# Patient Record
Sex: MALE | Race: WHITE | NOT HISPANIC OR LATINO | Employment: PART TIME | URBAN - METROPOLITAN AREA
[De-identification: names, ages, dates, MRNs, and addresses within clinical notes are randomized per-mention and may not be internally consistent; named-entity substitution may affect disease eponyms.]

---

## 2019-06-11 ENCOUNTER — TELEPHONE (OUTPATIENT)
Dept: PAIN MEDICINE | Facility: CLINIC | Age: 84
End: 2019-06-11

## 2019-06-11 NOTE — TELEPHONE ENCOUNTER
Staff contacted and spoke with patient in regards to a staff member reaching out to him to confirm his appointment date & time with odette Jimenez for 06.13.19 at 2:30p    Patient verbalized understanding and has confirmed appointment

## 2019-06-11 NOTE — TELEPHONE ENCOUNTER
----- Message from Amber Gillis sent at 6/11/2019 10:25 AM CDT -----  Contact: AMY NERI [[id number]]  Type:  Patient Returning Call    Who Called: AMY NERI [[id number]]    Who Left Message for Patient: Maida     Does the patient know what this is regarding?:yes    Would the patient rather a call back or a response via [facility]? Call back     Best Call Back Number:[phone]    Additional Information: Patient says maida had more questions regarding appt

## 2019-06-11 NOTE — TELEPHONE ENCOUNTER
"Called and spoke with pt to confirm appointment on 6/13/19@2:30 pm.     Pt informed staff he will call back to go over further appointment information because "now is not a good time."  " - A1c 4.8  - insulin LD SSI  - will monitor for hyperglycemia once tube feeds are started.   - in the meantime POCT q6h

## 2019-06-13 ENCOUNTER — OFFICE VISIT (OUTPATIENT)
Dept: PAIN MEDICINE | Facility: CLINIC | Age: 84
End: 2019-06-13
Attending: ANESTHESIOLOGY
Payer: MEDICARE

## 2019-06-13 VITALS
SYSTOLIC BLOOD PRESSURE: 137 MMHG | RESPIRATION RATE: 18 BRPM | TEMPERATURE: 97 F | DIASTOLIC BLOOD PRESSURE: 57 MMHG | HEART RATE: 54 BPM

## 2019-06-13 DIAGNOSIS — G03.9 ARACHNOIDITIS: ICD-10-CM

## 2019-06-13 DIAGNOSIS — G89.4 CHRONIC PAIN SYNDROME: Primary | ICD-10-CM

## 2019-06-13 PROCEDURE — 99999 PR PBB SHADOW E&M-EST. PATIENT-LVL III: CPT | Mod: PBBFAC,,, | Performed by: ANESTHESIOLOGY

## 2019-06-13 PROCEDURE — 99213 OFFICE O/P EST LOW 20 MIN: CPT | Mod: PBBFAC | Performed by: ANESTHESIOLOGY

## 2019-06-13 PROCEDURE — 99204 OFFICE O/P NEW MOD 45 MIN: CPT | Mod: S$PBB,GC,, | Performed by: ANESTHESIOLOGY

## 2019-06-13 PROCEDURE — 99204 PR OFFICE/OUTPT VISIT, NEW, LEVL IV, 45-59 MIN: ICD-10-PCS | Mod: S$PBB,GC,, | Performed by: ANESTHESIOLOGY

## 2019-06-13 PROCEDURE — 99999 PR PBB SHADOW E&M-EST. PATIENT-LVL III: ICD-10-PCS | Mod: PBBFAC,,, | Performed by: ANESTHESIOLOGY

## 2019-06-13 RX ORDER — LEVOTHYROXINE SODIUM 100 UG/1
100 TABLET ORAL
COMMUNITY

## 2019-06-13 RX ORDER — SIMVASTATIN 20 MG/1
20 TABLET, FILM COATED ORAL NIGHTLY
COMMUNITY

## 2019-06-13 RX ORDER — FLURAZEPAM HYDROCHLORIDE 15 MG/1
30 CAPSULE ORAL DAILY PRN
COMMUNITY
End: 2020-08-03

## 2019-06-13 RX ORDER — ERGOCALCIFEROL (VITAMIN D2) 10 MCG
50000 TABLET ORAL
Status: ON HOLD | COMMUNITY
End: 2022-03-09 | Stop reason: HOSPADM

## 2019-06-13 RX ORDER — CELECOXIB 200 MG/1
200 CAPSULE ORAL DAILY
COMMUNITY
End: 2021-08-26 | Stop reason: SDUPTHER

## 2019-06-13 RX ORDER — FINASTERIDE 5 MG/1
5 TABLET, FILM COATED ORAL DAILY
COMMUNITY
End: 2022-03-02

## 2019-06-13 RX ORDER — ALPRAZOLAM 1 MG/1
2 TABLET ORAL
Status: ON HOLD | COMMUNITY
End: 2023-02-13 | Stop reason: HOSPADM

## 2019-06-13 RX ORDER — TRAMADOL HYDROCHLORIDE 50 MG/1
TABLET ORAL
COMMUNITY
Start: 2019-04-12 | End: 2019-09-19 | Stop reason: SDUPTHER

## 2019-06-13 NOTE — PROGRESS NOTES
Chronic Pain - New Consult    Referring Physician: Dr Luz  Chief Complaint:   Chief Complaint   Patient presents with    Leg Pain     righ    Hip Pain     right        SUBJECTIVE:    Jefferson Boogie presents to the clinic for the evaluation of right leg and hip pain. The pain started 1.5 years ago following non related injury and symptoms have been worsening.The pain is located in the hip and gluteus area and radiates to the right calf.  The pain is described as burning, sharp and intermitten and is rated as 5/10. The pain is rated with a score of  1/10 on the BEST day and a score of 9/10 on the WORST day.  Symptoms interfere with daily activity and work. The pain is exacerbated by Sitting, Laying, Walking and Night Time.  The pain is mitigated by medications and rest. He reports spending 0 hours per day reclining. The patient reports 3 hours of uninterrupted sleep per night.  Pain imporves as the day progresses.  It interferes with activities such as golfing and exercising.    He was previously under the care of Dr. Kumar, Dr. Medina, and underwent ESIs for radicular back pain, however it was noted on MRI the interval development of arachnoiditis.  He subsequently underwent evaluation at the [facility] by Dr. Luz - his note is available in care everywhere.  Neuromodulation therapy was recommended at that time and referral was placed to Dr. Jimenez.    Patient is here for evaluation regarding spinal cord stimulator trial for arachnoiditis      Patient reports night fever/night sweats, urinary incontinence and bowel incontinence.    Physical Therapy/Home Exercise: yes, exercise     Pain Disability Index Review:  Last 3 PDI Scores 6/13/2019   Pain Disability Index (PDI) 18       Pain Medications:    - Opioids: Ultram (Tramadol HCL)  - Adjuvant Medications: none  - Anti-Coagulants: none  - Others: see med list     report:  Reviewed and consistent with medication use as prescribed.    Pain Procedures:    Lumbar injections at [facility] 2018    Imaging:  MRI OF THE LUMBAR SPINE WITH AND WITHOUT I.V. CONTRAST:    CPT CODE: 08885    INDICATION: Low back pain, rapidly progressive neuro deficit    COMPARISON STUDY: Unenhanced MRI lumbar spine of 4/8/2019 and 3/26/2018    TECHNIQUE:This study was performed on the i4.ms 1.5 [place] high field MR unit. Multislice, multisequence images of the lumbar spine were obtained in multiple projections both before and following intravenous administration of the same 9 mL of gadavist contrast that was utilized to obtain the patient's MRI of the thoracic spine.. The intravenous contrast was injected in the patient's left antecubital fossa.    FINDINGS: There are 5 lumbar type vertebral bodies lumbar vertebral body height and alignment are maintained. The signal from the lumbar vertebra demonstrates an admixture of red and yellow marrow. There are some Modic type degenerative signal changes involving the anterior inferior aspect of L5. There is slight anterior disc bulging at the L5-S1 level. All of the lumbar discs are desiccated with severe narrowing of the L2-L3 disc space, moderate narrowing of the L1-2 disc space posteriorly, moderate narrowing of the L5-S1 disc space and mild narrowing of the L3-4 and L4-5 disc spaces.    L5-S1 level: There is bilateral facet joint arthropathy with a slight amount of fluid in both facet joints. There is bilateral, right greater left, foraminal narrowing but no central canal stenosis. There is a mild broad-based posterior protrusion of the L5-S1 disc that extends towards the left-sided foramen and into and possibly through the right-sided foramen. This is similar to what was seen on the prior exam. Incidental note is made of a mild amount of epidural fat that is deposited along the right anterior and left anterior aspect of thecal sac at the L5-S1 disc space level.    L4-L5 level: There is bilateral, left greater than right, facet joint arthropathy without  central canal stenosis. There is no bony foraminal narrowing. There is a slight to mild broad-based posterior protrusion of the disc that extends to the medial foramen bilaterally.    L3-L4 level: There is bilateral facet joint arthropathy with slight ligament flavum redundancy but no bony foraminal narrowing or bony central canal stenosis. There is a slight to mild broad-based posterior protrusion of the disc that extends towards the right-sided foramen and into the anteromedial left-sided foramen.    L2-L3 level: There is bilateral facet joint arthropathy without bony central canal stenosis or bony foraminal narrowing. There is a slight to mild bilobed posterior protrusion of the disc at the level of the anteromedial foramen bilaterally and this is best seen on the axial images.    L1-L2 level: There is bilateral facet arthropathy without bony foraminal narrowing or bony central canal stenosis and the posterior disc margin is unremarkable.    The tip of the conus medullaris extends down to the level of the superior endplate of L1 and the signal from the visualized conus is unremarkable. There is clumping of the cauda equina nerve rootlets throughout the distal thecal sac consistent with arachnoiditis.  Following contrast administration, there is no abnormal enhancement of any of the bony or soft tissue elements of the lumbar spine except for possibly one or 2 facet joints.    IMPRESSION:   1. No abnormal enhancement of any bony or soft tissue elements of the lumbar spine except for possibly one or 2 facet joints. Clumping of the nerve rootlets of the cauda equina consistent with arachnoiditis.  3. Desiccation of all of the lumbar disks with multilevel disc space narrowing, multilevel facet joint arthropathy, but no central canal stenosis.  4. Modic type degenerative signal changes in the anterior inferior aspect of L5.  5. There is a mild amount of epidural fat deposit along the right anterior and left anterior  aspect of the thecal sac at the L5-S1 level..  6. Other findings as discussed above.    THE RESULTS OF THE STUDY WERE DISCUSSED BY TELEPHONE WITH DR. PAMELLA EDGAR in the [facility]  Critical Result system on 4/24/2019 12:52 PM CDT, Message ID [id number].    No past medical history on file.  No past surgical history on file.  Social History     Socioeconomic History    Marital status:      Spouse name: Not on file    Number of children: Not on file    Years of education: Not on file    Highest education level: Not on file   Occupational History    Not on file   Social Needs    Financial resource strain: Not on file    Food insecurity:     Worry: Not on file     Inability: Not on file    Transportation needs:     Medical: Not on file     Non-medical: Not on file   Tobacco Use    Smoking status: Not on file   Substance and Sexual Activity    Alcohol use: Not on file    Drug use: Not on file    Sexual activity: Not on file   Lifestyle    Physical activity:     Days per week: Not on file     Minutes per session: Not on file    Stress: Not on file   Relationships    Social connections:     Talks on phone: Not on file     Gets together: Not on file     Attends Evangelical service: Not on file     Active member of club or organization: Not on file     Attends meetings of clubs or organizations: Not on file     Relationship status: Not on file   Other Topics Concern    Not on file   Social History Narrative    Not on file     No family history on file.    Review of patient's allergies indicates:  No Known Allergies    Current Outpatient Medications   Medication Sig    ALPRAZolam (XANAX) 1 MG tablet Take 1 mg by mouth.    celecoxib (CELEBREX) 200 MG capsule Take 200 mg by mouth 2 (two) times daily.    ergocalciferol, vitamin D2, 400 unit Tab Take 50,000 mg by mouth.    finasteride (PROSCAR) 5 mg tablet Take 5 mg by mouth.    flurazepam (DALMANE) 15 MG Cap Take 30 mg by mouth daily as  needed.    levothyroxine (SYNTHROID) 100 MCG tablet Take 100 mcg by mouth.    simvastatin (ZOCOR) 20 MG tablet Take 20 mg by mouth.    traMADol (ULTRAM) 50 mg tablet TAKE 1 TABLET BY MOUTH 2 TO 3 TIMES A DAY AS NEEDED FOR SEVERE PAIN     No current facility-administered medications for this visit.        REVIEW OF SYSTEMS:    GENERAL:  No weight loss, malaise or fevers.  HEENT:  Negative for frequent or significant headaches.  NECK:  Negative for lumps, goiter, pain and significant neck swelling.  RESPIRATORY:  Negative for cough, wheezing or shortness of breath.  CARDIOVASCULAR:  Negative for chest pain, leg swelling or palpitations.  + HLD  GI:  Negative for abdominal discomfort, blood in stools or black stools or change in bowel habits.  MUSCULOSKELETAL:  See HPI.  SKIN:  Negative for lesions, rash, and itching.  PSYCH:  Positive for sleep disturbance, mood disorder and recent psychosocial stressors.   +BZD for sleep  HEMATOLOGY/LYMPHOLOGY:  Negative for prolonged bleeding, bruising easily or swollen nodes.  NEURO:   No history of headaches, syncope, paralysis, seizures or tremors.  All other reviewed and negative other than HPI.    OBJECTIVE:    BP (!) 137/57   Pulse (!) 54   Temp 96.8 °F (36 °C)   Resp 18     PHYSICAL EXAMINATION:    General appearance: Well appearing, in no acute distress, alert and oriented x3.  Psych:  Mood and affect appropriate.  Skin: Skin color, texture, turgor normal, no rashes or lesions, in both upper and lower body.  Head/face:  Normocephalic, atraumatic. No palpable lymph nodes.  Neck: No pain to palpation over the cervical paraspinous muscles. Spurling Negative. No pain with neck flexion, extension, or lateral flexion.   Cor: RRR  Pulm: CTA  GI:  Soft and non-tender.  Back: Straight leg raising in the sitting and supine positions is negative to radicular pain. Poorly localized pain to palpation over the spine or costovertebral angles. Normal range of motion without pain  reproduction.Positive axial loading test bilateral.  Positive FABERE,Ganselin and Yeoman's test on the both side.negative FADIR  Extremities: Peripheral joint ROM is full and pain free without obvious instability or laxity in all four extremities. No deformities, edema, or skin discoloration. Good capillary refill.  Musculoskeletal: Shoulder, hip, sacroiliac and knee provocative maneuvers are negative. Bilateral upper and lower extremity strength is normal and symmetric.  No atrophy or tone abnormalities are noted.  Neuro: Bilateral upper and lower extremity coordination and muscle stretch reflexes are physiologic and symmetric.  Plantar response are downgoing. No loss of sensation is noted.  Gait:  Antalgic    ASSESSMENT: 84 y.o. year old male with low back pain, consistent with below:     1. Chronic pain syndrome  Ambulatory consult to Psychology   2. Arachnoiditis  Ambulatory consult to Psychology         PLAN:     - I have stressed the importance of physical activity and a home exercise plan to help with pain and improve health.  - Patient can continue with medications for now since they are providing benefits, using them appropriately, and without side effects.  - Referral for psychological evaluation for SCS  - S&C for  dorsal column stimulator  - RTC 1 week following procedure  - Counseled patient regarding the importance of activity modification, constant sleeping habits and physical therapy.    The above plan and management options were discussed at length with patient. Patient is in agreement with the above and verbalized understanding. It will be communicated with the referring physician via electronic record, fax, or mail.    Maida Rowley MD  Pain Medicine  [phone]  PGY-V     I have personally reviewed the history and exam of this patient and agree with the resident/fellow/NPs note as stated above.    Samuel Jimenez MD

## 2019-06-14 ENCOUNTER — TELEPHONE (OUTPATIENT)
Dept: PAIN MEDICINE | Facility: CLINIC | Age: 84
End: 2019-06-14

## 2019-06-14 NOTE — TELEPHONE ENCOUNTER
Contacted patient to schedule psych testing/eval for SCS.    Psych testing/eval scheduled, all dates/times given, appt reminder letters mailed.    Patient acknowledged information given and expressed understanding.

## 2019-06-17 ENCOUNTER — TELEPHONE (OUTPATIENT)
Dept: PAIN MEDICINE | Facility: CLINIC | Age: 84
End: 2019-06-17

## 2019-06-17 NOTE — TELEPHONE ENCOUNTER
Contacted patient regarding message.    Patient stated he wanted to know if he could exercise before, during and after the SCS trial.    Staff informed pt he could do physical activities as tolerated before the trial, during the trial he will be given specific instructions with limited activities, and after the trial he can go to his normal activities as tolerated. Pt was advised to follow discharge instructions after the trial.    Patient also requested a f/u appt with Dr. Jimenez to discuss the SCS further. F/u appt with Dr. Jimenez scheduled.    Patient acknowledged information given and expressed understanding.

## 2019-06-17 NOTE — TELEPHONE ENCOUNTER
----- Message from Lui Monroy sent at 6/17/2019 11:22 AM CDT -----  Contact: AMY NERI [[id number]]  Name of Who is Calling: AMY NERI [[id number]]      What is the request in detail: Would like to speak with Deepika in regards to exercise? No other information provided. Please advise      Can the clinic reply by [facility]: no      What Number to Call Back if not in [facility]: [phone]

## 2019-07-11 ENCOUNTER — TELEPHONE (OUTPATIENT)
Dept: PAIN MEDICINE | Facility: CLINIC | Age: 84
End: 2019-07-11

## 2019-07-11 NOTE — TELEPHONE ENCOUNTER
Attempted to contact patient to confirm appointment for tomorrow 7/10/19 at 9:00am for telemed, no answer, left detailed voice message and requested a return call.

## 2019-07-12 ENCOUNTER — OFFICE VISIT (OUTPATIENT)
Dept: PSYCHIATRY | Facility: CLINIC | Age: 84
End: 2019-07-12
Payer: MEDICARE

## 2019-07-12 ENCOUNTER — TELEPHONE (OUTPATIENT)
Dept: PAIN MEDICINE | Facility: CLINIC | Age: 84
End: 2019-07-12

## 2019-07-12 ENCOUNTER — DOCUMENTATION ONLY (OUTPATIENT)
Dept: PSYCHIATRY | Facility: CLINIC | Age: 84
End: 2019-07-12

## 2019-07-12 DIAGNOSIS — G47.00 INSOMNIA, UNSPECIFIED TYPE: ICD-10-CM

## 2019-07-12 DIAGNOSIS — Z01.818 PREOP EXAMINATION: Primary | ICD-10-CM

## 2019-07-12 DIAGNOSIS — Z86.59 HISTORY OF ADJUSTMENT DISORDER: ICD-10-CM

## 2019-07-12 DIAGNOSIS — G89.4 CHRONIC PAIN SYNDROME: ICD-10-CM

## 2019-07-12 DIAGNOSIS — G03.9 ARACHNOIDITIS: ICD-10-CM

## 2019-07-12 DIAGNOSIS — G89.4 CHRONIC PAIN SYNDROME: Primary | ICD-10-CM

## 2019-07-12 PROCEDURE — 90791 PR PSYCHIATRIC DIAGNOSTIC EVALUATION: ICD-10-PCS | Mod: GT,,, | Performed by: PSYCHOLOGIST

## 2019-07-12 PROCEDURE — 96139 PSYCL/NRPSYC TST TECH EA: CPT | Mod: ,,, | Performed by: PSYCHOLOGIST

## 2019-07-12 PROCEDURE — 99499 NO LOS: ICD-10-PCS | Mod: S$PBB,,, | Performed by: PSYCHOLOGIST

## 2019-07-12 PROCEDURE — 96138 PR PSYCH/NEUROPSYCH TEST ADMIN/SCORING, BY TECH, 2+ TESTS, 1ST 30 MIN: ICD-10-PCS | Mod: ,,, | Performed by: PSYCHOLOGIST

## 2019-07-12 PROCEDURE — 96139 PR PSYCH/NEUROPSYCH TEST ADMIN/SCORING, BY TECH, 2+ TESTS, EA ADDTL 30 MIN: ICD-10-PCS | Mod: ,,, | Performed by: PSYCHOLOGIST

## 2019-07-12 PROCEDURE — 90791 PSYCH DIAGNOSTIC EVALUATION: CPT | Mod: GT,,, | Performed by: PSYCHOLOGIST

## 2019-07-12 PROCEDURE — 96130 PR PSYCHOLOGIC TEST EVAL SVCS, 1ST HR: ICD-10-PCS | Mod: ,,, | Performed by: PSYCHOLOGIST

## 2019-07-12 PROCEDURE — 96130 PSYCL TST EVAL PHYS/QHP 1ST: CPT | Mod: ,,, | Performed by: PSYCHOLOGIST

## 2019-07-12 PROCEDURE — 96131 PSYCL TST EVAL PHYS/QHP EA: CPT | Mod: ,,, | Performed by: PSYCHOLOGIST

## 2019-07-12 PROCEDURE — 99499 UNLISTED E&M SERVICE: CPT | Mod: S$PBB,,, | Performed by: PSYCHOLOGIST

## 2019-07-12 PROCEDURE — 96131 PR PSYCHOLOGIC TEST EVAL SVCS, EA ADDTL HR: ICD-10-PCS | Mod: ,,, | Performed by: PSYCHOLOGIST

## 2019-07-12 PROCEDURE — 96138 PSYCL/NRPSYC TECH 1ST: CPT | Mod: ,,, | Performed by: PSYCHOLOGIST

## 2019-07-12 NOTE — PROGRESS NOTES
This evaluation revealed NO contraindications to SCS from a psychological perspective.  Test results should be considered interpretable, and indicate that he reports somatic complaints and problematic perceptions related to pain catastrophizing.  He was open to optional treatment recommendations.  Based on research and recommendations by Janina et al. (2017) and Janina and Kamron (2013) regarding presurgical psychological screening for pain control procedures, his test results and reports are within the expected range to predict adequate outcomes and patient satisfaction.  In the clinical interview, Mr. Boogie reports a history of adjustment issues in full remission and insomnia that is well-managed with psychotropic medication.  There are no recommendations for psychological intervention at this time.  JR Chandana.

## 2019-07-12 NOTE — PSYCH TESTING
[facility]  [address] [place]  [address]  [phone]    REPORT OF PSYCHOLOGICAL TESTING    NAME: Jefferson Boogie  OC #: [id number]  : 1935    REFERRED BY:  Samuel Jimenez M.D.    EVALUATED BY:  Yisel Vogt, Ph.D., Clinical Psychologist  VALORIE Chavira, Technician    DATES OF EVALUATION: 2019, 2019    EVALUATION PROCEDURES AND TIMES:  Conducted by Psychologist (2 hours):  Integration of patient data, interpretation of standardized test results and clinical data, clinical decision-making, treatment planning and report, and interactive feedback to the patient  CPT Codes:  24623 - 1 hour; 92019 - 1 hour  Conducted by Technician (1 hour, 48 minutes):  Psychological test administration and scoring by technician, two or more tests, any method:  [place] Multiphasic Personality Inventory - 2 - Restructured Form (MMPI-2-RF); Pain and Impairment Relationship Scale (PAIRS); Beckham Pain Catastrophizing Scale (PCS)  CPT Codes:  84583 - 30 minutes; 30997 - 30 minutes, 14903 - 30 minutes, 88657 - 30 minutes (3 additional units)    EVALUATION FINDINGS:  The diagnostic interview revealed that Mr. Jefferson Boogie is a 84-year-old White  male referred for Psychological Evaluation prior to surgical implantation of a spinal cord stimulator (SCS) to address chronic pain.  He has tried multiple interventions without receiving sufficient relief.  His activities are limited.  Mr. Boogie is now interested in a trial of the SCS.  He is familiar with the procedures involved, understands risks of surgery, and indicates no concerns.  His expectations are reasonable, and he will consider other options if SCS is ineffective.    Mr. Boogie reports a history of outpatient therapy for adjustment issues with depressive symptoms.  He is currently prescribed Xanax for insomnia.  Otherwise, he does not report current psychiatric problems or adjustment issues.  The medical  record also revealed the following diagnoses:  Chronic pain syndrome; Arachnoiditis.    TEST DATA:  Psychological Testing data revealed that he was fully cooperative and engaged in the assessment process.  Effort on all tests was satisfactory to produce interpretable results.    MMPI-2-RF.  The MMPI-2-RF provides an assessment of personality and psychopathology with specific evaluation of psychosocial risk factors associated with outcomes of spinal cord stimulation.  Mr. Boogie produced an interpretable MMPI-2-RF profile. Of note, validity scale results suggest Mr. Boogie tended to portray himself in an overly positive and well-adjusted presentation, which may indicate an underestimation of problems assessed by this test.  This profile should be interpreted with these issues in mind (specifically potential underreporting), in which these results do not rule out symptoms or the possibility that certain treatment approaches could prove helpful for optimizing the candidates outcomes.    Mr. Boogie reports somatic complaints including neurological symptoms, poor health, and feeling weak or tired.  Interpersonally, he describes others as well-intentioned and trustworthy.  He is unlikely to be cynical.  Although there are no indications of emotional problems, disordered thinking, or behavioral issues, these problems cannot be ruled-out due to indications of under-reporting described earlier.    Compared to other SCS candidates, Mr. Dickson scores are all within the typical range.  His profile did not indicate any presurgical risk factors or adverse postsurgical outcomes.  There were no treatment recommendations indicated.    PAIRS and PCS.  The PAIRS and PCS reveal beliefs and attitudes related to pain that may impact outcomes of spinal cord stimulation.  The PAIRS indicated non-significant complications related to perceptions of impairment with a total score of 66 (a score over 75 is clinically  significant).  The PCS indicated significant catastrophizing of pain with a total score of 32, which is in the 80th percentile (a score over 30 is in the 75th percentile and is clinically significant).    Feedback.  Mr. Boogie was provided with test results, and offered the opportunity to respond to feedback and clarify results if needed.    DIAGNOSTIC IMPRESSIONS:    ICD-10-CM ICD-9-CM   1. Preop examination Z01.818 V72.84   2. Chronic pain syndrome G89.4 338.4   3. Arachnoiditis G03.9 322.9   4. Insomnia, unspecified type G47.00 780.52   5. History of adjustment disorder Z86.69 V12.40       SUMMARY AND RECOMMENDATIONS:  Mr. Boogie has a history of severe pain and is pursuing the spinal cord stimulator (SCS) to improve pain and quality of life.  Test results should be considered interpretable, and indicate that he reports somatic complaints and problematic perceptions related to pain catastrophizing.  He was open to optional treatment recommendations.  Based on research and recommendations by Janina et al. (2017) and Janina and Kamron (2013) regarding presurgical psychological screening for pain control procedures, his test results and reports are within the expected range to predict adequate outcomes and patient satisfaction.  In the clinical interview, Mr. Boogie reports a history of adjustment issues in full remission and insomnia that is well-managed with psychotropic medication.  There are no recommendations for psychological intervention at this time.  This evaluation revealed NO contraindications to SCS from a psychological perspective.        Interpretation and report and coding were completed on 07/12/2019.
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
## 2019-07-15 ENCOUNTER — PATIENT MESSAGE (OUTPATIENT)
Dept: PSYCHIATRY | Facility: CLINIC | Age: 84
End: 2019-07-15

## 2019-07-15 DIAGNOSIS — G03.9 ARACHNOIDITIS: ICD-10-CM

## 2019-07-15 DIAGNOSIS — G89.4 CHRONIC PAIN SYNDROME: Primary | ICD-10-CM

## 2019-07-16 ENCOUNTER — TELEPHONE (OUTPATIENT)
Dept: PAIN MEDICINE | Facility: CLINIC | Age: 84
End: 2019-07-16

## 2019-07-16 NOTE — TELEPHONE ENCOUNTER
Called to confirm appointment with Dr. Jimenez on 7/18/19 @ 9:30AM in [facility] clinic.          Pt did not answer. LVM to contact the office to confirm

## 2019-07-18 ENCOUNTER — OFFICE VISIT (OUTPATIENT)
Dept: PAIN MEDICINE | Facility: CLINIC | Age: 84
End: 2019-07-18
Attending: ANESTHESIOLOGY
Payer: MEDICARE

## 2019-07-18 ENCOUNTER — PATIENT MESSAGE (OUTPATIENT)
Dept: CARDIOLOGY | Facility: OTHER | Age: 84
End: 2019-07-18

## 2019-07-18 VITALS
WEIGHT: 207 LBS | RESPIRATION RATE: 18 BRPM | DIASTOLIC BLOOD PRESSURE: 54 MMHG | HEART RATE: 55 BPM | HEIGHT: 74 IN | SYSTOLIC BLOOD PRESSURE: 133 MMHG | BODY MASS INDEX: 26.56 KG/M2 | TEMPERATURE: 98 F

## 2019-07-18 DIAGNOSIS — G03.9 ARACHNOIDITIS: ICD-10-CM

## 2019-07-18 DIAGNOSIS — M54.15 RADICULOPATHY OF THORACOLUMBAR REGION: ICD-10-CM

## 2019-07-18 DIAGNOSIS — G89.4 CHRONIC PAIN SYNDROME: Primary | ICD-10-CM

## 2019-07-18 PROCEDURE — 99214 OFFICE O/P EST MOD 30 MIN: CPT | Mod: S$PBB,GC,, | Performed by: ANESTHESIOLOGY

## 2019-07-18 PROCEDURE — 99214 OFFICE O/P EST MOD 30 MIN: CPT | Mod: PBBFAC | Performed by: ANESTHESIOLOGY

## 2019-07-18 PROCEDURE — 99999 PR PBB SHADOW E&M-EST. PATIENT-LVL IV: ICD-10-PCS | Mod: PBBFAC,,, | Performed by: ANESTHESIOLOGY

## 2019-07-18 PROCEDURE — 99214 PR OFFICE/OUTPT VISIT, EST, LEVL IV, 30-39 MIN: ICD-10-PCS | Mod: S$PBB,GC,, | Performed by: ANESTHESIOLOGY

## 2019-07-18 PROCEDURE — 99999 PR PBB SHADOW E&M-EST. PATIENT-LVL IV: CPT | Mod: PBBFAC,,, | Performed by: ANESTHESIOLOGY

## 2019-07-18 NOTE — PROGRESS NOTES
Chronic patient Established Note (Follow up visit)      SUBJECTIVE:    Jefferson Boogie presents to the clinic for a follow-up appointment for right hip and right calf. Since the last visit, Jefferson Boogie states the pain has been persistant. Current pain intensity is 7/10. He was last seen in clinic on 6/25/2019. His pain is unchanged in character and intensity since last visit. The pain is located in the hip and gluteus area and radiates to the right calf.  The pain is described as burning, sharp and intermitten and is rated as 5/10. The pain is rated with a score of  1/10 on the BEST day and a score of 9/10 on the WORST day.  Symptoms interfere with daily activity and work. The pain is exacerbated by Sitting, Laying, Walking and Night Time.  The pain is mitigated by medications and rest.  He has been seen by psychology and it was determined that he has no CI to SCS trial.   Answered patient's questions and concerns about the SCS trial and he remains interested in pursuing.     Pain Disability Index Review:  Last 3 PDI Scores 7/18/2019 6/13/2019   Pain Disability Index (PDI) 40 18       Pain Medications:    - Opioids: Ultram ER ( Tramadol HCL)     report:  Reviewed and consistent with medication use as prescribed.    Pain Procedures: none    Physical Therapy/Home Exercise: yes    Imaging:  MRI OF THE LUMBAR SPINE WITH AND WITHOUT I.V. CONTRAST:    CPT CODE: 48562    INDICATION: Low back pain, rapidly progressive neuro deficit    COMPARISON STUDY: Unenhanced MRI lumbar spine of 4/8/2019 and 3/26/2018    TECHNIQUE:This study was performed on the GenieDB 1.5 [place] high field MR unit. Multislice, multisequence images of the lumbar spine were obtained in multiple projections both before and following intravenous administration of the same 9 mL of gadavist contrast that was utilized to obtain the patient's MRI of the thoracic spine.. The intravenous contrast was injected in the patient's left antecubital  fossa.    FINDINGS: There are 5 lumbar type vertebral bodies lumbar vertebral body height and alignment are maintained. The signal from the lumbar vertebra demonstrates an admixture of red and yellow marrow. There are some Modic type degenerative signal changes involving the anterior inferior aspect of L5. There is slight anterior disc bulging at the L5-S1 level. All of the lumbar discs are desiccated with severe narrowing of the L2-L3 disc space, moderate narrowing of the L1-2 disc space posteriorly, moderate narrowing of the L5-S1 disc space and mild narrowing of the L3-4 and L4-5 disc spaces.    L5-S1 level: There is bilateral facet joint arthropathy with a slight amount of fluid in both facet joints. There is bilateral, right greater left, foraminal narrowing but no central canal stenosis. There is a mild broad-based posterior protrusion of the L5-S1 disc that extends towards the left-sided foramen and into and possibly through the right-sided foramen. This is similar to what was seen on the prior exam. Incidental note is made of a mild amount of epidural fat that is deposited along the right anterior and left anterior aspect of thecal sac at the L5-S1 disc space level.    L4-L5 level: There is bilateral, left greater than right, facet joint arthropathy without central canal stenosis. There is no bony foraminal narrowing. There is a slight to mild broad-based posterior protrusion of the disc that extends to the medial foramen bilaterally.    L3-L4 level: There is bilateral facet joint arthropathy with slight ligament flavum redundancy but no bony foraminal narrowing or bony central canal stenosis. There is a slight to mild broad-based posterior protrusion of the disc that extends towards the right-sided foramen and into the anteromedial left-sided foramen.    L2-L3 level: There is bilateral facet joint arthropathy without bony central canal stenosis or bony foraminal narrowing. There is a slight to mild bilobed  posterior protrusion of the disc at the level of the anteromedial foramen bilaterally and this is best seen on the axial images.    L1-L2 level: There is bilateral facet arthropathy without bony foraminal narrowing or bony central canal stenosis and the posterior disc margin is unremarkable.    The tip of the conus medullaris extends down to the level of the superior endplate of L1 and the signal from the visualized conus is unremarkable. There is clumping of the cauda equina nerve rootlets throughout the distal thecal sac consistent with arachnoiditis.  Following contrast administration, there is no abnormal enhancement of any of the bony or soft tissue elements of the lumbar spine except for possibly one or 2 facet joints.    IMPRESSION:   1. No abnormal enhancement of any bony or soft tissue elements of the lumbar spine except for possibly one or 2 facet joints. Clumping of the nerve rootlets of the cauda equina consistent with arachnoiditis.  3. Desiccation of all of the lumbar disks with multilevel disc space narrowing, multilevel facet joint arthropathy, but no central canal stenosis.  4. Modic type degenerative signal changes in the anterior inferior aspect of L5.  5. There is a mild amount of epidural fat deposit along the right anterior and left anterior aspect of the thecal sac at the L5-S1 level..  6. Other findings as discussed above.        Allergies: Review of patient's allergies indicates:  No Known Allergies    Current Medications:   Current Outpatient Medications   Medication Sig Dispense Refill    ALPRAZolam (XANAX) 1 MG tablet Take 1 mg by mouth.      celecoxib (CELEBREX) 200 MG capsule Take 200 mg by mouth 2 (two) times daily.      ergocalciferol, vitamin D2, 400 unit Tab Take 50,000 mg by mouth.      finasteride (PROSCAR) 5 mg tablet Take 5 mg by mouth.      flurazepam (DALMANE) 15 MG Cap Take 30 mg by mouth daily as needed.      levothyroxine (SYNTHROID) 100 MCG tablet Take 100 mcg by  mouth.      simvastatin (ZOCOR) 20 MG tablet Take 20 mg by mouth.      traMADol (ULTRAM) 50 mg tablet TAKE 1 TABLET BY MOUTH 2 TO 3 TIMES A DAY AS NEEDED FOR SEVERE PAIN       No current facility-administered medications for this visit.        REVIEW OF SYSTEMS:    GENERAL:  No weight loss, malaise or fevers.  HEENT:  Negative for frequent or significant headaches.  NECK:  Negative for lumps, goiter, pain and significant neck swelling.  RESPIRATORY:  Negative for cough, wheezing or shortness of breath.  CARDIOVASCULAR:  Negative for chest pain, leg swelling or palpitations.  + HLD  GI:  Negative for abdominal discomfort, blood in stools or black stools or change in bowel habits.  MUSCULOSKELETAL:  See HPI.  SKIN:  Negative for lesions, rash, and itching.  PSYCH:  Positive for sleep disturbance, mood disorder and recent psychosocial stressors.   +BZD for sleep  HEMATOLOGY/LYMPHOLOGY:  Negative for prolonged bleeding, bruising easily or swollen nodes.  NEURO:   No history of headaches, syncope, paralysis, seizures or tremors.  All other reviewed and negative other than HPI.    Past Medical History:  No past medical history on file.    Past Surgical History:  No past surgical history on file.    Family History:  No family history on file.    Social History:  Social History     Socioeconomic History    Marital status:      Spouse name: Not on file    Number of children: Not on file    Years of education: Not on file    Highest education level: Not on file   Occupational History    Not on file   Social Needs    Financial resource strain: Not on file    Food insecurity:     Worry: Not on file     Inability: Not on file    Transportation needs:     Medical: Not on file     Non-medical: Not on file   Tobacco Use    Smoking status: Former Smoker    Tobacco comment: stopped 40 years ago   Substance and Sexual Activity    Alcohol use: Not on file    Drug use: Not on file    Sexual activity: Not on file  "  Lifestyle    Physical activity:     Days per week: Not on file     Minutes per session: Not on file    Stress: Not on file   Relationships    Social connections:     Talks on phone: Not on file     Gets together: Not on file     Attends Orthodoxy service: Not on file     Active member of club or organization: Not on file     Attends meetings of clubs or organizations: Not on file     Relationship status: Not on file   Other Topics Concern    Not on file   Social History Narrative    Not on file       OBJECTIVE:    BP (!) 133/54   Pulse (!) 55   Temp 97.7 °F (36.5 °C)   Resp 18   Ht 6' 1.5" (1.867 m)   Wt 93.9 kg (207 lb)   BMI 26.94 kg/m²     PHYSICAL EXAMINATION:    General appearance: Well appearing, in no acute distress, alert and oriented x3.  Psych:  Mood and affect appropriate.  Skin: Skin color, texture, turgor normal, no rashes or lesions, in both upper and lower body.  Head/face:  Normocephalic, atraumatic. No palpable lymph nodes.  Neck: No pain to palpation over the cervical paraspinous muscles. Spurling Negative. No pain with neck flexion, extension, or lateral flexion.   Cor: RRR  Pulm: CTA  GI:  Soft and non-tender.  Back: Straight leg raising in the sitting and supine positions is negative to radicular pain. Poorly localized pain to palpation over the spine or costovertebral angles. Normal range of motion without pain reproduction.Positive axial loading test bilateral.  Positive FABERE,Ganselin and Yeoman's test on the both side.negative FADIR  Extremities: Peripheral joint ROM is full and pain free without obvious instability or laxity in all four extremities. No deformities, edema, or skin discoloration. Good capillary refill.  Musculoskeletal: Shoulder, hip, sacroiliac and knee provocative maneuvers are negative. Bilateral upper and lower extremity strength is normal and symmetric.  No atrophy or tone abnormalities are noted.  Neuro: Bilateral upper and lower extremity coordination and " muscle stretch reflexes are physiologic and symmetric.  Plantar response are downgoing. No loss of sensation is noted.  Gait:  Antalgic    ASSESSMENT: 84 y.o. year old male with  low back pain, consistent with below:     1. Chronic pain syndrome     2. Arachnoiditis     3. Radiculopathy of thoracolumbar region         PLAN:     - I have stressed the importance of physical activity and a home exercise plan to help with pain and improve health.  - Patient can continue with medications for now since they are providing benefits, using them appropriately, and without side effects.  - S&C for  dorsal column stimulator scheduled  - Answered patient's questions and addressed concerns today in clinic  - RTC 1 week following procedure  - Counseled patient regarding the importance of activity modification, constant sleeping habits and physical therapy.    The above plan and management options were discussed at length with patient. Patient is in agreement with the above and verbalized understanding.    Greater than 25 minutes spent in total in todays visit with the patient, with more than half that time direct face to face counseling and education with the patient today. We discussed the disease process, prognosis, treatment plan, and risks and benefits.      Hugh Desai   Anesthesiology CA-2   I have personally reviewed the history and exam of this patient and agree with the resident/fellow/NPs note as stated above.    Samuel Jimenez MD    07/18/2019

## 2019-07-18 NOTE — H&P (VIEW-ONLY)
Chronic patient Established Note (Follow up visit)      SUBJECTIVE:    Jefferson Boogie presents to the clinic for a follow-up appointment for right hip and right calf. Since the last visit, Jefferson Boogie states the pain has been persistant. Current pain intensity is 7/10. He was last seen in clinic on 6/25/2019. His pain is unchanged in character and intensity since last visit. The pain is located in the hip and gluteus area and radiates to the right calf.  The pain is described as burning, sharp and intermitten and is rated as 5/10. The pain is rated with a score of  1/10 on the BEST day and a score of 9/10 on the WORST day.  Symptoms interfere with daily activity and work. The pain is exacerbated by Sitting, Laying, Walking and Night Time.  The pain is mitigated by medications and rest.  He has been seen by psychology and it was determined that he has no CI to SCS trial.   Answered patient's questions and concerns about the SCS trial and he remains interested in pursuing.     Pain Disability Index Review:  Last 3 PDI Scores 7/18/2019 6/13/2019   Pain Disability Index (PDI) 40 18       Pain Medications:    - Opioids: Ultram ER ( Tramadol HCL)     report:  Reviewed and consistent with medication use as prescribed.    Pain Procedures: none    Physical Therapy/Home Exercise: yes    Imaging:  MRI OF THE LUMBAR SPINE WITH AND WITHOUT I.V. CONTRAST:    CPT CODE: 34116    INDICATION: Low back pain, rapidly progressive neuro deficit    COMPARISON STUDY: Unenhanced MRI lumbar spine of 4/8/2019 and 3/26/2018    TECHNIQUE:This study was performed on the [employer] 1[employer]5 [place] high field MR unit. Multislice, multisequence images of the lumbar spine were obtained in multiple projections both before and following intravenous administration of the same 9 mL of gadavist contrast that was utilized to obtain the patient's MRI of the thoracic spine.. The intravenous contrast was injected in the patient's left antecubital  fossa.    FINDINGS: There are 5 lumbar type vertebral bodies lumbar vertebral body height and alignment are maintained. The signal from the lumbar vertebra demonstrates an admixture of red and yellow marrow. There are some Modic type degenerative signal changes involving the anterior inferior aspect of L5. There is slight anterior disc bulging at the L5-S1 level. All of the lumbar discs are desiccated with severe narrowing of the L2-L3 disc space, moderate narrowing of the L1-2 disc space posteriorly, moderate narrowing of the L5-S1 disc space and mild narrowing of the L3-4 and L4-5 disc spaces.    L5-S1 level: There is bilateral facet joint arthropathy with a slight amount of fluid in both facet joints. There is bilateral, right greater left, foraminal narrowing but no central canal stenosis. There is a mild broad-based posterior protrusion of the L5-S1 disc that extends towards the left-sided foramen and into and possibly through the right-sided foramen. This is similar to what was seen on the prior exam. Incidental note is made of a mild amount of epidural fat that is deposited along the right anterior and left anterior aspect of thecal sac at the L5-S1 disc space level.    L4-L5 level: There is bilateral, left greater than right, facet joint arthropathy without central canal stenosis. There is no bony foraminal narrowing. There is a slight to mild broad-based posterior protrusion of the disc that extends to the medial foramen bilaterally.    L3-L4 level: There is bilateral facet joint arthropathy with slight ligament flavum redundancy but no bony foraminal narrowing or bony central canal stenosis. There is a slight to mild broad-based posterior protrusion of the disc that extends towards the right-sided foramen and into the anteromedial left-sided foramen.    L2-L3 level: There is bilateral facet joint arthropathy without bony central canal stenosis or bony foraminal narrowing. There is a slight to mild bilobed  posterior protrusion of the disc at the level of the anteromedial foramen bilaterally and this is best seen on the axial images.    L1-L2 level: There is bilateral facet arthropathy without bony foraminal narrowing or bony central canal stenosis and the posterior disc margin is unremarkable.    The tip of the conus medullaris extends down to the level of the superior endplate of L1 and the signal from the visualized conus is unremarkable. There is clumping of the cauda equina nerve rootlets throughout the distal thecal sac consistent with arachnoiditis.  Following contrast administration, there is no abnormal enhancement of any of the bony or soft tissue elements of the lumbar spine except for possibly one or 2 facet joints.    IMPRESSION:   1. No abnormal enhancement of any bony or soft tissue elements of the lumbar spine except for possibly one or 2 facet joints. Clumping of the nerve rootlets of the cauda equina consistent with arachnoiditis.  3. Desiccation of all of the lumbar disks with multilevel disc space narrowing, multilevel facet joint arthropathy, but no central canal stenosis.  4. Modic type degenerative signal changes in the anterior inferior aspect of L5.  5. There is a mild amount of epidural fat deposit along the right anterior and left anterior aspect of the thecal sac at the L5-S1 level..  6. Other findings as discussed above.        Allergies: Review of patient's allergies indicates:  No Known Allergies    Current Medications:   Current Outpatient Medications   Medication Sig Dispense Refill    ALPRAZolam (XANAX) 1 MG tablet Take 1 mg by mouth.      celecoxib (CELEBREX) 200 MG capsule Take 200 mg by mouth 2 (two) times daily.      ergocalciferol, vitamin D2, 400 unit Tab Take 50,000 mg by mouth.      finasteride (PROSCAR) 5 mg tablet Take 5 mg by mouth.      flurazepam (DALMANE) 15 MG Cap Take 30 mg by mouth daily as needed.      levothyroxine (SYNTHROID) 100 MCG tablet Take 100 mcg by  mouth.      simvastatin (ZOCOR) 20 MG tablet Take 20 mg by mouth.      traMADol (ULTRAM) 50 mg tablet TAKE 1 TABLET BY MOUTH 2 TO 3 TIMES A DAY AS NEEDED FOR SEVERE PAIN       No current facility-administered medications for this visit.        REVIEW OF SYSTEMS:    GENERAL:  No weight loss, malaise or fevers.  HEENT:  Negative for frequent or significant headaches.  NECK:  Negative for lumps, goiter, pain and significant neck swelling.  RESPIRATORY:  Negative for cough, wheezing or shortness of breath.  CARDIOVASCULAR:  Negative for chest pain, leg swelling or palpitations.  + HLD  GI:  Negative for abdominal discomfort, blood in stools or black stools or change in bowel habits.  MUSCULOSKELETAL:  See HPI.  SKIN:  Negative for lesions, rash, and itching.  PSYCH:  Positive for sleep disturbance, mood disorder and recent psychosocial stressors.   +BZD for sleep  HEMATOLOGY/LYMPHOLOGY:  Negative for prolonged bleeding, bruising easily or swollen nodes.  NEURO:   No history of headaches, syncope, paralysis, seizures or tremors.  All other reviewed and negative other than HPI.    Past Medical History:  No past medical history on file.    Past Surgical History:  No past surgical history on file.    Family History:  No family history on file.    Social History:  Social History     Socioeconomic History    Marital status:      Spouse name: Not on file    Number of children: Not on file    Years of education: Not on file    Highest education level: Not on file   Occupational History    Not on file   Social Needs    Financial resource strain: Not on file    Food insecurity:     Worry: Not on file     Inability: Not on file    Transportation needs:     Medical: Not on file     Non-medical: Not on file   Tobacco Use    Smoking status: Former Smoker    Tobacco comment: stopped 40 years ago   Substance and Sexual Activity    Alcohol use: Not on file    Drug use: Not on file    Sexual activity: Not on file  "  Lifestyle    Physical activity:     Days per week: Not on file     Minutes per session: Not on file    Stress: Not on file   Relationships    Social connections:     Talks on phone: Not on file     Gets together: Not on file     Attends [employer] service: Not on file     Active member of club or organization: Not on file     Attends meetings of clubs or organizations: Not on file     Relationship status: Not on file   Other Topics Concern    Not on file   Social History Narrative    Not on file       OBJECTIVE:    BP (!) 133/54   Pulse (!) 55   Temp 97.7 °F (36.5 °C)   Resp 18   Ht 6' 1.5" (1.867 m)   Wt 93.9 kg (207 lb)   BMI 26.94 kg/m²     PHYSICAL EXAMINATION:    General appearance: Well appearing, in no acute distress, alert and oriented x3.  Psych:  Mood and affect appropriate.  Skin: Skin color, texture, turgor normal, no rashes or lesions, in both upper and lower body.  Head/face:  Normocephalic, atraumatic. No palpable lymph nodes.  Neck: No pain to palpation over the cervical paraspinous muscles. Spurling Negative. No pain with neck flexion, extension, or lateral flexion.   Cor: RRR  Pulm: CTA  GI:  Soft and non-tender.  Back: Straight leg raising in the sitting and supine positions is negative to radicular pain. Poorly localized pain to palpation over the spine or costovertebral angles. Normal range of motion without pain reproduction.Positive axial loading test bilateral.  Positive FABERE,Ganselin and Yeoman's test on the both side.negative FADIR  Extremities: Peripheral joint ROM is full and pain free without obvious instability or laxity in all four extremities. No deformities, edema, or skin discoloration. Good capillary refill.  Musculoskeletal: Shoulder, hip, sacroiliac and knee provocative maneuvers are negative. Bilateral upper and lower extremity strength is normal and symmetric.  No atrophy or tone abnormalities are noted.  Neuro: Bilateral upper and lower extremity coordination and " muscle stretch reflexes are physiologic and symmetric.  Plantar response are downgoing. No loss of sensation is noted.  Gait:  Antalgic    ASSESSMENT: 84 y.o. year old male with  low back pain, consistent with below:     1. Chronic pain syndrome     2. Arachnoiditis     3. Radiculopathy of thoracolumbar region         PLAN:     - I have stressed the importance of physical activity and a home exercise plan to help with pain and improve health.  - Patient can continue with medications for now since they are providing benefits, using them appropriately, and without side effects.  - S&C for  dorsal column stimulator scheduled  - Answered patient's questions and addressed concerns today in clinic  - RTC 1 week following procedure  - Counseled patient regarding the importance of activity modification, constant sleeping habits and physical therapy.    The above plan and management options were discussed at length with patient. Patient is in agreement with the above and verbalized understanding.    Greater than 25 minutes spent in total in todays visit with the patient, with more than half that time direct face to face counseling and education with the patient today. We discussed the disease process, prognosis, treatment plan, and risks and benefits.      Hugh Desai   Anesthesiology CA-2   I have personally reviewed the history and exam of this patient and agree with the resident/fellow/NPs note as stated above.    Samuel Jimenez MD    07/18/2019

## 2019-07-22 ENCOUNTER — TELEPHONE (OUTPATIENT)
Dept: PAIN MEDICINE | Facility: CLINIC | Age: 84
End: 2019-07-22

## 2019-07-22 NOTE — TELEPHONE ENCOUNTER
----- Message from Rizwana Gutiérrez sent at 7/22/2019  9:25 AM CDT -----  Contact: pt   Name of Who is Calling: AMY NERI [[id number]]    What is the request in detail: Patient is requesting medical records and clinic notes be emailed to Brian Sorto at [email or web address]....Please contact to further discuss and advise      Can the clinic reply by [facility]: no     What Number to Call Back if not in [facility]: [phone].

## 2019-07-22 NOTE — TELEPHONE ENCOUNTER
Staff returned the patient's call regarding his request for medical records being emailed to his providers office. Staff instructed the patient to contact our medical records department at [phone] for his pain management records. Staff also informed the patient that our office can only print a copy of his last office visit note.     Patient did not answer therefore staff left a detailed voice message informing the patient of the above information and instructing the patient to give our office a call if he had any further questions.

## 2019-07-30 PROBLEM — M54.15 RADICULOPATHY OF THORACOLUMBAR REGION: Status: ACTIVE | Noted: 2019-07-30

## 2019-07-30 PROBLEM — G89.4 CHRONIC PAIN SYNDROME: Status: ACTIVE | Noted: 2019-07-30

## 2019-07-30 PROBLEM — G03.9 ARACHNOIDITIS: Status: ACTIVE | Noted: 2019-07-30

## 2019-08-05 ENCOUNTER — HOSPITAL ENCOUNTER (OUTPATIENT)
Facility: OTHER | Age: 84
Discharge: HOME | End: 2019-08-05
Attending: ANESTHESIOLOGY | Admitting: ANESTHESIOLOGY
Payer: MEDICARE

## 2019-08-05 VITALS
RESPIRATION RATE: 16 BRPM | BODY MASS INDEX: 26.51 KG/M2 | HEART RATE: 55 BPM | OXYGEN SATURATION: 95 % | HEIGHT: 73 IN | WEIGHT: 200 LBS | SYSTOLIC BLOOD PRESSURE: 171 MMHG | DIASTOLIC BLOOD PRESSURE: 72 MMHG | TEMPERATURE: 98 F

## 2019-08-05 DIAGNOSIS — M54.15 RADICULOPATHY OF THORACOLUMBAR REGION: Primary | ICD-10-CM

## 2019-08-05 DIAGNOSIS — G03.9 ARACHNOIDITIS: ICD-10-CM

## 2019-08-05 DIAGNOSIS — G89.4 CHRONIC PAIN SYNDROME: ICD-10-CM

## 2019-08-05 PROCEDURE — 99152 MOD SED SAME PHYS/QHP 5/>YRS: CPT | Performed by: ANESTHESIOLOGY

## 2019-08-05 PROCEDURE — 99152 MOD SED SAME PHYS/QHP 5/>YRS: CPT | Mod: ,,, | Performed by: ANESTHESIOLOGY

## 2019-08-05 PROCEDURE — 63600175 PHARM REV CODE 636 W HCPCS: Performed by: ANESTHESIOLOGY

## 2019-08-05 PROCEDURE — 99152 PR MOD CONSCIOUS SEDATION, SAME PHYS, 5+ YRS, FIRST 15 MIN: ICD-10-PCS | Mod: ,,, | Performed by: ANESTHESIOLOGY

## 2019-08-05 PROCEDURE — 63650 IMPLANT NEUROELECTRODES: CPT | Mod: ,,, | Performed by: ANESTHESIOLOGY

## 2019-08-05 PROCEDURE — 63650 IMPLANT NEUROELECTRODES: CPT | Performed by: ANESTHESIOLOGY

## 2019-08-05 PROCEDURE — 25000003 PHARM REV CODE 250: Performed by: ANESTHESIOLOGY

## 2019-08-05 PROCEDURE — 63650 PR PERCUT IMPLNT NEUROELECT,EPIDURAL: ICD-10-PCS | Mod: 51,,, | Performed by: ANESTHESIOLOGY

## 2019-08-05 PROCEDURE — C1778 LEAD, NEUROSTIMULATOR: HCPCS | Performed by: ANESTHESIOLOGY

## 2019-08-05 DEVICE — TRIAL LEAD KIT, 70CM WITH 5MM SPACING
Type: IMPLANTABLE DEVICE | Site: SPINE LUMBAR | Status: FUNCTIONAL
Brand: NEVRO®

## 2019-08-05 RX ORDER — MIDAZOLAM HYDROCHLORIDE 1 MG/ML
INJECTION, SOLUTION INTRAMUSCULAR; INTRAVENOUS
Status: DISCONTINUED | OUTPATIENT
Start: 2019-08-05 | End: 2019-08-05 | Stop reason: HOSPADM

## 2019-08-05 RX ORDER — CEFAZOLIN SODIUM 2 G/50ML
2 SOLUTION INTRAVENOUS ONCE
Status: DISCONTINUED | OUTPATIENT
Start: 2019-08-05 | End: 2019-08-05 | Stop reason: HOSPADM

## 2019-08-05 RX ORDER — CEFAZOLIN SODIUM 1 G/3ML
INJECTION, POWDER, FOR SOLUTION INTRAMUSCULAR; INTRAVENOUS
Status: DISCONTINUED | OUTPATIENT
Start: 2019-08-05 | End: 2019-08-05 | Stop reason: HOSPADM

## 2019-08-05 RX ORDER — BUPIVACAINE HYDROCHLORIDE 2.5 MG/ML
INJECTION, SOLUTION EPIDURAL; INFILTRATION; INTRACAUDAL
Status: DISCONTINUED | OUTPATIENT
Start: 2019-08-05 | End: 2019-08-05 | Stop reason: HOSPADM

## 2019-08-05 RX ORDER — SODIUM CHLORIDE 9 MG/ML
500 INJECTION, SOLUTION INTRAVENOUS CONTINUOUS
Status: DISCONTINUED | OUTPATIENT
Start: 2019-08-05 | End: 2019-08-05 | Stop reason: HOSPADM

## 2019-08-05 RX ORDER — FENTANYL CITRATE 50 UG/ML
INJECTION, SOLUTION INTRAMUSCULAR; INTRAVENOUS
Status: DISCONTINUED | OUTPATIENT
Start: 2019-08-05 | End: 2019-08-05 | Stop reason: HOSPADM

## 2019-08-05 RX ORDER — LIDOCAINE HYDROCHLORIDE 10 MG/ML
INJECTION INFILTRATION; PERINEURAL
Status: DISCONTINUED | OUTPATIENT
Start: 2019-08-05 | End: 2019-08-05 | Stop reason: HOSPADM

## 2019-08-05 NOTE — DISCHARGE SUMMARY
Discharge Note  Short Stay      SUMMARY     Admit Date: 8/5/2019    Attending Physician: Samuel Jimenez      Discharge Physician: Samuel Jimenez      Discharge Date: 8/5/2019 9:21 AM    Procedure(s) (LRB):  Trial, Neurostimulator, SPINAL CORD STIMULATOR TRIAL (N/A)    Final Diagnosis: Chronic pain syndrome [G89.4]  Arachnoiditis [G03.9]    Disposition: Home or self care    Patient Instructions:   Current Discharge Medication List      CONTINUE these medications which have NOT CHANGED    Details   ALPRAZolam (XANAX) 1 MG tablet Take 1 mg by mouth.      celecoxib (CELEBREX) 200 MG capsule Take 200 mg by mouth 2 (two) times daily.      ergocalciferol, vitamin D2, 400 unit Tab Take 50,000 mg by mouth.      finasteride (PROSCAR) 5 mg tablet Take 5 mg by mouth.      flurazepam (DALMANE) 15 MG Cap Take 30 mg by mouth daily as needed.      levothyroxine (SYNTHROID) 100 MCG tablet Take 100 mcg by mouth.      simvastatin (ZOCOR) 20 MG tablet Take 20 mg by mouth.      traMADol (ULTRAM) 50 mg tablet TAKE 1 TABLET BY MOUTH 2 TO 3 TIMES A DAY AS NEEDED FOR SEVERE PAIN           Wound care:  1- in case of sutures leave underlying strips in place until follow up. If they fall off it is okay.   2- No soaking bath or swimming until after follow up. Sponge bath to front is ok   3-Take your pain medication as needed.   4-Take over the counter stool softener while taking pain medication to prevent constipation.   5-If no bowel movement in 2 days take miralax twice a day.   6-Ok for light activity (light housework, walking, stair climbing) no strenuous exercise until after follow up.   7-No lifting greater than 10 pounds or 1 gallon of milk until after follow up.  8- Please call my office if experienced any weakness or loss of sensation, fever > 101.5, pain uncontrolled with oral medications, persistent nausea/vomiting/or diarrhea, redness or drainage from the incisions, or any other worrisome concerns. If physician on call was not  reached or could not communicate with our office for any reason please go to the nearest emergency department   9-Please keep abdominal binder (if ordered) on during the day time and activity, but remove before bedtime or at rest         Discharge Diagnosis: Chronic pain syndrome [G89.4]  Arachnoiditis [G03.9]  Condition on Discharge: Stable with no complications to procedure   Diet on Discharge: Same as before.  Activity: as per instruction sheet.  Discharge to: Home with a responsible adult.  Follow up: 2-4 weeks    Please call my office or pager at [phone] if experienced any weakness or loss of sensation, fever > 101.5, pain uncontrolled with oral medications, persistent nausea/vomiting/or diarrhea, redness or drainage from the incisions, or any other worrisome concerns. If physician on call was not reached or could not communicate with our office for any reason please go to the nearest emergency department     Samuel Jimenez MD

## 2019-08-05 NOTE — OP NOTE
Patient Name: Jefferson Boogie  MRN: [id number]    INFORMED CONSENT: The procedure, risks, benefits and options were discussed with patient. There are no contraindications to the procedure. The patient expressed understanding and agreed to proceed. The personnel performing the procedure was discussed. I verify that I personally obtained Jefferson's consent prior to the start of the procedure and the signed consent can be found on the patient's chart.        Procedure Date: 8/5/2019  Surgeon(s) and Role:     * Samuel Jimenez MD - Primary      Pre Procedure diagnosis: Chronic pain syndrome [G89.4]  Arachnoiditis [G03.9]  1. Radiculopathy of thoracolumbar region    2. Chronic pain syndrome    3. Arachnoiditis      Post-Procedure diagnosis: SAME    Sedation: Yes - Fentanyl 50 mcg and Midazolam 2 mg    PROCEDURE:THORACOLUMBAR SPINAL CORD STIMULATOR TRIAL (NEVRO)    PREOPERATIVE ANTIBIOTICS: 2 g ANCEF IV given 30 minutes prior to procedure start, see anesthesia record for time.  DESCRIPTION OF PROCEDURE:  The patient was brought to the fluoroscopy suite. IV access was obtained prior to the procedure. The patient was positioned prone on the fluoroscopy table. Continuous hemodynamic monitoring was initiated including blood pressure and pulse oximetry. IV sedation was administered incrementally to allow the patient to remain comfortable and conversant throughout the procedure. The lumbar spine area was prepped with chlorhexidine  three times and draped in a sterile fashion. Skin anesthesia was achieved using 5 cc of lidocaine 1%. A 14 3 1/2 inch Tuohy needle was inserted at the T12-L1 interspace using a paramedian approach. The needle was slowly advanced in a 45-degree angle down towards the ligamentum flavum.  Once the ligamentum flavum was reached, a loss of resistance syringe was attached.  The needle was slowly advanced into the dorsal epidural space.  After loss of resistance was noted, negative aspiration for blood or  CSF was confirmed . A 8 contacts percutaneous lead was inserted through the needle and slowly advanced in the epidural space under fluoroscopy. The lead was eventually placed at top of T8. A second lead was then placed in a similar fashion and placed immediately adjacent to the first lead with tip at top of T9.  The lead was sutured in place with a silk suture. A sterile dressing was applied. The patient was transferred to the postoperative area in stable condition.  The patient's postoperative neurological examination showed no evidence of lower extremity weakness with normal bowel and bladder function. no specimens collected. PATIENT was taken to the Post-block Recovery Area for further observation.       Treatment plan was discussed with the patient. Post procedure instructions were reviewed and the patient voiced understanding.    Blood Loss: Nill  Specimen: None    Samuel Jimenez MD

## 2019-08-05 NOTE — PLAN OF CARE
Jefferson Boogie has met all discharge criteria from Phase II. Vital Signs are stable, ambulating  without difficulty. Discharge instructions given, patient verbalized understanding. Discharged from facility via wheelchair in stable condition.

## 2019-08-05 NOTE — DISCHARGE INSTRUCTIONS
Thank you for allowing us to care for you today. You may receive a survey about the care we provided. Your feedback is valuable and helps us provide excellent care throughout the community.     Home Care Instructions     1. DIET:   You may resume your normal diet today.   2. BATHING:   Sponge baths ONLY. No showers or tub baths with Spinal Cord Stimulator (SCS) in.   3. DRESSING:   Do not remove bandage. Doctor will remove at follow up visit. You can reinforce edges with band aids or skin tape if they start to come up.  4. ACTIVITY LEVEL:   Nothing strenuous with SCS in place. No bending of the back, no lifting anything heavier than a gallon of milk.    5. MEDICATIONS:   You may resume your normal medications today. DO NOT take any blood thinners with SCS in place.    6. SPECIAL INSTRUCTIONS:   No heat or ice  to the injection site while SCS is in place     If you have received any sedation through your IV, you may not drive for 24 hrs.     PLEASE CALL YOUR DOCTOR IF:  1. Redness or swelling around the injection site.  2. Fever of 101 degrees or more  3. Drainage (pus) from the injection site.  4. For any continuous bleeding (some dried blood over the incision is normal.)    FOR EMERGENCIES:   If any unusual problems or difficulties occur during clinic hours, call [phone] or [phone].       Anesthesia: Monitored Anesthesia Care (MAC)    Anesthesia Safety  · Have an adult family member or friend drive you home after the procedure.  · For the first 24 hours after your surgery:  ¨ Do not drive or use heavy equipment.  ¨ Do not make important decisions or sign documents.  ¨ Avoid alcohol.  ¨ Have someone stay with you, if possible. They can watch for problems and help keep you safe.

## 2019-08-12 ENCOUNTER — OFFICE VISIT (OUTPATIENT)
Dept: PAIN MEDICINE | Facility: CLINIC | Age: 84
End: 2019-08-12
Payer: MEDICARE

## 2019-08-12 ENCOUNTER — HOSPITAL ENCOUNTER (OUTPATIENT)
Dept: RADIOLOGY | Facility: OTHER | Age: 84
Discharge: HOME | End: 2019-08-12
Attending: NURSE PRACTITIONER
Payer: MEDICARE

## 2019-08-12 VITALS
HEART RATE: 51 BPM | SYSTOLIC BLOOD PRESSURE: 141 MMHG | WEIGHT: 209.38 LBS | BODY MASS INDEX: 27.75 KG/M2 | TEMPERATURE: 98 F | RESPIRATION RATE: 18 BRPM | HEIGHT: 73 IN | DIASTOLIC BLOOD PRESSURE: 63 MMHG

## 2019-08-12 DIAGNOSIS — G03.9 ARACHNOIDITIS: ICD-10-CM

## 2019-08-12 DIAGNOSIS — G89.4 CHRONIC PAIN SYNDROME: ICD-10-CM

## 2019-08-12 DIAGNOSIS — G89.4 CHRONIC PAIN SYNDROME: Primary | ICD-10-CM

## 2019-08-12 DIAGNOSIS — M54.15 RADICULOPATHY OF THORACOLUMBAR REGION: ICD-10-CM

## 2019-08-12 PROCEDURE — 72070 X-RAY EXAM THORAC SPINE 2VWS: CPT | Mod: 26,,, | Performed by: RADIOLOGY

## 2019-08-12 PROCEDURE — 99213 OFFICE O/P EST LOW 20 MIN: CPT | Mod: PBBFAC,25 | Performed by: NURSE PRACTITIONER

## 2019-08-12 PROCEDURE — 99024 POSTOP FOLLOW-UP VISIT: CPT | Mod: POP,,, | Performed by: NURSE PRACTITIONER

## 2019-08-12 PROCEDURE — 72070 XR THORACIC SPINE AP LATERAL: ICD-10-PCS | Mod: 26,,, | Performed by: RADIOLOGY

## 2019-08-12 PROCEDURE — 99999 PR PBB SHADOW E&M-EST. PATIENT-LVL III: ICD-10-PCS | Mod: PBBFAC,,, | Performed by: NURSE PRACTITIONER

## 2019-08-12 PROCEDURE — 99999 PR PBB SHADOW E&M-EST. PATIENT-LVL III: CPT | Mod: PBBFAC,,, | Performed by: NURSE PRACTITIONER

## 2019-08-12 PROCEDURE — 72100 X-RAY EXAM L-S SPINE 2/3 VWS: CPT | Mod: 26,,, | Performed by: RADIOLOGY

## 2019-08-12 PROCEDURE — 72070 X-RAY EXAM THORAC SPINE 2VWS: CPT | Mod: TC,FY

## 2019-08-12 PROCEDURE — 72100 XR LUMBAR SPINE AP AND LATERAL: ICD-10-PCS | Mod: 26,,, | Performed by: RADIOLOGY

## 2019-08-12 PROCEDURE — 72100 X-RAY EXAM L-S SPINE 2/3 VWS: CPT | Mod: TC,FY

## 2019-08-12 PROCEDURE — 99024 PR POST-OP FOLLOW-UP VISIT: ICD-10-PCS | Mod: POP,,, | Performed by: NURSE PRACTITIONER

## 2019-08-12 NOTE — PROGRESS NOTES
Chronic patient Established Note (Follow up visit)      SUBJECTIVE:    Jefferson Boogie returns to clinic today for post op. He is s/p Nevro SCS trial on 8/2/2019. He reports 50% relief over all. He reports 100% relief of his right hip and buttock pain. He reports 50% relief of his right calf pain. He does report that he was able to sleep better during the trial. He denies any fever, chills, or drainage from site. He denies any other health changes. His pain today is 5/10.      Pain Disability Index Review:  Last 3 PDI Scores 7/18/2019 6/13/2019   Pain Disability Index (PDI) 40 18       Pain Medications:    - Opioids: Ultram ER ( Tramadol HCL)     report:  Reviewed and consistent with medication use as prescribed.    Pain Procedures:   8/2/2019- Nevro SCS trial    Physical Therapy/Home Exercise: yes    Imaging:  MRI OF THE LUMBAR SPINE WITH AND WITHOUT I.V. CONTRAST:    CPT CODE: 94249    INDICATION: Low back pain, rapidly progressive neuro deficit    COMPARISON STUDY: Unenhanced MRI lumbar spine of 4/8/2019 and 3/26/2018    TECHNIQUE:This study was performed on the [employer] 1.[employer] high field MR unit. Multislice, multisequence images of the lumbar spine were obtained in multiple projections both before and following intravenous administration of the same 9 mL of gadavist contrast that was utilized to obtain the patient's MRI of the thoracic spine.. The intravenous contrast was injected in the patient's left antecubital fossa.    FINDINGS: There are 5 lumbar type vertebral bodies lumbar vertebral body height and alignment are maintained. The signal from the lumbar vertebra demonstrates an admixture of red and yellow marrow. There are some Modic type degenerative signal changes involving the anterior inferior aspect of L5. There is slight anterior disc bulging at the L5-S1 level. All of the lumbar discs are desiccated with severe narrowing of the L2-L3 disc space, moderate narrowing of the L1-2 disc space  posteriorly, moderate narrowing of the L5-S1 disc space and mild narrowing of the L3-4 and L4-5 disc spaces.    L5-S1 level: There is bilateral facet joint arthropathy with a slight amount of fluid in both facet joints. There is bilateral, right greater left, foraminal narrowing but no central canal stenosis. There is a mild broad-based posterior protrusion of the L5-S1 disc that extends towards the left-sided foramen and into and possibly through the right-sided foramen. This is similar to what was seen on the prior exam. Incidental note is made of a mild amount of epidural fat that is deposited along the right anterior and left anterior aspect of thecal sac at the L5-S1 disc space level.    L4-L5 level: There is bilateral, left greater than right, facet joint arthropathy without central canal stenosis. There is no bony foraminal narrowing. There is a slight to mild broad-based posterior protrusion of the disc that extends to the medial foramen bilaterally.    L3-L4 level: There is bilateral facet joint arthropathy with slight ligament flavum redundancy but no bony foraminal narrowing or bony central canal stenosis. There is a slight to mild broad-based posterior protrusion of the disc that extends towards the right-sided foramen and into the anteromedial left-sided foramen.    L2-L3 level: There is bilateral facet joint arthropathy without bony central canal stenosis or bony foraminal narrowing. There is a slight to mild bilobed posterior protrusion of the disc at the level of the anteromedial foramen bilaterally and this is best seen on the axial images.    L1-L2 level: There is bilateral facet arthropathy without bony foraminal narrowing or bony central canal stenosis and the posterior disc margin is unremarkable.    The tip of the conus medullaris extends down to the level of the superior endplate of L1 and the signal from the visualized conus is unremarkable. There is clumping of the cauda equina nerve  rootlets throughout the distal thecal sac consistent with arachnoiditis.  Following contrast administration, there is no abnormal enhancement of any of the bony or soft tissue elements of the lumbar spine except for possibly one or 2 facet joints.    IMPRESSION:   1. No abnormal enhancement of any bony or soft tissue elements of the lumbar spine except for possibly one or 2 facet joints. Clumping of the nerve rootlets of the cauda equina consistent with arachnoiditis.  3. Desiccation of all of the lumbar disks with multilevel disc space narrowing, multilevel facet joint arthropathy, but no central canal stenosis.  4. Modic type degenerative signal changes in the anterior inferior aspect of L5.  5. There is a mild amount of epidural fat deposit along the right anterior and left anterior aspect of the thecal sac at the L5-S1 level..  6. Other findings as discussed above.        Allergies: Review of patient's allergies indicates:  No Known Allergies    Current Medications:   Current Outpatient Medications   Medication Sig Dispense Refill    ALPRAZolam (XANAX) 1 MG tablet Take 1 mg by mouth.      celecoxib (CELEBREX) 200 MG capsule Take 200 mg by mouth 2 (two) times daily.      ergocalciferol, vitamin D2, 400 unit Tab Take 50,000 mg by mouth.      finasteride (PROSCAR) 5 mg tablet Take 5 mg by mouth.      flurazepam (DALMANE) 15 MG Cap Take 30 mg by mouth daily as needed.      levothyroxine (SYNTHROID) 100 MCG tablet Take 100 mcg by mouth.      simvastatin (ZOCOR) 20 MG tablet Take 20 mg by mouth.      traMADol (ULTRAM) 50 mg tablet TAKE 1 TABLET BY MOUTH 2 TO 3 TIMES A DAY AS NEEDED FOR SEVERE PAIN       No current facility-administered medications for this visit.        REVIEW OF SYSTEMS:    GENERAL:  No weight loss, malaise or fevers.  HEENT:  Negative for frequent or significant headaches.  NECK:  Negative for lumps, goiter, pain and significant neck swelling.  RESPIRATORY:  Negative for cough, wheezing or  shortness of breath.  CARDIOVASCULAR:  Negative for chest pain, leg swelling or palpitations.  + HLD  GI:  Negative for abdominal discomfort, blood in stools or black stools or change in bowel habits.  MUSCULOSKELETAL:  See HPI.  SKIN:  Negative for lesions, rash, and itching.  PSYCH:  Positive for sleep disturbance. Negative for mood disorder and recent psychosocial stressors.     HEMATOLOGY/LYMPHOLOGY:  Negative for prolonged bleeding, bruising easily or swollen nodes.  NEURO:   No history of headaches, syncope, paralysis, seizures or tremors.  All other reviewed and negative other than HPI.    Past Medical History:  Past Medical History:   Diagnosis Date    Cancer     stage I bladder cancer 50 yrs ago    Thyroid disease        Past Surgical History:  Past Surgical History:   Procedure Laterality Date    BLADDER SURGERY      HERNIA REPAIR      KNEE SURGERY      Trial, Neurostimulator, SPINAL CORD STIMULATOR TRIAL N/A 8/5/2019    Performed by Samuel Jimenez MD at [facility], [place] CATH LAB       Family History:  History reviewed. No pertinent family history.    Social History:  Social History     Socioeconomic History    Marital status:      Spouse name: Not on file    Number of children: Not on file    Years of education: Not on file    Highest education level: Not on file   Occupational History    Not on file   Social Needs    Financial resource strain: Not on file    Food insecurity:     Worry: Not on file     Inability: Not on file    Transportation needs:     Medical: Not on file     Non-medical: Not on file   Tobacco Use    Smoking status: Former Smoker    Tobacco comment: stopped 40 years ago   Substance and Sexual Activity    Alcohol use: Yes     Comment: social    Drug use: Never    Sexual activity: Yes     Partners: Female   Lifestyle    Physical activity:     Days per week: Not on file     Minutes per session: Not on file    Stress: Not on file   Relationships    Social connections:  "    Talks on phone: Not on file     Gets together: Not on file     Attends [employer] service: Not on file     Active member of club or organization: Not on file     Attends meetings of clubs or organizations: Not on file     Relationship status: Not on file   Other Topics Concern    Not on file   Social History Narrative    Not on file       OBJECTIVE:    BP (!) 141/63   Pulse (!) 51   Temp 97.7 °F (36.5 °C) (Oral)   Resp 18   Ht 6' 1" (1.854 m)   Wt 95 kg (209 lb 6.4 oz)   BMI 27.63 kg/m²     PHYSICAL EXAMINATION:    General appearance: Well appearing, in no acute distress, alert and oriented x3.  Psych:  Mood and affect appropriate.  Skin: Skin color, texture, turgor normal, no rashes or lesions, in both upper and lower body. SCS site without drainage or signs of infection.  Leads removed without difficulty with tips intact.  Area cleaned with alcohol and Bacitracin applied.  Head/face:  Normocephalic, atraumatic. No palpable lymph nodes.  Cor: RRR  Pulm: CTA  GI:  Soft and non-tender.  Gait:  Antalgic- ambulates without assistance.     ASSESSMENT: 84 y.o. year old male with  low back pain, consistent with below:     1. Chronic pain syndrome     2. Arachnoiditis     3. Radiculopathy of thoracolumbar region         PLAN:     - Previous imaging was reviewed and discussed with the patient today.    - He is s/p Nevro SCS trial with relief. He would like to move forward with implant. Leads removed today.     - Schedule for Nevro SCS implant.   The procedure, risks, benefits and options were discussed with patient. There are no contraindications to the procedure. The patient expressed understanding and agreed to proceed.  Consent obtained today.    - I have stressed the importance of physical activity and a home exercise plan to help with pain and improve health.    - Patient can continue with medications for now since they are providing benefits, using them appropriately, and without side effects.    - RTC 1 " week after implant.     The above plan and management options were discussed at length with patient. Patient is in agreement with the above and verbalized understanding.    Marian Tolliver NP  08/12/2019

## 2019-08-12 NOTE — H&P (VIEW-ONLY)
Chronic patient Established Note (Follow up visit)      SUBJECTIVE:    Jefferson Boogie returns to clinic today for post op. He is s/p Nevro SCS trial on 8/2/2019. He reports 50% relief over all. He reports 100% relief of his right hip and buttock pain. He reports 50% relief of his right calf pain. He does report that he was able to sleep better during the trial. He denies any fever, chills, or drainage from site. He denies any other health changes. His pain today is 5/10.      Pain Disability Index Review:  Last 3 PDI Scores 7/18/2019 6/13/2019   Pain Disability Index (PDI) 40 18       Pain Medications:    - Opioids: Ultram ER ( Tramadol HCL)     report:  Reviewed and consistent with medication use as prescribed.    Pain Procedures:   8/2/2019- Nevro SCS trial    Physical Therapy/Home Exercise: yes    Imaging:  MRI OF THE LUMBAR SPINE WITH AND WITHOUT I.V. CONTRAST:    CPT CODE: 88057    INDICATION: Low back pain, rapidly progressive neuro deficit    COMPARISON STUDY: Unenhanced MRI lumbar spine of 4/8/2019 and 3/26/2018    TECHNIQUE:This study was performed on the [employer] 1.5 [place] high field MR unit. Multislice, multisequence images of the lumbar spine were obtained in multiple projections both before and following intravenous administration of the same 9 mL of gadavist contrast that was utilized to obtain the patient's MRI of the thoracic spine.. The intravenous contrast was injected in the patient's left antecubital fossa.    FINDINGS: There are 5 lumbar type vertebral bodies lumbar vertebral body height and alignment are maintained. The signal from the lumbar vertebra demonstrates an admixture of red and yellow marrow. There are some Modic type degenerative signal changes involving the anterior inferior aspect of L5. There is slight anterior disc bulging at the L5-S1 level. All of the lumbar discs are desiccated with severe narrowing of the L2-L3 disc space, moderate narrowing of the L1-2 disc space  posteriorly, moderate narrowing of the L5-S1 disc space and mild narrowing of the L3-4 and L4-5 disc spaces.    L5-S1 level: There is bilateral facet joint arthropathy with a slight amount of fluid in both facet joints. There is bilateral, right greater left, foraminal narrowing but no central canal stenosis. There is a mild broad-based posterior protrusion of the L5-S1 disc that extends towards the left-sided foramen and into and possibly through the right-sided foramen. This is similar to what was seen on the prior exam. Incidental note is made of a mild amount of epidural fat that is deposited along the right anterior and left anterior aspect of thecal sac at the L5-S1 disc space level.    L4-L5 level: There is bilateral, left greater than right, facet joint arthropathy without central canal stenosis. There is no bony foraminal narrowing. There is a slight to mild broad-based posterior protrusion of the disc that extends to the medial foramen bilaterally.    L3-L4 level: There is bilateral facet joint arthropathy with slight ligament flavum redundancy but no bony foraminal narrowing or bony central canal stenosis. There is a slight to mild broad-based posterior protrusion of the disc that extends towards the right-sided foramen and into the anteromedial left-sided foramen.    L2-L3 level: There is bilateral facet joint arthropathy without bony central canal stenosis or bony foraminal narrowing. There is a slight to mild bilobed posterior protrusion of the disc at the level of the anteromedial foramen bilaterally and this is best seen on the axial images.    L1-L2 level: There is bilateral facet arthropathy without bony foraminal narrowing or bony central canal stenosis and the posterior disc margin is unremarkable.    The tip of the conus medullaris extends down to the level of the superior endplate of L1 and the signal from the visualized conus is unremarkable. There is clumping of the cauda equina nerve  rootlets throughout the distal thecal sac consistent with arachnoiditis.  Following contrast administration, there is no abnormal enhancement of any of the bony or soft tissue elements of the lumbar spine except for possibly one or 2 facet joints.    IMPRESSION:   1. No abnormal enhancement of any bony or soft tissue elements of the lumbar spine except for possibly one or 2 facet joints. Clumping of the nerve rootlets of the cauda equina consistent with arachnoiditis.  3. Desiccation of all of the lumbar disks with multilevel disc space narrowing, multilevel facet joint arthropathy, but no central canal stenosis.  4. Modic type degenerative signal changes in the anterior inferior aspect of L5.  5. There is a mild amount of epidural fat deposit along the right anterior and left anterior aspect of the thecal sac at the L5-S1 level..  6. Other findings as discussed above.        Allergies: Review of patient's allergies indicates:  No Known Allergies    Current Medications:   Current Outpatient Medications   Medication Sig Dispense Refill    ALPRAZolam (XANAX) 1 MG tablet Take 1 mg by mouth.      celecoxib (CELEBREX) 200 MG capsule Take 200 mg by mouth 2 (two) times daily.      ergocalciferol, vitamin D2, 400 unit Tab Take 50,000 mg by mouth.      finasteride (PROSCAR) 5 mg tablet Take 5 mg by mouth.      flurazepam (DALMANE) 15 MG Cap Take 30 mg by mouth daily as needed.      levothyroxine (SYNTHROID) 100 MCG tablet Take 100 mcg by mouth.      simvastatin (ZOCOR) 20 MG tablet Take 20 mg by mouth.      traMADol (ULTRAM) 50 mg tablet TAKE 1 TABLET BY MOUTH 2 TO 3 TIMES A DAY AS NEEDED FOR SEVERE PAIN       No current facility-administered medications for this visit.        REVIEW OF SYSTEMS:    GENERAL:  No weight loss, malaise or fevers.  HEENT:  Negative for frequent or significant headaches.  NECK:  Negative for lumps, goiter, pain and significant neck swelling.  RESPIRATORY:  Negative for cough, wheezing or  shortness of breath.  CARDIOVASCULAR:  Negative for chest pain, leg swelling or palpitations.  + HLD  GI:  Negative for abdominal discomfort, blood in stools or black stools or change in bowel habits.  MUSCULOSKELETAL:  See HPI.  SKIN:  Negative for lesions, rash, and itching.  PSYCH:  Positive for sleep disturbance. Negative for mood disorder and recent psychosocial stressors.     HEMATOLOGY/LYMPHOLOGY:  Negative for prolonged bleeding, bruising easily or swollen nodes.  NEURO:   No history of headaches, syncope, paralysis, seizures or tremors.  All other reviewed and negative other than HPI.    Past Medical History:  Past Medical History:   Diagnosis Date    Cancer     stage I bladder cancer 50 yrs ago    Thyroid disease        Past Surgical History:  Past Surgical History:   Procedure Laterality Date    BLADDER SURGERY      HERNIA REPAIR      KNEE SURGERY      Trial, Neurostimulator, SPINAL CORD STIMULATOR TRIAL N/A 8/5/2019    Performed by Samuel Jimenez MD at [facility] CATH LAB       Family History:  History reviewed. No pertinent family history.    Social History:  Social History     Socioeconomic History    Marital status:      Spouse name: Not on file    Number of children: Not on file    Years of education: Not on file    Highest education level: Not on file   Occupational History    Not on file   Social Needs    Financial resource strain: Not on file    Food insecurity:     Worry: Not on file     Inability: Not on file    Transportation needs:     Medical: Not on file     Non-medical: Not on file   Tobacco Use    Smoking status: Former Smoker    Tobacco comment: stopped 40 years ago   Substance and Sexual Activity    Alcohol use: Yes     Comment: social    Drug use: Never    Sexual activity: Yes     Partners: Female   Lifestyle    Physical activity:     Days per week: Not on file     Minutes per session: Not on file    Stress: Not on file   Relationships    Social connections:  "    Talks on phone: Not on file     Gets together: Not on file     Attends Sikhism service: Not on file     Active member of club or organization: Not on file     Attends meetings of clubs or organizations: Not on file     Relationship status: Not on file   Other Topics Concern    Not on file   Social History Narrative    Not on file       OBJECTIVE:    BP (!) 141/63   Pulse (!) 51   Temp 97.7 °F (36.5 °C) (Oral)   Resp 18   Ht 6' 1" (1.854 m)   Wt 95 kg (209 lb 6.4 oz)   BMI 27.63 kg/m²     PHYSICAL EXAMINATION:    General appearance: Well appearing, in no acute distress, alert and oriented x3.  Psych:  Mood and affect appropriate.  Skin: Skin color, texture, turgor normal, no rashes or lesions, in both upper and lower body. SCS site without drainage or signs of infection.  Leads removed without difficulty with tips intact.  Area cleaned with alcohol and Bacitracin applied.  Head/face:  Normocephalic, atraumatic. No palpable lymph nodes.  Cor: RRR  Pulm: CTA  GI:  Soft and non-tender.  Gait:  Antalgic- ambulates without assistance.     ASSESSMENT: 84 y.o. year old male with  low back pain, consistent with below:     1. Chronic pain syndrome     2. Arachnoiditis     3. Radiculopathy of thoracolumbar region         PLAN:     - Previous imaging was reviewed and discussed with the patient today.    - He is s/p Nevro SCS trial with relief. He would like to move forward with implant. Leads removed today.     - Schedule for Nevro SCS implant.   The procedure, risks, benefits and options were discussed with patient. There are no contraindications to the procedure. The patient expressed understanding and agreed to proceed.  Consent obtained today.    - I have stressed the importance of physical activity and a home exercise plan to help with pain and improve health.    - Patient can continue with medications for now since they are providing benefits, using them appropriately, and without side effects.    - RTC 1 " week after implant.     The above plan and management options were discussed at length with patient. Patient is in agreement with the above and verbalized understanding.    Marian Tolliver NP  08/12/2019

## 2019-08-16 ENCOUNTER — ANESTHESIA EVENT (OUTPATIENT)
Dept: SURGERY | Facility: OTHER | Age: 84
End: 2019-08-16
Payer: MEDICARE

## 2019-08-16 ENCOUNTER — HOSPITAL ENCOUNTER (OUTPATIENT)
Dept: PREADMISSION TESTING | Facility: OTHER | Age: 84
Discharge: HOME | End: 2019-08-16
Attending: ANESTHESIOLOGY
Payer: MEDICARE

## 2019-08-16 VITALS
TEMPERATURE: 98 F | SYSTOLIC BLOOD PRESSURE: 131 MMHG | WEIGHT: 201 LBS | OXYGEN SATURATION: 97 % | DIASTOLIC BLOOD PRESSURE: 60 MMHG | HEIGHT: 74 IN | HEART RATE: 54 BPM | BODY MASS INDEX: 25.8 KG/M2

## 2019-08-16 DIAGNOSIS — Z01.818 PRE-OP TESTING: Primary | ICD-10-CM

## 2019-08-16 LAB
ANION GAP SERPL CALC-SCNC: 8 MMOL/L (ref 8–16)
BASOPHILS # BLD AUTO: 0.02 K/UL (ref 0–0.2)
BASOPHILS NFR BLD: 0.4 % (ref 0–1.9)
BUN SERPL-MCNC: 15 MG/DL (ref 8–23)
CALCIUM SERPL-MCNC: 9.1 MG/DL (ref 8.7–10.5)
CHLORIDE SERPL-SCNC: 107 MMOL/L (ref 95–110)
CO2 SERPL-SCNC: 27 MMOL/L (ref 23–29)
CREAT SERPL-MCNC: 0.9 MG/DL (ref 0.5–1.4)
DIFFERENTIAL METHOD: ABNORMAL
EOSINOPHIL # BLD AUTO: 0.1 K/UL (ref 0–0.5)
EOSINOPHIL NFR BLD: 1.8 % (ref 0–8)
ERYTHROCYTE [DISTWIDTH] IN BLOOD BY AUTOMATED COUNT: 12.9 % (ref 11.5–14.5)
EST. GFR  (AFRICAN AMERICAN): >60 ML/MIN/1.73 M^2
EST. GFR  (NON AFRICAN AMERICAN): >60 ML/MIN/1.73 M^2
GLUCOSE SERPL-MCNC: 92 MG/DL (ref 70–110)
HCT VFR BLD AUTO: 41.2 % (ref 40–54)
HGB BLD-MCNC: 14.3 G/DL (ref 14–18)
IMM GRANULOCYTES # BLD AUTO: 0.02 K/UL (ref 0–0.04)
IMM GRANULOCYTES NFR BLD AUTO: 0.4 % (ref 0–0.5)
LYMPHOCYTES # BLD AUTO: 1.4 K/UL (ref 1–4.8)
LYMPHOCYTES NFR BLD: 25.5 % (ref 18–48)
MCH RBC QN AUTO: 31.2 PG (ref 27–31)
MCHC RBC AUTO-ENTMCNC: 34.7 G/DL (ref 32–36)
MCV RBC AUTO: 90 FL (ref 82–98)
MONOCYTES # BLD AUTO: 0.5 K/UL (ref 0.3–1)
MONOCYTES NFR BLD: 9.5 % (ref 4–15)
NEUTROPHILS # BLD AUTO: 3.4 K/UL (ref 1.8–7.7)
NEUTROPHILS NFR BLD: 62.4 % (ref 38–73)
NRBC BLD-RTO: 0 /100 WBC
PLATELET # BLD AUTO: 137 K/UL (ref 150–350)
PMV BLD AUTO: 12.3 FL (ref 9.2–12.9)
POTASSIUM SERPL-SCNC: 4.3 MMOL/L (ref 3.5–5.1)
RBC # BLD AUTO: 4.58 M/UL (ref 4.6–6.2)
SODIUM SERPL-SCNC: 142 MMOL/L (ref 136–145)
WBC # BLD AUTO: 5.48 K/UL (ref 3.9–12.7)

## 2019-08-16 PROCEDURE — 85025 COMPLETE CBC W/AUTO DIFF WBC: CPT

## 2019-08-16 PROCEDURE — 36415 COLL VENOUS BLD VENIPUNCTURE: CPT

## 2019-08-16 PROCEDURE — 93010 ELECTROCARDIOGRAM REPORT: CPT | Mod: ,,, | Performed by: INTERNAL MEDICINE

## 2019-08-16 PROCEDURE — 80048 BASIC METABOLIC PNL TOTAL CA: CPT

## 2019-08-16 PROCEDURE — 93010 EKG 12-LEAD: ICD-10-PCS | Mod: ,,, | Performed by: INTERNAL MEDICINE

## 2019-08-16 PROCEDURE — 93005 ELECTROCARDIOGRAM TRACING: CPT

## 2019-08-16 RX ORDER — SODIUM CHLORIDE, SODIUM LACTATE, POTASSIUM CHLORIDE, CALCIUM CHLORIDE 600; 310; 30; 20 MG/100ML; MG/100ML; MG/100ML; MG/100ML
INJECTION, SOLUTION INTRAVENOUS CONTINUOUS
Status: CANCELLED | OUTPATIENT
Start: 2019-08-16

## 2019-08-16 RX ORDER — FAMOTIDINE 20 MG/1
20 TABLET, FILM COATED ORAL
Status: CANCELLED | OUTPATIENT
Start: 2019-08-16 | End: 2019-08-16

## 2019-08-16 RX ORDER — ACETAMINOPHEN 500 MG
1000 TABLET ORAL
Status: CANCELLED | OUTPATIENT
Start: 2019-08-16 | End: 2019-08-16

## 2019-08-16 NOTE — DISCHARGE INSTRUCTIONS
PRE-ADMIT TESTING -  [phone]    [address]  [place] [facility]          Your surgery has been scheduled at [facility]. We are pleased to have the opportunity to serve you. For Further Information please call [phone].    On the day of surgery please report to the Information Desk on the 1st floor.    · CONTACT YOUR PHYSICIAN'S OFFICE THE DAY PRIOR TO YOUR SURGERY TO OBTAIN YOUR ARRIVAL TIME.     · The evening before surgery do not eat anything after 9 p.m. ( this includes hard candy, chewing gum and mints).  You may only have GATORADE, POWERADE AND WATER  from 9 p.m. until you leave your home.   DO NOT DRINK ANY LIQUIDS ON THE WAY TO THE HOSPITAL.      SPECIAL MEDICATION INSTRUCTIONS: TAKE medications checked off by the Anesthesiologist on your Medication List.    Angiogram Patients: Take medications as instructed by your physician, including aspirin.     Surgery Patients:    If you take ASPIRIN - Your PHYSICIAN/SURGEON will need to inform you IF/OR when you need to stop taking aspirin prior to your surgery.     Do Not take any medications containing IBUPROFEN.  Do Not Wear any make-up or dark nail polish   (especially eye make-up) to surgery. If you come to surgery with makeup on you will be required to remove the makeup or nail polish.    Do not shave your surgical area at least 5 days prior to your surgery. The surgical prep will be performed at the hospital according to Infection Control regulations.    Leave all valuables at home.   Do Not wear any jewelry or watches, including any metal in body piercings. Jewelry must be removed prior to coming to the hospital.  There is a possibility that rings that are unable to be removed may be cut off if they are on the surgical extremity.    Contact Lens must be removed before surgery. Either do not wear the contact lens or bring a case and solution for storage.  Please bring a container for eyeglasses or dentures as required.  Bring  any paperwork your physician has provided, such as consent forms,  history and physicals, doctor's orders, etc.   Bring comfortable clothes that are loose fitting to wear upon discharge. Take into consideration the type of surgery being performed.  Maintain your diet as advised per your physician the day prior to surgery.      Adequate rest the night before surgery is advised.   Park in the Parking lot behind the hospital or in the [place] [facility] across the street from the parking lot. Parking is complimentary.  If you will be discharged the same day as your procedure, please arrange for a responsible adult to drive you home or to accompany you if traveling by taxi.   YOU WILL NOT BE PERMITTED TO DRIVE OR TO LEAVE THE HOSPITAL ALONE AFTER SURGERY.   It is strongly recommended that you arrange for someone to remain with you for the first 24 hrs following your surgery.    The Surgeon will speak to your family/visitor after your surgery regarding the outcome of your surgery and post op care.  The Surgeon may speak to you after your surgery, but there is a possibility you may not remember the details.  Please check with your family members regarding the conversation with the Surgeon.    We strongly recommend whoever is bringing you home be present for discharge instructions.  This will ensure a thorough understanding for your post op home care.      Thank you for your cooperation.  The Staff of [facility].                Bathing Instructions with Hibiclens     Shower the evening before and morning of your procedure with Hibiclens:   Wash your face with water and your regular face wash/soap   Apply Hibiclens directly on your skin or on a wet washcloth and wash gently. When showering: Move away from the shower stream when applying Hibiclens to avoid rinsing off too soon.   Rinse thoroughly with warm water   Do not dilute Hibiclens         Dry off as usual, do not use any deodorant,  powder, body lotions, perfume, after shave or cologne.

## 2019-08-16 NOTE — ANESTHESIA PREPROCEDURE EVALUATION
08/16/2019  Jefferson Boogie is a 84 y.o., male.    Anesthesia Evaluation    I have reviewed the Patient Summary Reports.    I have reviewed the Nursing Notes.   I have reviewed the Medications.     Review of Systems  Anesthesia Hx:  No problems with previous Anesthesia  Denies Family Hx of Anesthesia complications.   Denies Personal Hx of Anesthesia complications.   Social:  Former Smoker    Hematology/Oncology:  Hematology Normal      Oncology Comments: Bladder cancer    Cardiovascular:  Cardiovascular Normal     Pulmonary:  Pulmonary Normal    Renal/:  Renal/ Normal     Hepatic/GI:  Hepatic/GI Normal    Musculoskeletal:   Arachnoiditis with gluteal and right leg   Neurological:  Neurology Normal    Endocrine:   Hypothyroidism        Physical Exam  General:  Well nourished    Airway/Jaw/Neck:  Airway Findings: Mouth Opening: Normal Tongue: Normal  General Airway Assessment: Adult  Mallampati: II  TM Distance: Normal, at least 6 cm         Dental:  Dental Findings: In tact             Anesthesia Plan  Type of Anesthesia, risks & benefits discussed:  Anesthesia Type:  general, MAC  Patient's Preference:   Intra-op Monitoring Plan: standard ASA monitors  Intra-op Monitoring Plan Comments:   Post Op Pain Control Plan: per primary service following discharge from PACU  Post Op Pain Control Plan Comments:   Induction:   IV  Beta Blocker:         Informed Consent: Patient understands risks and agrees with Anesthesia plan.  Questions answered. Anesthesia consent signed with patient.  ASA Score: 2     Day of Surgery Review of History & Physical:    H&P update referred to the surgeon.         Ready For Surgery From Anesthesia Perspective.

## 2019-08-20 ENCOUNTER — PATIENT MESSAGE (OUTPATIENT)
Dept: SURGERY | Facility: OTHER | Age: 84
End: 2019-08-20

## 2019-08-20 NOTE — TELEPHONE ENCOUNTER
"Patient returned call regarding previous message.    Patient stated he "had itching where the trial bandage was placed". Patient stated he had no redness, no swelling or any other symptoms.    Staff asked patient if he is allergic to tape, latex or has any other allergies, patient stated "no".    Patient was informed a note will be placed in his chart and also advised him to call pre-admit at [phone] and notify.    Patient acknowledged information given and expressed understanding.      "

## 2019-08-20 NOTE — TELEPHONE ENCOUNTER
Attempted to contact patient regarding MyChart message, no answer, left voice message and requested a return call.

## 2019-08-26 ENCOUNTER — HOSPITAL ENCOUNTER (OUTPATIENT)
Facility: OTHER | Age: 84
Discharge: HOME | End: 2019-08-26
Attending: ANESTHESIOLOGY | Admitting: ANESTHESIOLOGY
Payer: MEDICARE

## 2019-08-26 ENCOUNTER — ANESTHESIA (OUTPATIENT)
Dept: SURGERY | Facility: OTHER | Age: 84
End: 2019-08-26
Payer: MEDICARE

## 2019-08-26 VITALS
OXYGEN SATURATION: 96 % | TEMPERATURE: 97 F | RESPIRATION RATE: 16 BRPM | HEART RATE: 49 BPM | SYSTOLIC BLOOD PRESSURE: 177 MMHG | HEIGHT: 74 IN | DIASTOLIC BLOOD PRESSURE: 80 MMHG | BODY MASS INDEX: 25.8 KG/M2 | WEIGHT: 201 LBS

## 2019-08-26 DIAGNOSIS — G89.4 CHRONIC PAIN SYNDROME: Primary | ICD-10-CM

## 2019-08-26 DIAGNOSIS — G03.9 ARACHNOIDITIS: ICD-10-CM

## 2019-08-26 DIAGNOSIS — M54.15 RADICULOPATHY OF THORACOLUMBAR REGION: ICD-10-CM

## 2019-08-26 PROCEDURE — 37000009 HC ANESTHESIA EA ADD 15 MINS: Performed by: ANESTHESIOLOGY

## 2019-08-26 PROCEDURE — C1822 GEN, NEURO, HF, RECHG BAT: HCPCS | Performed by: ANESTHESIOLOGY

## 2019-08-26 PROCEDURE — 25000003 PHARM REV CODE 250: Performed by: NURSE ANESTHETIST, CERTIFIED REGISTERED

## 2019-08-26 PROCEDURE — 63600175 PHARM REV CODE 636 W HCPCS: Performed by: NURSE ANESTHETIST, CERTIFIED REGISTERED

## 2019-08-26 PROCEDURE — 27201423 OPTIME MED/SURG SUP & DEVICES STERILE SUPPLY: Performed by: ANESTHESIOLOGY

## 2019-08-26 PROCEDURE — 63600175 PHARM REV CODE 636 W HCPCS: Performed by: STUDENT IN AN ORGANIZED HEALTH CARE EDUCATION/TRAINING PROGRAM

## 2019-08-26 PROCEDURE — 36000707: Performed by: ANESTHESIOLOGY

## 2019-08-26 PROCEDURE — 71000016 HC POSTOP RECOV ADDL HR: Performed by: ANESTHESIOLOGY

## 2019-08-26 PROCEDURE — 63600175 PHARM REV CODE 636 W HCPCS: Performed by: ANESTHESIOLOGY

## 2019-08-26 PROCEDURE — 63685 PR IMPLANT SPINAL NEUROSTIM/RECEIVER: ICD-10-PCS | Mod: 59,,, | Performed by: ANESTHESIOLOGY

## 2019-08-26 PROCEDURE — 71000015 HC POSTOP RECOV 1ST HR: Performed by: ANESTHESIOLOGY

## 2019-08-26 PROCEDURE — 95971 ALYS SMPL SP/PN NPGT W/PRGRM: CPT | Mod: 59,,, | Performed by: ANESTHESIOLOGY

## 2019-08-26 PROCEDURE — 95971 PR ANALYZE NEUROSTIM,SIMPLE/PROG: ICD-10-PCS | Mod: 59,,, | Performed by: ANESTHESIOLOGY

## 2019-08-26 PROCEDURE — C1787 PATIENT PROGR, NEUROSTIM: HCPCS | Performed by: ANESTHESIOLOGY

## 2019-08-26 PROCEDURE — 63650 PR PERCUT IMPLNT NEUROELECT,EPIDURAL: ICD-10-PCS | Mod: ,,, | Performed by: ANESTHESIOLOGY

## 2019-08-26 PROCEDURE — 27800903 OPTIME MED/SURG SUP & DEVICES OTHER IMPLANTS: Performed by: ANESTHESIOLOGY

## 2019-08-26 PROCEDURE — 71000033 HC RECOVERY, INTIAL HOUR: Performed by: ANESTHESIOLOGY

## 2019-08-26 PROCEDURE — 63650 IMPLANT NEUROELECTRODES: CPT | Mod: ,,, | Performed by: ANESTHESIOLOGY

## 2019-08-26 PROCEDURE — S0020 INJECTION, BUPIVICAINE HYDRO: HCPCS | Performed by: ANESTHESIOLOGY

## 2019-08-26 PROCEDURE — 25000003 PHARM REV CODE 250: Performed by: ANESTHESIOLOGY

## 2019-08-26 PROCEDURE — 37000008 HC ANESTHESIA 1ST 15 MINUTES: Performed by: ANESTHESIOLOGY

## 2019-08-26 PROCEDURE — 36000706: Performed by: ANESTHESIOLOGY

## 2019-08-26 PROCEDURE — 63685 INS/RPLC SPI NPG/RCVR POCKET: CPT | Mod: 59,,, | Performed by: ANESTHESIOLOGY

## 2019-08-26 PROCEDURE — C1778 LEAD, NEUROSTIMULATOR: HCPCS | Performed by: ANESTHESIOLOGY

## 2019-08-26 PROCEDURE — 25000003 PHARM REV CODE 250

## 2019-08-26 DEVICE — KIT PATIENT REMOTE: Type: IMPLANTABLE DEVICE | Site: SPINE LUMBAR | Status: FUNCTIONAL

## 2019-08-26 DEVICE — KIT CHARGER: Type: IMPLANTABLE DEVICE | Site: SPINE LUMBAR | Status: FUNCTIONAL

## 2019-08-26 DEVICE — IPG SENZA 1500: Type: IMPLANTABLE DEVICE | Site: SPINE LUMBAR | Status: FUNCTIONAL

## 2019-08-26 DEVICE — KIT LEAD 70CM: Type: IMPLANTABLE DEVICE | Site: SPINE LUMBAR | Status: FUNCTIONAL

## 2019-08-26 DEVICE — KIT LEAD ANCHOR N3000: Type: IMPLANTABLE DEVICE | Site: SPINE LUMBAR | Status: FUNCTIONAL

## 2019-08-26 RX ORDER — NEOSTIGMINE METHYLSULFATE 1 MG/ML
INJECTION, SOLUTION INTRAVENOUS
Status: DISCONTINUED | OUTPATIENT
Start: 2019-08-26 | End: 2019-08-26

## 2019-08-26 RX ORDER — FAMOTIDINE 20 MG/1
TABLET, FILM COATED ORAL
Status: COMPLETED
Start: 2019-08-26 | End: 2019-08-26

## 2019-08-26 RX ORDER — ROCURONIUM BROMIDE 10 MG/ML
INJECTION, SOLUTION INTRAVENOUS
Status: DISCONTINUED | OUTPATIENT
Start: 2019-08-26 | End: 2019-08-26

## 2019-08-26 RX ORDER — SODIUM CHLORIDE, SODIUM LACTATE, POTASSIUM CHLORIDE, CALCIUM CHLORIDE 600; 310; 30; 20 MG/100ML; MG/100ML; MG/100ML; MG/100ML
INJECTION, SOLUTION INTRAVENOUS CONTINUOUS
Status: DISCONTINUED | OUTPATIENT
Start: 2019-08-26 | End: 2019-08-26 | Stop reason: HOSPADM

## 2019-08-26 RX ORDER — FAMOTIDINE 20 MG/1
20 TABLET, FILM COATED ORAL
Status: COMPLETED | OUTPATIENT
Start: 2019-08-26 | End: 2019-08-26

## 2019-08-26 RX ORDER — OXYCODONE HYDROCHLORIDE 5 MG/1
5 TABLET ORAL
Status: DISCONTINUED | OUTPATIENT
Start: 2019-08-26 | End: 2019-08-26 | Stop reason: HOSPADM

## 2019-08-26 RX ORDER — SODIUM CHLORIDE 0.9 % (FLUSH) 0.9 %
3 SYRINGE (ML) INJECTION
Status: DISCONTINUED | OUTPATIENT
Start: 2019-08-26 | End: 2019-08-26 | Stop reason: HOSPADM

## 2019-08-26 RX ORDER — LIDOCAINE HCL/PF 100 MG/5ML
SYRINGE (ML) INTRAVENOUS
Status: DISCONTINUED | OUTPATIENT
Start: 2019-08-26 | End: 2019-08-26

## 2019-08-26 RX ORDER — LIDOCAINE HYDROCHLORIDE AND EPINEPHRINE 10; 10 MG/ML; UG/ML
INJECTION, SOLUTION INFILTRATION; PERINEURAL
Status: DISCONTINUED | OUTPATIENT
Start: 2019-08-26 | End: 2019-08-26 | Stop reason: HOSPADM

## 2019-08-26 RX ORDER — BACITRACIN 50000 [IU]/1
INJECTION, POWDER, FOR SOLUTION INTRAMUSCULAR
Status: DISCONTINUED | OUTPATIENT
Start: 2019-08-26 | End: 2019-08-26 | Stop reason: HOSPADM

## 2019-08-26 RX ORDER — ACETAMINOPHEN 500 MG
TABLET ORAL
Status: COMPLETED
Start: 2019-08-26 | End: 2019-08-26

## 2019-08-26 RX ORDER — BUPIVACAINE HYDROCHLORIDE 5 MG/ML
INJECTION, SOLUTION EPIDURAL; INTRACAUDAL
Status: DISCONTINUED | OUTPATIENT
Start: 2019-08-26 | End: 2019-08-26 | Stop reason: HOSPADM

## 2019-08-26 RX ORDER — CEPHALEXIN 500 MG/1
500 CAPSULE ORAL EVERY 6 HOURS
Qty: 28 CAPSULE | Refills: 0 | Status: SHIPPED | OUTPATIENT
Start: 2019-08-26 | End: 2019-09-02

## 2019-08-26 RX ORDER — ONDANSETRON 2 MG/ML
4 INJECTION INTRAMUSCULAR; INTRAVENOUS DAILY PRN
Status: DISCONTINUED | OUTPATIENT
Start: 2019-08-26 | End: 2019-08-26 | Stop reason: HOSPADM

## 2019-08-26 RX ORDER — ONDANSETRON 2 MG/ML
INJECTION INTRAMUSCULAR; INTRAVENOUS
Status: DISCONTINUED | OUTPATIENT
Start: 2019-08-26 | End: 2019-08-26

## 2019-08-26 RX ORDER — PROPOFOL 10 MG/ML
VIAL (ML) INTRAVENOUS
Status: DISCONTINUED | OUTPATIENT
Start: 2019-08-26 | End: 2019-08-26

## 2019-08-26 RX ORDER — GLYCOPYRROLATE 0.2 MG/ML
INJECTION INTRAMUSCULAR; INTRAVENOUS
Status: DISCONTINUED | OUTPATIENT
Start: 2019-08-26 | End: 2019-08-26

## 2019-08-26 RX ORDER — HYDROMORPHONE HYDROCHLORIDE 2 MG/ML
0.4 INJECTION, SOLUTION INTRAMUSCULAR; INTRAVENOUS; SUBCUTANEOUS EVERY 5 MIN PRN
Status: DISCONTINUED | OUTPATIENT
Start: 2019-08-26 | End: 2019-08-26 | Stop reason: HOSPADM

## 2019-08-26 RX ORDER — HYDROCODONE BITARTRATE AND ACETAMINOPHEN 5; 325 MG/1; MG/1
1 TABLET ORAL EVERY 8 HOURS PRN
Qty: 30 TABLET | Refills: 0 | Status: SHIPPED | OUTPATIENT
Start: 2019-08-26 | End: 2019-09-03

## 2019-08-26 RX ORDER — ACETAMINOPHEN 500 MG
1000 TABLET ORAL
Status: COMPLETED | OUTPATIENT
Start: 2019-08-26 | End: 2019-08-26

## 2019-08-26 RX ORDER — SODIUM CHLORIDE 9 MG/ML
500 INJECTION, SOLUTION INTRAVENOUS CONTINUOUS
Status: DISCONTINUED | OUTPATIENT
Start: 2019-08-26 | End: 2019-08-26 | Stop reason: HOSPADM

## 2019-08-26 RX ORDER — MEPERIDINE HYDROCHLORIDE 25 MG/ML
12.5 INJECTION INTRAMUSCULAR; INTRAVENOUS; SUBCUTANEOUS ONCE AS NEEDED
Status: DISCONTINUED | OUTPATIENT
Start: 2019-08-26 | End: 2019-08-26 | Stop reason: HOSPADM

## 2019-08-26 RX ORDER — VANCOMYCIN HYDROCHLORIDE 1 G/20ML
INJECTION, POWDER, LYOPHILIZED, FOR SOLUTION INTRAVENOUS
Status: DISCONTINUED | OUTPATIENT
Start: 2019-08-26 | End: 2019-08-26 | Stop reason: HOSPADM

## 2019-08-26 RX ORDER — FENTANYL CITRATE 50 UG/ML
INJECTION, SOLUTION INTRAMUSCULAR; INTRAVENOUS
Status: DISCONTINUED | OUTPATIENT
Start: 2019-08-26 | End: 2019-08-26

## 2019-08-26 RX ORDER — CEFAZOLIN SODIUM 1 G/3ML
2 INJECTION, POWDER, FOR SOLUTION INTRAMUSCULAR; INTRAVENOUS
Status: COMPLETED | OUTPATIENT
Start: 2019-08-26 | End: 2019-08-26

## 2019-08-26 RX ADMIN — FAMOTIDINE 20 MG: 20 TABLET, FILM COATED ORAL at 12:08

## 2019-08-26 RX ADMIN — ACETAMINOPHEN 1000 MG: 500 TABLET, FILM COATED ORAL at 12:08

## 2019-08-26 RX ADMIN — GLYCOPYRROLATE 0.4 MG: 0.2 INJECTION, SOLUTION INTRAMUSCULAR; INTRAVENOUS at 02:08

## 2019-08-26 RX ADMIN — NEOSTIGMINE METHYLSULFATE 5 MG: 1 INJECTION INTRAVENOUS at 04:08

## 2019-08-26 RX ADMIN — ONDANSETRON 4 MG: 2 INJECTION INTRAMUSCULAR; INTRAVENOUS at 04:08

## 2019-08-26 RX ADMIN — LIDOCAINE HYDROCHLORIDE 75 MG: 20 INJECTION, SOLUTION INTRAVENOUS at 02:08

## 2019-08-26 RX ADMIN — GLYCOPYRROLATE 0.8 MG: 0.2 INJECTION, SOLUTION INTRAMUSCULAR; INTRAVENOUS at 04:08

## 2019-08-26 RX ADMIN — FENTANYL CITRATE 50 MCG: 50 INJECTION, SOLUTION INTRAMUSCULAR; INTRAVENOUS at 03:08

## 2019-08-26 RX ADMIN — LIDOCAINE HYDROCHLORIDE 25 MG: 20 INJECTION, SOLUTION INTRAVENOUS at 04:08

## 2019-08-26 RX ADMIN — SODIUM CHLORIDE, SODIUM LACTATE, POTASSIUM CHLORIDE, AND CALCIUM CHLORIDE: 600; 310; 30; 20 INJECTION, SOLUTION INTRAVENOUS at 03:08

## 2019-08-26 RX ADMIN — Medication 1000 MG: at 12:08

## 2019-08-26 RX ADMIN — CARBOXYMETHYLCELLULOSE SODIUM 2 DROP: 2.5 SOLUTION/ DROPS OPHTHALMIC at 02:08

## 2019-08-26 RX ADMIN — CEFAZOLIN 2 G: 330 INJECTION, POWDER, FOR SOLUTION INTRAMUSCULAR; INTRAVENOUS at 02:08

## 2019-08-26 RX ADMIN — ROCURONIUM BROMIDE 40 MG: 10 INJECTION, SOLUTION INTRAVENOUS at 02:08

## 2019-08-26 RX ADMIN — FENTANYL CITRATE 100 MCG: 50 INJECTION, SOLUTION INTRAMUSCULAR; INTRAVENOUS at 02:08

## 2019-08-26 RX ADMIN — SODIUM CHLORIDE, SODIUM LACTATE, POTASSIUM CHLORIDE, AND CALCIUM CHLORIDE: 600; 310; 30; 20 INJECTION, SOLUTION INTRAVENOUS at 01:08

## 2019-08-26 RX ADMIN — PROPOFOL 200 MG: 10 INJECTION, EMULSION INTRAVENOUS at 02:08

## 2019-08-26 NOTE — ANESTHESIA POSTPROCEDURE EVALUATION
Anesthesia Post Evaluation    Patient: Jefferson Boogie    Procedure(s) Performed: Procedure(s) (LRB):  Insertion, Neurostimulator, LUMBAR SPINAL CORD STIMULATOR IMPLANT (N/A)    Final Anesthesia Type: general  Patient location during evaluation: PACU  Patient participation: Yes- Able to Participate  Level of consciousness: awake and alert  Post-procedure vital signs: reviewed and stable  Pain management: adequate  Airway patency: patent  PONV status at discharge: No PONV  Anesthetic complications: no      Cardiovascular status: hemodynamically stable  Respiratory status: unassisted  Hydration status: euvolemic  Follow-up not needed.          Vitals Value Taken Time   /72 8/26/2019  5:25 PM   Temp 36.4 °C (97.5 °F) 8/26/2019  4:36 PM   Pulse 46 8/26/2019  5:30 PM   Resp 16 8/26/2019  5:25 PM   SpO2 100 % 8/26/2019  5:30 PM   Vitals shown include unvalidated device data.      Event Time     Out of Recovery 17:32:36          Pain/Kamron Score: Pain Rating Prior to Med Admin: 4 (8/26/2019 12:52 PM)

## 2019-08-26 NOTE — TRANSFER OF CARE
"Anesthesia Transfer of Care Note    Patient: Jefferson Boogie    Procedure(s) Performed: Procedure(s) (LRB):  Insertion, Neurostimulator, LUMBAR SPINAL CORD STIMULATOR IMPLANT (N/A)    Patient location: PACU    Anesthesia Type: general    Transport from OR: Transported from OR on 2-3 L/min O2 by NC with adequate spontaneous ventilation    Post pain: adequate analgesia    Post assessment: no apparent anesthetic complications    Post vital signs: stable    Level of consciousness: awake and alert    Nausea/Vomiting: no nausea/vomiting    Complications: none    Transfer of care protocol was followed      Last vitals:   Visit Vitals  BP (!) 179/70 (BP Location: Right arm, Patient Position: Sitting)   Pulse (!) 48   Temp 36.6 °C (97.8 °F) (Oral)   Resp 16   Ht 6' 1.5" (1.867 m)   Wt 91.2 kg (201 lb)   SpO2 97%   BMI 26.16 kg/m²     "

## 2019-08-26 NOTE — DISCHARGE INSTRUCTIONS
Spinal Cord Stimulation: Your Experience  Pain messages travel over nerve pathways to the spinal cord, inside the spine. The spinal cord carries the messages to the brain. Constant pain messages can cause long-term pain that is hard to treat. This is known as chronic pain. Spinal cord stimulation uses a medical device to send signals to the nerve pathways inside your spinal cord. These signals help block the pain.  Your healthcare provider does a stimulator placement in two stages. He or she does a test (trial) stage to see how well spinal cord stimulation works for you. If the trial stage is a success, the permanent stimulator system is put into place.     Placing the permanent system  If the trial stimulator works well for you, a permanent system might be indicated. This must be done in the hospital. Prepare for it as instructed. The  or the power source is implanted under the skin on your abdomen or buttocks. The power source is small, so it wont show under your clothing. Some devices are rechargeable After the system is in place, the settings are checked to make sure they are at the right level for you. If necessary, the spinal cord stimulation device can be removed at any time. Not all these systems are MRI compatible. Find out from your doctor if you still can have an MRI once the system is installed.    After the procedures  You may stay in the hospital overnight. The implant site will be sore for a few days. The leads need some time to become fixed so they dont move around. Your doctor will tell you what activities to avoid for the next month or so.    When to call your doctor  Call your doctor right away if you:  · Have fever over 100.4°F (38°C)  · Have chills  · Have pain, drainage, or increased redness at the implant site  · If you don't feel the stimulation anymore  Also call your doctor if the pain symptoms return.       Anesthesia: After Your Surgery  Youve just had surgery. During  surgery, you received medication called anesthesia to keep you comfortable and pain-free. After surgery, you may experience some pain or nausea. This is common. Here are some tips for feeling better and recovering after surgery.    Going home  Your doctor or nurse will show you how to take care of yourself when you go home. He or she will also answer your questions. Have an adult family member or friend drive you home. For the first 24 hours after your surgery:    · Do not drive or use heavy equipment.  · Do not make important decisions or sign legal documents.  · Avoid alcohol.  · Have someone stay with you, if needed. He or she can watch for problems and help keep you safe.  · Take your time getting up from a seated or lying position. You may experience dizziness for 24 hours.    Be sure to keep all follow-up appointments with your doctor. And rest after your procedure for as long as your doctor tells you to.    Coping with pain  If you have pain after surgery, pain medication will help you feel better. Take it as directed, before pain becomes severe. Also, ask your doctor or pharmacist about other ways to control pain, such as with heat, ice, and relaxation. And follow any other instructions your surgeon or nurse gives you.    URINARY RETENTION  Should you experience a decrease in your urine output or are unable to urinate following surgery, this can be due to the medications given during surgery.  We recommend you going to the nearest Emergency Department.    Tips for taking pain medication  To get the best relief possible, remember these points:    · Pain medications can upset your stomach. Taking them with a little food may help.  · Most pain relievers taken by mouth need at least 20 to 30 minutes to take effect.  · Taking medication on a schedule can help you remember to take it. Try to time your medication so that you can take it before beginning an activity, such as dressing, walking, or sitting down for  dinner.  · Constipation is a common side effect of pain medications. Contact your doctor before taking any medications like laxatives or stool softeners to help relieve constipation. Also ask about any dietary restrictions, because drinking lots of fluids and eating foods like fruits and vegetables that are high in fiber can also help. Remember, dont take laxatives unless your surgeon has prescribed them.  · Mixing alcohol and pain medication can cause dizziness and slow your breathing. It can even be fatal. Dont drink alcohol while taking pain medication.  · Pain medication can slow your reflexes. Dont drive or operate machinery while taking pain medication.    If your health care provider tells you to take acetaminophen to help relieve your pain, ask him or her how much you are supposed to take each day. (Acetaminophen is the generic name for Tylenol and other brand-name pain relievers.) Acetaminophen or other pain relievers may interact with your prescription medicines or other over-the-counter (OTC) drugs. Some prescription medications contain acetaminophen along with other active ingredients. Using both prescription and OTC acetaminophen for pain can cause you to overdose. The FDA recommends that you read the labels on your OTC medications carefully. This will help you to clearly understand the list of active ingredients, dosing instructions, and any warnings. It may also help you avoid taking too much acetaminophen. If you have questions or don't understand the information, ask your pharmacist or health care provider to explain it to you before you take the OTC medication.    Managing nausea  Some people have an upset stomach after surgery. This is often due to anesthesia, pain, pain medications, or the stress of surgery. The following tips will help you manage nausea and get good nutrition as you recover. If you were on a special diet before surgery, ask your doctor if you should follow it during recovery.  These tips may help:    · Dont push yourself to eat. Your body will tell you when to eat and how much.  · Start off with clear liquids and soup. They are easier to digest.  · Progress to semi-solid foods (mashed potatoes, applesauce, and gelatin) as you feel ready.  · Slowly move to solid foods. Dont eat fatty, rich, or spicy foods at first.  · Dont force yourself to have three large meals a day. Instead, eat smaller amounts more often.  · Take pain medications with a small amount of solid food, such as crackers or toast to avoid nausea.      Call your surgeon if    · You feel too sleepy, dizzy, or groggy (medication may be too strong).  · You have side effects like nausea, vomiting, or skin changes (rash, itching, or hives).     © 9836-7846 [employer]. [address] [place] [address], [place] [address]. All rights reserved. This information is not intended as a substitute for professional medical care. Always follow your healthcare professional's instructions.    PLEASE FOLLOW ANY OTHER INSTRUCTIONS PROVIDED TO YOU BY DR. RABAGO!

## 2019-08-26 NOTE — DISCHARGE SUMMARY
Discharge Note  Short Stay      SUMMARY     Admit Date: 8/26/2019    Attending Physician: Samuel Jimenez      Discharge Physician: Samuel Jimenez      Discharge Date: 8/26/2019 4:32 PM    Procedure(s) (LRB):  Insertion, Neurostimulator, LUMBAR SPINAL CORD STIMULATOR IMPLANT (N/A)    Final Diagnosis: Chronic pain syndrome [G89.4]  Arachnoiditis [G03.9]    Disposition: Home or self care    Patient Instructions:   Current Discharge Medication List      CONTINUE these medications which have NOT CHANGED    Details   ALPRAZolam (XANAX) 1 MG tablet Take 1 mg by mouth.      celecoxib (CELEBREX) 200 MG capsule Take 200 mg by mouth 2 (two) times daily.      ergocalciferol, vitamin D2, 400 unit Tab Take 50,000 mg by mouth.      finasteride (PROSCAR) 5 mg tablet Take 5 mg by mouth.      flurazepam (DALMANE) 15 MG Cap Take 30 mg by mouth daily as needed.      levothyroxine (SYNTHROID) 100 MCG tablet Take 100 mcg by mouth.      simvastatin (ZOCOR) 20 MG tablet Take 20 mg by mouth.      traMADol (ULTRAM) 50 mg tablet TAKE 1 TABLET BY MOUTH 2 TO 3 TIMES A DAY AS NEEDED FOR SEVERE PAIN                 Discharge Diagnosis: Chronic pain syndrome [G89.4]  Arachnoiditis [G03.9]  Condition on Discharge: Stable with no complications to procedure   Diet on Discharge: Same as before.  Activity: as per instruction sheet.  Discharge to: Home with a responsible adult.  Follow up: 2-4 weeks    Wound care:  1-Ok to remove dressing on Post op day 2, in case of sutures leave underlying strips in place until follow up. If they fall off it is okay.   2- No soaking bath or swimming until after follow up. Sponge bath to front is ok   3-Take your pain medication as needed.   4-Take over the counter stool softener while taking pain medication to prevent constipation.   5-If no bowel movement in 2 days take miralax twice a day.   6-Ok for light activity (light housework, walking, stair climbing) no strenuous exercise until after follow up.   7-No  lifting greater than 20 pounds or 1 gallon of milk until after follow up.  8- Please call my office if experienced any weakness or loss of sensation, fever > 101.5, pain uncontrolled with oral medications, persistent nausea/vomiting/or diarrhea, redness or drainage from the incisions, or any other worrisome concerns. If physician on call was not reached or could not communicate with our office for any reason please go to the nearest emergency department   9-Please keep abdominal binder (if ordered) on during the day time and activity, but remove before bedtime or at rest         Please call my office or pager at [phone] if experienced any weakness or loss of sensation, fever > 101.5, pain uncontrolled with oral medications, persistent nausea/vomiting/or diarrhea, redness or drainage from the incisions, or any other worrisome concerns. If physician on call was not reached or could not communicate with our office for any reason please go to the nearest emergency department       Samuel Jimenez MD

## 2019-08-26 NOTE — INTERVAL H&P NOTE
The patient has been examined and the H&P has been reviewed:    I concur with the findings and no changes have occurred since H&P was written.    Anesthesia/Surgery risks, benefits and alternative options discussed and understood by patient/family.          Active Hospital Problems    Diagnosis  POA    Radiculopathy of thoracolumbar region [M54.15]  Yes      Resolved Hospital Problems   No resolved problems to display.

## 2019-08-26 NOTE — OP NOTE
Patient Name: Jefferson Boogie  MRN: [id number]    INFORMED CONSENT: The procedure, risks, benefits and options were discussed with patient. There are no contraindications to the procedure. The patient expressed understanding and agreed to proceed. The personnel performing the procedure was discussed. I verify that I personally obtained Jefferson's consent prior to the start of the procedure and the signed consent can be found on the patient's chart.        Procedure Date: 8/26/2019  Surgeon(s) and Role:     * Samuel Jimenez MD - Primary      Pre Procedure diagnosis: Chronic pain syndrome [G89.4]  Arachnoiditis [G03.9]  1. Chronic pain syndrome    2. Radiculopathy of thoracolumbar region    3.      arachnoiditis     Post-Procedure diagnosis: SAME      PROCEDURE: SPINAL CORD STIMULATOR IMPLANT  SURGEON:DR. Samuel Jimenez MD  ASST: FELLOW PHYSICIAN, MD  OPERATION: Percutaneous SCS lead implantation x 2 and nevro system implantable pulse generator.  ANESTHESIA: GA  PREOPERATIVE ANTIBIOTICS: 2 g ancef IV given 30 minutes prior to incision, see anesthesia record for time.  OPERATIVE PROCEDURE: PATIENT was brought to the operating room and was placed in the prone position after ET intubation. he was prepped and draped in the usual sterile fashion with chlorhexidine and chlorhexidine three times. With fluoroscopic guidance the location of the desired site for placement of the percutaneous leads was identified and local anesthetic consisting of 1% Lidocaine., 0.5% bupivacaine  and  epinephrine was injected subcutaneously over the area. An approximately 5cm longitudinal incision was made in the midline. The incision was dissected down to the supraspinous ligament. Hemostasis was established and a self-retaining retractor was used to allow adequate visualization.  A 14 gauge touhy was then introduced with the paraspinous approach under fluoroscopic guidance into the T12-L1 interlaminar space. The entry of the Tuohy into the  epidural space was verified by using loss of resistance to air with a glass 5 mL syringe. The lead was passed through the needle and advanced up to top of T8 with fluoroscopic guidance. A second lead was then placed in identical fashion and placed adjacent to the first with tip at top of T9.  The leads were then anchored to the supraspinous ligaments with fix-it device using the anchoring devices.  The leads were then reconnected to the screening cable and retested for position and placement.  A pocket site was identified just above the lt iliac crest.  Local anesthetic was administered in the region and a three centimeter incision was made.  A subcutaneous pocket was then created with a blunt dissection technique for placement of the senza 1 implantable pulse generator. The tunneling was then done between the lead and the pocket site after the administration of additional local anesthetic subcutaneously.  The tunneling tool with a wedged tip attachment was introduced subcutaneously from the lead incision and guided to the pocket. The leads were inserted into the tunneling catheter and advanced to the pocket site. The leads were then inserted into the implantable pulse generator and the screws were tightened with the hexwrench. The generator was then introduced into the pocket and remote tested to ensure adequate placement of the leads into the IPG. Both surgical wounds were then irrigated with antibiotic solution followed by 1g vancomycin powder and the wounds were closed with #2-0 Vicryl suture. This was followed-up with staples. A sterile dressing was applied. No specimens collected.    Patient was extubated taken to recovery in a stable condition with no change in neurological exam       COMPLICATIONS: There were no complications.      Blood Loss: Nill  Specimen: None    Samuel Jimenez MD

## 2019-09-03 ENCOUNTER — PATIENT MESSAGE (OUTPATIENT)
Dept: PAIN MEDICINE | Facility: CLINIC | Age: 84
End: 2019-09-03

## 2019-09-03 ENCOUNTER — OFFICE VISIT (OUTPATIENT)
Dept: PAIN MEDICINE | Facility: CLINIC | Age: 84
End: 2019-09-03
Payer: MEDICARE

## 2019-09-03 ENCOUNTER — TELEPHONE (OUTPATIENT)
Dept: PAIN MEDICINE | Facility: CLINIC | Age: 84
End: 2019-09-03

## 2019-09-03 VITALS
RESPIRATION RATE: 18 BRPM | DIASTOLIC BLOOD PRESSURE: 64 MMHG | HEART RATE: 53 BPM | BODY MASS INDEX: 26.77 KG/M2 | HEIGHT: 74 IN | TEMPERATURE: 98 F | WEIGHT: 208.56 LBS | SYSTOLIC BLOOD PRESSURE: 139 MMHG

## 2019-09-03 DIAGNOSIS — G89.4 CHRONIC PAIN SYNDROME: Primary | ICD-10-CM

## 2019-09-03 DIAGNOSIS — M54.15 RADICULOPATHY OF THORACOLUMBAR REGION: ICD-10-CM

## 2019-09-03 DIAGNOSIS — G03.9 ARACHNOIDITIS: ICD-10-CM

## 2019-09-03 PROCEDURE — 99024 POSTOP FOLLOW-UP VISIT: CPT | Mod: POP,,, | Performed by: NURSE PRACTITIONER

## 2019-09-03 PROCEDURE — 99024 PR POST-OP FOLLOW-UP VISIT: ICD-10-PCS | Mod: POP,,, | Performed by: NURSE PRACTITIONER

## 2019-09-03 PROCEDURE — 99999 PR PBB SHADOW E&M-EST. PATIENT-LVL III: ICD-10-PCS | Mod: PBBFAC,,, | Performed by: NURSE PRACTITIONER

## 2019-09-03 PROCEDURE — 99999 PR PBB SHADOW E&M-EST. PATIENT-LVL III: CPT | Mod: PBBFAC,,, | Performed by: NURSE PRACTITIONER

## 2019-09-03 PROCEDURE — 99213 OFFICE O/P EST LOW 20 MIN: CPT | Mod: PBBFAC | Performed by: NURSE PRACTITIONER

## 2019-09-03 NOTE — TELEPHONE ENCOUNTER
----- Message from Maida Bell sent at 9/3/2019 12:35 PM CDT -----  Contact: pt    Name of Who is Calling:AMY NERI [[id number]]    What is the request in detail: patient  Like to say thank you to Dr. Jimenez for all that he has done!! Patient states he met with [employer] rep and he is very hopeful.    Can the clinic reply by [facility]: No    What [facility] Back [facility]: [phone]

## 2019-09-03 NOTE — PROGRESS NOTES
Chronic patient Established Note (Follow up visit)      SUBJECTIVE:    Jefferson Boogie returns to clinic today for post op. He is s/p Nevro SCS implant on 8/26/2019. He reports mild soreness around incisions. He denies any fever, chills, or drainage. He did complete his antibiotic this morning. He continues to follow his activity restrictions. He denies any other health changes. His pain today is 5/10.      Pain Disability Index Review:  Last 3 PDI Scores 9/3/2019 8/12/2019 7/18/2019   Pain Disability Index (PDI) 43 28 40       Pain Medications:    - Opioids: Ultram ER ( Tramadol HCL)     report:  Reviewed and consistent with medication use as prescribed.    Pain Procedures:   8/2/2019- Nevro SCS trial  8/26/2019- Nevro SCS implant    Physical Therapy/Home Exercise: yes    Imaging:  MRI OF THE LUMBAR SPINE WITH AND WITHOUT I.V. CONTRAST:    CPT CODE: 01678    INDICATION: Low back pain, rapidly progressive neuro deficit    COMPARISON STUDY: Unenhanced MRI lumbar spine of 4/8/2019 and 3/26/2018    TECHNIQUE:This study was performed on the [employer] 1.5 [place] high field MR unit. Multislice, multisequence images of the lumbar spine were obtained in multiple projections both before and following intravenous administration of the same 9 mL of gadavist contrast that was utilized to obtain the patient's MRI of the thoracic spine.. The intravenous contrast was injected in the patient's left antecubital fossa.    FINDINGS: There are 5 lumbar type vertebral bodies lumbar vertebral body height and alignment are maintained. The signal from the lumbar vertebra demonstrates an admixture of red and yellow marrow. There are some Modic type degenerative signal changes involving the anterior inferior aspect of L5. There is slight anterior disc bulging at the L5-S1 level. All of the lumbar discs are desiccated with severe narrowing of the L2-L3 disc space, moderate narrowing of the L1-2 disc space posteriorly, moderate narrowing of the  L5-S1 disc space and mild narrowing of the L3-4 and L4-5 disc spaces.    L5-S1 level: There is bilateral facet joint arthropathy with a slight amount of fluid in both facet joints. There is bilateral, right greater left, foraminal narrowing but no central canal stenosis. There is a mild broad-based posterior protrusion of the L5-S1 disc that extends towards the left-sided foramen and into and possibly through the right-sided foramen. This is similar to what was seen on the prior exam. Incidental note is made of a mild amount of epidural fat that is deposited along the right anterior and left anterior aspect of thecal sac at the L5-S1 disc space level.    L4-L5 level: There is bilateral, left greater than right, facet joint arthropathy without central canal stenosis. There is no bony foraminal narrowing. There is a slight to mild broad-based posterior protrusion of the disc that extends to the medial foramen bilaterally.    L3-L4 level: There is bilateral facet joint arthropathy with slight ligament flavum redundancy but no bony foraminal narrowing or bony central canal stenosis. There is a slight to mild broad-based posterior protrusion of the disc that extends towards the right-sided foramen and into the anteromedial left-sided foramen.    L2-L3 level: There is bilateral facet joint arthropathy without bony central canal stenosis or bony foraminal narrowing. There is a slight to mild bilobed posterior protrusion of the disc at the level of the anteromedial foramen bilaterally and this is best seen on the axial images.    L1-L2 level: There is bilateral facet arthropathy without bony foraminal narrowing or bony central canal stenosis and the posterior disc margin is unremarkable.    The tip of the conus medullaris extends down to the level of the superior endplate of L1 and the signal from the visualized conus is unremarkable. There is clumping of the cauda equina nerve rootlets throughout the distal thecal sac  consistent with arachnoiditis.  Following contrast administration, there is no abnormal enhancement of any of the bony or soft tissue elements of the lumbar spine except for possibly one or 2 facet joints.    IMPRESSION:   1. No abnormal enhancement of any bony or soft tissue elements of the lumbar spine except for possibly one or 2 facet joints. Clumping of the nerve rootlets of the cauda equina consistent with arachnoiditis.  3. Desiccation of all of the lumbar disks with multilevel disc space narrowing, multilevel facet joint arthropathy, but no central canal stenosis.  4. Modic type degenerative signal changes in the anterior inferior aspect of L5.  5. There is a mild amount of epidural fat deposit along the right anterior and left anterior aspect of the thecal sac at the L5-S1 level..  6. Other findings as discussed above.        Allergies:   Review of patient's allergies indicates:   Allergen Reactions    Adhesive Itching     Adhesive used during surgery-trial stimulator       Current Medications:   Current Outpatient Medications   Medication Sig Dispense Refill    ALPRAZolam (XANAX) 1 MG tablet Take 1 mg by mouth.      celecoxib (CELEBREX) 200 MG capsule Take 200 mg by mouth 2 (two) times daily.      ergocalciferol, vitamin D2, 400 unit Tab Take 50,000 mg by mouth.      finasteride (PROSCAR) 5 mg tablet Take 5 mg by mouth.      flurazepam (DALMANE) 15 MG Cap Take 30 mg by mouth daily as needed.      HYDROcodone-acetaminophen (NORCO) 5-325 mg per tablet Take 1 tablet by mouth every 8 (eight) hours as needed for Pain. 30 tablet 0    levothyroxine (SYNTHROID) 100 MCG tablet Take 100 mcg by mouth.      simvastatin (ZOCOR) 20 MG tablet Take 20 mg by mouth.      traMADol (ULTRAM) 50 mg tablet TAKE 1 TABLET BY MOUTH 2 TO 3 TIMES A DAY AS NEEDED FOR SEVERE PAIN       No current facility-administered medications for this visit.        REVIEW OF SYSTEMS:    GENERAL:  No weight loss, malaise or fevers.  HEENT:   Negative for frequent or significant headaches.  NECK:  Negative for lumps, goiter, pain and significant neck swelling.  RESPIRATORY:  Negative for cough, wheezing or shortness of breath.  CARDIOVASCULAR:  Negative for chest pain, leg swelling or palpitations.  + HLD  GI:  Negative for abdominal discomfort, blood in stools or black stools or change in bowel habits.  MUSCULOSKELETAL:  See HPI.  SKIN:  Negative for lesions, rash, and itching.  PSYCH:  Positive for sleep disturbance. Negative for mood disorder and recent psychosocial stressors.     HEMATOLOGY/LYMPHOLOGY:  Negative for prolonged bleeding, bruising easily or swollen nodes.  NEURO:   No history of headaches, syncope, paralysis, seizures or tremors.  All other reviewed and negative other than HPI.    Past Medical History:  Past Medical History:   Diagnosis Date    Cancer     stage I bladder cancer 50 yrs ago    Thyroid disease        Past Surgical History:  Past Surgical History:   Procedure Laterality Date    BLADDER SURGERY      HERNIA REPAIR      Insertion, Neurostimulator, LUMBAR SPINAL CORD STIMULATOR IMPLANT N/A 8/26/2019    [facility]    KNEE SURGERY      Trial, Neurostimulator, SPINAL CORD STIMULATOR TRIAL N/A 8/5/2019    [facility]formed [facility] at [facility] CATH LAB       Family History:  No family history on file.    Social History:  Social History     Socioeconomic History    Marital status:      Spouse name: Not on file    Number of children: Not on file    Years of education: Not on file    Highest education level: Not on file   Occupational History    Not on file   Social Needs    Financial resource strain: Not on file    Food insecurity:     Worry: Not on file     Inability: Not on file    Transportation needs:     Medical: Not on file     Non-medical: Not on file   Tobacco Use    Smoking status: Former Smoker    Tobacco comment: stopped 40 years ago   Substance and Sexual  "Activity    Alcohol use: Yes     Comment: social    Drug use: Never    Sexual activity: Yes     Partners: Female   Lifestyle    Physical activity:     Days per week: Not on file     Minutes per session: Not on file    Stress: Not on file   Relationships    Social connections:     Talks on phone: Not on file     Gets together: Not on file     Attends Mosque service: Not on file     Active member of club or organization: Not on file     Attends meetings of clubs or organizations: Not on file     Relationship status: Not on file   Other Topics Concern    Not on file   Social History Narrative    Not on file       OBJECTIVE:    /64   Pulse (!) 53   Temp 97.6 °F (36.4 °C)   Resp 18   Ht 6' 1.5" (1.867 m)   Wt 94.6 kg (208 lb 8.9 oz)   BMI 27.14 kg/m²     PHYSICAL EXAMINATION:    General appearance: Well appearing, in no acute distress, alert and oriented x3.  Psych:  Mood and affect appropriate.  Skin: Skin color, texture, turgor normal, no rashes or lesions, in both upper and lower body. SCS site without drainage or signs of infection.  Staples removed without difficulty with tips intact.  Area cleaned with alcohol and Bacitracin applied.  Head/face:  Normocephalic, atraumatic. No palpable lymph nodes.  Cor: RRR  Pulm: CTA  GI:  Soft and non-tender.  Gait:  Antalgic- ambulates without assistance.             ASSESSMENT: 84 y.o. year old male with  low back pain, consistent with below:     1. Chronic pain syndrome     2. Arachnoiditis     3. Radiculopathy of thoracolumbar region         PLAN:     - Previous imaging was reviewed and discussed with the patient today.    - He is s/p Nevro SCS implant with benefit. He did meet with [facility] today for programming.     - Staples removed today. He may shower, no tub baths. Apply Bacitracin to incisions BID x1 week.     - Notify clinic for any fever, chills, or drainage.     - Continue activity restrictions.    - Patient can continue with medications for now " since they are providing benefits, using them appropriately, and without side effects.    - RTC in 2 weeks for wound check.     - Dr. Jimenez was consulted on the patient and agrees with this plan.     The above plan and management options were discussed at length with patient. Patient is in agreement with the above and verbalized understanding.    Marian Tolliver NP  09/03/2019

## 2019-09-06 ENCOUNTER — TELEPHONE (OUTPATIENT)
Dept: PAIN MEDICINE | Facility: CLINIC | Age: 84
End: 2019-09-06

## 2019-09-06 NOTE — TELEPHONE ENCOUNTER
Contacted and spoke to patient regarding message    Staff informed patient his message was verbally relayed to Marian Tolliver NP and she stated he needs to contact the [facility] and request a temporary card for the SCS Implant and present this card at the airport. That Marian also stated to make sure to remind him to follow activity restrictions and no heavy lifting.      Patient acknowledged information given, expressed understanding and thankfulness.

## 2019-09-06 NOTE — TELEPHONE ENCOUNTER
----- Message from Renetta Owen sent at 9/6/2019 10:50 AM CDT -----  Contact: AMY NERI [[id number]]  Name of Who is Calling : AMY NERI [[id number]]    What is the request in detail :     Patient is requesting a call from staff he would like to know if it would be ok for him to fly next week with the implant he wants to make sure he won't be stopped by TSA  agents  .....Please contact to further discuss and advise.    Can the clinic reply by [facility] :  Phone call only    What Number to Call Back : [phone]

## 2019-09-18 NOTE — TELEPHONE ENCOUNTER
Good morning,afternoon Mr./Mrs.    My name is Rizwana I am contacting you from [facility] regarding your appointment scheduled for, Marian Tolliver with, 09/19/2019 just confirming you will be able to make it.          Staff left voicemail reminding pt of their  MD

## 2019-09-19 ENCOUNTER — OFFICE VISIT (OUTPATIENT)
Dept: PAIN MEDICINE | Facility: CLINIC | Age: 84
End: 2019-09-19
Payer: MEDICARE

## 2019-09-19 VITALS
BODY MASS INDEX: 26.34 KG/M2 | RESPIRATION RATE: 18 BRPM | HEART RATE: 51 BPM | DIASTOLIC BLOOD PRESSURE: 58 MMHG | WEIGHT: 205.25 LBS | HEIGHT: 74 IN | SYSTOLIC BLOOD PRESSURE: 133 MMHG | TEMPERATURE: 98 F

## 2019-09-19 DIAGNOSIS — M54.15 RADICULOPATHY OF THORACOLUMBAR REGION: ICD-10-CM

## 2019-09-19 DIAGNOSIS — G89.4 CHRONIC PAIN SYNDROME: Primary | ICD-10-CM

## 2019-09-19 DIAGNOSIS — G03.9 ARACHNOIDITIS: ICD-10-CM

## 2019-09-19 PROCEDURE — 99999 PR PBB SHADOW E&M-EST. PATIENT-LVL III: ICD-10-PCS | Mod: PBBFAC,,, | Performed by: NURSE PRACTITIONER

## 2019-09-19 PROCEDURE — 99213 OFFICE O/P EST LOW 20 MIN: CPT | Mod: PBBFAC | Performed by: NURSE PRACTITIONER

## 2019-09-19 PROCEDURE — 99999 PR PBB SHADOW E&M-EST. PATIENT-LVL III: CPT | Mod: PBBFAC,,, | Performed by: NURSE PRACTITIONER

## 2019-09-19 PROCEDURE — 99213 PR OFFICE/OUTPT VISIT, EST, LEVL III, 20-29 MIN: ICD-10-PCS | Mod: S$PBB,,, | Performed by: NURSE PRACTITIONER

## 2019-09-19 PROCEDURE — 99213 OFFICE O/P EST LOW 20 MIN: CPT | Mod: S$PBB,,, | Performed by: NURSE PRACTITIONER

## 2019-09-19 RX ORDER — TRAMADOL HYDROCHLORIDE 50 MG/1
50 TABLET ORAL EVERY 12 HOURS PRN
Qty: 30 TABLET | Refills: 0 | Status: SHIPPED | OUTPATIENT
Start: 2019-09-19 | End: 2020-05-26

## 2019-09-19 NOTE — PROGRESS NOTES
Chronic patient Established Note (Follow up visit)      SUBJECTIVE:    Jefferson Boogie returns to clinic today for post op. He is s/p Nevro SCS implant on 8/26/2019. He continues to report benefit with device. He denies any fever, chills, or drainage. He continues to follow his activity restrictions. He reports intermittent low back pain that is tolerable at this time. He does take Tramadol with benefit sparingly. He would like us to take over this medication. He denies any other health changes. He denies any bowel or bladder incontinence. His pain today is 4/10.       Pain Disability Index Review:  Last 3 PDI Scores 9/3/2019 8/12/2019 7/18/2019   Pain Disability Index (PDI) 43 28 40       Pain Medications:    - Opioids: Ultram ER ( Tramadol HCL)     report:  Reviewed and consistent with medication use as prescribed.    Pain Procedures:   8/2/2019- Nevro SCS trial  8/26/2019- Nevro SCS implant    Physical Therapy/Home Exercise: yes    Imaging:  MRI OF THE LUMBAR SPINE WITH AND WITHOUT I.V. CONTRAST:    CPT CODE: 87377    INDICATION: Low back pain, rapidly progressive neuro deficit    COMPARISON STUDY: Unenhanced MRI lumbar spine of 4/8/2019 and 3/26/2018    TECHNIQUE:This study was performed on the [employer] 1.5 [place] high field MR unit. Multislice, multisequence images of the lumbar spine were obtained in multiple projections both before and following intravenous administration of the same 9 mL of gadavist contrast that was utilized to obtain the patient's MRI of the thoracic spine.. The intravenous contrast was injected in the patient's left antecubital fossa.    FINDINGS: There are 5 lumbar type vertebral bodies lumbar vertebral body height and alignment are maintained. The signal from the lumbar vertebra demonstrates an admixture of red and yellow marrow. There are some Modic type degenerative signal changes involving the anterior inferior aspect of L5. There is slight anterior disc bulging at the L5-S1 level. All  of the lumbar discs are desiccated with severe narrowing of the L2-L3 disc space, moderate narrowing of the L1-2 disc space posteriorly, moderate narrowing of the L5-S1 disc space and mild narrowing of the L3-4 and L4-5 disc spaces.    L5-S1 level: There is bilateral facet joint arthropathy with a slight amount of fluid in both facet joints. There is bilateral, right greater left, foraminal narrowing but no central canal stenosis. There is a mild broad-based posterior protrusion of the L5-S1 disc that extends towards the left-sided foramen and into and possibly through the right-sided foramen. This is similar to what was seen on the prior exam. Incidental note is made of a mild amount of epidural fat that is deposited along the right anterior and left anterior aspect of thecal sac at the L5-S1 disc space level.    L4-L5 level: There is bilateral, left greater than right, facet joint arthropathy without central canal stenosis. There is no bony foraminal narrowing. There is a slight to mild broad-based posterior protrusion of the disc that extends to the medial foramen bilaterally.    L3-L4 level: There is bilateral facet joint arthropathy with slight ligament flavum redundancy but no bony foraminal narrowing or bony central canal stenosis. There is a slight to mild broad-based posterior protrusion of the disc that extends towards the right-sided foramen and into the anteromedial left-sided foramen.    L2-L3 level: There is bilateral facet joint arthropathy without bony central canal stenosis or bony foraminal narrowing. There is a slight to mild bilobed posterior protrusion of the disc at the level of the anteromedial foramen bilaterally and this is best seen on the axial images.    L1-L2 level: There is bilateral facet arthropathy without bony foraminal narrowing or bony central canal stenosis and the posterior disc margin is unremarkable.    The tip of the conus medullaris extends down to the level of the superior  endplate of L1 and the signal from the visualized conus is unremarkable. There is clumping of the cauda equina nerve rootlets throughout the distal thecal sac consistent with arachnoiditis.  Following contrast administration, there is no abnormal enhancement of any of the bony or soft tissue elements of the lumbar spine except for possibly one or 2 facet joints.    IMPRESSION:   1. No abnormal enhancement of any bony or soft tissue elements of the lumbar spine except for possibly one or 2 facet joints. Clumping of the nerve rootlets of the cauda equina consistent with arachnoiditis.  3. Desiccation of all of the lumbar disks with multilevel disc space narrowing, multilevel facet joint arthropathy, but no central canal stenosis.  4. Modic type degenerative signal changes in the anterior inferior aspect of L5.  5. There is a mild amount of epidural fat deposit along the right anterior and left anterior aspect of the thecal sac at the L5-S1 level..  6. Other findings as discussed above.        Allergies:   Review of patient's allergies indicates:   Allergen Reactions    Adhesive Itching     Adhesive used during surgery-trial stimulator       Current Medications:   Current Outpatient Medications   Medication Sig Dispense Refill    ALPRAZolam (XANAX) 1 MG tablet Take 1 mg by mouth.      celecoxib (CELEBREX) 200 MG capsule Take 200 mg by mouth 2 (two) times daily.      ergocalciferol, vitamin D2, 400 unit Tab Take 50,000 mg by mouth.      finasteride (PROSCAR) 5 mg tablet Take 5 mg by mouth.      flurazepam (DALMANE) 15 MG Cap Take 30 mg by mouth daily as needed.      levothyroxine (SYNTHROID) 100 MCG tablet Take 100 mcg by mouth.      simvastatin (ZOCOR) 20 MG tablet Take 20 mg by mouth.      traMADol (ULTRAM) 50 mg tablet TAKE 1 TABLET BY MOUTH 2 TO 3 TIMES A DAY AS NEEDED FOR SEVERE PAIN       No current facility-administered medications for this visit.        REVIEW OF SYSTEMS:    GENERAL:  No weight loss,  malaise or fevers.  HEENT:  Negative for frequent or significant headaches.  NECK:  Negative for lumps, goiter, pain and significant neck swelling.  RESPIRATORY:  Negative for cough, wheezing or shortness of breath.  CARDIOVASCULAR:  Negative for chest pain, leg swelling or palpitations.  + HLD  GI:  Negative for abdominal discomfort, blood in stools or black stools or change in bowel habits.  MUSCULOSKELETAL:  See HPI.  SKIN:  Negative for lesions, rash, and itching.  PSYCH:  Positive for sleep disturbance. Negative for mood disorder and recent psychosocial stressors.     HEMATOLOGY/LYMPHOLOGY:  Negative for prolonged bleeding, bruising easily or swollen nodes.  NEURO:   No history of headaches, syncope, paralysis, seizures or tremors.  ENDO: Thyroid disorder.   All other reviewed and negative other than HPI.    Past Medical History:  Past Medical History:   Diagnosis Date    Cancer     stage I bladder cancer 50 yrs ago    Thyroid disease        Past Surgical History:  Past Surgical History:   Procedure Laterality Date    BLADDER SURGERY      HERNIA REPAIR      Insertion, Neurostimulator, LUMBAR SPINAL CORD STIMULATOR IMPLANT N/A 8/26/2019    [facility] OR    KNEE SURGERY      Trial, Neurostimulator, SPINAL CORD STIMULATOR TRIAL N/A 8/5/2019    Performed by [facility] at [facility] CATH LAB       Family History:  History reviewed. No pertinent family history.    Social History:  Social History     Socioeconomic History    Marital status:      Spouse name: Not on file    Number of children: Not on file    Years of education: Not on file    Highest education level: Not on file   Occupational History    Not on file   Social Needs    Financial resource strain: Not on file    Food insecurity:     Worry: Not on file     Inability: Not on file    Transportation needs:     Medical: Not on file     Non-medical: Not on file   Tobacco Use    Smoking status: Former  "Smoker    Tobacco comment: stopped 40 years ago   Substance and Sexual Activity    Alcohol use: Yes     Comment: social    Drug use: Never    Sexual activity: Yes     Partners: Female   Lifestyle    Physical activity:     Days per week: Not on file     Minutes per session: Not on file    Stress: Not on file   Relationships    Social connections:     Talks on phone: Not on file     Gets together: Not on file     Attends Hoahaoism service: Not on file     Active member of club or organization: Not on file     Attends meetings of clubs or organizations: Not on file     Relationship status: Not on file   Other Topics Concern    Not on file   Social History Narrative    Not on file       OBJECTIVE:    BP (!) 133/58   Pulse (!) 51   Temp 97.7 °F (36.5 °C)   Resp 18   Ht 6' 1.5" (1.867 m)   Wt 93.1 kg (205 lb 4 oz)   BMI 26.71 kg/m²     PHYSICAL EXAMINATION:    General appearance: Well appearing, in no acute distress, alert and oriented x3.  Psych:  Mood and affect appropriate.  Skin: Skin color, texture, turgor normal, no rashes or lesions, in both upper and lower body. SCS site without drainage or signs of infection. Edges well approximated.   Head/face:  Normocephalic, atraumatic. No palpable lymph nodes.  Cor: RRR  Pulm: CTA  GI:  Soft and non-tender.  Gait:  Antalgic- ambulates without assistance.                 ASSESSMENT: 84 y.o. year old male with  low back pain, consistent with below:     1. Chronic pain syndrome     2. Arachnoiditis     3. Radiculopathy of thoracolumbar region         PLAN:     - Previous imaging was reviewed and discussed with the patient today.    - He is s/p Nevro SCS implant with benefit. He is in contact with representatives for programming needs.     - Notify clinic for any fever, chills, or drainage.     - Continue activity restrictions.    - We will take over prescriptive authority of his Tramadol. Ok per Dr. Jimenez. Continue Tramadol BID PRN, #60.     - Will obtain a " pain contract at next visit.     - RTC in 2 weeks.     - Dr. Jimenez was consulted on the patient and agrees with this plan.     The above plan and management options were discussed at length with patient. Patient is in agreement with the above and verbalized understanding.    Marian Tolliver NP  09/19/2019

## 2019-09-20 ENCOUNTER — PATIENT MESSAGE (OUTPATIENT)
Dept: PAIN MEDICINE | Facility: CLINIC | Age: 84
End: 2019-09-20

## 2019-10-02 ENCOUNTER — TELEPHONE (OUTPATIENT)
Dept: PAIN MEDICINE | Facility: CLINIC | Age: 84
End: 2019-10-02

## 2019-10-03 ENCOUNTER — OFFICE VISIT (OUTPATIENT)
Dept: PAIN MEDICINE | Facility: CLINIC | Age: 84
End: 2019-10-03
Attending: ANESTHESIOLOGY
Payer: MEDICARE

## 2019-10-03 VITALS
BODY MASS INDEX: 26.62 KG/M2 | OXYGEN SATURATION: 100 % | SYSTOLIC BLOOD PRESSURE: 121 MMHG | HEIGHT: 74 IN | RESPIRATION RATE: 18 BRPM | DIASTOLIC BLOOD PRESSURE: 57 MMHG | WEIGHT: 207.44 LBS | TEMPERATURE: 99 F | HEART RATE: 52 BPM

## 2019-10-03 DIAGNOSIS — G03.9 ARACHNOIDITIS: ICD-10-CM

## 2019-10-03 DIAGNOSIS — G89.4 CHRONIC PAIN SYNDROME: Primary | ICD-10-CM

## 2019-10-03 PROCEDURE — 99213 OFFICE O/P EST LOW 20 MIN: CPT | Mod: PBBFAC | Performed by: ANESTHESIOLOGY

## 2019-10-03 PROCEDURE — 99999 PR PBB SHADOW E&M-EST. PATIENT-LVL III: ICD-10-PCS | Mod: PBBFAC,,, | Performed by: ANESTHESIOLOGY

## 2019-10-03 PROCEDURE — 99214 PR OFFICE/OUTPT VISIT, EST, LEVL IV, 30-39 MIN: ICD-10-PCS | Mod: S$PBB,GC,, | Performed by: ANESTHESIOLOGY

## 2019-10-03 PROCEDURE — 99214 OFFICE O/P EST MOD 30 MIN: CPT | Mod: S$PBB,GC,, | Performed by: ANESTHESIOLOGY

## 2019-10-03 PROCEDURE — 99999 PR PBB SHADOW E&M-EST. PATIENT-LVL III: CPT | Mod: PBBFAC,,, | Performed by: ANESTHESIOLOGY

## 2019-10-03 NOTE — PROGRESS NOTES
Chronic patient Established Note (Follow up visit)      SUBJECTIVE:    10/3/19  Jefferson Boogie presents to the clinic for a follow-up appointment for lower leg pain. Since the last visit, Jefferson Boogie states the pain has been persistant. Current pain intensity is 2/10.  Patient had multiple questions regarding his IPG and SCS to ask today     Interval History 9/19/19  Jefferson Boogie returns to clinic today for post op. He is s/p [employer] SCS implant on 8/26/2019. He continues to report benefit with device. He denies any fever, chills, or drainage. He continues to follow his activity restrictions. He reports intermittent low back pain that is tolerable at this time. He does take Tramadol with benefit sparingly. He would like us to take over this medication. He denies any other health changes. He denies any bowel or bladder incontinence. His pain today is 4/10.        Pain Disability Index Review:  Last 3 PDI Scores 9/19/2019 9/3/2019 8/12/2019   Pain Disability Index (PDI) 39 43 28       Pain Medications:     - Opioids: Ultram ER ( Tramadol HCL)      report:  Reviewed and consistent with medication use as prescribed.     Pain Procedures:   8/2/2019- [employer] SCS trial  8/26/2019- [employer] SCS implant     Physical Therapy/Home Exercise: yes     Imaging:  MRI OF THE LUMBAR SPINE WITH AND WITHOUT I.V. CONTRAST:    INDICATION: Low back pain, rapidly progressive neuro deficit    COMPARISON STUDY: Unenhanced MRI lumbar spine of 4/8/2019 and 3/26/2018    TECHNIQUE:This study was performed on the [employer] 1.5 [place] high field MR unit. Multislice, multisequence images of the lumbar spine were obtained in multiple projections both before and following intravenous administration of the same 9 mL of gadavist contrast that was utilized to obtain the patient's MRI of the thoracic spine.. The intravenous contrast was injected in the patient's left antecubital fossa.    FINDINGS: There are 5 lumbar type vertebral bodies lumbar  vertebral body height and alignment are maintained. The signal from the lumbar vertebra demonstrates an admixture of red and yellow marrow. There are some Modic type degenerative signal changes involving the anterior inferior aspect of L5. There is slight anterior disc bulging at the L5-S1 level. All of the lumbar discs are desiccated with severe narrowing of the L2-L3 disc space, moderate narrowing of the L1-2 disc space posteriorly, moderate narrowing of the L5-S1 disc space and mild narrowing of the L3-4 and L4-5 disc spaces.    L5-S1 level: There is bilateral facet joint arthropathy with a slight amount of fluid in both facet joints. There is bilateral, right greater left, foraminal narrowing but no central canal stenosis. There is a mild broad-based posterior protrusion of the L5-S1 disc that extends towards the left-sided foramen and into and possibly through the right-sided foramen. This is similar to what was seen on the prior exam. Incidental note is made of a mild amount of epidural fat that is deposited along the right anterior and left anterior aspect of thecal sac at the L5-S1 disc space level.    L4-L5 level: There is bilateral, left greater than right, facet joint arthropathy without central canal stenosis. There is no bony foraminal narrowing. There is a slight to mild broad-based posterior protrusion of the disc that extends to the medial foramen bilaterally.    L3-L4 level: There is bilateral facet joint arthropathy with slight ligament flavum redundancy but no bony foraminal narrowing or bony central canal stenosis. There is a slight to mild broad-based posterior protrusion of the disc that extends towards the right-sided foramen and into the anteromedial left-sided foramen.    L2-L3 level: There is bilateral facet joint arthropathy without bony central canal stenosis or bony foraminal narrowing. There is a slight to mild bilobed posterior protrusion of the disc at the level of the anteromedial  foramen bilaterally and this is best seen on the axial images.    L1-L2 level: There is bilateral facet arthropathy without bony foraminal narrowing or bony central canal stenosis and the posterior disc margin is unremarkable.    The tip of the conus medullaris extends down to the level of the superior endplate of L1 and the signal from the visualized conus is unremarkable. There is clumping of the cauda equina nerve rootlets throughout the distal thecal sac consistent with arachnoiditis.  Following contrast administration, there is no abnormal enhancement of any of the bony or soft tissue elements of the lumbar spine except for possibly one or 2 facet joints.    IMPRESSION:   1. No abnormal enhancement of any bony or soft tissue elements of the lumbar spine except for possibly one or 2 facet joints. Clumping of the nerve rootlets of the cauda equina consistent with arachnoiditis.  3. Desiccation of all of the lumbar disks with multilevel disc space narrowing, multilevel facet joint arthropathy, but no central canal stenosis.  4. Modic type degenerative signal changes in the anterior inferior aspect of L5.  5. There is a mild amount of epidural fat deposit along the right anterior and left anterior aspect of the thecal sac at the L5-S1 level..  6. Other findings as discussed above      Allergies:   Review of patient's allergies indicates:   Allergen Reactions    Adhesive Itching     Adhesive used during surgery-trial stimulator       Current Medications:   Current Outpatient Medications   Medication Sig Dispense Refill    ALPRAZolam (XANAX) 1 MG tablet Take 1 mg by mouth.      celecoxib (CELEBREX) 200 MG capsule Take 200 mg by mouth 2 (two) times daily.      ergocalciferol, vitamin D2, 400 unit Tab Take 50,000 mg by mouth.      flurazepam (DALMANE) 15 MG Cap Take 30 mg by mouth daily as needed.      levothyroxine (SYNTHROID) 100 MCG tablet Take 100 mcg by mouth.      simvastatin (ZOCOR) 20 MG tablet Take 20  mg by mouth.      traMADol (ULTRAM) 50 mg tablet Take 1 tablet (50 mg total) by mouth every 12 (twelve) hours as needed for Pain. 30 tablet 0    finasteride (PROSCAR) 5 mg tablet Take 5 mg by mouth.       No current facility-administered medications for this visit.        REVIEW OF SYSTEMS:    GENERAL:  No weight loss, malaise or fevers.  HEENT:  Negative for frequent or significant headaches.  NECK:  Negative for lumps, goiter, pain and significant neck swelling.  RESPIRATORY:  Negative for cough, wheezing or shortness of breath.  CARDIOVASCULAR:  Negative for chest pain, leg swelling or palpitations.  GI:  Negative for abdominal discomfort, blood in stools or black stools or change in bowel habits.  MUSCULOSKELETAL:  See HPI.  SKIN:  Negative for lesions, rash, and itching.  PSYCH:  +ve for sleep disturbance, mood disorder and recent psychosocial stressors.  HEMATOLOGY/LYMPHOLOGY:  Negative for prolonged bleeding, bruising easily or swollen nodes.  NEURO:   No history of headaches, syncope, paralysis, seizures or tremors.  All other reviewed and negative other than HPI.    Past Medical History:  Past Medical History:   Diagnosis Date    Cancer     stage I bladder cancer 50 yrs ago    Thyroid disease        Past Surgical History:  Past Surgical History:   Procedure Laterality Date    BLADDER SURGERY      HERNIA REPAIR      KNEE SURGERY      TRIAL OF SPINAL CORD NERVE STIMULATOR N/A 8/5/2019    Procedure: Trial, Neurostimulator, SPINAL CORD STIMULATOR TRIAL;  Surgeon: Samuel Jimenez MD;  Location: [facility] CATH LAB;  Service: Pain Management;  Laterality: N/A;  C-ARM, NEVRO REP       Family History:  No family history on file.    Social History:  Social History     Socioeconomic History    Marital status:      Spouse name: Not on file    Number of children: Not on file    Years of education: Not on file    Highest education level: Not on file   Occupational History    Not on file   Social Needs  "   Financial resource strain: Not on file    Food insecurity:     Worry: Not on file     Inability: Not on file    Transportation needs:     Medical: Not on file     Non-medical: Not on file   Tobacco Use    Smoking status: Former Smoker    Tobacco comment: stopped 40 years ago   Substance and Sexual Activity    Alcohol use: Yes     Comment: social    Drug use: Never    Sexual activity: Yes     Partners: Female   Lifestyle    Physical activity:     Days per week: Not on file     Minutes per session: Not on file    Stress: Not on file   Relationships    Social connections:     Talks on phone: Not on file     Gets together: Not on file     Attends Church service: Not on file     Active member of club or organization: Not on file     Attends meetings of clubs or organizations: Not on file     Relationship status: Not on file   Other Topics Concern    Not on file   Social History Narrative    Not on file       OBJECTIVE:    Resp 18   Ht 6' 1.5" (1.867 m)   SpO2 100%   BMI 26.71 kg/m²     PHYSICAL EXAMINATION:    General appearance: Well appearing, in no acute distress, alert and oriented x3.  Psych:  Mood and affect appropriate.  Skin: Skin color, texture, turgor normal, no rashes or lesions, in both upper and lower body.well healed back scars from SCS surgery   Head/face:  Atraumatic, normocephalic. No palpable lymph nodes  Neck: No pain to palpation over the cervical paraspinous muscles. Spurling Negative. No pain with neck flexion, extension, or lateral flexion. .  Cor: RRR  Pulm: CTA  GI: Abdomen soft and non-tender.  Back: Straight leg raising in the sitting and supine positions is negative to radicular pain. mild pain to palpation over the spine or costovertebral angles. Normal range of motion without pain reproduction.Positive axial loading test bilateral. -ve FABERE,Ganselin and Yeoman's test on the both side.negative FADIR  Extremities: Peripheral joint ROM is full and pain free without " obvious instability or laxity in all four extremities. No deformities, edema, or skin discoloration. Good capillary refill.  Musculoskeletal: Shoulder, hip, sacroiliac and knee provocative maneuvers are negative. Bilateral upper and lower extremity strength is normal and symmetric.  No atrophy or tone abnormalities are noted.  Neuro: Bilateral upper and lower extremity coordination and muscle stretch reflexes are physiologic and symmetric.  Plantar response are downgoing. No loss of sensation is noted.  Gait: Normal.    ASSESSMENT: 84 y.o. year old male with low back pain, consistent with      1. Chronic pain syndrome  X-Ray Thoracic Spine AP Lateral   2. Arachnoiditis  X-Ray Thoracic Spine AP Lateral         PLAN:     - I have stressed the importance of physical activity and a home exercise plan to help with pain and improve health.  - Patient can continue with medications for now since they are providing benefits, using them appropriately, and without side effects.  - xray thoracic spine  - Answered all questions regarding programing and battery life. Patient verbalized understanding   - RTC 4-6 weeks  - Counseled patient regarding the importance of activity modification, constant sleeping habits and physical therapy.    The above plan and management options were discussed at length with patient. Patient is in agreement with the above and verbalized understanding.       I have personally reviewed the history and exam of this patient and agree with the resident/fellow/NPs note as stated above.    Samuel Jimenez MD    10/03/2019

## 2019-10-08 ENCOUNTER — HOSPITAL ENCOUNTER (OUTPATIENT)
Dept: RADIOLOGY | Facility: OTHER | Age: 84
Discharge: HOME | End: 2019-10-08
Attending: ANESTHESIOLOGY
Payer: MEDICARE

## 2019-10-08 DIAGNOSIS — G03.9 ARACHNOIDITIS: ICD-10-CM

## 2019-10-08 DIAGNOSIS — G89.4 CHRONIC PAIN SYNDROME: ICD-10-CM

## 2019-10-08 PROCEDURE — 72070 XR THORACIC SPINE AP LATERAL: ICD-10-PCS | Mod: 26,,, | Performed by: RADIOLOGY

## 2019-10-08 PROCEDURE — 72070 X-RAY EXAM THORAC SPINE 2VWS: CPT | Mod: 26,,, | Performed by: RADIOLOGY

## 2019-10-08 PROCEDURE — 72070 X-RAY EXAM THORAC SPINE 2VWS: CPT | Mod: TC,FY

## 2019-10-10 ENCOUNTER — PATIENT MESSAGE (OUTPATIENT)
Dept: PAIN MEDICINE | Facility: CLINIC | Age: 84
End: 2019-10-10

## 2019-10-25 ENCOUNTER — PATIENT MESSAGE (OUTPATIENT)
Dept: PAIN MEDICINE | Facility: CLINIC | Age: 84
End: 2019-10-25

## 2019-11-19 ENCOUNTER — TELEPHONE (OUTPATIENT)
Dept: PAIN MEDICINE | Facility: CLINIC | Age: 84
End: 2019-11-19

## 2019-11-20 ENCOUNTER — OFFICE VISIT (OUTPATIENT)
Dept: PAIN MEDICINE | Facility: CLINIC | Age: 84
End: 2019-11-20
Payer: MEDICARE

## 2019-11-20 ENCOUNTER — PATIENT MESSAGE (OUTPATIENT)
Dept: PAIN MEDICINE | Facility: CLINIC | Age: 84
End: 2019-11-20

## 2019-11-20 VITALS
DIASTOLIC BLOOD PRESSURE: 54 MMHG | WEIGHT: 206.13 LBS | HEART RATE: 50 BPM | TEMPERATURE: 98 F | OXYGEN SATURATION: 100 % | RESPIRATION RATE: 18 BRPM | HEIGHT: 74 IN | BODY MASS INDEX: 26.45 KG/M2 | SYSTOLIC BLOOD PRESSURE: 147 MMHG

## 2019-11-20 DIAGNOSIS — G03.9 ARACHNOIDITIS: ICD-10-CM

## 2019-11-20 DIAGNOSIS — G89.4 CHRONIC PAIN SYNDROME: Primary | ICD-10-CM

## 2019-11-20 PROCEDURE — 99213 PR OFFICE/OUTPT VISIT, EST, LEVL III, 20-29 MIN: ICD-10-PCS | Mod: S$PBB,,, | Performed by: NURSE PRACTITIONER

## 2019-11-20 PROCEDURE — 99213 OFFICE O/P EST LOW 20 MIN: CPT | Mod: PBBFAC | Performed by: NURSE PRACTITIONER

## 2019-11-20 PROCEDURE — 1159F MED LIST DOCD IN RCRD: CPT | Mod: ,,, | Performed by: NURSE PRACTITIONER

## 2019-11-20 PROCEDURE — 1125F AMNT PAIN NOTED PAIN PRSNT: CPT | Mod: ,,, | Performed by: NURSE PRACTITIONER

## 2019-11-20 PROCEDURE — 99999 PR PBB SHADOW E&M-EST. PATIENT-LVL III: ICD-10-PCS | Mod: PBBFAC,,, | Performed by: NURSE PRACTITIONER

## 2019-11-20 PROCEDURE — 99999 PR PBB SHADOW E&M-EST. PATIENT-LVL III: CPT | Mod: PBBFAC,,, | Performed by: NURSE PRACTITIONER

## 2019-11-20 PROCEDURE — 1125F PR PAIN SEVERITY QUANTIFIED, PAIN PRESENT: ICD-10-PCS | Mod: ,,, | Performed by: NURSE PRACTITIONER

## 2019-11-20 PROCEDURE — 99213 OFFICE O/P EST LOW 20 MIN: CPT | Mod: S$PBB,,, | Performed by: NURSE PRACTITIONER

## 2019-11-20 PROCEDURE — 1159F PR MEDICATION LIST DOCUMENTED IN MEDICAL RECORD: ICD-10-PCS | Mod: ,,, | Performed by: NURSE PRACTITIONER

## 2019-11-20 NOTE — PROGRESS NOTES
Chronic patient Established Note (Follow up visit)      SUBJECTIVE:    Jefferson Boogie returns to clinic today for follow up. He reports increased low back and leg pain over the last few days. He has been in contact with [employer] representatives for programming adjustments. He does report benefit with the device. He reports good days and bad days. His pain is worse with prolonged standing and activity. He continues to participate in a home exercise routine. He does take Tramadol sparingly but reports limited relief. He denies any other health changes. His pain today is 5/10.      Pain Disability Index Review:  Last 3 PDI Scores 9/19/2019 9/3/2019 8/12/2019   Pain Disability Index (PDI) 39 43 28       Pain Medications:    - Opioids: Ultram ER ( Tramadol HCL)     report:  Reviewed and consistent with medication use as prescribed.    Pain Procedures:   8/2/2019- [employer] SCS trial  8/26/2019- [employer]ro SCS implant    Physical Therapy/Home Exercise: yes    Imaging:  MRI OF THE LUMBAR SPINE WITH AND WITHOUT I.V. CONTRAST:    CPT CODE: 20750    INDICATION: Low back pain, rapidly progressive neuro deficit    COMPARISON STUDY: Unenhanced MRI lumbar spine of 4/8/2019 and 3/26/2018    TECHNIQUE:This study was performed on the [employer] 1.5 [place] high field MR unit. Multislice, multisequence images of the lumbar spine were obtained in multiple projections both before and following intravenous administration of the same 9 mL of gadavist contrast that was utilized to obtain the patient's MRI of the thoracic spine.. The intravenous contrast was injected in the patient's left antecubital fossa.    FINDINGS: There are 5 lumbar type vertebral bodies lumbar vertebral body height and alignment are maintained. The signal from the lumbar vertebra demonstrates an admixture of red and yellow marrow. There are some Modic type degenerative signal changes involving the anterior inferior aspect of L5. There is slight anterior disc bulging at the L5-S1  level. All of the lumbar discs are desiccated with severe narrowing of the L2-L3 disc space, moderate narrowing of the L1-2 disc space posteriorly, moderate narrowing of the L5-S1 disc space and mild narrowing of the L3-4 and L4-5 disc spaces.    L5-S1 level: There is bilateral facet joint arthropathy with a slight amount of fluid in both facet joints. There is bilateral, right greater left, foraminal narrowing but no central canal stenosis. There is a mild broad-based posterior protrusion of the L5-S1 disc that extends towards the left-sided foramen and into and possibly through the right-sided foramen. This is similar to what was seen on the prior exam. Incidental note is made of a mild amount of epidural fat that is deposited along the right anterior and left anterior aspect of thecal sac at the L5-S1 disc space level.    L4-L5 level: There is bilateral, left greater than right, facet joint arthropathy without central canal stenosis. There is no bony foraminal narrowing. There is a slight to mild broad-based posterior protrusion of the disc that extends to the medial foramen bilaterally.    L3-L4 level: There is bilateral facet joint arthropathy with slight ligament flavum redundancy but no bony foraminal narrowing or bony central canal stenosis. There is a slight to mild broad-based posterior protrusion of the disc that extends towards the right-sided foramen and into the anteromedial left-sided foramen.    L2-L3 level: There is bilateral facet joint arthropathy without bony central canal stenosis or bony foraminal narrowing. There is a slight to mild bilobed posterior protrusion of the disc at the level of the anteromedial foramen bilaterally and this is best seen on the axial images.    L1-L2 level: There is bilateral facet arthropathy without bony foraminal narrowing or bony central canal stenosis and the posterior disc margin is unremarkable.    The tip of the conus medullaris extends down to the level of  the superior endplate of L1 and the signal from the visualized conus is unremarkable. There is clumping of the cauda equina nerve rootlets throughout the distal thecal sac consistent with arachnoiditis.  Following contrast administration, there is no abnormal enhancement of any of the bony or soft tissue elements of the lumbar spine except for possibly one or 2 facet joints.    IMPRESSION:   1. No abnormal enhancement of any bony or soft tissue elements of the lumbar spine except for possibly one or 2 facet joints. Clumping of the nerve rootlets of the cauda equina consistent with arachnoiditis.  3. Desiccation of all of the lumbar disks with multilevel disc space narrowing, multilevel facet joint arthropathy, but no central canal stenosis.  4. Modic type degenerative signal changes in the anterior inferior aspect of L5.  5. There is a mild amount of epidural fat deposit along the right anterior and left anterior aspect of the thecal sac at the L5-S1 level..  6. Other findings as discussed above.        Allergies:   Review of patient's allergies indicates:   Allergen Reactions    Adhesive Itching     Adhesive used during surgery-trial stimulator       Current Medications:   Current Outpatient Medications   Medication Sig Dispense Refill    ALPRAZolam (XANAX) 1 MG tablet Take 1 mg by mouth.      celecoxib (CELEBREX) 200 MG capsule Take 200 mg by mouth 2 (two) times daily.      ergocalciferol, vitamin D2, 400 unit Tab Take 50,000 mg by mouth.      finasteride (PROSCAR) 5 mg tablet Take 5 mg by mouth.      flurazepam (DALMANE) 15 MG Cap Take 30 mg by mouth daily as needed.      levothyroxine (SYNTHROID) 100 MCG tablet Take 100 mcg by mouth.      simvastatin (ZOCOR) 20 MG tablet Take 20 mg by mouth.      traMADol (ULTRAM) 50 mg tablet Take 1 tablet (50 mg total) by mouth every 12 (twelve) hours as needed for Pain. 30 tablet 0     No current facility-administered medications for this visit.        REVIEW OF  SYSTEMS:    GENERAL:  No weight loss, malaise or fevers.  HEENT:  Negative for frequent or significant headaches.  NECK:  Negative for lumps, goiter, pain and significant neck swelling.  RESPIRATORY:  Negative for cough, wheezing or shortness of breath.  CARDIOVASCULAR:  Negative for chest pain, leg swelling or palpitations.  + HLD  GI:  Negative for abdominal discomfort, blood in stools or black stools or change in bowel habits.  MUSCULOSKELETAL:  See HPI.  SKIN:  Negative for lesions, rash, and itching.  PSYCH:  Positive for sleep disturbance. Negative for mood disorder and recent psychosocial stressors.     HEMATOLOGY/LYMPHOLOGY:  Negative for prolonged bleeding, bruising easily or swollen nodes.  NEURO:   No history of headaches, syncope, paralysis, seizures or tremors.  All other reviewed and negative other than HPI.    Past Medical History:  Past Medical History:   Diagnosis Date    Cancer     stage I bladder cancer 50 yrs ago    Thyroid disease        Past Surgical History:  Past Surgical History:   Procedure Laterality Date    BLADDER SURGERY      HERNIA REPAIR      KNEE SURGERY      TRIAL OF SPINAL CORD NERVE STIMULATOR N/A 8/5/2019    Procedure: Trial, Neurostimulator, SPINAL CORD STIMULATOR TRIAL;  Surgeon: Samuel Jimenez MD;  Location: [facility] C[facility] LAB;  Service: Pain Management;  Laterality: N/A;  C-ARM, NEVRO REP       Family History:  History reviewed. No pertinent family history.    Social History:  Social History     Socioeconomic History    Marital status:      Spouse name: Not on file    Number of children: Not on file    Years of education: Not on file    Highest education level: Not on file   Occupational History    Not on file   Social Needs    Financial resource strain: Not on file    Food insecurity:     Worry: Not on file     Inability: Not on file    Transportation needs:     Medical: Not on file     Non-medical: Not on file   Tobacco Use    Smoking status: Former  "Smoker    Tobacco comment: stopped 40 years ago   Substance and Sexual Activity    Alcohol use: Yes     Comment: social    Drug use: Never    Sexual activity: Yes     Partners: Female   Lifestyle    Physical activity:     Days per week: Not on file     Minutes per session: Not on file    Stress: Not on file   Relationships    Social connections:     Talks on phone: Not on file     Gets together: Not on file     Attends Rastafari service: Not on file     Active member of club or organization: Not on file     Attends meetings of clubs or organizations: Not on file     Relationship status: Not on file   Other Topics Concern    Not on file   Social History Narrative    Not on file       OBJECTIVE:    BP (!) 147/54   Pulse (!) 50   Temp 97.6 °F (36.4 °C)   Resp 18   Ht 6' 1.5" (1.867 m)   Wt 93.5 kg (206 lb 2.1 oz)   SpO2 100%   BMI 26.83 kg/m²     PHYSICAL EXAMINATION:    General appearance: Well appearing, in no acute distress, alert and oriented x3.  Psych:  Mood and affect appropriate.  Skin: Skin color, texture, turgor normal, no rashes or lesions, in both upper and lower body. SCS incisions well healed.   Head/face:  Normocephalic, atraumatic. No palpable lymph nodes.  Cor: RRR  Pulm: CTA  GI:  Soft and non-tender.  Back: Straight leg raising in the sitting position is negative to radicular pain bilaterally. Mild pain to palpation over the lumbar spine. Limited ROM with mild pain on extension. Positive facet loading bilaterally.   Extremities: Peripheral joint ROM is full and pain free without obvious instability or laxity in all four extremities. No deformities, edema, or skin discoloration. Good capillary refill.  Musculoskeletal: Shoulder, hip, sacroiliac and knee provocative maneuvers are negative. Bilateral upper and lower extremity strength is normal and symmetric.  No atrophy or tone abnormalities are noted.  Neuro: Bilateral upper and lower extremity coordination and muscle stretch reflexes " are physiologic and symmetric.  Plantar response are downgoing. No loss of sensation is noted.  Gait:  Antalgic- ambulates without assistance.       ASSESSMENT: 84 y.o. year old male with  low back pain, consistent with below:     1. Chronic pain syndrome     2. Arachnoiditis         PLAN:     - Previous imaging was reviewed and discussed with the patient today.    - He is s/p [employer] SCS implant with benefit. He is in contact with representatives.     - We discussed physical therapy. He declined stating he works with a .     - Continue Tramadol sparingly. Will discuss the medication with Dr. Jimenez.     - RTC in 6 weeks.     - Dr. Jimenez was consulted on the patient and agrees with this plan.     The above plan and management options were discussed at length with patient. Patient is in agreement with the above and verbalized understanding.    Marian Tolliver NP  11/20/2019

## 2019-11-20 NOTE — TELEPHONE ENCOUNTER
Patient would like to know is he clear from lifting (suitcases)  and twisting (golf).      Please review and advise. Thanks

## 2020-01-09 ENCOUNTER — OFFICE VISIT (OUTPATIENT)
Dept: PAIN MEDICINE | Facility: CLINIC | Age: 85
End: 2020-01-09
Attending: ANESTHESIOLOGY
Payer: MEDICARE

## 2020-01-09 VITALS
WEIGHT: 205 LBS | BODY MASS INDEX: 26.31 KG/M2 | HEIGHT: 74 IN | TEMPERATURE: 98 F | HEART RATE: 51 BPM | DIASTOLIC BLOOD PRESSURE: 57 MMHG | SYSTOLIC BLOOD PRESSURE: 139 MMHG

## 2020-01-09 DIAGNOSIS — Z79.891 OPIOID CONTRACT EXISTS: ICD-10-CM

## 2020-01-09 DIAGNOSIS — G03.9 ARACHNOIDITIS: ICD-10-CM

## 2020-01-09 DIAGNOSIS — G89.4 CHRONIC PAIN SYNDROME: Primary | ICD-10-CM

## 2020-01-09 PROCEDURE — 1125F AMNT PAIN NOTED PAIN PRSNT: CPT | Mod: ,,, | Performed by: ANESTHESIOLOGY

## 2020-01-09 PROCEDURE — 99999 PR PBB SHADOW E&M-EST. PATIENT-LVL III: CPT | Mod: PBBFAC,,, | Performed by: ANESTHESIOLOGY

## 2020-01-09 PROCEDURE — 1125F PR PAIN SEVERITY QUANTIFIED, PAIN PRESENT: ICD-10-PCS | Mod: ,,, | Performed by: ANESTHESIOLOGY

## 2020-01-09 PROCEDURE — 99214 OFFICE O/P EST MOD 30 MIN: CPT | Mod: S$PBB,GC,, | Performed by: ANESTHESIOLOGY

## 2020-01-09 PROCEDURE — 99213 OFFICE O/P EST LOW 20 MIN: CPT | Mod: PBBFAC | Performed by: ANESTHESIOLOGY

## 2020-01-09 PROCEDURE — 99214 PR OFFICE/OUTPT VISIT, EST, LEVL IV, 30-39 MIN: ICD-10-PCS | Mod: S$PBB,GC,, | Performed by: ANESTHESIOLOGY

## 2020-01-09 PROCEDURE — 1159F MED LIST DOCD IN RCRD: CPT | Mod: ,,, | Performed by: ANESTHESIOLOGY

## 2020-01-09 PROCEDURE — 1159F PR MEDICATION LIST DOCUMENTED IN MEDICAL RECORD: ICD-10-PCS | Mod: ,,, | Performed by: ANESTHESIOLOGY

## 2020-01-09 PROCEDURE — 99999 PR PBB SHADOW E&M-EST. PATIENT-LVL III: ICD-10-PCS | Mod: PBBFAC,,, | Performed by: ANESTHESIOLOGY

## 2020-01-09 RX ORDER — HYDROCODONE BITARTRATE AND ACETAMINOPHEN 5; 325 MG/1; MG/1
1 TABLET ORAL EVERY 8 HOURS PRN
Qty: 90 TABLET | Refills: 0 | Status: SHIPPED | OUTPATIENT
Start: 2020-01-09 | End: 2020-03-05 | Stop reason: SDUPTHER

## 2020-01-09 NOTE — PROGRESS NOTES
Chronic patient Established Note (Follow up visit)      SUBJECTIVE:    Interval HPI 1/9/2020:  Patient returns for follow up s/p Nev[employer] SCS. He states he is unsure if it has helped his pain since he had it implanted. He last had an adjustment of his programming about 1 month ago. He does note that once he is done charging his SCS his pain is improved. Pain still worse with prolonged standing and activity such as golf. He still is doing home exercise program. He says that he is trying to do more since getting the SCS. He is still taking the Tramadol with limited pain relief. He takes Tramadol 50 mg twice daily only on days of activity which is once a week. No other health changes.     HPI 11/20/19  Jefferson Boogie returns to clinic today for follow up. He reports increased low back and leg pain over the last few days. He has been in contact with [employer] representatives for programming adjustments. He does report benefit with the device. He reports good days and bad days. His pain is worse with prolonged standing and activity. He continues to participate in a home exercise routine. He does take Tramadol sparingly but reports limited relief. He denies any other health changes. His pain today is 5/10.      Pain Disability Index Review:  Last 3 PDI Scores 1/9/2020 11/20/2019 9/19/2019   Pain Disability Index (PDI) 25 41 39       Pain Medications:    - Opioids: Ultram ER ( Tramadol HCL)     report:  Reviewed and consistent with medication use as prescribed.    Pain Procedures:   8/2/2019- Nevro SCS trial  8/26/2019- Nevro SCS implant    Physical Therapy/Home Exercise: yes    Imaging:  MRI OF THE LUMBAR SPINE WITH AND WITHOUT I.V. CONTRAST:    CPT CODE: 66267    INDICATION: Low back pain, rapidly progressive neuro deficit    COMPARISON STUDY: Unenhanced MRI lumbar spine of 4/8/2019 and 3/26/2018    TECHNIQUE:This study was performed on the [employer] 1.5 [place] high field MR unit. Multislice, multisequence images of the lumbar  spine were obtained in multiple projections both before and following intravenous administration of the same 9 mL of gadavist contrast that was utilized to obtain the patient's MRI of the thoracic spine.. The intravenous contrast was injected in the patient's left antecubital fossa.    FINDINGS: There are 5 lumbar type vertebral bodies lumbar vertebral body height and alignment are maintained. The signal from the lumbar vertebra demonstrates an admixture of red and yellow marrow. There are some Modic type degenerative signal changes involving the anterior inferior aspect of L5. There is slight anterior disc bulging at the L5-S1 level. All of the lumbar discs are desiccated with severe narrowing of the L2-L3 disc space, moderate narrowing of the L1-2 disc space posteriorly, moderate narrowing of the L5-S1 disc space and mild narrowing of the L3-4 and L4-5 disc spaces.    L5-S1 level: There is bilateral facet joint arthropathy with a slight amount of fluid in both facet joints. There is bilateral, right greater left, foraminal narrowing but no central canal stenosis. There is a mild broad-based posterior protrusion of the L5-S1 disc that extends towards the left-sided foramen and into and possibly through the right-sided foramen. This is similar to what was seen on the prior exam. Incidental note is made of a mild amount of epidural fat that is deposited along the right anterior and left anterior aspect of thecal sac at the L5-S1 disc space level.    L4-L5 level: There is bilateral, left greater than right, facet joint arthropathy without central canal stenosis. There is no bony foraminal narrowing. There is a slight to mild broad-based posterior protrusion of the disc that extends to the medial foramen bilaterally.    L3-L4 level: There is bilateral facet joint arthropathy with slight ligament flavum redundancy but no bony foraminal narrowing or bony central canal stenosis. There is a slight to mild broad-based  posterior protrusion of the disc that extends towards the right-sided foramen and into the anteromedial left-sided foramen.    L2-L3 level: There is bilateral facet joint arthropathy without bony central canal stenosis or bony foraminal narrowing. There is a slight to mild bilobed posterior protrusion of the disc at the level of the anteromedial foramen bilaterally and this is best seen on the axial images.    L1-L2 level: There is bilateral facet arthropathy without bony foraminal narrowing or bony central canal stenosis and the posterior disc margin is unremarkable.    The tip of the conus medullaris extends down to the level of the superior endplate of L1 and the signal from the visualized conus is unremarkable. There is clumping of the cauda equina nerve rootlets throughout the distal thecal sac consistent with arachnoiditis.  Following contrast administration, there is no abnormal enhancement of any of the bony or soft tissue elements of the lumbar spine except for possibly one or 2 facet joints.    IMPRESSION:   1. No abnormal enhancement of any bony or soft tissue elements of the lumbar spine except for possibly one or 2 facet joints. Clumping of the nerve rootlets of the cauda equina consistent with arachnoiditis.  3. Desiccation of all of the lumbar disks with multilevel disc space narrowing, multilevel facet joint arthropathy, but no central canal stenosis.  4. Modic type degenerative signal changes in the anterior inferior aspect of L5.  5. There is a mild amount of epidural fat deposit along the right anterior and left anterior aspect of the thecal sac at the L5-S1 level..  6. Other findings as discussed above.        Allergies:   Review of patient's allergies indicates:   Allergen Reactions    Adhesive Itching     Adhesive used during surgery-trial stimulator       Current Medications:   Current Outpatient Medications   Medication Sig Dispense Refill    ALPRAZolam (XANAX) 1 MG tablet Take 1 mg by  mouth.      celecoxib (CELEBREX) 200 MG capsule Take 200 mg by mouth 2 (two) times daily.      ergocalciferol, vitamin D2, 400 unit Tab Take 50,000 mg by mouth.      finasteride (PROSCAR) 5 mg tablet Take 5 mg by mouth.      flurazepam (DALMANE) 15 MG Cap Take 30 mg by mouth daily as needed.      levothyroxine (SYNTHROID) 100 MCG tablet Take 100 mcg by mouth.      simvastatin (ZOCOR) 20 MG tablet Take 20 mg by mouth.      traMADol (ULTRAM) 50 mg tablet Take 1 tablet (50 mg total) by mouth every 12 (twelve) hours as needed for Pain. 30 tablet 0     No current facility-administered medications for this visit.        REVIEW OF SYSTEMS:    GENERAL:  No weight loss, malaise or fevers.  HEENT:  Negative for frequent or significant headaches.  NECK:  Negative for lumps, goiter, pain and significant neck swelling.  RESPIRATORY:  Negative for cough, wheezing or shortness of breath.  CARDIOVASCULAR:  Negative for chest pain, leg swelling or palpitations.  + HLD  GI:  Negative for abdominal discomfort, blood in stools or black stools or change in bowel habits.  MUSCULOSKELETAL:  See HPI.  SKIN:  Negative for lesions, rash, and itching.  PSYCH:  Positive for sleep disturbance. Negative for mood disorder and recent psychosocial stressors.     HEMATOLOGY/LYMPHOLOGY:  Negative for prolonged bleeding, bruising easily or swollen nodes.  NEURO:   No history of headaches, syncope, paralysis, seizures or tremors.  All other reviewed and negative other than HPI.    Past Medical History:  Past Medical History:   Diagnosis Date    Cancer     stage I bladder cancer 50 yrs ago    Thyroid disease        Past Surgical History:  Past Surgical History:   Procedure Laterality Date    BLADDER SURGERY      HERNIA REPAIR      KNEE SURGERY      TRIAL OF SPINAL CORD NERVE STIMULATOR N/A 8/5/2019    Procedure: Trial, Neurostimulator, SPINAL CORD STIMULATOR TRIAL;  Surgeon: Samuel Jimenez MD;  Location: [facility] CATH LAB;  Service: Pain  "Management;  Laterality: N/A;  C-ARM, NEVRO REP       Family History:  No family history on file.    Social History:  Social History     Socioeconomic History    Marital status:      Spouse name: Not on file    Number of children: Not on file    Years of education: Not on file    Highest education level: Not on file   Occupational History    Not on file   Social Needs    Financial resource strain: Not on file    Food insecurity:     Worry: Not on file     Inability: Not on file    Transportation needs:     Medical: Not on file     Non-medical: Not on file   Tobacco Use    Smoking status: Former Smoker    Tobacco comment: stopped 40 years ago   Substance and Sexual Activity    Alcohol use: Yes     Comment: social    Drug use: Never    Sexual activity: Yes     Partners: Female   Lifestyle    Physical activity:     Days per week: Not on file     Minutes per session: Not on file    Stress: Not on file   Relationships    Social connections:     Talks on phone: Not on file     Gets together: Not on file     Attends [employer] service: Not on file     Active member of club or organization: Not on file     Attends meetings of clubs or organizations: Not on file     Relationship status: Not on file   Other Topics Concern    Not on file   Social History Narrative    Not on file       OBJECTIVE:    BP (!) 139/57   Pulse (!) 51   Temp 97.7 °F (36.5 °C)   Ht 6' 1.5" (1.867 m)   Wt 93 kg (205 lb)   BMI 26.68 kg/m²     PHYSICAL EXAMINATION:    General appearance: Well appearing, in no acute distress, alert and oriented x3.  Psych:  Mood and affect appropriate.  Skin: Skin color, texture, turgor normal, no rashes or lesions, in both upper and lower body. SCS incisions well healed.   Head/face:  Normocephalic, atraumatic. No palpable lymph nodes.  Cor: RRR  Pulm: CTA  GI:  Soft and non-tender.  Back: Straight leg raising in the sitting position is negative to radicular pain bilaterally. Mild pain to " palpation over the lumbar spine. Limited ROM with mild pain on extension. Mild positive facet loading bilaterally.   Extremities: Peripheral joint ROM is full and pain free without obvious instability or laxity in all four extremities. No deformities, edema, or skin discoloration. Good capillary refill.  Musculoskeletal: Shoulder, hip, sacroiliac and knee provocative maneuvers are negative. Bilateral upper and lower extremity strength is normal and symmetric.  No atrophy or tone abnormalities are noted.  Neuro: Bilateral upper and lower extremity coordination and muscle stretch reflexes are physiologic and symmetric.  Plantar response are downgoing. No loss of sensation is noted.  Gait:  Antalgic- ambulates without assistance.       ASSESSMENT: 84 y.o. year old male with  low back pain, consistent with below:     1. Chronic pain syndrome  X-Ray Thoracolumbar Spine AP Lateral    HYDROcodone-acetaminophen (NORCO) 5-325 mg per tablet   2. Arachnoiditis  X-Ray Thoracolumbar Spine AP Lateral    HYDROcodone-acetaminophen (NORCO) 5-325 mg per tablet   3. Opioid contract exists         PLAN:     - I have stressed the importance of physical activity and a home exercise plan to help with pain and improve health.  - Patient can continue with medications for now since they are providing benefits, using them appropriately, and without side effects.  - Previous imaging was reviewed and discussed with the patient today.  - He is s/p [employer] SCS implant. He is in contact with representatives and to meet with them tomorrow. Advised to have representative interrogate the battery as he feels his pain is improved while it's charging.    -Rx norco 5-325 mg every 8h prn  -opioid contract signed   -obtain UDS next visit  - Continue home exercise program with .   - Continue Tramadol sparingly.   - RTC in 6 weeks.     Patient was initially seen and examined by [facility] Pain Fellow, Dr. Mehdi Mcginnis. Thank you for allowing me to  participate in the care of this patient.     The above plan and management options were discussed at length with patient. Patient is in agreement with the above and verbalized understanding.    Mehdi Mcginnis MD    I have personally reviewed the history and exam of this patient and agree with the resident/fellow/NPs note as stated above.    Samuel Jimenez MD    01/09/2020

## 2020-01-10 ENCOUNTER — PATIENT MESSAGE (OUTPATIENT)
Dept: PAIN MEDICINE | Facility: CLINIC | Age: 85
End: 2020-01-10

## 2020-01-10 ENCOUNTER — HOSPITAL ENCOUNTER (OUTPATIENT)
Dept: RADIOLOGY | Facility: OTHER | Age: 85
Discharge: HOME | End: 2020-01-10
Attending: ANESTHESIOLOGY
Payer: MEDICARE

## 2020-01-10 DIAGNOSIS — G03.9 ARACHNOIDITIS: ICD-10-CM

## 2020-01-10 DIAGNOSIS — G89.4 CHRONIC PAIN SYNDROME: ICD-10-CM

## 2020-01-10 PROCEDURE — 72080 X-RAY EXAM THORACOLMB 2/> VW: CPT | Mod: TC

## 2020-01-10 PROCEDURE — 72080 X-RAY EXAM THORACOLMB 2/> VW: CPT | Mod: 26,,, | Performed by: INTERNAL MEDICINE

## 2020-01-10 PROCEDURE — 72080 XR THORACOLUMBAR SPINE AP LATERAL: ICD-10-PCS | Mod: 26,,, | Performed by: INTERNAL MEDICINE

## 2020-01-20 PROBLEM — Z79.891 OPIOID CONTRACT EXISTS: Status: ACTIVE | Noted: 2020-01-20

## 2020-01-30 ENCOUNTER — TELEPHONE (OUTPATIENT)
Dept: PAIN MEDICINE | Facility: CLINIC | Age: 85
End: 2020-01-30

## 2020-01-30 ENCOUNTER — PATIENT MESSAGE (OUTPATIENT)
Dept: PAIN MEDICINE | Facility: CLINIC | Age: 85
End: 2020-01-30

## 2020-01-30 NOTE — TELEPHONE ENCOUNTER
sarbjitm informing pt to contact the office to regarding appt for 2/27/20 @ 10:30am with Dr. Mayo as he will be unavailable that day     Informed pt that we were able to get him rescheduled for 3/5/20 @ 3:00 with Dr. Jimenez     Informed pt to contact the office and advise if the appt date and time will work out

## 2020-03-02 ENCOUNTER — TELEPHONE (OUTPATIENT)
Dept: PAIN MEDICINE | Facility: CLINIC | Age: 85
End: 2020-03-02

## 2020-03-05 ENCOUNTER — OFFICE VISIT (OUTPATIENT)
Dept: PAIN MEDICINE | Facility: CLINIC | Age: 85
End: 2020-03-05
Attending: ANESTHESIOLOGY
Payer: MEDICARE

## 2020-03-05 VITALS
WEIGHT: 205 LBS | HEART RATE: 56 BPM | BODY MASS INDEX: 26.31 KG/M2 | RESPIRATION RATE: 18 BRPM | SYSTOLIC BLOOD PRESSURE: 131 MMHG | TEMPERATURE: 98 F | DIASTOLIC BLOOD PRESSURE: 66 MMHG | HEIGHT: 74 IN

## 2020-03-05 DIAGNOSIS — G89.4 CHRONIC PAIN SYNDROME: Primary | ICD-10-CM

## 2020-03-05 DIAGNOSIS — M54.15 RADICULOPATHY OF THORACOLUMBAR REGION: ICD-10-CM

## 2020-03-05 DIAGNOSIS — G03.9 ARACHNOIDITIS: ICD-10-CM

## 2020-03-05 PROCEDURE — 99999 PR PBB SHADOW E&M-EST. PATIENT-LVL III: ICD-10-PCS | Mod: PBBFAC,,, | Performed by: ANESTHESIOLOGY

## 2020-03-05 PROCEDURE — 99214 PR OFFICE/OUTPT VISIT, EST, LEVL IV, 30-39 MIN: ICD-10-PCS | Mod: S$PBB,GC,, | Performed by: ANESTHESIOLOGY

## 2020-03-05 PROCEDURE — 99214 OFFICE O/P EST MOD 30 MIN: CPT | Mod: S$PBB,GC,, | Performed by: ANESTHESIOLOGY

## 2020-03-05 PROCEDURE — 99999 PR PBB SHADOW E&M-EST. PATIENT-LVL III: CPT | Mod: PBBFAC,,, | Performed by: ANESTHESIOLOGY

## 2020-03-05 PROCEDURE — 99213 OFFICE O/P EST LOW 20 MIN: CPT | Mod: PBBFAC | Performed by: ANESTHESIOLOGY

## 2020-03-05 RX ORDER — TADALAFIL 20 MG/1
TABLET ORAL
COMMUNITY
Start: 2020-01-12

## 2020-03-05 RX ORDER — IRBESARTAN 75 MG/1
150 TABLET ORAL DAILY
COMMUNITY
Start: 2019-12-14

## 2020-03-05 RX ORDER — FLUTICASONE PROPIONATE 50 MCG
SPRAY, SUSPENSION (ML) NASAL DAILY PRN
COMMUNITY
Start: 2018-06-13 | End: 2023-02-08

## 2020-03-05 RX ORDER — HYDROCODONE BITARTRATE AND ACETAMINOPHEN 5; 325 MG/1; MG/1
1 TABLET ORAL EVERY 8 HOURS PRN
Qty: 90 TABLET | Refills: 0 | Status: SHIPPED | OUTPATIENT
Start: 2020-03-05 | End: 2020-05-26 | Stop reason: SDUPTHER

## 2020-03-05 NOTE — PROGRESS NOTES
Chronic patient Established Note (Follow up visit)      SUBJECTIVE:    Interval HPI 3/5/20:  Patient presents to the clinic for a follow-up appointment for low back pain. Since the last visit, he states his pain has been unchanged. Current pain intensity is 4/10. He continues to work with [employer] to adjust settings on his SCS. He has stopped using tramadol, and uses norco sparingly. He continues to work with a  for physical therapy.     Interval HPI 1/9/2020:  Patient returns for follow up s/p Nevro SCS. He states he is unsure if it has helped his pain since he had it implanted. He last had an adjustment of his programming about 1 month ago. He does note that once he is done charging his SCS his pain is improved. Pain still worse with prolonged standing and activity such as golf. He still is doing home exercise program. He says that he is trying to do more since getting the SCS. He is still taking the Tramadol with limited pain relief. He takes Tramadol 50 mg twice daily only on days of activity which is once a week. No other health changes.     HPI 11/20/19  Jefferson Boogie returns to clinic today for follow up. He reports increased low back and leg pain over the last few days. He has been in contact with [employer] representatives for programming adjustments. He does report benefit with the device. He reports good days and bad days. His pain is worse with prolonged standing and activity. He continues to participate in a home exercise routine. He does take Tramadol sparingly but reports limited relief. He denies any other health changes. His pain today is 5/10.      Pain Disability Index Review:  Last 3 PDI Scores 3/5/2020 1/9/2020 11/20/2019   Pain Disability Index (PDI) 24 25 41       Pain Medications:    - Opioids: Lortab ( Hydrocodone/Acetaminophen)     report:  Reviewed and consistent with medication use as prescribed.    Pain Procedures:   8/2/2019 - Nevro SCS trial  8/26/2019 - Nevro SCS  implant    Physical Therapy/Home Exercise: yes    Imaging:  MRI OF THE LUMBAR SPINE WITH AND WITHOUT I.V. CONTRAST:    CPT CODE: 58136    INDICATION: Low back pain, rapidly progressive neuro deficit    COMPARISON STUDY: Unenhanced MRI lumbar spine of 4/8/2019 and 3/26/2018    TECHNIQUE:This study was performed on the [employer] 1.5 [place] high field MR unit. Multislice, multisequence images of the lumbar spine were obtained in multiple projections both before and following intravenous administration of the same 9 mL of gadavist contrast that was utilized to obtain the patient's MRI of the thoracic spine.. The intravenous contrast was injected in the patient's left antecubital fossa.    FINDINGS: There are 5 lumbar type vertebral bodies lumbar vertebral body height and alignment are maintained. The signal from the lumbar vertebra demonstrates an admixture of red and yellow marrow. There are some Modic type degenerative signal changes involving the anterior inferior aspect of L5. There is slight anterior disc bulging at the L5-S1 level. All of the lumbar discs are desiccated with severe narrowing of the L2-L3 disc space, moderate narrowing of the L1-2 disc space posteriorly, moderate narrowing of the L5-S1 disc space and mild narrowing of the L3-4 and L4-5 disc spaces.    L5-S1 level: There is bilateral facet joint arthropathy with a slight amount of fluid in both facet joints. There is bilateral, right greater left, foraminal narrowing but no central canal stenosis. There is a mild broad-based posterior protrusion of the L5-S1 disc that extends towards the left-sided foramen and into and possibly through the right-sided foramen. This is similar to what was seen on the prior exam. Incidental note is made of a mild amount of epidural fat that is deposited along the right anterior and left anterior aspect of thecal sac at the L5-S1 disc space level.    L4-L5 level: There is bilateral, left greater than right, facet joint  arthropathy without central canal stenosis. There is no bony foraminal narrowing. There is a slight to mild broad-based posterior protrusion of the disc that extends to the medial foramen bilaterally.    L3-L4 level: There is bilateral facet joint arthropathy with slight ligament flavum redundancy but no bony foraminal narrowing or bony central canal stenosis. There is a slight to mild broad-based posterior protrusion of the disc that extends towards the right-sided foramen and into the anteromedial left-sided foramen.    L2-L3 level: There is bilateral facet joint arthropathy without bony central canal stenosis or bony foraminal narrowing. There is a slight to mild bilobed posterior protrusion of the disc at the level of the anteromedial foramen bilaterally and this is best seen on the axial images.    L1-L2 level: There is bilateral facet arthropathy without bony foraminal narrowing or bony central canal stenosis and the posterior disc margin is unremarkable.    The tip of the conus medullaris extends down to the level of the superior endplate of L1 and the signal from the visualized conus is unremarkable. There is clumping of the cauda equina nerve rootlets throughout the distal thecal sac consistent with arachnoiditis.  Following contrast administration, there is no abnormal enhancement of any of the bony or soft tissue elements of the lumbar spine except for possibly one or 2 facet joints.    IMPRESSION:   1. No abnormal enhancement of any bony or soft tissue elements of the lumbar spine except for possibly one or 2 facet joints. Clumping of the nerve rootlets of the cauda equina consistent with arachnoiditis.  3. Desiccation of all of the lumbar disks with multilevel disc space narrowing, multilevel facet joint arthropathy, but no central canal stenosis.  4. Modic type degenerative signal changes in the anterior inferior aspect of L5.  5. There is a mild amount of epidural fat deposit along the right anterior  and left anterior aspect of the thecal sac at the L5-S1 level..  6. Other findings as discussed above.        Allergies:   Review of patient's allergies indicates:   Allergen Reactions    Adhesive Itching     Adhesive used during surgery-trial stimulator       Current Medications:   Current Outpatient Medications   Medication Sig Dispense Refill    ALPRAZolam (XANAX) 1 MG tablet Take 1 mg by mouth.      ergocalciferol, vitamin D2, 400 unit Tab Take 50,000 mg by mouth.      finasteride (PROSCAR) 5 mg tablet Take 5 mg by mouth.      flurazepam (DALMANE) 15 MG Cap Take 30 mg by mouth daily as needed.      fluticasone propionate (FLONASE) 50 mcg/actuation nasal spray SPRAY 1 SPRAY IEN D      irbesartan (AVAPRO) 75 MG tablet       levothyroxine (SYNTHROID) 100 MCG tablet Take 100 mcg by mouth.      simvastatin (ZOCOR) 20 MG tablet Take 20 mg by mouth.      celecoxib (CELEBREX) 200 MG capsule Take 200 mg by mouth 2 (two) times daily.      tadalafil (CIALIS) 20 MG Tab       traMADol (ULTRAM) 50 mg tablet Take 1 tablet (50 mg total) by mouth every 12 (twelve) hours as needed for Pain. (Patient not taking: Reported on 3/5/2020) 30 tablet 0     No current facility-administered medications for this visit.        REVIEW OF SYSTEMS:    GENERAL:  No weight loss, malaise or fevers.  HEENT:  Negative for frequent or significant headaches.  NECK:  Negative for lumps, goiter, pain and significant neck swelling.  RESPIRATORY:  Negative for cough, wheezing or shortness of breath.  CARDIOVASCULAR:  Negative for chest pain, leg swelling or palpitations.  + HLD  GI:  Negative for abdominal discomfort, blood in stools or black stools or change in bowel habits.  MUSCULOSKELETAL:  See HPI.  SKIN:  Negative for lesions, rash, and itching.  PSYCH:  Positive for sleep disturbance. Negative for mood disorder and recent psychosocial stressors.     HEMATOLOGY/LYMPHOLOGY:  Negative for prolonged bleeding, bruising easily or swollen  nodes.  NEURO:   No history of headaches, syncope, paralysis, seizures or tremors.  All other reviewed and negative other than HPI.    Past Medical History:  Past Medical History:   Diagnosis Date    Cancer     stage I bladder cancer 50 yrs ago    Thyroid disease        Past Surgical History:  Past Surgical History:   Procedure Laterality Date    BLADDER SURGERY      HERNIA REPAIR      KNEE SURGERY      TRIAL OF SPINAL CORD NERVE STIMULATOR N/A 8/5/2019    Procedure: Trial, Neurostimulator, SPINAL CORD STIMULATOR TRIAL;  Surgeon: Samuel Jimenez MD;  Location: [facility];  Service: Pain Management;  Laterality: N/A;  C-ARM, NEVRO REP       Family History:  No family history on file.    Social History:  Social History     Socioeconomic History    Marital status:      Spouse name: Not on file    Number of children: Not on file    Years of education: Not on file    Highest education level: Not on file   Occupational History    Not on file   Social Needs    Financial resource strain: Not on file    Food insecurity:     Worry: Not on file     Inability: Not on file    Transportation needs:     Medical: Not on file     Non-medical: Not on file   Tobacco Use    Smoking status: Former Smoker    Tobacco comment: stopped 40 years ago   Substance and Sexual Activity    Alcohol use: Yes     Comment: social    Drug use: Never    Sexual activity: Yes     Partners: Female   Lifestyle    Physical activity:     Days per week: Not on file     Minutes per session: Not on file    Stress: Not on file   Relationships    Social connections:     Talks on phone: Not on file     Gets together: Not on file     Attends Orthodox service: Not on file     Active member of club or organization: Not on file     Attends meetings of clubs or organizations: Not on file     Relationship status: Not on file   Other Topics Concern    Not on file   Social History Narrative    Not on file       OBJECTIVE:    /66   " Pulse (!) 56   Temp 98.4 °F (36.9 °C) (Oral)   Resp 18   Ht 6' 1.5" (1.867 m)   Wt 93 kg (205 lb)   BMI 26.68 kg/m²     PHYSICAL EXAMINATION:    General appearance: Well appearing, in no acute distress, alert and oriented x3.  Psych:  Mood and affect appropriate.  Skin: Skin color, texture, turgor normal, no rashes or lesions, in both upper and lower body. SCS incisions well healed.   Head/face:  Normocephalic, atraumatic. No palpable lymph nodes.  Cor: RRR  Pulm: CTA  GI:  Soft and non-tender.  Back: Straight leg raising in the sitting position is negative to radicular pain bilaterally. Mild pain to palpation over the lumbar spine. Limited ROM with mild pain on extension. Mild positive facet loading bilaterally.   Extremities: Peripheral joint ROM is full and pain free without obvious instability or laxity in all four extremities. No deformities, edema, or skin discoloration. Good capillary refill.  Musculoskeletal: Shoulder, hip, sacroiliac and knee provocative maneuvers are negative. Bilateral upper and lower extremity strength is normal and symmetric.  No atrophy or tone abnormalities are noted.  Neuro: Bilateral upper and lower extremity coordination and muscle stretch reflexes are physiologic and symmetric.  Plantar response are downgoing. No loss of sensation is noted.  Gait:  Antalgic- ambulates without assistance.       ASSESSMENT: 84 y.o. year old male with  low back pain, consistent with below:     1. Chronic pain syndrome  HYDROcodone-acetaminophen (NORCO) 5-325 mg per tablet   2. Arachnoiditis  HYDROcodone-acetaminophen (NORCO) 5-325 mg per tablet   3. Radiculopathy of thoracolumbar region  HYDROcodone-acetaminophen (NORCO) 5-325 mg per tablet       PLAN:     - I have stressed the importance of physical activity and a home exercise plan to help with pain and improve health.  - Patient can continue with medications for now since they are providing benefits, using them appropriately, and without " side effects.  - Previous imaging was reviewed and discussed with the patient today.  - He is s/p Nevro SCS implant. He continues to work with representatives to adjust his settings.  -refill norco 5-325 mg every 8h as needed  - Continue home exercise program with .   - RTC in 4-6 weeks.     The above plan and management options were discussed at length with patient. Patient is in agreement with the above and verbalized understanding.       Quirino Verma MD  PGY-3, Anesthesiology   I have personally reviewed the history and exam of this patient and agree with the resident/fellow/NPs note as stated above.    Samuel Jimenez MD    03/05/2020

## 2020-04-24 ENCOUNTER — TELEPHONE (OUTPATIENT)
Dept: ADMINISTRATIVE | Facility: OTHER | Age: 85
End: 2020-04-24

## 2020-05-21 ENCOUNTER — OFFICE VISIT (OUTPATIENT)
Dept: PAIN MEDICINE | Facility: CLINIC | Age: 85
End: 2020-05-21
Attending: ANESTHESIOLOGY
Payer: MEDICARE

## 2020-05-21 ENCOUNTER — TELEPHONE (OUTPATIENT)
Dept: PAIN MEDICINE | Facility: CLINIC | Age: 85
End: 2020-05-21

## 2020-05-21 DIAGNOSIS — G89.4 CHRONIC PAIN SYNDROME: Primary | ICD-10-CM

## 2020-05-21 PROCEDURE — 99499 NO LOS: ICD-10-PCS | Mod: 95,,, | Performed by: ANESTHESIOLOGY

## 2020-05-21 PROCEDURE — 99499 UNLISTED E&M SERVICE: CPT | Mod: 95,,, | Performed by: ANESTHESIOLOGY

## 2020-05-21 NOTE — TELEPHONE ENCOUNTER
----- Message from Laura Mora sent at 5/21/2020  2:06 PM CDT -----  Contact: AMY NERI [[id number]]   Name of Who is Calling:     What is the request in detail:patient request call back in reference to having trouble logging on  Please contact to further discuss and advise      Can the clinic reply by [facility]: no     What Number to Call Back if not in [facility]:  [phone]

## 2020-05-21 NOTE — TELEPHONE ENCOUNTER
----- Message from Carmen Recinos sent at 5/21/2020  2:26 PM CDT -----  Contact: AMY NERI  Name of Who is Calling: AMY NERI      What is the request in detail: Would like to speak to staff in regards to wanting to set up a telephone consult with Dr. Jimenez instead of a vv because he can't download Mychart due to not remembering his password to download apps. Please advise.       Can the clinic reply by [facility]: No      What Number to Call Back if not in [facility]: [phone]

## 2020-05-21 NOTE — TELEPHONE ENCOUNTER
Contacted pt at home for follow-up however no answer received after multiple attempts. Unable to leave voicemail 2/2 full voicemail inbox. Will attempt to contact in future to reschedule appt.    Phill Arce MD  [facility] Fellow, PGY V

## 2020-05-25 ENCOUNTER — PATIENT MESSAGE (OUTPATIENT)
Dept: PAIN MEDICINE | Facility: CLINIC | Age: 85
End: 2020-05-25

## 2020-05-26 ENCOUNTER — OFFICE VISIT (OUTPATIENT)
Dept: PAIN MEDICINE | Facility: CLINIC | Age: 85
End: 2020-05-26
Attending: ANESTHESIOLOGY
Payer: MEDICARE

## 2020-05-26 ENCOUNTER — PATIENT MESSAGE (OUTPATIENT)
Dept: PAIN MEDICINE | Facility: CLINIC | Age: 85
End: 2020-05-26

## 2020-05-26 ENCOUNTER — TELEPHONE (OUTPATIENT)
Dept: PAIN MEDICINE | Facility: CLINIC | Age: 85
End: 2020-05-26

## 2020-05-26 DIAGNOSIS — G89.4 CHRONIC PAIN SYNDROME: ICD-10-CM

## 2020-05-26 DIAGNOSIS — G03.9 ARACHNOIDITIS: ICD-10-CM

## 2020-05-26 DIAGNOSIS — M54.15 RADICULOPATHY OF THORACOLUMBAR REGION: ICD-10-CM

## 2020-05-26 PROCEDURE — 99443 PR PHYSICIAN TELEPHONE EVALUATION 21-30 MIN: CPT | Mod: 95,,, | Performed by: ANESTHESIOLOGY

## 2020-05-26 PROCEDURE — 99443 PR PHYSICIAN TELEPHONE EVALUATION 21-30 MIN: ICD-10-PCS | Mod: 95,,, | Performed by: ANESTHESIOLOGY

## 2020-05-26 RX ORDER — HYDROCODONE BITARTRATE AND ACETAMINOPHEN 5; 325 MG/1; MG/1
1 TABLET ORAL EVERY 8 HOURS PRN
Qty: 90 TABLET | Refills: 0 | Status: CANCELLED | OUTPATIENT
Start: 2020-05-26 | End: 2020-06-25

## 2020-05-26 RX ORDER — HYDROCODONE BITARTRATE AND ACETAMINOPHEN 5; 325 MG/1; MG/1
1 TABLET ORAL EVERY 8 HOURS PRN
Qty: 90 TABLET | Refills: 0 | Status: SHIPPED | OUTPATIENT
Start: 2020-05-26 | End: 2020-06-25

## 2020-05-26 NOTE — TELEPHONE ENCOUNTER
----- Message from Kalani Schuler sent at 5/26/2020 11:30 AM CDT -----  Contact: pt  Name of Who is Calling: AMY NERI [[id number]]    What is the request in detail: AMY NERI [[id number]] is requesting a call from the doctor the pt had an appt for 10:45 am today and the doctor never called ....Please contact to further discuss and advise      Can the clinic reply by [facility]: No    What [facility] Back [facility]:  [phone] (home) [phone] (work)

## 2020-05-26 NOTE — PROGRESS NOTES
Established Patient - Audio Only Telehealth Visit     The patient location is: home  The chief complaint leading to consultation is: back pain  Visit type: Virtual visit with audio only (telephone)  Total time spent with patient: 25 min       The reason for the audio only service rather than synchronous audio and video virtual visit was related to technical difficulties or patient preference/necessity.     Each patient to whom I provide medical services by telemedicine is:  (1) informed of the relationship between the physician and patient and the respective role of any other health care provider with respect to management of the patient; and (2) notified that they may decline to receive medical services by telemedicine and may withdraw from such care at any time. Patient verbally consented to receive this service via voice-only telephone call.       HPI: Patient reports having several questions including: CBD oil, pain medication dosage, Nevro research. He reports Norco knocks his pain down 2/10 points and lasts for 3-4 hours. It does help him do activities like play golf. He only takes the pain pills on days when he does activities like play golf.     1. Chronic pain syndrome     2. Arachnoiditis     3. Radiculopathy of thoracolumbar region            Assessment and plan:    - I have stressed the importance of physical activity and a home exercise plan to help with pain and improve health.  - Patient can continue with medications for now since they are providing benefits, using them appropriately, and without side effects.  -answered all patient questions to best of my knowledge  -patient to schedule a virtual or in person visit to discuss SCS relief and discomfort around IPG site    - Counseled patient regarding the importance of activity modification, constant sleeping habits and physical therapy.         I have personally reviewed the phone encounter with resident/fellow/NP and personally spoke with patient for  over 25 min after addressing all questions and concerns   The phone call was initiated by patient who consented and verbalized understanding to the type of encounter not related to any office visit or other encounter in the past 7 days    Samuel Jimenez MD       This service was not originating from a related E/M service provided within the previous 7 days nor will  to an E/M service or procedure within the next 24 hours or my soonest available appointment.  Prevailing standard of care was able to be met in this audio-only visit.

## 2020-06-10 ENCOUNTER — TELEPHONE (OUTPATIENT)
Dept: PAIN MEDICINE | Facility: CLINIC | Age: 85
End: 2020-06-10

## 2020-06-10 NOTE — TELEPHONE ENCOUNTER
Staff contacted patient and left a message on VM informing patient that his appt for tomorrow will be cancelled and to contact the office to reschedule his appt

## 2020-06-11 ENCOUNTER — TELEPHONE (OUTPATIENT)
Dept: PAIN MEDICINE | Facility: CLINIC | Age: 85
End: 2020-06-11

## 2020-06-11 NOTE — TELEPHONE ENCOUNTER
----- Message from Ramya William sent at 6/11/2020  3:13 PM CDT -----  Contact: AMY NERI [[id number]]  Name of Who is Calling : AMY NERI [[id number]]    Patient is requesting a call from staff in regards to being rescheduled he would like to be seen sooner than what is available in July and tried scheduling patient with NP he would not like to be scheduled with any other provider.   .....Please contact to further discuss and advise.    Can the clinic reply by [facility] : No    What Number to Call Back :  [phone]

## 2020-06-13 ENCOUNTER — PATIENT MESSAGE (OUTPATIENT)
Dept: PAIN MEDICINE | Facility: CLINIC | Age: 85
End: 2020-06-13

## 2020-06-15 ENCOUNTER — OFFICE VISIT (OUTPATIENT)
Dept: PAIN MEDICINE | Facility: CLINIC | Age: 85
End: 2020-06-15
Attending: ANESTHESIOLOGY
Payer: MEDICARE

## 2020-06-15 VITALS
BODY MASS INDEX: 26.59 KG/M2 | TEMPERATURE: 98 F | HEART RATE: 51 BPM | HEIGHT: 73 IN | WEIGHT: 200.63 LBS | DIASTOLIC BLOOD PRESSURE: 60 MMHG | RESPIRATION RATE: 18 BRPM | SYSTOLIC BLOOD PRESSURE: 148 MMHG

## 2020-06-15 DIAGNOSIS — G89.4 CHRONIC PAIN SYNDROME: Primary | ICD-10-CM

## 2020-06-15 DIAGNOSIS — M53.3 PAIN OF RIGHT SACROILIAC JOINT: ICD-10-CM

## 2020-06-15 DIAGNOSIS — M54.15 RADICULOPATHY OF THORACOLUMBAR REGION: ICD-10-CM

## 2020-06-15 DIAGNOSIS — T85.192A FAILURE OF SPINAL CORD STIMULATOR, INITIAL ENCOUNTER: ICD-10-CM

## 2020-06-15 PROCEDURE — 99214 OFFICE O/P EST MOD 30 MIN: CPT | Mod: S$PBB,,, | Performed by: ANESTHESIOLOGY

## 2020-06-15 PROCEDURE — 99999 PR PBB SHADOW E&M-EST. PATIENT-LVL IV: ICD-10-PCS | Mod: PBBFAC,,, | Performed by: ANESTHESIOLOGY

## 2020-06-15 PROCEDURE — 99999 PR PBB SHADOW E&M-EST. PATIENT-LVL IV: CPT | Mod: PBBFAC,,, | Performed by: ANESTHESIOLOGY

## 2020-06-15 PROCEDURE — 99214 PR OFFICE/OUTPT VISIT, EST, LEVL IV, 30-39 MIN: ICD-10-PCS | Mod: S$PBB,,, | Performed by: ANESTHESIOLOGY

## 2020-06-15 PROCEDURE — 99214 OFFICE O/P EST MOD 30 MIN: CPT | Mod: PBBFAC | Performed by: ANESTHESIOLOGY

## 2020-06-15 RX ORDER — TEMAZEPAM 30 MG/1
CAPSULE ORAL
COMMUNITY
Start: 2020-03-23

## 2020-06-15 RX ORDER — TOLTERODINE TARTRATE 4 MG/1
CAPSULE, EXTENDED RELEASE ORAL
COMMUNITY
Start: 2020-04-12 | End: 2021-03-12 | Stop reason: CLARIF

## 2020-06-15 NOTE — PROGRESS NOTES
Chronic patient Established Note (Follow up visit)      SUBJECTIVE:  Interval HPI 6/15/2020:  Jefferson Boogie presents to the clinic for a follow-up appointment for 3 week follow up right hip and right thigh pain. Since the last visit, Jefferson Boogie states the pain has been persistant. Current pain intensity is 5/10. Patient has been working with Bienvenido Varghese, to try and get better coverage of a localized pain that he still experiences in his right low back area. He does report improvement in symptoms of numbness/tingling. Today, worse pain is 1/10 in severity and localizes to the right SI joint. Pain is worse with extension of his back. It is worse with golfing. Pain relieved by Norco--he takes 1-2 tablets of 5-325 Norco on days that he plays golf. Pain is also improved with being still and not being active. Patient endorses a much more active lifestyle with Norco. Denies new weakness/numbness or bowel/bladder changes.    Previous injection history includes a series of 3 lumbar CHOCO at [facility].     Interval HPI 3/5/20:  Patient presents to the clinic for a follow-up appointment for low back pain. Since the last visit, he states his pain has been unchanged. Current pain intensity is 4/10. He continues to work with Leonila to adjust settings on his SCS. He has stopped using tramadol, and uses norco sparingly. He continues to work with a  for physical therapy.      Interval HPI 1/9/2020:  Patient returns for follow up s/p Nevro SCS. He states he is unsure if it has helped his pain since he had it implanted. He last had an adjustment of his programming about 1 month ago. He does note that once he is done charging his SCS his pain is improved. Pain still worse with prolonged standing and activity such as golf. He still is doing home exercise program. He says that he is trying to do more since getting the SCS. He is still taking the Tramadol with limited pain relief. He takes Tramadol 50 mg  twice daily only on days of activity which is once a week. No other health changes.      HPI 11/20/19  Jefferson Boogie returns to clinic today for follow up. He reports increased low back and leg pain over the last few days. He has been in contact with [employer] representatives for programming adjustments. He does report benefit with the device. He reports good days and bad days. His pain is worse with prolonged standing and activity. He continues to participate in a home exercise routine. He does take Tramadol sparingly but reports limited relief. He denies any other health changes. His pain today is 5/10.    Pain Disability Index Review:  Last 3 PDI Scores 6/15/2020 3/5/2020 1/9/2020   Pain Disability Index (PDI) 24 24 25       Pain Medications:  Norco    Current medication:  Celebrex   Xanax  See med list       report:  Reviewed and consistent with medication use as prescribed.    Pain Procedures:   8/26/19 SCS implant  8/5/19 SCS trial    Physical Therapy/Home Exercise: no    Imaging: none to review.     Allergies:   Review of patient's allergies indicates:   Allergen Reactions    Adhesive Itching     Adhesive used during surgery-trial stimulator       Current Medications:   Current Outpatient Medications   Medication Sig Dispense Refill    ALPRAZolam (XANAX) 1 MG tablet Take 1 mg by mouth.      celecoxib (CELEBREX) 200 MG capsule Take 200 mg by mouth 2 (two) times daily.      DETROL LA 4 mg 24 hr capsule       ergocalciferol, vitamin D2, 400 unit Tab Take 50,000 mg by mouth.      finasteride (PROSCAR) 5 mg tablet Take 5 mg by mouth.      fluticasone propionate (FLONASE) 50 mcg/actuation nasal spray SPRAY 1 SPRAY IEN D      HYDROcodone-acetaminophen (NORCO) 5-325 mg per tablet Take 1 tablet by mouth every 8 (eight) hours as needed for Pain. 90 tablet 0    levothyroxine (SYNTHROID) 100 MCG tablet Take 100 mcg by mouth.      simvastatin (ZOCOR) 20 MG tablet Take 20 mg by mouth.      tadalafil (CIALIS)  20 MG Tab       temazepam (RESTORIL) 30 mg capsule TK 1 C PO QD HS      flurazepam (DALMANE) 15 MG Cap Take 30 mg by mouth daily as needed.      irbesartan (AVAPRO) 75 MG tablet        No current facility-administered medications for this visit.        REVIEW OF SYSTEMS:    GENERAL:  No weight loss, malaise or fevers.  HEENT:  Negative for frequent or significant headaches.  NECK:  Negative for lumps, goiter, pain and significant neck swelling.  RESPIRATORY:  Negative for cough, wheezing or shortness of breath.  CARDIOVASCULAR:  Negative for chest pain, leg swelling or palpitations.  GI:  Negative for abdominal discomfort, blood in stools or black stools or change in bowel habits.  MUSCULOSKELETAL:  See HPI.  SKIN:  Negative for lesions, rash, and itching.  PSYCH:  +ve for sleep disturbance, mood disorder and recent psychosocial stressors.  HEMATOLOGY/LYMPHOLOGY:  Negative for prolonged bleeding, bruising easily or swollen nodes.  NEURO:   No history of headaches, syncope, paralysis, seizures or tremors.  All other reviewed and negative other than HPI.    Past Medical History:  Past Medical History:   Diagnosis Date    Cancer     stage I bladder cancer 50 yrs ago    Thyroid disease        Past Surgical History:  Past Surgical History:   Procedure Laterality Date    BLADDER SURGERY      HERNIA REPAIR      KNEE SURGERY      TRIAL OF SPINAL CORD NERVE STIMULATOR N/A 8/5/2019    Procedure: Trial, Neurostimulator, SPINAL CORD STIMULATOR TRIAL;  Surgeon: Samuel Jimenez MD;  Location: [facility] CATH LAB;  Service: Pain Management;  Laterality: N/A;  C-ARM, NEVRO REP       Family History:  No family history on file.    Social History:  Social History     Socioeconomic History    Marital status:      Spouse name: Not on file    Number of children: Not on file    Years of education: Not on file    Highest education level: Not on file   Occupational History    Not on file   Social Needs    Financial  "resource strain: Not on file    Food insecurity     Worry: Not on file     Inability: Not on file    Transportation needs     Medical: Not on file     Non-medical: Not on file   Tobacco Use    Smoking status: Former Smoker    Tobacco comment: stopped 40 years ago   Substance and Sexual Activity    Alcohol use: Yes     Comment: social    Drug use: Never    Sexual activity: Yes     Partners: Female   Lifestyle    Physical activity     Days per week: Not on file     Minutes per session: Not on file    Stress: Not on file   Relationships    Social connections     Talks on phone: Not on file     Gets together: Not on file     Attends Faith service: Not on file     Active member of club or organization: Not on file     Attends meetings of clubs or organizations: Not on file     Relationship status: Not on file   Other Topics Concern    Not on file   Social History Narrative    Not on file       OBJECTIVE:    BP (!) 148/60   Pulse (!) 51   Temp 98.2 °F (36.8 °C) (Oral)   Resp 18   Ht 6' 1" (1.854 m)   Wt 91 kg (200 lb 9.6 oz)   BMI 26.47 kg/m²     PHYSICAL EXAMINATION:    General appearance: Well appearing, in no acute distress, alert and oriented x3.  Psych:  Mood and affect appropriate.  Skin: Skin color, texture, turgor normal, no rashes or lesions, in both upper and lower body.  Head/face:  Atraumatic, normocephalic. No palpable lymph nodes  Neck: No pain to palpation over the cervical paraspinous muscles. Spurling Negative. No pain with neck flexion, extension, or lateral flexion. .  Cor: RRR  Pulm: non-labored respirations  GI: Abdomen soft   Back: Straight leg raising in the sitting and supine positions is negative to radicular pain. No pain to palpation over the spine or costovertebral angles. Normal range of motion without pain reproduction.  Extremities: Peripheral joint ROM is full and pain free without obvious instability or laxity in all four extremities. No deformities, edema, or skin " discoloration. Good capillary refill.  Musculoskeletal: Pain to palpation over the right SIJ. Positive WICHO reproducing the right low back pain. Shoulder, hip, and knee provocative maneuvers are negative. Bilateral upper and lower extremity strength is normal and symmetric.  No atrophy or tone abnormalities are noted.  Neuro: Bilateral upper and lower extremity coordination and muscle stretch reflexes are physiologic and symmetric.  Plantar response are downgoing. No loss of sensation is noted.  Gait: Normal.    ASSESSMENT: 85 y.o. year old male with pain, consistent with      1. Chronic pain syndrome     2. Pain of right sacroiliac joint     3. Radiculopathy of thoracolumbar region           PLAN:     - I have stressed the importance of physical activity and a home exercise plan to help with pain and improve health.  - Patient can continue with medications for now since they are providing benefits, using them appropriately, and without side effects.  - Schedule for right SI joint injection under fluoroscopy   - We will also plan to change out patient's SCS battery to the new Nevro Omnion battery to help with pain coverage   - RTC 7-10 days after battery swap   - Counseled patient regarding the importance of activity modification and physical therapy.    The above plan and management options were discussed at length with patient. Patient is in agreement with the above and verbalized understanding.    Chente Reddy    I have personally reviewed the history and exam of this patient and agree with the resident/fellow/NPs note as stated above.    Samuel Jimenez MD    06/15/2020

## 2020-06-19 DIAGNOSIS — Z01.812 PRE-PROCEDURE LAB EXAM: ICD-10-CM

## 2020-06-19 DIAGNOSIS — G89.4 CHRONIC PAIN SYNDROME: Primary | ICD-10-CM

## 2020-06-29 ENCOUNTER — TELEPHONE (OUTPATIENT)
Dept: PAIN MEDICINE | Facility: CLINIC | Age: 85
End: 2020-06-29

## 2020-06-29 NOTE — TELEPHONE ENCOUNTER
Staff returned call to patient for the 2nd time. Staff was unable to reach patient and left a voicemail for patient to return call back to clinical staff.

## 2020-06-29 NOTE — TELEPHONE ENCOUNTER
----- Message from Ramya William sent at 6/29/2020  2:04 PM CDT -----  Regarding: Call Back  Name of Who is Calling : AMY NERI [[id number]]    Patient is requesting a call from staff  in regards to being tested for COVID-19 .....Please contact to further discuss and advise.    Can the clinic reply by [facility] : No    What Number to Call Back :  [phone]

## 2020-06-29 NOTE — TELEPHONE ENCOUNTER
Staff left a voicemail for patient to returned call to clinic in regards to his message about being schedule for covid testing

## 2020-06-29 NOTE — TELEPHONE ENCOUNTER
----- Message from Carmen Krishnanmfield sent at 6/29/2020  2:52 PM CDT -----  Type:  Patient Returning Call    Who Called: AMY NERI    Who Left Message for Patient: Sen GALO    Does the patient know what this is regarding?: Yes    Best Call Back Number: [phone]    Additional Information:

## 2020-07-01 ENCOUNTER — TELEPHONE (OUTPATIENT)
Dept: PAIN MEDICINE | Facility: CLINIC | Age: 85
End: 2020-07-01

## 2020-07-01 NOTE — TELEPHONE ENCOUNTER
----- Message from Ramya William sent at 7/1/2020  8:16 AM CDT -----  Regarding: Call Back  Name of Who is Calling : AMY NERI [[id number]]    Patient is requesting a call from staff in regards to having questions about his post procedure care needing some clarity .....Please contact to further discuss and advise.    Can the clinic reply by [facility] : Yes    What Number to Call Back :  [phone]

## 2020-07-08 ENCOUNTER — HOSPITAL ENCOUNTER (OUTPATIENT)
Facility: OTHER | Age: 85
Discharge: HOME | End: 2020-07-08
Attending: ANESTHESIOLOGY | Admitting: ANESTHESIOLOGY
Payer: MEDICARE

## 2020-07-08 VITALS
SYSTOLIC BLOOD PRESSURE: 160 MMHG | BODY MASS INDEX: 25.84 KG/M2 | RESPIRATION RATE: 16 BRPM | WEIGHT: 195 LBS | OXYGEN SATURATION: 99 % | HEIGHT: 73 IN | TEMPERATURE: 98 F | DIASTOLIC BLOOD PRESSURE: 67 MMHG | HEART RATE: 51 BPM

## 2020-07-08 DIAGNOSIS — M53.3 PAIN OF RIGHT SACROILIAC JOINT: ICD-10-CM

## 2020-07-08 DIAGNOSIS — M46.1 SACROILIITIS: Primary | ICD-10-CM

## 2020-07-08 PROCEDURE — 27096 INJECT SACROILIAC JOINT: CPT | Mod: RT,,, | Performed by: ANESTHESIOLOGY

## 2020-07-08 PROCEDURE — 25500020 PHARM REV CODE 255: Performed by: ANESTHESIOLOGY

## 2020-07-08 PROCEDURE — 27096 PR INJECTION,SACROILIAC JOINT: ICD-10-PCS | Mod: RT,,, | Performed by: ANESTHESIOLOGY

## 2020-07-08 PROCEDURE — 25000003 PHARM REV CODE 250: Performed by: ANESTHESIOLOGY

## 2020-07-08 PROCEDURE — 63600175 PHARM REV CODE 636 W HCPCS: Performed by: ANESTHESIOLOGY

## 2020-07-08 PROCEDURE — 27096 INJECT SACROILIAC JOINT: CPT

## 2020-07-08 RX ORDER — SODIUM CHLORIDE 9 MG/ML
500 INJECTION, SOLUTION INTRAVENOUS CONTINUOUS
Status: DISCONTINUED | OUTPATIENT
Start: 2020-07-08 | End: 2020-07-08 | Stop reason: HOSPADM

## 2020-07-08 RX ORDER — TRIAMCINOLONE ACETONIDE 40 MG/ML
INJECTION, SUSPENSION INTRA-ARTICULAR; INTRAMUSCULAR
Status: DISCONTINUED | OUTPATIENT
Start: 2020-07-08 | End: 2020-07-08 | Stop reason: HOSPADM

## 2020-07-08 RX ORDER — BUPIVACAINE HYDROCHLORIDE 2.5 MG/ML
INJECTION, SOLUTION EPIDURAL; INFILTRATION; INTRACAUDAL
Status: DISCONTINUED | OUTPATIENT
Start: 2020-07-08 | End: 2020-07-08 | Stop reason: HOSPADM

## 2020-07-08 RX ORDER — LIDOCAINE HYDROCHLORIDE 10 MG/ML
INJECTION INFILTRATION; PERINEURAL
Status: DISCONTINUED | OUTPATIENT
Start: 2020-07-08 | End: 2020-07-08 | Stop reason: HOSPADM

## 2020-07-08 RX ORDER — ALPRAZOLAM 0.5 MG/1
0.5 TABLET ORAL ONCE
Status: DISCONTINUED | OUTPATIENT
Start: 2020-07-08 | End: 2020-07-08 | Stop reason: HOSPADM

## 2020-07-08 NOTE — H&P
"HPI  Patient presenting for Procedure(s) (LRB):  INJECTION, JOINT, SACROILIAC (SI) (Right)     Patient on Anti-coagulation No    No health changes since previous encounter    Past Medical History:   Diagnosis Date    Cancer     stage I bladder cancer 50 yrs ago    Thyroid disease      Past Surgical History:   Procedure Laterality Date    BLADDER SURGERY      HERNIA REPAIR      KNEE SURGERY      TRIAL OF SPINAL CORD NERVE STIMULATOR N/A 8/5/2019    Procedure: Trial, Neurostimulator, SPINAL CORD STIMULATOR TRIAL;  Surgeon: Samuel Jimenez MD;  Location: [facility] CATH LAB;  Service: Pain Management;  Laterality: N/A;  C-ARM, NEVRO REP     Review of patient's allergies indicates:   Allergen Reactions    Adhesive Itching     Adhesive used during surgery-trial stimulator      No current facility-administered medications for this encounter.        PMHx, PSHx, Allergies, Medications reviewed in epic    ROS negative except pain complaints in HPI    OBJECTIVE:    BP (!) 145/67 (BP Location: Right arm, Patient Position: Lying)   Pulse (!) 56   Temp 98.2 °F (36.8 °C) (Oral)   Resp 20   Ht 6' 1" (1.854 m)   Wt 88.5 kg (195 lb)   SpO2 96%   BMI 25.73 kg/m²     PHYSICAL EXAMINATION:    GENERAL: Well appearing, in no acute distress, alert and oriented x3.  PSYCH:  Mood and affect appropriate.  SKIN: Skin color, texture, turgor normal, no rashes or lesions which will impact the procedure.  CV: RRR with palpation of the radial artery.  PULM: No evidence of respiratory difficulty, symmetric chest rise. Clear to auscultation.  NEURO: Cranial nerves grossly intact.    Plan:    Proceed with procedure as planned Procedure(s) (LRB):  INJECTION, JOINT, SACROILIAC (SI) (Right)    Michele Ward  07/08/2020            "

## 2020-07-08 NOTE — OP NOTE
Patient Name: Jefferson Boogie  MRN: [id number]    INFORMED CONSENT: The procedure, risks, benefits and options were discussed with patient. There are no contraindications to the procedure. The patient expressed understanding and agreed to proceed. The personnel performing the procedure was discussed. I verify that I personally obtained Jefferson's consent prior to the start of the procedure and the signed consent can be found on the patient's chart.    Procedure Date: 07/08/2020    Anesthesia: Topical    Pre Procedure diagnosis: Sacroiliitis [M46.1]  1. Sacroiliitis    2. Pain of right sacroiliac joint      Post-Procedure diagnosis: SAME      Sedation: None    PROCEDURE: right SACROILIAC JOINT INJECTION  DESCRIPTION OF PROCEDURE: The patient was brought to the fluoroscopy suite. After performing time out  IV access was obtained prior to the procedure. The patient was positioned prone on the fluoroscopy table. Continuous hemodynamic monitoring was initiated including blood pressure, EKG, and pulse oximetry.  The lumbosacral area was prepped with chlorhexidine three times and draped into a sterile field. The skin overlying the rt side sacroiliac joint was anesthetized using 3cc of lidocaine 1%. The inferior pole of the sacroiliac joint was identified by cephalo-oblique fluoroscopy. A 22 gauge, 3 1/2 inch spinal needle was slowly advanced through the sacroiliac joint capsule under fluoroscopic guidance. The needle position was confirmed using oblique, AP and lateral fluoroscopic imaging.  Negative aspiration was confirmed. 0.5 cc of Omnipaque 300 was injected confirming intra-articular contrast spread. A combination of 2 cc of Bupivacaine 0.25% and 40 mg kenalog was easily injected. The needle was removed and bleeding was nil.  A sterile dressing was applied. PATIENT was taken to the Post-block Recovery Area for further observation.      Blood Loss: Nill  Specimen: None    Samuel Jimenez MD

## 2020-07-08 NOTE — DISCHARGE SUMMARY
Discharge Note  Short Stay      SUMMARY     Admit Date: 7/8/2020    Attending Physician: Samuel Jimenez      Discharge Physician: Samuel Jimenez      Discharge Date: 7/8/2020 4:19 PM    Procedure(s) (LRB):  INJECTION, JOINT, SACROILIAC (SI) (Right)    Final Diagnosis: Sacroiliitis [M46.1]    Disposition: Home or self care    Patient Instructions:   Current Discharge Medication List      CONTINUE these medications which have NOT CHANGED    Details   levothyroxine (SYNTHROID) 100 MCG tablet Take 100 mcg by mouth.      simvastatin (ZOCOR) 20 MG tablet Take 20 mg by mouth.      ALPRAZolam (XANAX) 1 MG tablet Take 1 mg by mouth.      celecoxib (CELEBREX) 200 MG capsule Take 200 mg by mouth 2 (two) times daily.      DETROL LA 4 mg 24 hr capsule       ergocalciferol, vitamin D2, 400 unit Tab Take 50,000 mg by mouth.      finasteride (PROSCAR) 5 mg tablet Take 5 mg by mouth.      flurazepam (DALMANE) 15 MG Cap Take 30 mg by mouth daily as needed.      fluticasone propionate (FLONASE) 50 mcg/actuation nasal spray SPRAY 1 SPRAY IEN D      irbesartan (AVAPRO) 75 MG tablet       tadalafil (CIALIS) 20 MG Tab       temazepam (RESTORIL) 30 mg capsule TK 1 C PO QD HS                 Discharge Diagnosis: Sacroiliitis [M46.1]  Condition on Discharge: Stable with no complications to procedure   Diet on Discharge: Same as before.  Activity: as per instruction sheet.  Discharge to: Home with a responsible adult.  Follow up: 2-4 weeks       Please call my office or pager at [phone] if experienced any weakness or loss of sensation, fever > 101.5, pain uncontrolled with oral medications, persistent nausea/vomiting/or diarrhea, redness or drainage from the incisions, or any other worrisome concerns. If physician on call was not reached or could not communicate with our office for any reason please go to the nearest emergency department

## 2020-07-08 NOTE — DISCHARGE INSTRUCTIONS

## 2020-07-20 ENCOUNTER — ANESTHESIA EVENT (OUTPATIENT)
Dept: SURGERY | Facility: OTHER | Age: 85
End: 2020-07-20
Payer: MEDICARE

## 2020-07-20 ENCOUNTER — HOSPITAL ENCOUNTER (OUTPATIENT)
Dept: PREADMISSION TESTING | Facility: OTHER | Age: 85
Discharge: HOME | End: 2020-07-20
Attending: ANESTHESIOLOGY
Payer: MEDICARE

## 2020-07-20 VITALS
OXYGEN SATURATION: 97 % | HEART RATE: 49 BPM | TEMPERATURE: 98 F | SYSTOLIC BLOOD PRESSURE: 135 MMHG | BODY MASS INDEX: 25.84 KG/M2 | DIASTOLIC BLOOD PRESSURE: 69 MMHG | HEIGHT: 73 IN | WEIGHT: 195 LBS | RESPIRATION RATE: 16 BRPM

## 2020-07-20 RX ORDER — ACETAMINOPHEN 500 MG
1000 TABLET ORAL
Status: CANCELLED | OUTPATIENT
Start: 2020-07-20 | End: 2020-07-20

## 2020-07-20 RX ORDER — LIDOCAINE HYDROCHLORIDE 10 MG/ML
0.5 INJECTION, SOLUTION EPIDURAL; INFILTRATION; INTRACAUDAL; PERINEURAL ONCE
Status: CANCELLED | OUTPATIENT
Start: 2020-07-20 | End: 2020-07-20

## 2020-07-20 RX ORDER — SODIUM CHLORIDE, SODIUM LACTATE, POTASSIUM CHLORIDE, CALCIUM CHLORIDE 600; 310; 30; 20 MG/100ML; MG/100ML; MG/100ML; MG/100ML
INJECTION, SOLUTION INTRAVENOUS CONTINUOUS
Status: CANCELLED | OUTPATIENT
Start: 2020-07-20

## 2020-07-20 NOTE — ANESTHESIA PREPROCEDURE EVALUATION
07/20/2020  Jefferson Boogie is a 85 y.o., male.    Anesthesia Evaluation    I have reviewed the Patient Summary Reports.    I have reviewed the Nursing Notes.    I have reviewed the Medications.     Review of Systems  Anesthesia Hx:  No problems with previous Anesthesia  History of prior surgery of interest to airway management or planning: Previous anesthesia: General 8/19 spinal cord stimulator with general anesthesia.  Airway issues documented on chart review include mask, easy, GETA, videolaryngoscope used , view on video-laryngoscopy Grade I    Denies Family Hx of Anesthesia complications.   Denies Personal Hx of Anesthesia complications.   Social:  Former Smoker    Hematology/Oncology:  Hematology Normal      Oncology Comments: Bladder cancer    Cardiovascular:   Exercise tolerance: good Hypertension hyperlipidemia    Pulmonary:  Pulmonary Normal    Renal/:  Renal/ Normal     Hepatic/GI:  Hepatic/GI Normal    Musculoskeletal:   Arachnoiditis with gluteal and right leg   Neurological:   Chronic Pain Syndrome   Endocrine:   Hypothyroidism        Physical Exam  General:  Well nourished    Airway/Jaw/Neck:  Airway Findings: Mouth Opening: Normal Tongue: Normal  General Airway Assessment: Adult  Mallampati: II  TM Distance: Normal, at least 6 cm         Dental:  Dental Findings: In tact             Anesthesia Plan  Type of Anesthesia, risks & benefits discussed:  Anesthesia Type:  general, MAC  Patient's Preference:   Intra-op Monitoring Plan: standard ASA monitors  Intra-op Monitoring Plan Comments:   Post Op Pain Control Plan: per primary service following discharge from PACU and multimodal analgesia  Post Op Pain Control Plan Comments:   Induction:    Beta Blocker:         Informed Consent: Patient understands risks and agrees with Anesthesia plan.  Questions answered. Anesthesia consent signed with  patient.  ASA Score: 2     Day of Surgery Review of History & Physical:    H&P update referred to the surgeon.     Anesthesia Plan Notes: Booked as General/MAC, both discussed        Ready For Surgery From Anesthesia Perspective.

## 2020-07-20 NOTE — DISCHARGE INSTRUCTIONS
Information to Prepare you for your Surgery    PRE-ADMIT TESTING -  [phone]    [address]  [facility]          Your surgery has been scheduled at [facility]. We are pleased to have the opportunity to serve you. For Further Information please call [phone].    On the day of surgery please report to the Information Desk on the 1st floor.    · CONTACT YOUR PHYSICIAN'S OFFICE THE DAY PRIOR TO YOUR SURGERY TO OBTAIN YOUR ARRIVAL TIME.     · The evening before surgery do not eat anything after 9 p.m. ( this includes hard candy, chewing gum and mints).  You may only have GATORADE, POWERADE AND WATER  from 9 p.m. until you leave your home.   DO NOT DRINK ANY LIQUIDS ON THE WAY TO THE HOSPITAL.      SPECIAL MEDICATION INSTRUCTIONS: TAKE medications checked off by the Anesthesiologist on your Medication List.    Angiogram Patients: Take medications as instructed by your physician, including aspirin.     Surgery Patients:    If you take ASPIRIN - Your PHYSICIAN/SURGEON will need to inform you IF/OR when you need to stop taking aspirin prior to your surgery.     Do Not take any medications containing IBUPROFEN.  Do Not Wear any make-up or dark nail polish   (especially eye make-up) to surgery. If you come to surgery with makeup on you will be required to remove the makeup or nail polish.    Do not shave your surgical area at least 5 days prior to your surgery. The surgical prep will be performed at the hospital according to Infection Control regulations.    Leave all valuables at home.   Do Not wear any jewelry or watches, including any metal in body piercings. Jewelry must be removed prior to coming to the hospital.  There is a possibility that rings that are unable to be removed may be cut off if they are on the surgical extremity.    Contact Lens must be removed before surgery. Either do not wear the contact lens or bring a case and solution for  storage.  Please bring a container for eyeglasses or dentures as required.  Bring any paperwork your physician has provided, such as consent forms,  history and physicals, doctor's orders, etc.   Bring comfortable clothes that are loose fitting to wear upon discharge. Take into consideration the type of surgery being performed.  Maintain your diet as advised per your physician the day prior to surgery.      Adequate rest the night before surgery is advised.   Park in the Parking lot behind the hospital or in the [facility] across the street from the parking lot. Parking is complimentary.  If you will be discharged the same day as your procedure, please arrange for a responsible adult to drive you home or to accompany you if traveling by taxi.   YOU WILL NOT BE PERMITTED TO DRIVE OR TO LEAVE THE HOSPITAL ALONE AFTER SURGERY.   If you are being discharged the same day, it is strongly recommended that you arrange for someone to remain with you for the first 24 hrs following your surgery.    The Surgeon will speak to your family/visitor after your surgery regarding the outcome of your surgery and post op care.  The Surgeon may speak to you after your surgery, but there is a possibility you may not remember the details.  Please check with your family members regarding the conversation with the Surgeon.    We strongly recommend whoever is bringing you home be present for discharge instructions.  This will ensure a thorough understanding for your post op home care.    ALL CHILDREN MUST ALWAYS BE ACCOMPANIED BY AN ADULT.    Visitors-Refer to current Visitor policy handouts.    Thank you for your cooperation.  The Staff of [facility].                Bathing Instructions with Hibiclens     Shower the evening before and morning of your procedure with Hibiclens:   Wash your face with water and your regular face wash/soap   Apply Hibiclens directly on your skin or on a wet washcloth and wash  gently. When showering: Move away from the shower stream when applying Hibiclens to avoid rinsing off too soon.   Rinse thoroughly with warm water   Do not dilute Hibiclens         Dry off as usual, do not use any deodorant, powder, body lotions, perfume, after shave or cologne.

## 2020-07-21 ENCOUNTER — TELEPHONE (OUTPATIENT)
Dept: PAIN MEDICINE | Facility: CLINIC | Age: 85
End: 2020-07-21

## 2020-07-21 NOTE — TELEPHONE ENCOUNTER
Staff left detailed voice message regarding appointment 7/22/20 at 12:20pm as a audio call and to inform patient that provider may call 15 minutes before or after.

## 2020-07-22 ENCOUNTER — TELEPHONE (OUTPATIENT)
Dept: PAIN MEDICINE | Facility: CLINIC | Age: 85
End: 2020-07-22

## 2020-07-22 ENCOUNTER — OFFICE VISIT (OUTPATIENT)
Dept: PAIN MEDICINE | Facility: CLINIC | Age: 85
End: 2020-07-22
Payer: MEDICARE

## 2020-07-22 DIAGNOSIS — G89.4 CHRONIC PAIN SYNDROME: ICD-10-CM

## 2020-07-22 DIAGNOSIS — M46.1 SACROILIITIS: Primary | ICD-10-CM

## 2020-07-22 DIAGNOSIS — G03.9 ARACHNOIDITIS: ICD-10-CM

## 2020-07-22 DIAGNOSIS — T85.192A FAILURE OF SPINAL CORD STIMULATOR, INITIAL ENCOUNTER: ICD-10-CM

## 2020-07-22 DIAGNOSIS — M53.3 PAIN OF RIGHT SACROILIAC JOINT: ICD-10-CM

## 2020-07-22 PROCEDURE — 99442 PR PHYSICIAN TELEPHONE EVALUATION 11-20 MIN: ICD-10-PCS | Mod: 95,,, | Performed by: NURSE PRACTITIONER

## 2020-07-22 PROCEDURE — 99442 PR PHYSICIAN TELEPHONE EVALUATION 11-20 MIN: CPT | Mod: 95,,, | Performed by: NURSE PRACTITIONER

## 2020-07-22 NOTE — TELEPHONE ENCOUNTER
----- Message from Marie Dasilva sent at 7/22/2020 12:55 PM CDT -----      Name of Who is Calling: AMY NERI [[id number]]      What is the request in detail: Pt called said he didn't receive his virtual audio call at 12:20.Please contact to further discuss and advise.          Can the clinic reply by [facility]: N      What [facility] to [facility] Back [facility]: [phone]

## 2020-07-22 NOTE — H&P (VIEW-ONLY)
Chronic Pain-Tele-Medicine-Established Note (Follow up visit)        The patient location is: Home  The chief complaint leading to consultation is: pain  Visit type: Virtual visit with synchronous audio only  Total time spent with patient: 15 min  Each patient to whom he or she provides medical services by telemedicine is:  (1) informed of the relationship between the physician and patient and the respective role of any other health care provider with respect to management of the patient; and (2) notified that he or she may decline to receive medical services by telemedicine and may withdraw from such care at any time.        SUBJECTIVE:  Interval History 7/22/2020:  Pt presents for audio follow up only s/p Right SI joint injection on 7/8/2020. Pt states he has near resolution of focal pain to buttock. He is pleased with result and pain relief. Pt has been in contact with [employer] and will be having battery swap in 5 days. He has difficulty whether stimulator is beneficial and has even had a holiday from using SCS.  He denies any newer areas pain, denies any neuro changes, meds area adequate to control pain without adverse side effects. Pain is 2/10 today.    Interval HPI 6/15/2020:  Jefferson Boogie presents to the clinic for a follow-up appointment for 3 week follow up right hip and right thigh pain. Since the last visit, Jefferson Boogie states the pain has been persistant. Current pain intensity is 5/10. Patient has been working with Bienvenido Varghese, to try and get better coverage of a localized pain that he still experiences in his right low back area. He does report improvement in symptoms of numbness/tingling. Today, worse pain is 1/10 in severity and localizes to the right SI joint. Pain is worse with extension of his back. It is worse with golfing. Pain relieved by Norco--he takes 1-2 tablets of 5-325 Norco on days that he plays golf. Pain is also improved with being still and not being active. Patient  endorses a much more active lifestyle with Norco. Denies new weakness/numbness or bowel/bladder changes.    Previous injection history includes a series of 3 lumbar CHOCO at [facility].     Interval HPI 3/5/20:  Patient presents to the clinic for a follow-up appointment for low back pain. Since the last visit, he states his pain has been unchanged. Current pain intensity is 4/10. He continues to work with [employer] to adjust settings on his SCS. He has stopped using tramadol, and uses norco sparingly. He continues to work with a  for physical therapy.      Interval HPI 1/9/2020:  Patient returns for follow up s/p Nevro SCS. He states he is unsure if it has helped his pain since he had it implanted. He last had an adjustment of his programming about 1 month ago. He does note that once he is done charging his SCS his pain is improved. Pain still worse with prolonged standing and activity such as golf. He still is doing home exercise program. He says that he is trying to do more since getting the SCS. He is still taking the Tramadol with limited pain relief. He takes Tramadol 50 mg twice daily only on days of activity which is once a week. No other health changes.      HPI 11/20/19  Jefferson Boogie returns to clinic today for follow up. He reports increased low back and leg pain over the last few days. He has been in contact with [employer] representatives for programming adjustments. He does report benefit with the device. He reports good days and bad days. His pain is worse with prolonged standing and activity. He continues to participate in a home exercise routine. He does take Tramadol sparingly but reports limited relief. He denies any other health changes. His pain today is 5/10.    Pain Disability Index Review:  Last 3 PDI Scores 6/15/2020 3/5/2020 1/9/2020   Pain Disability Index (PDI) 24 24 25       Pain Medications:  Norco    Current medication:  Celebrex   Xanax  See med list       report:   Reviewed and consistent with medication use as prescribed.    Pain Procedures:  7/8/2020- Right SIJ injection >80% relief   8/26/19 SCS implant  8/5/19 SCS trial    Physical Therapy/Home Exercise: no    Imaging: none to review.     Allergies:   Review of patient's allergies indicates:   Allergen Reactions    Adhesive Itching     Adhesive used during surgery-trial stimulator       Current Medications:   Current Outpatient Medications   Medication Sig Dispense Refill    ALPRAZolam (XANAX) 1 MG tablet Take 1 mg by mouth.      celecoxib (CELEBREX) 200 MG capsule Take 200 mg by mouth 2 (two) times daily.      DETROL LA 4 mg 24 hr capsule       ergocalciferol, vitamin D2, 400 unit Tab Take 50,000 mg by mouth.      finasteride (PROSCAR) 5 mg tablet Take 5 mg by mouth.      flurazepam (DALMANE) 15 MG Cap Take 30 mg by mouth daily as needed.      fluticasone propionate (FLONASE) 50 mcg/actuation nasal spray SPRAY 1 SPRAY IEN D      irbesartan (AVAPRO) 75 MG tablet       levothyroxine (SYNTHROID) 100 MCG tablet Take 100 mcg by mouth.      simvastatin (ZOCOR) 20 MG tablet Take 20 mg by mouth.      tadalafil (CIALIS) 20 MG Tab       temazepam (RESTORIL) 30 mg capsule TK 1 C PO QD HS       No current facility-administered medications for this visit.        REVIEW OF SYSTEMS:    GENERAL:  No weight loss, malaise or fevers.  HEENT:  Negative for frequent or significant headaches.  NECK:  Negative for lumps, goiter, pain and significant neck swelling.  RESPIRATORY:  Negative for cough, wheezing or shortness of breath.  CARDIOVASCULAR:  Negative for chest pain, leg swelling or palpitations.  GI:  Negative for abdominal discomfort, blood in stools or black stools or change in bowel habits.  MUSCULOSKELETAL:  See HPI.  SKIN:  Negative for lesions, rash, and itching.  PSYCH:  +ve for sleep disturbance, mood disorder and recent psychosocial stressors.  HEMATOLOGY/LYMPHOLOGY:  Negative for prolonged bleeding, bruising easily  or swollen nodes.  NEURO:   No history of headaches, syncope, paralysis, seizures or tremors.  All other reviewed and negative other than HPI.    Past Medical History:  Past Medical History:   Diagnosis Date    Cancer     stage I bladder cancer 50 yrs ago    Sacroiliitis 7/8/2020    Thyroid disease        Past Surgical History:  Past Surgical History:   Procedure Laterality Date    BLADDER SURGERY      HERNIA REPAIR      INJECTION OF JOINT Right 7/8/2020    Procedure: INJECTION, JOINT, SACROILIAC (SI);  Surgeon: Samuel Jimeenz MD;  Location: [facility] PAIN MGT;  Service: Pain Management;  Laterality: Right;    KNEE SURGERY      TRIAL OF SPINAL CORD NERVE STIMULATOR N/A 8/5/2019    Procedure: Trial, Neurostimulator, SPINAL CORD STIMULATOR TRIAL;  Surgeon: Samuel Jimenez MD;  Location: [facility] CATH LAB;  Service: Pain Management;  Laterality: N/A;  C-ARM, NEVRO REP       Family History:  No family history on file.    Social History:  Social History     Socioeconomic History    Marital status:      Spouse name: Not on file    Number of children: Not on file    Years of education: Not on file    Highest education level: Not on file   Occupational History    Not on file   Social Needs    Financial resource strain: Not on file    Food insecurity     Worry: Not on file     Inability: Not on file    Transportation needs     Medical: Not on file     Non-medical: Not on file   Tobacco Use    Smoking status: Former Smoker    Smokeless tobacco: Never Used    Tobacco comment: stopped 40 years ago   Substance and Sexual Activity    Alcohol use: Yes     Alcohol/week: 2.0 standard drinks     Types: 2 Shots of liquor per week     Comment: nightly -scotch    Drug use: Never    Sexual activity: Yes     Partners: Female   Lifestyle    Physical activity     Days per week: Not on file     Minutes per session: Not on file    Stress: Not on file   Relationships    Social connections     Talks on phone: Not  on file     Gets together: Not on file     Attends Christian service: Not on file     Active member of club or organization: Not on file     Attends meetings of clubs or organizations: Not on file     Relationship status: Not on file   Other Topics Concern    Not on file   Social History Narrative    Not on file       OBJECTIVE:    There were no vitals taken for this visit.    PHYSICAL EXAMINATION:  Voice normal.  Normal affect without evidence of distress.      Prior PHYSICAL EXAMINATION:    General appearance: Well appearing, in no acute distress, alert and oriented x3.  Psych:  Mood and affect appropriate.  Skin: Skin color, texture, turgor normal, no rashes or lesions, in both upper and lower body.  Head/face:  Atraumatic, normocephalic. No palpable lymph nodes  Neck: No pain to palpation over the cervical paraspinous muscles. Spurling Negative. No pain with neck flexion, extension, or lateral flexion. .  Cor: RRR  Pulm: non-labored respirations  GI: Abdomen soft   Back: Straight leg raising in the sitting and supine positions is negative to radicular pain. No pain to palpation over the spine or costovertebral angles. Normal range of motion without pain reproduction.  Extremities: Peripheral joint ROM is full and pain free without obvious instability or laxity in all four extremities. No deformities, edema, or skin discoloration. Good capillary refill.  Musculoskeletal: Pain to palpation over the right SIJ. Positive WICHO reproducing the right low back pain. Shoulder, hip, and knee provocative maneuvers are negative. Bilateral upper and lower extremity strength is normal and symmetric.  No atrophy or tone abnormalities are noted.  Neuro: Bilateral upper and lower extremity coordination and muscle stretch reflexes are physiologic and symmetric.  Plantar response are downgoing. No loss of sensation is noted.  Gait: Normal.    ASSESSMENT: 85 y.o. year old male with pain, consistent with      1. Sacroiliitis      2. Chronic pain syndrome     3. Pain of right sacroiliac joint     4. Failure of spinal cord stimulator, initial encounter     5. Arachnoiditis           PLAN:   - Prior records reviewed  - He is s/p Right SI joint injection with good relief, can repeat as needed.   - He is scheduled in 5 days for SCS battery swap to the new Nevro Omnion battery.  - Pt voiced several questions including why SI joint injection was not performed earlier, it was discussed he did not have focal pain to this area or findings to consider this in past  - Pt also voiced post SCS battery replacement questions. Also advised Pt to contact his rep Bienvenido with [employer] for any questions.   - All questions were answered for patient but he still voiced wanting to have an appointment with Dr Jimenez   - RTC schedule appointment with Dr Jimenez  - RTC 7-10 days after battery swap   - I have stressed the importance of physical activity and a home exercise plan to help with pain and improve health.  - Patient can continue with medications for now since they are providing benefits, using them appropriately, and without side effects.  - Counseled patient regarding the importance of activity modification after SCS battery replacment.    The above plan and management options were discussed at length with patient. Patient is in agreement with the above and verbalized understanding.    Hugo Mora NP    07/22/2020

## 2020-07-22 NOTE — TELEPHONE ENCOUNTER
----- Message from Kim Juan, Patient Care Assistant sent at 7/22/2020  2:10 PM CDT -----  Name of Who is Calling: AMY NERI [[id number]]    What is the request in detail: Requesting a call back in regards of audio appointment. Please contact to further discuss and advise      Can the clinic reply by [facility]: No    [facility]   [phone]

## 2020-07-22 NOTE — TELEPHONE ENCOUNTER
Staff contacted patient in regards to message appointment has been moved to 2:40pm slot with Hugo Mora as audio call patient verbalized understanding.

## 2020-07-22 NOTE — PROGRESS NOTES
Chronic Pain-Tele-Medicine-Established Note (Follow up visit)        The patient location is: Home  The chief complaint leading to consultation is: pain  Visit type: Virtual visit with synchronous audio only  Total time spent with patient: 15 min  Each patient to whom he or she provides medical services by telemedicine is:  (1) informed of the relationship between the physician and patient and the respective role of any other health care provider with respect to management of the patient; and (2) notified that he or she may decline to receive medical services by telemedicine and may withdraw from such care at any time.        SUBJECTIVE:  Interval History 7/22/2020:  Pt presents for audio follow up only s/p Right SI joint injection on 7/8/2020. Pt states he has near resolution of focal pain to buttock. He is pleased with result and pain relief. Pt has been in contact with [employer] and will be having battery swap in 5 days. He has difficulty whether stimulator is beneficial and has even had a holiday from using SCS.  He denies any newer areas pain, denies any neuro changes, meds area adequate to control pain without adverse side effects. Pain is 2/10 today.    Interval HPI 6/15/2020:  Jefferson Boogie presents to the clinic for a follow-up appointment for 3 week follow up right hip and right thigh pain. Since the last visit, Jefferson Boogie states the pain has been persistant. Current pain intensity is 5/10. Patient has been working with Bienvenido Varghese, to try and get better coverage of a localized pain that he still experiences in his right low back area. He does report improvement in symptoms of numbness/tingling. Today, worse pain is 1/10 in severity and localizes to the right SI joint. Pain is worse with extension of his back. It is worse with golfing. Pain relieved by Norco--he takes 1-2 tablets of 5-325 Norco on days that he plays golf. Pain is also improved with being still and not being active. Patient  endorses a much more active lifestyle with Norco. Denies new weakness/numbness or bowel/bladder changes.    Previous injection history includes a series of 3 lumbar CHOCO at [facility].     Interval HPI 3/5/20:  Patient presents to the clinic for a follow-up appointment for low back pain. Since the last visit, he states his pain has been unchanged. Current pain intensity is 4/10. He continues to work with [employer] to adjust settings on his SCS. He has stopped using tramadol, and uses norco sparingly. He continues to work with a  for physical therapy.      Interval HPI 1/9/2020:  Patient returns for follow up s/p Nevro SCS. He states he is unsure if it has helped his pain since he had it implanted. He last had an adjustment of his programming about 1 month ago. He does note that once he is done charging his SCS his pain is improved. Pain still worse with prolonged standing and activity such as golf. He still is doing home exercise program. He says that he is trying to do more since getting the SCS. He is still taking the Tramadol with limited pain relief. He takes Tramadol 50 mg twice daily only on days of activity which is once a week. No other health changes.      HPI 11/20/19  Jefferson Boogie returns to clinic today for follow up. He reports increased low back and leg pain over the last few days. He has been in contact with [employer] representatives for programming adjustments. He does report benefit with the device. He reports good days and bad days. His pain is worse with prolonged standing and activity. He continues to participate in a home exercise routine. He does take Tramadol sparingly but reports limited relief. He denies any other health changes. His pain today is 5/10.    Pain Disability Index Review:  Last 3 PDI Scores 6/15/2020 3/5/2020 1/9/2020   Pain Disability Index (PDI) 24 24 25       Pain Medications:  Norco    Current medication:  Celebrex   Xanax  See med list       report:   Reviewed and consistent with medication use as prescribed.    Pain Procedures:  7/8/2020- Right SIJ injection >80% relief   8/26/19 SCS implant  8/5/19 SCS trial    Physical Therapy/Home Exercise: no    Imaging: none to review.     Allergies:   Review of patient's allergies indicates:   Allergen Reactions    Adhesive Itching     Adhesive used during surgery-trial stimulator       Current Medications:   Current Outpatient Medications   Medication Sig Dispense Refill    ALPRAZolam (XANAX) 1 MG tablet Take 1 mg by mouth.      celecoxib (CELEBREX) 200 MG capsule Take 200 mg by mouth 2 (two) times daily.      DETROL LA 4 mg 24 hr capsule       ergocalciferol, vitamin D2, 400 unit Tab Take 50,000 mg by mouth.      finasteride (PROSCAR) 5 mg tablet Take 5 mg by mouth.      flurazepam (DALMANE) 15 MG Cap Take 30 mg by mouth daily as needed.      fluticasone propionate (FLONASE) 50 mcg/actuation nasal spray SPRAY 1 SPRAY IEN D      irbesartan (AVAPRO) 75 MG tablet       levothyroxine (SYNTHROID) 100 MCG tablet Take 100 mcg by mouth.      simvastatin (ZOCOR) 20 MG tablet Take 20 mg by mouth.      tadalafil (CIALIS) 20 MG Tab       temazepam (RESTORIL) 30 mg capsule TK 1 C PO QD HS       No current facility-administered medications for this visit.        REVIEW OF SYSTEMS:    GENERAL:  No weight loss, malaise or fevers.  HEENT:  Negative for frequent or significant headaches.  NECK:  Negative for lumps, goiter, pain and significant neck swelling.  RESPIRATORY:  Negative for cough, wheezing or shortness of breath.  CARDIOVASCULAR:  Negative for chest pain, leg swelling or palpitations.  GI:  Negative for abdominal discomfort, blood in stools or black stools or change in bowel habits.  MUSCULOSKELETAL:  See HPI.  SKIN:  Negative for lesions, rash, and itching.  PSYCH:  +ve for sleep disturbance, mood disorder and recent psychosocial stressors.  HEMATOLOGY/LYMPHOLOGY:  Negative for prolonged bleeding, bruising easily  or swollen nodes.  NEURO:   No history of headaches, syncope, paralysis, seizures or tremors.  All other reviewed and negative other than HPI.    Past Medical History:  Past Medical History:   Diagnosis Date    Cancer     stage I bladder cancer 50 yrs ago    Sacroiliitis 7/8/2020    Thyroid disease        Past Surgical History:  Past Surgical History:   Procedure Laterality Date    BLADDER SURGERY      HERNIA REPAIR      INJECTION OF JOINT Right 7/8/2020    Procedure: INJECTION, JOINT, SACROILIAC (SI);  Surgeon: Samuel Jimenez MD;  Location: [facility] PAIN MGT;  Service: Pain Management;  Laterality: Right;    KNEE SURGERY      TRIAL OF SPINAL CORD NERVE STIMULATOR N/A 8/5/2019    Procedure: Trial, Neurostimulator, SPINAL CORD STIMULATOR TRIAL;  Surgeon: Samuel Jimenez MD;  Location: [facility] CATH LAB;  Service: Pain Management;  Laterality: N/A;  C-ARM, NEVRO REP       Family History:  No family history on file.    Social History:  Social History     Socioeconomic History    Marital status:      Spouse name: Not on file    Number of children: Not on file    Years of education: Not on file    Highest education level: Not on file   Occupational History    Not on file   Social Needs    Financial resource strain: Not on file    Food insecurity     Worry: Not on file     Inability: Not on file    Transportation needs     Medical: Not on file     Non-medical: Not on file   Tobacco Use    Smoking status: Former Smoker    Smokeless tobacco: Never Used    Tobacco comment: stopped 40 years ago   Substance and Sexual Activity    Alcohol use: Yes     Alcohol/week: 2.0 standard drinks     Types: 2 Shots of liquor per week     Comment: nightly -scotch    Drug use: Never    Sexual activity: Yes     Partners: Female   Lifestyle    Physical activity     Days per week: Not on file     Minutes per session: Not on file    Stress: Not on file   Relationships    Social connections     Talks on phone: Not  on file     Gets together: Not on file     Attends [employer] service: Not on file     Active member of club or organization: Not on file     Attends meetings of clubs or organizations: Not on file     Relationship status: Not on file   Other Topics Concern    Not on file   Social History Narrative    Not on file       OBJECTIVE:    There were no vitals taken for this visit.    PHYSICAL EXAMINATION:  Voice normal.  Normal affect without evidence of distress.      Prior PHYSICAL EXAMINATION:    General appearance: Well appearing, in no acute distress, alert and oriented x3.  Psych:  Mood and affect appropriate.  Skin: Skin color, texture, turgor normal, no rashes or lesions, in both upper and lower body.  Head/face:  Atraumatic, normocephalic. No palpable lymph nodes  Neck: No pain to palpation over the cervical paraspinous muscles. Spurling Negative. No pain with neck flexion, extension, or lateral flexion. .  Cor: RRR  Pulm: non-labored respirations  GI: Abdomen soft   Back: Straight leg raising in the sitting and supine positions is negative to radicular pain. No pain to palpation over the spine or costovertebral angles. Normal range of motion without pain reproduction.  Extremities: Peripheral joint ROM is full and pain free without obvious instability or laxity in all four extremities. No deformities, edema, or skin discoloration. Good capillary refill.  Musculoskeletal: Pain to palpation over the right SIJ. Positive WICHO reproducing the right low back pain. Shoulder, hip, and knee provocative maneuvers are negative. Bilateral upper and lower extremity strength is normal and symmetric.  No atrophy or tone abnormalities are noted.  Neuro: Bilateral upper and lower extremity coordination and muscle stretch reflexes are physiologic and symmetric.  Plantar response are downgoing. No loss of sensation is noted.  Gait: Normal.    ASSESSMENT: 85 y.o. year old male with pain, consistent with      1. Sacroiliitis      2. Chronic pain syndrome     3. Pain of right sacroiliac joint     4. Failure of spinal cord stimulator, initial encounter     5. Arachnoiditis           PLAN:   - Prior records reviewed  - He is s/p Right SI joint injection with good relief, can repeat as needed.   - He is scheduled in 5 days for SCS battery swap to the new Nevro Omnion battery.  - Pt voiced several questions including why SI joint injection was not performed earlier, it was discussed he did not have focal pain to this area or findings to consider this in past  - Pt also voiced post SCS battery replacement questions. Also advised Pt to contact his rep Bienvenido with [employer] for any questions.   - All questions were answered for patient but he still voiced wanting to have an appointment with Dr Jimenez   - RTC schedule appointment with Dr Jimenez  - RTC 7-10 days after battery swap   - I have stressed the importance of physical activity and a home exercise plan to help with pain and improve health.  - Patient can continue with medications for now since they are providing benefits, using them appropriately, and without side effects.  - Counseled patient regarding the importance of activity modification after SCS battery replacment.    The above plan and management options were discussed at length with patient. Patient is in agreement with the above and verbalized understanding.    Hugo Mora NP    07/22/2020

## 2020-07-24 ENCOUNTER — LAB VISIT (OUTPATIENT)
Dept: URGENT CARE | Facility: CLINIC | Age: 85
End: 2020-07-24
Payer: MEDICARE

## 2020-07-24 DIAGNOSIS — G89.4 CHRONIC PAIN SYNDROME: ICD-10-CM

## 2020-07-24 DIAGNOSIS — Z01.812 PRE-PROCEDURE LAB EXAM: ICD-10-CM

## 2020-07-24 PROCEDURE — U0003 INFECTIOUS AGENT DETECTION BY NUCLEIC ACID (DNA OR RNA); SEVERE ACUTE RESPIRATORY SYNDROME CORONAVIRUS 2 (SARS-COV-2) (CORONAVIRUS DISEASE [COVID-19]), AMPLIFIED PROBE TECHNIQUE, MAKING USE OF HIGH THROUGHPUT TECHNOLOGIES AS DESCRIBED BY CMS-2020-01-R: HCPCS

## 2020-07-25 LAB — SARS-COV-2 RNA RESP QL NAA+PROBE: NOT DETECTED

## 2020-07-27 ENCOUNTER — ANESTHESIA (OUTPATIENT)
Dept: SURGERY | Facility: OTHER | Age: 85
End: 2020-07-27
Payer: MEDICARE

## 2020-07-27 ENCOUNTER — HOSPITAL ENCOUNTER (OUTPATIENT)
Facility: OTHER | Age: 85
Discharge: HOME | End: 2020-07-27
Attending: ANESTHESIOLOGY | Admitting: ANESTHESIOLOGY
Payer: MEDICARE

## 2020-07-27 VITALS
RESPIRATION RATE: 16 BRPM | WEIGHT: 195 LBS | TEMPERATURE: 97 F | DIASTOLIC BLOOD PRESSURE: 69 MMHG | OXYGEN SATURATION: 98 % | HEIGHT: 73 IN | BODY MASS INDEX: 25.84 KG/M2 | SYSTOLIC BLOOD PRESSURE: 146 MMHG | HEART RATE: 49 BPM

## 2020-07-27 DIAGNOSIS — G89.4 CHRONIC PAIN SYNDROME: Primary | ICD-10-CM

## 2020-07-27 DIAGNOSIS — T85.192D FAILURE OF SPINAL CORD STIMULATOR, SUBSEQUENT ENCOUNTER: ICD-10-CM

## 2020-07-27 DIAGNOSIS — G90.50 CRPS (COMPLEX REGIONAL PAIN SYNDROME TYPE I): ICD-10-CM

## 2020-07-27 DIAGNOSIS — G03.9 ARACHNOIDITIS: ICD-10-CM

## 2020-07-27 PROCEDURE — 88300 SURGICAL PATH GROSS: CPT | Performed by: PATHOLOGY

## 2020-07-27 PROCEDURE — C1822 GEN, NEURO, HF, RECHG BAT: HCPCS | Performed by: ANESTHESIOLOGY

## 2020-07-27 PROCEDURE — 88300 SURGICAL PATH GROSS: CPT | Mod: 26,,, | Performed by: PATHOLOGY

## 2020-07-27 PROCEDURE — C1787 PATIENT PROGR, NEUROSTIM: HCPCS | Performed by: ANESTHESIOLOGY

## 2020-07-27 PROCEDURE — 88300 PR  SURG PATH,GROSS,LEVEL I: ICD-10-PCS | Mod: 26,,, | Performed by: PATHOLOGY

## 2020-07-27 PROCEDURE — 25000003 PHARM REV CODE 250: Performed by: ANESTHESIOLOGY

## 2020-07-27 PROCEDURE — 63600175 PHARM REV CODE 636 W HCPCS: Performed by: ANESTHESIOLOGY

## 2020-07-27 PROCEDURE — 63685 PR IMPLANT SPINAL NEUROSTIM/RECEIVER: ICD-10-PCS | Mod: ,,, | Performed by: ANESTHESIOLOGY

## 2020-07-27 PROCEDURE — 63685 INS/RPLC SPI NPG/RCVR POCKET: CPT | Mod: ,,, | Performed by: ANESTHESIOLOGY

## 2020-07-27 PROCEDURE — 36000707: Performed by: ANESTHESIOLOGY

## 2020-07-27 PROCEDURE — 37000009 HC ANESTHESIA EA ADD 15 MINS: Performed by: ANESTHESIOLOGY

## 2020-07-27 PROCEDURE — 63600175 PHARM REV CODE 636 W HCPCS: Performed by: PHYSICAL MEDICINE & REHABILITATION

## 2020-07-27 PROCEDURE — 71000016 HC POSTOP RECOV ADDL HR: Performed by: ANESTHESIOLOGY

## 2020-07-27 PROCEDURE — 36000706: Performed by: ANESTHESIOLOGY

## 2020-07-27 PROCEDURE — 71000015 HC POSTOP RECOV 1ST HR: Performed by: ANESTHESIOLOGY

## 2020-07-27 PROCEDURE — 27800903 OPTIME MED/SURG SUP & DEVICES OTHER IMPLANTS: Performed by: ANESTHESIOLOGY

## 2020-07-27 PROCEDURE — 27201423 OPTIME MED/SURG SUP & DEVICES STERILE SUPPLY: Performed by: ANESTHESIOLOGY

## 2020-07-27 PROCEDURE — 37000008 HC ANESTHESIA 1ST 15 MINUTES: Performed by: ANESTHESIOLOGY

## 2020-07-27 PROCEDURE — 63600175 PHARM REV CODE 636 W HCPCS: Performed by: NURSE ANESTHETIST, CERTIFIED REGISTERED

## 2020-07-27 DEVICE — IMPLANTABLE DEVICE: Type: IMPLANTABLE DEVICE | Site: BACK | Status: FUNCTIONAL

## 2020-07-27 RX ORDER — BUPIVACAINE HYDROCHLORIDE AND EPINEPHRINE 2.5; 5 MG/ML; UG/ML
INJECTION, SOLUTION EPIDURAL; INFILTRATION; INTRACAUDAL; PERINEURAL
Status: DISCONTINUED | OUTPATIENT
Start: 2020-07-27 | End: 2020-07-27 | Stop reason: HOSPADM

## 2020-07-27 RX ORDER — MIDAZOLAM HYDROCHLORIDE 1 MG/ML
INJECTION INTRAMUSCULAR; INTRAVENOUS
Status: DISCONTINUED | OUTPATIENT
Start: 2020-07-27 | End: 2020-07-27

## 2020-07-27 RX ORDER — SODIUM CHLORIDE, SODIUM LACTATE, POTASSIUM CHLORIDE, CALCIUM CHLORIDE 600; 310; 30; 20 MG/100ML; MG/100ML; MG/100ML; MG/100ML
INJECTION, SOLUTION INTRAVENOUS CONTINUOUS
Status: DISCONTINUED | OUTPATIENT
Start: 2020-07-27 | End: 2020-07-27 | Stop reason: HOSPADM

## 2020-07-27 RX ORDER — CEPHALEXIN 500 MG/1
500 CAPSULE ORAL EVERY 6 HOURS
Qty: 28 CAPSULE | Refills: 0 | Status: SHIPPED | OUTPATIENT
Start: 2020-07-27 | End: 2020-08-03

## 2020-07-27 RX ORDER — CEFAZOLIN SODIUM 1 G/50ML
1 SOLUTION INTRAVENOUS ONCE
Status: CANCELLED | OUTPATIENT
Start: 2020-07-27 | End: 2020-07-27

## 2020-07-27 RX ORDER — LIDOCAINE HYDROCHLORIDE 10 MG/ML
0.5 INJECTION, SOLUTION EPIDURAL; INFILTRATION; INTRACAUDAL; PERINEURAL ONCE
Status: DISCONTINUED | OUTPATIENT
Start: 2020-07-27 | End: 2020-07-27 | Stop reason: HOSPADM

## 2020-07-27 RX ORDER — SODIUM CHLORIDE 9 MG/ML
500 INJECTION, SOLUTION INTRAVENOUS CONTINUOUS
Status: DISCONTINUED | OUTPATIENT
Start: 2020-07-27 | End: 2020-07-27 | Stop reason: HOSPADM

## 2020-07-27 RX ORDER — FENTANYL CITRATE 50 UG/ML
INJECTION, SOLUTION INTRAMUSCULAR; INTRAVENOUS
Status: DISCONTINUED | OUTPATIENT
Start: 2020-07-27 | End: 2020-07-27

## 2020-07-27 RX ORDER — OXYCODONE AND ACETAMINOPHEN 5; 325 MG/1; MG/1
1 TABLET ORAL EVERY 8 HOURS PRN
Qty: 21 TABLET | Refills: 0 | Status: SHIPPED | OUTPATIENT
Start: 2020-07-27 | End: 2020-08-03

## 2020-07-27 RX ORDER — CEFAZOLIN SODIUM 1 G/3ML
2 INJECTION, POWDER, FOR SOLUTION INTRAMUSCULAR; INTRAVENOUS ONCE
Status: COMPLETED | OUTPATIENT
Start: 2020-07-27 | End: 2020-07-27

## 2020-07-27 RX ORDER — ACETAMINOPHEN 500 MG
1000 TABLET ORAL
Status: COMPLETED | OUTPATIENT
Start: 2020-07-27 | End: 2020-07-27

## 2020-07-27 RX ORDER — LIDOCAINE HYDROCHLORIDE 10 MG/ML
INJECTION, SOLUTION EPIDURAL; INFILTRATION; INTRACAUDAL; PERINEURAL
Status: DISCONTINUED | OUTPATIENT
Start: 2020-07-27 | End: 2020-07-27 | Stop reason: HOSPADM

## 2020-07-27 RX ADMIN — MIDAZOLAM HYDROCHLORIDE 0.5 MG: 1 INJECTION, SOLUTION INTRAMUSCULAR; INTRAVENOUS at 07:07

## 2020-07-27 RX ADMIN — CEFAZOLIN 2 G: 330 INJECTION, POWDER, FOR SOLUTION INTRAMUSCULAR; INTRAVENOUS at 07:07

## 2020-07-27 RX ADMIN — FENTANYL CITRATE 25 MCG: 50 INJECTION, SOLUTION INTRAMUSCULAR; INTRAVENOUS at 08:07

## 2020-07-27 RX ADMIN — FENTANYL CITRATE 25 MCG: 50 INJECTION, SOLUTION INTRAMUSCULAR; INTRAVENOUS at 07:07

## 2020-07-27 RX ADMIN — SODIUM CHLORIDE, SODIUM LACTATE, POTASSIUM CHLORIDE, AND CALCIUM CHLORIDE: 600; 310; 30; 20 INJECTION, SOLUTION INTRAVENOUS at 07:07

## 2020-07-27 RX ADMIN — ACETAMINOPHEN 1000 MG: 500 TABLET, FILM COATED ORAL at 06:07

## 2020-07-27 RX ADMIN — MIDAZOLAM HYDROCHLORIDE 0.5 MG: 1 INJECTION, SOLUTION INTRAMUSCULAR; INTRAVENOUS at 08:07

## 2020-07-27 NOTE — DISCHARGE SUMMARY
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      celecoxib (CELEBREX) 200 MG capsule Take 200 mg by mouth 2 (two) times daily.      DETROL LA 4 mg 24 hr capsule       ergocalciferol, vitamin D2, 400 unit Tab Take 50,000 mg by mouth.      finasteride (PROSCAR) 5 mg tablet Take 5 mg by mouth.      flurazepam (DALMANE) 15 MG Cap Take 30 mg by mouth daily as needed.      irbesartan (AVAPRO) 75 MG tablet       levothyroxine (SYNTHROID) 100 MCG tablet Take 100 mcg by mouth.      simvastatin (ZOCOR) 20 MG tablet Take 20 mg by mouth.      temazepam (RESTORIL) 30 mg capsule TK 1 C PO QD HS      fluticasone propionate (FLONASE) 50 mcg/actuation nasal spray SPRAY 1 SPRAY IEN D      tadalafil (CIALIS) 20 MG Tab              Wound care:  1- In case of sutures or staples leave underlying strips in place until follow up. If they fall off it is okay.   2- No soaking bath or swimming until after follow up. Sponge bath to front is ok   3-Take your pain medication as needed.   4-Take over the counter stool softener while taking pain medication to prevent constipation.   5-If no bowel movement in 2 days take miralax twice a day.   6-Ok for light activity (light housework, walking, stair climbing) no strenuous exercise until after follow up.   7-No lifting greater than 20 pounds or 1 gallon of milk until after follow up.  8- Please call my office if experienced any  weakness or loss of sensation, fever > 101.5, pain uncontrolled with oral medications, persistent nausea/vomiting/or diarrhea, redness or drainage from the incisions, or any other worrisome concerns. 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  Activity: as per instruction sheet.  Discharge to: Home with a responsible adult.  Follow up: 1-4 weeks       Please call my office or pager at [phone] if experienced any weakness or loss of sensation, fever > 101.5, pain uncontrolled with oral medications, persistent nausea/vomiting/or diarrhea, redness or drainage from the incisions, or any other worrisome concerns. If physician on call was not reached or could not communicate with our office for any reason please go to the nearest emergency department      Samuel Jimenez MD

## 2020-07-27 NOTE — INTERVAL H&P NOTE
The patient has been examined and the H&P has been reviewed:    I concur with the findings and no changes have occurred since H&P was written.    Anesthesia/Surgery risks, benefits and alternative options discussed and understood by patient/family.          Active Hospital Problems    Diagnosis  POA    CRPS (complex regional pain syndrome type I) [G90.50]  Yes      Resolved Hospital Problems   No resolved problems to display.

## 2020-07-27 NOTE — OP NOTE
Spinal cord stimulator battery replacement    Preoperative diagnosis: Mechanical complication of dorsal column stimulator, initial encounter [T85.192A]     Postoperative diagnosis: Mechanical complication of dorsal column stimulator, initial encounter [T85.192A]   1. Chronic pain syndrome    2 arachnoiditis     3. Failure of spinal cord stimulator, subsequent encounter        Procedure: 1) explant of old pulse generator   2) placement of pulse generator 3) intraoperative and post operative programming simple     Surgeon: Samuel Jimenez MD    Assistant: pain fellow    EBL: Minimal     Complications: None     Specimens: old senza 1 IPG    Anesthesia: MAC     Description of procedure:     After written consent was obtained the patient was brought into the OR and placed in the prone position with all pressure points padded appropriately. The area overlying the skin of the back was prepped and draped in usual sterile fashion using chlorhexidine with an Ioban dressing over the skin. A time out was performed and there was confirmation of preoperative antibiotics.   Fluoroscopy was used to identify the old battery and connecting electrodes and an incision was planned over the site. The tract was anesthetized at the level of the skin and deeper tissues leading to the prevertebral fascia with 15 mL of a equal mixture of 0.5% bupivacaine with 1:200,000 epinephrine +1% lidocaine through a 25-gauge needle. This was followed with the skin incision with a 10 blade scalpel, followed by sharp and blunt dissection to the old battery using cautery for hemostasis.  The old electrodes were disconnected and the old battery was explanted.  The electrodes were connected to the new battery and the battery was tested so that the entire system was confirmed to be working appropriately. The site was irrigated with saline solution and there was care to confirm hemostasis followed by adding 1 g of vancomycin powder . The incision was then  closed with interrupted 2-0 Vicryl in two layers. followed by staples at the skin.  This was followed by bacitracin ointment being placed over the staples after wet and dry cleaning. Dressing was placed over the skin and an abdominal binder was placed around the patient.     The patient was taken to recovery in stable condition and the stimulator was programmed postoperatively to capture all the usual painful areas.     The patient was discharged from the hospital in stable condition.    Samuel Jimenez MD

## 2020-07-27 NOTE — DISCHARGE INSTRUCTIONS
Anesthesia: Monitored Anesthesia Care (MAC)    Youre due to have surgery. During surgery, youll be given medicine called anesthesia. This will keep you comfortable and pain-free. Your surgeon will use monitored anesthesia care (MAC). This sheet tells you more about this type of anesthesia.  What is monitored anesthesia care?  MAC keeps you very drowsy during surgery. You may be awake, but you will likely not remember much. And you wont feel pain. With MAC, medicines are given through an IV line into a vein in your arm or hand. A local anesthetic will usually be injected into the skin and muscle around the surgical site to numb it. The anesthesia provider monitors you during the procedure. He or she checks your heart rate and rhythm, blood pressure, and blood oxygen level.  Anesthesia tools and medicines that may be near you during your procedure  You will likely have:  · A pulse oximeter on the end of your finger. This measures your blood oxygen level.  · Electrocardiography leads (electrodes) on your chest. These record your heart rate and rhythm.  · Medicines given through an IV. These relax you and prevent pain. You may be awake or sleep lightly. If you have local anesthetic, it is injected directly into your skin.  · A facemask to give you oxygen, if needed.  Risks and possible complications  MAC has some risks. These include:  · Breathing problems  · Nausea and vomiting  · Allergic reaction to the anesthetic    Anesthesia safety  Tips for anesthesia safety include the following:   · Follow all instructions you are given for how long not to eat or drink before your procedure.  · Be sure your healthcare provider knows what medicines you take, especially any anti-inflammatory medicine or blood thinners. This includes aspirin and any other over-the-counter medicines, herbs, and supplements.  · Have an adult family member or friend drive you home after the procedure.  · For the first 24 hours after your  surgery:  ¨ Do not drive or use heavy equipment.  ¨ Do not make important decisions or sign documents.  ¨ Avoid alcohol.  ¨ Have someone stay with you, if possible. They can watch for problems and help keep you safe.  Date Last Reviewed: 12/1/2016 © 2000-2017 [employer]. [address] [place] [address]. All rights reserved. This information is not intended as a substitute for professional medical care. Always follow your healthcare professional's instructions.    FOLLOW ANY OTHER INSTRUCTIONS GIVEN TO YOU BY DR. RABAGO!!!

## 2020-07-27 NOTE — ANESTHESIA POSTPROCEDURE EVALUATION
Anesthesia Post Evaluation    Patient: Jefferson Boogie    Procedure(s) Performed: Procedure(s) (LRB):  Replacement, SPINAL CORD STIMULATOR BATTERY CHANGE TO [employer] BATTERY (N/A)    Final Anesthesia Type: MAC    Patient location during evaluation: [facility]  Patient participation: Yes- Able to Participate  Level of consciousness: awake and alert, oriented and awake  Post-procedure vital signs: reviewed and stable  Pain management: adequate  Airway patency: patent    PONV status at discharge: No PONV  Anesthetic complications: no      Cardiovascular status: blood pressure returned to baseline  Respiratory status: unassisted  Hydration status: euvolemic  Follow-up not needed.          Vitals Value Taken Time   /66 07/27/20 0625   Temp 36.3 °C (97.3 °F) 07/27/20 0625   Pulse 49 07/27/20 0625   Resp 16 07/27/20 0625   SpO2 98 % 07/27/20 0625         No case tracking events are documented in the log.      Pain/Kamron Score: Pain Rating Prior to Med Admin: 6 (7/27/2020  6:21 AM)

## 2020-07-30 LAB
FINAL PATHOLOGIC DIAGNOSIS: NORMAL
GROSS: NORMAL

## 2020-07-31 ENCOUNTER — TELEPHONE (OUTPATIENT)
Dept: PAIN MEDICINE | Facility: CLINIC | Age: 85
End: 2020-07-31

## 2020-07-31 NOTE — TELEPHONE ENCOUNTER
Staff spoke with patient regarding appointment 8/33/20 at 10:20am with Marian Tolliver Np as in office visit and to inform patient of direct line to call before entering the building also to arrive 15 minutes early but we ask that patient come alone unless its an essential visitor patient verbalized understanding.

## 2020-08-03 ENCOUNTER — PATIENT MESSAGE (OUTPATIENT)
Dept: PAIN MEDICINE | Facility: CLINIC | Age: 85
End: 2020-08-03

## 2020-08-03 ENCOUNTER — OFFICE VISIT (OUTPATIENT)
Dept: PAIN MEDICINE | Facility: CLINIC | Age: 85
End: 2020-08-03
Payer: MEDICARE

## 2020-08-03 ENCOUNTER — TELEPHONE (OUTPATIENT)
Dept: PAIN MEDICINE | Facility: CLINIC | Age: 85
End: 2020-08-03

## 2020-08-03 VITALS
BODY MASS INDEX: 26.59 KG/M2 | SYSTOLIC BLOOD PRESSURE: 143 MMHG | HEIGHT: 73 IN | OXYGEN SATURATION: 100 % | HEART RATE: 52 BPM | RESPIRATION RATE: 18 BRPM | TEMPERATURE: 97 F | WEIGHT: 200.63 LBS | DIASTOLIC BLOOD PRESSURE: 62 MMHG

## 2020-08-03 DIAGNOSIS — M46.1 SACROILIITIS: ICD-10-CM

## 2020-08-03 DIAGNOSIS — T85.192D FAILURE OF SPINAL CORD STIMULATOR, SUBSEQUENT ENCOUNTER: ICD-10-CM

## 2020-08-03 DIAGNOSIS — G89.4 CHRONIC PAIN SYNDROME: Primary | ICD-10-CM

## 2020-08-03 DIAGNOSIS — G03.9 ARACHNOIDITIS: ICD-10-CM

## 2020-08-03 PROCEDURE — 99214 OFFICE O/P EST MOD 30 MIN: CPT | Mod: PBBFAC | Performed by: NURSE PRACTITIONER

## 2020-08-03 PROCEDURE — 99999 PR PBB SHADOW E&M-EST. PATIENT-LVL IV: CPT | Mod: PBBFAC,,, | Performed by: NURSE PRACTITIONER

## 2020-08-03 PROCEDURE — 99024 POSTOP FOLLOW-UP VISIT: CPT | Mod: POP,,, | Performed by: NURSE PRACTITIONER

## 2020-08-03 PROCEDURE — 99999 PR PBB SHADOW E&M-EST. PATIENT-LVL IV: ICD-10-PCS | Mod: PBBFAC,,, | Performed by: NURSE PRACTITIONER

## 2020-08-03 PROCEDURE — 99024 PR POST-OP FOLLOW-UP VISIT: ICD-10-PCS | Mod: POP,,, | Performed by: NURSE PRACTITIONER

## 2020-08-03 NOTE — TELEPHONE ENCOUNTER
I do not recommend using the recumbent bike until his two week wound check. He may walk for exercise until then.

## 2020-08-03 NOTE — TELEPHONE ENCOUNTER
"Staff contacted the patient to further discuss his concerns.     Patient states," I would like to know if I can use the recumbent bicycle today for exercise?"    Staff informed the patient that his concerns will be presented to you for review.     Patient verbalized understanding and expressed thanks.   "

## 2020-08-03 NOTE — PROGRESS NOTES
Chronic Pain-Tele-Medicine-Established Note (Follow up visit)              SUBJECTIVE:    Interval History 8/3/2020:  The patient returns to clinic today for post op. He is s/p Leonila battery change on 7/27/2020. He denies any fever, chills, or drainage from incision. He has not completed his antibiotics as he missed two days of the medication. He continues to follow his activity restrictions. He reports relief with the device. He is meeting with [employer] today for programming. He denies any other health changes. His pain today is 2/10    Interval History 7/22/2020:  Pt presents for audio follow up only s/p Right SI joint injection on 7/8/2020. Pt states he has near resolution of focal pain to buttock. He is pleased with result and pain relief. Pt has been in contact with Leonila and will be having battery swap in 5 days. He has difficulty whether stimulator is beneficial and has even had a holiday from using SCS.  He denies any newer areas pain, denies any neuro changes, meds area adequate to control pain without adverse side effects. Pain is 2/10 today.    Interval HPI 6/15/2020:  Jefferson Boogie presents to the clinic for a follow-up appointment for 3 week follow up right hip and right thigh pain. Since the last visit, Jefferson Boogie states the pain has been persistant. Current pain intensity is 5/10. Patient has been working with [employer] Varghese, to try and get better coverage of a localized pain that he still experiences in his right low back area. He does report improvement in symptoms of numbness/tingling. Today, worse pain is 1/10 in severity and localizes to the right SI joint. Pain is worse with extension of his back. It is worse with golfing. Pain relieved by Norco--he takes 1-2 tablets of 5-325 Norco on days that he plays golf. Pain is also improved with being still and not being active. Patient endorses a much more active lifestyle with Norco. Denies new weakness/numbness or bowel/bladder  changes.    Previous injection history includes a series of 3 lumbar CHOCO at [facility].     Interval HPI 3/5/20:  Patient presents to the clinic for a follow-up appointment for low back pain. Since the last visit, he states his pain has been unchanged. Current pain intensity is 4/10. He continues to work with [employer] to adjust settings on his SCS. He has stopped using tramadol, and uses norco sparingly. He continues to work with a  for physical therapy.      Interval HPI 1/9/2020:  Patient returns for follow up s/p Nevro SCS. He states he is unsure if it has helped his pain since he had it implanted. He last had an adjustment of his programming about 1 month ago. He does note that once he is done charging his SCS his pain is improved. Pain still worse with prolonged standing and activity such as golf. He still is doing home exercise program. He says that he is trying to do more since getting the SCS. He is still taking the Tramadol with limited pain relief. He takes Tramadol 50 mg twice daily only on days of activity which is once a week. No other health changes.      HPI 11/20/19  Jefferson Boogie returns to clinic today for follow up. He reports increased low back and leg pain over the last few days. He has been in contact with [employer] representatives for programming adjustments. He does report benefit with the device. He reports good days and bad days. His pain is worse with prolonged standing and activity. He continues to participate in a home exercise routine. He does take Tramadol sparingly but reports limited relief. He denies any other health changes. His pain today is 5/10.    Pain Disability Index Review:  Last 3 PDI Scores 8/3/2020 6/15/2020 3/5/2020   Pain Disability Index (PDI) 18 24 24       Pain Medications:  Norco    Current medication:  Celebrex   Xanax  See med list       report:  Reviewed and consistent with medication use as prescribed.    Pain Procedures:  7/27/2020- Leonila  SCS battery change  7/8/2020- Right SIJ injection >80% relief   8/26/19 SCS implant  8/5/19 SCS trial    Physical Therapy/Home Exercise: no    Imaging:    Xray Thoracolumbar 1/10/2020:  FINDINGS:  There is a spinal stimulator device, incompletely visualized with TIPS unchanged compared to prior examination at approximately T8 level.  Vertebral bodies are normally aligned and normal in height, with mild degenerative change.     Impression:     As above    Allergies:   Review of patient's allergies indicates:   Allergen Reactions    Adhesive Itching     Adhesive used during surgery-trial stimulator       Current Medications:   Current Outpatient Medications   Medication Sig Dispense Refill    ALPRAZolam (XANAX) 1 MG tablet Take 1 mg by mouth.      celecoxib (CELEBREX) 200 MG capsule Take 200 mg by mouth 2 (two) times daily.      cephALEXin (KEFLEX) 500 MG capsule Take 1 capsule (500 mg total) by mouth every 6 (six) hours. for 7 days 28 capsule 0    ergocalciferol, vitamin D2, 400 unit Tab Take 50,000 mg by mouth.      finasteride (PROSCAR) 5 mg tablet Take 5 mg by mouth.      fluticasone propionate (FLONASE) 50 mcg/actuation nasal spray SPRAY 1 SPRAY IEN D      irbesartan (AVAPRO) 75 MG tablet       levothyroxine (SYNTHROID) 100 MCG tablet Take 100 mcg by mouth.      simvastatin (ZOCOR) 20 MG tablet Take 20 mg by mouth.      tadalafil (CIALIS) 20 MG Tab       temazepam (RESTORIL) 30 mg capsule TK 1 C PO QD HS      DETROL LA 4 mg 24 hr capsule       flurazepam (DALMANE) 15 MG Cap Take 30 mg by mouth daily as needed.      oxyCODONE-acetaminophen (PERCOCET) 5-325 mg per tablet Take 1 tablet by mouth every 8 (eight) hours as needed for Pain. (Patient not taking: Reported on 8/3/2020) 21 tablet 0     No current facility-administered medications for this visit.        REVIEW OF SYSTEMS:    GENERAL:  No weight loss, malaise or fevers.  HEENT:  Negative for frequent or significant headaches.  NECK:  Negative  for lumps, goiter, pain and significant neck swelling.  RESPIRATORY:  Negative for cough, wheezing or shortness of breath.  CARDIOVASCULAR:  Negative for chest pain, leg swelling or palpitations.  GI:  Negative for abdominal discomfort, blood in stools or black stools or change in bowel habits.  MUSCULOSKELETAL:  See HPI.  SKIN:  Negative for lesions, rash, and itching.  PSYCH:  Positive for sleep disturbance. Negative for mood disorder and recent psychosocial stressors.  HEMATOLOGY/LYMPHOLOGY:  Negative for prolonged bleeding, bruising easily or swollen nodes. Hx of cancer.   NEURO:   No history of headaches, syncope, paralysis, seizures or tremors.\  ENDO: Thyroid disorder.   All other reviewed and negative other than HPI.    Past Medical History:  Past Medical History:   Diagnosis Date    Cancer     stage I bladder cancer 50 yrs ago    Sacroiliitis 7/8/2020    Thyroid disease        Past Surgical History:  Past Surgical History:   Procedure Laterality Date    BLADDER SURGERY      HERNIA REPAIR      INJECTION OF JOINT Right 7/8/2020    Procedure: INJECTION, JOINT, SACROILIAC (SI);  Surgeon: Samuel Jimenez MD;  Location: [facility] PAIN MGT;  Service: Pain Management;  Laterality: Right;    KNEE SURGERY      REPLACEMENT OF NERVE STIMULATOR BATTERY N/A 7/27/2020    Procedure: Replacement, SPINAL CORD STIMULATOR BATTERY CHANGE TO NEVRO OMNION BATTERY;  Surgeon: Samuel Jimenez MD;  Location: [facility] OR;  Service: Pain Management;  Laterality: N/A;  C-ARM, NEVRO REP    TRIAL OF SPINAL CORD NERVE STIMULATOR N/A 8/5/2019    Procedure: Trial, Neurostimulator, SPINAL CORD STIMULATOR TRIAL;  Surgeon: Samuel Jimenez MD;  Location: [facility] CATH LAB;  Service: Pain Management;  Laterality: N/A;  C-ARM, NEVRO REP       Family History:  History reviewed. No pertinent family history.    Social History:  Social History     Socioeconomic History    Marital status:      Spouse name: Not on file    Number of  "children: Not on file    Years of education: Not on file    Highest education level: Not on file   Occupational History    Not on file   Social Needs    Financial resource strain: Not on file    Food insecurity     Worry: Not on file     Inability: Not on file    Transportation needs     Medical: Not on file     Non-medical: Not on file   Tobacco Use    Smoking status: Former Smoker    Smokeless tobacco: Never Used    Tobacco comment: stopped 40 years ago   Substance and Sexual Activity    Alcohol use: Yes     Alcohol/week: 2.0 standard drinks     Types: 2 Shots of liquor per week     Comment: nightly -scotch    Drug use: Never    Sexual activity: Yes     Partners: Female   Lifestyle    Physical activity     Days per week: Not on file     Minutes per session: Not on file    Stress: Not on file   Relationships    Social connections     Talks on phone: Not on file     Gets together: Not on file     Attends Confucianism service: Not on file     Active member of club or organization: Not on file     Attends meetings of clubs or organizations: Not on file     Relationship status: Not on file   Other Topics Concern    Not on file   Social History Narrative    Not on file       OBJECTIVE:    BP (!) 143/62   Pulse (!) 52   Temp 97 °F (36.1 °C)   Resp 18   Ht 6' 1" (1.854 m)   Wt 91 kg (200 lb 9.9 oz)   SpO2 100%   BMI 26.47 kg/m²     PHYSICAL EXAMINATION:    General appearance: Well appearing, in no acute distress, alert and oriented x3.  Psych:  Mood and affect appropriate.  Skin: Skin color, texture, turgor normal, no rashes or lesions, in both upper and lower body. SCS site without drainage or signs of infection.  Staples removed without difficulty with tips intact.  Area cleaned with alcohol and Bacitracin applied.  Head/face:  Atraumatic, normocephalic. No palpable lymph nodes.  Cor: RRR  Pulm: non-labored respirations  Gait: Normal.            ASSESSMENT: 85 y.o. year old male with pain, " consistent with      1. Chronic pain syndrome     2. Arachnoiditis     3. Failure of spinal cord stimulator, subsequent encounter     4. Sacroiliitis           PLAN:     - Previous imaging was reviewed and discussed with the patient today.    - He is s/p Nevro SCS battery change. He is meeting with [employer] today for programming.     - Staples removed today. He may shower tonight, no tub baths. Apply Bacitracin once daily for one week.     - Notify clinic for any fever, chills, or drainage.     - We discussed finishing his antibiotic. He verbalized understanding.     - Continue current medications.     - Continue activity restrictions.     - RTC in 2 weeks for wound check.     - Dr. Jimenez was consulted on the patient and agrees with this plan.    The above plan and management options were discussed at length with patient. Patient is in agreement with the above and verbalized understanding.    Marian Tolliver NP  08/03/2020

## 2020-08-03 NOTE — TELEPHONE ENCOUNTER
----- Message from Shania Elena sent at 8/3/2020 12:02 PM CDT -----  Contact: AMY NERI [[id number]]  Type: Patient Call Back    Who called:AMY NERI [[id number]]    What is the request in detail: Patient is requesting a call back. He states that he would like to know if he can use the recumbent bicycle today.   Please advise.    Can the clinic reply by [facility]? No    Best call back number: [phone]    Additional Information: N/A

## 2020-08-05 ENCOUNTER — PATIENT MESSAGE (OUTPATIENT)
Dept: PAIN MEDICINE | Facility: CLINIC | Age: 85
End: 2020-08-05

## 2020-08-10 ENCOUNTER — PATIENT MESSAGE (OUTPATIENT)
Dept: PAIN MEDICINE | Facility: CLINIC | Age: 85
End: 2020-08-10

## 2020-08-13 ENCOUNTER — TELEPHONE (OUTPATIENT)
Dept: PAIN MEDICINE | Facility: CLINIC | Age: 85
End: 2020-08-13

## 2020-08-13 NOTE — TELEPHONE ENCOUNTER
Staff called to confirm 8/17/20 11:40 am post op visit with Marian Tolliver Np. Patient informed of instructions.     Patient did not answer therefore staff left a detailed vocie message informign the patient of the above. My chart message sent.

## 2020-08-17 ENCOUNTER — OFFICE VISIT (OUTPATIENT)
Dept: PAIN MEDICINE | Facility: CLINIC | Age: 85
End: 2020-08-17
Payer: MEDICARE

## 2020-08-17 VITALS
SYSTOLIC BLOOD PRESSURE: 145 MMHG | BODY MASS INDEX: 26.8 KG/M2 | HEIGHT: 73 IN | DIASTOLIC BLOOD PRESSURE: 72 MMHG | HEART RATE: 52 BPM | TEMPERATURE: 98 F | WEIGHT: 202.19 LBS

## 2020-08-17 DIAGNOSIS — G03.9 ARACHNOIDITIS: ICD-10-CM

## 2020-08-17 DIAGNOSIS — G89.4 CHRONIC PAIN SYNDROME: Primary | ICD-10-CM

## 2020-08-17 DIAGNOSIS — T85.192D FAILURE OF SPINAL CORD STIMULATOR, SUBSEQUENT ENCOUNTER: ICD-10-CM

## 2020-08-17 PROCEDURE — 99024 PR POST-OP FOLLOW-UP VISIT: ICD-10-PCS | Mod: POP,,, | Performed by: NURSE PRACTITIONER

## 2020-08-17 PROCEDURE — 99999 PR PBB SHADOW E&M-EST. PATIENT-LVL IV: ICD-10-PCS | Mod: PBBFAC,,, | Performed by: NURSE PRACTITIONER

## 2020-08-17 PROCEDURE — 99999 PR PBB SHADOW E&M-EST. PATIENT-LVL IV: CPT | Mod: PBBFAC,,, | Performed by: NURSE PRACTITIONER

## 2020-08-17 PROCEDURE — 99214 OFFICE O/P EST MOD 30 MIN: CPT | Mod: PBBFAC | Performed by: NURSE PRACTITIONER

## 2020-08-17 PROCEDURE — 99024 POSTOP FOLLOW-UP VISIT: CPT | Mod: POP,,, | Performed by: NURSE PRACTITIONER

## 2020-08-17 NOTE — PROGRESS NOTES
Chronic Pain-Tele-Medicine-Established Note (Follow up visit)              SUBJECTIVE:    Interval History 8/17/2020:  The patient returns to clinic today for post op wound check. He continues to report benefit with Nevro device. He denies any fever, chills, or drainage. He continues to follow his activity restrictions. He does report a recent achilles tendon injury while swimming. He is seeing Orthopedics. He denies any other health changes. His pain today is 4/10.    Interval History 8/3/2020:  The patient returns to clinic today for post op. He is s/p Nevro battery change on 7/27/2020. He denies any fever, chills, or drainage from incision. He has not completed his antibiotics as he missed two days of the medication. He continues to follow his activity restrictions. He reports relief with the device. He is meeting with [employer] today for programming. He denies any other health changes. His pain today is 2/10    Interval History 7/22/2020:  Pt presents for audio follow up only s/p Right SI joint injection on 7/8/2020. Pt states he has near resolution of focal pain to buttock. He is pleased with result and pain relief. Pt has been in contact with Nevro and will be having battery swap in 5 days. He has difficulty whether stimulator is beneficial and has even had a holiday from using SCS.  He denies any newer areas pain, denies any neuro changes, meds area adequate to control pain without adverse side effects. Pain is 2/10 today.    Interval HPI 6/15/2020:  Jefferson Boogie presents to the clinic for a follow-up appointment for 3 week follow up right hip and right thigh pain. Since the last visit, Jefferson Boogie states the pain has been persistant. Current pain intensity is 5/10. Patient has been working with [employer] [facility], to try and get better coverage of a localized pain that he still experiences in his right low back area. He does report improvement in symptoms of numbness/tingling. Today, worse pain is 1/10 in  severity and localizes to the right SI joint. Pain is worse with extension of his back. It is worse with golfing. Pain relieved by Norco--he takes 1-2 tablets of 5-325 Norco on days that he plays golf. Pain is also improved with being still and not being active. Patient endorses a much more active lifestyle with Norco. Denies new weakness/numbness or bowel/bladder changes.    Previous injection history includes a series of 3 lumbar CHOCO at [facility].     Interval HPI 3/5/20:  Patient presents to the clinic for a follow-up appointment for low back pain. Since the last visit, he states his pain has been unchanged. Current pain intensity is 4/10. He continues to work with [employer] to adjust settings on his SCS. He has stopped using tramadol, and uses norco sparingly. He continues to work with a  for physical therapy.      Interval HPI 1/9/2020:  Patient returns for follow up s/p Nevro SCS. He states he is unsure if it has helped his pain since he had it implanted. He last had an adjustment of his programming about 1 month ago. He does note that once he is done charging his SCS his pain is improved. Pain still worse with prolonged standing and activity such as golf. He still is doing home exercise program. He says that he is trying to do more since getting the SCS. He is still taking the Tramadol with limited pain relief. He takes Tramadol 50 mg twice daily only on days of activity which is once a week. No other health changes.      HPI 11/20/19  Jefferson Boogie returns to clinic today for follow up. He reports increased low back and leg pain over the last few days. He has been in contact with [employer] representatives for programming adjustments. He does report benefit with the device. He reports good days and bad days. His pain is worse with prolonged standing and activity. He continues to participate in a home exercise routine. He does take Tramadol sparingly but reports limited relief. He denies any  other health changes. His pain today is 5/10.    Pain Disability Index Review:  Last 3 PDI Scores 8/17/2020 8/3/2020 6/15/2020   Pain Disability Index (PDI) 28 18 24       Pain Medications:  Norco    Current medication:  Celebrex   Xanax  See med list       report:  Reviewed and consistent with medication use as prescribed.    Pain Procedures:  7/27/2020- Nevro SCS battery change  7/8/2020- Right SIJ injection >80% relief   8/26/19 SCS implant  8/5/19 SCS trial    Physical Therapy/Home Exercise: no    Imaging:    Xray Thoracolumbar 1/10/2020:  FINDINGS:  There is a spinal stimulator device, incompletely visualized with TIPS unchanged compared to prior examination at approximately T8 level.  Vertebral bodies are normally aligned and normal in height, with mild degenerative change.     Impression:     As above    Allergies:   Review of patient's allergies indicates:   Allergen Reactions    Adhesive Itching     Adhesive used during surgery-trial stimulator       Current Medications:   Current Outpatient Medications   Medication Sig Dispense Refill    ALPRAZolam (XANAX) 1 MG tablet Take 1 mg by mouth.      celecoxib (CELEBREX) 200 MG capsule Take 200 mg by mouth 2 (two) times daily.      DETROL LA 4 mg 24 hr capsule       ergocalciferol, vitamin D2, 400 unit Tab Take 50,000 mg by mouth.      finasteride (PROSCAR) 5 mg tablet Take 5 mg by mouth.      fluticasone propionate (FLONASE) 50 mcg/actuation nasal spray SPRAY 1 SPRAY IEN D      irbesartan (AVAPRO) 75 MG tablet       levothyroxine (SYNTHROID) 100 MCG tablet Take 100 mcg by mouth.      simvastatin (ZOCOR) 20 MG tablet Take 20 mg by mouth.      tadalafil (CIALIS) 20 MG Tab       temazepam (RESTORIL) 30 mg capsule TK 1 C PO QD HS       No current facility-administered medications for this visit.        REVIEW OF SYSTEMS:    GENERAL:  No weight loss, malaise or fevers.  HEENT:  Negative for frequent or significant headaches.  NECK:  Negative for lumps,  goiter, pain and significant neck swelling.  RESPIRATORY:  Negative for cough, wheezing or shortness of breath.  CARDIOVASCULAR:  Negative for chest pain, leg swelling or palpitations.  GI:  Negative for abdominal discomfort, blood in stools or black stools or change in bowel habits.  MUSCULOSKELETAL:  See HPI.  SKIN:  Negative for lesions, rash, and itching.  PSYCH:  Positive for sleep disturbance. Negative for mood disorder and recent psychosocial stressors.  HEMATOLOGY/LYMPHOLOGY:  Negative for prolonged bleeding, bruising easily or swollen nodes. Hx of cancer.   NEURO:   No history of headaches, syncope, paralysis, seizures or tremors.\  ENDO: Thyroid disorder.   All other reviewed and negative other than HPI.    Past Medical History:  Past Medical History:   Diagnosis Date    Cancer     stage I bladder cancer 50 yrs ago    Sacroiliitis 7/8/2020    Thyroid disease        Past Surgical History:  Past Surgical History:   Procedure Laterality Date    BLADDER SURGERY      HERNIA REPAIR      INJECTION OF JOINT Right 7/8/2020    Procedure: INJECTION, JOINT, SACROILIAC (SI);  Surgeon: Samuel Jimenez MD;  Location: [facility] PAIN MGT;  Service: Pain Management;  Laterality: Right;    KNEE SURGERY      REPLACEMENT OF NERVE STIMULATOR BATTERY N/A 7/27/2020    Procedure: Replacement, SPINAL CORD STIMULATOR BATTERY CHANGE TO NEVRO OMNION BATTERY;  Surgeon: Samuel Jimenez MD;  Location: [facility] OR;  Service: Pain Management;  Laterality: N/A;  C-ARM, NEVRO REP    TRIAL OF SPINAL CORD NERVE STIMULATOR N/A 8/5/2019    Procedure: Trial, Neurostimulator, SPINAL CORD STIMULATOR TRIAL;  Surgeon: Samuel Jimenez MD;  Location: [facility] CATH LAB;  Service: Pain Management;  Laterality: N/A;  C-ARM, NEVRO REP       Family History:  History reviewed. No pertinent family history.    Social History:  Social History     Socioeconomic History    Marital status:      Spouse name: Not on file    Number of children: Not on  "file    Years of education: Not on file    Highest education level: Not on file   Occupational History    Not on file   Social Needs    Financial resource strain: Not on file    Food insecurity     Worry: Not on file     Inability: Not on file    Transportation needs     Medical: Not on file     Non-medical: Not on file   Tobacco Use    Smoking status: Former Smoker    Smokeless tobacco: Never Used    Tobacco comment: stopped 40 years ago   Substance and Sexual Activity    Alcohol use: Yes     Alcohol/week: 2.0 standard drinks     Types: 2 Shots of liquor per week     Comment: nightly -scotch    Drug use: Never    Sexual activity: Yes     Partners: Female   Lifestyle    Physical activity     Days per week: Not on file     Minutes per session: Not on file    Stress: Not on file   Relationships    Social connections     Talks on phone: Not on file     Gets together: Not on file     Attends Druze service: Not on file     Active member of club or organization: Not on file     Attends meetings of clubs or organizations: Not on file     Relationship status: Not on file   Other Topics Concern    Not on file   Social History Narrative    Not on file       OBJECTIVE:    BP (!) 145/72   Pulse (!) 52   Temp 97.7 °F (36.5 °C)   Ht 6' 1" (1.854 m)   Wt 91.7 kg (202 lb 2.6 oz)   BMI 26.67 kg/m²     PHYSICAL EXAMINATION:    General appearance: Well appearing, in no acute distress, alert and oriented x3.  Psych:  Mood and affect appropriate.  Skin: Skin color, texture, turgor normal, no rashes or lesions, in both upper and lower body. SCS site without drainage or signs of infection. Edges well approximated.   Head/face:  Atraumatic, normocephalic. No palpable lymph nodes.  Cor: RRR  Pulm: non-labored respirations  Gait: Normal.                ASSESSMENT: 85 y.o. year old male with pain, consistent with      1. Chronic pain syndrome     2. Arachnoiditis     3. Failure of spinal cord stimulator, subsequent " encounter           PLAN:     - Previous imaging was reviewed and discussed with the patient today.    - He is s/p Nevro SCS battery change. He is in contact with representatives.     - Incision healing well. Notify clinic for any fever, chills, or drainage from incisions.     - He may resume his normal activities.     - Continue current medications.     - RTC PRN.     - Dr. Jimenez was consulted on the patient and agrees with this plan.    The above plan and management options were discussed at length with patient. Patient is in agreement with the above and verbalized understanding.    Marian Tolliver NP  08/17/2020

## 2020-08-27 ENCOUNTER — TELEPHONE (OUTPATIENT)
Dept: PAIN MEDICINE | Facility: CLINIC | Age: 85
End: 2020-08-27

## 2020-08-27 ENCOUNTER — OFFICE VISIT (OUTPATIENT)
Dept: PAIN MEDICINE | Facility: CLINIC | Age: 85
End: 2020-08-27
Attending: ANESTHESIOLOGY
Payer: MEDICARE

## 2020-08-27 DIAGNOSIS — M46.1 SACROILIITIS: ICD-10-CM

## 2020-08-27 DIAGNOSIS — G03.9 ARACHNOIDITIS: ICD-10-CM

## 2020-08-27 DIAGNOSIS — T85.192D FAILURE OF SPINAL CORD STIMULATOR, SUBSEQUENT ENCOUNTER: Primary | ICD-10-CM

## 2020-08-27 DIAGNOSIS — G89.4 CHRONIC PAIN SYNDROME: ICD-10-CM

## 2020-08-27 PROBLEM — T85.192A FAILED SPINAL CORD STIMULATOR: Status: ACTIVE | Noted: 2020-08-27

## 2020-08-27 PROCEDURE — 99442 PR PHYSICIAN TELEPHONE EVALUATION 11-20 MIN: CPT | Mod: 95,,, | Performed by: ANESTHESIOLOGY

## 2020-08-27 PROCEDURE — 99442 PR PHYSICIAN TELEPHONE EVALUATION 11-20 MIN: ICD-10-PCS | Mod: 95,,, | Performed by: ANESTHESIOLOGY

## 2020-08-27 NOTE — PROGRESS NOTES
Established Patient - Audio Only Telehealth Visit     The patient location is: home  The chief complaint leading to consultation is: right buttock and calf pain  Visit type: Virtual visit with audio only (telephone)  Total time spent with patient: 15 mins       The reason for the audio only service rather than synchronous audio and video virtual visit was related to technical difficulties or patient preference/necessity.     Each patient to whom I provide medical services by telemedicine is:  (1) informed of the relationship between the physician and patient and the respective role of any other health care provider with respect to management of the patient; and (2) notified that they may decline to receive medical services by telemedicine and may withdraw from such care at any time. Patient verbally consented to receive this service via voice-only telephone call.       HPI:  Interval History 08/27/2020:  Pt was contacted over virtual audio for a follow up appointment.  He is currently complaining of right hip and right calf with no associated numbness or weakness.  He continues to use his Nevro device.  He states it has been very frustrating. Inconsistent.  Took 20 mins to 1 hour to charge.  Couldn't get it to start this morning.  He states that there is no drainage at the battery site, no signs of infection or pain at the site.  Patient is not taking medications at this time.  He wants to speak about medication options because he would like to start playing golf again.    SUBJECTIVE:     Interval History 8/17/2020:  The patient returns to clinic today for post op wound check. He continues to report benefit with Nevro device. He denies any fever, chills, or drainage. He continues to follow his activity restrictions. He does report a recent achilles tendon injury while swimming. He is seeing Orthopedics. He denies any other health changes. His pain today is 4/10.     Interval History 8/3/2020:  The patient returns to  clinic today for post op. He is s/p Nevro battery change on 7/27/2020. He denies any fever, chills, or drainage from incision. He has not completed his antibiotics as he missed two days of the medication. He continues to follow his activity restrictions. He reports relief with the device. He is meeting with [employer] today for programming. He denies any other health changes. His pain today is 2/10     Interval History 7/22/2020:  Pt presents for audio follow up only s/p Right SI joint injection on 7/8/2020. Pt states he has near resolution of focal pain to buttock. He is pleased with result and pain relief. Pt has been in contact with [employer] and will be having battery swap in 5 days. He has difficulty whether stimulator is beneficial and has even had a holiday from using SCS.  He denies any newer areas pain, denies any neuro changes, meds area adequate to control pain without adverse side effects. Pain is 2/10 today.     Interval HPI 6/15/2020:  Jefferson S Keagan presents to the clinic for a follow-up appointment for 3 week follow up right hip and right thigh pain. Since the last visit, Jefferson Boogie states the pain has been persistant. Current pain intensity is 5/10. Patient has been working with [employer] [facility], to try and get better coverage of a localized pain that he still experiences in his right low back area. He does report improvement in symptoms of numbness/tingling. Today, worse pain is 1/10 in severity and localizes to the right SI joint. Pain is worse with extension of his back. It is worse with golfing. Pain relieved by Norco--he takes 1-2 tablets of 5-325 Norco on days that he plays golf. Pain is also improved with being still and not being active. Patient endorses a much more active lifestyle with Norco. Denies new weakness/numbness or bowel/bladder changes.     Previous injection history includes a series of 3 lumbar CHOCO at [facility].      Interval HPI 3/5/20:  Patient presents to the clinic for  a follow-up appointment for low back pain. Since the last visit, he states his pain has been unchanged. Current pain intensity is 4/10. He continues to work with [employer] to adjust settings on his SCS. He has stopped using tramadol, and uses norco sparingly. He continues to work with a  for physical therapy.      Interval HPI 1/9/2020:  Patient returns for follow up s/p Nevro SCS. He states he is unsure if it has helped his pain since he had it implanted. He last had an adjustment of his programming about 1 month ago. He does note that once he is done charging his SCS his pain is improved. Pain still worse with prolonged standing and activity such as golf. He still is doing home exercise program. He says that he is trying to do more since getting the SCS. He is still taking the Tramadol with limited pain relief. He takes Tramadol 50 mg twice daily only on days of activity which is once a week. No other health changes.      HPI 11/20/19  Jefferson Boogie returns to clinic today for follow up. He reports increased low back and leg pain over the last few days. He has been in contact with [employer] representatives for programming adjustments. He does report benefit with the device. He reports good days and bad days. His pain is worse with prolonged standing and activity. He continues to participate in a home exercise routine. He does take Tramadol sparingly but reports limited relief. He denies any other health changes. His pain today is 5/10.     Pain Disability Index Review:  Last 3 PDI Scores 8/17/2020 8/3/2020 6/15/2020   Pain Disability Index (PDI) 28 18 24        Pain Medications:  Norco     Current medication:  Celebrex   Xanax  See med list         report:  Reviewed and consistent with medication use as prescribed.     Pain Procedures:  7/27/2020- Nevro SCS battery change  7/8/2020- Right SIJ injection >80% relief   8/26/19 SCS implant  8/5/19 SCS trial     Physical Therapy/Home Exercise:  no     Imaging:    Xray Thoracolumbar 1/10/2020:  FINDINGS:  There is a spinal stimulator device, incompletely visualized with TIPS unchanged compared to prior examination at approximately T8 level.  Vertebral bodies are normally aligned and normal in height, with mild degenerative change.     Impression:     As above    Assessment and plan:      84 yo with [employer] SCS, recent battery change, c/o right buttock, hip, and calf pain.    Encounter Diagnoses   Name Primary?    Failure of spinal cord stimulator, subsequent encounter Yes    Arachnoiditis     Chronic pain syndrome     Sacroiliitis        Plan:   - emphasized the importance of physical therapy and exercise  - pt should contact the [employer], regarding programming, managing, and technical issues related to his SCS  - will schedule for right SIJ injection in 3 weeks as he had >80% relief in the past  - Thoracolumbar xray order to evaluate battery placement/position  - percocet 5/325 prn sent to pharmacy, pt will bring leftover norco to next visit  - pt to follow up with [facility] 2 weeks after procedure    Anastasiya Estrada,     I have personally reviewed the phone encounter with resident/fellow/NP and personally spoke with patient for over 11 min after addressing all questions and concerns   The phone call was initiated by patient who consented and verbalized understanding to the type of encounter not related to any office visit or other encounter in the past 7 days    Samuel Jimenez MD         08/27/2020     This service was not originating from a related E/M service provided within the previous 7 days nor will  to an E/M service or procedure within the next 24 hours or my soonest available appointment.  Prevailing standard of care was able to be met in this audio-only visit.

## 2020-08-27 NOTE — TELEPHONE ENCOUNTER
Spoke with pt regarding virtual audio visit for today    Pt informed staff he hasn't received a call thus far    Staff informed pt that he is scheduled for 9:45 and he should receive a call by then, if not to give the provider a 15 minute window     Pt verbalized understanding

## 2020-08-27 NOTE — H&P (VIEW-ONLY)
Established Patient - Audio Only Telehealth Visit     The patient location is: home  The chief complaint leading to consultation is: right buttock and calf pain  Visit type: Virtual visit with audio only (telephone)  Total time spent with patient: 15 mins       The reason for the audio only service rather than synchronous audio and video virtual visit was related to technical difficulties or patient preference/necessity.     Each patient to whom I provide medical services by telemedicine is:  (1) informed of the relationship between the physician and patient and the respective role of any other health care provider with respect to management of the patient; and (2) notified that they may decline to receive medical services by telemedicine and may withdraw from such care at any time. Patient verbally consented to receive this service via voice-only telephone call.       HPI:  Interval History 08/27/2020:  Pt was contacted over virtual audio for a follow up appointment.  He is currently complaining of right hip and right calf with no associated numbness or weakness.  He continues to use his Nevro device.  He states it has been very frustrating. Inconsistent.  Took 20 mins to 1 hour to charge.  Couldn't get it to start this morning.  He states that there is no drainage at the battery site, no signs of infection or pain at the site.  Patient is not taking medications at this time.  He wants to speak about medication options because he would like to start playing golf again.    SUBJECTIVE:     Interval History 8/17/2020:  The patient returns to clinic today for post op wound check. He continues to report benefit with Nevro device. He denies any fever, chills, or drainage. He continues to follow his activity restrictions. He does report a recent achilles tendon injury while swimming. He is seeing Orthopedics. He denies any other health changes. His pain today is 4/10.     Interval History 8/3/2020:  The patient returns to  clinic today for post op. He is s/p Nevro battery change on 7/27/2020. He denies any fever, chills, or drainage from incision. He has not completed his antibiotics as he missed two days of the medication. He continues to follow his activity restrictions. He reports relief with the device. He is meeting with [facility] today for programming. He denies any other health changes. His pain today is 2/10     Interval History 7/22/2020:  Pt presents for audio follow up only s/p Right SI joint injection on 7/8/2020. Pt states he has near resolution of focal pain to buttock. He is pleased with result and pain relief. Pt has been in contact with Nevro and will be having battery swap in 5 days. He has difficulty whether stimulator is beneficial and has even had a holiday from using SCS.  He denies any newer areas pain, denies any neuro changes, meds area adequate to control pain without adverse side effects. Pain is 2/10 today.     Interval HPI 6/15/2020:  Jefferson S Keagan presents to the clinic for a follow-up appointment for 3 week follow up right hip and right thigh pain. Since the last visit, Jefferson Boogie states the pain has been persistant. Current pain intensity is 5/10. Patient has been working with Bien[facility]ido Varghese, to try and get better coverage of a localized pain that he still experiences in his right low back area. He does report improvement in symptoms of numbness/tingling. Today, worse pain is 1/10 in severity and localizes to the right SI joint. Pain is worse with extension of his back. It is worse with golfing. Pain relieved by Norco--he takes 1-2 tablets of 5-325 Norco on days that he plays golf. Pain is also improved with being still and not being active. Patient endorses a much more active lifestyle with Norco. Denies new weakness/numbness or bowel/bladder changes.     Previous injection history includes a series of 3 lumbar CHOCO at [facility].      Interval HPI 3/5/20:  Patient presents to the clinic for  a follow-up appointment for low back pain. Since the last visit, he states his pain has been unchanged. Current pain intensity is 4/10. He continues to work with [facility] [employer] to adjust settings on his SCS. He has stopped using tramadol, and uses norco sparingly. He continues to work with a  for physical therapy.      Interval HPI 1/9/2020:  Patient returns for follow up s/p Nevro SCS. He states he is unsure if it has helped his pain since he had it implanted. He last had an adjustment of his programming about 1 month ago. He does note that once he is done charging his SCS his pain is improved. Pain still worse with prolonged standing and activity such as golf. He still is doing home exercise program. He says that he is trying to do more since getting the SCS. He is still taking the Tramadol with limited pain relief. He takes Tramadol 50 mg twice daily only on days of activity which is once a week. No other health changes.      HPI 11/20/19  Jefferson Boogie returns to clinic today for follow up. He reports increased low back and leg pain over the last few days. He has been in contact with [employer] representatives for programming adjustments. He does report benefit with the device. He reports good days and bad days. His pain is worse with prolonged standing and activity. He continues to participate in a home exercise routine. He does take Tramadol sparingly but reports limited relief. He denies any other health changes. His pain today is 5/10.     Pain Disability Index Review:  Last 3 PDI Scores 8/17/2020 8/3/2020 6/15/2020   Pain Disability Index (PDI) 28 18 24        Pain Medications:  Norco     Current medication:  Celebrex   Xanax  See med list         report:  Reviewed and consistent with medication use as prescribed.     Pain Procedures:  7/27/2020- Nevro SCS battery change  7/8/2020- Right SIJ injection >80% relief   8/26/19 SCS implant  8/5/19 SCS trial     Physical Therapy/Home Exercise:  no     Imaging:    Xray Thoracolumbar 1/10/2020:  FINDINGS:  There is a spinal stimulator device, incompletely visualized with TIPS unchanged compared to prior examination at approximately T8 level.  Vertebral bodies are normally aligned and normal in height, with mild degenerative change.     Impression:     As above    Assessment and plan:      86 yo with Leonila SCS, recent battery change, c/o right buttock, hip, and calf pain.    Encounter Diagnoses   Name Primary?    Failure of spinal cord stimulator, subsequent encounter Yes    Arachnoiditis     Chronic pain syndrome     Sacroiliitis        Plan:   - emphasized the importance of physical therapy and exercise  - pt should contact the [employer] Rep[facility]ien[facility], regarding programming, managing, and technical issues related to his SCS  - will schedule for right SIJ injection in 3 weeks as he had >80% relief in the past  - Thoracolumbar xray order to evaluate battery placement/position  - percocet 5/325 prn sent to pharmacy, pt will bring leftover norco to next visit  - pt to follow up with RAMAN 2 weeks after procedure    Anastasiya Estrada,     I have personally reviewed the phone encounter with resident/fellow/NP and personally spoke with patient for over 11 min after addressing all questions and concerns   The phone call was initiated by patient who consented and verbalized understanding to the type of encounter not related to any office visit or other encounter in the past 7 days    Samuel Jimenez MD         08/27/2020     This service was not originating from a related E/M service provided within the previous 7 days nor will  to an E/M service or procedure within the next 24 hours or my soonest available appointment.  Prevailing standard of care was able to be met in this audio-only visit.

## 2020-08-27 NOTE — TELEPHONE ENCOUNTER
----- Message from Samuel Jimenez MD sent at 8/27/2020 10:18 AM CDT -----  Pls schedule for SIJ injection in 3 weeks and an in person office visit with Marian next Wednesday to change opioid medication   Also please call Bienvenido from [employer] and make him available for Wednesday visit   Patient will get xray Wednesday before his appointment orders are in already    MG

## 2020-08-27 NOTE — TELEPHONE ENCOUNTER
Schedulers notified to schedule pt procedure    Xray- 9/2/20 @ 12:30    appt with Marian 9/2/20 @ 1:00    Bienvenido notified of patient appt via phone

## 2020-08-27 NOTE — TELEPHONE ENCOUNTER
----- Message from [facility] sent at 8/27/2020  9:02 AM CDT -----  Name of Who is Calling: AMY NERI      What is the request in detail: Patient states he received a call from the office stating he had a telephone consult with Dr. Jimenez, but he needed to reschedule but the message was unclear. Please advise.       Can the clinic reply by [facility]: No      What Number to Call Back if not in [facility]: [phone]

## 2020-09-02 ENCOUNTER — HOSPITAL ENCOUNTER (OUTPATIENT)
Dept: RADIOLOGY | Facility: OTHER | Age: 85
Discharge: HOME | End: 2020-09-02
Attending: STUDENT IN AN ORGANIZED HEALTH CARE EDUCATION/TRAINING PROGRAM
Payer: MEDICARE

## 2020-09-02 ENCOUNTER — TELEPHONE (OUTPATIENT)
Dept: PAIN MEDICINE | Facility: CLINIC | Age: 85
End: 2020-09-02

## 2020-09-02 ENCOUNTER — OFFICE VISIT (OUTPATIENT)
Dept: PAIN MEDICINE | Facility: CLINIC | Age: 85
End: 2020-09-02
Payer: MEDICARE

## 2020-09-02 VITALS
WEIGHT: 202.81 LBS | DIASTOLIC BLOOD PRESSURE: 59 MMHG | OXYGEN SATURATION: 100 % | RESPIRATION RATE: 18 BRPM | SYSTOLIC BLOOD PRESSURE: 147 MMHG | TEMPERATURE: 98 F | HEART RATE: 53 BPM | HEIGHT: 73 IN | BODY MASS INDEX: 26.88 KG/M2

## 2020-09-02 DIAGNOSIS — M53.3 SACROILIAC JOINT PAIN: ICD-10-CM

## 2020-09-02 DIAGNOSIS — G89.4 CHRONIC PAIN SYNDROME: Primary | ICD-10-CM

## 2020-09-02 DIAGNOSIS — T85.192D FAILURE OF SPINAL CORD STIMULATOR, SUBSEQUENT ENCOUNTER: ICD-10-CM

## 2020-09-02 DIAGNOSIS — G03.9 ARACHNOIDITIS: ICD-10-CM

## 2020-09-02 PROCEDURE — 99213 PR OFFICE/OUTPT VISIT, EST, LEVL III, 20-29 MIN: ICD-10-PCS | Mod: S$PBB,,, | Performed by: NURSE PRACTITIONER

## 2020-09-02 PROCEDURE — 72080 X-RAY EXAM THORACOLMB 2/> VW: CPT | Mod: TC

## 2020-09-02 PROCEDURE — 72080 X-RAY EXAM THORACOLMB 2/> VW: CPT | Mod: 26,,, | Performed by: RADIOLOGY

## 2020-09-02 PROCEDURE — 99213 OFFICE O/P EST LOW 20 MIN: CPT | Mod: S$PBB,,, | Performed by: NURSE PRACTITIONER

## 2020-09-02 PROCEDURE — 99999 PR PBB SHADOW E&M-EST. PATIENT-LVL III: CPT | Mod: PBBFAC,,, | Performed by: NURSE PRACTITIONER

## 2020-09-02 PROCEDURE — 99213 OFFICE O/P EST LOW 20 MIN: CPT | Mod: PBBFAC,25 | Performed by: NURSE PRACTITIONER

## 2020-09-02 PROCEDURE — 72080 XR THORACOLUMBAR SPINE AP LATERAL: ICD-10-PCS | Mod: 26,,, | Performed by: RADIOLOGY

## 2020-09-02 PROCEDURE — 99999 PR PBB SHADOW E&M-EST. PATIENT-LVL III: ICD-10-PCS | Mod: PBBFAC,,, | Performed by: NURSE PRACTITIONER

## 2020-09-02 NOTE — PROGRESS NOTES
Chronic Pain- Established Visit        Chief Complaint   Patient presents with    Back Pain       SUBJECTIVE:    Interval History 9/2/2020:  The patient returns to clinic today for follow up of back pain. He reports that his back pain has improved since last audio visit. He continues to report back pain with intermittent radiating pain into his right hip and calf. He has spoken with Bienvenido from [employer] via phone with some programming. He is meeting with [facility] today. He had some issues with charging but this has improved. He is currently taking Norco intermittently but this is only lasts 2-3 hours. He denies any adverse effects. He denies any other health changes. His pain today is 8/10.    Interval History 08/27/2020:  Pt was contacted over virtual audio for a follow up appointment.  He is currently complaining of right hip and right calf with no associated numbness or weakness.  He continues to use his Nevro device.  He states it has been very frustrating. Inconsistent.  Took 20 mins to 1 hour to charge.  Couldn't get it to start this morning.  He states that there is no drainage at the battery site, no signs of infection or pain at the site.  Patient is not taking medications at this time.  He wants to speak about medication options because he would like to start playing golf again.    Interval History 8/17/2020:  The patient returns to clinic today for post op wound check. He continues to report benefit with Nevro device. He denies any fever, chills, or drainage. He continues to follow his activity restrictions. He does report a recent achilles tendon injury while swimming. He is seeing Orthopedics. He denies any other health changes. His pain today is 4/10.    Interval History 8/3/2020:  The patient returns to clinic today for post op. He is s/p Nevro battery change on 7/27/2020. He denies any fever, chills, or drainage from incision. He has not completed his antibiotics as he missed two days of the medication. He  continues to follow his activity restrictions. He reports relief with the device. He is meeting with [facility] today for programming. He denies any other health changes. His pain today is 2/10    Interval History 7/22/2020:  Pt presents for audio follow up only s/p Right SI joint injection on 7/8/2020. Pt states he has near resolution of focal pain to buttock. He is pleased with result and pain relief. Pt has been in contact with [employer] and will be having battery swap in 5 days. He has difficulty whether stimulator is beneficial and has even had a holiday from using SCS.  He denies any newer areas pain, denies any neuro changes, meds area adequate to control pain without adverse side effects. Pain is 2/10 today.    Interval HPI 6/15/2020:  Jefferson Boogie presents to the clinic for a follow-up appointment for 3 week follow up right hip and right thigh pain. Since the last visit, Jefferson Boogie states the pain has been persistant. Current pain intensity is 5/10. Patient has been working with Bienvenido Varghese, to try and get better coverage of a localized pain that he still experiences in his right low back area. He does report improvement in symptoms of numbness/tingling. Today, worse pain is 1/10 in severity and localizes to the right SI joint. Pain is worse with extension of his back. It is worse with golfing. Pain relieved by Norco--he takes 1-2 tablets of 5-325 Norco on days that he plays golf. Pain is also improved with being still and not being active. Patient endorses a much more active lifestyle with Norco. Denies new weakness/numbness or bowel/bladder changes.    Previous injection history includes a series of 3 lumbar CHOCO at [facility].     Interval HPI 3/5/20:  Patient presents to the clinic for a follow-up appointment for low back pain. Since the last visit, he states his pain has been unchanged. Current pain intensity is 4/10. He continues to work with [employer] to adjust settings on his SCS. He has  stopped using tramadol, and uses norco sparingly. He continues to work with a  for physical therapy.      Interval HPI 1/9/2020:  Patient returns for follow up s/p Nevro SCS. He states he is unsure if it has helped his pain since he had it implanted. He last had an adjustment of his programming about 1 month ago. He does note that once he is done charging his SCS his pain is improved. Pain still worse with prolonged standing and activity such as golf. He still is doing home exercise program. He says that he is trying to do more since getting the SCS. He is still taking the Tramadol with limited pain relief. He takes Tramadol 50 mg twice daily only on days of activity which is once a week. No other health changes.      HPI 11/20/19  Jefferson Boogie returns to clinic today for follow up. He reports increased low back and leg pain over the last few days. He has been in contact with [employer] representatives for programming adjustments. He does report benefit with the device. He reports good days and bad days. His pain is worse with prolonged standing and activity. He continues to participate in a home exercise routine. He does take Tramadol sparingly but reports limited relief. He denies any other health changes. His pain today is 5/10.    Pain Disability Index Review:  Last 3 PDI Scores 9/2/2020 8/17/2020 8/3/2020   Pain Disability Index (PDI) 27 28 18       Pain Medications:  Norco    Current medication:  Celebrex   Xanax  See med list       report:  Reviewed and consistent with medication use as prescribed.    Pain Procedures:  7/27/2020- Nevro SCS battery change  7/8/2020- Right SIJ injection >80% relief   8/26/19 SCS implant  8/5/19 SCS trial    Physical Therapy/Home Exercise: no    Imaging:    Xray Thoracolumbar 9/2/2020:  FINDINGS:  There is generalized bone demineralization.  Visualized thoracic and lumbar vertebral body heights are preserved with a gentle leftward undulation the lower lumbar  spine.  There is multilevel upper lumbar spondylitic spurring, disc narrowing and degenerative endplate sclerosis most severe at L2-3, similar to the prior study.     A spinal stimulator device enters the posterior spinal canal at the level of L1-2 and the leads terminate at the level of T8 vertebral body and T9 vertebral bodies respectively.     Impression:     Osteopenia, spondylosis, and scoliosis.  Spinal stimulator in place    Xray Thoracolumbar 1/10/2020:  FINDINGS:  There is a spinal stimulator device, incompletely visualized with TIPS unchanged compared to prior examination at approximately T8 level.  Vertebral bodies are normally aligned and normal in height, with mild degenerative change.     Impression:     As above    Allergies:   Review of patient's allergies indicates:   Allergen Reactions    Adhesive Itching     Adhesive used during surgery-trial stimulator       Current Medications:   Current Outpatient Medications   Medication Sig Dispense Refill    ALPRAZolam (XANAX) 1 MG tablet Take 1 mg by mouth.      celecoxib (CELEBREX) 200 MG capsule Take 200 mg by mouth 2 (two) times daily.      DETROL LA 4 mg 24 hr capsule       ergocalciferol, vitamin D2, 400 unit Tab Take 50,000 mg by mouth.      finasteride (PROSCAR) 5 mg tablet Take 5 mg by mouth.      fluticasone propionate (FLONASE) 50 mcg/actuation nasal spray SPRAY 1 SPRAY IEN D      irbesartan (AVAPRO) 75 MG tablet       levothyroxine (SYNTHROID) 100 MCG tablet Take 100 mcg by mouth.      simvastatin (ZOCOR) 20 MG tablet Take 20 mg by mouth.      tadalafil (CIALIS) 20 MG Tab       temazepam (RESTORIL) 30 mg capsule TK 1 C PO QD HS       No current facility-administered medications for this visit.        REVIEW OF SYSTEMS:    GENERAL:  No weight loss, malaise or fevers.  HEENT:  Negative for frequent or significant headaches.  NECK:  Negative for lumps, goiter, pain and significant neck swelling.  RESPIRATORY:  Negative for cough,  wheezing or shortness of breath.  CARDIOVASCULAR:  Negative for chest pain, leg swelling or palpitations.  GI:  Negative for abdominal discomfort, blood in stools or black stools or change in bowel habits.  MUSCULOSKELETAL:  See HPI.  SKIN:  Negative for lesions, rash, and itching.  PSYCH:  Positive for sleep disturbance. Negative for mood disorder and recent psychosocial stressors.  HEMATOLOGY/LYMPHOLOGY:  Negative for prolonged bleeding, bruising easily or swollen nodes. Hx of cancer.   NEURO:   No history of headaches, syncope, paralysis, seizures or tremors.\  ENDO: Thyroid disorder.   All other reviewed and negative other than HPI.    Past Medical History:  Past Medical History:   Diagnosis Date    Cancer     stage I bladder cancer 50 yrs ago    Sacroiliitis 7/8/2020    Thyroid disease        Past Surgical History:  Past Surgical History:   Procedure Laterality Date    BLADDER SURGERY      HERNIA REPAIR      INJECTION OF JOINT Right 7/8/2020    Procedure: INJECTION, JOINT, SACROILIAC (SI);  Surgeon: Samuel Jimenez MD;  Location: [facility] PAIN MGT;  Service: Pain Management;  Laterality: Right;    KNEE SURGERY      REPLACEMENT OF NERVE STIMULATOR BATTERY N/A 7/27/2020    Procedure: Replacement, SPINAL CORD STIMULATOR BATTERY CHANGE TO NEVRO OMNION BATTERY;  Surgeon: Samuel Jimenez MD;  Location: [facility] OR;  Service: Pain Management;  Laterality: N/A;  C-ARM, NEVRO REP    TRIAL OF SPINAL CORD NERVE STIMULATOR N/A 8/5/2019    Procedure: Trial, Neurostimulator, SPINAL CORD STIMULATOR TRIAL;  Surgeon: Samuel Jimenez MD;  Location: [facility] CATH LAB;  Service: Pain Management;  Laterality: N/A;  C-ARM, NEVRO REP       Family History:  History reviewed. No pertinent family history.    Social History:  Social History     Socioeconomic History    Marital status:      Spouse name: Not on file    Number of children: Not on file    Years of education: Not on file    Highest education level: Not on  "file   Occupational History    Not on file   Social Needs    Financial resource strain: Not on file    Food insecurity     Worry: Not on file     Inability: Not on file    Transportation needs     Medical: Not on file     Non-medical: Not on file   Tobacco Use    Smoking status: Former Smoker    Smokeless tobacco: Never Used    Tobacco comment: stopped 40 years ago   Substance and Sexual Activity    Alcohol use: Yes     Alcohol/week: 2.0 standard drinks     Types: 2 Shots of liquor per week     Comment: nightly -scotch    Drug use: Never    Sexual activity: Yes     Partners: Female   Lifestyle    Physical activity     Days per week: Not on file     Minutes per session: Not on file    Stress: Not on file   Relationships    Social connections     Talks on phone: Not on file     Gets together: Not on file     Attends [employer] service: Not on file     Active member of club or organization: Not on file     Attends meetings of clubs or organizations: Not on file     Relationship status: Not on file   Other Topics Concern    Not on file   Social History Narrative    Not on file       OBJECTIVE:    BP (!) 147/59   Pulse (!) 53   Temp 97.5 °F (36.4 °C)   Resp 18   Ht 6' 1" (1.854 m)   Wt 92 kg (202 lb 13.2 oz)   SpO2 100%   BMI 26.76 kg/m²     PHYSICAL EXAMINATION:    General appearance: Well appearing, in no acute distress, alert and oriented x3.  Psych:  Mood and affect appropriate.  Skin: Skin color, texture, turgor normal, no rashes or lesions, in both upper and lower body. SCS site without drainage or signs of infection. Edges well approximated.   Head/face:  Atraumatic, normocephalic. No palpable lymph nodes.  Cor: RRR  Pulm: non-labored respirations  Gait: Normal.        ASSESSMENT: 85 y.o. year old male with pain, consistent with      1. Chronic pain syndrome     2. Sacroiliac joint pain     3. Arachnoiditis           PLAN:     - Previous imaging was reviewed and discussed with the patient " today. Leads remain in correct position.     - He is s/p Nevro SCS battery change. He did meet with B[facility] today for programming.     - He is scheduled for SI joint injection next week. He will keep this appointment.     - Discontinue Norco. Patient brought bottle to clinic today, count done with Deepika Tejada RN and Radha Mora, clinic supervisor, #81 in bottle. RX disposed per pharmacy instructions which was witness by Deepika Tejada RN and Radha Mora, supervisor.     - Trial Percocet 5/325 mg every 8 hours PRN, #90.     - Continue home exercise routine.     - RTC 2 weeks after above procedure.     - Dr. Jimenez was consulted on the patient and agrees with this plan.    The above plan and management options were discussed at length with patient. Patient is in agreement with the above and verbalized understanding.    Marian Tolliver NP  09/02/2020

## 2020-09-02 NOTE — TELEPHONE ENCOUNTER
Patient required to bring Hydrocodone 5-325mg for count and destroy.  Pill count done and confirmed in front of patient and Supervisor Elena Mora. Total number of 81 pills of Hydrocodone 5-325mg were counted and presented to Marian Tolliver NP for pick-up by ambulatory.

## 2020-09-03 RX ORDER — OXYCODONE AND ACETAMINOPHEN 5; 325 MG/1; MG/1
1 TABLET ORAL EVERY 8 HOURS PRN
Qty: 90 TABLET | Refills: 0 | Status: SHIPPED | OUTPATIENT
Start: 2020-09-03 | End: 2020-12-09 | Stop reason: SDUPTHER

## 2020-09-09 ENCOUNTER — HOSPITAL ENCOUNTER (OUTPATIENT)
Facility: OTHER | Age: 85
Discharge: HOME | End: 2020-09-09
Attending: ANESTHESIOLOGY | Admitting: ANESTHESIOLOGY
Payer: MEDICARE

## 2020-09-09 VITALS
DIASTOLIC BLOOD PRESSURE: 76 MMHG | HEART RATE: 54 BPM | OXYGEN SATURATION: 95 % | BODY MASS INDEX: 26.11 KG/M2 | SYSTOLIC BLOOD PRESSURE: 188 MMHG | WEIGHT: 197 LBS | TEMPERATURE: 98 F | HEIGHT: 73 IN | RESPIRATION RATE: 18 BRPM

## 2020-09-09 DIAGNOSIS — M46.1 SACROILIITIS: Primary | ICD-10-CM

## 2020-09-09 DIAGNOSIS — G89.29 CHRONIC PAIN: ICD-10-CM

## 2020-09-09 PROCEDURE — 27096 PR INJECTION,SACROILIAC JOINT: ICD-10-PCS | Mod: RT,,, | Performed by: ANESTHESIOLOGY

## 2020-09-09 PROCEDURE — 63600175 PHARM REV CODE 636 W HCPCS: Performed by: ANESTHESIOLOGY

## 2020-09-09 PROCEDURE — 27096 INJECT SACROILIAC JOINT: CPT | Mod: RT,,, | Performed by: ANESTHESIOLOGY

## 2020-09-09 PROCEDURE — 27096 INJECT SACROILIAC JOINT: CPT | Mod: RT | Performed by: ANESTHESIOLOGY

## 2020-09-09 PROCEDURE — 25000003 PHARM REV CODE 250: Performed by: ANESTHESIOLOGY

## 2020-09-09 PROCEDURE — 25500020 PHARM REV CODE 255: Performed by: ANESTHESIOLOGY

## 2020-09-09 RX ORDER — TRIAMCINOLONE ACETONIDE 40 MG/ML
INJECTION, SUSPENSION INTRA-ARTICULAR; INTRAMUSCULAR
Status: DISCONTINUED | OUTPATIENT
Start: 2020-09-09 | End: 2020-09-09 | Stop reason: HOSPADM

## 2020-09-09 RX ORDER — BUPIVACAINE HYDROCHLORIDE 2.5 MG/ML
INJECTION, SOLUTION EPIDURAL; INFILTRATION; INTRACAUDAL
Status: DISCONTINUED | OUTPATIENT
Start: 2020-09-09 | End: 2020-09-09 | Stop reason: HOSPADM

## 2020-09-09 RX ORDER — LIDOCAINE HYDROCHLORIDE 10 MG/ML
INJECTION INFILTRATION; PERINEURAL
Status: DISCONTINUED | OUTPATIENT
Start: 2020-09-09 | End: 2020-09-09 | Stop reason: HOSPADM

## 2020-09-09 RX ORDER — ALPRAZOLAM 0.5 MG/1
0.5 TABLET ORAL ONCE
Status: DISCONTINUED | OUTPATIENT
Start: 2020-09-09 | End: 2020-09-09 | Stop reason: HOSPADM

## 2020-09-09 NOTE — DISCHARGE INSTRUCTIONS

## 2020-09-09 NOTE — OP NOTE
Patient Name: Jefferson Boogie  MRN: [id number]    INFORMED CONSENT: The procedure, risks, benefits and options were discussed with patient. There are no contraindications to the procedure. The patient expressed understanding and agreed to proceed. The personnel performing the procedure was discussed. I verify that I personally obtained Jefferson's consent prior to the start of the procedure and the signed consent can be found on the patient's chart.    Procedure Date: 09/09/2020    Anesthesia: Topical    Pre Procedure diagnosis: Sacroiliitis [M46.1]  1. Sacroiliitis    2. Chronic pain      Post-Procedure diagnosis: SAME      Sedation: None    PROCEDURE: right SACROILIAC JOINT INJECTION  DESCRIPTION OF PROCEDURE: The patient was brought to the fluoroscopy suite. After performing time out  IV access was obtained prior to the procedure. The patient was positioned prone on the fluoroscopy table. Continuous hemodynamic monitoring was initiated including blood pressure, EKG, and pulse oximetry.  The lumbosacral area was prepped with chlorhexidine three times and draped into a sterile field. The skin overlying the rt side sacroiliac joint was anesthetized using 3cc of lidocaine 1%. The inferior pole of the sacroiliac joint was identified by cephalo-oblique fluoroscopy. A 22 gauge, 3 1/2 inch spinal needle was slowly advanced through the sacroiliac joint capsule under fluoroscopic guidance. The needle position was confirmed using oblique, AP and lateral fluoroscopic imaging.  Negative aspiration was confirmed. 0.5 cc of Omnipaque 300 was injected confirming intra-articular contrast spread. A combination of 2 cc of Bupivacaine 0.25% and 40 mg kenalog was easily injected. The needle was removed and bleeding was nil.  A sterile dressing was applied. PATIENT was taken to the Post-block Recovery Area for further observation.      Blood Loss: Nill  Specimen: None    Samuel Jimenez MD

## 2020-09-09 NOTE — DISCHARGE SUMMARY
Discharge Note  Short Stay      SUMMARY     Admit Date: 9/9/2020    Attending Physician: Samuel Jimenez      Discharge Physician: Samuel Jimenez      Discharge Date: 9/9/2020 10:27 AM    Procedure(s) (LRB):  INJECTION, JOINT, SACROILIAC (SI) (Right)    Final Diagnosis: Sacroiliitis [M46.1]    Disposition: Home or self care    Patient Instructions:   Discharge Medication List as of 9/9/2020  9:59 AM      CONTINUE these medications which have NOT CHANGED    Details   ALPRAZolam (XANAX) 1 MG tablet Take 1 mg by mouth., Historical Med      celecoxib (CELEBREX) 200 MG capsule Take 200 mg by mouth 2 (two) times daily., Historical Med      DETROL LA 4 mg 24 hr capsule Starting Sun 4/12/2020, Historical Med      ergocalciferol, vitamin D2, 400 unit Tab Take 50,000 mg by mouth., Historical Med      finasteride (PROSCAR) 5 mg tablet Take 5 mg by mouth., Historical Med      fluticasone propionate (FLONASE) 50 mcg/actuation nasal spray SPRAY 1 SPRAY IEN D, Historical Med      irbesartan (AVAPRO) 75 MG tablet Starting Sat 12/14/2019, Historical Med      levothyroxine (SYNTHROID) 100 MCG tablet Take 100 mcg by mouth., Historical Med      oxyCODONE-acetaminophen (PERCOCET) 5-325 mg per tablet Take 1 tablet by mouth every 8 (eight) hours as needed for Pain., Starting Thu 9/3/2020, Until Sat 10/3/2020, Normal      simvastatin (ZOCOR) 20 MG tablet Take 20 mg by mouth., Historical Med      tadalafil (CIALIS) 20 MG Tab Starting Sun 1/12/2020, Historical Med      temazepam (RESTORIL) 30 mg capsule TK 1 C PO QD HS, Historical Med                 Discharge Diagnosis: Sacroiliitis [M46.1]  Condition on Discharge: Stable with no complications to procedure   Diet on Discharge: Same as before.  Activity: as per instruction sheet.  Discharge to: Home with a responsible adult.  Follow up: 2-4 weeks       Please call my office or pager at [phone] if experienced any weakness or loss of sensation, fever > 101.5, pain uncontrolled with  oral medications, persistent nausea/vomiting/or diarrhea, redness or drainage from the incisions, or any other worrisome concerns. If physician on call was not reached or could not communicate with our office for any reason please go to the nearest emergency department

## 2020-09-25 ENCOUNTER — PATIENT MESSAGE (OUTPATIENT)
Dept: PAIN MEDICINE | Facility: CLINIC | Age: 85
End: 2020-09-25

## 2020-10-05 ENCOUNTER — OFFICE VISIT (OUTPATIENT)
Dept: PAIN MEDICINE | Facility: CLINIC | Age: 85
End: 2020-10-05
Payer: MEDICARE

## 2020-10-05 VITALS
HEIGHT: 73 IN | WEIGHT: 199.31 LBS | TEMPERATURE: 99 F | BODY MASS INDEX: 26.42 KG/M2 | DIASTOLIC BLOOD PRESSURE: 65 MMHG | HEART RATE: 58 BPM | OXYGEN SATURATION: 98 % | RESPIRATION RATE: 20 BRPM | SYSTOLIC BLOOD PRESSURE: 138 MMHG

## 2020-10-05 DIAGNOSIS — G89.4 CHRONIC PAIN SYNDROME: Primary | ICD-10-CM

## 2020-10-05 DIAGNOSIS — M53.3 SACROILIAC JOINT PAIN: ICD-10-CM

## 2020-10-05 DIAGNOSIS — G03.9 ARACHNOIDITIS: ICD-10-CM

## 2020-10-05 PROCEDURE — 99999 PR PBB SHADOW E&M-EST. PATIENT-LVL IV: CPT | Mod: PBBFAC,,, | Performed by: NURSE PRACTITIONER

## 2020-10-05 PROCEDURE — 99214 OFFICE O/P EST MOD 30 MIN: CPT | Mod: PBBFAC | Performed by: NURSE PRACTITIONER

## 2020-10-05 PROCEDURE — 99213 PR OFFICE/OUTPT VISIT, EST, LEVL III, 20-29 MIN: ICD-10-PCS | Mod: S$PBB,,, | Performed by: NURSE PRACTITIONER

## 2020-10-05 PROCEDURE — 99213 OFFICE O/P EST LOW 20 MIN: CPT | Mod: S$PBB,,, | Performed by: NURSE PRACTITIONER

## 2020-10-05 PROCEDURE — 99999 PR PBB SHADOW E&M-EST. PATIENT-LVL IV: ICD-10-PCS | Mod: PBBFAC,,, | Performed by: NURSE PRACTITIONER

## 2020-10-05 NOTE — PROGRESS NOTES
Chronic Pain- Established Visit        Chief Complaint   Patient presents with    Low-back Pain       SUBJECTIVE:    Interval History 10/5/2020:  The patient returns to clinic today for follow up of low back pain. He is s/p right SI joint injection on 9/9/2020. He reports 25% relief of his right sided low back and buttock pain. He did have good relief the first two days. He continues to report right sided low back and buttock pain. He denies any radicular leg pain. His pain is worse with prolonged standing and walking. He continues to report intermittent pain into his achilles from previous injury. He feels as though this has changed his gait. He continues to report some benefit with Nev[employer] SCS device. He is in contact with representatives. He is currently taking Percocet as needed sparingly with some benefit. He denies any adverse effects. He denies any other health changes. His pain today is 4/10.     Interval History 9/2/2020:  The patient returns to clinic today for follow up of back pain. He reports that his back pain has improved since last audio visit. He continues to report back pain with intermittent radiating pain into his right hip and calf. He has spoken with Bienvenido from [employer] via phone with some programming. He is meeting with Bienvenido today. He had some issues with charging but this has improved. He is currently taking Norco intermittently but this is only lasts 2-3 hours. He denies any adverse effects. He denies any other health changes. His pain today is 8/10.    Interval History 08/27/2020:  Pt was contacted over [employer] audio for a follow up appointment.  He is currently complaining of right hip and right calf with no associated numbness or weakness.  He continues to use his Nevro device.  He states it has been very frustrating. Inconsistent.  Took 20 mins to 1 hour to charge.  Couldn't get it to start this morning.  He states that there is no drainage at the battery site, no signs of infection or pain  at the site.  Patient is not taking medications at this time.  He wants to speak about medication options because he would like to start playing golf again.    Interval History 8/17/2020:  The patient returns to clinic today for post op wound check. He continues to report benefit with Nevro device. He denies any fever, chills, or drainage. He continues to follow his activity restrictions. He does report a recent achilles tendon injury while swimming. He is seeing Orthopedics. He denies any other health changes. His pain today is 4/10.    Interval History 8/3/2020:  The patient returns to clinic today for post op. He is s/p Nevro battery change on 7/27/2020. He denies any fever, chills, or drainage from incision. He has not completed his antibiotics as he missed two days of the medication. He continues to follow his activity restrictions. He reports relief with the device. He is meeting with Bienvenido today for programming. He denies any other health changes. His pain today is 2/10    Interval History 7/22/2020:  Pt presents for audio follow up only s/p Right SI joint injection on 7/8/2020. Pt states he has near resolution of focal pain to buttock. He is pleased with result and pain relief. Pt has been in contact with [employer] and will be having battery swap in 5 days. He has difficulty whether stimulator is beneficial and has even had a holiday from using SCS.  He denies any newer areas pain, denies any neuro changes, meds area adequate to control pain without adverse side effects. Pain is 2/10 today.    Interval HPI 6/15/2020:  Jefferson Boogie presents to the clinic for a follow-up appointment for 3 week follow up right hip and right thigh pain. Since the last visit, Jefferson Boogie states the pain has been persistant. Current pain intensity is 5/10. Patient has been working with Bienvenido Varghese, to try and get better coverage of a localized pain that he still experiences in his right low back area. He does report  improvement in symptoms of numbness/tingling. Today, worse pain is 1/10 in severity and localizes to the right SI joint. Pain is worse with extension of his back. It is worse with golfing. Pain relieved by Norco--he takes 1-2 tablets of 5-325 Norco on days that he plays golf. Pain is also improved with being still and not being active. Patient endorses a much more active lifestyle with Norco. Denies new weakness/numbness or bowel/bladder changes.    Previous injection history includes a series of 3 lumbar CHOCO at [facility].     Interval HPI 3/5/20:  Patient presents to the clinic for a follow-up appointment for low back pain. Since the last visit, he states his pain has been unchanged. Current pain intensity is 4/10. He continues to work with [employer] to adjust settings on his SCS. He has stopped using tramadol, and uses norco sparingly. He continues to work with a  for physical therapy.      Interval HPI 1/9/2020:  Patient returns for follow up s/p Nevro SCS. He states he is unsure if it has helped his pain since he had it implanted. He last had an adjustment of his programming about 1 month ago. He does note that once he is done charging his SCS his pain is improved. Pain still worse with prolonged standing and activity such as golf. He still is doing home exercise program. He says that he is trying to do more since getting the SCS. He is still taking the Tramadol with limited pain relief. He takes Tramadol 50 mg twice daily only on days of activity which is once a week. No other health changes.      HPI 11/20/19  Jefferson Boogie returns to clinic today for follow up. He reports increased low back and leg pain over the last few days. He has been in contact with [employer] representatives for programming adjustments. He does report benefit with the device. He reports good days and bad days. His pain is worse with prolonged standing and activity. He continues to participate in a home exercise routine.  He does take Tramadol sparingly but reports limited relief. He denies any other health changes. His pain today is 5/10.    Pain Disability Index Review:  Last 3 PDI Scores 10/5/2020 9/2/2020 8/17/2020   Pain Disability Index (PDI) 36 27 28       Pain Medications:  Percocet    Current medication:  Celebrex   Xanax  See med list       report:  Reviewed and consistent with medication use as prescribed.    Pain Procedures:  9/9/2020- Right SI joint injection  7/27/2020- Nevro SCS battery change  7/8/2020- Right SIJ injection >80% relief   8/26/19 SCS implant  8/5/19 SCS trial      Physical Therapy/Home Exercise: no    Imaging:    Xray Thoracolumbar 9/2/2020:  FINDINGS:  There is generalized bone demineralization.  Visualized thoracic and lumbar vertebral body heights are preserved with a gentle leftward undulation the lower lumbar spine.  There is multilevel upper lumbar spondylitic spurring, disc narrowing and degenerative endplate sclerosis most severe at L2-3, similar to the prior study.     A spinal stimulator device enters the posterior spinal canal at the level of L1-2 and the leads terminate at the level of T8 vertebral body and T9 vertebral bodies respectively.     Impression:     Osteopenia, spondylosis, and scoliosis.  Spinal stimulator in place    Xray Thoracolumbar 1/10/2020:  FINDINGS:  There is a spinal stimulator device, incompletely visualized with TIPS unchanged compared to prior examination at approximately T8 level.  Vertebral bodies are normally aligned and normal in height, with mild degenerative change.     Impression:     As above    Allergies:   Review of patient's allergies indicates:   Allergen Reactions    Adhesive Itching     Adhesive used during surgery-trial stimulator       Current Medications:   Current Outpatient Medications   Medication Sig Dispense Refill    ALPRAZolam (XANAX) 1 MG tablet Take 1 mg by mouth.      celecoxib (CELEBREX) 200 MG capsule Take 200 mg by mouth 2 (two)  times daily.      DETROL LA 4 mg 24 hr capsule       ergocalciferol, vitamin D2, 400 unit Tab Take 50,000 mg by mouth.      finasteride (PROSCAR) 5 mg tablet Take 5 mg by mouth.      irbesartan (AVAPRO) 75 MG tablet       levothyroxine (SYNTHROID) 100 MCG tablet Take 100 mcg by mouth.      simvastatin (ZOCOR) 20 MG tablet Take 20 mg by mouth.      tadalafil (CIALIS) 20 MG Tab       temazepam (RESTORIL) 30 mg capsule TK 1 C PO QD HS      fluticasone propionate (FLONASE) 50 mcg/actuation nasal spray SPRAY 1 SPRAY IEN D       No current facility-administered medications for this visit.        REVIEW OF SYSTEMS:    GENERAL:  No weight loss, malaise or fevers.  HEENT:  Negative for frequent or significant headaches.  NECK:  Negative for lumps, goiter, pain and significant neck swelling.  RESPIRATORY:  Negative for cough, wheezing or shortness of breath.  CARDIOVASCULAR:  Negative for chest pain, leg swelling or palpitations.  GI:  Negative for abdominal discomfort, blood in stools or black stools or change in bowel habits.  MUSCULOSKELETAL:  See HPI.  SKIN:  Negative for lesions, rash, and itching.  PSYCH:  Positive for sleep disturbance. Negative for mood disorder and recent psychosocial stressors.  HEMATOLOGY/LYMPHOLOGY:  Negative for prolonged bleeding, bruising easily or swollen nodes. Hx of cancer.   NEURO:   No history of headaches, syncope, paralysis, seizures or tremors.\  ENDO: Thyroid disorder.   All other reviewed and negative other than HPI.    Past Medical History:  Past Medical History:   Diagnosis Date    Cancer     stage I bladder cancer 50 yrs ago    Sacroiliitis 7/8/2020    Thyroid disease        Past Surgical History:  Past Surgical History:   Procedure Laterality Date    BLADDER SURGERY      HERNIA REPAIR      INJECTION OF JOINT Right 7/8/2020    Procedure: INJECTION, JOINT, SACROILIAC (SI);  Surgeon: Samuel Jimenez MD;  Location: [facility] T;  Service: Pain Management;   Laterality: Right;    INJECTION OF JOINT Right 9/9/2020    Procedure: INJECTION, JOINT, SACROILIAC (SI);  Surgeon: Samuel Jimenez MD;  [facility];  Service: Pain Management;  Laterality: Right;    KNEE SURGERY      REPLACEMENT OF NERVE STIMULATOR BATTERY N/A 7/27/2020    Procedure: Replacement, SPINAL CORD STIMULATOR BATTERY CHANGE TO NEVRO OMNION BATTERY;  Surgeon: Samuel Jimenez MD;  [facility] [place] [facility];  Service: Pain Management;  Laterality: N/A;  C-ARM, NEVRO REP    TRIAL OF SPINAL CORD NERVE STIMULATOR N/A 8/5/2019    Procedure: Trial, Neurostimulator, SPINAL CORD STIMULATOR TRIAL;  Surgeon: Samuel Jimenez MD;  [facility];  Service: Pain Management;  Laterality: N/A;  C-ARM, NEVRO REP       Family History:  History reviewed. No pertinent family history.    Social History:  Social History     Socioeconomic History    Marital status:      Spouse name: Not on file    Number of children: Not on file    Years of education: Not on file    Highest education level: Not on file   Occupational History    Not on file   Social Needs    Financial resource strain: Not on file    Food insecurity     Worry: Not on file     Inability: Not on file    Transportation needs     Medical: Not on file     Non-medical: Not on file   Tobacco Use    Smoking status: Former Smoker    Smokeless tobacco: Never Used    Tobacco comment: stopped 40 years ago   Substance and Sexual Activity    Alcohol use: Yes     Alcohol/week: 2.0 standard drinks     Types: 2 Shots of liquor per week     Comment: nightly -scotch    Drug use: Never    Sexual activity: Yes     Partners: Female   Lifestyle    Physical activity     Days per week: Not on file     Minutes per session: Not on file    Stress: Not on file   Relationships    Social connections     Talks on phone: Not on file     Gets together: Not on file     Attends Orthodoxy service: Not on file     Active member of club or organization:  "Not on file     Attends meetings of clubs or organizations: Not on file     Relationship status: Not on file   Other Topics Concern    Not on file   Social History Narrative    Not on file       OBJECTIVE:    /65   Pulse (!) 58   Temp 98.6 °F (37 °C) (Oral)   Resp 20   Ht 6' 1" (1.854 m)   Wt 90.4 kg (199 lb 4.7 oz)   SpO2 98%   BMI 26.29 kg/m²     PHYSICAL EXAMINATION:    General appearance: Well appearing, in no acute distress, alert and oriented x3.  Psych:  Mood and affect appropriate.  Skin: Skin color, texture, turgor normal, no rashes or lesions, in both upper and lower body. SCS site without drainage or signs of infection. Edges well approximated.   Head/face:  Atraumatic, normocephalic. No palpable lymph nodes.  Pulm: non-labored respirations  Back: Straight leg raise in the sitting position is negative for radicular pain bilaterally. No pain with palpation over lumbar facet joints. Limited ROM with mild pain on extension. Negative facet loading bilaterally.   Musculoskeletal: No pain with palpation over bilateral SI joints. FABERs is positive on the right. Mild pain with palpation over right piriformis muscle.   Gait: Antalgic- ambulates without assistance.        ASSESSMENT: 85 y.o. year old male with pain, consistent with      1. Chronic pain syndrome     2. Sacroiliac joint pain     3. Arachnoiditis           PLAN:     - Previous imaging was reviewed and discussed with the patient today. Leads remain in correct position.     - He is s/p right SI joint injection with some benefit.     - Consider sacral RFA vs. SI fusion in the future.     - He is s/p Nevro SCS battery change. He is in contact with representatives.     - Continue Percocet 5/325 mg every 8 hours PRN, #90. He takes this sparingly, he does not need a refill.     - Continue home exercise routine.     - RTC in 1 month.     - Dr. Jimenez was consulted on the patient and agrees with this plan.    The above plan and management " options were discussed at length with patient. Patient is in agreement with the above and verbalized understanding.    Marian Tolliver NP  10/05/2020

## 2020-10-06 ENCOUNTER — PATIENT MESSAGE (OUTPATIENT)
Dept: RESEARCH | Facility: OTHER | Age: 85
End: 2020-10-06

## 2020-10-27 ENCOUNTER — PATIENT MESSAGE (OUTPATIENT)
Dept: PAIN MEDICINE | Facility: CLINIC | Age: 85
End: 2020-10-27

## 2020-10-28 ENCOUNTER — PATIENT MESSAGE (OUTPATIENT)
Dept: PAIN MEDICINE | Facility: CLINIC | Age: 85
End: 2020-10-28

## 2020-11-05 ENCOUNTER — OFFICE VISIT (OUTPATIENT)
Dept: PAIN MEDICINE | Facility: CLINIC | Age: 85
End: 2020-11-05
Attending: ANESTHESIOLOGY
Payer: MEDICARE

## 2020-11-05 VITALS
HEART RATE: 58 BPM | RESPIRATION RATE: 18 BRPM | TEMPERATURE: 97 F | HEIGHT: 73 IN | SYSTOLIC BLOOD PRESSURE: 154 MMHG | BODY MASS INDEX: 26.88 KG/M2 | DIASTOLIC BLOOD PRESSURE: 69 MMHG | WEIGHT: 202.81 LBS

## 2020-11-05 DIAGNOSIS — G03.9 ARACHNOIDITIS: ICD-10-CM

## 2020-11-05 DIAGNOSIS — M46.1 SACROILIITIS: Primary | ICD-10-CM

## 2020-11-05 DIAGNOSIS — G89.4 CHRONIC PAIN SYNDROME: ICD-10-CM

## 2020-11-05 PROCEDURE — 99214 PR OFFICE/OUTPT VISIT, EST, LEVL IV, 30-39 MIN: ICD-10-PCS | Mod: S$PBB,GC,, | Performed by: ANESTHESIOLOGY

## 2020-11-05 PROCEDURE — 99214 OFFICE O/P EST MOD 30 MIN: CPT | Mod: S$PBB,GC,, | Performed by: ANESTHESIOLOGY

## 2020-11-05 PROCEDURE — 99999 PR PBB SHADOW E&M-EST. PATIENT-LVL III: ICD-10-PCS | Mod: PBBFAC,,, | Performed by: ANESTHESIOLOGY

## 2020-11-05 PROCEDURE — 99999 PR PBB SHADOW E&M-EST. PATIENT-LVL III: CPT | Mod: PBBFAC,,, | Performed by: ANESTHESIOLOGY

## 2020-11-05 PROCEDURE — 99213 OFFICE O/P EST LOW 20 MIN: CPT | Mod: PBBFAC | Performed by: ANESTHESIOLOGY

## 2020-11-05 NOTE — PROGRESS NOTES
Chronic patient Established Note (Follow up visit)      SUBJECTIVE:    Interval History 11/5/2020:  Jefferson Boogie presents to the clinic for a follow-up appointment for low back pain. Since the last visit, Jefferson Boogie states the pain has been stable. Current pain intensity is 4/10. He had good relief from the SI joint injection that lasted for about a week. Pain has since returned. He also slipped and fell on his buttock a couple of weeks ago and had increased pain in the right buttock after that which is slowly improving. He continues to have variable benefit with the Nevro SCS for intermittent pain in the right leg. He is scheduled to meet with representatives today. He is taking celebrex daily and percocet as needed sparingly. He denies any adverse effects and any other health changes.    Interval History 10/5/2020:  The patient returns to clinic today for follow up of low back pain. He is s/p right SI joint injection on 9/9/2020. He reports 25% relief of his right sided low back and buttock pain. He did have good relief the first two days. He continues to report right sided low back and buttock pain. He denies any radicular leg pain. His pain is worse with prolonged standing and walking. He continues to report intermittent pain into his achilles from previous injury. He feels as though this has changed his gait. He continues to report some benefit with Nevro SCS device. He is in contact with representatives. He is currently taking Percocet as needed sparingly with some benefit. He denies any adverse effects. He denies any other health changes. His pain today is 4/10.      Interval History 9/2/2020:  The patient returns to clinic today for follow up of back pain. He reports that his back pain has improved since last audio visit. He continues to report back pain with intermittent radiating pain into his right hip and calf. He has spoken with Bienvenido from [employer] via phone with some programming. He is meeting  with Bienvenido today. He had some issues with charging but this has improved. He is currently taking Norco intermittently but this is only lasts 2-3 hours. He denies any adverse effects. He denies any other health changes. His pain today is 8/10.     Interval History 08/27/2020:  Pt was contacted over [facility] audio for a follow up appointment.  He is currently complaining of right hip and right calf with no associated numbness or weakness.  He continues to use his Nevro device.  He states it has been very frustrating. Inconsistent.  Took 20 mins to 1 hour to charge.  Couldn't get it to start this morning.  He states that there is no drainage at the battery site, no signs of infection or pain at the site.  Patient is not taking medications at this time.  He wants to speak about medication options because he would like to start playing golf again.     Interval History 8/17/2020:  The patient returns to clinic today for post op wound check. He continues to report benefit with Nevro device. He denies any fever, chills, or drainage. He continues to follow his activity restrictions. He does report a recent achilles tendon injury while swimming. He is seeing Orthopedics. He denies any other health changes. His pain today is 4/10.     Interval History 8/3/2020:  The patient returns to clinic today for post op. He is s/p Nevro battery change on 7/27/2020. He denies any fever, chills, or drainage from incision. He has not completed his antibiotics as he missed two days of the medication. He continues to follow his activity restrictions. He reports relief with the device. He is meeting with Bienvenido today for programming. He denies any other health changes. His pain today is 2/10     Interval History 7/22/2020:  Pt presents for audio follow up only s/p Right SI joint injection on 7/8/2020. Pt states he has near resolution of focal pain to buttock. He is pleased with result and pain relief. Pt has been in contact with [employer] and will be  having battery swap in 5 days. He has difficulty whether stimulator is beneficial and has even had a holiday from using SCS.  He denies any newer areas pain, denies any neuro changes, meds area adequate to control pain without adverse side effects. Pain is 2/10 today.     Interval HPI 6/15/2020:  Jefferson Boogie presents to the clinic for a follow-up appointment for 3 week follow up right hip and right thigh pain. Since the last visit, Jefferson Boogie states the pain has been persistant. Current pain intensity is 5/10. Patient has been working with Bienvenido Varghese, to try and get better coverage of a localized pain that he still experiences in his right low back area. He does report improvement in symptoms of numbness/tingling. Today, worse pain is 1/10 in severity and localizes to the right SI joint. Pain is worse with extension of his back. It is worse with golfing. Pain relieved by Norco--he takes 1-2 tablets of 5-325 Norco on days that he plays golf. Pain is also improved with being still and not being active. Patient endorses a much more active lifestyle with Norco. Denies new weakness/numbness or bowel/bladder changes.     Previous injection history includes a series of 3 lumbar CHOCO at [facility].      Interval HPI 3/5/20:  Patient presents to the clinic for a follow-up appointment for low back pain. Since the last visit, he states his pain has been unchanged. Current pain intensity is 4/10. He continues to work with [employer] to adjust settings on his SCS. He has stopped using tramadol, and uses norco sparingly. He continues to work with a  for physical therapy.      Interval HPI 1/9/2020:  Patient returns for follow up s/p Nevro SCS. He states he is unsure if it has helped his pain since he had it implanted. He last had an adjustment of his programming about 1 month ago. He does note that once he is done charging his SCS his pain is improved. Pain still worse with prolonged standing and  activity such as golf. He still is doing home exercise program. He says that he is trying to do more since getting the SCS. He is still taking the Tramadol with limited pain relief. He takes Tramadol 50 mg twice daily only on days of activity which is once a week. No other health changes.      HPI 11/20/19  Jefferson Boogie returns to clinic today for follow up. He reports increased low back and leg pain over the last few days. He has been in contact with [employer] representatives for programming adjustments. He does report benefit with the device. He reports good days and bad days. His pain is worse with prolonged standing and activity. He continues to participate in a home exercise routine. He does take Tramadol sparingly but reports limited relief. He denies any other health changes. His pain today is 5/10.    Pain Disability Index Review:  Last 3 PDI Scores 10/5/2020 9/2/2020 8/17/2020   Pain Disability Index (PDI) 36 27 28       Pain Medications:  Percocet     Current medication:  Celebrex   Xanax  See med list         report:  Reviewed and consistent with medication use as prescribed.     Pain Procedures:  9/9/2020- Right SI joint injection  7/27/2020- Nevro SCS battery change  7/8/2020- Right SIJ injection >80% relief   8/26/19 SCS implant  8/5/19 SCS trial        Physical Therapy/Home Exercise: no    Imaging:  Xray Thoracolumbar 9/2/2020:  FINDINGS:  There is generalized bone demineralization.  Visualized thoracic and lumbar vertebral body heights are preserved with a gentle leftward undulation the lower lumbar spine.  There is multilevel upper lumbar spondylitic spurring, disc narrowing and degenerative endplate sclerosis most severe at L2-3, similar to the prior study.     A spinal stimulator device enters the posterior spinal canal at the level of L1-2 and the leads terminate at the level of T8 vertebral body and T9 vertebral bodies respectively.     Impression:     Osteopenia, spondylosis, and scoliosis.   Spinal stimulator in place     Xray Thoracolumbar 1/10/2020:  FINDINGS:  There is a spinal stimulator device, incompletely visualized with TIPS unchanged compared to prior examination at approximately T8 level.  Vertebral bodies are normally aligned and normal in height, with mild degenerative change.     Impression:     As above    Allergies:   Review of patient's allergies indicates:   Allergen Reactions    Adhesive Itching     Adhesive used during surgery-trial stimulator       Current Medications:   Current Outpatient Medications   Medication Sig Dispense Refill    ALPRAZolam (XANAX) 1 MG tablet Take 1 mg by mouth.      celecoxib (CELEBREX) 200 MG capsule Take 200 mg by mouth 2 (two) times daily.      DETROL LA 4 mg 24 hr capsule       ergocalciferol, vitamin D2, 400 unit Tab Take 50,000 mg by mouth.      finasteride (PROSCAR) 5 mg tablet Take 5 mg by mouth.      fluticasone propionate (FLONASE) 50 mcg/actuation nasal spray SPRAY 1 SPRAY IEN D      irbesartan (AVAPRO) 75 MG tablet       levothyroxine (SYNTHROID) 100 MCG tablet Take 100 mcg by mouth.      simvastatin (ZOCOR) 20 MG tablet Take 20 mg by mouth.      tadalafil (CIALIS) 20 MG Tab       temazepam (RESTORIL) 30 mg capsule TK 1 C PO QD HS       No current facility-administered medications for this visit.        REVIEW OF SYSTEMS:    GENERAL:  No weight loss, malaise or fevers.  HEENT:  Negative for frequent or significant headaches.  NECK:  Negative for lumps, goiter, pain and significant neck swelling.  RESPIRATORY:  Negative for cough, wheezing or shortness of breath.  CARDIOVASCULAR:  Negative for chest pain, leg swelling or palpitations.  GI:  Negative for abdominal discomfort, blood in stools or black stools or change in bowel habits.  MUSCULOSKELETAL:  See HPI.  SKIN:  Negative for lesions, rash, and itching.  PSYCH:  Positive for sleep disturbance. Negative for mood disorder and recent psychosocial stressors.  HEMATOLOGY/LYMPHOLOGY:   Negative for prolonged bleeding, bruising easily or swollen nodes. Hx of cancer.   NEURO:   No history of headaches, syncope, paralysis, seizures or tremors.\  ENDO: Thyroid disorder.   All other reviewed and negative other than HPI.    Past Medical History:  Past Medical History:   Diagnosis Date    Cancer     stage I bladder cancer 50 yrs ago    Sacroiliitis 7/8/2020    Thyroid disease        Past Surgical History:  Past Surgical History:   Procedure Laterality Date    BLADDER SURGERY      HERNIA REPAIR      INJECTION OF JOINT Right 7/8/2020    Procedure: INJECTION, JOINT, SACROILIAC (SI);  Surgeon: Samuel Jimenez MD;  Location: [facility] PAIN MGT;  Service: Pain Management;  Laterality: Right;    INJECTION OF JOINT Right 9/9/2020    Procedure: INJECTION, JOINT, SACROILIAC (SI);  Surgeon: Samuel Jimenez MD;  Location: [facility] PAIN MGT;  Service: Pain Management;  Laterality: Right;    KNEE SURGERY      REPLACEMENT OF NERVE STIMULATOR BATTERY N/A 7/27/2020    Procedure: Replacement, SPINAL CORD STIMULATOR BATTERY CHANGE TO NEVRO OMNION BATTERY;  Surgeon: Samuel Jimenez MD;  Location: [facility] OR;  Service: Pain Management;  Laterality: N/A;  C-ARM, NEVRO REP    TRIAL OF SPINAL CORD NERVE STIMULATOR N/A 8/5/2019    Procedure: Trial, Neurostimulator, SPINAL CORD STIMULATOR TRIAL;  Surgeon: Samuel Jimenez MD;  Location: [facility] CATH LAB;  Service: Pain Management;  Laterality: N/A;  C-ARM, NEVRO REP       Family History:  No family history on file.    Social History:  Social History     Socioeconomic History    Marital status:      Spouse name: Not on file    Number of children: Not on file    Years of education: Not on file    Highest education level: Not on file   Occupational History    Not on file   Social Needs    Financial resource strain: Not on file    Food insecurity     Worry: Not on file     Inability: Not on file    Transportation needs     Medical: Not on file     Non-medical:  "Not on file   Tobacco Use    Smoking status: Former Smoker    Smokeless tobacco: Never Used    Tobacco comment: stopped 40 years ago   Substance and Sexual Activity    Alcohol use: Yes     Alcohol/week: 2.0 standard drinks     Types: 2 Shots of liquor per week     Comment: nightly -scotch    Drug use: Never    Sexual activity: Yes     Partners: Female   Lifestyle    Physical activity     Days per week: Not on file     Minutes per session: Not on file    Stress: Not on file   Relationships    Social connections     Talks on phone: Not on file     Gets together: Not on file     Attends Yarsani service: Not on file     Active member of club or organization: Not on file     Attends meetings of clubs or organizations: Not on file     Relationship status: Not on file   Other Topics Concern    Not on file   Social History Narrative    Not on file       OBJECTIVE:    BP (!) 154/69   Pulse (!) 58   Temp 97 °F (36.1 °C)   Resp 18   Ht 6' 1" (1.854 m)   Wt 92 kg (202 lb 13.2 oz)   BMI 26.76 kg/m²     PHYSICAL EXAMINATION:    General appearance: Well appearing, in no acute distress, alert and oriented x3.  Psych:  Mood and affect appropriate.  Skin: Skin color, texture, turgor normal, no rashes or lesions, in both upper and lower body.  Head/face:  Atraumatic, normocephalic. No palpable lymph nodes  Neck: No pain to palpation over the cervical paraspinous muscles. Spurling Negative. No pain with neck flexion, extension, or lateral flexion. .  Cor: RRR  Pulm: CTA  GI: Abdomen soft and non-tender.  Back: Straight leg raise in the sitting position is negative for radicular pain bilaterally. No pain with palpation over lumbar facet joints. Limited ROM with mild pain on extension. Negative facet loading bilaterally.  Extremities: Peripheral joint ROM is full and pain free without obvious instability or laxity in all four extremities. No deformities, edema, or skin discoloration. Good capillary " refill.  Musculoskeletal: Pain with palpation over right SI joints. FABERs is positive on the right. Fading bruise over the right SI joint area that is slightly tender to palpation.  Neuro: Bilateral upper and lower extremity coordination and muscle stretch reflexes are physiologic and symmetric.  Plantar response are downgoing. No loss of sensation is noted.  Gait: Antalgic- ambulates without assistance.    ASSESSMENT: 85 y.o. year old male with low back pain, consistent with     1. Sacroiliitis     2. Chronic pain syndrome     3. Arachnoiditis           PLAN:     - I have stressed the importance of physical activity and a home exercise plan to help with pain and improve health.  - Patient can continue with medications for now since they are providing benefits, using them appropriately, and without side effects.  - Schedule Right DR5, lateral branches of S1, S2 RFA  - Patient met with [facility] representatives for program adjustment and will continue to follow up with them.  - Continue celebrex daily and Percocet 5/325 mg prn. He takes this sparingly, he does not need a refill.   - Continue home exercise routine.   - RTC 4 weeks after procedure  - Counseled patient regarding the importance of activity modification, constant sleeping habits and physical therapy.    The above plan and management options were discussed at length with patient. Patient is in agreement with the above and verbalized understanding.    Andrae Cabello    I have personally reviewed the history and exam of this patient and agree with the resident/fellow/NPs note as stated above.    Samuel Jimenez MD    11/05/2020

## 2020-11-05 NOTE — PROGRESS NOTES
Chronic patient Established Note (Follow up visit)      SUBJECTIVE:    Jefferson Boogie presents to the clinic for a follow-up appointment for right hip and right calf pain. Since the last visit, Jefferson Boogie states the pain has been stable. Current pain intensity is 7/10.    Pain Disability Index Review:  Last 3 PDI Scores 10/5/2020 9/2/2020 8/17/2020   Pain Disability Index (PDI) 36 27 28       Pain Medications:    {PMC - MEDICATIONS:33192}    Opioid Contract: {YES/NO:63}     report:  {:10076}    Pain Procedures: ***    Physical Therapy/Home Exercise: {YES/NO:63}    Imaging: ***    Allergies:   Review of patient's allergies indicates:   Allergen Reactions    Adhesive Itching     Adhesive used during surgery-trial stimulator       Current Medications:   Current Outpatient Medications   Medication Sig Dispense Refill    ALPRAZolam (XANAX) 1 MG tablet Take 1 mg by mouth.      celecoxib (CELEBREX) 200 MG capsule Take 200 mg by mouth 2 (two) times daily.      DETROL LA 4 mg 24 hr capsule       ergocalciferol, vitamin D2, 400 unit Tab Take 50,000 mg by mouth.      finasteride (PROSCAR) 5 mg tablet Take 5 mg by mouth.      fluticasone propionate (FLONASE) 50 mcg/actuation nasal spray SPRAY 1 SPRAY IEN D      irbesartan (AVAPRO) 75 MG tablet       levothyroxine (SYNTHROID) 100 MCG tablet Take 100 mcg by mouth.      simvastatin (ZOCOR) 20 MG tablet Take 20 mg by mouth.      tadalafil (CIALIS) 20 MG Tab       temazepam (RESTORIL) 30 mg capsule TK 1 C PO QD HS       No current facility-administered medications for this visit.        REVIEW OF SYSTEMS:    GENERAL:  No weight loss, malaise or fevers.  HEENT:  Negative for frequent or significant headaches.  NECK:  Negative for lumps, goiter, pain and significant neck swelling.  RESPIRATORY:  Negative for cough, wheezing or shortness of breath.  CARDIOVASCULAR:  Negative for chest pain, leg swelling or palpitations.  GI:  Negative for abdominal  discomfort, blood in stools or black stools or change in bowel habits.  MUSCULOSKELETAL:  See HPI.  SKIN:  Negative for lesions, rash, and itching.  PSYCH:  Negative for sleep disturbance, mood disorder and recent psychosocial stressors.  HEMATOLOGY/LYMPHOLOGY:  Negative for prolonged bleeding, bruising easily or swollen nodes.  NEURO:   No history of headaches, syncope, paralysis, seizures or tremors.  All other reviewed and negative other than HPI.    Past Medical History:  Past Medical History:   Diagnosis Date    Cancer     stage I bladder cancer 50 yrs ago    Sacroiliitis 7/8/2020    Thyroid disease        Past Surgical History:  Past Surgical History:   Procedure Laterality Date    BLADDER SURGERY      HERNIA REPAIR      INJECTION OF JOINT Right 7/8/2020    Procedure: INJECTION, JOINT, SACROILIAC (SI);  Surgeon: Samuel Jimenez MD;  Location: [facility] PAIN MGT;  Service: Pain Management;  Laterality: Right;    INJECTION OF JOINT Right 9/9/2020    Procedure: INJECTION, JOINT, SACROILIAC (SI);  Surgeon: Samuel Jimenez MD;  Location: [facility] PAIN MGT;  Service: Pain Management;  Laterality: Right;    KNEE SURGERY      REPLACEMENT OF NERVE STIMULATOR BATTERY N/A 7/27/2020    Procedure: Replacement, SPINAL CORD STIMULATOR BATTERY CHANGE TO NEVRO OMNION BATTERY;  Surgeon: Samuel Jimenez MD;  Location: [facility] OR;  Service: Pain Management;  Laterality: N/A;  C-ARM, NEVRO REP    TRIAL OF SPINAL CORD NERVE STIMULATOR N/A 8/5/2019    Procedure: Trial, Neurostimulator, SPINAL CORD STIMULATOR TRIAL;  Surgeon: Samuel Jimenez MD;  Location: [facility] CATH LAB;  Service: Pain Management;  Laterality: N/A;  C-ARM, NEVRO REP       Family History:  No family history on file.    Social History:  Social History     Socioeconomic History    Marital status:      Spouse name: Not on file    Number of children: Not on file    Years of education: Not on file    Highest education level: Not on file   Occupational  "History    Not on file   Social Needs    Financial resource strain: Not on file    Food insecurity     Worry: Not on file     Inability: Not on file    Transportation needs     Medical: Not on file     Non-medical: Not on file   Tobacco Use    Smoking status: Former Smoker    Smokeless tobacco: Never Used    Tobacco comment: stopped 40 years ago   Substance and Sexual Activity    Alcohol use: Yes     Alcohol/week: 2.0 standard drinks     Types: 2 Shots of liquor per week     Comment: nightly -scotch    Drug use: Never    Sexual activity: Yes     Partners: Female   Lifestyle    Physical activity     Days per week: Not on file     Minutes per session: Not on file    Stress: Not on file   Relationships    Social connections     Talks on phone: Not on file     Gets together: Not on file     Attends Oriental orthodox service: Not on file     Active member of club or organization: Not on file     Attends meetings of clubs or organizations: Not on file     Relationship status: Not on file   Other Topics Concern    Not on file   Social History Narrative    Not on file       OBJECTIVE:    Ht 6' 1" (1.854 m)   BMI 26.29 kg/m²     PHYSICAL EXAMINATION:    General appearance: Well appearing, in no acute distress, alert and oriented x3.  Psych:  Mood and affect appropriate.  Skin: Skin color, texture, turgor normal, no rashes or lesions, in both upper and lower body.  Head/face:  Atraumatic, normocephalic. No palpable lymph nodes  Neck: No pain to palpation over the cervical paraspinous muscles. Spurling Negative. No pain with neck flexion, extension, or lateral flexion. .  Cor: RRR  Pulm: CTA  GI: Abdomen soft and non-tender.  Back: Straight leg raising in the sitting and supine positions is negative to radicular pain. No pain to palpation over the spine or costovertebral angles. Normal range of motion without pain reproduction.  Extremities: Peripheral joint ROM is full and pain free without obvious instability or " laxity in all four extremities. No deformities, edema, or skin discoloration. Good capillary refill.  Musculoskeletal: Shoulder, hip, sacroiliac and knee provocative maneuvers are negative. Bilateral upper and lower extremity strength is normal and symmetric.  No atrophy or tone abnormalities are noted.  Neuro: Bilateral upper and lower extremity coordination and muscle stretch reflexes are physiologic and symmetric.  Plantar response are downgoing. No loss of sensation is noted.  Gait: Normal.    ASSESSMENT: 85 y.o. year old male with *** pain, consistent with ***     No diagnosis found.      PLAN:     {PLAN:72419}  - RTC ***  - Counseled patient regarding the importance of {:98517}.    The above plan and management options were discussed at length with patient. Patient is in agreement with the above and verbalized understanding.    Mary Feliciano  11/05/2020

## 2020-11-20 ENCOUNTER — PATIENT MESSAGE (OUTPATIENT)
Dept: PAIN MEDICINE | Facility: OTHER | Age: 85
End: 2020-11-20

## 2020-12-02 ENCOUNTER — TELEPHONE (OUTPATIENT)
Dept: PAIN MEDICINE | Facility: CLINIC | Age: 85
End: 2020-12-02

## 2020-12-02 NOTE — TELEPHONE ENCOUNTER
----- Message from Lyubov Briseno sent at 12/2/2020 11:29 AM CST -----  Regarding: Patient call back  Who called:AMY NERI [[id number]]    What is the request in detail: Patient is requesting a call back. He states he is scheduling an MRI and needs to know if he nevro spinal cord stimulator is compatible. He states he is trying to schedule for this coming Monday 12/7.  He would like to further discuss.   Please advise.    Can the clinic reply by [facility]? No     Best call back number: [phone]    Additional Information: N/A

## 2020-12-02 NOTE — TELEPHONE ENCOUNTER
Contacted patient in regards to his message, spoke to RAMAN she advised to contact the representative as they need to meet him where he is having the MRI to ensure the device is programmed afterwards. All information was left on voicemail for patient and informed if he had any additional questions after he received the voicemail to contact us.

## 2020-12-07 ENCOUNTER — TELEPHONE (OUTPATIENT)
Dept: PAIN MEDICINE | Facility: CLINIC | Age: 85
End: 2020-12-07

## 2020-12-07 NOTE — TELEPHONE ENCOUNTER
----- Message from Lyubov Briseno sent at 12/7/2020  1:57 PM CST -----  Regarding: Patient call back  Who called:AMY NERI [[id number]]    What is the request in detail: Patient is requesting a call back. He would not provide further details.   Please advise.    Can the clinic reply by [facility]? No    Best call back number: [phone]    Additional Information: N/A

## 2020-12-07 NOTE — TELEPHONE ENCOUNTER
----- Message from Ramya William sent at 12/7/2020 12:52 PM CST -----  Regarding: Patient Call Back  Who Called: AMY NERI [[id number]]    What is the request in detail: Would like a call back in regards to needing clearance to have a MRI done tomorrow. Patient would like a call back as soon as possible.    Can the clinic reply by  [facility]? No    Best Call Back Number: [phone]

## 2020-12-08 ENCOUNTER — PATIENT MESSAGE (OUTPATIENT)
Dept: PAIN MEDICINE | Facility: OTHER | Age: 85
End: 2020-12-08

## 2020-12-09 ENCOUNTER — HOSPITAL ENCOUNTER (OUTPATIENT)
Facility: OTHER | Age: 85
Discharge: HOME | End: 2020-12-09
Attending: ANESTHESIOLOGY | Admitting: ANESTHESIOLOGY
Payer: MEDICARE

## 2020-12-09 VITALS
OXYGEN SATURATION: 99 % | BODY MASS INDEX: 25.84 KG/M2 | HEIGHT: 73 IN | DIASTOLIC BLOOD PRESSURE: 77 MMHG | WEIGHT: 195 LBS | TEMPERATURE: 98 F | HEART RATE: 57 BPM | SYSTOLIC BLOOD PRESSURE: 174 MMHG | RESPIRATION RATE: 16 BRPM

## 2020-12-09 DIAGNOSIS — M47.9 OSTEOARTHRITIS OF SPINE, UNSPECIFIED SPINAL OSTEOARTHRITIS COMPLICATION STATUS, UNSPECIFIED SPINAL REGION: Primary | ICD-10-CM

## 2020-12-09 DIAGNOSIS — M47.819 SPONDYLOSIS WITHOUT MYELOPATHY: ICD-10-CM

## 2020-12-09 DIAGNOSIS — G89.29 CHRONIC PAIN: ICD-10-CM

## 2020-12-09 PROCEDURE — 25000003 PHARM REV CODE 250: Performed by: STUDENT IN AN ORGANIZED HEALTH CARE EDUCATION/TRAINING PROGRAM

## 2020-12-09 PROCEDURE — 63600175 PHARM REV CODE 636 W HCPCS: Performed by: ANESTHESIOLOGY

## 2020-12-09 PROCEDURE — 99152 MOD SED SAME PHYS/QHP 5/>YRS: CPT | Performed by: ANESTHESIOLOGY

## 2020-12-09 PROCEDURE — 64635 DESTROY LUMB/SAC FACET JNT: CPT | Mod: RT | Performed by: ANESTHESIOLOGY

## 2020-12-09 PROCEDURE — A4649 SURGICAL SUPPLIES: HCPCS | Performed by: ANESTHESIOLOGY

## 2020-12-09 PROCEDURE — 64625 RF ABLTJ NRV NRVTG SI JT: CPT | Mod: RT,,, | Performed by: ANESTHESIOLOGY

## 2020-12-09 PROCEDURE — 99152 PR MOD CONSCIOUS SEDATION, SAME PHYS, 5+ YRS, FIRST 15 MIN: ICD-10-PCS | Mod: ,,, | Performed by: ANESTHESIOLOGY

## 2020-12-09 PROCEDURE — 99152 MOD SED SAME PHYS/QHP 5/>YRS: CPT | Mod: ,,, | Performed by: ANESTHESIOLOGY

## 2020-12-09 PROCEDURE — 25000003 PHARM REV CODE 250: Performed by: ANESTHESIOLOGY

## 2020-12-09 PROCEDURE — 64625 RF ABLTJ NRV NRVTG SI JT: CPT | Mod: RT | Performed by: ANESTHESIOLOGY

## 2020-12-09 PROCEDURE — 64625 PR ABLATION, RADIOFREQ, SACROILIAC JOINT, W/IMG: ICD-10-PCS | Mod: RT,,, | Performed by: ANESTHESIOLOGY

## 2020-12-09 RX ORDER — FENTANYL CITRATE 50 UG/ML
INJECTION, SOLUTION INTRAMUSCULAR; INTRAVENOUS
Status: DISCONTINUED | OUTPATIENT
Start: 2020-12-09 | End: 2020-12-09 | Stop reason: HOSPADM

## 2020-12-09 RX ORDER — DEXAMETHASONE SODIUM PHOSPHATE 4 MG/ML
INJECTION, SOLUTION INTRA-ARTICULAR; INTRALESIONAL; INTRAMUSCULAR; INTRAVENOUS; SOFT TISSUE
Status: DISCONTINUED | OUTPATIENT
Start: 2020-12-09 | End: 2020-12-09 | Stop reason: HOSPADM

## 2020-12-09 RX ORDER — SODIUM CHLORIDE 9 MG/ML
INJECTION, SOLUTION INTRAVENOUS CONTINUOUS
Status: DISCONTINUED | OUTPATIENT
Start: 2020-12-09 | End: 2020-12-09 | Stop reason: HOSPADM

## 2020-12-09 RX ORDER — MIDAZOLAM HYDROCHLORIDE 1 MG/ML
INJECTION INTRAMUSCULAR; INTRAVENOUS
Status: DISCONTINUED | OUTPATIENT
Start: 2020-12-09 | End: 2020-12-09 | Stop reason: HOSPADM

## 2020-12-09 RX ORDER — LIDOCAINE HYDROCHLORIDE 10 MG/ML
INJECTION INFILTRATION; PERINEURAL
Status: DISCONTINUED | OUTPATIENT
Start: 2020-12-09 | End: 2020-12-09 | Stop reason: HOSPADM

## 2020-12-09 RX ORDER — OXYCODONE AND ACETAMINOPHEN 5; 325 MG/1; MG/1
1 TABLET ORAL EVERY 8 HOURS PRN
Qty: 90 TABLET | Refills: 0 | Status: SHIPPED | OUTPATIENT
Start: 2020-12-09 | End: 2021-06-10 | Stop reason: SDUPTHER

## 2020-12-09 RX ORDER — AMITRIPTYLINE HYDROCHLORIDE 25 MG/1
25 TABLET, FILM COATED ORAL NIGHTLY PRN
Qty: 30 TABLET | Refills: 2 | Status: SHIPPED | OUTPATIENT
Start: 2020-12-09 | End: 2021-01-11 | Stop reason: SDUPTHER

## 2020-12-09 RX ADMIN — SODIUM CHLORIDE 25 ML/HR: 0.9 INJECTION, SOLUTION INTRAVENOUS at 09:12

## 2020-12-11 ENCOUNTER — PATIENT MESSAGE (OUTPATIENT)
Dept: PAIN MEDICINE | Facility: CLINIC | Age: 85
End: 2020-12-11

## 2021-01-11 ENCOUNTER — OFFICE VISIT (OUTPATIENT)
Dept: PAIN MEDICINE | Facility: CLINIC | Age: 86
End: 2021-01-11
Attending: ANESTHESIOLOGY
Payer: MEDICARE

## 2021-01-11 VITALS
SYSTOLIC BLOOD PRESSURE: 144 MMHG | TEMPERATURE: 98 F | DIASTOLIC BLOOD PRESSURE: 66 MMHG | BODY MASS INDEX: 27.05 KG/M2 | WEIGHT: 204.13 LBS | HEIGHT: 73 IN | HEART RATE: 62 BPM

## 2021-01-11 DIAGNOSIS — M12.812 ROTATOR CUFF ARTHROPATHY OF BOTH SHOULDERS: Primary | ICD-10-CM

## 2021-01-11 DIAGNOSIS — M54.15 RADICULOPATHY OF THORACOLUMBAR REGION: ICD-10-CM

## 2021-01-11 DIAGNOSIS — M12.811 ROTATOR CUFF ARTHROPATHY OF BOTH SHOULDERS: Primary | ICD-10-CM

## 2021-01-11 DIAGNOSIS — G89.4 CHRONIC PAIN SYNDROME: ICD-10-CM

## 2021-01-11 PROCEDURE — 99214 PR OFFICE/OUTPT VISIT, EST, LEVL IV, 30-39 MIN: ICD-10-PCS | Mod: S$PBB,GC,, | Performed by: ANESTHESIOLOGY

## 2021-01-11 PROCEDURE — 99999 PR PBB SHADOW E&M-EST. PATIENT-LVL IV: CPT | Mod: PBBFAC,,, | Performed by: ANESTHESIOLOGY

## 2021-01-11 PROCEDURE — 99214 OFFICE O/P EST MOD 30 MIN: CPT | Mod: PBBFAC | Performed by: ANESTHESIOLOGY

## 2021-01-11 PROCEDURE — 99999 PR PBB SHADOW E&M-EST. PATIENT-LVL IV: ICD-10-PCS | Mod: PBBFAC,,, | Performed by: ANESTHESIOLOGY

## 2021-01-11 PROCEDURE — 99214 OFFICE O/P EST MOD 30 MIN: CPT | Mod: S$PBB,GC,, | Performed by: ANESTHESIOLOGY

## 2021-01-11 RX ORDER — AMITRIPTYLINE HYDROCHLORIDE 25 MG/1
25 TABLET, FILM COATED ORAL NIGHTLY PRN
Qty: 30 TABLET | Refills: 2 | Status: SHIPPED | OUTPATIENT
Start: 2021-01-11 | End: 2021-03-12 | Stop reason: CLARIF

## 2021-01-13 ENCOUNTER — PATIENT MESSAGE (OUTPATIENT)
Dept: PAIN MEDICINE | Facility: CLINIC | Age: 86
End: 2021-01-13

## 2021-02-08 ENCOUNTER — PROCEDURE VISIT (OUTPATIENT)
Dept: PAIN MEDICINE | Facility: CLINIC | Age: 86
End: 2021-02-08
Attending: ANESTHESIOLOGY
Payer: MEDICARE

## 2021-02-08 VITALS
DIASTOLIC BLOOD PRESSURE: 68 MMHG | HEIGHT: 73 IN | RESPIRATION RATE: 18 BRPM | WEIGHT: 204.56 LBS | TEMPERATURE: 98 F | SYSTOLIC BLOOD PRESSURE: 152 MMHG | HEART RATE: 59 BPM | OXYGEN SATURATION: 100 % | BODY MASS INDEX: 27.11 KG/M2

## 2021-02-08 DIAGNOSIS — M75.50 SUBACROMIAL BURSITIS, UNSPECIFIED LATERALITY: Primary | ICD-10-CM

## 2021-02-08 PROCEDURE — 20611 DRAIN/INJ JOINT/BURSA W/US: CPT | Mod: PBBFAC | Performed by: ANESTHESIOLOGY

## 2021-02-08 PROCEDURE — 20611 DRAIN/INJ JOINT/BURSA W/US: CPT | Mod: 50,S$PBB,, | Performed by: ANESTHESIOLOGY

## 2021-02-08 PROCEDURE — 20611 PR DRAIN/ASP/INJECT MAJOR JOINT/BURSA W/US GUIDANCE: ICD-10-PCS | Mod: 50,S$PBB,, | Performed by: ANESTHESIOLOGY

## 2021-02-08 RX ORDER — ALENDRONATE SODIUM 70 MG/1
70 TABLET ORAL
COMMUNITY
Start: 2021-01-11 | End: 2023-02-08

## 2021-02-09 ENCOUNTER — TELEPHONE (OUTPATIENT)
Dept: PAIN MEDICINE | Facility: CLINIC | Age: 86
End: 2021-02-09

## 2021-02-11 ENCOUNTER — TELEPHONE (OUTPATIENT)
Dept: PAIN MEDICINE | Facility: CLINIC | Age: 86
End: 2021-02-11

## 2021-02-11 DIAGNOSIS — M46.1 SACROILIITIS: Primary | ICD-10-CM

## 2021-02-11 DIAGNOSIS — Z01.812 PRE-PROCEDURE LAB EXAM: ICD-10-CM

## 2021-02-11 RX ORDER — MUPIROCIN 20 MG/G
OINTMENT TOPICAL 2 TIMES DAILY
Qty: 1 TUBE | Refills: 0 | Status: SHIPPED | OUTPATIENT
Start: 2021-02-11 | End: 2021-11-29

## 2021-02-14 ENCOUNTER — PATIENT MESSAGE (OUTPATIENT)
Dept: SURGERY | Facility: OTHER | Age: 86
End: 2021-02-14

## 2021-02-15 ENCOUNTER — PATIENT MESSAGE (OUTPATIENT)
Dept: PAIN MEDICINE | Facility: CLINIC | Age: 86
End: 2021-02-15

## 2021-03-12 ENCOUNTER — HOSPITAL ENCOUNTER (OUTPATIENT)
Dept: PREADMISSION TESTING | Facility: OTHER | Age: 86
Discharge: HOME | End: 2021-03-12
Attending: ANESTHESIOLOGY
Payer: MEDICARE

## 2021-03-12 ENCOUNTER — ANESTHESIA EVENT (OUTPATIENT)
Dept: SURGERY | Facility: OTHER | Age: 86
End: 2021-03-12
Payer: MEDICARE

## 2021-03-12 VITALS
DIASTOLIC BLOOD PRESSURE: 70 MMHG | HEART RATE: 54 BPM | WEIGHT: 197 LBS | TEMPERATURE: 97 F | HEIGHT: 73 IN | OXYGEN SATURATION: 97 % | BODY MASS INDEX: 26.11 KG/M2 | SYSTOLIC BLOOD PRESSURE: 162 MMHG

## 2021-03-12 DIAGNOSIS — Z01.812 PRE-PROCEDURE LAB EXAM: ICD-10-CM

## 2021-03-12 DIAGNOSIS — Z01.818 PREOP TESTING: Primary | ICD-10-CM

## 2021-03-12 LAB
ANION GAP SERPL CALC-SCNC: 8 MMOL/L (ref 8–16)
BASOPHILS # BLD AUTO: 0.02 K/UL (ref 0–0.2)
BASOPHILS NFR BLD: 0.3 % (ref 0–1.9)
BUN SERPL-MCNC: 19 MG/DL (ref 8–23)
CALCIUM SERPL-MCNC: 9.2 MG/DL (ref 8.7–10.5)
CHLORIDE SERPL-SCNC: 106 MMOL/L (ref 95–110)
CO2 SERPL-SCNC: 26 MMOL/L (ref 23–29)
CREAT SERPL-MCNC: 0.8 MG/DL (ref 0.5–1.4)
DIFFERENTIAL METHOD: ABNORMAL
EOSINOPHIL # BLD AUTO: 0.1 K/UL (ref 0–0.5)
EOSINOPHIL NFR BLD: 1.5 % (ref 0–8)
ERYTHROCYTE [DISTWIDTH] IN BLOOD BY AUTOMATED COUNT: 13.1 % (ref 11.5–14.5)
EST. GFR  (AFRICAN AMERICAN): >60 ML/MIN/1.73 M^2
EST. GFR  (NON AFRICAN AMERICAN): >60 ML/MIN/1.73 M^2
GLUCOSE SERPL-MCNC: 98 MG/DL (ref 70–110)
HCT VFR BLD AUTO: 41.3 % (ref 40–54)
HGB BLD-MCNC: 13.7 G/DL (ref 14–18)
IMM GRANULOCYTES # BLD AUTO: 0.02 K/UL (ref 0–0.04)
IMM GRANULOCYTES NFR BLD AUTO: 0.3 % (ref 0–0.5)
LYMPHOCYTES # BLD AUTO: 1.8 K/UL (ref 1–4.8)
LYMPHOCYTES NFR BLD: 26.9 % (ref 18–48)
MCH RBC QN AUTO: 30.5 PG (ref 27–31)
MCHC RBC AUTO-ENTMCNC: 33.2 G/DL (ref 32–36)
MCV RBC AUTO: 92 FL (ref 82–98)
MONOCYTES # BLD AUTO: 0.6 K/UL (ref 0.3–1)
MONOCYTES NFR BLD: 9.3 % (ref 4–15)
NEUTROPHILS # BLD AUTO: 4 K/UL (ref 1.8–7.7)
NEUTROPHILS NFR BLD: 61.7 % (ref 38–73)
NRBC BLD-RTO: 0 /100 WBC
PLATELET # BLD AUTO: 164 K/UL (ref 150–350)
PMV BLD AUTO: 11.4 FL (ref 9.2–12.9)
POTASSIUM SERPL-SCNC: 4.5 MMOL/L (ref 3.5–5.1)
RBC # BLD AUTO: 4.49 M/UL (ref 4.6–6.2)
SODIUM SERPL-SCNC: 140 MMOL/L (ref 136–145)
WBC # BLD AUTO: 6.54 K/UL (ref 3.9–12.7)

## 2021-03-12 PROCEDURE — U0003 INFECTIOUS AGENT DETECTION BY NUCLEIC ACID (DNA OR RNA); SEVERE ACUTE RESPIRATORY SYNDROME CORONAVIRUS 2 (SARS-COV-2) (CORONAVIRUS DISEASE [COVID-19]), AMPLIFIED PROBE TECHNIQUE, MAKING USE OF HIGH THROUGHPUT TECHNOLOGIES AS DESCRIBED BY CMS-2020-01-R: HCPCS | Performed by: ANESTHESIOLOGY

## 2021-03-12 PROCEDURE — 80048 BASIC METABOLIC PNL TOTAL CA: CPT | Performed by: SPECIALIST

## 2021-03-12 PROCEDURE — 36415 COLL VENOUS BLD VENIPUNCTURE: CPT | Performed by: SPECIALIST

## 2021-03-12 PROCEDURE — U0005 INFEC AGEN DETEC AMPLI PROBE: HCPCS | Performed by: ANESTHESIOLOGY

## 2021-03-12 PROCEDURE — 85025 COMPLETE CBC W/AUTO DIFF WBC: CPT | Performed by: SPECIALIST

## 2021-03-12 RX ORDER — SODIUM CHLORIDE, SODIUM LACTATE, POTASSIUM CHLORIDE, CALCIUM CHLORIDE 600; 310; 30; 20 MG/100ML; MG/100ML; MG/100ML; MG/100ML
INJECTION, SOLUTION INTRAVENOUS CONTINUOUS
Status: CANCELLED | OUTPATIENT
Start: 2021-03-12

## 2021-03-13 LAB — SARS-COV-2 RNA RESP QL NAA+PROBE: NOT DETECTED

## 2021-03-15 ENCOUNTER — HOSPITAL ENCOUNTER (OUTPATIENT)
Facility: OTHER | Age: 86
Discharge: HOME | End: 2021-03-15
Attending: ANESTHESIOLOGY | Admitting: ANESTHESIOLOGY
Payer: MEDICARE

## 2021-03-15 ENCOUNTER — PATIENT MESSAGE (OUTPATIENT)
Dept: SURGERY | Facility: OTHER | Age: 86
End: 2021-03-15

## 2021-03-15 ENCOUNTER — ANESTHESIA (OUTPATIENT)
Dept: SURGERY | Facility: OTHER | Age: 86
End: 2021-03-15
Payer: MEDICARE

## 2021-03-15 VITALS
OXYGEN SATURATION: 97 % | HEIGHT: 73 IN | SYSTOLIC BLOOD PRESSURE: 155 MMHG | WEIGHT: 197 LBS | HEART RATE: 52 BPM | BODY MASS INDEX: 26.11 KG/M2 | TEMPERATURE: 98 F | DIASTOLIC BLOOD PRESSURE: 64 MMHG | RESPIRATION RATE: 16 BRPM

## 2021-03-15 DIAGNOSIS — M46.1 SACROILIITIS: ICD-10-CM

## 2021-03-15 DIAGNOSIS — M53.3 PAIN OF RIGHT SACROILIAC JOINT: ICD-10-CM

## 2021-03-15 DIAGNOSIS — G89.4 CHRONIC PAIN SYNDROME: ICD-10-CM

## 2021-03-15 DIAGNOSIS — M53.3 SACROILIAC JOINT DYSFUNCTION: Primary | ICD-10-CM

## 2021-03-15 DIAGNOSIS — G89.29 CHRONIC PAIN: ICD-10-CM

## 2021-03-15 PROCEDURE — 71000016 HC POSTOP RECOV ADDL HR: Performed by: ANESTHESIOLOGY

## 2021-03-15 PROCEDURE — 63600175 PHARM REV CODE 636 W HCPCS: Performed by: NURSE ANESTHETIST, CERTIFIED REGISTERED

## 2021-03-15 PROCEDURE — 71000033 HC RECOVERY, INTIAL HOUR: Performed by: ANESTHESIOLOGY

## 2021-03-15 PROCEDURE — 63600175 PHARM REV CODE 636 W HCPCS: Performed by: STUDENT IN AN ORGANIZED HEALTH CARE EDUCATION/TRAINING PROGRAM

## 2021-03-15 PROCEDURE — 37000009 HC ANESTHESIA EA ADD 15 MINS: Performed by: ANESTHESIOLOGY

## 2021-03-15 PROCEDURE — 71000015 HC POSTOP RECOV 1ST HR: Performed by: ANESTHESIOLOGY

## 2021-03-15 PROCEDURE — 27279 ARTHRD SI JT PLMT TARTCLR DV: CPT | Mod: RT,,, | Performed by: ANESTHESIOLOGY

## 2021-03-15 PROCEDURE — C1769 GUIDE WIRE: HCPCS | Performed by: ANESTHESIOLOGY

## 2021-03-15 PROCEDURE — 27279 PR ARTHRODESIS SACROILIAC JNT PERQ W/IMAGE GUIDE INCL BONE GRAFT: ICD-10-PCS | Mod: RT,,, | Performed by: ANESTHESIOLOGY

## 2021-03-15 PROCEDURE — 36000711: Performed by: ANESTHESIOLOGY

## 2021-03-15 PROCEDURE — 27800903 OPTIME MED/SURG SUP & DEVICES OTHER IMPLANTS: Performed by: ANESTHESIOLOGY

## 2021-03-15 PROCEDURE — 37000008 HC ANESTHESIA 1ST 15 MINUTES: Performed by: ANESTHESIOLOGY

## 2021-03-15 PROCEDURE — 36000710: Performed by: ANESTHESIOLOGY

## 2021-03-15 PROCEDURE — 25000003 PHARM REV CODE 250: Performed by: ANESTHESIOLOGY

## 2021-03-15 PROCEDURE — 25000003 PHARM REV CODE 250: Performed by: NURSE ANESTHETIST, CERTIFIED REGISTERED

## 2021-03-15 DEVICE — IMPLANTABLE DEVICE: Type: IMPLANTABLE DEVICE | Site: BACK | Status: FUNCTIONAL

## 2021-03-15 RX ORDER — FENTANYL CITRATE 50 UG/ML
INJECTION, SOLUTION INTRAMUSCULAR; INTRAVENOUS
Status: DISCONTINUED | OUTPATIENT
Start: 2021-03-15 | End: 2021-03-15

## 2021-03-15 RX ORDER — ROCURONIUM BROMIDE 10 MG/ML
INJECTION, SOLUTION INTRAVENOUS
Status: DISCONTINUED | OUTPATIENT
Start: 2021-03-15 | End: 2021-03-15

## 2021-03-15 RX ORDER — DIPHENHYDRAMINE HYDROCHLORIDE 50 MG/ML
25 INJECTION INTRAMUSCULAR; INTRAVENOUS EVERY 6 HOURS PRN
Status: DISCONTINUED | OUTPATIENT
Start: 2021-03-15 | End: 2021-03-15 | Stop reason: HOSPADM

## 2021-03-15 RX ORDER — SODIUM CHLORIDE 9 MG/ML
INJECTION, SOLUTION INTRAVENOUS CONTINUOUS
Status: DISCONTINUED | OUTPATIENT
Start: 2021-03-15 | End: 2021-03-15 | Stop reason: HOSPADM

## 2021-03-15 RX ORDER — SODIUM CHLORIDE 0.9 % (FLUSH) 0.9 %
3 SYRINGE (ML) INJECTION
Status: DISCONTINUED | OUTPATIENT
Start: 2021-03-15 | End: 2021-03-15 | Stop reason: HOSPADM

## 2021-03-15 RX ORDER — BUPIVACAINE HYDROCHLORIDE AND EPINEPHRINE 2.5; 5 MG/ML; UG/ML
INJECTION, SOLUTION EPIDURAL; INFILTRATION; INTRACAUDAL; PERINEURAL
Status: DISCONTINUED | OUTPATIENT
Start: 2021-03-15 | End: 2021-03-15 | Stop reason: HOSPADM

## 2021-03-15 RX ORDER — LIDOCAINE HYDROCHLORIDE 10 MG/ML
INJECTION, SOLUTION EPIDURAL; INFILTRATION; INTRACAUDAL; PERINEURAL
Status: DISCONTINUED | OUTPATIENT
Start: 2021-03-15 | End: 2021-03-15 | Stop reason: HOSPADM

## 2021-03-15 RX ORDER — LIDOCAINE HYDROCHLORIDE 20 MG/ML
INJECTION INTRAVENOUS
Status: DISCONTINUED | OUTPATIENT
Start: 2021-03-15 | End: 2021-03-15

## 2021-03-15 RX ORDER — BACITRACIN ZINC 500 UNIT/G
OINTMENT (GRAM) TOPICAL
Status: DISCONTINUED | OUTPATIENT
Start: 2021-03-15 | End: 2021-03-15 | Stop reason: HOSPADM

## 2021-03-15 RX ORDER — ONDANSETRON 2 MG/ML
4 INJECTION INTRAMUSCULAR; INTRAVENOUS DAILY PRN
Status: DISCONTINUED | OUTPATIENT
Start: 2021-03-15 | End: 2021-03-15 | Stop reason: HOSPADM

## 2021-03-15 RX ORDER — MEPERIDINE HYDROCHLORIDE 25 MG/ML
12.5 INJECTION INTRAMUSCULAR; INTRAVENOUS; SUBCUTANEOUS ONCE AS NEEDED
Status: DISCONTINUED | OUTPATIENT
Start: 2021-03-15 | End: 2021-03-15 | Stop reason: HOSPADM

## 2021-03-15 RX ORDER — PROPOFOL 10 MG/ML
VIAL (ML) INTRAVENOUS
Status: DISCONTINUED | OUTPATIENT
Start: 2021-03-15 | End: 2021-03-15

## 2021-03-15 RX ORDER — NEOSTIGMINE METHYLSULFATE 1 MG/ML
INJECTION, SOLUTION INTRAVENOUS
Status: DISCONTINUED | OUTPATIENT
Start: 2021-03-15 | End: 2021-03-15

## 2021-03-15 RX ORDER — CEFAZOLIN SODIUM 1 G/3ML
2 INJECTION, POWDER, FOR SOLUTION INTRAMUSCULAR; INTRAVENOUS ONCE
Status: COMPLETED | OUTPATIENT
Start: 2021-03-15 | End: 2021-03-15

## 2021-03-15 RX ORDER — OXYCODONE HYDROCHLORIDE 5 MG/1
5 TABLET ORAL
Status: DISCONTINUED | OUTPATIENT
Start: 2021-03-15 | End: 2021-03-15 | Stop reason: HOSPADM

## 2021-03-15 RX ORDER — CEPHALEXIN 500 MG/1
500 CAPSULE ORAL 4 TIMES DAILY
Qty: 28 CAPSULE | Refills: 0 | Status: SHIPPED | OUTPATIENT
Start: 2021-03-15 | End: 2021-03-22

## 2021-03-15 RX ORDER — EPHEDRINE SULFATE 50 MG/ML
INJECTION, SOLUTION INTRAVENOUS
Status: DISCONTINUED | OUTPATIENT
Start: 2021-03-15 | End: 2021-03-15

## 2021-03-15 RX ORDER — SODIUM CHLORIDE, SODIUM LACTATE, POTASSIUM CHLORIDE, CALCIUM CHLORIDE 600; 310; 30; 20 MG/100ML; MG/100ML; MG/100ML; MG/100ML
INJECTION, SOLUTION INTRAVENOUS CONTINUOUS
Status: DISCONTINUED | OUTPATIENT
Start: 2021-03-15 | End: 2021-03-15 | Stop reason: HOSPADM

## 2021-03-15 RX ORDER — HYDROMORPHONE HYDROCHLORIDE 2 MG/ML
0.4 INJECTION, SOLUTION INTRAMUSCULAR; INTRAVENOUS; SUBCUTANEOUS EVERY 5 MIN PRN
Status: DISCONTINUED | OUTPATIENT
Start: 2021-03-15 | End: 2021-03-15 | Stop reason: HOSPADM

## 2021-03-15 RX ORDER — DEXAMETHASONE SODIUM PHOSPHATE 4 MG/ML
INJECTION, SOLUTION INTRA-ARTICULAR; INTRALESIONAL; INTRAMUSCULAR; INTRAVENOUS; SOFT TISSUE
Status: DISCONTINUED | OUTPATIENT
Start: 2021-03-15 | End: 2021-03-15

## 2021-03-15 RX ORDER — ONDANSETRON 2 MG/ML
INJECTION INTRAMUSCULAR; INTRAVENOUS
Status: DISCONTINUED | OUTPATIENT
Start: 2021-03-15 | End: 2021-03-15

## 2021-03-15 RX ADMIN — GLYCOPYRROLATE 0.2 MG: 0.2 INJECTION, SOLUTION INTRAMUSCULAR; INTRAVITREAL at 08:03

## 2021-03-15 RX ADMIN — ONDANSETRON HYDROCHLORIDE 4 MG: 2 INJECTION INTRAMUSCULAR; INTRAVENOUS at 09:03

## 2021-03-15 RX ADMIN — NEOSTIGMINE METHYLSULFATE 5 MG: 1 INJECTION INTRAVENOUS at 09:03

## 2021-03-15 RX ADMIN — GLYCOPYRROLATE 0.6 MG: 0.2 INJECTION, SOLUTION INTRAMUSCULAR; INTRAVITREAL at 09:03

## 2021-03-15 RX ADMIN — DEXAMETHASONE SODIUM PHOSPHATE 4 MG: 4 INJECTION, SOLUTION INTRAMUSCULAR; INTRAVENOUS at 09:03

## 2021-03-15 RX ADMIN — EPHEDRINE SULFATE 10 MG: 50 INJECTION INTRAVENOUS at 08:03

## 2021-03-15 RX ADMIN — FENTANYL CITRATE 100 MCG: 50 INJECTION, SOLUTION INTRAMUSCULAR; INTRAVENOUS at 07:03

## 2021-03-15 RX ADMIN — ROCURONIUM BROMIDE 40 MG: 10 INJECTION, SOLUTION INTRAVENOUS at 07:03

## 2021-03-15 RX ADMIN — LIDOCAINE HYDROCHLORIDE 100 MG: 20 INJECTION, SOLUTION INTRAVENOUS at 07:03

## 2021-03-15 RX ADMIN — CEFAZOLIN 2 G: 330 INJECTION, POWDER, FOR SOLUTION INTRAMUSCULAR; INTRAVENOUS at 07:03

## 2021-03-15 RX ADMIN — PROPOFOL 180 MG: 10 INJECTION, EMULSION INTRAVENOUS at 07:03

## 2021-03-18 ENCOUNTER — TELEPHONE (OUTPATIENT)
Dept: PAIN MEDICINE | Facility: CLINIC | Age: 86
End: 2021-03-18

## 2021-03-19 ENCOUNTER — PATIENT MESSAGE (OUTPATIENT)
Dept: PAIN MEDICINE | Facility: CLINIC | Age: 86
End: 2021-03-19

## 2021-03-19 ENCOUNTER — TELEPHONE (OUTPATIENT)
Dept: PAIN MEDICINE | Facility: CLINIC | Age: 86
End: 2021-03-19

## 2021-03-22 ENCOUNTER — OFFICE VISIT (OUTPATIENT)
Dept: PAIN MEDICINE | Facility: CLINIC | Age: 86
End: 2021-03-22
Payer: MEDICARE

## 2021-03-22 VITALS
HEART RATE: 60 BPM | SYSTOLIC BLOOD PRESSURE: 140 MMHG | WEIGHT: 201.5 LBS | DIASTOLIC BLOOD PRESSURE: 58 MMHG | RESPIRATION RATE: 18 BRPM | OXYGEN SATURATION: 100 % | HEIGHT: 73 IN | BODY MASS INDEX: 26.71 KG/M2

## 2021-03-22 DIAGNOSIS — G89.4 CHRONIC PAIN SYNDROME: ICD-10-CM

## 2021-03-22 DIAGNOSIS — M46.1 SACROILIITIS: Primary | ICD-10-CM

## 2021-03-22 PROCEDURE — 99999 PR PBB SHADOW E&M-EST. PATIENT-LVL IV: CPT | Mod: PBBFAC,,, | Performed by: NURSE PRACTITIONER

## 2021-03-22 PROCEDURE — 99024 PR POST-OP FOLLOW-UP VISIT: ICD-10-PCS | Mod: POP,,, | Performed by: NURSE PRACTITIONER

## 2021-03-22 PROCEDURE — 99214 OFFICE O/P EST MOD 30 MIN: CPT | Mod: PBBFAC | Performed by: NURSE PRACTITIONER

## 2021-03-22 PROCEDURE — 99024 POSTOP FOLLOW-UP VISIT: CPT | Mod: POP,,, | Performed by: NURSE PRACTITIONER

## 2021-03-22 PROCEDURE — 99999 PR PBB SHADOW E&M-EST. PATIENT-LVL IV: ICD-10-PCS | Mod: PBBFAC,,, | Performed by: NURSE PRACTITIONER

## 2021-03-24 ENCOUNTER — PATIENT MESSAGE (OUTPATIENT)
Dept: PAIN MEDICINE | Facility: CLINIC | Age: 86
End: 2021-03-24

## 2021-03-24 ENCOUNTER — TELEPHONE (OUTPATIENT)
Dept: PAIN MEDICINE | Facility: CLINIC | Age: 86
End: 2021-03-24

## 2021-03-25 ENCOUNTER — PATIENT MESSAGE (OUTPATIENT)
Dept: PAIN MEDICINE | Facility: CLINIC | Age: 86
End: 2021-03-25

## 2021-03-25 ENCOUNTER — OFFICE VISIT (OUTPATIENT)
Dept: PAIN MEDICINE | Facility: CLINIC | Age: 86
End: 2021-03-25
Attending: ANESTHESIOLOGY
Payer: MEDICARE

## 2021-03-25 VITALS
WEIGHT: 204.56 LBS | HEART RATE: 60 BPM | RESPIRATION RATE: 18 BRPM | OXYGEN SATURATION: 97 % | DIASTOLIC BLOOD PRESSURE: 68 MMHG | SYSTOLIC BLOOD PRESSURE: 132 MMHG | BODY MASS INDEX: 27.11 KG/M2 | HEIGHT: 73 IN

## 2021-03-25 DIAGNOSIS — G03.9 ARACHNOIDITIS: ICD-10-CM

## 2021-03-25 DIAGNOSIS — M25.519 SHOULDER PAIN, UNSPECIFIED CHRONICITY, UNSPECIFIED LATERALITY: ICD-10-CM

## 2021-03-25 DIAGNOSIS — M46.1 SACROILIITIS: Primary | ICD-10-CM

## 2021-03-25 DIAGNOSIS — G89.4 CHRONIC PAIN SYNDROME: ICD-10-CM

## 2021-03-25 PROCEDURE — 99215 OFFICE O/P EST HI 40 MIN: CPT | Mod: PBBFAC | Performed by: ANESTHESIOLOGY

## 2021-03-25 PROCEDURE — 99999 PR PBB SHADOW E&M-EST. PATIENT-LVL V: CPT | Mod: PBBFAC,,, | Performed by: ANESTHESIOLOGY

## 2021-03-25 PROCEDURE — 99999 PR PBB SHADOW E&M-EST. PATIENT-LVL V: ICD-10-PCS | Mod: PBBFAC,,, | Performed by: ANESTHESIOLOGY

## 2021-03-25 PROCEDURE — 99213 PR OFFICE/OUTPT VISIT, EST, LEVL III, 20-29 MIN: ICD-10-PCS | Mod: S$PBB,25,GC, | Performed by: ANESTHESIOLOGY

## 2021-03-25 PROCEDURE — 99213 OFFICE O/P EST LOW 20 MIN: CPT | Mod: S$PBB,25,GC, | Performed by: ANESTHESIOLOGY

## 2021-03-26 ENCOUNTER — PATIENT MESSAGE (OUTPATIENT)
Dept: PAIN MEDICINE | Facility: CLINIC | Age: 86
End: 2021-03-26

## 2021-03-31 ENCOUNTER — TELEPHONE (OUTPATIENT)
Dept: PAIN MEDICINE | Facility: CLINIC | Age: 86
End: 2021-03-31

## 2021-04-05 PROBLEM — M54.15 RADICULOPATHY OF THORACOLUMBAR REGION: Status: RESOLVED | Noted: 2019-07-30 | Resolved: 2021-04-05

## 2021-04-05 PROBLEM — M53.3 PAIN OF RIGHT SACROILIAC JOINT: Status: RESOLVED | Noted: 2020-06-15 | Resolved: 2021-04-05

## 2021-04-05 PROBLEM — G90.50 CRPS (COMPLEX REGIONAL PAIN SYNDROME TYPE I): Status: RESOLVED | Noted: 2020-07-27 | Resolved: 2021-04-05

## 2021-04-05 PROBLEM — T85.192A FAILED SPINAL CORD STIMULATOR: Status: RESOLVED | Noted: 2020-08-27 | Resolved: 2021-04-05

## 2021-04-14 ENCOUNTER — TELEPHONE (OUTPATIENT)
Dept: PAIN MEDICINE | Facility: CLINIC | Age: 86
End: 2021-04-14

## 2021-04-15 ENCOUNTER — OFFICE VISIT (OUTPATIENT)
Dept: PAIN MEDICINE | Facility: CLINIC | Age: 86
End: 2021-04-15
Payer: MEDICARE

## 2021-04-15 VITALS
DIASTOLIC BLOOD PRESSURE: 67 MMHG | RESPIRATION RATE: 18 BRPM | WEIGHT: 201.94 LBS | SYSTOLIC BLOOD PRESSURE: 123 MMHG | TEMPERATURE: 98 F | HEART RATE: 64 BPM | HEIGHT: 73 IN | BODY MASS INDEX: 26.76 KG/M2

## 2021-04-15 DIAGNOSIS — G03.9 ARACHNOIDITIS: ICD-10-CM

## 2021-04-15 DIAGNOSIS — M46.1 SACROILIITIS: Primary | ICD-10-CM

## 2021-04-15 DIAGNOSIS — G89.4 CHRONIC PAIN SYNDROME: ICD-10-CM

## 2021-04-15 DIAGNOSIS — Z96.89 S/P INSERTION OF SPINAL CORD STIMULATOR: ICD-10-CM

## 2021-04-15 PROCEDURE — 99999 PR PBB SHADOW E&M-EST. PATIENT-LVL IV: ICD-10-PCS | Mod: PBBFAC,,, | Performed by: NURSE PRACTITIONER

## 2021-04-15 PROCEDURE — 99213 PR OFFICE/OUTPT VISIT, EST, LEVL III, 20-29 MIN: ICD-10-PCS | Mod: S$PBB,25,, | Performed by: NURSE PRACTITIONER

## 2021-04-15 PROCEDURE — 99999 PR PBB SHADOW E&M-EST. PATIENT-LVL IV: CPT | Mod: PBBFAC,,, | Performed by: NURSE PRACTITIONER

## 2021-04-15 PROCEDURE — 99214 OFFICE O/P EST MOD 30 MIN: CPT | Mod: PBBFAC | Performed by: NURSE PRACTITIONER

## 2021-04-15 PROCEDURE — 99213 OFFICE O/P EST LOW 20 MIN: CPT | Mod: S$PBB,25,, | Performed by: NURSE PRACTITIONER

## 2021-05-10 ENCOUNTER — PATIENT MESSAGE (OUTPATIENT)
Dept: PAIN MEDICINE | Facility: CLINIC | Age: 86
End: 2021-05-10

## 2021-05-11 ENCOUNTER — PATIENT MESSAGE (OUTPATIENT)
Dept: PAIN MEDICINE | Facility: CLINIC | Age: 86
End: 2021-05-11

## 2021-06-07 ENCOUNTER — TELEPHONE (OUTPATIENT)
Dept: PAIN MEDICINE | Facility: CLINIC | Age: 86
End: 2021-06-07

## 2021-06-07 ENCOUNTER — TELEPHONE (OUTPATIENT)
Dept: OPHTHALMOLOGY | Facility: CLINIC | Age: 86
End: 2021-06-07

## 2021-06-09 ENCOUNTER — TELEPHONE (OUTPATIENT)
Dept: PAIN MEDICINE | Facility: CLINIC | Age: 86
End: 2021-06-09

## 2021-06-10 ENCOUNTER — OFFICE VISIT (OUTPATIENT)
Dept: PAIN MEDICINE | Facility: CLINIC | Age: 86
End: 2021-06-10
Attending: ANESTHESIOLOGY
Payer: MEDICARE

## 2021-06-10 ENCOUNTER — PATIENT MESSAGE (OUTPATIENT)
Dept: PAIN MEDICINE | Facility: CLINIC | Age: 86
End: 2021-06-10

## 2021-06-10 ENCOUNTER — HOSPITAL ENCOUNTER (OUTPATIENT)
Dept: RADIOLOGY | Facility: OTHER | Age: 86
Discharge: HOME | End: 2021-06-10
Attending: ANESTHESIOLOGY
Payer: MEDICARE

## 2021-06-10 VITALS
SYSTOLIC BLOOD PRESSURE: 137 MMHG | DIASTOLIC BLOOD PRESSURE: 67 MMHG | RESPIRATION RATE: 18 BRPM | WEIGHT: 198.19 LBS | HEART RATE: 55 BPM | BODY MASS INDEX: 26.27 KG/M2 | HEIGHT: 73 IN

## 2021-06-10 DIAGNOSIS — M25.559 HIP PAIN: ICD-10-CM

## 2021-06-10 DIAGNOSIS — Z79.891 OPIOID CONTRACT EXISTS: ICD-10-CM

## 2021-06-10 DIAGNOSIS — G03.9 ARACHNOIDITIS: ICD-10-CM

## 2021-06-10 DIAGNOSIS — G89.4 CHRONIC PAIN SYNDROME: Primary | ICD-10-CM

## 2021-06-10 PROCEDURE — 99214 PR OFFICE/OUTPT VISIT, EST, LEVL IV, 30-39 MIN: ICD-10-PCS | Mod: 24,S$PBB,, | Performed by: ANESTHESIOLOGY

## 2021-06-10 PROCEDURE — 73521 XR HIPS BILATERAL 2 VIEW INCL AP PELVIS: ICD-10-PCS | Mod: 26,,, | Performed by: RADIOLOGY

## 2021-06-10 PROCEDURE — 99999 PR PBB SHADOW E&M-EST. PATIENT-LVL IV: CPT | Mod: PBBFAC,,, | Performed by: ANESTHESIOLOGY

## 2021-06-10 PROCEDURE — 73521 X-RAY EXAM HIPS BI 2 VIEWS: CPT | Mod: TC,FY

## 2021-06-10 PROCEDURE — 73521 X-RAY EXAM HIPS BI 2 VIEWS: CPT | Mod: 26,,, | Performed by: RADIOLOGY

## 2021-06-10 PROCEDURE — 99214 OFFICE O/P EST MOD 30 MIN: CPT | Mod: 24,S$PBB,, | Performed by: ANESTHESIOLOGY

## 2021-06-10 PROCEDURE — 99214 OFFICE O/P EST MOD 30 MIN: CPT | Mod: PBBFAC,25 | Performed by: ANESTHESIOLOGY

## 2021-06-10 PROCEDURE — 99999 PR PBB SHADOW E&M-EST. PATIENT-LVL IV: ICD-10-PCS | Mod: PBBFAC,,, | Performed by: ANESTHESIOLOGY

## 2021-06-10 RX ORDER — OXYCODONE AND ACETAMINOPHEN 5; 325 MG/1; MG/1
1 TABLET ORAL EVERY 8 HOURS PRN
Qty: 90 TABLET | Refills: 0 | Status: SHIPPED | OUTPATIENT
Start: 2021-06-10 | End: 2021-08-26 | Stop reason: SDUPTHER

## 2021-06-21 ENCOUNTER — PATIENT MESSAGE (OUTPATIENT)
Dept: PAIN MEDICINE | Facility: CLINIC | Age: 86
End: 2021-06-21

## 2021-06-29 ENCOUNTER — PATIENT MESSAGE (OUTPATIENT)
Dept: PAIN MEDICINE | Facility: CLINIC | Age: 86
End: 2021-06-29

## 2021-06-30 ENCOUNTER — PATIENT MESSAGE (OUTPATIENT)
Dept: PAIN MEDICINE | Facility: CLINIC | Age: 86
End: 2021-06-30

## 2021-07-02 ENCOUNTER — PATIENT MESSAGE (OUTPATIENT)
Dept: PAIN MEDICINE | Facility: CLINIC | Age: 86
End: 2021-07-02

## 2021-07-02 ENCOUNTER — PATIENT MESSAGE (OUTPATIENT)
Dept: PAIN MEDICINE | Facility: OTHER | Age: 86
End: 2021-07-02

## 2021-07-12 ENCOUNTER — PATIENT MESSAGE (OUTPATIENT)
Dept: PAIN MEDICINE | Facility: OTHER | Age: 86
End: 2021-07-12

## 2021-07-14 ENCOUNTER — TELEPHONE (OUTPATIENT)
Dept: PAIN MEDICINE | Facility: CLINIC | Age: 86
End: 2021-07-14

## 2021-07-15 ENCOUNTER — PATIENT MESSAGE (OUTPATIENT)
Dept: PAIN MEDICINE | Facility: CLINIC | Age: 86
End: 2021-07-15

## 2021-07-15 ENCOUNTER — TELEPHONE (OUTPATIENT)
Dept: PAIN MEDICINE | Facility: CLINIC | Age: 86
End: 2021-07-15

## 2021-07-20 ENCOUNTER — PATIENT MESSAGE (OUTPATIENT)
Dept: PAIN MEDICINE | Facility: OTHER | Age: 86
End: 2021-07-20

## 2021-07-21 ENCOUNTER — HOSPITAL ENCOUNTER (OUTPATIENT)
Facility: OTHER | Age: 86
Discharge: HOME | End: 2021-07-21
Attending: ANESTHESIOLOGY | Admitting: ANESTHESIOLOGY
Payer: MEDICARE

## 2021-07-21 VITALS
TEMPERATURE: 98 F | DIASTOLIC BLOOD PRESSURE: 84 MMHG | OXYGEN SATURATION: 99 % | HEART RATE: 55 BPM | HEIGHT: 73 IN | SYSTOLIC BLOOD PRESSURE: 158 MMHG | WEIGHT: 194 LBS | RESPIRATION RATE: 14 BRPM | BODY MASS INDEX: 25.71 KG/M2

## 2021-07-21 DIAGNOSIS — M16.0 PRIMARY OSTEOARTHRITIS OF BOTH HIPS: Primary | ICD-10-CM

## 2021-07-21 DIAGNOSIS — M16.9 DEGENERATIVE JOINT DISEASE (DJD) OF HIP: ICD-10-CM

## 2021-07-21 PROCEDURE — 77002 NEEDLE LOCALIZATION BY XRAY: CPT | Mod: 26,,, | Performed by: ANESTHESIOLOGY

## 2021-07-21 PROCEDURE — 77002 PR FLUOROSCOPIC GUIDANCE NEEDLE PLACEMENT: ICD-10-PCS | Mod: 26,,, | Performed by: ANESTHESIOLOGY

## 2021-07-21 PROCEDURE — 77002 NEEDLE LOCALIZATION BY XRAY: CPT | Mod: 26 | Performed by: ANESTHESIOLOGY

## 2021-07-21 PROCEDURE — 20610 DRAIN/INJ JOINT/BURSA W/O US: CPT | Mod: RT,,, | Performed by: ANESTHESIOLOGY

## 2021-07-21 PROCEDURE — 63600175 PHARM REV CODE 636 W HCPCS: Performed by: ANESTHESIOLOGY

## 2021-07-21 PROCEDURE — 20610 PR DRAIN/INJECT LARGE JOINT/BURSA: ICD-10-PCS | Mod: RT,,, | Performed by: ANESTHESIOLOGY

## 2021-07-21 PROCEDURE — 25000003 PHARM REV CODE 250: Performed by: ANESTHESIOLOGY

## 2021-07-21 PROCEDURE — 20610 DRAIN/INJ JOINT/BURSA W/O US: CPT | Mod: RT | Performed by: ANESTHESIOLOGY

## 2021-07-21 PROCEDURE — 25500020 PHARM REV CODE 255: Performed by: ANESTHESIOLOGY

## 2021-07-21 RX ORDER — BUPIVACAINE HYDROCHLORIDE 2.5 MG/ML
INJECTION, SOLUTION EPIDURAL; INFILTRATION; INTRACAUDAL
Status: DISCONTINUED | OUTPATIENT
Start: 2021-07-21 | End: 2021-07-21 | Stop reason: HOSPADM

## 2021-07-21 RX ORDER — TRIAMCINOLONE ACETONIDE 40 MG/ML
INJECTION, SUSPENSION INTRA-ARTICULAR; INTRAMUSCULAR
Status: DISCONTINUED | OUTPATIENT
Start: 2021-07-21 | End: 2021-07-21 | Stop reason: HOSPADM

## 2021-07-21 RX ORDER — SODIUM CHLORIDE 9 MG/ML
INJECTION, SOLUTION INTRAVENOUS CONTINUOUS
Status: DISCONTINUED | OUTPATIENT
Start: 2021-07-21 | End: 2021-07-21 | Stop reason: HOSPADM

## 2021-07-21 RX ORDER — LIDOCAINE HYDROCHLORIDE 10 MG/ML
INJECTION INFILTRATION; PERINEURAL
Status: DISCONTINUED | OUTPATIENT
Start: 2021-07-21 | End: 2021-07-21 | Stop reason: HOSPADM

## 2021-07-23 ENCOUNTER — PATIENT MESSAGE (OUTPATIENT)
Dept: PAIN MEDICINE | Facility: CLINIC | Age: 86
End: 2021-07-23

## 2021-07-27 ENCOUNTER — PATIENT MESSAGE (OUTPATIENT)
Dept: PAIN MEDICINE | Facility: CLINIC | Age: 86
End: 2021-07-27

## 2021-07-29 ENCOUNTER — TELEPHONE (OUTPATIENT)
Dept: OPHTHALMOLOGY | Facility: CLINIC | Age: 86
End: 2021-07-29

## 2021-08-08 ENCOUNTER — PATIENT MESSAGE (OUTPATIENT)
Dept: PAIN MEDICINE | Facility: CLINIC | Age: 86
End: 2021-08-08

## 2021-08-09 ENCOUNTER — PATIENT MESSAGE (OUTPATIENT)
Dept: PAIN MEDICINE | Facility: CLINIC | Age: 86
End: 2021-08-09

## 2021-08-11 ENCOUNTER — PATIENT MESSAGE (OUTPATIENT)
Dept: PAIN MEDICINE | Facility: CLINIC | Age: 86
End: 2021-08-11

## 2021-08-12 ENCOUNTER — PATIENT MESSAGE (OUTPATIENT)
Dept: PAIN MEDICINE | Facility: CLINIC | Age: 86
End: 2021-08-12

## 2021-08-12 ENCOUNTER — OFFICE VISIT (OUTPATIENT)
Dept: PAIN MEDICINE | Facility: CLINIC | Age: 86
End: 2021-08-12
Attending: ANESTHESIOLOGY
Payer: MEDICARE

## 2021-08-12 DIAGNOSIS — G89.4 CHRONIC PAIN SYNDROME: Primary | ICD-10-CM

## 2021-08-12 PROCEDURE — 99499 UNLISTED E&M SERVICE: CPT | Mod: 95,,, | Performed by: ANESTHESIOLOGY

## 2021-08-12 PROCEDURE — 99499 NO LOS: ICD-10-PCS | Mod: 95,,, | Performed by: ANESTHESIOLOGY

## 2021-08-14 ENCOUNTER — PATIENT MESSAGE (OUTPATIENT)
Dept: PAIN MEDICINE | Facility: CLINIC | Age: 86
End: 2021-08-14

## 2021-08-23 ENCOUNTER — TELEPHONE (OUTPATIENT)
Dept: PAIN MEDICINE | Facility: CLINIC | Age: 86
End: 2021-08-23

## 2021-08-25 ENCOUNTER — TELEPHONE (OUTPATIENT)
Dept: PAIN MEDICINE | Facility: CLINIC | Age: 86
End: 2021-08-25

## 2021-08-25 ENCOUNTER — PATIENT MESSAGE (OUTPATIENT)
Dept: PAIN MEDICINE | Facility: CLINIC | Age: 86
End: 2021-08-25

## 2021-08-26 ENCOUNTER — OFFICE VISIT (OUTPATIENT)
Dept: PAIN MEDICINE | Facility: CLINIC | Age: 86
End: 2021-08-26
Attending: ANESTHESIOLOGY
Payer: MEDICARE

## 2021-08-26 VITALS
TEMPERATURE: 98 F | HEIGHT: 73 IN | RESPIRATION RATE: 18 BRPM | SYSTOLIC BLOOD PRESSURE: 168 MMHG | WEIGHT: 196 LBS | DIASTOLIC BLOOD PRESSURE: 75 MMHG | BODY MASS INDEX: 25.98 KG/M2 | HEART RATE: 59 BPM

## 2021-08-26 DIAGNOSIS — Z96.89 SPINAL CORD STIMULATOR STATUS: ICD-10-CM

## 2021-08-26 DIAGNOSIS — M54.15 RADICULOPATHY OF THORACOLUMBAR REGION: ICD-10-CM

## 2021-08-26 DIAGNOSIS — G89.4 CHRONIC PAIN SYNDROME: ICD-10-CM

## 2021-08-26 DIAGNOSIS — G03.9 ARACHNOIDITIS: Primary | ICD-10-CM

## 2021-08-26 DIAGNOSIS — M46.1 SACROILIITIS: ICD-10-CM

## 2021-08-26 PROCEDURE — 99999 PR PBB SHADOW E&M-EST. PATIENT-LVL IV: CPT | Mod: PBBFAC,,, | Performed by: ANESTHESIOLOGY

## 2021-08-26 PROCEDURE — 99214 OFFICE O/P EST MOD 30 MIN: CPT | Mod: S$PBB,GC,, | Performed by: ANESTHESIOLOGY

## 2021-08-26 PROCEDURE — 99999 PR PBB SHADOW E&M-EST. PATIENT-LVL IV: ICD-10-PCS | Mod: PBBFAC,,, | Performed by: ANESTHESIOLOGY

## 2021-08-26 PROCEDURE — 99214 OFFICE O/P EST MOD 30 MIN: CPT | Mod: PBBFAC | Performed by: ANESTHESIOLOGY

## 2021-08-26 PROCEDURE — 99214 PR OFFICE/OUTPT VISIT, EST, LEVL IV, 30-39 MIN: ICD-10-PCS | Mod: S$PBB,GC,, | Performed by: ANESTHESIOLOGY

## 2021-08-26 RX ORDER — OXYCODONE AND ACETAMINOPHEN 5; 325 MG/1; MG/1
1 TABLET ORAL EVERY 8 HOURS PRN
Qty: 90 TABLET | Refills: 0 | Status: SHIPPED | OUTPATIENT
Start: 2021-08-26 | End: 2021-11-04

## 2021-08-26 RX ORDER — CELECOXIB 200 MG/1
200 CAPSULE ORAL DAILY
Qty: 90 CAPSULE | Refills: 0 | Status: SHIPPED | OUTPATIENT
Start: 2021-08-26 | End: 2021-11-24

## 2021-09-09 ENCOUNTER — TELEPHONE (OUTPATIENT)
Dept: OPHTHALMOLOGY | Facility: CLINIC | Age: 86
End: 2021-09-09

## 2021-09-18 ENCOUNTER — HOSPITAL ENCOUNTER (OUTPATIENT)
Dept: RADIOLOGY | Facility: OTHER | Age: 86
Discharge: HOME | End: 2021-09-18
Attending: ANESTHESIOLOGY
Payer: MEDICARE

## 2021-09-18 DIAGNOSIS — Z96.89 SPINAL CORD STIMULATOR STATUS: ICD-10-CM

## 2021-09-18 DIAGNOSIS — G89.4 CHRONIC PAIN SYNDROME: ICD-10-CM

## 2021-09-18 DIAGNOSIS — G03.9 ARACHNOIDITIS: ICD-10-CM

## 2021-09-18 DIAGNOSIS — M54.15 RADICULOPATHY OF THORACOLUMBAR REGION: ICD-10-CM

## 2021-09-18 DIAGNOSIS — M46.1 SACROILIITIS: ICD-10-CM

## 2021-09-18 PROCEDURE — 72148 MRI LUMBAR SPINE W/O DYE: CPT | Mod: TC

## 2021-09-18 PROCEDURE — 72148 MRI LUMBAR SPINE W/O DYE: CPT | Mod: 26,,, | Performed by: RADIOLOGY

## 2021-09-18 PROCEDURE — 72148 MRI LUMBAR SPINE WITHOUT CONTRAST: ICD-10-PCS | Mod: 26,,, | Performed by: RADIOLOGY

## 2021-10-04 ENCOUNTER — TELEPHONE (OUTPATIENT)
Dept: ADMINISTRATIVE | Facility: OTHER | Age: 86
End: 2021-10-04

## 2021-10-04 ENCOUNTER — OFFICE VISIT (OUTPATIENT)
Dept: SPINE | Facility: CLINIC | Age: 86
End: 2021-10-04
Attending: ANESTHESIOLOGY
Payer: MEDICARE

## 2021-10-04 VITALS
SYSTOLIC BLOOD PRESSURE: 155 MMHG | HEIGHT: 73 IN | WEIGHT: 196 LBS | DIASTOLIC BLOOD PRESSURE: 65 MMHG | HEART RATE: 56 BPM | BODY MASS INDEX: 25.98 KG/M2

## 2021-10-04 DIAGNOSIS — G03.9 ARACHNOIDITIS: ICD-10-CM

## 2021-10-04 DIAGNOSIS — Z96.89 SPINAL CORD STIMULATOR STATUS: ICD-10-CM

## 2021-10-04 DIAGNOSIS — M53.3 SACRAL DYSFUNCTION: ICD-10-CM

## 2021-10-04 DIAGNOSIS — G89.4 CHRONIC PAIN SYNDROME: ICD-10-CM

## 2021-10-04 DIAGNOSIS — M46.1 SACROILIITIS: Primary | ICD-10-CM

## 2021-10-04 PROCEDURE — 99999 PR PBB SHADOW E&M-EST. PATIENT-LVL III: CPT | Mod: PBBFAC,,, | Performed by: ANESTHESIOLOGY

## 2021-10-04 PROCEDURE — 99999 PR PBB SHADOW E&M-EST. PATIENT-LVL III: ICD-10-PCS | Mod: PBBFAC,,, | Performed by: ANESTHESIOLOGY

## 2021-10-04 PROCEDURE — 99213 OFFICE O/P EST LOW 20 MIN: CPT | Mod: PBBFAC | Performed by: ANESTHESIOLOGY

## 2021-10-04 PROCEDURE — 99214 PR OFFICE/OUTPT VISIT, EST, LEVL IV, 30-39 MIN: ICD-10-PCS | Mod: S$PBB,GC,, | Performed by: ANESTHESIOLOGY

## 2021-10-04 PROCEDURE — 99214 OFFICE O/P EST MOD 30 MIN: CPT | Mod: S$PBB,GC,, | Performed by: ANESTHESIOLOGY

## 2021-10-04 RX ORDER — HYDROCODONE BITARTRATE AND ACETAMINOPHEN 10; 325 MG/1; MG/1
1 TABLET ORAL DAILY PRN
COMMUNITY
End: 2021-10-04

## 2021-10-04 RX ORDER — OXYCODONE AND ACETAMINOPHEN 7.5; 325 MG/1; MG/1
1 TABLET ORAL EVERY 6 HOURS PRN
COMMUNITY
Start: 2021-06-24 | End: 2021-11-04

## 2021-10-05 ENCOUNTER — TELEPHONE (OUTPATIENT)
Dept: OPHTHALMOLOGY | Facility: CLINIC | Age: 86
End: 2021-10-05

## 2021-10-05 ENCOUNTER — OFFICE VISIT (OUTPATIENT)
Dept: OPHTHALMOLOGY | Facility: CLINIC | Age: 86
End: 2021-10-05
Payer: MEDICARE

## 2021-10-05 DIAGNOSIS — H25.11 NUCLEAR SCLEROTIC CATARACT OF RIGHT EYE: Primary | ICD-10-CM

## 2021-10-05 DIAGNOSIS — Z13.9 SCREENING PROCEDURE: Primary | ICD-10-CM

## 2021-10-05 DIAGNOSIS — Z98.42 STATUS POST CATARACT EXTRACTION AND INSERTION OF INTRAOCULAR LENS, LEFT: ICD-10-CM

## 2021-10-05 DIAGNOSIS — Z96.1 STATUS POST CATARACT EXTRACTION AND INSERTION OF INTRAOCULAR LENS, LEFT: ICD-10-CM

## 2021-10-05 PROCEDURE — 92136 IOL MASTER - OD - RIGHT EYE: ICD-10-PCS | Mod: 26,S$PBB,RT, | Performed by: OPHTHALMOLOGY

## 2021-10-05 PROCEDURE — 99204 OFFICE O/P NEW MOD 45 MIN: CPT | Mod: S$PBB,,, | Performed by: OPHTHALMOLOGY

## 2021-10-05 PROCEDURE — 92136 OPHTHALMIC BIOMETRY: CPT | Mod: PBBFAC | Performed by: OPHTHALMOLOGY

## 2021-10-05 PROCEDURE — 99204 PR OFFICE/OUTPT VISIT, NEW, LEVL IV, 45-59 MIN: ICD-10-PCS | Mod: S$PBB,,, | Performed by: OPHTHALMOLOGY

## 2021-10-05 PROCEDURE — 99213 OFFICE O/P EST LOW 20 MIN: CPT | Mod: PBBFAC | Performed by: OPHTHALMOLOGY

## 2021-10-05 PROCEDURE — 99999 PR PBB SHADOW E&M-EST. PATIENT-LVL III: ICD-10-PCS | Mod: PBBFAC,,, | Performed by: OPHTHALMOLOGY

## 2021-10-05 PROCEDURE — 99999 PR PBB SHADOW E&M-EST. PATIENT-LVL III: CPT | Mod: PBBFAC,,, | Performed by: OPHTHALMOLOGY

## 2021-10-08 ENCOUNTER — TELEPHONE (OUTPATIENT)
Dept: ADMINISTRATIVE | Facility: OTHER | Age: 86
End: 2021-10-08

## 2021-10-11 ENCOUNTER — TELEPHONE (OUTPATIENT)
Dept: ADMINISTRATIVE | Facility: OTHER | Age: 86
End: 2021-10-11

## 2021-10-13 ENCOUNTER — PATIENT MESSAGE (OUTPATIENT)
Dept: PAIN MEDICINE | Facility: OTHER | Age: 86
End: 2021-10-13
Payer: MEDICARE

## 2021-10-13 ENCOUNTER — HOSPITAL ENCOUNTER (OUTPATIENT)
Facility: OTHER | Age: 86
Discharge: HOME | End: 2021-10-13
Attending: ANESTHESIOLOGY | Admitting: ANESTHESIOLOGY
Payer: MEDICARE

## 2021-10-13 VITALS
WEIGHT: 190 LBS | HEIGHT: 73 IN | OXYGEN SATURATION: 95 % | SYSTOLIC BLOOD PRESSURE: 197 MMHG | DIASTOLIC BLOOD PRESSURE: 74 MMHG | RESPIRATION RATE: 18 BRPM | BODY MASS INDEX: 25.18 KG/M2 | TEMPERATURE: 98 F | HEART RATE: 56 BPM

## 2021-10-13 DIAGNOSIS — M46.1 SACROILIITIS: Primary | ICD-10-CM

## 2021-10-13 DIAGNOSIS — G89.29 CHRONIC PAIN: ICD-10-CM

## 2021-10-13 PROCEDURE — 63600175 PHARM REV CODE 636 W HCPCS: Performed by: ANESTHESIOLOGY

## 2021-10-13 PROCEDURE — 27096 INJECT SACROILIAC JOINT: CPT | Mod: RT,,, | Performed by: ANESTHESIOLOGY

## 2021-10-13 PROCEDURE — 25000003 PHARM REV CODE 250: Performed by: ANESTHESIOLOGY

## 2021-10-13 PROCEDURE — 27096 PR INJECTION,SACROILIAC JOINT: ICD-10-PCS | Mod: RT,,, | Performed by: ANESTHESIOLOGY

## 2021-10-13 PROCEDURE — 25500020 PHARM REV CODE 255: Performed by: ANESTHESIOLOGY

## 2021-10-13 PROCEDURE — 27096 INJECT SACROILIAC JOINT: CPT | Mod: RT | Performed by: ANESTHESIOLOGY

## 2021-10-13 RX ORDER — SODIUM CHLORIDE 9 MG/ML
INJECTION, SOLUTION INTRAVENOUS CONTINUOUS
Status: DISCONTINUED | OUTPATIENT
Start: 2021-10-13 | End: 2021-10-13 | Stop reason: HOSPADM

## 2021-10-13 RX ORDER — BUPIVACAINE HYDROCHLORIDE 2.5 MG/ML
INJECTION, SOLUTION EPIDURAL; INFILTRATION; INTRACAUDAL
Status: DISCONTINUED | OUTPATIENT
Start: 2021-10-13 | End: 2021-10-13 | Stop reason: HOSPADM

## 2021-10-13 RX ORDER — LIDOCAINE HYDROCHLORIDE 10 MG/ML
INJECTION INFILTRATION; PERINEURAL
Status: DISCONTINUED | OUTPATIENT
Start: 2021-10-13 | End: 2021-10-13 | Stop reason: HOSPADM

## 2021-10-13 RX ORDER — TRIAMCINOLONE ACETONIDE 40 MG/ML
INJECTION, SUSPENSION INTRA-ARTICULAR; INTRAMUSCULAR
Status: DISCONTINUED | OUTPATIENT
Start: 2021-10-13 | End: 2021-10-13 | Stop reason: HOSPADM

## 2021-10-14 ENCOUNTER — PATIENT MESSAGE (OUTPATIENT)
Dept: PAIN MEDICINE | Facility: CLINIC | Age: 86
End: 2021-10-14
Payer: MEDICARE

## 2021-10-20 DIAGNOSIS — H25.11 NUCLEAR SCLEROTIC CATARACT OF RIGHT EYE: Primary | ICD-10-CM

## 2021-10-20 RX ORDER — PREDNISOLONE ACETATE-GATIFLOXACIN-BROMFENAC .75; 5; 1 MG/ML; MG/ML; MG/ML
1 SUSPENSION/ DROPS OPHTHALMIC 3 TIMES DAILY
Qty: 5 ML | Refills: 3 | Status: SHIPPED | OUTPATIENT
Start: 2021-10-20 | End: 2022-02-25 | Stop reason: SDUPTHER

## 2021-11-03 ENCOUNTER — TELEPHONE (OUTPATIENT)
Dept: PAIN MEDICINE | Facility: CLINIC | Age: 86
End: 2021-11-03
Payer: MEDICARE

## 2021-11-04 ENCOUNTER — OFFICE VISIT (OUTPATIENT)
Dept: PAIN MEDICINE | Facility: CLINIC | Age: 86
End: 2021-11-04
Attending: ANESTHESIOLOGY
Payer: MEDICARE

## 2021-11-04 ENCOUNTER — PATIENT MESSAGE (OUTPATIENT)
Dept: PAIN MEDICINE | Facility: CLINIC | Age: 86
End: 2021-11-04

## 2021-11-04 VITALS
BODY MASS INDEX: 26.12 KG/M2 | RESPIRATION RATE: 18 BRPM | HEIGHT: 73 IN | DIASTOLIC BLOOD PRESSURE: 67 MMHG | HEART RATE: 58 BPM | SYSTOLIC BLOOD PRESSURE: 139 MMHG | WEIGHT: 197.06 LBS

## 2021-11-04 DIAGNOSIS — G57.01 PIRIFORMIS SYNDROME OF RIGHT SIDE: Primary | ICD-10-CM

## 2021-11-04 DIAGNOSIS — M46.1 SACROILIITIS: ICD-10-CM

## 2021-11-04 DIAGNOSIS — G03.9 ARACHNOIDITIS: ICD-10-CM

## 2021-11-04 DIAGNOSIS — Z96.89 SPINAL CORD STIMULATOR STATUS: ICD-10-CM

## 2021-11-04 DIAGNOSIS — M54.15 RADICULOPATHY OF THORACOLUMBAR REGION: ICD-10-CM

## 2021-11-04 DIAGNOSIS — G89.4 CHRONIC PAIN SYNDROME: ICD-10-CM

## 2021-11-04 PROCEDURE — 99214 PR OFFICE/OUTPT VISIT, EST, LEVL IV, 30-39 MIN: ICD-10-PCS | Mod: S$PBB,GC,, | Performed by: ANESTHESIOLOGY

## 2021-11-04 PROCEDURE — 99999 PR PBB SHADOW E&M-EST. PATIENT-LVL III: CPT | Mod: PBBFAC,,, | Performed by: ANESTHESIOLOGY

## 2021-11-04 PROCEDURE — 99213 OFFICE O/P EST LOW 20 MIN: CPT | Mod: PBBFAC | Performed by: ANESTHESIOLOGY

## 2021-11-04 PROCEDURE — 99214 OFFICE O/P EST MOD 30 MIN: CPT | Mod: S$PBB,GC,, | Performed by: ANESTHESIOLOGY

## 2021-11-04 PROCEDURE — 99999 PR PBB SHADOW E&M-EST. PATIENT-LVL III: ICD-10-PCS | Mod: PBBFAC,,, | Performed by: ANESTHESIOLOGY

## 2021-11-04 RX ORDER — OXYCODONE AND ACETAMINOPHEN 5; 325 MG/1; MG/1
1 TABLET ORAL EVERY 8 HOURS PRN
Qty: 90 TABLET | Refills: 0 | Status: SHIPPED | OUTPATIENT
Start: 2021-11-04 | End: 2021-12-30 | Stop reason: SDUPTHER

## 2021-11-09 ENCOUNTER — TELEPHONE (OUTPATIENT)
Dept: PAIN MEDICINE | Facility: CLINIC | Age: 86
End: 2021-11-09
Payer: MEDICARE

## 2021-11-09 DIAGNOSIS — G57.01 PIRIFORMIS SYNDROME OF RIGHT SIDE: Primary | ICD-10-CM

## 2021-11-12 ENCOUNTER — TELEPHONE (OUTPATIENT)
Dept: OPHTHALMOLOGY | Facility: CLINIC | Age: 86
End: 2021-11-12
Payer: MEDICARE

## 2021-11-15 ENCOUNTER — TELEPHONE (OUTPATIENT)
Dept: OPHTHALMOLOGY | Facility: CLINIC | Age: 86
End: 2021-11-15
Payer: MEDICARE

## 2021-11-17 ENCOUNTER — TELEPHONE (OUTPATIENT)
Dept: OPHTHALMOLOGY | Facility: CLINIC | Age: 86
End: 2021-11-17
Payer: MEDICARE

## 2021-11-17 ENCOUNTER — HOSPITAL ENCOUNTER (OUTPATIENT)
Facility: OTHER | Age: 86
Discharge: HOME | End: 2021-11-17
Attending: OPHTHALMOLOGY | Admitting: OPHTHALMOLOGY
Payer: MEDICARE

## 2021-11-17 VITALS
WEIGHT: 197 LBS | HEART RATE: 56 BPM | RESPIRATION RATE: 18 BRPM | HEIGHT: 73 IN | DIASTOLIC BLOOD PRESSURE: 71 MMHG | SYSTOLIC BLOOD PRESSURE: 162 MMHG | OXYGEN SATURATION: 99 % | TEMPERATURE: 98 F | BODY MASS INDEX: 26.11 KG/M2

## 2021-11-17 DIAGNOSIS — H25.11 NUCLEAR SCLEROTIC CATARACT OF RIGHT EYE: Primary | ICD-10-CM

## 2021-11-17 DIAGNOSIS — H25.10 AGE-RELATED NUCLEAR CATARACT: ICD-10-CM

## 2021-11-17 PROCEDURE — 36000704 HC OR TIME LEV I 1ST 15 MIN

## 2021-11-17 PROCEDURE — 25000003 PHARM REV CODE 250: Performed by: OPHTHALMOLOGY

## 2021-11-17 RX ORDER — MOXIFLOXACIN 5 MG/ML
1 SOLUTION/ DROPS OPHTHALMIC
Status: DISCONTINUED | OUTPATIENT
Start: 2021-11-17 | End: 2021-11-17 | Stop reason: HOSPADM

## 2021-11-17 RX ORDER — TETRACAINE HYDROCHLORIDE 5 MG/ML
1 SOLUTION OPHTHALMIC
Status: DISCONTINUED | OUTPATIENT
Start: 2021-11-17 | End: 2021-11-17 | Stop reason: HOSPADM

## 2021-11-17 RX ORDER — TROPICAMIDE 10 MG/ML
1 SOLUTION/ DROPS OPHTHALMIC
Status: DISCONTINUED | OUTPATIENT
Start: 2021-11-17 | End: 2021-11-17 | Stop reason: HOSPADM

## 2021-11-17 RX ORDER — SODIUM CHLORIDE 0.9 % (FLUSH) 0.9 %
2 SYRINGE (ML) INJECTION
Status: DISCONTINUED | OUTPATIENT
Start: 2021-11-17 | End: 2021-11-17 | Stop reason: HOSPADM

## 2021-11-17 RX ORDER — PHENYLEPHRINE HYDROCHLORIDE 25 MG/ML
1 SOLUTION/ DROPS OPHTHALMIC
Status: DISCONTINUED | OUTPATIENT
Start: 2021-11-17 | End: 2021-11-17 | Stop reason: HOSPADM

## 2021-11-17 RX ADMIN — TROPICAMIDE 1 DROP: 10 SOLUTION/ DROPS OPHTHALMIC at 09:11

## 2021-11-17 RX ADMIN — TETRACAINE HYDROCHLORIDE 1 DROP: 5 SOLUTION OPHTHALMIC at 09:11

## 2021-11-17 RX ADMIN — PHENYLEPHRINE HYDROCHLORIDE 1 DROP: 25 SOLUTION/ DROPS OPHTHALMIC at 09:11

## 2021-11-17 RX ADMIN — MOXIFLOXACIN 1 DROP: 5 SOLUTION/ DROPS OPHTHALMIC at 09:11

## 2021-11-26 ENCOUNTER — TELEPHONE (OUTPATIENT)
Dept: PAIN MEDICINE | Facility: CLINIC | Age: 86
End: 2021-11-26
Payer: MEDICARE

## 2021-11-29 ENCOUNTER — PROCEDURE VISIT (OUTPATIENT)
Dept: PAIN MEDICINE | Facility: CLINIC | Age: 86
End: 2021-11-29
Attending: ANESTHESIOLOGY
Payer: MEDICARE

## 2021-11-29 VITALS
BODY MASS INDEX: 26.36 KG/M2 | RESPIRATION RATE: 19 BRPM | HEART RATE: 54 BPM | WEIGHT: 198.88 LBS | HEIGHT: 73 IN | TEMPERATURE: 97 F | SYSTOLIC BLOOD PRESSURE: 140 MMHG | DIASTOLIC BLOOD PRESSURE: 61 MMHG

## 2021-11-29 DIAGNOSIS — M79.18 MYOFASCIAL PAIN: ICD-10-CM

## 2021-11-29 DIAGNOSIS — G57.00 PIRIFORMIS SYNDROME, UNSPECIFIED LATERALITY: ICD-10-CM

## 2021-11-29 DIAGNOSIS — M70.70 ISCHIAL BURSITIS, UNSPECIFIED LATERALITY: Primary | ICD-10-CM

## 2021-11-29 PROCEDURE — 27096 PR INJECTION,SACROILIAC JOINT: ICD-10-PCS | Mod: S$PBB,RT,, | Performed by: ANESTHESIOLOGY

## 2021-11-29 PROCEDURE — 20552 NJX 1/MLT TRIGGER POINT 1/2: CPT | Mod: 59,PBBFAC | Performed by: ANESTHESIOLOGY

## 2021-11-29 PROCEDURE — 20606 DRAIN/INJ JOINT/BURSA W/US: CPT | Mod: PBBFAC | Performed by: ANESTHESIOLOGY

## 2021-11-29 PROCEDURE — 76942 ECHO GUIDE FOR BIOPSY: CPT | Mod: 59,PBBFAC | Performed by: ANESTHESIOLOGY

## 2021-11-29 PROCEDURE — 20552 PR INJECT TRIGGER POINT, 1 OR 2: ICD-10-PCS | Mod: 59,S$PBB,, | Performed by: ANESTHESIOLOGY

## 2021-11-29 PROCEDURE — 20552 NJX 1/MLT TRIGGER POINT 1/2: CPT | Mod: 59,S$PBB,, | Performed by: ANESTHESIOLOGY

## 2021-11-29 PROCEDURE — 27096 INJECT SACROILIAC JOINT: CPT | Mod: S$PBB,RT,, | Performed by: ANESTHESIOLOGY

## 2021-12-23 ENCOUNTER — PATIENT MESSAGE (OUTPATIENT)
Dept: PAIN MEDICINE | Facility: CLINIC | Age: 86
End: 2021-12-23
Payer: MEDICARE

## 2021-12-29 ENCOUNTER — PATIENT MESSAGE (OUTPATIENT)
Dept: PAIN MEDICINE | Facility: CLINIC | Age: 86
End: 2021-12-29
Payer: MEDICARE

## 2021-12-30 ENCOUNTER — OFFICE VISIT (OUTPATIENT)
Dept: PAIN MEDICINE | Facility: CLINIC | Age: 86
End: 2021-12-30
Attending: ANESTHESIOLOGY
Payer: MEDICARE

## 2021-12-30 ENCOUNTER — TELEPHONE (OUTPATIENT)
Dept: PAIN MEDICINE | Facility: CLINIC | Age: 86
End: 2021-12-30
Payer: MEDICARE

## 2021-12-30 VITALS
DIASTOLIC BLOOD PRESSURE: 60 MMHG | HEIGHT: 73 IN | SYSTOLIC BLOOD PRESSURE: 149 MMHG | HEART RATE: 58 BPM | RESPIRATION RATE: 20 BRPM | WEIGHT: 199.75 LBS | TEMPERATURE: 98 F | BODY MASS INDEX: 26.47 KG/M2

## 2021-12-30 DIAGNOSIS — R26.9 GAIT DISTURBANCE: ICD-10-CM

## 2021-12-30 DIAGNOSIS — G89.4 CHRONIC PAIN SYNDROME: ICD-10-CM

## 2021-12-30 DIAGNOSIS — G03.9 ARACHNOIDITIS: Primary | ICD-10-CM

## 2021-12-30 DIAGNOSIS — Z96.89 SPINAL CORD STIMULATOR STATUS: ICD-10-CM

## 2021-12-30 DIAGNOSIS — M46.1 SACROILIITIS: ICD-10-CM

## 2021-12-30 PROCEDURE — 99214 OFFICE O/P EST MOD 30 MIN: CPT | Mod: S$PBB,GC,, | Performed by: ANESTHESIOLOGY

## 2021-12-30 PROCEDURE — 99999 PR PBB SHADOW E&M-EST. PATIENT-LVL IV: ICD-10-PCS | Mod: PBBFAC,,, | Performed by: ANESTHESIOLOGY

## 2021-12-30 PROCEDURE — 99214 OFFICE O/P EST MOD 30 MIN: CPT | Mod: PBBFAC | Performed by: ANESTHESIOLOGY

## 2021-12-30 PROCEDURE — 99999 PR PBB SHADOW E&M-EST. PATIENT-LVL IV: CPT | Mod: PBBFAC,,, | Performed by: ANESTHESIOLOGY

## 2021-12-30 PROCEDURE — 99214 PR OFFICE/OUTPT VISIT, EST, LEVL IV, 30-39 MIN: ICD-10-PCS | Mod: S$PBB,GC,, | Performed by: ANESTHESIOLOGY

## 2021-12-30 RX ORDER — OXYCODONE AND ACETAMINOPHEN 5; 325 MG/1; MG/1
1 TABLET ORAL EVERY 8 HOURS PRN
Qty: 90 TABLET | Refills: 0 | Status: SHIPPED | OUTPATIENT
Start: 2021-12-30 | End: 2022-01-29

## 2021-12-30 NOTE — PROGRESS NOTES
"  Chronic patient Established Note (Follow up visit)      SUBJECTIVE:  Interval History 12/30/21:  Jefferson Boogie presents to clinic for follow up appointment s/p Right SI joint and Right piriformis muscle injection under US guidance on 11/29/21. He did not obtain any noticeable relief with this procedure similar to all of his previous injections. His pain is unchanged and constant over the right buttock. He is taking percocet 5mg x 3 on days he is more active, such as playing golf. This helps some, but minimally. Denies any new symptoms or neurological changes. No numbness, tingling, weakness in his lower extremities. Denies bowel/bladder incontinence or saddle anesthesia.       Interval History 11/4/2021:  Jefferson Boogie presents to the clinic for a follow-up appointment for right sided back pain and SI joint pain. Mr. Boogie had a right SI joint injection on 10/13/2021. He did not note significant relief with the injection for any period of time. Pain is still constant over the right buttock and most noticeable when playing golf. He denies any new bowel/bladder incontinence, lower extremity numbness or weakness or saddle anesthesia.    Interval History 8/26/2021:  Jefferson Boogie presents to the clinic for a follow-up appointment for right sided hip pain with right-sided radiculopathy . Since the last visit, Jefferson Boogie states the pain has been "particularly tender today" 7/10. Patient reports playing golf recently and experienced worsening symptoms the following day. Mr. Boogie is s/p right-side hip joint injection with minimal relief. Patient states he has had relief with previous interventions and is open to RFA.     Interval History 6/10/2021:  Jefferson Boogie presents to the clinic for a follow-up appointment. Since the last visit, Jefferson Boogie states the pain has been stable. Current pain intensity is 3/10. States he is still having pain around SIJ that is affecting his " ADL      Interval History 4/15/2021:  The patient returns to clinic today for follow up of back pain. He reports that the swelling above the SI fusion incision has resolved. He denies any fever, chills, or drainage from the incision. He does report benefit since the fusion. He continues to report benefit with Nevro SCS. He reports intermittent shoulder pain at this time. He did receive an injection with Sports Medicine with benefit. He denies any other health changes. His pain today is 2/10.     Interval History 3/25/21:  Mr. Boogie presents to clinic for follow up of bilateral shoulder pain. He is s/p BL subacromial injections on 2/8/21. He reports maximum 20% relief of pain in the Right shoulder. Today the Left shoulder pain is 1/10; Right shoulder pain is 5-7/10.      Interval History 3/22/2021:  The patient returns to clinic today for follow up of back pain. He is s/p right SI fusion on 3/15/2021. He reports some relief at this time. He does report soreness to the incision. He denies any fever, chills, or drainage from incision. He continues to report benefit with Nevro SCS. He denies any other health changes. His pain today is 4/10.     Interval History 1/11/2020:  Patient is here for follow-up of low back pain now s/p sacroiliac RFA on 12/9/20 which provided no benefit and with the new complaint of bilateral anterior shoulder pain R>L. His shoulder pain started about 2 months ago. He describes 9/10 sharp/stinging pain without radiation that is exacerbated with overhead activity and improved with rest. He started PT about one month ago. He takes oxycodone which provides some relief. He had a blind subacromial steroid injection in the right shoulder with Dr. Ramirez on 10/26/19 which provided some short term relief.     Interval History 11/5/2020:  Jefferson Boogie presents to the clinic for a follow-up appointment for low back pain. Since the last visit, Jefferson HANSON Keagan states the pain has been stable.  Current pain intensity is 4/10. He had good relief from the SI joint injection that lasted for about a week. Pain has since returned. He also slipped and fell on his buttock a couple of weeks ago and had increased pain in the right buttock after that which is slowly improving. He continues to have variable benefit with the Nevro SCS for intermittent pain in the right leg. He is scheduled to meet with representatives today. He is taking celebrex daily and percocet as needed sparingly. He denies any adverse effects and any other health changes.     Interval History 10/5/2020:  The patient returns to clinic today for follow up of low back pain. He is s/p right SI joint injection on 9/9/2020. He reports 25% relief of his right sided low back and buttock pain. He did have good relief the first two days. He continues to report right sided low back and buttock pain. He denies any radicular leg pain. His pain is worse with prolonged standing and walking. He continues to report intermittent pain into his achilles from previous injury. He feels as though this has changed his gait. He continues to report some benefit with Nevro SCS device. He is in contact with representatives. He is currently taking Percocet as needed sparingly with some benefit. He denies any adverse effects. He denies any other health changes. His pain today is 4/10.      Interval History 9/2/2020:  The patient returns to clinic today for follow up of back pain. He reports that his back pain has improved since last audio visit. He continues to report back pain with intermittent radiating pain into his right hip and calf. He has spoken with Bienvenido from [employer] via phone with some programming. He is meeting with Bienvenido today. He had some issues with charging but this has improved. He is currently taking Norco intermittently but this is only lasts 2-3 hours. He denies any adverse effects. He denies any other health changes. His pain today is 8/10.     Interval  History 08/27/2020:  Pt was contacted over virtual audio for a follow up appointment.  He is currently complaining of right hip and right calf with no associated numbness or weakness.  He continues to use his Nevro device.  He states it has been very frustrating. Inconsistent.  Took 20 mins to 1 hour to charge.  Couldn't get it to start this morning.  He states that there is no drainage at the battery site, no signs of infection or pain at the site.  Patient is not taking medications at this time.  He wants to speak about medication options because he would like to start playing golf again.     Interval History 8/17/2020:  The patient returns to clinic today for post op wound check. He continues to report benefit with Nevro device. He denies any fever, chills, or drainage. He continues to follow his activity restrictions. He does report a recent achilles tendon injury while swimming. He is seeing Orthopedics. He denies any other health changes. His pain today is 4/10.     Interval History 8/3/2020:  The patient returns to clinic today for post op. He is s/p Nevro battery change on 7/27/2020. He denies any fever, chills, or drainage from incision. He has not completed his antibiotics as he missed two days of the medication. He continues to follow his activity restrictions. He reports relief with the device. He is meeting with Bienvenido javier for programming. He denies any other health changes. His pain today is 2/10     Interval History 7/22/2020:  Pt presents for audio follow up only s/p Right SI joint injection on 7/8/2020. Pt states he has near resolution of focal pain to buttock. He is pleased with result and pain relief. Pt has been in contact with [employer] and will be having battery swap in 5 days. He has difficulty whether stimulator is beneficial and has even had a holiday from using SCS.  He denies any newer areas pain, denies any neuro changes, meds area adequate to control pain without adverse side effects. Pain  is 2/10 today.     Interval HPI 6/15/2020:  Jefferson Boogie presents to the clinic for a follow-up appointment for 3 week follow up right hip and right thigh pain. Since the last visit, Jefferson Boogie states the pain has been persistant. Current pain intensity is 5/10. Patient has been working with Bienvenido Varghese, to try and get better coverage of a localized pain that he still experiences in his right low back area. He does report improvement in symptoms of numbness/tingling. Today, worse pain is 1/10 in severity and localizes to the right SI joint. Pain is worse with extension of his back. It is worse with golfing. Pain relieved by Norco--he takes 1-2 tablets of 5-325 Norco on days that he plays golf. Pain is also improved with being still and not being active. Patient endorses a much more active lifestyle with Norco. Denies new weakness/numbness or bowel/bladder changes.     Previous injection history includes a series of 3 lumbar CHOCO at [facility].      Interval HPI 3/5/20:  Patient presents to the clinic for a follow-up appointment for low back pain. Since the last visit, he states his pain has been unchanged. Current pain intensity is 4/10. He continues to work with [employer] to adjust settings on his SCS. He has stopped using tramadol, and uses norco sparingly. He continues to work with a  for physical therapy.      Interval HPI 1/9/2020:  Patient returns for follow up s/p Nevro SCS. He states he is unsure if it has helped his pain since he had it implanted. He last had an adjustment of his programming about 1 month ago. He does note that once he is done charging his SCS his pain is improved. Pain still worse with prolonged standing and activity such as golf. He still is doing home exercise program. He says that he is trying to do more since getting the SCS. He is still taking the Tramadol with limited pain relief. He takes Tramadol 50 mg twice daily only on days of activity which is  once a week. No other health changes.      HPI 19  Jefferson Boogie returns to clinic today for follow up. He reports increased low back and leg pain over the last few days. He has been in contact with [employer] representatives for programming adjustments. He does report benefit with the device. He reports good days and bad days. His pain is worse with prolonged standing and activity. He continues to participate in a home exercise routine. He does take Tramadol sparingly but reports limited relief. He denies any other health changes. His pain today is 5/10.    Pain Disability Index Review:  Last 3 PDI Scores 2021   Pain Disability Index (PDI) 25 45 25       Pain Medications:     Current medication:  Celebrex   Xanax  See med list    Opioid Contract: not applicable      report:  Reviewed and consistent with medication use as prescribed.     Pain Procedures:   2020- Right SI joint injection  2020- Nevro SCS battery change  2020- Right SIJ injection >80% relief   19 SCS implant  19 SCS trial  2020- Right SI joint injection  2020- SCS battery revision  2020- Right SI joint injection   2020- Right L5-S1 RFA  3/15/2021- Right SI fusion  2021 - Injection R Hip - minimal relief  10/13/2021 - Right SI joint injection  2021 - R SI joing and R piriformis muscle injection - no relief     Physical Therapy/Home Exercise: no     Imagin/10/2021 - X ray Hips Bilateral: No radiographic evidence of acute osseous abnormality of the pelvis and hips and without radiographic evidence of osteonecrosis of the femoral heads.    21 MRI L-spine:  FINDINGS:  Lumbar sagittal alignment is slightly is straightened.  There is slight convex right curvature lumbar spine.  There is degenerative disc disease with intervertebral disc height loss and endplate degenerative change at all levels with scattered disc desiccation allowing for degenerative change the  lumbar vertebral body heights and contours are within normal is without evidence for acute fracture or subluxation.  There is heterogeneous T1/T2 signal focus within the right aspect of the S1 vertebra most compatible with hemangioma this measures 2.0 cm.     The distal spinal cord and conus is normal in signal and contour tip of the conus approximates the T12/L1 level.  Please note there is artifact from indwelling spinal stimulator device with leads partially visualized casting artifact entering the dorsal epidural space at the T12 level and extending cranially.     There is abnormal clumping configuration of the filum terminalis a from T12 through L5 with slight thickening of scattered nerve roots most compatible with arachnoiditis.     No aneurysmal dilatation of the visualized abdominal aorta.     T12/L1 through L1/L2: No significant disc bulge, central canal or neural foraminal stenosis.     L2/L3: Posterior disc osteophyte with facet joint arthropathy without significant central canal stenosis with mild bilateral neural foraminal stenosis.     L3/L4:Bulging disc with ligamentum flavum hypertrophy without significant central canal stenosis with mild bilateral neural foraminal stenosis.     L4/L5:Posterior disc osteophyte with ligamentum flavum hypertrophy and facet joint arthropathy without significant central canal stenosis and mild bilateral neural foraminal stenosis.     L5/S1: Posterior disc osteophyte with facet joint arthropathy without significant central canal stenosis with moderate right and mild left neural foraminal stenosis.     This report was flagged in Epic as abnormal.     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  Please note there is spinal stimulator device with leads partially visualized and distorting the study by artifacts.     There is abnormal thickening of the filum configuration most compatible with arachnoiditis.     Please see above for  additional details.    Allergies: Review of patient's allergies indicates:  No Known Allergies    Current Medications:   Current Outpatient Medications   Medication Sig Dispense Refill    alendronate (FOSAMAX) 70 MG tablet 70 mg every 7 days.       ALPRAZolam (XANAX) 1 MG tablet Take 1 mg by mouth.      ergocalciferol, vitamin D2, 400 unit Tab Take 50,000 mg by mouth every 30 days.      finasteride (PROSCAR) 5 mg tablet Take 5 mg by mouth once daily.      fluticasone propionate (FLONASE) 50 mcg/actuation nasal spray daily as needed.      irbesartan (AVAPRO) 75 MG tablet 150 mg once daily.       levothyroxine (SYNTHROID) 100 MCG tablet Take 100 mcg by mouth.      prednisolon/gatiflox/bromfenac (PREDNISOL ACE-GATIFLOX-BROMFEN) 1-0.5-0.075 % DrpS Apply 1 drop to eye 3 (three) times daily. 5 mL 3    simvastatin (ZOCOR) 20 MG tablet Take 20 mg by mouth every evening.      tadalafil (CIALIS) 20 MG Tab       temazepam (RESTORIL) 30 mg capsule TK 1 C PO QD HS       No current facility-administered medications for this visit.       REVIEW OF SYSTEMS:    GENERAL:  No weight loss, malaise or fevers.  HEENT:  Negative for frequent or significant headaches.  NECK:  Negative for lumps, goiter, pain and significant neck swelling.  RESPIRATORY:  Negative for cough, wheezing or shortness of breath.  CARDIOVASCULAR:  Negative for chest pain, leg swelling or palpitations.  GI:  Negative for abdominal discomfort, blood in stools or black stools or change in bowel habits.  MUSCULOSKELETAL:  See HPI.  SKIN:  Negative for lesions, rash, and itching.  PSYCH:  Positive for sleep disturbance, mood disorder and recent psychosocial stressors.  HEMATOLOGY/LYMPHOLOGY:  Negative for prolonged bleeding, bruising easily or swollen nodes.  NEURO:   No history of headaches, syncope, paralysis, seizures or tremors.  All other reviewed and negative other than HPI.    Past Medical History:  Past Medical History:   Diagnosis Date    Cancer      stage I bladder cancer 50 yrs ago    Hypercholesteremia     Hypertension     Sacroiliitis 7/8/2020    Thyroid disease        Past Surgical History:  Past Surgical History:   Procedure Laterality Date    BLADDER SURGERY      CATARACT EXTRACTION      COLONOSCOPY      EYE SURGERY      cataracts     FUSION OF SACROILIAC JOINT Right 3/15/2021    Procedure: FUSION, RIGHT SACROILIAC JOINT FUSION;  Surgeon: Samuel Jimenez MD;  Location: [facility], [place], operated by [facility] OR;  Service: Pain Management;  Laterality: Right;  C-ARM, PAINTEQ REP     HERNIA REPAIR      INJECTION OF JOINT Right 7/8/2020    Procedure: INJECTION, JOINT, SACROILIAC (SI);  Surgeon: Samuel Jimenez MD;  Location: [facility], [place], operated by [facility] PAIN MGT;  Service: Pain Management;  Laterality: Right;    INJECTION OF JOINT Right 9/9/2020    Procedure: INJECTION, JOINT, SACROILIAC (SI);  Surgeon: Samuel Jimenez MD;  Location: [facility], [place], operated by [facility] PAIN MGT;  Service: Pain Management;  Laterality: Right;    INJECTION OF JOINT Right 7/21/2021    Procedure: INJECTION, JOINT, HIP RIGHT;  Surgeon: Samuel Jimenez MD;  Location: [facility], [place], operated by [facility] PAIN MGT;  Service: Pain Management;  Laterality: Right;  CONSENT NEEDED    INJECTION OF JOINT Right 10/13/2021    Procedure: INJECTION, JOINT, SACROILIAC (SI)  NEED CONSENT pt. requesting sedation;  Surgeon: Samuel Jimenez MD;  Location: [facility], [place], operated by [facility] PAIN MGT;  Service: Pain Management;  Laterality: Right;    KNEE SURGERY      RADIOFREQUENCY ABLATION Right 12/9/2020    Procedure: RADIOFREQUENCY ABLATION, L5-S1-S2 LATERAL BRANCH COOLED;  Surgeon: Samuel Jimenez MD;  Location: [facility], [place], operated by [facility] PAIN MGT;  Service: Pain Management;  Laterality: Right;    REPLACEMENT OF NERVE STIMULATOR BATTERY N/A 7/27/2020    Procedure: Replacement, SPINAL CORD STIMULATOR BATTERY CHANGE TO NEVRO OMNION BATTERY;  Surgeon: Samuel Jimenez MD;  Location: [facility], [place], operated by [facility] OR;  Service: Pain Management;  Laterality: N/A;  C-ARM, NEVRO REP    TRIAL OF SPINAL CORD NERVE STIMULATOR N/A 8/5/2019    Procedure: Trial,  "Neurostimulator, SPINAL CORD STIMULATOR TRIAL;  Surgeon: Samuel Jimenez MD;  Location: [facility] [place] CATH LAB;  Service: Pain Management;  Laterality: N/A;  C-ARM, NEVRO REP       Family History:  No family history on file.    Social History:  Social History     Socioeconomic History    Marital status:    Tobacco Use    Smoking status: Former Smoker     Quit date: 1980     Years since quittin.0    Smokeless tobacco: Never Used    Tobacco comment: stopped 40 years ago   Substance and Sexual Activity    Alcohol use: Yes     Alcohol/week: 2.0 standard drinks     Types: 2 Shots of liquor per week     Comment: nightly -scotch    Drug use: Never    Sexual activity: Yes     Partners: Female       OBJECTIVE:    BP (!) 149/60   Pulse (!) 58   Temp 97.7 °F (36.5 °C) (Temporal)   Resp 20   Ht 6' 1" (1.854 m)   Wt 90.6 kg (199 lb 11.8 oz)   BMI 26.35 kg/m²     PHYSICAL EXAMINATION:    General appearance: Well appearing, in no acute distress, alert and oriented x3.  Psych:  Mood and affect appropriate.  Skin: Skin color, texture, turgor normal, no rashes or lesions, in both upper and lower body.  Head/face:  Atraumatic, normocephalic. No palpable lymph nodes  Neck: No pain to palpation over the cervical paraspinous muscles. Spurling Negative. No pain with neck flexion, extension, or lateral flexion. .  Cor: RRR  Pulm: CTA  GI: Abdomen soft and non-tender.  Back: Straight leg raising in the sitting and supine positions is negative to radicular pain. mild pain to palpation over the spine or costovertebral angles. Decreased range of motion without pain reproduction. Positive tenderness over rt SIJ, Positive FABERE,Ganselin and Yeoman's test on the rt side.negative FADIR  Extremities: Tenderness to deep palpation of right piriformis. Peripheral joint ROM is full and pain free without obvious instability or laxity in all four extremities. No deformities, edema, or skin discoloration. Good capillary " refill.  Musculoskeletal: No pain with WICHO, FADIR or modified Gaenslen's. Bilateral lower extremity strength is normal and symmetric.  No atrophy or tone abnormalities are noted.  Neuro: Bilateral lower extremity coordination and muscle stretch reflexes are physiologic and symmetric.  No clonus. No loss of sensation is noted.  Gait: Antalgic without use of assistive device    ASSESSMENT: 86 y.o. year old male with low back and hip pain, consistent with:     1. Arachnoiditis  Procedure Request Order for Pain Management    Ambulatory referral/consult to Physical/Occupational Therapy   2. Chronic pain syndrome  Procedure Request Order for Pain Management    Ambulatory referral/consult to Physical/Occupational Therapy   3. Gait disturbance  Procedure Request Order for Pain Management    Ambulatory referral/consult to Physical/Occupational Therapy   4. Sacroiliitis     5. Spinal cord stimulator status         PLAN:     - I have stressed the importance of physical activity and a home exercise plan to help with pain and improve health.    - Patient can continue with medications for now since they are providing benefits, using them appropriately, and without side effects.    - Refilled percocet 5-325mg q6h prn (max TID)    - Schedule for caudal epidural with catheter     - Can consider retrial of gabapentin/lyrica following the above procedure    - Counseled patient regarding the importance of activity modification, constant sleeping habits and physical therapy.    The above plan and management options were discussed at length with patient. Patient is in agreement with the above and verbalized understanding.      Aaron Horton MD   I have personally reviewed the history and exam of this patient and agree with the resident/fellow/NPs note as stated above.    Samuel Jimenez MD    12/30/2021

## 2022-01-11 ENCOUNTER — PATIENT MESSAGE (OUTPATIENT)
Dept: PAIN MEDICINE | Facility: OTHER | Age: 87
End: 2022-01-11
Payer: MEDICARE

## 2022-01-12 ENCOUNTER — HOSPITAL ENCOUNTER (OUTPATIENT)
Facility: OTHER | Age: 87
Discharge: HOME | End: 2022-01-12
Attending: ANESTHESIOLOGY | Admitting: ANESTHESIOLOGY
Payer: MEDICARE

## 2022-01-12 VITALS
HEIGHT: 73 IN | OXYGEN SATURATION: 94 % | DIASTOLIC BLOOD PRESSURE: 70 MMHG | RESPIRATION RATE: 14 BRPM | BODY MASS INDEX: 25.71 KG/M2 | WEIGHT: 194 LBS | HEART RATE: 56 BPM | TEMPERATURE: 98 F | SYSTOLIC BLOOD PRESSURE: 163 MMHG

## 2022-01-12 DIAGNOSIS — M51.36 DDD (DEGENERATIVE DISC DISEASE), LUMBAR: Primary | ICD-10-CM

## 2022-01-12 DIAGNOSIS — M54.16 LUMBAR RADICULOPATHY: ICD-10-CM

## 2022-01-12 PROCEDURE — 25000003 PHARM REV CODE 250: Performed by: ANESTHESIOLOGY

## 2022-01-12 PROCEDURE — 62323 NJX INTERLAMINAR LMBR/SAC: CPT | Mod: ,,, | Performed by: ANESTHESIOLOGY

## 2022-01-12 PROCEDURE — 25500020 PHARM REV CODE 255: Performed by: ANESTHESIOLOGY

## 2022-01-12 PROCEDURE — 62323 PR INJ LUMBAR/SACRAL, W/IMAGING GUIDANCE: ICD-10-PCS | Mod: ,,, | Performed by: ANESTHESIOLOGY

## 2022-01-12 PROCEDURE — 62323 NJX INTERLAMINAR LMBR/SAC: CPT | Performed by: ANESTHESIOLOGY

## 2022-01-12 PROCEDURE — 25000003 PHARM REV CODE 250: Performed by: STUDENT IN AN ORGANIZED HEALTH CARE EDUCATION/TRAINING PROGRAM

## 2022-01-12 PROCEDURE — 63600175 PHARM REV CODE 636 W HCPCS: Performed by: ANESTHESIOLOGY

## 2022-01-12 RX ORDER — LIDOCAINE HYDROCHLORIDE 10 MG/ML
INJECTION, SOLUTION EPIDURAL; INFILTRATION; INTRACAUDAL; PERINEURAL
Status: DISCONTINUED | OUTPATIENT
Start: 2022-01-12 | End: 2022-01-12 | Stop reason: HOSPADM

## 2022-01-12 RX ORDER — SODIUM CHLORIDE 9 MG/ML
INJECTION, SOLUTION INTRAVENOUS CONTINUOUS
Status: DISCONTINUED | OUTPATIENT
Start: 2022-01-12 | End: 2022-01-12 | Stop reason: HOSPADM

## 2022-01-12 RX ORDER — MIDAZOLAM HYDROCHLORIDE 1 MG/ML
INJECTION INTRAMUSCULAR; INTRAVENOUS
Status: DISCONTINUED | OUTPATIENT
Start: 2022-01-12 | End: 2022-01-12 | Stop reason: HOSPADM

## 2022-01-12 RX ORDER — LIDOCAINE HYDROCHLORIDE 20 MG/ML
INJECTION, SOLUTION INFILTRATION; PERINEURAL
Status: DISCONTINUED | OUTPATIENT
Start: 2022-01-12 | End: 2022-01-12 | Stop reason: HOSPADM

## 2022-01-12 RX ORDER — DEXAMETHASONE SODIUM PHOSPHATE 10 MG/ML
INJECTION INTRAMUSCULAR; INTRAVENOUS
Status: DISCONTINUED | OUTPATIENT
Start: 2022-01-12 | End: 2022-01-12 | Stop reason: HOSPADM

## 2022-01-12 RX ADMIN — SODIUM CHLORIDE: 0.9 INJECTION, SOLUTION INTRAVENOUS at 08:01

## 2022-01-12 NOTE — DISCHARGE INSTRUCTIONS

## 2022-01-12 NOTE — OP NOTE
Patient Name: Jefferson Boogie  MRN: [id number]    INFORMED CONSENT: The procedure, risks, benefits and options were discussed with patient. There are no contraindications to the procedure. The patient expressed understanding and agreed to proceed. The personnel performing the procedure was discussed. I verify that I personally obtained Jefferson's consent prior to the start of the procedure and the signed consent can be found on the patient's chart.    Procedure Date: 01/12/2022    Anesthesia: Topical    Pre Procedure diagnosis: Chronic pain syndrome [G89.4]  Arachnoiditis [G03.9]  Gait disturbance [R26.9]  1. DDD (degenerative disc disease), lumbar    2. Lumbar radiculopathy      Post-Procedure diagnosis: SAME      Moderate Sedation: None    PROCEDURE: CAUDAL CHOCO with Racz Catheter      DESCRIPTION OF PROCEDURE: After fully informed written consent was obtained, the patient was brought to the procedure room and placed in the prone position. Monitoring of pulse oximetry, heart rate, and blood pressure was done pre-procedure, during the procedure, and post-procedure. The skin was prepped with chlorhexidine and draped in a sterile fashion.  After performing time out. With a 25-gauge 1.5  inch needle, 4 cc of lidocaine 1% was injected subcutaneously over the entry site. The sacrum and sacral cornua were visualized in an AP view.  An 16 gauge 31/2 inch Tuohy needle was inserted and advanced into the sacral hiatus under fluoroscopic guidance. After the needle passed through the sacrococcygeal ligament, the needle angle was lowered and the needle was advanced . The needle position was confirmed using AP and lateral fluoroscopic imaging. There was no evidence of paresthesias throughout needle placement. A radiopaque 20 G Flextip catheter  was introduced through the needle and directed to the L5 level, under fluoroscopic guidance. The stylette was removed and aspiration was negative for blood or CSF. 3 ml of Omnipaque 300  contrast agent was slowly injected. Confirmation of spread of contrast agent within the epidural space was made with fluoroscopic imaging in the AP and lateral views. Subsequently, 10 mL of lidocaine 0.5% and 10 mg decadron was slowly administered without resistance. There was no pain on injection. Contrast spread was noted from S2 to L5. The needle was removed and bleeding was nil . The patient tolerated the procedure well and there was no evidence of procedural complications. A sterile dressing was applied. No  specimens collected. Jefferson was taken back to the recovery room for further observation.     Blood Loss: Nill  Specimen: None    Samuel Jimenez MD

## 2022-01-12 NOTE — H&P
HPI: Patient presents today for scheduled caudal CHOCO.         Past Medical History:   Diagnosis Date    Cancer     stage I bladder cancer 50 yrs ago    Hypercholesteremia     Hypertension     Sacroiliitis 7/8/2020    Thyroid disease      Past Surgical History:   Procedure Laterality Date    BLADDER SURGERY      CATARACT EXTRACTION      COLONOSCOPY      EYE SURGERY      cataracts     FUSION OF SACROILIAC JOINT Right 3/15/2021    Procedure: FUSION, RIGHT SACROILIAC JOINT FUSION;  Surgeon: Samuel Jimenez MD;  Location: [facility] OR;  Service: Pain Management;  Laterality: Right;  C-ARM, PAINTEQ REP     HERNIA REPAIR      INJECTION OF JOINT Right 7/8/2020    Procedure: INJECTION, JOINT, SACROILIAC (SI);  Surgeon: Samuel Jimenez MD;  Location: [facility] PAIN MGT;  Service: Pain Management;  Laterality: Right;    INJECTION OF JOINT Right 9/9/2020    Procedure: INJECTION, JOINT, SACROILIAC (SI);  Surgeon: Samuel Jimenez MD;  Location: [facility] PAIN MGT;  Service: Pain Management;  Laterality: Right;    INJECTION OF JOINT Right 7/21/2021    Procedure: INJECTION, JOINT, HIP RIGHT;  Surgeon: Samuel Jimenez MD;  Location: [facility] PAIN MGT;  Service: Pain Management;  Laterality: Right;  CONSENT NEEDED    INJECTION OF JOINT Right 10/13/2021    Procedure: INJECTION, JOINT, SACROILIAC (SI)  NEED CONSENT pt. requesting sedation;  Surgeon: Samuel Jimenez MD;  Location: [facility] PAIN MGT;  Service: Pain Management;  Laterality: Right;    KNEE SURGERY      RADIOFREQUENCY ABLATION Right 12/9/2020    Procedure: RADIOFREQUENCY ABLATION, L5-S1-S2 LATERAL BRANCH COOLED;  Surgeon: Samuel Jimenez MD;  Location: [facility] PAIN MGT;  Service: Pain Management;  Laterality: Right;    REPLACEMENT OF NERVE STIMULATOR BATTERY N/A 7/27/2020    Procedure: Replacement, SPINAL CORD STIMULATOR BATTERY CHANGE TO NEVRO OMNION BATTERY;  Surgeon: Samuel Jimenez MD;  Location: [facility] OR;  Service: Pain Management;  Laterality: N/A;  C-ARM,  "NEVRO REP    TRIAL OF SPINAL CORD NERVE STIMULATOR N/A 8/5/2019    Procedure: Trial, Neurostimulator, SPINAL CORD STIMULATOR TRIAL;  Surgeon: Samuel Jimenez MD;  Location: [facility] CATH LAB;  Service: Pain Management;  Laterality: N/A;  C-ARM, NEVRO REP     Review of patient's allergies indicates:  No Known Allergies     PMHx, PSHx, Allergies, Medications reviewed in epic      ROS negative except pain complaints in HPI    OBJECTIVE:    BP (!) 167/72 (BP Location: Right arm, Patient Position: Sitting)   Pulse (!) 58   Temp 97.5 °F (36.4 °C) (Oral)   Resp 16   Ht 6' 1" (1.854 m)   Wt 88 kg (194 lb)   SpO2 (!) 94%   BMI 25.60 kg/m²     PHYSICAL EXAMINATION:    GENERAL: Well appearing, in no acute distress, alert and oriented x3.  PSYCH:  Mood and affect appropriate.  SKIN: Skin color, texture, turgor normal, no rashes or lesions.  CV: RRR with palpation of the radial artery.  PULM: No evidence of respiratory difficulty, symmetric chest rise. Clear to auscultation.  NEURO: Cranial nerves grossly intact.    Plan:    Proceed with procedure as planned    Chloé Andersen  01/12/2022    "

## 2022-01-12 NOTE — DISCHARGE SUMMARY
Discharge Note  Short Stay      SUMMARY     Admit Date: 1/12/2022    Attending Physician: Samuel Jimenez      Discharge Physician: Samuel Jimenez      Discharge Date: 1/12/2022 10:03 AM    Procedure(s) (LRB):  INJECTION, STEROID, EPIDURAL CAUDAL With Catheter needs consent (N/A)    Final Diagnosis: Chronic pain syndrome [G89.4]  Arachnoiditis [G03.9]  Gait disturbance [R26.9]    Disposition: Home or self care    Patient Instructions:   Current Discharge Medication List      CONTINUE these medications which have NOT CHANGED    Details   alendronate (FOSAMAX) 70 MG tablet 70 mg every 7 days.       ALPRAZolam (XANAX) 1 MG tablet Take 1 mg by mouth.      ergocalciferol, vitamin D2, 400 unit Tab Take 50,000 mg by mouth every 30 days.      finasteride (PROSCAR) 5 mg tablet Take 5 mg by mouth once daily.      fluticasone propionate (FLONASE) 50 mcg/actuation nasal spray daily as needed.      irbesartan (AVAPRO) 75 MG tablet 150 mg once daily.       levothyroxine (SYNTHROID) 100 MCG tablet Take 100 mcg by mouth.      oxyCODONE-acetaminophen (PERCOCET) 5-325 mg per tablet Take 1 tablet by mouth every 8 (eight) hours as needed for Pain.  Qty: 90 tablet, Refills: 0    Comments: Quantity prescribed more than 7 day supply? Yes, quantity medically necessary      prednisolon/gatiflox/bromfenac (PREDNISOL ACE-GATIFLOX-BROMFEN) 1-0.5-0.075 % DrpS Apply 1 drop to eye 3 (three) times daily.  Qty: 5 mL, Refills: 3    Associated Diagnoses: Nuclear sclerotic cataract of right eye      simvastatin (ZOCOR) 20 MG tablet Take 20 mg by mouth every evening.      tadalafil (CIALIS) 20 MG Tab       temazepam (RESTORIL) 30 mg capsule TK 1 C PO QD HS                 Discharge Diagnosis: Chronic pain syndrome [G89.4]  Arachnoiditis [G03.9]  Gait disturbance [R26.9]  Condition on Discharge: Stable with no complications to procedure   Diet on Discharge: Same as before.  Activity: as per instruction sheet.  Discharge to: Home with a responsible  adult.  Follow up: 2-4 weeks       Please call my office or pager at [phone] if experienced any weakness or loss of sensation, fever > 101.5, pain uncontrolled with oral medications, persistent nausea/vomiting/or diarrhea, redness or drainage from the incisions, or any other worrisome concerns. If physician on call was not reached or could not communicate with our office for any reason please go to the nearest emergency department        Samuel Jimenez MD     Tazorac Counseling:  Patient advised that medication is irritating and drying.  Patient may need to apply sparingly and wash off after an hour before eventually leaving it on overnight.  The patient verbalized understanding of the proper use and possible adverse effects of tazorac.  All of the patient's questions and concerns were addressed. Isotretinoin Counseling: Patient should get monthly blood tests, not donate blood, not drive at night if vision affected, not share medication, and not undergo elective surgery for 6 months after tx completed. Side effects reviewed, pt to contact office should one occur. Erythromycin Pregnancy And Lactation Text: This medication is Pregnancy Category B and is considered safe during pregnancy. It is also excreted in breast milk. Topical Retinoid Pregnancy And Lactation Text: This medication is Pregnancy Category C. It is unknown if this medication is excreted in breast milk. Minocycline Counseling: Patient advised regarding possible photosensitivity and discoloration of the teeth, skin, lips, tongue and gums.  Patient instructed to avoid sunlight, if possible.  When exposed to sunlight, patients should wear protective clothing, sunglasses, and sunscreen.  The patient was instructed to call the office immediately if the following severe adverse effects occur:  hearing changes, easy bruising/bleeding, severe headache, or vision changes.  The patient verbalized understanding of the proper use and possible adverse effects of minocycline.  All of the patient's questions and concerns were addressed. Bactrim Counseling:  I discussed with the patient the risks of sulfa antibiotics including but not limited to GI upset, allergic reaction, drug rash, diarrhea, dizziness, photosensitivity, and yeast infections.  Rarely, more serious reactions can occur including but not limited to aplastic anemia, agranulocytosis, methemoglobinemia, blood dyscrasias, liver or kidney failure, lung infiltrates or desquamative/blistering drug rashes. Topical Sulfur Applications Counseling: Topical Sulfur Counseling: Patient counseled that this medication may cause skin irritation or allergic reactions.  In the event of skin irritation, the patient was advised to reduce the amount of the drug applied or use it less frequently.   The patient verbalized understanding of the proper use and possible adverse effects of topical sulfur application.  All of the patient's questions and concerns were addressed. Dapsone Pregnancy And Lactation Text: This medication is Pregnancy Category C and is not considered safe during pregnancy or breast feeding. Tetracycline Pregnancy And Lactation Text: This medication is Pregnancy Category D and not consider safe during pregnancy. It is also excreted in breast milk. Include Pregnancy/Lactation Warning?: No Isotretinoin Pregnancy And Lactation Text: This medication is Pregnancy Category X and is considered extremely dangerous during pregnancy. It is unknown if it is excreted in breast milk. Tazorac Pregnancy And Lactation Text: This medication is not safe during pregnancy. It is unknown if this medication is excreted in breast milk. Birth Control Pills Counseling: Birth Control Pill Counseling: I discussed with the patient the potential side effects of OCPs including but not limited to increased risk of stroke, heart attack, thrombophlebitis, deep venous thrombosis, hepatic adenomas, breast changes, GI upset, headaches, and depression.  The patient verbalized understanding of the proper use and possible adverse effects of OCPs. All of the patient's questions and concerns were addressed. Spironolactone Counseling: Patient advised regarding risks of diarrhea, abdominal pain, hyperkalemia, birth defects (for female patients), liver toxicity and renal toxicity. The patient may need blood work to monitor liver and kidney function and potassium levels while on therapy. The patient verbalized understanding of the proper use and possible adverse effects of spironolactone.  All of the patient's questions and concerns were addressed. Benzoyl Peroxide Pregnancy And Lactation Text: This medication is Pregnancy Category C. It is unknown if benzoyl peroxide is excreted in breast milk. Bactrim Pregnancy And Lactation Text: This medication is Pregnancy Category D and is known to cause fetal risk.  It is also excreted in breast milk. Topical Sulfur Applications Pregnancy And Lactation Text: This medication is Pregnancy Category C and has an unknown safety profile during pregnancy. It is unknown if this topical medication is excreted in breast milk. Doxycycline Counseling:  Patient counseled regarding possible photosensitivity and increased risk for sunburn.  Patient instructed to avoid sunlight, if possible.  When exposed to sunlight, patients should wear protective clothing, sunglasses, and sunscreen.  The patient was instructed to call the office immediately if the following severe adverse effects occur:  hearing changes, easy bruising/bleeding, severe headache, or vision changes.  The patient verbalized understanding of the proper use and possible adverse effects of doxycycline.  All of the patient's questions and concerns were addressed. Benzoyl Peroxide Counseling: Patient counseled that medicine may cause skin irritation and bleach clothing.  In the event of skin irritation, the patient was advised to reduce the amount of the drug applied or use it less frequently.   The patient verbalized understanding of the proper use and possible adverse effects of benzoyl peroxide.  All of the patient's questions and concerns were addressed. High Dose Vitamin A Counseling: Side effects reviewed, pt to contact office should one occur. Azithromycin Counseling:  I discussed with the patient the risks of azithromycin including but not limited to GI upset, allergic reaction, drug rash, diarrhea, and yeast infections. Topical Clindamycin Counseling: Patient counseled that this medication may cause skin irritation or allergic reactions.  In the event of skin irritation, the patient was advised to reduce the amount of the drug applied or use it less frequently.   The patient verbalized understanding of the proper use and possible adverse effects of clindamycin.  All of the patient's questions and concerns were addressed. Birth Control Pills Pregnancy And Lactation Text: This medication should be avoided if pregnant and for the first 30 days post-partum. Spironolactone Pregnancy And Lactation Text: This medication can cause feminization of the male fetus and should be avoided during pregnancy. The active metabolite is also found in breast milk. Erythromycin Counseling:  I discussed with the patient the risks of erythromycin including but not limited to GI upset, allergic reaction, drug rash, diarrhea, increase in liver enzymes, and yeast infections. Doxycycline Pregnancy And Lactation Text: This medication is Pregnancy Category D and not consider safe during pregnancy. It is also excreted in breast milk but is considered safe for shorter treatment courses. Detail Level: Detailed Dapsone Counseling: I discussed with the patient the risks of dapsone including but not limited to hemolytic anemia, agranulocytosis, rashes, methemoglobinemia, kidney failure, peripheral neuropathy, headaches, GI upset, and liver toxicity.  Patients who start dapsone require monitoring including baseline LFTs and weekly CBCs for the first month, then every month thereafter.  The patient verbalized understanding of the proper use and possible adverse effects of dapsone.  All of the patient's questions and concerns were addressed. Tetracycline Counseling: Patient counseled regarding possible photosensitivity and increased risk for sunburn.  Patient instructed to avoid sunlight, if possible.  When exposed to sunlight, patients should wear protective clothing, sunglasses, and sunscreen.  The patient was instructed to call the office immediately if the following severe adverse effects occur:  hearing changes, easy bruising/bleeding, severe headache, or vision changes.  The patient verbalized understanding of the proper use and possible adverse effects of tetracycline.  All of the patient's questions and concerns were addressed. Patient understands to avoid pregnancy while on therapy due to potential birth defects. Topical Retinoid counseling:  Patient advised to apply a pea-sized amount only at bedtime and wait 30 minutes after washing their face before applying.  If too drying, patient may add a non-comedogenic moisturizer. The patient verbalized understanding of the proper use and possible adverse effects of retinoids.  All of the patient's questions and concerns were addressed. High Dose Vitamin A Pregnancy And Lactation Text: High dose vitamin A therapy is contraindicated during pregnancy and breast feeding. Topical Clindamycin Pregnancy And Lactation Text: This medication is Pregnancy Category B and is considered safe during pregnancy. It is unknown if it is excreted in breast milk. Azithromycin Pregnancy And Lactation Text: This medication is considered safe during pregnancy and is also secreted in breast milk.

## 2022-02-09 ENCOUNTER — TELEPHONE (OUTPATIENT)
Dept: PAIN MEDICINE | Facility: CLINIC | Age: 87
End: 2022-02-09
Payer: MEDICARE

## 2022-02-09 NOTE — TELEPHONE ENCOUNTER
"This message is for patient in regards to his/her appointment 2/10/22 at 3:30 pm.      [facility] Policy: For the safety of all patients and staff members.     Patient Visitor policy: Due to social distancing and limited seating staff are requesting patient to arrive to their schedule appointments alone. If patient do not need assistance with their visit, we're asking all visitors to remain outside the waiting area.    Upon arriving to facility; patient will have temperature taking and are required to wear a face mask, if patient do not have a face mask one will be provided. Upon arriving patient we ask patients to contact clinic at this number [phone] to notify staff that they have arrived or they may do do by utilizing the Mobile checked in [place](if patient have patient portal; clinical staff will send a message through there letting them know it's okay to proceed to their visit). Staff will give the patient the "okay" to come up or wait inside their vehicle until clinic is ready for patient to be seen by  If you have any questions or concerns please contact [phone]        "

## 2022-02-10 ENCOUNTER — OFFICE VISIT (OUTPATIENT)
Dept: PAIN MEDICINE | Facility: CLINIC | Age: 87
End: 2022-02-10
Attending: ANESTHESIOLOGY
Payer: MEDICARE

## 2022-02-10 VITALS
TEMPERATURE: 99 F | RESPIRATION RATE: 18 BRPM | WEIGHT: 194 LBS | SYSTOLIC BLOOD PRESSURE: 127 MMHG | DIASTOLIC BLOOD PRESSURE: 63 MMHG | BODY MASS INDEX: 25.71 KG/M2 | HEIGHT: 73 IN | HEART RATE: 60 BPM

## 2022-02-10 DIAGNOSIS — M54.16 LUMBAR RADICULOPATHY: ICD-10-CM

## 2022-02-10 DIAGNOSIS — M51.36 DDD (DEGENERATIVE DISC DISEASE), LUMBAR: Primary | ICD-10-CM

## 2022-02-10 PROCEDURE — 99214 PR OFFICE/OUTPT VISIT, EST, LEVL IV, 30-39 MIN: ICD-10-PCS | Mod: S$PBB,GC,, | Performed by: ANESTHESIOLOGY

## 2022-02-10 PROCEDURE — 99214 OFFICE O/P EST MOD 30 MIN: CPT | Mod: PBBFAC | Performed by: ANESTHESIOLOGY

## 2022-02-10 PROCEDURE — 99214 OFFICE O/P EST MOD 30 MIN: CPT | Mod: S$PBB,GC,, | Performed by: ANESTHESIOLOGY

## 2022-02-10 PROCEDURE — 99999 PR PBB SHADOW E&M-EST. PATIENT-LVL IV: ICD-10-PCS | Mod: PBBFAC,,, | Performed by: ANESTHESIOLOGY

## 2022-02-10 PROCEDURE — 99999 PR PBB SHADOW E&M-EST. PATIENT-LVL IV: CPT | Mod: PBBFAC,,, | Performed by: ANESTHESIOLOGY

## 2022-02-10 RX ORDER — OXYCODONE AND ACETAMINOPHEN 7.5; 325 MG/1; MG/1
1 TABLET ORAL EVERY 8 HOURS PRN
Qty: 90 TABLET | Refills: 0 | Status: SHIPPED | OUTPATIENT
Start: 2022-02-10 | End: 2023-03-30 | Stop reason: SDUPTHER

## 2022-02-10 NOTE — PROGRESS NOTES
"  Chronic patient Established Note (Follow up visit)      SUBJECTIVE:  Interval History 2/10/22:  Patent presents today s/p caudal epidural steroid injections on 1/12/22 since then he has not had any relief. He states his pain has been unchanged and is a 6/10 on average with the worst being 8/10 and best is 4/10. He has been taking percocet 5mg when he golfs or plays with his granddaughter.       Interval History 12/30/21:  Jefferson Boogie presents to clinic for follow up appointment s/p Right SI joint and Right piriformis muscle injection under US guidance on 11/29/21. He did not obtain any noticeable relief with this procedure similar to all of his previous injections. His pain is unchanged and constant over the right buttock. He is taking percocet 5mg x 3 on days he is more active, such as playing golf. This helps some, but minimally. Denies any new symptoms or neurological changes. No numbness, tingling, weakness in his lower extremities. Denies bowel/bladder incontinence or saddle anesthesia.       Interval History 11/4/2021:  Jefferson Boogie presents to the clinic for a follow-up appointment for right sided back pain and SI joint pain. Mr. Boogie had a right SI joint injection on 10/13/2021. He did not note significant relief with the injection for any period of time. Pain is still constant over the right buttock and most noticeable when playing golf. He denies any new bowel/bladder incontinence, lower extremity numbness or weakness or saddle anesthesia.    Interval History 8/26/2021:  Jefferson Boogie presents to the clinic for a follow-up appointment for right sided hip pain with right-sided radiculopathy . Since the last visit, Jefferson Boogie states the pain has been "particularly tender today" 7/10. Patient reports playing golf recently and experienced worsening symptoms the following day. Mr. Boogie is s/p right-side hip joint injection with minimal relief. Patient states he has had relief " with previous interventions and is open to RFA.     Interval History 6/10/2021:  Jefferson Boogie presents to the clinic for a follow-up appointment. Since the last visit, Jefferson Boogie states the pain has been stable. Current pain intensity is 3/10. States he is still having pain around SIJ that is affecting his ADL      Interval History 4/15/2021:  The patient returns to clinic today for follow up of back pain. He reports that the swelling above the SI fusion incision has resolved. He denies any fever, chills, or drainage from the incision. He does report benefit since the fusion. He continues to report benefit with Nevro SCS. He reports intermittent shoulder pain at this time. He did receive an injection with Sports Medicine with benefit. He denies any other health changes. His pain today is 2/10.     Interval History 3/25/21:  Mr. Boogie presents to clinic for follow up of bilateral shoulder pain. He is s/p BL subacromial injections on 2/8/21. He reports maximum 20% relief of pain in the Right shoulder. Today the Left shoulder pain is 1/10; Right shoulder pain is 5-7/10.      Interval History 3/22/2021:  The patient returns to clinic today for follow up of back pain. He is s/p right SI fusion on 3/15/2021. He reports some relief at this time. He does report soreness to the incision. He denies any fever, chills, or drainage from incision. He continues to report benefit with Nevro SCS. He denies any other health changes. His pain today is 4/10.     Interval History 1/11/2020:  Patient is here for follow-up of low back pain now s/p sacroiliac RFA on 12/9/20 which provided no benefit and with the new complaint of bilateral anterior shoulder pain R>L. His shoulder pain started about 2 months ago. He describes 9/10 sharp/stinging pain without radiation that is exacerbated with overhead activity and improved with rest. He started PT about one month ago. He takes oxycodone which provides some relief. He  had a blind subacromial steroid injection in the right shoulder with Dr. Ramirez on 10/26/19 which provided some short term relief.     Interval History 11/5/2020:  Jefferson Boogie presents to the clinic for a follow-up appointment for low back pain. Since the last visit, Jefferson Boogie states the pain has been stable. Current pain intensity is 4/10. He had good relief from the SI joint injection that lasted for about a week. Pain has since returned. He also slipped and fell on his buttock a couple of weeks ago and had increased pain in the right buttock after that which is slowly improving. He continues to have variable benefit with the Nevro SCS for intermittent pain in the right leg. He is scheduled to meet with representatives today. He is taking celebrex daily and percocet as needed sparingly. He denies any adverse effects and any other health changes.     Interval History 10/5/2020:  The patient returns to clinic today for follow up of low back pain. He is s/p right SI joint injection on 9/9/2020. He reports 25% relief of his right sided low back and buttock pain. He did have good relief the first two days. He continues to report right sided low back and buttock pain. He denies any radicular leg pain. His pain is worse with prolonged standing and walking. He continues to report intermittent pain into his achilles from previous injury. He feels as though this has changed his gait. He continues to report some benefit with Nevro SCS device. He is in contact with representatives. He is currently taking Percocet as needed sparingly with some benefit. He denies any adverse effects. He denies any other health changes. His pain today is 4/10.      Interval History 9/2/2020:  The patient returns to clinic today for follow up of back pain. He reports that his back pain has improved since last audio visit. He continues to report back pain with intermittent radiating pain into his right hip and calf. He has spoken  with Bienvenido from [employer] via phone with some programming. He is meeting with Bienvenido today. He had some issues with charging but this has improved. He is currently taking Norco intermittently but this is only lasts 2-3 hours. He denies any adverse effects. He denies any other health changes. His pain today is 8/10.     Interval History 08/27/2020:  Pt was contacted over virtual audio for a follow up appointment.  He is currently complaining of right hip and right calf with no associated numbness or weakness.  He continues to use his Nevro device.  He states it has been very frustrating. Inconsistent.  Took 20 mins to 1 hour to charge.  Couldn't get it to start this morning.  He states that there is no drainage at the battery site, no signs of infection or pain at the site.  Patient is not taking medications at this time.  He wants to speak about medication options because he would like to start playing golf again.     Interval History 8/17/2020:  The patient returns to clinic today for post op wound check. He continues to report benefit with Nevro device. He denies any fever, chills, or drainage. He continues to follow his activity restrictions. He does report a recent achilles tendon injury while swimming. He is seeing Orthopedics. He denies any other health changes. His pain today is 4/10.     Interval History 8/3/2020:  The patient returns to clinic today for post op. He is s/p Nevro battery change on 7/27/2020. He denies any fever, chills, or drainage from incision. He has not completed his antibiotics as he missed two days of the medication. He continues to follow his activity restrictions. He reports relief with the device. He is meeting with B[employer] today for programming. He denies any other health changes. His pain today is 2/10     Interval History 7/22/2020:  Pt presents for audio follow up only s/p Right SI joint injection on 7/8/2020. Pt states he has near resolution of focal pain to buttock. He is pleased with  result and pain relief. Pt has been in contact with [employer] and will be having battery swap in 5 days. He has difficulty whether stimulator is beneficial and has even had a holiday from using SCS.  He denies any newer areas pain, denies any neuro changes, meds area adequate to control pain without adverse side effects. Pain is 2/10 today.     Interval HPI 6/15/2020:  Jefferson Boogie presents to the clinic for a follow-up appointment for 3 week follow up right hip and right thigh pain. Since the last visit, Jefferson Boogie states the pain has been persistant. Current pain intensity is 5/10. Patient has been working with Bienvenido Varghese, to try and get better coverage of a localized pain that he still experiences in his right low back area. He does report improvement in symptoms of numbness/tingling. Today, worse pain is 1/10 in severity and localizes to the right SI joint. Pain is worse with extension of his back. It is worse with golfing. Pain relieved by Norco--he takes 1-2 tablets of 5-325 Norco on days that he plays golf. Pain is also improved with being still and not being active. Patient endorses a much more active lifestyle with Norco. Denies new weakness/numbness or bowel/bladder changes.     Previous injection history includes a series of 3 lumbar CHOCO at [facility].      Interval HPI 3/5/20:  Patient presents to the clinic for a follow-up appointment for low back pain. Since the last visit, he states his pain has been unchanged. Current pain intensity is 4/10. He continues to work with [employer] to adjust settings on his SCS. He has stopped using tramadol, and uses norco sparingly. He continues to work with a  for physical therapy.      Interval HPI 1/9/2020:  Patient returns for follow up s/p Nevro SCS. He states he is unsure if it has helped his pain since he had it implanted. He last had an adjustment of his programming about 1 month ago. He does note that once he is done charging his  SCS his pain is improved. Pain still worse with prolonged standing and activity such as golf. He still is doing home exercise program. He says that he is trying to do more since getting the SCS. He is still taking the Tramadol with limited pain relief. He takes Tramadol 50 mg twice daily only on days of activity which is once a week. No other health changes.      HPI 19  Jefferson Boogie returns to clinic today for follow up. He reports increased low back and leg pain over the last few days. He has been in contact with [employer] representatives for programming adjustments. He does report benefit with the device. He reports good days and bad days. His pain is worse with prolonged standing and activity. He continues to participate in a home exercise routine. He does take Tramadol sparingly but reports limited relief. He denies any other health changes. His pain today is 5/10.    Pain Disability Index Review:  Last 3 PDI Scores 2021   Pain Disability Index (PDI) 25 45 25       Pain Medications:     Current medication:  Celebrex   Xanax  See med list    Opioid Contract: not applicable      report:  Reviewed and consistent with medication use as prescribed.     Pain Procedures:   2020- Right SI joint injection  2020- Nevro SCS battery change  2020- Right SIJ injection >80% relief   19 SCS implant  19 SCS trial  2020- Right SI joint injection  2020- SCS battery revision  2020- Right SI joint injection   2020- Right L5-S1 RFA  3/15/2021- Right SI fusion  2021 - Injection R Hip - minimal relief  10/13/2021 - Right SI joint injection  2021 - R SI joing and R piriformis muscle injection - no relief   2022- Caudal CHOCO- no relief      Physical Therapy/Home Exercise: no     Imagin/10/2021 - X ray Hips Bilateral: No radiographic evidence of acute osseous abnormality of the pelvis and hips and without radiographic evidence of  osteonecrosis of the femoral heads.    9/18/21 MRI L-spine:  FINDINGS:  Lumbar sagittal alignment is slightly is straightened.  There is slight convex right curvature lumbar spine.  There is degenerative disc disease with intervertebral disc height loss and endplate degenerative change at all levels with scattered disc desiccation allowing for degenerative change the lumbar vertebral body heights and contours are within normal is without evidence for acute fracture or subluxation.  There is heterogeneous T1/T2 signal focus within the right aspect of the S1 vertebra most compatible with hemangioma this measures 2.0 cm.     The distal spinal cord and conus is normal in signal and contour tip of the conus approximates the T12/L1 level.  Please note there is artifact from indwelling spinal stimulator device with leads partially visualized casting artifact entering the dorsal epidural space at the T12 level and extending cranially.     There is abnormal clumping configuration of the filum terminalis a from T12 through L5 with slight thickening of scattered nerve roots most compatible with arachnoiditis.     No aneurysmal dilatation of the visualized abdominal aorta.     T12/L1 through L1/L2: No significant disc bulge, central canal or neural foraminal stenosis.     L2/L3: Posterior disc osteophyte with facet joint arthropathy without significant central canal stenosis with mild bilateral neural foraminal stenosis.     L3/L4:Bulging disc with ligamentum flavum hypertrophy without significant central canal stenosis with mild bilateral neural foraminal stenosis.     L4/L5:Posterior disc osteophyte with ligamentum flavum hypertrophy and facet joint arthropathy without significant central canal stenosis and mild bilateral neural foraminal stenosis.     L5/S1: Posterior disc osteophyte with facet joint arthropathy without significant central canal stenosis with moderate right and mild left neural foraminal stenosis.     This  report was flagged in Epic as abnormal.     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  Please note there is spinal stimulator device with leads partially visualized and distorting the study by artifacts.     There is abnormal thickening of the filum configuration most compatible with arachnoiditis.     Please see above for additional details.    Allergies: Review of patient's allergies indicates:  No Known Allergies    Current Medications:   Current Outpatient Medications   Medication Sig Dispense Refill    alendronate (FOSAMAX) 70 MG tablet 70 mg every 7 days.       ALPRAZolam (XANAX) 1 MG tablet Take 1 mg by mouth.      ergocalciferol, vitamin D2, 400 unit Tab Take 50,000 mg by mouth every 30 days.      finasteride (PROSCAR) 5 mg tablet Take 5 mg by mouth once daily.      fluticasone propionate (FLONASE) 50 mcg/actuation nasal spray daily as needed.      irbesartan (AVAPRO) 75 MG tablet 150 mg once daily.       levothyroxine (SYNTHROID) 100 MCG tablet Take 100 mcg by mouth.      prednisolon/gatiflox/bromfenac (PREDNISOL ACE-GATIFLOX-BROMFEN) 1-0.5-0.075 % DrpS Apply 1 drop to eye 3 (three) times daily. 5 mL 3    simvastatin (ZOCOR) 20 MG tablet Take 20 mg by mouth every evening.      tadalafil (CIALIS) 20 MG Tab       temazepam (RESTORIL) 30 mg capsule TK 1 C PO QD HS       No current facility-administered medications for this visit.       REVIEW OF SYSTEMS:    GENERAL:  No weight loss, malaise or fevers.  HEENT:  Negative for frequent or significant headaches.  NECK:  Negative for lumps, goiter, pain and significant neck swelling.  RESPIRATORY:  Negative for cough, wheezing or shortness of breath.  CARDIOVASCULAR:  Negative for chest pain, leg swelling or palpitations.  GI:  Negative for abdominal discomfort, blood in stools or black stools or change in bowel habits.  MUSCULOSKELETAL:  See HPI.  SKIN:  Negative for lesions, rash, and itching.  PSYCH:   Positive for sleep disturbance, mood disorder and recent psychosocial stressors.  HEMATOLOGY/LYMPHOLOGY:  Negative for prolonged bleeding, bruising easily or swollen nodes.  NEURO:   No history of headaches, syncope, paralysis, seizures or tremors.  All other reviewed and negative other than HPI.    Past Medical History:  Past Medical History:   Diagnosis Date    Cancer     stage I bladder cancer 50 yrs ago    Hypercholesteremia     Hypertension     Sacroiliitis 7/8/2020    Thyroid disease        Past Surgical History:  Past Surgical History:   Procedure Laterality Date    BLADDER SURGERY      CATARACT EXTRACTION      COLONOSCOPY      EPIDURAL STEROID INJECTION N/A 1/12/2022    Procedure: INJECTION, STEROID, EPIDURAL CAUDAL With Catheter needs consent;  Surgeon: Samuel Jimenez MD;  Location: [facility];  Service: Pain Management;  Laterality: N/A;    EYE SURGERY      cataracts     FUSION OF SACROILIAC JOINT Right 3/15/2021    Procedure: FUSION, RIGHT SACROILIAC JOINT FUSION;  Surgeon: Samuel Jimenez MD;  Location: [facility], [place], operated [facility] OR;  Service: Pain Management;  Laterality: Right;  C-ARM, PAINTEQ REP     HERNIA REPAIR      INJECTION OF JOINT Right 7/8/2020    Procedure: INJECTION, JOINT, SACROILIAC (SI);  Surgeon: Samuel Jimenez MD;  Location: [facility], [place] PAIN T;  Service: Pain Management;  Laterality: Right;    INJECTION OF JOINT Right 9/9/2020    Procedure: INJECTION, JOINT, SACROILIAC (SI);  Surgeon: Samuel Jimenez MD;  Location: [facility], [place] operated [facility] PAIN MGT;  Service: Pain Management;  Laterality: Right;    INJECTION OF JOINT Right 7/21/2021    Procedure: INJECTION, JOINT, HIP RIGHT;  Surgeon: Samuel Jimenez MD;  Location: [facility], [place] operated [facility] PAIN T;  Service: Pain Management;  Laterality: Right;  CONSENT NEEDED    INJECTION OF JOINT Right 10/13/2021    Procedure: INJECTION, JOINT, SACROILIAC (SI)  NEED CONSENT pt. requesting sedation;  Surgeon: Samuel Jimenez MD;  Location: [facility] [place] PAIN T;  Service: Pain  "Management;  Laterality: Right;    KNEE SURGERY      RADIOFREQUENCY ABLATION Right 2020    Procedure: RADIOFREQUENCY ABLATION, L5-S1-S2 LATERAL BRANCH COOLED;  Surgeon: Samuel Jimenez MD;  Location: [facility] PAIN MGT;  Service: Pain Management;  Laterality: Right;    REPLACEMENT OF NERVE STIMULATOR BATTERY N/A 2020    Procedure: Replacement, SPINAL CORD STIMULATOR BATTERY CHANGE TO NEVRO OMNION BATTERY;  Surgeon: Samuel Jimeenz MD;  Location: [facility] OR;  Service: Pain Management;  Laterality: N/A;  C-ARM, NEVRO REP    TRIAL OF SPINAL CORD NERVE STIMULATOR N/A 2019    Procedure: Trial, Neurostimulator, SPINAL CORD STIMULATOR TRIAL;  Surgeon: Samuel Jimenez MD;  Location: [facility] CATH LAB;  Service: Pain Management;  Laterality: N/A;  C-ARM, NEVRO REP       Family History:  No family history on file.    Social History:  Social History     Socioeconomic History    Marital status:    Tobacco Use    Smoking status: Former Smoker     Quit date:      Years since quittin.1    Smokeless tobacco: Never Used    Tobacco comment: stopped 40 years ago   Substance and Sexual Activity    Alcohol use: Yes     Alcohol/week: 2.0 standard drinks     Types: 2 Shots of liquor per week     Comment: nightly -scotch    Drug use: Never    Sexual activity: Yes     Partners: Female       OBJECTIVE:    /63   Pulse 60   Temp 98.5 °F (36.9 °C)   Resp 18   Ht 6' 1" (1.854 m)   Wt 88 kg (194 lb)   BMI 25.60 kg/m²     PHYSICAL EXAMINATION:    General appearance: Well appearing, in no acute distress, alert and oriented x3.  Psych:  Mood and affect appropriate.  Skin: Skin color, texture, turgor normal, no rashes or lesions, in both upper and lower body.  Head/face:  Atraumatic, normocephalic. No palpable lymph nodes  Neck: No pain to palpation over the cervical paraspinous muscles. Spurling Negative. No pain with neck flexion, extension, or lateral flexion. .  Cor: RRR  Pulm: CTA  GI: Abdomen " soft and non-tender.  Back: Mild pain to palpation over the spine or costovertebral angles. Decreased range of motion without pain reproduction. Positive tenderness over rt SIJ, Peripheral joint ROM is full and pain free without obvious instability or laxity in all four extremities. No deformities, edema, or skin discoloration. Good capillary refill.  Musculoskeletal:Bilateral lower extremity strength is normal and symmetric.  No atrophy or tone abnormalities are noted.  Neuro: Bilateral lower extremity coordination and muscle stretch reflexes are physiologic and symmetric.  No clonus. No loss of sensation is noted.  Gait: Antalgic without use of assistive device    ASSESSMENT: 86 y.o. year old male with low back and hip pain, consistent with:     1. DDD (degenerative disc disease), lumbar     2. Lumbar radiculopathy         PLAN:     - I have stressed the importance of physical activity and a home exercise plan to help with pain and improve health.  -Plan on increasing percocet 7.5mg-325mg  -Patient counseled on dangers of taking to many percocet at once and to use the medication sparingly  -Follow up in 4 weeks .  - Counseled patient regarding the importance of activity modification, constant sleeping habits and physical therapy.      The above plan and management options were discussed at length with patient. Patient is in agreement with the above and verbalized understanding.    Kade Maria MD   I have personally reviewed the history and exam of this patient and agree with the resident/fellow/NPs note as stated above.    Samuel Jimenez MD    02/10/2022

## 2022-02-22 ENCOUNTER — TELEPHONE (OUTPATIENT)
Dept: OPHTHALMOLOGY | Facility: CLINIC | Age: 87
End: 2022-02-22
Payer: MEDICARE

## 2022-02-22 DIAGNOSIS — H25.11 NUCLEAR SCLEROTIC CATARACT OF RIGHT EYE: Primary | ICD-10-CM

## 2022-02-25 DIAGNOSIS — H25.11 NUCLEAR SCLEROTIC CATARACT OF RIGHT EYE: Primary | ICD-10-CM

## 2022-02-25 RX ORDER — PREDNISOLONE ACETATE-GATIFLOXACIN-BROMFENAC .75; 5; 1 MG/ML; MG/ML; MG/ML
1 SUSPENSION/ DROPS OPHTHALMIC 3 TIMES DAILY
Qty: 5 ML | Refills: 3 | Status: SHIPPED | OUTPATIENT
Start: 2022-02-25 | End: 2023-02-08

## 2022-02-28 ENCOUNTER — TELEPHONE (OUTPATIENT)
Dept: OPHTHALMOLOGY | Facility: CLINIC | Age: 87
End: 2022-02-28
Payer: MEDICARE

## 2022-02-28 DIAGNOSIS — H25.11 NUCLEAR SCLEROTIC CATARACT OF RIGHT EYE: Primary | ICD-10-CM

## 2022-03-02 ENCOUNTER — TELEPHONE (OUTPATIENT)
Dept: OPHTHALMOLOGY | Facility: CLINIC | Age: 87
End: 2022-03-02
Payer: MEDICARE

## 2022-03-02 NOTE — TELEPHONE ENCOUNTER
lvm for  pt to remind them of the date and time of Covid test and to remind them to call pharmacy to pay for eye drops     -TD

## 2022-03-07 ENCOUNTER — LAB VISIT (OUTPATIENT)
Dept: PRIMARY CARE CLINIC | Facility: CLINIC | Age: 87
End: 2022-03-07
Payer: MEDICARE

## 2022-03-07 DIAGNOSIS — H25.11 NUCLEAR SCLEROTIC CATARACT OF RIGHT EYE: ICD-10-CM

## 2022-03-07 PROCEDURE — U0005 INFEC AGEN DETEC AMPLI PROBE: HCPCS | Performed by: OPHTHALMOLOGY

## 2022-03-07 PROCEDURE — U0003 INFECTIOUS AGENT DETECTION BY NUCLEIC ACID (DNA OR RNA); SEVERE ACUTE RESPIRATORY SYNDROME CORONAVIRUS 2 (SARS-COV-2) (CORONAVIRUS DISEASE [COVID-19]), AMPLIFIED PROBE TECHNIQUE, MAKING USE OF HIGH THROUGHPUT TECHNOLOGIES AS DESCRIBED BY CMS-2020-01-R: HCPCS | Performed by: OPHTHALMOLOGY

## 2022-03-07 NOTE — H&P
History    Chief complaint:  Painless progressive vision loss    Present Ilness/Diagnosis: Nuclear sclerotic Cataract    Past Medical History:  has a past medical history of Cancer, Hypercholesteremia, Hypertension, Sacroiliitis (7/8/2020), and Thyroid disease.    Family History/Social History: refer to chart    Allergies: Review of patient's allergies indicates:  No Known Allergies    Current Medications: No current facility-administered medications for this encounter.    Current Outpatient Medications:     alendronate (FOSAMAX) 70 MG tablet, 70 mg every 7 days. , Disp: , Rfl:     ALPRAZolam (XANAX) 1 MG tablet, Take 1 mg by mouth., Disp: , Rfl:     ergocalciferol, vitamin D2, 400 unit Tab, Take 50,000 mg by mouth every 30 days., Disp: , Rfl:     fluticasone propionate (FLONASE) 50 mcg/actuation nasal spray, daily as needed., Disp: , Rfl:     irbesartan (AVAPRO) 75 MG tablet, 150 mg once daily. , Disp: , Rfl:     levothyroxine (SYNTHROID) 100 MCG tablet, Take 100 mcg by mouth., Disp: , Rfl:     mirabegron (MYRBETRIQ ORAL), Take by mouth., Disp: , Rfl:     oxyCODONE-acetaminophen (PERCOCET) 7.5-325 mg per tablet, Take 1 tablet by mouth every 8 (eight) hours as needed for Pain., Disp: 90 tablet, Rfl: 0    prednisolon/gatiflox/bromfenac (PREDNISOL ACE-GATIFLOX-BROMFEN) 1-0.5-0.075 % DrpS, Apply 1 drop to eye 3 (three) times daily., Disp: 5 mL, Rfl: 3    simvastatin (ZOCOR) 20 MG tablet, Take 20 mg by mouth every evening., Disp: , Rfl:     tadalafil (CIALIS) 20 MG Tab, , Disp: , Rfl:     temazepam (RESTORIL) 30 mg capsule, TK 1 C PO QD HS, Disp: , Rfl:     Physical Exam    BP: Vital signs stable  General: No apparent distress  HEENT: nuclear sclerotic cataract  Lungs: adequate respirations  Heart: + pulses  Abdomen: soft  Rectal/pelvic: deferred    Impression: Visually significant Cataract.    See previous clinic notes for surgical indications.    Plan: Phacoemulsification with implantation of Intraocular  lens

## 2022-03-08 ENCOUNTER — TELEPHONE (OUTPATIENT)
Dept: OPHTHALMOLOGY | Facility: CLINIC | Age: 87
End: 2022-03-08
Payer: MEDICARE

## 2022-03-08 LAB
SARS-COV-2 RNA RESP QL NAA+PROBE: NOT DETECTED
SARS-COV-2- CYCLE NUMBER: NORMAL

## 2022-03-09 ENCOUNTER — HOSPITAL ENCOUNTER (OUTPATIENT)
Facility: OTHER | Age: 87
Discharge: HOME | End: 2022-03-09
Attending: OPHTHALMOLOGY | Admitting: OPHTHALMOLOGY
Payer: MEDICARE

## 2022-03-09 ENCOUNTER — ANESTHESIA (OUTPATIENT)
Dept: SURGERY | Facility: OTHER | Age: 87
End: 2022-03-09
Payer: MEDICARE

## 2022-03-09 ENCOUNTER — ANESTHESIA EVENT (OUTPATIENT)
Dept: SURGERY | Facility: OTHER | Age: 87
End: 2022-03-09
Payer: MEDICARE

## 2022-03-09 VITALS
HEIGHT: 73 IN | BODY MASS INDEX: 25.84 KG/M2 | OXYGEN SATURATION: 100 % | RESPIRATION RATE: 18 BRPM | HEART RATE: 57 BPM | SYSTOLIC BLOOD PRESSURE: 171 MMHG | TEMPERATURE: 98 F | DIASTOLIC BLOOD PRESSURE: 74 MMHG | WEIGHT: 195 LBS

## 2022-03-09 DIAGNOSIS — H25.10 AGE-RELATED NUCLEAR CATARACT: ICD-10-CM

## 2022-03-09 DIAGNOSIS — H25.11 NUCLEAR SCLEROTIC CATARACT OF RIGHT EYE: Primary | ICD-10-CM

## 2022-03-09 PROCEDURE — V2632 POST CHMBR INTRAOCULAR LENS: HCPCS | Performed by: OPHTHALMOLOGY

## 2022-03-09 PROCEDURE — 66984 PR REMOVAL, CATARACT, W/INSRT INTRAOC LENS, W/O ENDO CYCLO: ICD-10-PCS | Mod: LT,,, | Performed by: OPHTHALMOLOGY

## 2022-03-09 PROCEDURE — 37000008 HC ANESTHESIA 1ST 15 MINUTES: Performed by: OPHTHALMOLOGY

## 2022-03-09 PROCEDURE — 71000016 HC POSTOP RECOV ADDL HR: Performed by: OPHTHALMOLOGY

## 2022-03-09 PROCEDURE — 63600175 PHARM REV CODE 636 W HCPCS

## 2022-03-09 PROCEDURE — 71000015 HC POSTOP RECOV 1ST HR: Performed by: OPHTHALMOLOGY

## 2022-03-09 PROCEDURE — 36000706: Performed by: OPHTHALMOLOGY

## 2022-03-09 PROCEDURE — 36000707: Performed by: OPHTHALMOLOGY

## 2022-03-09 PROCEDURE — 66984 XCAPSL CTRC RMVL W/O ECP: CPT | Mod: LT,,, | Performed by: OPHTHALMOLOGY

## 2022-03-09 PROCEDURE — 63600175 PHARM REV CODE 636 W HCPCS: Performed by: ANESTHESIOLOGY

## 2022-03-09 PROCEDURE — 25000003 PHARM REV CODE 250: Performed by: OPHTHALMOLOGY

## 2022-03-09 PROCEDURE — 37000009 HC ANESTHESIA EA ADD 15 MINS: Performed by: OPHTHALMOLOGY

## 2022-03-09 DEVICE — IMPLANTABLE DEVICE: Type: IMPLANTABLE DEVICE | Site: EYE | Status: FUNCTIONAL

## 2022-03-09 RX ORDER — PHENYLEPHRINE HYDROCHLORIDE 25 MG/ML
1 SOLUTION/ DROPS OPHTHALMIC
Status: COMPLETED | OUTPATIENT
Start: 2022-03-09 | End: 2022-03-09

## 2022-03-09 RX ORDER — LIDOCAINE HYDROCHLORIDE 10 MG/ML
INJECTION, SOLUTION EPIDURAL; INFILTRATION; INTRACAUDAL; PERINEURAL
Status: DISCONTINUED | OUTPATIENT
Start: 2022-03-09 | End: 2022-03-09 | Stop reason: HOSPADM

## 2022-03-09 RX ORDER — MOXIFLOXACIN 5 MG/ML
SOLUTION/ DROPS OPHTHALMIC
Status: DISCONTINUED | OUTPATIENT
Start: 2022-03-09 | End: 2022-03-09 | Stop reason: HOSPADM

## 2022-03-09 RX ORDER — PROPARACAINE HYDROCHLORIDE 5 MG/ML
1 SOLUTION/ DROPS OPHTHALMIC
Status: DISCONTINUED | OUTPATIENT
Start: 2022-03-09 | End: 2022-03-09 | Stop reason: HOSPADM

## 2022-03-09 RX ORDER — MOXIFLOXACIN 5 MG/ML
1 SOLUTION/ DROPS OPHTHALMIC
Status: COMPLETED | OUTPATIENT
Start: 2022-03-09 | End: 2022-03-09

## 2022-03-09 RX ORDER — FENTANYL CITRATE 50 UG/ML
INJECTION, SOLUTION INTRAMUSCULAR; INTRAVENOUS
Status: DISCONTINUED | OUTPATIENT
Start: 2022-03-09 | End: 2022-03-09

## 2022-03-09 RX ORDER — TETRACAINE HYDROCHLORIDE 5 MG/ML
1 SOLUTION OPHTHALMIC
Status: COMPLETED | OUTPATIENT
Start: 2022-03-09 | End: 2022-03-09

## 2022-03-09 RX ORDER — PREDNISOLONE ACETATE 10 MG/ML
SUSPENSION/ DROPS OPHTHALMIC
Status: DISCONTINUED | OUTPATIENT
Start: 2022-03-09 | End: 2022-03-09 | Stop reason: HOSPADM

## 2022-03-09 RX ORDER — TROPICAMIDE 10 MG/ML
1 SOLUTION/ DROPS OPHTHALMIC
Status: COMPLETED | OUTPATIENT
Start: 2022-03-09 | End: 2022-03-09

## 2022-03-09 RX ORDER — SODIUM CHLORIDE 0.9 % (FLUSH) 0.9 %
2 SYRINGE (ML) INJECTION
Status: ACTIVE | OUTPATIENT
Start: 2022-03-09

## 2022-03-09 RX ORDER — ACETAMINOPHEN 325 MG/1
650 TABLET ORAL EVERY 4 HOURS PRN
Status: DISCONTINUED | OUTPATIENT
Start: 2022-03-09 | End: 2022-03-09 | Stop reason: HOSPADM

## 2022-03-09 RX ORDER — HYDRALAZINE HYDROCHLORIDE 20 MG/ML
5 INJECTION INTRAMUSCULAR; INTRAVENOUS ONCE
Status: COMPLETED | OUTPATIENT
Start: 2022-03-09 | End: 2022-03-09

## 2022-03-09 RX ORDER — LIDOCAINE HYDROCHLORIDE 40 MG/ML
INJECTION, SOLUTION RETROBULBAR
Status: DISCONTINUED | OUTPATIENT
Start: 2022-03-09 | End: 2022-03-09 | Stop reason: HOSPADM

## 2022-03-09 RX ADMIN — MOXIFLOXACIN 1 DROP: 5 SOLUTION/ DROPS OPHTHALMIC at 07:03

## 2022-03-09 RX ADMIN — TROPICAMIDE 1 DROP: 10 SOLUTION/ DROPS OPHTHALMIC at 07:03

## 2022-03-09 RX ADMIN — PHENYLEPHRINE HYDROCHLORIDE 1 DROP: 25 SOLUTION/ DROPS OPHTHALMIC at 07:03

## 2022-03-09 RX ADMIN — FENTANYL CITRATE 50 MCG: 50 INJECTION, SOLUTION INTRAMUSCULAR; INTRAVENOUS at 09:03

## 2022-03-09 RX ADMIN — TETRACAINE HYDROCHLORIDE 1 DROP: 5 SOLUTION OPHTHALMIC at 07:03

## 2022-03-09 RX ADMIN — MOXIFLOXACIN 1 DROP: 5 SOLUTION/ DROPS OPHTHALMIC at 09:03

## 2022-03-09 RX ADMIN — HYDRALAZINE HYDROCHLORIDE 5 MG: 20 INJECTION, SOLUTION INTRAMUSCULAR; INTRAVENOUS at 09:03

## 2022-03-09 NOTE — OR NURSING
Pts BP is 171/74. Dr. Valentin notified. Telephone orders for pt to take BP meds when he gets home and pt OK for discharge at this time. Pt verbalized understanding. Denies HA, dizziness, or SOB. Pt AAOx4 and appropriate at this time. Respirations even and unlabored. No acute distress noted.

## 2022-03-09 NOTE — OP NOTE
DATE OF PROCEDURE: 03/09/2022    SURGEON: HARRY TRAMMELL MD    PREOPERATIVE DIAGNOSIS:  Senile nuclear sclerotic cataract right eye.     POSTOPERATIVE DIAGNOSIS: Senile nuclear sclerotic cataract right eye.     PROCEDURE PERFORMED:  Phacoemulsification with placement of intraocular lens, right eye.    IMPLANT:  DCB00 16.0    ANESTHESIA:  Topical and MAC    COMPLICATIONS: none    ESTIMATED BLOOD LOSS: <1cc    SPECIMENS: none    INDICATIONS FOR PROCEDURE:  This patient presented to the clinic with decreased vision in the right eye and was found to have a cataract.  The risks, benefits, and alternatives were discussed and the patient agreed to proceed with phacoemulsification and implantation of a lens in the right eye.     PROCEDURE IN DETAIL:  The patient was met in the preop holding area.  Consent was confirmed to be signed.  The operative site was marked.  The patient was brought into the operating room by the anesthesia team and placed under monitored anesthesia care.  The right eye was prepped and draped in a sterile ophthalmic fashion.  A Patrice speculum was placed into the right eye.   A paracentesis site was made and 1% preservative-free lidocaine was injected into the anterior chamber.  Viscoelastic  material was injected into the anterior chamber.  A keratome blade was used to make a clear corneal incision.  A cystotome was used to initiate the continuous curvilinear capsulorrhexis which was completed with Utrata forceps.  BSS on a gallardo cannula was used to perform hydrodissection.  The phacoemulsification tip was introduced into the eye and the nucleus was removed in a standard divide-and-conquer fashion.  Remaining cortical material was removed from the eye using irrigation-aspiration.  The capsular bag was filled with viscoelastic material and the intraocular lens was injected and positioned into place. Remaining viscoelastic material was removed from the eye using irrigation and aspiration.  The  corneal wounds were hydrated.  The eye was filled to physiologic pressure. The wounds were found to be watertight. Drops of Vigamox and prednisilone were placed into the eye.  The eye was washed, dried, and shielded.  The patient tolerated the procedure well and knows to follow up with me tomorrow morning, sooner if needed.

## 2022-03-09 NOTE — ANESTHESIA POSTPROCEDURE EVALUATION
Anesthesia Post Evaluation    Patient: Jefferson Boogie    Procedure(s) Performed: Procedure(s) (LRB):  EXTRACTION, CATARACT, WITH IOL INSERTION (Right)    Final Anesthesia Type: MAC      Patient location during evaluation: [facility]  Patient participation: Yes- Able to Participate  Level of consciousness: awake and alert and oriented  Post-procedure vital signs: reviewed and stable  Pain management: adequate  Airway patency: patent    PONV status at discharge: No PONV  Anesthetic complications: no      Cardiovascular status: blood pressure returned to baseline and hemodynamically stable  Respiratory status: unassisted, spontaneous ventilation and room air  Hydration status: euvolemic  Follow-up not needed.          Vitals Value Taken Time   /68 03/09/22 0731   Temp 36.6 °C (97.9 °F) 03/09/22 0731   Pulse 53 03/09/22 0731   Resp 16 03/09/22 0731   SpO2 99 % 03/09/22 0731         No case tracking events are documented in the log.      Pain/Kamron Score: No data recorded

## 2022-03-09 NOTE — ANESTHESIA PREPROCEDURE EVALUATION
03/09/2022  Jefferson Boogie is a 86 y.o., male.      Pre-op Assessment    I have reviewed the Patient Summary Reports.     I have reviewed the Nursing Notes. I have reviewed the NPO Status.   I have reviewed the Medications.     Review of Systems  Anesthesia Hx:  Denies Family Hx of Anesthesia complications.   Denies Personal Hx of Anesthesia complications.   Social:  Former Smoker    Hematology/Oncology:  Hematology Normal       -- Cancer in past history (bladder):    EENT/Dental:EENT/Dental Normal   Cardiovascular:   Hypertension hyperlipidemia    Pulmonary:  Pulmonary Normal    Renal/:  Renal/ Normal     Hepatic/GI:  Hepatic/GI Normal    Musculoskeletal:   Arthritis     Neurological:   Neuromuscular Disease, (arachnoiditis following [facility]O 3 years ago)   Chronic Pain Syndrome (occ percocet)   Endocrine:   Hypothyroidism    Dermatological:  Skin Normal    Psych:  Psychiatric Normal           Physical Exam  General: Well nourished, Cooperative, Alert and Oriented        Anesthesia Plan  Type of Anesthesia, risks & benefits discussed:    Anesthesia Type: MAC  Intra-op Monitoring Plan: Standard ASA Monitors  Post Op Pain Control Plan: multimodal analgesia  Informed Consent: Informed consent signed with the Patient and all parties understand the risks and agree with anesthesia plan.  All questions answered.   ASA Score: 2    Ready For Surgery From Anesthesia Perspective.     .

## 2022-03-09 NOTE — BRIEF OP NOTE
BRIEF DISCHARGE NOTE:    Date of discharge: 03/09/2022    Reason for hospitalization -  Cataract surgery     Final Diagnosis - Visually significant Cataract    Procedures and treatment provided - Status post phacoemulsification with placement of intraocular lens     Diet - Advance to regular as tolerated    Activity - as tolerated    Disposition at the end of the case - Good.    Discharge: to home    The patient tolerated the procedure well and knows to follow up with me tomorrow morning in the eye clinic, sooner if needed.    Patient and family instructions (as appropriate) - Given to patient on discharge    Bell Cedeno MD

## 2022-03-10 ENCOUNTER — OFFICE VISIT (OUTPATIENT)
Dept: OPHTHALMOLOGY | Facility: CLINIC | Age: 87
End: 2022-03-10
Payer: MEDICARE

## 2022-03-10 DIAGNOSIS — Z96.1 STATUS POST CATARACT EXTRACTION AND INSERTION OF INTRAOCULAR LENS, RIGHT: Primary | ICD-10-CM

## 2022-03-10 DIAGNOSIS — Z98.41 STATUS POST CATARACT EXTRACTION AND INSERTION OF INTRAOCULAR LENS, RIGHT: Primary | ICD-10-CM

## 2022-03-10 PROCEDURE — 99999 PR PBB SHADOW E&M-EST. PATIENT-LVL III: ICD-10-PCS | Mod: PBBFAC,,, | Performed by: OPHTHALMOLOGY

## 2022-03-10 PROCEDURE — 99024 POSTOP FOLLOW-UP VISIT: CPT | Mod: POP,,, | Performed by: OPHTHALMOLOGY

## 2022-03-10 PROCEDURE — 99024 PR POST-OP FOLLOW-UP VISIT: ICD-10-PCS | Mod: POP,,, | Performed by: OPHTHALMOLOGY

## 2022-03-10 PROCEDURE — 99213 OFFICE O/P EST LOW 20 MIN: CPT | Mod: PBBFAC | Performed by: OPHTHALMOLOGY

## 2022-03-10 PROCEDURE — 99999 PR PBB SHADOW E&M-EST. PATIENT-LVL III: CPT | Mod: PBBFAC,,, | Performed by: OPHTHALMOLOGY

## 2022-03-10 RX ORDER — ERGOCALCIFEROL 1.25 MG/1
CAPSULE ORAL
COMMUNITY
Start: 2022-02-09 | End: 2023-02-08

## 2022-03-10 RX ORDER — FLURAZEPAM HYDROCHLORIDE 15 MG/1
30 CAPSULE ORAL DAILY PRN
COMMUNITY
End: 2023-02-08

## 2022-03-10 RX ORDER — TOLTERODINE 4 MG/1
CAPSULE, EXTENDED RELEASE ORAL
COMMUNITY
Start: 2021-07-08 | End: 2023-02-08

## 2022-03-10 RX ORDER — CLINDAMYCIN HYDROCHLORIDE 300 MG/1
CAPSULE ORAL
COMMUNITY
Start: 2022-02-07 | End: 2023-02-08

## 2022-03-10 RX ORDER — IBUPROFEN 100 MG/5ML
1000 SUSPENSION, ORAL (FINAL DOSE FORM) ORAL
COMMUNITY

## 2022-03-10 RX ORDER — BUDESONIDE AND FORMOTEROL FUMARATE DIHYDRATE 80; 4.5 UG/1; UG/1
2 AEROSOL RESPIRATORY (INHALATION)
COMMUNITY
End: 2023-02-08

## 2022-03-10 RX ORDER — CODEINE PHOSPHATE AND GUAIFENESIN 10; 100 MG/5ML; MG/5ML
SOLUTION ORAL
Status: ON HOLD | COMMUNITY
Start: 2021-12-20 | End: 2023-02-13 | Stop reason: HOSPADM

## 2022-03-10 RX ORDER — TERBINAFINE HYDROCHLORIDE 250 MG/1
TABLET ORAL
COMMUNITY
Start: 2021-05-27 | End: 2023-02-08

## 2022-03-10 RX ORDER — NEOMYCIN SULFATE, POLYMYXIN B SULFATE, HYDROCORTISONE 3.5; 10000; 1 MG/ML; [USP'U]/ML; MG/ML
SOLUTION/ DROPS AURICULAR (OTIC)
COMMUNITY
Start: 2021-08-24 | End: 2023-02-08

## 2022-03-10 RX ORDER — SULFAMETHOXAZOLE AND TRIMETHOPRIM 800; 160 MG/1; MG/1
TABLET ORAL
COMMUNITY
Start: 2022-02-05 | End: 2023-02-08

## 2022-03-10 RX ORDER — CELECOXIB 200 MG/1
CAPSULE ORAL
COMMUNITY
Start: 2021-08-26

## 2022-03-10 RX ORDER — AMOXICILLIN 500 MG/1
TABLET, FILM COATED ORAL
COMMUNITY
Start: 2021-06-22 | End: 2023-02-08

## 2022-03-10 RX ORDER — CEPHALEXIN 500 MG/1
CAPSULE ORAL
COMMUNITY
Start: 2021-03-15 | End: 2023-02-08

## 2022-03-10 RX ORDER — MUPIROCIN 20 MG/G
OINTMENT TOPICAL
COMMUNITY
Start: 2022-02-07 | End: 2023-02-08

## 2022-03-10 RX ORDER — CICLOPIROX OLAMINE 7.7 MG/G
CREAM TOPICAL
COMMUNITY
Start: 2021-05-27 | End: 2023-02-08

## 2022-03-10 NOTE — PROGRESS NOTES
HPI     New patient (last exam outside of [facility] ~8 months ago - Dr. Buckley)    S/p phaco w/IOL OS ~15 years ago - dr leader  Phacoemulsification with placement of intraocular lens, right   eye.03/09/2022    1 day          Last edited by Bell Cedeno MD on 3/10/2022  3:01 PM. (History)            Assessment /Plan     For exam results, see Encounter Report.    Status post cataract extraction and insertion of intraocular lens, right      Slit Lamp Exam  L/L - normal  C/s - quiet  Cornea - clear  A/C - 1+ cell  Lens - PCIOL    POD #1 s/p phaco/IOL  - doing well  - continue the following drops:    vigamox or ocuflox TID x 1 wk then stop  Pred forte or durezol or dexamethasone TID x  4 wks  Ketorolac TID until runs out    Versus:    Combination drop - 1 drop TID x total of 1 month    Appropriate precautions and post op medications reviewed.  Patient instructed to call or come in if symptoms of redness, decreased vision, or pain are experienced.    -f/up 1-2wks, sooner PRN.

## 2022-03-18 ENCOUNTER — TELEPHONE (OUTPATIENT)
Dept: OPHTHALMOLOGY | Facility: CLINIC | Age: 87
End: 2022-03-18

## 2022-03-18 ENCOUNTER — OFFICE VISIT (OUTPATIENT)
Dept: OPHTHALMOLOGY | Facility: CLINIC | Age: 87
End: 2022-03-18
Payer: MEDICARE

## 2022-03-18 DIAGNOSIS — Z98.42 STATUS POST CATARACT EXTRACTION AND INSERTION OF INTRAOCULAR LENS, LEFT: ICD-10-CM

## 2022-03-18 DIAGNOSIS — Z96.1 STATUS POST CATARACT EXTRACTION AND INSERTION OF INTRAOCULAR LENS, RIGHT: Primary | ICD-10-CM

## 2022-03-18 DIAGNOSIS — Z98.41 STATUS POST CATARACT EXTRACTION AND INSERTION OF INTRAOCULAR LENS, RIGHT: Primary | ICD-10-CM

## 2022-03-18 DIAGNOSIS — Z96.1 STATUS POST CATARACT EXTRACTION AND INSERTION OF INTRAOCULAR LENS, LEFT: ICD-10-CM

## 2022-03-18 PROCEDURE — 99024 POSTOP FOLLOW-UP VISIT: CPT | Mod: POP,,, | Performed by: OPHTHALMOLOGY

## 2022-03-18 PROCEDURE — 99024 PR POST-OP FOLLOW-UP VISIT: ICD-10-PCS | Mod: POP,,, | Performed by: OPHTHALMOLOGY

## 2022-03-18 NOTE — PROGRESS NOTES
HPI     New patient (last exam outside of [facility] ~8 months ago - Dr. Buckley)    S/p phaco w/IOL OS ~15 years ago - dr leader  Phacoemulsification with placement of intraocular lens, right   eye.03/09/2022    1 wk PO  Doing well , [facility] with [facility]  No pain or discomfort      Combo TID    Last edited by Sherly Oseguera on 3/18/2022 11:02 AM. (History)            Assessment /Plan     For exam results, see Encounter Report.    Status post cataract extraction and insertion of intraocular lens, right    Status post cataract extraction and insertion of intraocular lens, left      PO week #1 s/p phaco/IOL -    - doing well, no issues    Continue combo drops for a total of 1 month versus: d/c abx gtt, continue PF/ketorolocTID for total of 1 month    Pt wishes to hold off on new Rx for now.    Early PCO OD - f/up 3 mo for yag eval.  On a day JN in clinic.

## 2022-03-23 ENCOUNTER — PATIENT MESSAGE (OUTPATIENT)
Dept: PAIN MEDICINE | Facility: CLINIC | Age: 87
End: 2022-03-23
Payer: MEDICARE

## 2022-03-24 ENCOUNTER — OFFICE VISIT (OUTPATIENT)
Dept: PAIN MEDICINE | Facility: CLINIC | Age: 87
End: 2022-03-24
Attending: ANESTHESIOLOGY
Payer: MEDICARE

## 2022-03-24 VITALS
SYSTOLIC BLOOD PRESSURE: 141 MMHG | OXYGEN SATURATION: 99 % | HEART RATE: 52 BPM | BODY MASS INDEX: 27.7 KG/M2 | TEMPERATURE: 99 F | RESPIRATION RATE: 18 BRPM | HEIGHT: 73 IN | DIASTOLIC BLOOD PRESSURE: 66 MMHG | WEIGHT: 209 LBS

## 2022-03-24 DIAGNOSIS — G03.9 ARACHNOIDITIS: Primary | ICD-10-CM

## 2022-03-24 DIAGNOSIS — G89.4 CHRONIC PAIN SYNDROME: ICD-10-CM

## 2022-03-24 DIAGNOSIS — M46.1 SACROILIITIS: ICD-10-CM

## 2022-03-24 DIAGNOSIS — M51.36 DDD (DEGENERATIVE DISC DISEASE), LUMBAR: ICD-10-CM

## 2022-03-24 PROCEDURE — 99999 PR PBB SHADOW E&M-EST. PATIENT-LVL V: ICD-10-PCS | Mod: PBBFAC,,, | Performed by: ANESTHESIOLOGY

## 2022-03-24 PROCEDURE — 99999 PR PBB SHADOW E&M-EST. PATIENT-LVL V: CPT | Mod: PBBFAC,,, | Performed by: ANESTHESIOLOGY

## 2022-03-24 PROCEDURE — 99214 PR OFFICE/OUTPT VISIT, EST, LEVL IV, 30-39 MIN: ICD-10-PCS | Mod: S$PBB,GC,, | Performed by: ANESTHESIOLOGY

## 2022-03-24 PROCEDURE — 99215 OFFICE O/P EST HI 40 MIN: CPT | Mod: PBBFAC | Performed by: ANESTHESIOLOGY

## 2022-03-24 PROCEDURE — 99214 OFFICE O/P EST MOD 30 MIN: CPT | Mod: S$PBB,GC,, | Performed by: ANESTHESIOLOGY

## 2022-03-24 NOTE — PROGRESS NOTES
Chronic patient Established Note (Follow up visit)      SUBJECTIVE:  Interval History 3/24/22:  Jefferson Boogie presents to the clinic for a follow-up appointment for back pain. Since the last visit, Jefferson Boogie states the pain has been worse than usual. Current pain intensity is 8/10. He continues to report benefit with Nevro SCS however he has not been able to get in touch with the representative in order to have his settings adjusted. He continues to take occasional norco 7.5 mg with benefit, particularly when he plays golf.    Interval History 2/10/22:  Patent presents today s/p caudal epidural steroid injections on 1/12/22 since then he has not had any relief. He states his pain has been unchanged and is a 6/10 on average with the worst being 8/10 and best is 4/10. He has been taking percocet 5mg when he golfs or plays with his granddaughter.         Interval History 12/30/21:  Jefferson Boogie presents to clinic for follow up appointment s/p Right SI joint and Right piriformis muscle injection under US guidance on 11/29/21. He did not obtain any noticeable relief with this procedure similar to all of his previous injections. His pain is unchanged and constant over the right buttock. He is taking percocet 5mg x 3 on days he is more active, such as playing golf. This helps some, but minimally. Denies any new symptoms or neurological changes. No numbness, tingling, weakness in his lower extremities. Denies bowel/bladder incontinence or saddle anesthesia.         Interval History 11/4/2021:  Jefferson Boogie presents to the clinic for a follow-up appointment for right sided back pain and SI joint pain. Mr. Boogie had a right SI joint injection on 10/13/2021. He did not note significant relief with the injection for any period of time. Pain is still constant over the right buttock and most noticeable when playing golf. He denies any new bowel/bladder incontinence, lower extremity numbness or weakness  "or saddle anesthesia.     Interval History 8/26/2021:  Jefferson Boogie presents to the clinic for a follow-up appointment for right sided hip pain with right-sided radiculopathy . Since the last visit, Jefferson Boogie states the pain has been "particularly tender today" 7/10. Patient reports playing golf recently and experienced worsening symptoms the following day. Mr. Boogie is s/p right-side hip joint injection with minimal relief. Patient states he has had relief with previous interventions and is open to RFA.     Interval History 6/10/2021:  Jefferson Boogie presents to the clinic for a follow-up appointment. Since the last visit, Jefferson Boogie states the pain has been stable. Current pain intensity is 3/10. States he is still having pain around SIJ that is affecting his ADL      Interval History 4/15/2021:  The patient returns to clinic today for follow up of back pain. He reports that the swelling above the SI fusion incision has resolved. He denies any fever, chills, or drainage from the incision. He does report benefit since the fusion. He continues to report benefit with Nevro SCS. He reports intermittent shoulder pain at this time. He did receive an injection with Sports Medicine with benefit. He denies any other health changes. His pain today is 2/10.     Interval History 3/25/21:  Mr. Boogie presents to clinic for follow up of bilateral shoulder pain. He is s/p BL subacromial injections on 2/8/21. He reports maximum 20% relief of pain in the Right shoulder. Today the Left shoulder pain is 1/10; Right shoulder pain is 5-7/10.      Interval History 3/22/2021:  The patient returns to clinic today for follow up of back pain. He is s/p right SI fusion on 3/15/2021. He reports some relief at this time. He does report soreness to the incision. He denies any fever, chills, or drainage from incision. He continues to report benefit with Nevro SCS. He denies any other health changes. His pain today " is 4/10.     Interval History 1/11/2020:  Patient is here for follow-up of low back pain now s/p sacroiliac RFA on 12/9/20 which provided no benefit and with the new complaint of bilateral anterior shoulder pain R>L. His shoulder pain started about 2 months ago. He describes 9/10 sharp/stinging pain without radiation that is exacerbated with overhead activity and improved with rest. He started PT about one month ago. He takes oxycodone which provides some relief. He had a blind subacromial steroid injection in the right shoulder with Dr. Ramirez on 10/26/19 which provided some short term relief.     Interval History 11/5/2020:  Jefferson Boogie presents to the clinic for a follow-up appointment for low back pain. Since the last visit, Jefferson Boogie states the pain has been stable. Current pain intensity is 4/10. He had good relief from the SI joint injection that lasted for about a week. Pain has since returned. He also slipped and fell on his buttock a couple of weeks ago and had increased pain in the right buttock after that which is slowly improving. He continues to have variable benefit with the Nevro SCS for intermittent pain in the right leg. He is scheduled to meet with representatives today. He is taking celebrex daily and percocet as needed sparingly. He denies any adverse effects and any other health changes.     Interval History 10/5/2020:  The patient returns to clinic today for follow up of low back pain. He is s/p right SI joint injection on 9/9/2020. He reports 25% relief of his right sided low back and buttock pain. He did have good relief the first two days. He continues to report right sided low back and buttock pain. He denies any radicular leg pain. His pain is worse with prolonged standing and walking. He continues to report intermittent pain into his achilles from previous injury. He feels as though this has changed his gait. He continues to report some benefit with Nevro SCS device. He is  in contact with representatives. He is currently taking Percocet as needed sparingly with some benefit. He denies any adverse effects. He denies any other health changes. His pain today is 4/10.      Interval History 9/2/2020:  The patient returns to clinic today for follow up of back pain. He reports that his back pain has improved since last audio visit. He continues to report back pain with intermittent radiating pain into his right hip and calf. He has spoken with Bienvenido from [employer] via phone with some programming. He is meeting with Bienvenido today. He had some issues with charging but this has improved. He is currently taking Norco intermittently but this is only lasts 2-3 hours. He denies any adverse effects. He denies any other health changes. His pain today is 8/10.     Interval History 08/27/2020:  Pt was contacted over [employer] audio for a follow up appointment.  He is currently complaining of right hip and right calf with no associated numbness or weakness.  He continues to use his Nevro device.  He states it has been very frustrating. Inconsistent.  Took 20 mins to 1 hour to charge.  Couldn't get it to start this morning.  He states that there is no drainage at the battery site, no signs of infection or pain at the site.  Patient is not taking medications at this time.  He wants to speak about medication options because he would like to start playing golf again.     Interval History 8/17/2020:  The patient returns to clinic today for post op wound check. He continues to report benefit with Nevro device. He denies any fever, chills, or drainage. He continues to follow his activity restrictions. He does report a recent achilles tendon injury while swimming. He is seeing Orthopedics. He denies any other health changes. His pain today is 4/10.     Interval History 8/3/2020:  The patient returns to clinic today for post op. He is s/p Nevro battery change on 7/27/2020. He denies any fever, chills, or drainage from  incision. He has not completed his antibiotics as he missed two days of the medication. He continues to follow his activity restrictions. He reports relief with the device. He is meeting with Bienven[employer] today for programming. He denies any other health changes. His pain today is 2/10     Interval History 7/22/2020:  Pt presents for audio follow up only s/p Right SI joint injection on 7/8/2020. Pt states he has near resolution of focal pain to buttock. He is pleased with result and pain relief. Pt has been in contact with [employer] and will be having battery swap in 5 days. He has difficulty whether stimulator is beneficial and has even had a holiday from using SCS.  He denies any newer areas pain, denies any neuro changes, meds area adequate to control pain without adverse side effects. Pain is 2/10 today.     Interval HPI 6/15/2020:  Jefferson Boogie presents to the clinic for a follow-up appointment for 3 week follow up right hip and right thigh pain. Since the last visit, Jefferson Boogie states the pain has been persistant. Current pain intensity is 5/10. Patient has been working with Bienvenido Varghese, to try and get better coverage of a localized pain that he still experiences in his right low back area. He does report improvement in symptoms of numbness/tingling. Today, worse pain is 1/10 in severity and localizes to the right SI joint. Pain is worse with extension of his back. It is worse with golfing. Pain relieved by Norco--he takes 1-2 tablets of 5-325 Norco on days that he plays golf. Pain is also improved with being still and not being active. Patient endorses a much more active lifestyle with Norco. Denies new weakness/numbness or bowel/bladder changes.     Previous injection history includes a series of 3 lumbar CHOCO at [facility].      Interval HPI 3/5/20:  Patient presents to the clinic for a follow-up appointment for low back pain. Since the last visit, he states his pain has been unchanged. Current pain  intensity is 4/10. He continues to work with [employer] to adjust settings on his SCS. He has stopped using tramadol, and uses norco sparingly. He continues to work with a  for physical therapy.      Interval HPI 1/9/2020:  Patient returns for follow up s/p Nevro SCS. He states he is unsure if it has helped his pain since he had it implanted. He last had an adjustment of his programming about 1 month ago. He does note that once he is done charging his SCS his pain is improved. Pain still worse with prolonged standing and activity such as golf. He still is doing home exercise program. He says that he is trying to do more since getting the SCS. He is still taking the Tramadol with limited pain relief. He takes Tramadol 50 mg twice daily only on days of activity which is once a week. No other health changes.      HPI 11/20/19  Jefferson Boogie returns to clinic today for follow up. He reports increased low back and leg pain over the last few days. He has been in contact with [employer] representatives for programming adjustments. He does report benefit with the device. He reports good days and bad days. His pain is worse with prolonged standing and activity. He continues to participate in a home exercise routine. He does take Tramadol sparingly but reports limited relief. He denies any other health changes. His pain today is 5/10.    Pain Disability Index Review:  Last 3 PDI Scores 2/10/2022 12/30/2021 11/29/2021   Pain Disability Index (PDI) 37 [phone] 45       Pain Medications:     Current medication:  Celebrex   Xanax  See med list     Opioid Contract: not applicable      report:  Reviewed and consistent with medication use as prescribed.     Pain Procedures:   9/9/2020- Right SI joint injection  7/27/2020- Nevro SCS battery change  7/8/2020- Right SIJ injection >80% relief   8/26/19 SCS implant  8/5/19 SCS trial  7/8/2020- Right SI joint injection  7/27/2020- SCS battery revision  9/9/2020- Right SI joint  injection   2020- Right L5-S1 RFA  3/15/2021- Right SI fusion  2021 - Injection R Hip - minimal relief  10/13/2021 - Right SI joint injection  2021 - R SI joing and R piriformis muscle injection - no relief   2022- Caudal CHOCO- no relief      Physical Therapy/Home Exercise: no     Imagin/10/2021 - X ray Hips Bilateral: No radiographic evidence of acute osseous abnormality of the pelvis and hips and without radiographic evidence of osteonecrosis of the femoral heads.     21 MRI L-spine:  FINDINGS:  Lumbar sagittal alignment is slightly is straightened.  There is slight convex right curvature lumbar spine.  There is degenerative disc disease with intervertebral disc height loss and endplate degenerative change at all levels with scattered disc desiccation allowing for degenerative change the lumbar vertebral body heights and contours are within normal is without evidence for acute fracture or subluxation.  There is heterogeneous T1/T2 signal focus within the right aspect of the S1 vertebra most compatible with hemangioma this measures 2.0 cm.     The distal spinal cord and conus is normal in signal and contour tip of the conus approximates the T12/L1 level.  Please note there is artifact from indwelling spinal stimulator device with leads partially visualized casting artifact entering the dorsal epidural space at the T12 level and extending cranially.     There is abnormal clumping configuration of the filum terminalis a from T12 through L5 with slight thickening of scattered nerve roots most compatible with arachnoiditis.     No aneurysmal dilatation of the visualized abdominal aorta.     T12/L1 through L1/L2: No significant disc bulge, central canal or neural foraminal stenosis.     L2/L3: Posterior disc osteophyte with facet joint arthropathy without significant central canal stenosis with mild bilateral neural foraminal stenosis.     L3/L4:Bulging disc with ligamentum flavum  hypertrophy without significant central canal stenosis with mild bilateral neural foraminal stenosis.     L4/L5:Posterior disc osteophyte with ligamentum flavum hypertrophy and facet joint arthropathy without significant central canal stenosis and mild bilateral neural foraminal stenosis.     L5/S1: Posterior disc osteophyte with facet joint arthropathy without significant central canal stenosis with moderate right and mild left neural foraminal stenosis.     This report was flagged in Epic as abnormal.     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  Please note there is spinal stimulator device with leads partially visualized and distorting the study by artifacts.     There is abnormal thickening of the filum configuration most compatible with arachnoiditis.     Please see above for additional details.     Allergies: Review of patient's allergies indicates:  No Known Allergies      Current Medications:  Current Outpatient Medications   Medication Sig Dispense Refill    alendronate (FOSAMAX) 70 MG tablet 70 mg every 7 days.       ALPRAZolam (XANAX) 1 MG tablet Take 1 mg by mouth.      amoxicillin (AMOXIL) 500 MG Tab       ascorbic acid, vitamin C, (VITAMIN C) 1000 MG tablet Take 1,000 mg by mouth.      budesonide-formoterol 80-4.5 mcg (SYMBICORT) 80-4.5 mcg/actuation HFAA Inhale 2 puffs into the lungs.      celecoxib (CELEBREX) 200 MG capsule       cephALEXin (KEFLEX) 500 MG capsule       ciclopirox (LOPROX) 0.77 % Crea       clindamycin (CLEOCIN) 300 MG capsule       ergocalciferol (ERGOCALCIFEROL) 50,000 unit Cap       flurazepam (DALMANE) 15 MG Cap Take 30 mg by mouth daily as needed.      fluticasone propionate (FLONASE) 50 mcg/actuation nasal spray daily as needed.      guaiFENesin-codeine 100-10 mg/5 ml (TUSSI-ORGANIDIN NR)  mg/5 mL syrup       irbesartan (AVAPRO) 75 MG tablet 150 mg once daily.       levothyroxine (SYNTHROID) 100 MCG tablet Take  100 mcg by mouth.      mirabegron (MYRBETRIQ ORAL) Take by mouth.      mupirocin (BACTROBAN) 2 % ointment       neomycin-polymyxin-hydrocortisone (CORTISPORIN) otic solution       prednisolon/gatiflox/bromfenac (PREDNISOL ACE-GATIFLOX-BROMFEN) 1-0.5-0.075 % DrpS Apply 1 drop to eye 3 (three) times daily. 5 mL 3    simvastatin (ZOCOR) 20 MG tablet Take 20 mg by mouth every evening.      sulfamethoxazole-trimethoprim 800-160mg (BACTRIM DS) 800-160 mg Tab       tadalafil (CIALIS) 20 MG Tab       temazepam (RESTORIL) 30 mg capsule TK 1 C PO QD HS      terbinafine HCL (LAMISIL) 250 mg tablet       tolterodine (DETROL LA) 4 MG 24 hr capsule        No current facility-administered medications for this visit.     Facility-Administered Medications Ordered in Other Visits   Medication Dose Route Frequency Provider Last Rate Last Admin    balanced salt irrigation intra-ocular solution 1 drop  1 drop Right Eye On Call Procedure Bell Cedeno MD        sodium chloride 0.9% flush 2 mL  2 mL Intravenous PRN Bell Cedeno MD           REVIEW OF SYSTEMS:    GENERAL:  No weight loss, malaise or fevers.  HEENT:  Negative for frequent or significant headaches.  NECK:  Negative for lumps, goiter, pain and significant neck swelling.  RESPIRATORY:  Negative for cough, wheezing or shortness of breath.  CARDIOVASCULAR:  Negative for chest pain, leg swelling or palpitations.  GI:  Negative for abdominal discomfort, blood in stools or black stools or change in bowel habits.  MUSCULOSKELETAL:  See HPI.  SKIN:  Negative for lesions, rash, and itching.  PSYCH:  +ve for sleep disturbance, mood disorder and recent psychosocial stressors.  HEMATOLOGY/LYMPHOLOGY:  Negative for prolonged bleeding, bruising easily or swollen nodes.  NEURO:   No history of headaches, syncope, paralysis, seizures or tremors.  All other reviewed and negative other than HPI.    Past Medical History:  Past Medical History:   Diagnosis Date    Cancer      stage I bladder cancer 50 yrs ago    Hypercholesteremia     Hypertension     Sacroiliitis 7/8/2020    Thyroid disease        Past Surgical History:  Past Surgical History:   Procedure Laterality Date    BLADDER SURGERY      CATARACT EXTRACTION      CATARACT EXTRACTION W/  INTRAOCULAR LENS IMPLANT Right 3/9/2022    Procedure: EXTRACTION, CATARACT, WITH IOL INSERTION;  Surgeon: Bell Cedeno MD;  Location: [facility] OR;  Service: Ophthalmology;  Laterality: Right;    COLONOSCOPY      EPIDURAL STEROID INJECTION N/A 1/12/2022    Procedure: INJECTION, STEROID, EPIDURAL CAUDAL With Catheter needs consent;  Surgeon: Samuel Jimenez MD;  Location: [facility] PAIN MGT;  Service: Pain Management;  Laterality: N/A;    EYE SURGERY      cataracts     FUSION OF SACROILIAC JOINT Right 3/15/2021    Procedure: FUSION, RIGHT SACROILIAC JOINT FUSION;  Surgeon: Samuel Jimenez MD;  Location: [facility] OR;  Service: Pain Management;  Laterality: Right;  C-ARM, PAINTEQ REP     HERNIA REPAIR      INJECTION OF JOINT Right 7/8/2020    Procedure: INJECTION, JOINT, SACROILIAC (SI);  Surgeon: Samuel Jimenez MD;  Location: [facility] PAIN MGT;  Service: Pain Management;  Laterality: Right;    INJECTION OF JOINT Right 9/9/2020    Procedure: INJECTION, JOINT, SACROILIAC (SI);  Surgeon: Samuel Jimenez MD;  Location: [facility] PAIN MGT;  Service: Pain Management;  Laterality: Right;    INJECTION OF JOINT Right 7/21/2021    Procedure: INJECTION, JOINT, HIP RIGHT;  Surgeon: Samuel Jimenez MD;  Location: [facility] PAIN MGT;  Service: Pain Management;  Laterality: Right;  CONSENT NEEDED    INJECTION OF JOINT Right 10/13/2021    Procedure: INJECTION, JOINT, SACROILIAC (SI)  NEED CONSENT pt. requesting sedation;  Surgeon: Samuel Jimenez MD;  Location: [facility] PAIN MGT;  Service: Pain Management;  Laterality: Right;    KNEE SURGERY      RADIOFREQUENCY ABLATION Right 12/9/2020    Procedure: RADIOFREQUENCY ABLATION, L5-S1-S2 LATERAL BRANCH  COOLED;  Surgeon: Samuel Jimenez MD;  Location: [facility] PAIN MGT;  Service: Pain Management;  Laterality: Right;    REPLACEMENT OF NERVE STIMULATOR BATTERY N/A 2020    Procedure: Replacement, SPINAL CORD STIMULATOR BATTERY CHANGE TO NEVRO OMNION BATTERY;  Surgeon: Samuel Jimenez MD;  Location: [facility] OR;  Service: Pain Management;  Laterality: N/A;  C-ARM, NEVRO REP    TRIAL OF SPINAL CORD NERVE STIMULATOR N/A 2019    Procedure: Trial, Neurostimulator, SPINAL CORD STIMULATOR TRIAL;  Surgeon: Samuel Jimenez MD;  Location: [facility] CATH LAB;  Service: Pain Management;  Laterality: N/A;  C-ARM, NEVRO REP       Family History:  No family history on file.    Social History:  Social History     Socioeconomic History    Marital status:    Tobacco Use    Smoking status: Former Smoker     Quit date:      Years since quittin.2    Smokeless tobacco: Never Used    Tobacco comment: stopped 40 years ago   Substance and Sexual Activity    Alcohol use: Yes     Alcohol/week: 2.0 standard drinks     Types: 2 Shots of liquor per week     Comment: nightly -scotch    Drug use: Never    Sexual activity: Yes     Partners: Female       OBJECTIVE:    There were no vitals taken for this visit.    PHYSICAL EXAMINATION:    GENERAL: Well appearing, in no acute distress, alert and oriented x3.  PSYCH:  Mood and affect appropriate.  SKIN: Skin color, texture, turgor normal, no rashes or lesions.  HEAD/FACE:  Normocephalic, atraumatic  NECK: No pain with neck flexion, extension, or lateral flexion  CV: RRR  PULM: No evidence of respiratory difficulty, symmetric chest rise.  GI:  Soft and non-tender.  BACK: Mild pain to palpation over the spine or costovertebral angles. Decreased range of motion without pain reproduction. Negative facet loading bilaterally. Positive tenderness over rt SIJ, Peripheral joint ROM is full and pain free without obvious instability or laxity in all four extremities. No deformities,  edema, or skin discoloration. Good capillary refill.   EXTREMITIES: Peripheral joint ROM is full and pain free without obvious instability or laxity in all four extremities. No deformities, edema, or skin discoloration. Good capillary refill.  MUSCULOSKELETAL: Bilateral lower extremity strength is normal and symmetric.  No atrophy or tone abnormalities are noted.  NEURO: Bilateral lower extremity coordination and muscle stretch reflexes are physiologic and symmetric.  No clonus. No loss of sensation is noted.  GAIT: Antalgic with use of assistive device. Utilizes a cane.      ASSESSMENT: 86 y.o. year old male with back pain pain, consistent with:     1. Arachnoiditis     2. Chronic pain syndrome     3. DDD (degenerative disc disease), lumbar     4. Sacroiliitis         PLAN:   - I have stressed the importance of physical activity and a home exercise plan to help with pain and improve health.  - Patient can continue with medications for now since they are providing benefits, using them appropriately, and without side effects.  - RTC in 4-6 weeks with Dr. Jimenez  - Counseled patient regarding the importance of activity modification, constant sleeping habits and physical therapy.    The above plan and management options were discussed at length with patient. Patient is in agreement with the above and verbalized understanding.    Jhonatan Stanley MD  Anesthesiology PGY II   I have personally reviewed the history and exam of this patient and agree with the resident/fellow/NPs note as stated above.    Samuel Jimenez MD    03/24/2022

## 2022-05-25 ENCOUNTER — TELEPHONE (OUTPATIENT)
Dept: PAIN MEDICINE | Facility: CLINIC | Age: 87
End: 2022-05-25
Payer: MEDICARE

## 2022-05-26 ENCOUNTER — OFFICE VISIT (OUTPATIENT)
Dept: PAIN MEDICINE | Facility: CLINIC | Age: 87
End: 2022-05-26
Attending: ANESTHESIOLOGY
Payer: MEDICARE

## 2022-05-26 VITALS
SYSTOLIC BLOOD PRESSURE: 137 MMHG | RESPIRATION RATE: 18 BRPM | TEMPERATURE: 98 F | WEIGHT: 200 LBS | BODY MASS INDEX: 26.51 KG/M2 | HEIGHT: 73 IN | DIASTOLIC BLOOD PRESSURE: 62 MMHG | HEART RATE: 55 BPM

## 2022-05-26 DIAGNOSIS — Z96.89 SPINAL CORD STIMULATOR STATUS: ICD-10-CM

## 2022-05-26 DIAGNOSIS — M54.89 VERTEBROGENIC PAIN: ICD-10-CM

## 2022-05-26 DIAGNOSIS — M47.819 SPONDYLOSIS WITHOUT MYELOPATHY OR RADICULOPATHY: ICD-10-CM

## 2022-05-26 DIAGNOSIS — G89.4 CHRONIC PAIN SYNDROME: ICD-10-CM

## 2022-05-26 DIAGNOSIS — M47.897 OTHER SPONDYLOSIS, LUMBOSACRAL REGION: Primary | ICD-10-CM

## 2022-05-26 PROCEDURE — 99214 OFFICE O/P EST MOD 30 MIN: CPT | Mod: S$PBB,,, | Performed by: ANESTHESIOLOGY

## 2022-05-26 PROCEDURE — 99999 PR PBB SHADOW E&M-EST. PATIENT-LVL V: ICD-10-PCS | Mod: PBBFAC,,, | Performed by: ANESTHESIOLOGY

## 2022-05-26 PROCEDURE — 99215 OFFICE O/P EST HI 40 MIN: CPT | Mod: PBBFAC | Performed by: ANESTHESIOLOGY

## 2022-05-26 PROCEDURE — 99999 PR PBB SHADOW E&M-EST. PATIENT-LVL V: CPT | Mod: PBBFAC,,, | Performed by: ANESTHESIOLOGY

## 2022-05-26 PROCEDURE — 99214 PR OFFICE/OUTPT VISIT, EST, LEVL IV, 30-39 MIN: ICD-10-PCS | Mod: S$PBB,,, | Performed by: ANESTHESIOLOGY

## 2022-05-26 NOTE — PROGRESS NOTES
Chronic patient Established Note (Follow up visit)      SUBJECTIVE:    Interval History 5/26/22:  Jefferson Boogie presents to the clinic for a follow-up appointment for back pain. Since the last visit, Jefferson Boogie states the pain has been stable. Certain postures he can tolerate in the standing position such as leaning on a shopping cart or support to his posterior thighs. He now uses a walker when covering long distances. He continues to play golf despite it aggravating his pain. He receives some benefit from the SCS whereas other interventions have not helped. He remains interested in the [place] device.    Interval History 3/24/22:  Jefferson Boogie presents to the clinic for a follow-up appointment for back pain. Since the last visit, Jefferson Boogie states the pain has been worse than usual. Current pain intensity is 8/10. He continues to report benefit with [employer] SCS however he has not been able to get in touch with the representative in order to have his settings adjusted. He continues to take occasional norco 7.5 mg with benefit, particularly when he plays golf.     Interval History 2/10/22:  Patent presents today s/p caudal epidural steroid injections on 1/12/22 since then he has not had any relief. He states his pain has been unchanged and is a 6/10 on average with the worst being 8/10 and best is 4/10. He has been taking percocet 5mg when he golfs or plays with his granddaughter.         Interval History 12/30/21:  Jefferson Boogie presents to clinic for follow up appointment s/p Right SI joint and Right piriformis muscle injection under US guidance on 11/29/21. He did not obtain any noticeable relief with this procedure similar to all of his previous injections. His pain is unchanged and constant over the right buttock. He is taking percocet 5mg x 3 on days he is more active, such as playing golf. This helps some, but minimally. Denies any new symptoms or neurological changes. No numbness,  "tingling, weakness in his lower extremities. Denies bowel/bladder incontinence or saddle anesthesia.         Interval History 11/4/2021:  Jefferson Boogie presents to the clinic for a follow-up appointment for right sided back pain and SI joint pain. Mr. Boogie had a right SI joint injection on 10/13/2021. He did not note significant relief with the injection for any period of time. Pain is still constant over the right buttock and most noticeable when playing golf. He denies any new bowel/bladder incontinence, lower extremity numbness or weakness or saddle anesthesia.     Interval History 8/26/2021:  Jefferson Boogie presents to the clinic for a follow-up appointment for right sided hip pain with right-sided radiculopathy . Since the last visit, Jefferson Boogie states the pain has been "particularly tender today" 7/10. Patient reports playing golf recently and experienced worsening symptoms the following day. Mr. Boogie is s/p right-side hip joint injection with minimal relief. Patient states he has had relief with previous interventions and is open to RFA.     Interval History 6/10/2021:  Jefferson Boogie presents to the clinic for a follow-up appointment. Since the last visit, Jefferson Boogie states the pain has been stable. Current pain intensity is 3/10. States he is still having pain around SIJ that is affecting his ADL      Interval History 4/15/2021:  The patient returns to clinic today for follow up of back pain. He reports that the swelling above the SI fusion incision has resolved. He denies any fever, chills, or drainage from the incision. He does report benefit since the fusion. He continues to report benefit with Nevro SCS. He reports intermittent shoulder pain at this time. He did receive an injection with Sports Medicine with benefit. He denies any other health changes. His pain today is 2/10.     Interval History 3/25/21:  Mr. Boogie presents to clinic for follow up of bilateral " shoulder pain. He is s/p BL subacromial injections on 2/8/21. He reports maximum 20% relief of pain in the Right shoulder. Today the Left shoulder pain is 1/10; Right shoulder pain is 5-7/10.      Interval History 3/22/2021:  The patient returns to clinic today for follow up of back pain. He is s/p right SI fusion on 3/15/2021. He reports some relief at this time. He does report soreness to the incision. He denies any fever, chills, or drainage from incision. He continues to report benefit with Nevro SCS. He denies any other health changes. His pain today is 4/10.     Interval History 1/11/2020:  Patient is here for follow-up of low back pain now s/p sacroiliac RFA on 12/9/20 which provided no benefit and with the new complaint of bilateral anterior shoulder pain R>L. His shoulder pain started about 2 months ago. He describes 9/10 sharp/stinging pain without radiation that is exacerbated with overhead activity and improved with rest. He started PT about one month ago. He takes oxycodone which provides some relief. He had a blind subacromial steroid injection in the right shoulder with Dr. Ramirez on 10/26/19 which provided some short term relief.     Interval History 11/5/2020:  Jefferson Boogie presents to the clinic for a follow-up appointment for low back pain. Since the last visit, Jefferson Boogie states the pain has been stable. Current pain intensity is 4/10. He had good relief from the SI joint injection that lasted for about a week. Pain has since returned. He also slipped and fell on his buttock a couple of weeks ago and had increased pain in the right buttock after that which is slowly improving. He continues to have variable benefit with the Nevro SCS for intermittent pain in the right leg. He is scheduled to meet with representatives today. He is taking celebrex daily and percocet as needed sparingly. He denies any adverse effects and any other health changes.     Interval History 10/5/2020:  The  patient returns to clinic today for follow up of low back pain. He is s/p right SI joint injection on 9/9/2020. He reports 25% relief of his right sided low back and buttock pain. He did have good relief the first two days. He continues to report right sided low back and buttock pain. He denies any radicular leg pain. His pain is worse with prolonged standing and walking. He continues to report intermittent pain into his achilles from previous injury. He feels as though this has changed his gait. He continues to report some benefit with Nevro SCS device. He is in contact with representatives. He is currently taking Percocet as needed sparingly with some benefit. He denies any adverse effects. He denies any other health changes. His pain today is 4/10.      Interval History 9/2/2020:  The patient returns to clinic today for follow up of back pain. He reports that his back pain has improved since last audio visit. He continues to report back pain with intermittent radiating pain into his right hip and calf. He has spoken with Bienvenido from [employer] via phone with some programming. He is meeting with Bienvenido today. He had some issues with charging but this has improved. He is currently taking Norco intermittently but this is only lasts 2-3 hours. He denies any adverse effects. He denies any other health changes. His pain today is 8/10.     Interval History 08/27/2020:  Pt was contacted over [employer] for a follow up appointment.  He is currently complaining of right hip and right calf with no associated numbness or weakness.  He continues to use his Nevro device.  He states it has been very frustrating. Inconsistent.  Took 20 mins to 1 hour to charge.  Couldn't get it to start this morning.  He states that there is no drainage at the battery site, no signs of infection or pain at the site.  Patient is not taking medications at this time.  He wants to speak about medication options because he would like to start playing golf  again.     Interval History 8/17/2020:  The patient returns to clinic today for post op wound check. He continues to report benefit with Nevro device. He denies any fever, chills, or drainage. He continues to follow his activity restrictions. He does report a recent achilles tendon injury while swimming. He is seeing Orthopedics. He denies any other health changes. His pain today is 4/10.     Interval History 8/3/2020:  The patient returns to clinic today for post op. He is s/p Nevro battery change on 7/27/2020. He denies any fever, chills, or drainage from incision. He has not completed his antibiotics as he missed two days of the medication. He continues to follow his activity restrictions. He reports relief with the device. He is meeting with Bienvenido today for programming. He denies any other health changes. His pain today is 2/10     Interval History 7/22/2020:  Pt presents for audio follow up only s/p Right SI joint injection on 7/8/2020. Pt states he has near resolution of focal pain to buttock. He is pleased with result and pain relief. Pt has been in contact with [employer] and will be having battery swap in 5 days. He has difficulty whether stimulator is beneficial and has even had a holiday from using SCS.  He denies any newer areas pain, denies any neuro changes, meds area adequate to control pain without adverse side effects. Pain is 2/10 today.     Interval HPI 6/15/2020:  Jefferson Boogie presents to the clinic for a follow-up appointment for 3 week follow up right hip and right thigh pain. Since the last visit, Jefferson HANSON Keagan states the pain has been persistant. Current pain intensity is 5/10. Patient has been working with Bienvenido Varghese, to try and get better coverage of a localized pain that he still experiences in his right low back area. He does report improvement in symptoms of numbness/tingling. Today, worse pain is 1/10 in severity and localizes to the right SI joint. Pain is worse with extension  of his back. It is worse with golfing. Pain relieved by Norco--he takes 1-2 tablets of 5-325 Norco on days that he plays golf. Pain is also improved with being still and not being active. Patient endorses a much more active lifestyle with Norco. Denies new weakness/numbness or bowel/bladder changes.     Previous injection history includes a series of 3 lumbar CHOCO at [facility].      Interval HPI 3/5/20:  Patient presents to the clinic for a follow-up appointment for low back pain. Since the last visit, he states his pain has been unchanged. Current pain intensity is 4/10. He continues to work with [employer] to adjust settings on his SCS. He has stopped using tramadol, and uses norco sparingly. He continues to work with a  for physical therapy.      Interval HPI 1/9/2020:  Patient returns for follow up s/p Nevro SCS. He states he is unsure if it has helped his pain since he had it implanted. He last had an adjustment of his programming about 1 month ago. He does note that once he is done charging his SCS his pain is improved. Pain still worse with prolonged standing and activity such as golf. He still is doing home exercise program. He says that he is trying to do more since getting the SCS. He is still taking the Tramadol with limited pain relief. He takes Tramadol 50 mg twice daily only on days of activity which is once a week. No other health changes.      HPI 11/20/19  Jefferson Boogie returns to clinic today for follow up. He reports increased low back and leg pain over the last few days. He has been in contact with [employer] representatives for programming adjustments. He does report benefit with the device. He reports good days and bad days. His pain is worse with prolonged standing and activity. He continues to participate in a home exercise routine. He does take Tramadol sparingly but reports limited relief. He denies any other health changes. His pain today is 5/10.     Pain Disability Index  Review:  Last 3 PDI Scores 2/10/2022 2021 2021   Pain Disability Index (PDI) 37 [phone]         Pain Medications:     Current medication:  Celebrex   Xanax  See med list     Opioid Contract: not applicable      report:  Reviewed and consistent with medication use as prescribed.     Pain Procedures:   2020- Right SI joint injection  2020- Nevro SCS battery change  2020- Right SIJ injection >80% relief   19 SCS implant  19 SCS trial  2020- Right SI joint injection  2020- SCS battery revision  2020- Right SI joint injection   2020- Right L5-S1 RFA  3/15/2021- Right SI fusion  2021 - Injection R Hip - minimal relief  10/13/2021 - Right SI joint injection  2021 - R SI joing and R piriformis muscle injection - no relief   2022- Caudal CHOCO- no relief      Physical Therapy/Home Exercise: no     Imagin/10/2021 - X ray Hips Bilateral: No radiographic evidence of acute osseous abnormality of the pelvis and hips and without radiographic evidence of osteonecrosis of the femoral heads.     21 MRI L-spine:  FINDINGS:  Lumbar sagittal alignment is slightly is straightened.  There is slight convex right curvature lumbar spine.  There is degenerative disc disease with intervertebral disc height loss and endplate degenerative change at all levels with scattered disc desiccation allowing for degenerative change the lumbar vertebral body heights and contours are within normal is without evidence for acute fracture or subluxation.  There is heterogeneous T1/T2 signal focus within the right aspect of the S1 vertebra most compatible with hemangioma this measures 2.0 cm.     The distal spinal cord and conus is normal in signal and contour tip of the conus approximates the T12/L1 level.  Please note there is artifact from indwelling spinal stimulator device with leads partially visualized casting artifact entering the dorsal epidural space at the T12 level and  extending cranially.     There is abnormal clumping configuration of the filum terminalis a from T12 through L5 with slight thickening of scattered nerve roots most compatible with arachnoiditis.     No aneurysmal dilatation of the visualized abdominal aorta.     T12/L1 through L1/L2: No significant disc bulge, central canal or neural foraminal stenosis.     L2/L3: Posterior disc osteophyte with facet joint arthropathy without significant central canal stenosis with mild bilateral neural foraminal stenosis.     L3/L4:Bulging disc with ligamentum flavum hypertrophy without significant central canal stenosis with mild bilateral neural foraminal stenosis.     L4/L5:Posterior disc osteophyte with ligamentum flavum hypertrophy and facet joint arthropathy without significant central canal stenosis and mild bilateral neural foraminal stenosis.     L5/S1: Posterior disc osteophyte with facet joint arthropathy without significant central canal stenosis with moderate right and mild left neural foraminal stenosis.     This report was flagged in Epic as abnormal.     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  Please note there is spinal stimulator device with leads partially visualized and distorting the study by artifacts.     There is abnormal thickening of the filum configuration most compatible with arachnoiditis.     Please see above for additional details.        Lumbar spine MRI with contrast 2019    FINDINGS: There are 5 lumbar type vertebral bodies lumbar vertebral body height and alignment are maintained. The signal from the lumbar vertebra demonstrates an admixture of red and yellow marrow. There are some Modic type degenerative signal changes involving the anterior inferior aspect of L5. There is slight anterior disc bulging at the L5-S1 level. All of the lumbar discs are desiccated with severe narrowing of the L2-L3 disc space, moderate narrowing of the L1-2 disc space  posteriorly, moderate narrowing of the L5-S1 disc space and mild narrowing of the L3-4 and L4-5 disc spaces.    L5-S1 level: There is bilateral facet joint arthropathy with a slight amount of fluid in both facet joints. There is bilateral, right greater left, foraminal narrowing but no central canal stenosis. There is a mild broad-based posterior protrusion of the L5-S1 disc that extends towards the left-sided foramen and into and possibly through the right-sided foramen. This is similar to what was seen on the prior exam. Incidental note is made of a mild amount of epidural fat that is deposited along the right anterior and left anterior aspect of thecal sac at the L5-S1 disc space level.    L4-L5 level: There is bilateral, left greater than right, facet joint arthropathy without central canal stenosis. There is no bony foraminal narrowing. There is a slight to mild broad-based posterior protrusion of the disc that extends to the medial foramen bilaterally.    L3-L4 level: There is bilateral facet joint arthropathy with slight ligament flavum redundancy but no bony foraminal narrowing or bony central canal stenosis. There is a slight to mild broad-based posterior protrusion of the disc that extends towards the right-sided foramen and into the anteromedial left-sided foramen.    L2-L3 level: There is bilateral facet joint arthropathy without bony central canal stenosis or bony foraminal narrowing. There is a slight to mild bilobed posterior protrusion of the disc at the level of the anteromedial foramen bilaterally and this is best seen on the axial images.    L1-L2 level: There is bilateral facet arthropathy without bony foraminal narrowing or bony central canal stenosis and the posterior disc margin is unremarkable.    The tip of the conus medullaris extends down to the level of the superior endplate of L1 and the signal from the visualized conus is unremarkable. There is clumping of the cauda equina nerve  rootlets throughout the distal thecal sac consistent with arachnoiditis.  Following contrast administration, there is no abnormal enhancement of any of the bony or soft tissue elements of the lumbar spine except for possibly one or 2 facet joints.    Impression:   1. No abnormal enhancement of any bony or soft tissue elements of the lumbar spine except for possibly one or 2 facet joints. Clumping of the nerve rootlets of the cauda equina consistent with arachnoiditis.  3. Desiccation of all of the lumbar disks with multilevel disc space narrowing, multilevel facet joint arthropathy, but no central canal stenosis.  4. Modic type degenerative signal changes in the anterior inferior aspect of L5.  5. There is a mild amount of epidural fat deposit along the right anterior and left anterior aspect of the thecal sac at the L5-S1 level..  6. Other findings as discussed above.    MRI WO Lumbar spine:  1. Since the prior study, there has been interval development of arachnoiditis including multifocal multisegmental clumping of the nerve rootlets of the cauda equina.  2. All of the lumbar discs are desiccated with multilevel disc space narrowing and desiccation. There is multilevel facet joint arthropathy and foraminal narrowing as detailed above. There is no central canal stenosis.  3. There are posteriorly protruding discs at every level in the lumbar spine except L1-L2 and, for the most part, they appear unchanged from the prior study of 3/26/2018.    Thoracic spine MRI:  FINDINGS: Thoracic vertebral body height and alignment are maintained with a few small scattered Schmorl's nodes involving the endplates of several mid to lower thoracic vertebra. The T3-4 vertebra are partially fused. There is some degree of desiccation of all of the thoracic discs with multilevel disc space narrowing, especially at the T7-T8, T6-T7 and T5-T6 levels. There is no abnormal prevertebral soft tissue thickening. The somewhat heterogeneous  appearance to the signal from the thoracic vertebra is presumably secondary to an admixture of red and yellow marrow.    T1-T2 level: There is no bony foraminal narrowing or bony central canal stenosis and the posterior disc margin is unremarkable.    T2-T3 level: There is no bony foraminal narrowing or bony central canal stenosis and the posterior disc margin is unremarkable.    T3-T4 level: There is no bony foraminal narrowing or bony central canal stenosis and the posterior disc margin is unremarkable.    T4-5 level: There is no bony foraminal narrowing or bony central canal stenosis and the posterior disc margin is unremarkable.    T5-T6 level: There is no bony foraminal narrowing or bony central canal stenosis and the posterior disc margin is unremarkable.    T6-T7 level: There is no bony foraminal narrowing or bony central canal stenosis and the posterior disc margin is unremarkable.    T7-T8 level: There is no bony foraminal narrowing or bony central canal stenosis and the posterior disc margin is unremarkable.    T8-T9 level: There is no bony foraminal narrowing or bony central canal stenosis and the posterior disc margin is unremarkable.    T9-T10 level: There is no bony foraminal narrowing or bony central canal stenosis and the posterior disc margin is unremarkable.    T10-T11 level: There is no bony foraminal narrowing or bony central canal stenosis and the posterior disc margin is unremarkable.    T11-T12 level: There is no bony foraminal narrowing or bony central canal stenosis and the posterior disc margin is unremarkable.    T12-L1 level: The tip the conus medullaris extends down to level of the superior endplate of L1. 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 There is no bony foraminal narrowing or bony central canal stenosis at this level.    On the axial images, the immediate paravertebral soft tissues are unremarkable. A small cyst is seen to  involve the upper pole the left kidney.    Following contrast administration, there is no abnormal enhancement of any bony or soft tissue elements of the thoracic spine. There is no abnormal enhancement of the subserosal surface of the thoracic spinal cord. Some foci of mild signal intensity in the CSF dorsal to the spinal cord of some of the sagittal sequences presumably is secondary to CSF pulsation artifact.    On the sagittal  image, there is cervical spondylosis and kyphosis with narrowing of all of the disc spaces in the cervical spine.        Allergies: Review of patient's allergies indicates:  No Known Allergies    Current Medications:   Current Outpatient Medications   Medication Sig Dispense Refill    alendronate (FOSAMAX) 70 MG tablet 70 mg every 7 days.       ALPRAZolam (XANAX) 1 MG tablet Take 1 mg by mouth.      celecoxib (CELEBREX) 200 MG capsule       ciclopirox (LOPROX) 0.77 % Crea       clindamycin (CLEOCIN) 300 MG capsule       ergocalciferol (ERGOCALCIFEROL) 50,000 unit Cap       fluticasone propionate (FLONASE) 50 mcg/actuation nasal spray daily as needed.      guaiFENesin-codeine 100-10 mg/5 ml (TUSSI-ORGANIDIN NR)  mg/5 mL syrup       irbesartan (AVAPRO) 75 MG tablet 150 mg once daily.       mirabegron (MYRBETRIQ ORAL) Take by mouth.      simvastatin (ZOCOR) 20 MG tablet Take 20 mg by mouth every evening.      temazepam (RESTORIL) 30 mg capsule TK 1 C PO QD HS      terbinafine HCL (LAMISIL) 250 mg tablet       amoxicillin (AMOXIL) 500 MG Tab       ascorbic acid, vitamin C, (VITAMIN C) 1000 MG tablet Take 1,000 mg by mouth.      budesonide-formoterol 80-4.5 mcg (SYMBICORT) 80-4.5 mcg/actuation HFAA Inhale 2 puffs into the lungs.      cephALEXin (KEFLEX) 500 MG capsule       flurazepam (DALMANE) 15 MG Cap Take 30 mg by mouth daily as needed.      levothyroxine (SYNTHROID) 100 MCG tablet Take 100 mcg by mouth.      mupirocin (BACTROBAN) 2 % ointment        neomycin-polymyxin-hydrocortisone (CORTISPORIN) otic solution       prednisolon/gatiflox/bromfenac (PREDNISOL ACE-GATIFLOX-BROMFEN) 1-0.5-0.075 % DrpS Apply 1 drop to eye 3 (three) times daily. (Patient not taking: Reported on 5/26/2022) 5 mL 3    sulfamethoxazole-trimethoprim 800-160mg (BACTRIM DS) 800-160 mg Tab       tadalafil (CIALIS) 20 MG Tab       tolterodine (DETROL LA) 4 MG 24 hr capsule        No current facility-administered medications for this visit.     Facility-Administered Medications Ordered in Other Visits   Medication Dose Route Frequency Provider Last Rate Last Admin    balanced salt irrigation intra-ocular solution 1 drop  1 drop [facility] Bell Cedeno MD        sodium chloride 0.9% flush 2 mL  2 mL Intravenous PRN Bell Cedeno MD           REVIEW OF SYSTEMS:    GENERAL:  No weight loss, malaise or fevers.  HEENT:  Negative for frequent or significant headaches.  NECK:  Negative for lumps, goiter, pain and significant neck swelling.  RESPIRATORY:  Negative for cough, wheezing or shortness of breath.  CARDIOVASCULAR:  Negative for chest pain, leg swelling or palpitations.  GI:  Negative for abdominal discomfort, blood in stools or black stools or change in bowel habits.  MUSCULOSKELETAL:  See HPI.  SKIN:  Negative for lesions, rash, and itching.  PSYCH:  +ve for sleep disturbance, mood disorder and recent psychosocial stressors.  HEMATOLOGY/LYMPHOLOGY:  Negative for prolonged bleeding, bruising easily or swollen nodes.  NEURO:   No history of headaches, syncope, paralysis, seizures or tremors.  All other reviewed and negative other than HPI.    Past Medical History:  Past Medical History:   Diagnosis Date    Cancer     stage I bladder cancer 50 yrs ago    Hypercholesteremia     Hypertension     Sacroiliitis 7/8/2020    Thyroid disease        Past Surgical History:  Past Surgical History:   Procedure Laterality Date    BLADDER SURGERY      CATARACT  EXTRACTION      CATARACT EXTRACTION W/  INTRAOCULAR LENS IMPLANT Right 3/9/2022    Procedure: EXTRACTION, CATARACT, WITH IOL INSERTION;  Surgeon: Bell Cedeno MD;  Location: [facility] OR;  Service: Ophthalmology;  Laterality: Right;    COLONOSCOPY      EPIDURAL STEROID INJECTION N/A 1/12/2022    Procedure: INJECTION, STEROID, EPIDURAL CAUDAL With Catheter needs consent;  Surgeon: Samuel Jimenez MD;  Location: [facility] PAIN MGT;  Service: Pain Management;  Laterality: N/A;    EYE SURGERY      cataracts     FUSION OF SACROILIAC JOINT Right 3/15/2021    Procedure: FUSION, RIGHT SACROILIAC JOINT FUSION;  Surgeon: Samuel Jimenez MD;  Location: [facility] OR;  Service: Pain Management;  Laterality: Right;  C-ARM, PAINTEQ REP     HERNIA REPAIR      INJECTION OF JOINT Right 7/8/2020    Procedure: INJECTION, JOINT, SACROILIAC (SI);  Surgeon: Samuel Jimenez MD;  Location: [facility] PAIN MGT;  Service: Pain Management;  Laterality: Right;    INJECTION OF JOINT Right 9/9/2020    Procedure: INJECTION, JOINT, SACROILIAC (SI);  Surgeon: Samuel Jimenez MD;  Location: [facility] PAIN MGT;  Service: Pain Management;  Laterality: Right;    INJECTION OF JOINT Right 7/21/2021    Procedure: INJECTION, JOINT, HIP RIGHT;  Surgeon: Samuel Jimenez MD;  Location: [facility] PAIN MGT;  Service: Pain Management;  Laterality: Right;  CONSENT NEEDED    INJECTION OF JOINT Right 10/13/2021    Procedure: INJECTION, JOINT, SACROILIAC (SI)  NEED CONSENT pt. requesting sedation;  Surgeon: Samuel Jimenez MD;  Location: [facility] PAIN MGT;  Service: Pain Management;  Laterality: Right;    KNEE SURGERY      RADIOFREQUENCY ABLATION Right 12/9/2020    Procedure: RADIOFREQUENCY ABLATION, L5-S1-S2 LATERAL BRANCH COOLED;  Surgeon: Samuel Jimenez MD;  Location: [facility] PAIN MGT;  Service: Pain Management;  Laterality: Right;    REPLACEMENT OF NERVE STIMULATOR BATTERY N/A 7/27/2020    Procedure: Replacement, SPINAL CORD STIMULATOR BATTERY CHANGE TO NEVRO  "OMNION BATTERY;  Surgeon: Samuel Jimenez MD;  Location: [facility] OR;  Service: Pain Management;  Laterality: N/A;  C-ARM, NEVRO REP    TRIAL OF SPINAL CORD NERVE STIMULATOR N/A 2019    Procedure: Trial, Neurostimulator, SPINAL CORD STIMULATOR TRIAL;  Surgeon: Samuel Jimenez MD;  Location: [facility] CATH LAB;  Service: Pain Management;  Laterality: N/A;  C-ARM, NEVRO REP       Family History:  No family history on file.    Social History:  Social History     Socioeconomic History    Marital status:    Tobacco Use    Smoking status: Former Smoker     Quit date:      Years since quittin.4    Smokeless tobacco: Never Used    Tobacco comment: stopped 40 years ago   Substance and Sexual Activity    Alcohol use: Yes     Alcohol/week: 2.0 standard drinks     Types: 2 Shots of liquor per week     Comment: nightly -scotch    Drug use: Never    Sexual activity: Yes     Partners: Female       OBJECTIVE:    /62   Pulse (!) 55   Temp 98 °F (36.7 °C)   Resp 18   Ht 6' 1" (1.854 m)   Wt 90.7 kg (200 lb)   BMI 26.39 kg/m²     PHYSICAL EXAMINATION:    GENERAL: Well appearing, in no acute distress, alert and oriented x3.  PSYCH:  Mood and affect appropriate.  SKIN: Skin color, texture, turgor normal, no rashes or lesions.  HEAD/FACE:  Normocephalic, atraumatic  NECK: No pain with neck flexion, extension, or lateral flexion  CV: RRR  PULM: No evidence of respiratory difficulty, symmetric chest rise.  GI:  Soft and non-tender.  BACK: Mild pain to palpation over the spine or costovertebral angles. Decreased range of motion without pain reproduction. Positive axial loading test bilateral. Positive tenderness over rt SIJ, Positive FABERE,Ganselin and Yeoman's test on the rt side.negative FADIR Peripheral joint ROM is full and pain free without obvious instability or laxity in all four extremities. No deformities, edema, or skin discoloration. Good capillary refill.   EXTREMITIES: Peripheral joint ROM " is full and pain free without obvious instability or laxity in all four extremities. No deformities, edema, or skin discoloration. Good capillary refill.  MUSCULOSKELETAL: Bilateral lower extremity strength is normal and symmetric.  No atrophy or tone abnormalities are noted.  NEURO: Bilateral lower extremity coordination and muscle stretch reflexes are physiologic and symmetric.  No clonus. No loss of sensation is noted.  GAIT: Antalgic with use of assistive device. Utilizes a walker.    ASSESSMENT: 86 y.o. year old male with low back pain, consistent with:     1. Other spondylosis, lumbosacral region  Procedure Order to Pain Management   2. Spondylosis without myelopathy or radiculopathy  Procedure Order to Pain Management   3. Chronic pain syndrome     4. Spinal cord stimulator status     5. Vertebrogenic pain           PLAN:     - I have stressed the importance of physical activity and a home exercise plan to help with pain and improve health.  - Patient can continue with medications for now since they are providing benefits, using them appropriately, and without side effects.  - Schedule for a Diagnostic/Therapeutic Medial branch block at Left/ Right L3,4, and L5 to help with axial low back pain and progress with a home exercise program.  - if no relief Will consider intercept  (Interval increased modic changes on MRI)   - RTC 2 wks after MBB  - Counseled patient regarding the importance of activity modification, constant sleeping habits and physical therapy.    The above plan and management options were discussed at length with patient. Patient is in agreement with the above and verbalized understanding.    Saulo Sheridan I have personally reviewed the history and exam of this patient and agree with the resident/fellow/NPs note as stated above.    Samuel Jimenez MD    05/26/2022

## 2022-06-01 ENCOUNTER — HOSPITAL ENCOUNTER (OUTPATIENT)
Facility: OTHER | Age: 87
Discharge: HOME | End: 2022-06-01
Attending: ANESTHESIOLOGY | Admitting: ANESTHESIOLOGY
Payer: MEDICARE

## 2022-06-01 VITALS
SYSTOLIC BLOOD PRESSURE: 160 MMHG | BODY MASS INDEX: 25.71 KG/M2 | HEART RATE: 55 BPM | WEIGHT: 194 LBS | OXYGEN SATURATION: 97 % | TEMPERATURE: 98 F | RESPIRATION RATE: 14 BRPM | DIASTOLIC BLOOD PRESSURE: 67 MMHG | HEIGHT: 73 IN

## 2022-06-01 DIAGNOSIS — M47.897 OTHER OSTEOARTHRITIS OF SPINE, LUMBOSACRAL REGION: Primary | ICD-10-CM

## 2022-06-01 DIAGNOSIS — G89.29 CHRONIC PAIN: ICD-10-CM

## 2022-06-01 PROCEDURE — 64494 INJ PARAVERT F JNT L/S 2 LEV: CPT | Mod: 50,KX | Performed by: ANESTHESIOLOGY

## 2022-06-01 PROCEDURE — 64493 INJ PARAVERT F JNT L/S 1 LEV: CPT | Mod: 50,KX,, | Performed by: ANESTHESIOLOGY

## 2022-06-01 PROCEDURE — 64494 PR INJ DX/THER AGNT PARAVERT FACET JOINT,IMG GUIDE,LUMBAR/SAC, 2ND LEVEL: ICD-10-PCS | Mod: 50,KX,, | Performed by: ANESTHESIOLOGY

## 2022-06-01 PROCEDURE — 25000003 PHARM REV CODE 250: Performed by: ANESTHESIOLOGY

## 2022-06-01 PROCEDURE — 63600175 PHARM REV CODE 636 W HCPCS: Performed by: ANESTHESIOLOGY

## 2022-06-01 PROCEDURE — 64493 INJ PARAVERT F JNT L/S 1 LEV: CPT | Mod: 50,KX | Performed by: ANESTHESIOLOGY

## 2022-06-01 PROCEDURE — 64494 INJ PARAVERT F JNT L/S 2 LEV: CPT | Mod: 50,KX,, | Performed by: ANESTHESIOLOGY

## 2022-06-01 PROCEDURE — 64493 PR INJ DX/THER AGNT PARAVERT FACET JOINT,IMG GUIDE,LUMBAR/SAC,1ST LVL: ICD-10-PCS | Mod: 50,KX,, | Performed by: ANESTHESIOLOGY

## 2022-06-01 RX ORDER — MIDAZOLAM HYDROCHLORIDE 1 MG/ML
INJECTION INTRAMUSCULAR; INTRAVENOUS
Status: DISCONTINUED | OUTPATIENT
Start: 2022-06-01 | End: 2022-06-01 | Stop reason: HOSPADM

## 2022-06-01 RX ORDER — LIDOCAINE HYDROCHLORIDE 20 MG/ML
INJECTION, SOLUTION INFILTRATION; PERINEURAL
Status: DISCONTINUED | OUTPATIENT
Start: 2022-06-01 | End: 2022-06-01 | Stop reason: HOSPADM

## 2022-06-01 RX ORDER — BUPIVACAINE HYDROCHLORIDE 2.5 MG/ML
INJECTION, SOLUTION EPIDURAL; INFILTRATION; INTRACAUDAL
Status: DISCONTINUED | OUTPATIENT
Start: 2022-06-01 | End: 2022-06-01 | Stop reason: HOSPADM

## 2022-06-01 RX ORDER — SODIUM CHLORIDE 9 MG/ML
500 INJECTION, SOLUTION INTRAVENOUS CONTINUOUS
Status: DISCONTINUED | OUTPATIENT
Start: 2022-06-01 | End: 2022-06-01 | Stop reason: HOSPADM

## 2022-06-01 NOTE — OP NOTE
Diagnostic Lumbar Medial Branch Block Under Fluoroscopy    The procedure, risks, benefits, and options were discussed with the patient. There are no contraindications to the procedure. The patent expressed understanding and agreed to the procedure. Informed written consent was obtained prior to the start of the procedure and can be found in the patient's chart.    PATIENT NAME: Jefferson Boogie   MRN: [id number]     DATE OF PROCEDURE: 06/01/2022                                           PROCEDURE:  Diagnostic Bilateral L3, L4 and L5 Lumbar Medial Branch Block under Fluoroscopy    PRE-OP DIAGNOSIS: Other spondylosis, lumbar region [M47.896]  Spondylosis of lumbar region without myelopathy or radiculopathy [M47.816] Lumbar spondylosis [M47.816]    POST-OP DIAGNOSIS: Same    PHYSICIAN: Samuel Jimenez MD    MEDICATIONS INJECTED:  Bupivicaine 0.25%    LOCAL ANESTHETIC INJECTED:   Xylocaine 2%    SEDATION: None    ESTIMATED BLOOD LOSS:  None    COMPLICATIONS:  None.    INTERVAL HISTORY: Patient has clinical and imaging findings suggestive of facet mediated pain.    TECHNIQUE: Time-out was performed to identify the patient and procedure to be performed. With the patient laying in a prone position, the surgical area was prepped and draped in the usual sterile fashion using ChloraPrep and fenestrated drape. The levels were determined under fluoroscopic guidance. Skin anesthesia was achieved by injecting Lidocaine 2% over the injection sites. A 22 gauge, 3.5 inch needle was introduced into the medial branch nerves at the junctions of the superior articular process and the transverse processes of the targeted sites using AP, lateral and/or contralateral oblique fluoroscopic imaging. After negative aspiration for blood or CSF was confirmed, 1 mL of the anesthetic listed above was then slowly injected at each site. The needles were removed and bleeding was nil. A sterile dressing was applied. No specimens collected. The patient  tolerated the procedure well.     The patient was monitored after the procedure in the recovery area. They were given post-procedure and discharge instructions to follow at home. The patient was discharged in a stable condition.    I reviewed and edited the fellow's note. I conducted my own interview and physical examination. I agree with the findings. I was present and supervising all critical portions of the procedure.    Samuel Jimenez MD

## 2022-06-01 NOTE — H&P
HPI  Patient presenting for Procedure(s) (LRB):  BLOCK, NERVE MEDIAL BRANCH L3,4,5 BILATERAL 1st (Bilateral)     Patient on Anti-coagulation No    No health changes since previous encounter    Past Medical History:   Diagnosis Date    Cancer     stage I bladder cancer 50 yrs ago    Hypercholesteremia     Hypertension     Sacroiliitis 7/8/2020    Thyroid disease      Past Surgical History:   Procedure Laterality Date    BLADDER SURGERY      CATARACT EXTRACTION      CATARACT EXTRACTION W/  INTRAOCULAR LENS IMPLANT Right 3/9/2022    Procedure: EXTRACTION, CATARACT, WITH IOL INSERTION;  Surgeon: Bell Cedeno MD;  Location: [facility] OR;  Service: Ophthalmology;  Laterality: Right;    COLONOSCOPY      EPIDURAL STEROID INJECTION N/A 1/12/2022    Procedure: INJECTION, STEROID, EPIDURAL CAUDAL With Catheter needs consent;  Surgeon: Samuel Jimenez MD;  Location: [facility] PAIN MGT;  Service: Pain Management;  Laterality: N/A;    EYE SURGERY      cataracts     FUSION OF SACROILIAC JOINT Right 3/15/2021    Procedure: FUSION, RIGHT SACROILIAC JOINT FUSION;  Surgeon: Samuel Jimenez MD;  Location: [facility] OR;  Service: Pain Management;  Laterality: Right;  C-ARM, PAINTEQ REP     HERNIA REPAIR      INJECTION OF JOINT Right 7/8/2020    Procedure: INJECTION, JOINT, SACROILIAC (SI);  Surgeon: Samuel Jimenez MD;  Location: [facility] PAIN MGT;  Service: Pain Management;  Laterality: Right;    INJECTION OF JOINT Right 9/9/2020    Procedure: INJECTION, JOINT, SACROILIAC (SI);  Surgeon: Samuel Jimenez MD;  Location: [facility] PAIN MGT;  Service: Pain Management;  Laterality: Right;    INJECTION OF JOINT Right 7/21/2021    Procedure: INJECTION, JOINT, HIP RIGHT;  Surgeon: Samuel Jimenez MD;  Location: [facility] PAIN MGT;  Service: Pain Management;  Laterality: Right;  CONSENT NEEDED    INJECTION OF JOINT Right 10/13/2021    Procedure: INJECTION, JOINT, SACROILIAC (SI)  NEED CONSENT pt. requesting sedation;  Surgeon: Samuel BROWN  "MD Barbara;  Location: [facility] PAIN MGT;  Service: Pain Management;  Laterality: Right;    KNEE SURGERY      RADIOFREQUENCY ABLATION Right 12/9/2020    Procedure: RADIOFREQUENCY ABLATION, L5-S1-S2 LATERAL BRANCH COOLED;  Surgeon: Samuel Jimenez MD;  Location: [facility] PAIN MGT;  Service: Pain Management;  Laterality: Right;    REPLACEMENT OF NERVE STIMULATOR BATTERY N/A 7/27/2020    Procedure: Replacement, SPINAL CORD STIMULATOR BATTERY CHANGE TO NEVRO OMNION BATTERY;  Surgeon: Samuel Jimenez MD;  Location: [facility] OR;  Service: Pain Management;  Laterality: N/A;  C-ARM, NEVRO REP    TRIAL OF SPINAL CORD NERVE STIMULATOR N/A 8/5/2019    Procedure: Trial, Neurostimulator, SPINAL CORD STIMULATOR TRIAL;  Surgeon: Samuel Jimenez MD;  Location: [facility] CATH LAB;  Service: Pain Management;  Laterality: N/A;  C-ARM, NEVRO REP     Review of patient's allergies indicates:  No Known Allergies   Current Facility-Administered Medications   Medication    0.9%  NaCl infusion     Facility-Administered Medications Ordered in Other Encounters   Medication    balanced salt irrigation intra-ocular solution 1 drop    sodium chloride 0.9% flush 2 mL       PMHx, PSHx, Allergies, Medications reviewed in epic    ROS negative except pain complaints in HPI    OBJECTIVE:    BP (!) 168/70 (BP Location: Right arm, Patient Position: Sitting)   Pulse (!) 54   Temp 97.6 °F (36.4 °C) (Oral)   Resp 16   Ht 6' 1" (1.854 m)   Wt 88 kg (194 lb)   SpO2 96%   BMI 25.60 kg/m²     PHYSICAL EXAMINATION:    GENERAL: Well appearing, in no acute distress, alert and oriented x3.  PSYCH:  Mood and affect appropriate.  SKIN: Skin color, texture, turgor normal, no rashes or lesions which will impact the procedure.  CV: RRR with palpation of the radial artery.  PULM: No evidence of respiratory difficulty, symmetric chest rise. Clear to auscultation.  NEURO: Cranial nerves grossly intact.    Plan:    Proceed with procedure as planned Procedure(s) " (LRB):  BLOCK, NERVE MEDIAL BRANCH L3,4,5 BILATERAL 1st (Bilateral)    Stephen Persaud  06/01/2022

## 2022-06-01 NOTE — DISCHARGE SUMMARY
Discharge Note  Short Stay      SUMMARY     Admit Date: 6/1/2022    Attending Physician: Samuel Jimenez      Discharge Physician: Samuel Jimenez      Discharge Date: 6/1/2022 9:47 AM    Procedure(s) (LRB):  BLOCK, NERVE MEDIAL BRANCH L3,4,5 BILATERAL 1st (Bilateral)    Final Diagnosis: Other spondylosis, lumbar region [M47.896]  Spondylosis of lumbar region without myelopathy or radiculopathy [M47.816]    Disposition: Home or self care    Patient Instructions:   Discharge Medication List as of 6/1/2022  8:57 AM      CONTINUE these medications which have NOT CHANGED    Details   alendronate (FOSAMAX) 70 MG tablet 70 mg every 7 days. , Starting Mon 1/11/2021, Historical Med      ALPRAZolam (XANAX) 1 MG tablet Take 1 mg by mouth., Historical Med      amoxicillin (AMOXIL) 500 MG Tab Starting Tue 6/22/2021, Historical Med      ascorbic acid, vitamin C, (VITAMIN C) 1000 MG tablet Take 1,000 mg by mouth., Historical Med      budesonide-formoterol 80-4.5 mcg (SYMBICORT) 80-4.5 mcg/actuation HFAA Inhale 2 puffs into the lungs., Historical Med      celecoxib (CELEBREX) 200 MG capsule Starting Thu 8/26/2021, Historical Med      cephALEXin (KEFLEX) 500 MG capsule Starting Mon 3/15/2021, Historical Med      ciclopirox (LOPROX) 0.77 % Crea Starting Thu 5/27/2021, Historical Med      clindamycin (CLEOCIN) 300 MG capsule Starting Mon 2/7/2022, Historical Med      ergocalciferol (ERGOCALCIFEROL) 50,000 unit Cap Starting Wed 2/9/2022, Historical Med      flurazepam (DALMANE) 15 MG Cap Take 30 mg by mouth daily as needed., Historical Med      fluticasone propionate (FLONASE) 50 mcg/actuation nasal spray daily as needed., Starting Wed 6/13/2018, Historical Med      guaiFENesin-codeine 100-10 mg/5 ml (TUSSI-ORGANIDIN NR)  mg/5 mL syrup Starting Mon 12/20/2021, Historical Med      irbesartan (AVAPRO) 75 MG tablet 150 mg once daily. , Starting Sat 12/14/2019, Historical Med      levothyroxine (SYNTHROID) 100 MCG tablet Take 100  mcg by mouth., Historical Med      mirabegron (MYRBETRIQ ORAL) Take by mouth., Historical Med      mupirocin (BACTROBAN) 2 % ointment Starting Mon 2/7/2022, Historical Med      neomycin-polymyxin-hydrocortisone (CORTISPORIN) otic solution Starting Tue 8/24/2021, Historical Med      prednisolon/gatiflox/bromfenac (PREDNISOL ACE-GATIFLOX-BROMFEN) 1-0.5-0.075 % DrpS Apply 1 drop to eye 3 (three) times daily., Starting Fri 2/25/2022, Normal      simvastatin (ZOCOR) 20 MG tablet Take 20 mg by mouth every evening., Historical Med      sulfamethoxazole-trimethoprim 800-160mg (BACTRIM DS) 800-160 mg Tab Starting Sat 2/5/2022, Historical Med      tadalafil (CIALIS) 20 MG Tab Starting Sun 1/12/2020, Historical Med      temazepam (RESTORIL) 30 mg capsule TK 1 C PO QD HS, Historical Med      terbinafine HCL (LAMISIL) 250 mg tablet Starting Thu 5/27/2021, Historical Med      tolterodine (DETROL LA) 4 MG 24 hr capsule Starting Thu 7/8/2021, Historical Med                 Discharge Diagnosis: Other spondylosis, lumbar region [M47.896]  Spondylosis of lumbar region without myelopathy or radiculopathy [M47.816]  Condition on Discharge: Stable with no complications to procedure   Diet on Discharge: Same as before.  Activity: as per instruction sheet.  Discharge to: Home with a responsible adult.  Follow up: 2-4 weeks       Please call my office or pager at [phone] if experienced any weakness or loss of sensation, fever > 101.5, pain uncontrolled with oral medications, persistent nausea/vomiting/or diarrhea, redness or drainage from the incisions, or any other worrisome concerns. If physician on call was not reached or could not communicate with our office for any reason please go to the nearest emergency department        Samuel Jimenez MD

## 2022-06-01 NOTE — DISCHARGE INSTRUCTIONS
Thank you for allowing us to care for you today. You may receive a survey about the care we provided. Your feedback is valuable and helps us provide excellent care throughout the community.     Home Care Instructions for Pain Management:    1. DIET:   You may resume your normal diet today.   2. BATHING:   You may shower with luke warm water. No tub baths or anything that will soak injection sites under water for the next 24 hours.  3. DRESSING:   You may remove your bandage today.   4. ACTIVITY LEVEL:   You may resume your normal activities 24 hrs after your procedure. Nothing strenuous today.  5. MEDICATIONS:   You may resume your normal medications today. To restart blood thinners, ask your doctor.  6. DRIVING    If you have received any sedatives by mouth today, you may not drive for 12 hours.    If you have received any sedation through your IV, you may not drive for 24 hrs.   7. SPECIAL INSTRUCTIONS:   No heat to the injection site for 24 hrs including, hot bath or shower, heating pad, moist heat, or hot tubs.    Use ice pack to injection site for any pain or discomfort.  Apply ice packs for 20 minute intervals as needed.    IF you have diabetes, be sure to monitor your blood sugar more closely. IF your injection contained steroids your blood sugar levels may become higher than normal.    If you are still having pain upon discharge:  Your pain may improve over the next 48 hours. The anesthetic (numbing medication) works immediately to 48 hours. IF your injection contained a steroid (anti-inflammatory medication), it takes approximately 3 days to start feeling relief and 7-10 days to see your greatest results from the medication. It is possible you may need subsequent injections. This would be discussed at your follow up appointment with pain management or your referring doctor.    Please call the PAIN MANAGEMENT office at [phone] or ON CALL pager at [phone] if you experienced any:   -Weakness or  loss of sensation  -Fever > 101.5  -Pain uncontrolled with oral medications   -Persistent nausea, vomiting, or diarrhea  -Redness or drainage from the injection sites, or any other worrisome concerns.   If physician on call was not reached or could not communicate with our office for any reason please go to the nearest emergency department.   Lumbar/Cervical/Joint Medial Branch Block Pain Diary    Patient Name:  :    Pre-procedure Pain Score: _____/10    Time of injection:     Please fill out the chart below to the best of your ability. We need to know how much percentage of relief in your pain that you have while the numbing medication is active. Please be mindful that this is a diagnostic test and may not last for a long time. If you are asleep during one of the times listed below, you do not have to record that time.     Time After the   Procedure % of pain relief   achieved Pain Score (0-10)   1 hour post-procedure     2 hours post-procedure     3 hours post-procedure     4 hours post-procedure     5 hours post-procedure     6 hours post-procedure     12 hours post-procedure     24 hours post-procedure       Please make sure to return this form or information to the clinic. This information is needed to proceed with your plan of care. There are several options that you can use to send this back to us.    Form can be faxed to us at [phone]  Call us to provide the information at [phone]  Send the information to us through your [facility]    If you have any questions about completing this diary, please call us at [phone].

## 2022-06-17 ENCOUNTER — OFFICE VISIT (OUTPATIENT)
Dept: OPHTHALMOLOGY | Facility: CLINIC | Age: 87
End: 2022-06-17
Payer: MEDICARE

## 2022-06-17 DIAGNOSIS — H16.223 KERATOCONJUNCTIVITIS SICCA NOT SPECIFIED AS SJOGREN'S, BILATERAL: Primary | ICD-10-CM

## 2022-06-17 PROCEDURE — 99213 OFFICE O/P EST LOW 20 MIN: CPT | Mod: PBBFAC | Performed by: OPHTHALMOLOGY

## 2022-06-17 PROCEDURE — 99214 OFFICE O/P EST MOD 30 MIN: CPT | Mod: S$PBB,,, | Performed by: OPHTHALMOLOGY

## 2022-06-17 PROCEDURE — 99999 PR PBB SHADOW E&M-EST. PATIENT-LVL III: CPT | Mod: PBBFAC,,, | Performed by: OPHTHALMOLOGY

## 2022-06-17 PROCEDURE — 99999 PR PBB SHADOW E&M-EST. PATIENT-LVL III: ICD-10-PCS | Mod: PBBFAC,,, | Performed by: OPHTHALMOLOGY

## 2022-06-17 PROCEDURE — 99214 PR OFFICE/OUTPT VISIT, EST, LEVL IV, 30-39 MIN: ICD-10-PCS | Mod: S$PBB,,, | Performed by: OPHTHALMOLOGY

## 2022-06-17 NOTE — PROGRESS NOTES
HPI     Dr. Buckley    S/p phaco w/IOL OS ~15 years ago - dr leader  Phacoemulsification with placement of intraocular lens, right   eye.03/09/2022    Patient states his vision has been doing very well. No gtts at this time.       Last edited by Bell Cedeno MD on 6/17/2022  2:20 PM. (History)            Assessment /Plan     For exam results, see Encounter Report.    Keratoconjunctivitis sicca not specified as Sjogren's, bilateral      S/p phaco w/IOL OS ~15 years ago - dr leader  Phacoemulsification with placement of intraocular lens, right   Eye.03/09/2022    Doing excellent, early PCO OD, not VS    F/up 1 yr - DFE OU    CLAUDIA wishes to meet pt.

## 2022-06-29 ENCOUNTER — TELEPHONE (OUTPATIENT)
Dept: PAIN MEDICINE | Facility: CLINIC | Age: 87
End: 2022-06-29
Payer: MEDICARE

## 2022-06-29 NOTE — TELEPHONE ENCOUNTER
This message is for patient in regards to his/her appointment 06/30/22 at 03:45p   With Dr. Samuel Jimenez MD.      [facility] Policy: For the safety of all patients and staff members.     Patient Visitor policy: During this visit we're informing all patients that face mask are now optional, upon visit you have the options of requesting clinical staff to wear a mask for your protection and thiers.      If you have any questions or concerns please contact [phone]        Staff left a voicemail reminding pt of their appt

## 2022-08-10 ENCOUNTER — TELEPHONE (OUTPATIENT)
Dept: PAIN MEDICINE | Facility: CLINIC | Age: 87
End: 2022-08-10
Payer: MEDICARE

## 2022-08-10 NOTE — TELEPHONE ENCOUNTER
This message is for patient in regards to his/her appointment 08/11/22 at 9:30 a   With Dr.Guirguis [facility] Policy: For the safety of all patients and staff members.     Patient Visitor policy: During this visit we're informing all patients that face mask are required.     If you have any questions or concerns please contact [phone]

## 2022-08-11 ENCOUNTER — OFFICE VISIT (OUTPATIENT)
Dept: PAIN MEDICINE | Facility: CLINIC | Age: 87
End: 2022-08-11
Attending: ANESTHESIOLOGY
Payer: MEDICARE

## 2022-08-11 VITALS
SYSTOLIC BLOOD PRESSURE: 138 MMHG | RESPIRATION RATE: 18 BRPM | BODY MASS INDEX: 26 KG/M2 | TEMPERATURE: 98 F | DIASTOLIC BLOOD PRESSURE: 55 MMHG | HEART RATE: 56 BPM | HEIGHT: 73 IN | WEIGHT: 196.19 LBS

## 2022-08-11 DIAGNOSIS — M51.36 DDD (DEGENERATIVE DISC DISEASE), LUMBAR: ICD-10-CM

## 2022-08-11 DIAGNOSIS — M47.816 LUMBAR SPONDYLOSIS: Primary | ICD-10-CM

## 2022-08-11 DIAGNOSIS — G89.29 OTHER CHRONIC PAIN: ICD-10-CM

## 2022-08-11 DIAGNOSIS — M47.897 OTHER OSTEOARTHRITIS OF SPINE, LUMBOSACRAL REGION: ICD-10-CM

## 2022-08-11 DIAGNOSIS — G03.9 ARACHNOIDITIS: ICD-10-CM

## 2022-08-11 PROCEDURE — 99214 PR OFFICE/OUTPT VISIT, EST, LEVL IV, 30-39 MIN: ICD-10-PCS | Mod: S$PBB,GC,, | Performed by: ANESTHESIOLOGY

## 2022-08-11 PROCEDURE — 99999 PR PBB SHADOW E&M-EST. PATIENT-LVL V: CPT | Mod: PBBFAC,,, | Performed by: ANESTHESIOLOGY

## 2022-08-11 PROCEDURE — 99215 OFFICE O/P EST HI 40 MIN: CPT | Mod: PBBFAC | Performed by: ANESTHESIOLOGY

## 2022-08-11 PROCEDURE — 99214 OFFICE O/P EST MOD 30 MIN: CPT | Mod: S$PBB,GC,, | Performed by: ANESTHESIOLOGY

## 2022-08-11 PROCEDURE — 99999 PR PBB SHADOW E&M-EST. PATIENT-LVL V: ICD-10-PCS | Mod: PBBFAC,,, | Performed by: ANESTHESIOLOGY

## 2022-08-11 NOTE — PROGRESS NOTES
Chronic patient Established Note (Follow up visit)      SUBJECTIVE:        Interval History 8/11/2022:  Jefferson Boogie presents to the clinic for a follow-up appointment for chronic low back pain. LCV 5/26/2022, at which time, we scheduled pt for BL L3,4,5 MBB. Performed first block on 6/1/22. Per phone visit 6/3, pt reported 80-90% relief. Pt was subsequently lost to follow up and never obtained 2nd block. Pt reports interval complication with bisphosphonate therapy and general business of summer that lead to him missing follow up.     Since the last visit, Jefferson Boogie states the pain has been stable. Pain remians similar in character and quality to that of previous evals. Pain localized to lower lumbar spine with radiation to right buttock and down right leg in L5 distribution pattern. SCS continues to provide some benefit. Current pain intensity is 6/10.    Interval History 5/26/22:  Jefferson Boogie presents to the clinic for a follow-up appointment for back pain. Since the last visit, Jefferson Boogie states the pain has been stable. Certain postures he can tolerate in the standing position such as leaning on a shopping cart or support to his posterior thighs. He now uses a walker when covering long distances. He continues to play golf despite it aggravating his pain. He receives some benefit from the SCS whereas other interventions have not helped. He remains interested in the [employer] device.     Interval History 3/24/22:  Jefferson Boogie presents to the clinic for a follow-up appointment for back pain. Since the last visit, Jefferson Boogie states the pain has been worse than usual. Current pain intensity is 8/10. He continues to report benefit with Nevro SCS however he has not been able to get in touch with the representative in order to have his settings adjusted. He continues to take occasional norco 7.5 mg with benefit, particularly when he plays golf.     Interval History 2/10/22:  Patent  "presents today s/p caudal epidural steroid injections on 1/12/22 since then he has not had any relief. He states his pain has been unchanged and is a 6/10 on average with the worst being 8/10 and best is 4/10. He has been taking percocet 5mg when he golfs or plays with his granddaughter.         Interval History 12/30/21:  Jefferson Boogie presents to clinic for follow up appointment s/p Right SI joint and Right piriformis muscle injection under US guidance on 11/29/21. He did not obtain any noticeable relief with this procedure similar to all of his previous injections. His pain is unchanged and constant over the right buttock. He is taking percocet 5mg x 3 on days he is more active, such as playing golf. This helps some, but minimally. Denies any new symptoms or neurological changes. No numbness, tingling, weakness in his lower extremities. Denies bowel/bladder incontinence or saddle anesthesia.         Interval History 11/4/2021:  Jefferson Boogie presents to the clinic for a follow-up appointment for right sided back pain and SI joint pain. Mr. Boogie had a right SI joint injection on 10/13/2021. He did not note significant relief with the injection for any period of time. Pain is still constant over the right buttock and most noticeable when playing golf. He denies any new bowel/bladder incontinence, lower extremity numbness or weakness or saddle anesthesia.     Interval History 8/26/2021:  Jefferson Boogie presents to the clinic for a follow-up appointment for right sided hip pain with right-sided radiculopathy . Since the last visit, Jefferson Boogie states the pain has been "particularly tender today" 7/10. Patient reports playing golf recently and experienced worsening symptoms the following day. Mr. Boogie is s/p right-side hip joint injection with minimal relief. Patient states he has had relief with previous interventions and is open to RFA.     Interval History 6/10/2021:  Jefferson Boogie " presents to the clinic for a follow-up appointment. Since the last visit, Jefferson Boogie states the pain has been stable. Current pain intensity is 3/10. States he is still having pain around SIJ that is affecting his ADL      Interval History 4/15/2021:  The patient returns to clinic today for follow up of back pain. He reports that the swelling above the SI fusion incision has resolved. He denies any fever, chills, or drainage from the incision. He does report benefit since the fusion. He continues to report benefit with Nevro SCS. He reports intermittent shoulder pain at this time. He did receive an injection with Sports Medicine with benefit. He denies any other health changes. His pain today is 2/10.     Interval History 3/25/21:  Mr. Boogie presents to clinic for follow up of bilateral shoulder pain. He is s/p BL subacromial injections on 2/8/21. He reports maximum 20% relief of pain in the Right shoulder. Today the Left shoulder pain is 1/10; Right shoulder pain is 5-7/10.      Interval History 3/22/2021:  The patient returns to clinic today for follow up of back pain. He is s/p right SI fusion on 3/15/2021. He reports some relief at this time. He does report soreness to the incision. He denies any fever, chills, or drainage from incision. He continues to report benefit with Nevro SCS. He denies any other health changes. His pain today is 4/10.     Interval History 1/11/2020:  Patient is here for follow-up of low back pain now s/p sacroiliac RFA on 12/9/20 which provided no benefit and with the new complaint of bilateral anterior shoulder pain R>L. His shoulder pain started about 2 months ago. He describes 9/10 sharp/stinging pain without radiation that is exacerbated with overhead activity and improved with rest. He started PT about one month ago. He takes oxycodone which provides some relief. He had a blind subacromial steroid injection in the right shoulder with Dr. Ramirez on 10/26/19 which provided  some short term relief.     Interval History 11/5/2020:  Jefferson Boogie presents to the clinic for a follow-up appointment for low back pain. Since the last visit, Jefferson Boogie states the pain has been stable. Current pain intensity is 4/10. He had good relief from the SI joint injection that lasted for about a week. Pain has since returned. He also slipped and fell on his buttock a couple of weeks ago and had increased pain in the right buttock after that which is slowly improving. He continues to have variable benefit with the Nevro SCS for intermittent pain in the right leg. He is scheduled to meet with representatives today. He is taking celebrex daily and percocet as needed sparingly. He denies any adverse effects and any other health changes.     Interval History 10/5/2020:  The patient returns to clinic today for follow up of low back pain. He is s/p right SI joint injection on 9/9/2020. He reports 25% relief of his right sided low back and buttock pain. He did have good relief the first two days. He continues to report right sided low back and buttock pain. He denies any radicular leg pain. His pain is worse with prolonged standing and walking. He continues to report intermittent pain into his achilles from previous injury. He feels as though this has changed his gait. He continues to report some benefit with Nevro SCS device. He is in contact with representatives. He is currently taking Percocet as needed sparingly with some benefit. He denies any adverse effects. He denies any other health changes. His pain today is 4/10.      Interval History 9/2/2020:  The patient returns to clinic today for follow up of back pain. He reports that his back pain has improved since last audio visit. He continues to report back pain with intermittent radiating pain into his right hip and calf. He has spoken with Bienvenido from [employer] via phone with some programming. He is meeting with Bienvenido today. He had some issues with  charging but this has improved. He is currently taking Norco intermittently but this is only lasts 2-3 hours. He denies any adverse effects. He denies any other health changes. His pain today is 8/10.     Interval History 08/27/2020:  Pt was contacted over [employer] for a follow up appointment.  He is currently complaining of right hip and right calf with no associated numbness or weakness.  He continues to use his Nevro device.  He states it has been very frustrating. Inconsistent.  Took 20 mins to 1 hour to charge.  Couldn't get it to start this morning.  He states that there is no drainage at the battery site, no signs of infection or pain at the site.  Patient is not taking medications at this time.  He wants to speak about medication options because he would like to start playing golf again.     Interval History 8/17/2020:  The patient returns to clinic today for post op wound check. He continues to report benefit with Nevro device. He denies any fever, chills, or drainage. He continues to follow his activity restrictions. He does report a recent achilles tendon injury while swimming. He is seeing Orthopedics. He denies any other health changes. His pain today is 4/10.     Interval History 8/3/2020:  The patient returns to clinic today for post op. He is s/p Nevro battery change on 7/27/2020. He denies any fever, chills, or drainage from incision. He has not completed his antibiotics as he missed two days of the medication. He continues to follow his activity restrictions. He reports relief with the device. He is meeting with Bienvenido today for programming. He denies any other health changes. His pain today is 2/10     Interval History 7/22/2020:  Pt presents for audio follow up only s/p Right SI joint injection on 7/8/2020. Pt states he has near resolution of focal pain to buttock. He is pleased with result and pain relief. Pt has been in contact with [employer] and will be having battery swap in 5 days. He has  difficulty whether stimulator is beneficial and has even had a holiday from using SCS.  He denies any newer areas pain, denies any neuro changes, meds area adequate to control pain without adverse side effects. Pain is 2/10 today.     Interval HPI 6/15/2020:  Jefferson Boogie presents to the clinic for a follow-up appointment for 3 week follow up right hip and right thigh pain. Since the last visit, Jefferson Boogie states the pain has been persistant. Current pain intensity is 5/10. Patient has been working with Bienvenido Varghese, to try and get better coverage of a localized pain that he still experiences in his right low back area. He does report improvement in symptoms of numbness/tingling. Today, worse pain is 1/10 in severity and localizes to the right SI joint. Pain is worse with extension of his back. It is worse with golfing. Pain relieved by Norco--he takes 1-2 tablets of 5-325 Norco on days that he plays golf. Pain is also improved with being still and not being active. Patient endorses a much more active lifestyle with Norco. Denies new weakness/numbness or bowel/bladder changes.     Previous injection history includes a series of 3 lumbar CHOCO at [facility].      Interval HPI 3/5/20:  Patient presents to the clinic for a follow-up appointment for low back pain. Since the last visit, he states his pain has been unchanged. Current pain intensity is 4/10. He continues to work with Leonila to adjust settings on his SCS. He has stopped using tramadol, and uses norco sparingly. He continues to work with a  for physical therapy.      Interval HPI 1/9/2020:  Patient returns for follow up s/p Gaviro SCS. He states he is unsure if it has helped his pain since he had it implanted. He last had an adjustment of his programming about 1 month ago. He does note that once he is done charging his SCS his pain is improved. Pain still worse with prolonged standing and activity such as golf. He still is  doing home exercise program. He says that he is trying to do more since getting the SCS. He is still taking the Tramadol with limited pain relief. He takes Tramadol 50 mg twice daily only on days of activity which is once a week. No other health changes.      HPI 19  Jefferson Boogie returns to clinic today for follow up. He reports increased low back and leg pain over the last few days. He has been in contact with [employer] representatives for programming adjustments. He does report benefit with the device. He reports good days and bad days. His pain is worse with prolonged standing and activity. He continues to participate in a home exercise routine. He does take Tramadol sparingly but reports limited relief. He denies any other health changes. His pain today is 5/10.    Pain Disability Index Review:  Last 3 PDI Scores 2022 2022 3/24/2022   Pain Disability Index (PDI) [phone]       Pain Medications:     Current medication:  Celebrex   Xanax  See med list     Opioid Contract: not applicable      report:  Reviewed and consistent with medication use as prescribed.     Pain Procedures:   2020- Right SI joint injection  2020- Nevro SCS battery change  2020- Right SIJ injection >80% relief   19 SCS implant  19 SCS trial  2020- Right SI joint injection  2020- SCS battery revision  2020- Right SI joint injection   2020- Right L5-S1 RFA  3/15/2021- Right SI fusion  2021 - Injection R Hip - minimal relief  10/13/2021 - Right SI joint injection  2021 - R SI joing and R piriformis muscle injection - no relief   2022- Caudal CHOCO- no relief   2022 - Bilateral L3,4,5 MBB - 80-90 % relief.      Physical Therapy/Home Exercise: no     Imagin/10/2021 - X ray Hips Bilateral: No radiographic evidence of acute osseous abnormality of the pelvis and hips and without radiographic evidence of osteonecrosis of the femoral heads.     21 MRI  L-spine:  FINDINGS:  Lumbar sagittal alignment is slightly is straightened.  There is slight convex right curvature lumbar spine.  There is degenerative disc disease with intervertebral disc height loss and endplate degenerative change at all levels with scattered disc desiccation allowing for degenerative change the lumbar vertebral body heights and contours are within normal is without evidence for acute fracture or subluxation.  There is heterogeneous T1/T2 signal focus within the right aspect of the S1 vertebra most compatible with hemangioma this measures 2.0 cm.     The distal spinal cord and conus is normal in signal and contour tip of the conus approximates the T12/L1 level.  Please note there is artifact from indwelling spinal stimulator device with leads partially visualized casting artifact entering the dorsal epidural space at the T12 level and extending cranially.     There is abnormal clumping configuration of the filum terminalis a from T12 through L5 with slight thickening of scattered nerve roots most compatible with arachnoiditis.     No aneurysmal dilatation of the visualized abdominal aorta.     T12/L1 through L1/L2: No significant disc bulge, central canal or neural foraminal stenosis.     L2/L3: Posterior disc osteophyte with facet joint arthropathy without significant central canal stenosis with mild bilateral neural foraminal stenosis.     L3/L4:Bulging disc with ligamentum flavum hypertrophy without significant central canal stenosis with mild bilateral neural foraminal stenosis.     L4/L5:Posterior disc osteophyte with ligamentum flavum hypertrophy and facet joint arthropathy without significant central canal stenosis and mild bilateral neural foraminal stenosis.     L5/S1: Posterior disc osteophyte with facet joint arthropathy without significant central canal stenosis with moderate right and mild left neural foraminal stenosis.     This report was flagged in Epic as abnormal.      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  Please note there is spinal stimulator device with leads partially visualized and distorting the study by artifacts.     There is abnormal thickening of the filum configuration most compatible with arachnoiditis.     Please see above for additional details.           Lumbar spine MRI with contrast 2019    FINDINGS: There are 5 lumbar type vertebral bodies lumbar vertebral body height and alignment are maintained. The signal from the lumbar vertebra demonstrates an admixture of red and yellow marrow. There are some Modic type degenerative signal changes involving the anterior inferior aspect of L5. There is slight anterior disc bulging at the L5-S1 level. All of the lumbar discs are desiccated with severe narrowing of the L2-L3 disc space, moderate narrowing of the L1-2 disc space posteriorly, moderate narrowing of the L5-S1 disc space and mild narrowing of the L3-4 and L4-5 disc spaces.    L5-S1 level: There is bilateral facet joint arthropathy with a slight amount of fluid in both facet joints. There is bilateral, right greater left, foraminal narrowing but no central canal stenosis. There is a mild broad-based posterior protrusion of the L5-S1 disc that extends towards the left-sided foramen and into and possibly through the right-sided foramen. This is similar to what was seen on the prior exam. Incidental note is made of a mild amount of epidural fat that is deposited along the right anterior and left anterior aspect of thecal sac at the L5-S1 disc space level.    L4-L5 level: There is bilateral, left greater than right, facet joint arthropathy without central canal stenosis. There is no bony foraminal narrowing. There is a slight to mild broad-based posterior protrusion of the disc that extends to the medial foramen bilaterally.    L3-L4 level: There is bilateral facet joint arthropathy with slight ligament flavum  redundancy but no bony foraminal narrowing or bony central canal stenosis. There is a slight to mild broad-based posterior protrusion of the disc that extends towards the right-sided foramen and into the anteromedial left-sided foramen.    L2-L3 level: There is bilateral facet joint arthropathy without bony central canal stenosis or bony foraminal narrowing. There is a slight to mild bilobed posterior protrusion of the disc at the level of the anteromedial foramen bilaterally and this is best seen on the axial images.    L1-L2 level: There is bilateral facet arthropathy without bony foraminal narrowing or bony central canal stenosis and the posterior disc margin is unremarkable.    The tip of the conus medullaris extends down to the level of the superior endplate of L1 and the signal from the visualized conus is unremarkable. There is clumping of the cauda equina nerve rootlets throughout the distal thecal sac consistent with arachnoiditis.  Following contrast administration, there is no abnormal enhancement of any of the bony or soft tissue elements of the lumbar spine except for possibly one or 2 facet joints.    Impression:   1. No abnormal enhancement of any bony or soft tissue elements of the lumbar spine except for possibly one or 2 facet joints. Clumping of the nerve rootlets of the cauda equina consistent with arachnoiditis.  3. Desiccation of all of the lumbar disks with multilevel disc space narrowing, multilevel facet joint arthropathy, but no central canal stenosis.  4. Modic type degenerative signal changes in the anterior inferior aspect of L5.  5. There is a mild amount of epidural fat deposit along the right anterior and left anterior aspect of the thecal sac at the L5-S1 level..  6. Other findings as discussed above.    MRI WO Lumbar spine:  1. Since the prior study, there has been interval development of arachnoiditis including multifocal multisegmental clumping of the nerve rootlets of the cauda  equina.  2. All of the lumbar discs are desiccated with multilevel disc space narrowing and desiccation. There is multilevel facet joint arthropathy and foraminal narrowing as detailed above. There is no central canal stenosis.  3. There are posteriorly protruding discs at every level in the lumbar spine except L1-L2 and, for the most part, they appear unchanged from the prior study of 3/26/2018.    Thoracic spine MRI:  FINDINGS: Thoracic vertebral body height and alignment are maintained with a few small scattered Schmorl's nodes involving the endplates of several mid to lower thoracic vertebra. The T3-4 vertebra are partially fused. There is some degree of desiccation of all of the thoracic discs with multilevel disc space narrowing, especially at the T7-T8, T6-T7 and T5-T6 levels. There is no abnormal prevertebral soft tissue thickening. The somewhat heterogeneous appearance to the signal from the thoracic vertebra is presumably secondary to an admixture of red and yellow marrow.    T1-T2 level: There is no bony foraminal narrowing or bony central canal stenosis and the posterior disc margin is unremarkable.    T2-T3 level: There is no bony foraminal narrowing or bony central canal stenosis and the posterior disc margin is unremarkable.    T3-T4 level: There is no bony foraminal narrowing or bony central canal stenosis and the posterior disc margin is unremarkable.    T4-5 level: There is no bony foraminal narrowing or bony central canal stenosis and the posterior disc margin is unremarkable.    T5-T6 level: There is no bony foraminal narrowing or bony central canal stenosis and the posterior disc margin is unremarkable.    T6-T7 level: There is no bony foraminal narrowing or bony central canal stenosis and the posterior disc margin is unremarkable.    T7-T8 level: There is no bony foraminal narrowing or bony central canal stenosis and the posterior disc margin is unremarkable.    T8-T9 level: There is no bony  foraminal narrowing or bony central canal stenosis and the posterior disc margin is unremarkable.    T9-T10 level: There is no bony foraminal narrowing or bony central canal stenosis and the posterior disc margin is unremarkable.    T10-T11 level: There is no bony foraminal narrowing or bony central canal stenosis and the posterior disc margin is unremarkable.    T11-T12 level: There is no bony foraminal narrowing or bony central canal stenosis and the posterior disc margin is unremarkable.    T12-L1 level: The tip the conus medullaris extends down to level of the superior endplate of L1. 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 There is no bony foraminal narrowing or bony central canal stenosis at this level.    On the axial images, the immediate paravertebral soft tissues are unremarkable. A small cyst is seen to involve the upper pole the left kidney.    Following contrast administration, there is no abnormal enhancement of any bony or soft tissue elements of the thoracic spine. There is no abnormal enhancement of the subserosal surface of the thoracic spinal cord. Some foci of mild signal intensity in the CSF dorsal to the spinal cord of some of the sagittal sequences presumably is secondary to CSF pulsation artifact.    On the sagittal  image, there is cervical spondylosis and kyphosis with narrowing of all of the disc spaces in the cervical spine.       Allergies: Review of patient's allergies indicates:  No Known Allergies    Current Medications:   Current Outpatient Medications   Medication Sig Dispense Refill    alendronate (FOSAMAX) 70 MG tablet 70 mg every 7 days.       ALPRAZolam (XANAX) 1 MG tablet Take 1 mg by mouth.      amoxicillin (AMOXIL) 500 MG Tab       ascorbic acid, vitamin C, (VITAMIN C) 1000 MG tablet Take 1,000 mg by mouth.      budesonide-formoterol 80-4.5 mcg (SYMBICORT) 80-4.5 mcg/actuation HFAA Inhale 2 puffs into the lungs.       celecoxib (CELEBREX) 200 MG capsule       cephALEXin (KEFLEX) 500 MG capsule       ciclopirox (LOPROX) 0.77 % Crea       clindamycin (CLEOCIN) 300 MG capsule       ergocalciferol (ERGOCALCIFEROL) 50,000 unit Cap       flurazepam (DALMANE) 15 MG Cap Take 30 mg by mouth daily as needed.      fluticasone propionate (FLONASE) 50 mcg/actuation nasal spray daily as needed.      guaiFENesin-codeine 100-10 mg/5 ml (TUSSI-ORGANIDIN NR)  mg/5 mL syrup       irbesartan (AVAPRO) 75 MG tablet 150 mg once daily.       levothyroxine (SYNTHROID) 100 MCG tablet Take 100 mcg by mouth.      mirabegron (MYRBETRIQ ORAL) Take by mouth.      mupirocin (BACTROBAN) 2 % ointment       neomycin-polymyxin-hydrocortisone (CORTISPORIN) otic solution       prednisolon/gatiflox/bromfenac (PREDNISOL ACE-GATIFLOX-BROMFEN) 1-0.5-0.075 % DrpS Apply 1 drop to eye 3 (three) times daily. (Patient not taking: Reported on 6/17/2022) 5 mL 3    simvastatin (ZOCOR) 20 MG tablet Take 20 mg by mouth every evening.      sulfamethoxazole-trimethoprim 800-160mg (BACTRIM DS) 800-160 mg Tab       tadalafil (CIALIS) 20 MG Tab       temazepam (RESTORIL) 30 mg capsule TK 1 C PO QD HS      terbinafine HCL (LAMISIL) 250 mg tablet       tolterodine (DETROL LA) 4 MG 24 hr capsule        No current facility-administered medications for this visit.     Facility-Administered Medications Ordered in Other Visits   Medication Dose Route Frequency Provider Last Rate Last Admin    balanced salt irrigation intra-ocular solution 1 drop  1 drop Right Eye On Call Procedure Bell Cedeno MD        sodium chloride 0.9% flush 2 mL  2 mL Intravenous PRN Bell Cedeno MD           REVIEW OF SYSTEMS:    GENERAL:  No weight loss, malaise or fevers.  HEENT:  Negative for frequent or significant headaches.  NECK:  Negative for lumps, goiter, pain and significant neck swelling.  RESPIRATORY:  Negative for cough, wheezing or shortness of breath.  CARDIOVASCULAR:  Negative for chest  pain, leg swelling or palpitations.  GI:  Negative for abdominal discomfort, blood in stools or black stools or change in bowel habits.  MUSCULOSKELETAL:  See HPI.  SKIN:  Negative for lesions, rash, and itching.  PSYCH:  Negative for sleep disturbance, mood disorder and recent psychosocial stressors.  HEMATOLOGY/LYMPHOLOGY:  Negative for prolonged bleeding, bruising easily or swollen nodes.  NEURO:   No history of headaches, syncope, paralysis, seizures or tremors.  All other reviewed and negative other than HPI.    Past Medical History:  Past Medical History:   Diagnosis Date    Cancer     stage I bladder cancer 50 yrs ago    Hypercholesteremia     Hypertension     Sacroiliitis 7/8/2020    Thyroid disease        Past Surgical History:  Past Surgical History:   Procedure Laterality Date    BLADDER SURGERY      CATARACT EXTRACTION      CATARACT EXTRACTION W/  INTRAOCULAR LENS IMPLANT Right 3/9/2022    Procedure: EXTRACTION, CATARACT, WITH IOL INSERTION;  Surgeon: Bell Cedeno MD;  Location: [facility];  Service: Ophthalmology;  Laterality: Right;    COLONOSCOPY      EPIDURAL STEROID INJECTION N/A 1/12/2022    Procedure: INJECTION, STEROID, EPIDURAL CAUDAL With Catheter needs consent;  Surgeon: Samuel Jimenez MD;  Location: [facility];  Service: Pain Management;  Laterality: N/A;    EYE SURGERY      cataracts     FUSION OF SACROILIAC JOINT Right 3/15/2021    Procedure: FUSION, RIGHT SACROILIAC JOINT FUSION;  Surgeon: Samuel Jimenez MD;  Location: [facility];  Service: Pain Management;  Laterality: Right;  C-ARM, PAINTEQ REP     HERNIA REPAIR      INJECTION OF ANESTHETIC AGENT AROUND NERVE Bilateral 6/1/2022    Procedure: BLOCK, NERVE MEDIAL BRANCH L3,4,5 BILATERAL 1st;  Surgeon: Samuel Jimenez MD;  Location: [facility] [place] PAIN T;  Service: Pain Management;  Laterality: Bilateral;    INJECTION OF JOINT Right 7/8/2020    Procedure: INJECTION, JOINT, SACROILIAC (SI);  Surgeon: Samuel Jimenez MD;  Location:  [facility] PAIN MGT;  Service: Pain Management;  Laterality: Right;    INJECTION OF JOINT Right 2020    Procedure: INJECTION, JOINT, SACROILIAC (SI);  Surgeon: Samuel Jimenez MD;  Location: [facility] PAIN MGT;  Service: Pain Management;  Laterality: Right;    INJECTION OF JOINT Right 2021    Procedure: INJECTION, JOINT, HIP RIGHT;  Surgeon: Samuel Jimenez MD;  Location: [facility] PAIN MGT;  Service: Pain Management;  Laterality: Right;  CONSENT NEEDED    INJECTION OF JOINT Right 10/13/2021    Procedure: INJECTION, JOINT, SACROILIAC (SI)  NEED CONSENT pt. requesting sedation;  Surgeon: Samuel Jimenez MD;  Location: [facility] PAIN MGT;  Service: Pain Management;  Laterality: Right;    KNEE SURGERY      RADIOFREQUENCY ABLATION Right 2020    Procedure: RADIOFREQUENCY ABLATION, L5-S1-S2 LATERAL BRANCH COOLED;  Surgeon: Samuel Jimenez MD;  Location: [facility] PAIN MGT;  Service: Pain Management;  Laterality: Right;    REPLACEMENT OF NERVE STIMULATOR BATTERY N/A 2020    Procedure: Replacement, SPINAL CORD STIMULATOR BATTERY CHANGE TO [employer] OMNION BATTERY;  Surgeon: Samuel Jimenez MD;  Location: [facility] OR;  Service: Pain Management;  Laterality: N/A;  C-ARM, NEVRO REP    TRIAL OF SPINAL CORD NERVE STIMULATOR N/A 2019    Procedure: Trial, Neurostimulator, SPINAL CORD STIMULATOR TRIAL;  Surgeon: Samuel Jimenez MD;  Location: [facility] CATH LAB;  Service: Pain Management;  Laterality: N/A;  C-ARM, NEVRO REP       Family History:  No family history on file.    Social History:  Social History     Socioeconomic History    Marital status:    Tobacco Use    Smoking status: Former Smoker     Quit date: 1980     Years since quittin.6    Smokeless tobacco: Never Used    Tobacco comment: stopped 40 years ago   Substance and Sexual Activity    Alcohol use: Yes     Alcohol/week: 2.0 standard drinks     Types: 2 Shots of liquor per week     Comment: nightly -scotch    Drug use: Never    Sexual activity: Yes      "Partners: Female       OBJECTIVE:    BP (!) 138/55   Pulse (!) 56   Temp 97.5 °F (36.4 °C)   Resp 18   Ht 6' 1" (1.854 m)   Wt 89 kg (196 lb 3.4 oz)   BMI 25.89 kg/m²     PHYSICAL EXAMINATION:    General appearance: Well appearing, in no acute distress, alert and oriented x3.  Psych:  Mood and affect appropriate.  Skin: Skin color, texture, turgor normal, no rashes or lesions, in both upper and lower body.  Cor: RRR  Pulm: CTA  GI: Abdomen soft and non-tender.  Back: Mild TTP about the lumbar paraspinal muscles. Reduced ROM in all planes, primarily lumbar extension. Positive Facet Loading BL. SLR neg in seated pos.   Extremities:   Neuro: Bilateral upper and lower extremity coordination and muscle stretch reflexes are physiologic and symmetric.  Plantar response are downgoing. No loss of sensation is noted.  Gait: Normal.    ASSESSMENT: 87 y.o. year old male with chronic low back pain, consistent with      1. Lumbar spondylosis  Procedure Order to Pain Management      2. DDD (degenerative disc disease), lumbar        3. Other osteoarthritis of spine, lumbosacral region        4. Other chronic pain        5. Arachnoiditis              PLAN:     - I have stressed the importance of physical activity and a home exercise plan to help with pain and improve health.  - Patient can continue with medications for now since they are providing benefits, using them appropriately, and without side effects.  - Schedule for a Diagnostic/Therapeutic Medial branch block at BL L3,4, and L5 to help with axial low back pain and progress with a home exercise program.  - If great relief from MBB, will plan to schedule patient for RFA of same levels.   - Previous notes indicated potential plans to consider intercept procedure if no relief from RFA. For arachnoiditis, continued to discuss the SafeTool medical device, which may be a potential option in the near future.   - RTC 2 weeks after MBB.   - Counseled patient regarding the " importance of activity modification and constant sleeping habits.    The above plan and management options were discussed at length with patient. Patient is in agreement with the above and verbalized understanding.    Jace Soares   I have personally reviewed the history and exam of this patient and agree with the resident/fellow/NPs note as stated above.    Samuel Jimenez MD  08/11/2022

## 2022-08-12 ENCOUNTER — PATIENT MESSAGE (OUTPATIENT)
Dept: PAIN MEDICINE | Facility: OTHER | Age: 87
End: 2022-08-12
Payer: MEDICARE

## 2022-08-12 DIAGNOSIS — M47.816 LUMBAR SPONDYLOSIS: Primary | ICD-10-CM

## 2022-08-18 ENCOUNTER — TELEPHONE (OUTPATIENT)
Dept: PAIN MEDICINE | Facility: CLINIC | Age: 87
End: 2022-08-18
Payer: MEDICARE

## 2022-08-18 NOTE — TELEPHONE ENCOUNTER
----- Message from Mira Swain sent at 8/18/2022 10:15 AM CDT -----  Regarding: Patient Advice              Name of Who is Calling: Jefferson Boogie    Who Left The Message: Jefferson Boogie      What is the request in detail:    Patient called requesting a Handicap Decal for his automobile.  Please give a call back at your earliest and further advise.  Thank you!      Reply by [facility]: No      Preferred Call Back :  [phone] ([phone])

## 2022-08-18 NOTE — TELEPHONE ENCOUNTER
Staff spoke with patient in regards to needing handicap form filled out for him to receive a handicap tag. Staff informed patient we will submit request to  ad give him a call with response.

## 2022-08-24 ENCOUNTER — HOSPITAL ENCOUNTER (OUTPATIENT)
Facility: OTHER | Age: 87
Discharge: HOME | End: 2022-08-24
Attending: ANESTHESIOLOGY | Admitting: ANESTHESIOLOGY
Payer: MEDICARE

## 2022-08-24 VITALS
WEIGHT: 194 LBS | TEMPERATURE: 98 F | HEART RATE: 56 BPM | RESPIRATION RATE: 16 BRPM | OXYGEN SATURATION: 99 % | DIASTOLIC BLOOD PRESSURE: 62 MMHG | BODY MASS INDEX: 25.71 KG/M2 | SYSTOLIC BLOOD PRESSURE: 148 MMHG | HEIGHT: 73 IN

## 2022-08-24 DIAGNOSIS — G89.29 CHRONIC PAIN: ICD-10-CM

## 2022-08-24 DIAGNOSIS — M47.816 LUMBAR SPONDYLOSIS: Primary | ICD-10-CM

## 2022-08-24 PROCEDURE — 64494 INJ PARAVERT F JNT L/S 2 LEV: CPT | Mod: 50,KX | Performed by: ANESTHESIOLOGY

## 2022-08-24 PROCEDURE — 64493 INJ PARAVERT F JNT L/S 1 LEV: CPT | Mod: 50,KX,, | Performed by: ANESTHESIOLOGY

## 2022-08-24 PROCEDURE — 25000003 PHARM REV CODE 250: Performed by: STUDENT IN AN ORGANIZED HEALTH CARE EDUCATION/TRAINING PROGRAM

## 2022-08-24 PROCEDURE — 25000003 PHARM REV CODE 250: Performed by: ANESTHESIOLOGY

## 2022-08-24 PROCEDURE — 64493 INJ PARAVERT F JNT L/S 1 LEV: CPT | Mod: 50,KX | Performed by: ANESTHESIOLOGY

## 2022-08-24 PROCEDURE — 63600175 PHARM REV CODE 636 W HCPCS: Performed by: ANESTHESIOLOGY

## 2022-08-24 PROCEDURE — 64494 INJ PARAVERT F JNT L/S 2 LEV: CPT | Mod: 50,KX,, | Performed by: ANESTHESIOLOGY

## 2022-08-24 PROCEDURE — 64494 PR INJ DX/THER AGNT PARAVERT FACET JOINT,IMG GUIDE,LUMBAR/SAC, 2ND LEVEL: ICD-10-PCS | Mod: 50,KX,, | Performed by: ANESTHESIOLOGY

## 2022-08-24 PROCEDURE — 64493 PR INJ DX/THER AGNT PARAVERT FACET JOINT,IMG GUIDE,LUMBAR/SAC,1ST LVL: ICD-10-PCS | Mod: 50,KX,, | Performed by: ANESTHESIOLOGY

## 2022-08-24 RX ORDER — LIDOCAINE HYDROCHLORIDE 20 MG/ML
INJECTION, SOLUTION INFILTRATION; PERINEURAL
Status: DISCONTINUED | OUTPATIENT
Start: 2022-08-24 | End: 2022-08-24 | Stop reason: HOSPADM

## 2022-08-24 RX ORDER — BUPIVACAINE HYDROCHLORIDE 2.5 MG/ML
INJECTION, SOLUTION EPIDURAL; INFILTRATION; INTRACAUDAL
Status: DISCONTINUED | OUTPATIENT
Start: 2022-08-24 | End: 2022-08-24 | Stop reason: HOSPADM

## 2022-08-24 RX ORDER — SODIUM CHLORIDE 9 MG/ML
500 INJECTION, SOLUTION INTRAVENOUS CONTINUOUS
Status: DISCONTINUED | OUTPATIENT
Start: 2022-08-24 | End: 2022-08-24 | Stop reason: HOSPADM

## 2022-08-24 RX ORDER — MIDAZOLAM HYDROCHLORIDE 1 MG/ML
INJECTION INTRAMUSCULAR; INTRAVENOUS
Status: DISCONTINUED | OUTPATIENT
Start: 2022-08-24 | End: 2022-08-24 | Stop reason: HOSPADM

## 2022-08-24 NOTE — DISCHARGE INSTRUCTIONS

## 2022-08-24 NOTE — OP NOTE
Diagnostic Lumbar Medial Branch Block Under Fluoroscopy    The procedure, risks, benefits, and options were discussed with the patient. There are no contraindications to the procedure. The patent expressed understanding and agreed to the procedure. Informed written consent was obtained prior to the start of the procedure and can be found in the patient's chart.    PATIENT NAME: Jefferson Boogie   MRN: [id number]     DATE OF PROCEDURE: 08/24/2022                                           PROCEDURE:  Diagnostic Bilateral L3, L4 and L5 Lumbar Medial Branch Block under Fluoroscopy    PRE-OP DIAGNOSIS: Lumbar spondylosis [M47.816] Lumbar spondylosis [M47.816]    POST-OP DIAGNOSIS: Same    PHYSICIAN: Samuel Jimenez MD    ASSISTANTS: Ernesto Sarabia D.O. ([facility] Fellow)      MEDICATIONS INJECTED:  Bupivicaine 0.25%    LOCAL ANESTHETIC INJECTED:   Xylocaine 2%    SEDATION:   Versed 2mg                                                                                                                                                                                  Conscious sedation ordered by M.D. Patient re-evaluation prior to administration of conscious sedation. No changes noted in patient's status from initial evaluation. The patient's vital signs were monitored by RN and patient remained hemodynamically stable throughout the procedure.    Event Time In   Sedation Start 0906   Sedation End 0913       ESTIMATED BLOOD LOSS:  None    COMPLICATIONS:  None.    INTERVAL HISTORY: Patient has clinical and imaging findings suggestive of facet mediated pain.    TECHNIQUE: Time-out was performed to identify the patient and procedure to be performed. With the patient laying in a prone position, the surgical area was prepped and draped in the usual sterile fashion using ChloraPrep and fenestrated drape. The levels were determined under fluoroscopic guidance. Skin anesthesia was achieved by injecting Lidocaine 2% over the  injection sites. A 22 gauge, 3.5 inch needle was introduced into the medial branch nerves at the junctions of the superior articular process and the transverse processes of the targeted sites using AP, lateral and/or contralateral oblique fluoroscopic imaging. After negative aspiration for blood or CSF was confirmed, 1 mL of the anesthetic listed above was then slowly injected at each site. The needles were removed and bleeding was nil. A sterile dressing was applied. No specimens collected. The patient tolerated the procedure well.     The patient was monitored after the procedure in the recovery area. They were given post-procedure and discharge instructions to follow at home. The patient was discharged in a stable condition.  Ernesto Sarabia DO  I reviewed and edited the fellow's note. I conducted my own interview and physical examination. I agree with the findings. I was present and supervising all critical portions of the procedure.    Samuel Jimenez MD

## 2022-08-24 NOTE — DISCHARGE SUMMARY
Discharge Note  Short Stay      SUMMARY     Admit Date: 8/24/2022    Attending Physician: Ernesto Sarabia      Discharge Physician: Ernesto Sarabia      Discharge Date: 8/24/2022 9:14 AM    Procedure(s) (LRB):  Block, Nerve Kenny L3, L4, & L5 (Bilateral)    Final Diagnosis: Lumbar spondylosis [M47.816]    Disposition: Home or self care    Patient Instructions:   Current Discharge Medication List      CONTINUE these medications which have NOT CHANGED    Details   alendronate (FOSAMAX) 70 MG tablet 70 mg every 7 days.       ALPRAZolam (XANAX) 1 MG tablet Take 2 mg by mouth.      amoxicillin (AMOXIL) 500 MG Tab       ascorbic acid, vitamin C, (VITAMIN C) 1000 MG tablet Take 1,000 mg by mouth.      budesonide-formoterol 80-4.5 mcg (SYMBICORT) 80-4.5 mcg/actuation HFAA Inhale 2 puffs into the lungs.      celecoxib (CELEBREX) 200 MG capsule       cephALEXin (KEFLEX) 500 MG capsule       ciclopirox (LOPROX) 0.77 % Crea       clindamycin (CLEOCIN) 300 MG capsule       ergocalciferol (ERGOCALCIFEROL) 50,000 unit Cap       flurazepam (DALMANE) 15 MG Cap Take 30 mg by mouth daily as needed.      fluticasone propionate (FLONASE) 50 mcg/actuation nasal spray daily as needed.      guaiFENesin-codeine 100-10 mg/5 ml (TUSSI-ORGANIDIN NR)  mg/5 mL syrup       irbesartan (AVAPRO) 75 MG tablet 150 mg once daily.       levothyroxine (SYNTHROID) 100 MCG tablet Take 100 mcg by mouth.      mirabegron (MYRBETRIQ ORAL) Take by mouth.      mupirocin (BACTROBAN) 2 % ointment       neomycin-polymyxin-hydrocortisone (CORTISPORIN) otic solution       prednisolon/gatiflox/bromfenac (PREDNISOL ACE-GATIFLOX-BROMFEN) 1-0.5-0.075 % DrpS Apply 1 drop to eye 3 (three) times daily.  Qty: 5 mL, Refills: 3    Associated Diagnoses: Nuclear sclerotic cataract of right eye      simvastatin (ZOCOR) 20 MG tablet Take 20 mg by mouth every evening.      sulfamethoxazole-trimethoprim 800-160mg (BACTRIM DS) 800-160 mg Tab       tadalafil (CIALIS) 20 MG  Tab       temazepam (RESTORIL) 30 mg capsule TK 1 C PO QD HS      terbinafine HCL (LAMISIL) 250 mg tablet       tolterodine (DETROL LA) 4 MG 24 hr capsule                  Discharge Diagnosis: Lumbar spondylosis [M47.816]  Condition on Discharge: Stable with no complications to procedure   Diet on Discharge: Same as before.  Activity: as per instruction sheet.  Discharge to: Home with a responsible adult.  Follow up: 2-4 weeks       Please call my office or pager at [phone] if experienced any weakness or loss of sensation, fever > 101.5, pain uncontrolled with oral medications, persistent nausea/vomiting/or diarrhea, redness or drainage from the incisions, or any other worrisome concerns. If physician on call was not reached or could not communicate with our office for any reason please go to the nearest emergency department        Ernesto Sarabia DO

## 2022-08-24 NOTE — H&P
HPI  Patient presents for Bilateral L3, L4, and L5 Medial Branch Blocks.        Past Medical History:   Diagnosis Date    Cancer     stage I bladder cancer 50 yrs ago    Hypercholesteremia     Hypertension     Sacroiliitis 7/8/2020    Thyroid disease      Past Surgical History:   Procedure Laterality Date    BLADDER SURGERY      CATARACT EXTRACTION      CATARACT EXTRACTION W/  INTRAOCULAR LENS IMPLANT Right 3/9/2022    Procedure: EXTRACTION, CATARACT, WITH IOL INSERTION;  Surgeon: Bell Cedeno MD;  Location: [facility] OR;  Service: Ophthalmology;  Laterality: Right;    COLONOSCOPY      EPIDURAL STEROID INJECTION N/A 1/12/2022    Procedure: INJECTION, STEROID, EPIDURAL CAUDAL With Catheter needs consent;  Surgeon: Samuel Jimenez MD;  Location: [facility] PAIN MGT;  Service: Pain Management;  Laterality: N/A;    EYE SURGERY      cataracts     FUSION OF SACROILIAC JOINT Right 3/15/2021    Procedure: FUSION, RIGHT SACROILIAC JOINT FUSION;  Surgeon: Samuel Jimenez MD;  Location: [facility] OR;  Service: Pain Management;  Laterality: Right;  C-ARM, PAINTEQ REP     HERNIA REPAIR      INJECTION OF ANESTHETIC AGENT AROUND NERVE Bilateral 6/1/2022    Procedure: BLOCK, NERVE MEDIAL BRANCH L3,4,5 BILATERAL 1st;  Surgeon: Samuel Jimenez MD;  Location: [facility] PAIN MGT;  Service: Pain Management;  Laterality: Bilateral;    INJECTION OF JOINT Right 7/8/2020    Procedure: INJECTION, JOINT, SACROILIAC (SI);  Surgeon: Samuel Jimenez MD;  Location: [facility] PAIN MGT;  Service: Pain Management;  Laterality: Right;    INJECTION OF JOINT Right 9/9/2020    Procedure: INJECTION, JOINT, SACROILIAC (SI);  Surgeon: Samuel Jimenez MD;  Location: [facility] PAIN MGT;  Service: Pain Management;  Laterality: Right;    INJECTION OF JOINT Right 7/21/2021    Procedure: INJECTION, JOINT, HIP RIGHT;  Surgeon: Samuel Jimenez MD;  Location: [facility] PAIN MGT;  Service: Pain Management;  Laterality: Right;  CONSENT NEEDED    INJECTION OF  "JOINT Right 10/13/2021    Procedure: INJECTION, JOINT, SACROILIAC (SI)  NEED CONSENT pt. requesting sedation;  Surgeon: Samuel Jimenez MD;  Location: [facility] PAIN MGT;  Service: Pain Management;  Laterality: Right;    KNEE SURGERY      RADIOFREQUENCY ABLATION Right 12/9/2020    Procedure: RADIOFREQUENCY ABLATION, L5-S1-S2 LATERAL BRANCH COOLED;  Surgeon: Samuel Jimenez MD;  Location: [facility] PAIN MGT;  Service: Pain Management;  Laterality: Right;    REPLACEMENT OF NERVE STIMULATOR BATTERY N/A 7/27/2020    Procedure: Replacement, SPINAL CORD STIMULATOR BATTERY CHANGE TO [employer] OMNION BATTERY;  Surgeon: Samuel Jimenez MD;  Location: [facility] OR;  Service: Pain Management;  Laterality: N/A;  C-ARM, NEVRO REP    TRIAL OF SPINAL CORD NERVE STIMULATOR N/A 8/5/2019    Procedure: Trial, Neurostimulator, SPINAL CORD STIMULATOR TRIAL;  Surgeon: Samuel Jimenez MD;  Location: [facility] CATH LAB;  Service: Pain Management;  Laterality: N/A;  C-ARM, NEVRO REP     Review of patient's allergies indicates:  No Known Allergies     PMHx, PSHx, Allergies, Medications reviewed in epic      ROS negative except pain complaints in HPI    OBJECTIVE:    BP (!) 160/68 (BP Location: Right arm, Patient Position: Lying)   Pulse (!) 55   Temp 97.9 °F (36.6 °C) (Oral)   Resp 16   Ht 6' 1" (1.854 m)   Wt 88 kg (194 lb)   SpO2 99%   BMI 25.60 kg/m²     PHYSICAL EXAMINATION:    GENERAL: Well appearing, in no acute distress, alert and oriented x3.  PSYCH:  Mood and affect appropriate.  SKIN: Skin color, texture, turgor normal, no rashes or lesions.  CV: RRR with palpation of the radial artery.  PULM: No evidence of respiratory difficulty, symmetric chest rise. Clear to auscultation.  NEURO: Cranial nerves grossly intact.    Plan:    Proceed with procedure as planned    Ernesto Sarabia  08/24/2022    "

## 2022-08-25 ENCOUNTER — PATIENT MESSAGE (OUTPATIENT)
Dept: PAIN MEDICINE | Facility: OTHER | Age: 87
End: 2022-08-25
Payer: MEDICARE

## 2022-08-26 ENCOUNTER — TELEPHONE (OUTPATIENT)
Dept: PAIN MEDICINE | Facility: CLINIC | Age: 87
End: 2022-08-26
Payer: MEDICARE

## 2022-08-26 NOTE — TELEPHONE ENCOUNTER
Patient was contacted staff left a message on VM for patient informing him to contact the office regarding his pain diary

## 2022-08-26 NOTE — TELEPHONE ENCOUNTER
----- Message from Shania Parker sent at 8/26/2022  1:00 PM CDT -----  Contact: AMY NERI [[id number]]  Type: Call Back      Who called: AMY NERI [[id number]]      What is the request in detail: Patient is requesting a call back in regards to reporting his pain diary. Please advise.      Can the clinic reply by [facility]? No      Would the patient rather a call back or a response via [facility]? Call back       Best call back number: [phone] (mobile)      Additional Information:

## 2022-08-26 NOTE — TELEPHONE ENCOUNTER
----- Message from Kalyn Francois sent at 8/26/2022  2:11 PM CDT -----  Name of Who is Calling: AMY NERI          What is the request in detail: The patient is calling to speak to Sarina Charlton MA in regards to accepting the appointment on 08.29.22. Please advise          Can the clinic reply by [facility]: no         [facility] Back [facility]: [phone]

## 2022-08-28 DIAGNOSIS — M47.896 OTHER SPONDYLOSIS, LUMBAR REGION: Primary | ICD-10-CM

## 2022-08-29 ENCOUNTER — OFFICE VISIT (OUTPATIENT)
Dept: PAIN MEDICINE | Facility: CLINIC | Age: 87
End: 2022-08-29
Attending: ANESTHESIOLOGY
Payer: MEDICARE

## 2022-08-29 VITALS
TEMPERATURE: 97 F | SYSTOLIC BLOOD PRESSURE: 131 MMHG | HEIGHT: 73 IN | BODY MASS INDEX: 26.51 KG/M2 | WEIGHT: 200 LBS | HEART RATE: 56 BPM | RESPIRATION RATE: 16 BRPM | DIASTOLIC BLOOD PRESSURE: 53 MMHG

## 2022-08-29 DIAGNOSIS — Z79.891 OPIOID CONTRACT EXISTS: ICD-10-CM

## 2022-08-29 DIAGNOSIS — G89.4 CHRONIC PAIN SYNDROME: ICD-10-CM

## 2022-08-29 DIAGNOSIS — M46.1 SACROILIITIS: ICD-10-CM

## 2022-08-29 DIAGNOSIS — Z96.89 SPINAL CORD STIMULATOR STATUS: ICD-10-CM

## 2022-08-29 DIAGNOSIS — G03.9 ARACHNOIDITIS: ICD-10-CM

## 2022-08-29 DIAGNOSIS — M47.897 OTHER SPONDYLOSIS, LUMBOSACRAL REGION: Primary | ICD-10-CM

## 2022-08-29 PROCEDURE — 99999 PR PBB SHADOW E&M-EST. PATIENT-LVL V: CPT | Mod: PBBFAC,,, | Performed by: ANESTHESIOLOGY

## 2022-08-29 PROCEDURE — 99999 PR PBB SHADOW E&M-EST. PATIENT-LVL V: ICD-10-PCS | Mod: PBBFAC,,, | Performed by: ANESTHESIOLOGY

## 2022-08-29 PROCEDURE — 99214 OFFICE O/P EST MOD 30 MIN: CPT | Mod: S$PBB,GC,, | Performed by: ANESTHESIOLOGY

## 2022-08-29 PROCEDURE — 99215 OFFICE O/P EST HI 40 MIN: CPT | Mod: PBBFAC | Performed by: ANESTHESIOLOGY

## 2022-08-29 PROCEDURE — 99214 PR OFFICE/OUTPT VISIT, EST, LEVL IV, 30-39 MIN: ICD-10-PCS | Mod: S$PBB,GC,, | Performed by: ANESTHESIOLOGY

## 2022-08-29 NOTE — PROGRESS NOTES
Chronic patient Established Note (Follow up visit)      SUBJECTIVE:    Interval History 8/29/22:  Jefferson Boogie presents to the clinic for a follow-up appointment for back pain.  He had his second bilateral L3, L4, L5 MBB and reports 100% pain relief.  He would like to proceed with RFA.    Interval History 5/26/22:  Jefferson Boogie presents to the clinic for a follow-up appointment for back pain. Since the last visit, Jefferson Boogie states the pain has been stable. Certain postures he can tolerate in the standing position such as leaning on a shopping cart or support to his posterior thighs. He now uses a walker when covering long distances. He continues to play golf despite it aggravating his pain. He receives some benefit from the SCS whereas other interventions have not helped. He remains interested in the [employer] device.    Interval History 3/24/22:  Jefferson Boogie presents to the clinic for a follow-up appointment for back pain. Since the last visit, Jefferson Boogie states the pain has been worse than usual. Current pain intensity is 8/10. He continues to report benefit with Nevro SCS however he has not been able to get in touch with the representative in order to have his settings adjusted. He continues to take occasional norco 7.5 mg with benefit, particularly when he plays golf.     Interval History 2/10/22:  Patent presents today s/p caudal epidural steroid injections on 1/12/22 since then he has not had any relief. He states his pain has been unchanged and is a 6/10 on average with the worst being 8/10 and best is 4/10. He has been taking percocet 5mg when he golfs or plays with his granddaughter.         Interval History 12/30/21:  Jefferson Boogie presents to clinic for follow up appointment s/p Right SI joint and Right piriformis muscle injection under US guidance on 11/29/21. He did not obtain any noticeable relief with this procedure similar to all of his previous injections. His pain is  "unchanged and constant over the right buttock. He is taking percocet 5mg x 3 on days he is more active, such as playing golf. This helps some, but minimally. Denies any new symptoms or neurological changes. No numbness, tingling, weakness in his lower extremities. Denies bowel/bladder incontinence or saddle anesthesia.         Interval History 11/4/2021:  Jefferson Boogie presents to the clinic for a follow-up appointment for right sided back pain and SI joint pain. Mr. Boogie had a right SI joint injection on 10/13/2021. He did not note significant relief with the injection for any period of time. Pain is still constant over the right buttock and most noticeable when playing golf. He denies any new bowel/bladder incontinence, lower extremity numbness or weakness or saddle anesthesia.     Interval History 8/26/2021:  Jefferson Boogie presents to the clinic for a follow-up appointment for right sided hip pain with right-sided radiculopathy . Since the last visit, Jefferson Boogie states the pain has been "particularly tender today" 7/10. Patient reports playing golf recently and experienced worsening symptoms the following day. Mr. Boogie is s/p right-side hip joint injection with minimal relief. Patient states he has had relief with previous interventions and is open to RFA.     Interval History 6/10/2021:  Jefferson Boogie presents to the clinic for a follow-up appointment. Since the last visit, Jefferson Boogie states the pain has been stable. Current pain intensity is 3/10. States he is still having pain around SIJ that is affecting his ADL      Interval History 4/15/2021:  The patient returns to clinic today for follow up of back pain. He reports that the swelling above the SI fusion incision has resolved. He denies any fever, chills, or drainage from the incision. He does report benefit since the fusion. He continues to report benefit with Nevro SCS. He reports intermittent shoulder pain at this " time. He did receive an injection with Sports Medicine with benefit. He denies any other health changes. His pain today is 2/10.     Interval History 3/25/21:  Mr. Boogie presents to clinic for follow up of bilateral shoulder pain. He is s/p BL subacromial injections on 2/8/21. He reports maximum 20% relief of pain in the Right shoulder. Today the Left shoulder pain is 1/10; Right shoulder pain is 5-7/10.      Interval History 3/22/2021:  The patient returns to clinic today for follow up of back pain. He is s/p right SI fusion on 3/15/2021. He reports some relief at this time. He does report soreness to the incision. He denies any fever, chills, or drainage from incision. He continues to report benefit with Nevro SCS. He denies any other health changes. His pain today is 4/10.     Interval History 1/11/2020:  Patient is here for follow-up of low back pain now s/p sacroiliac RFA on 12/9/20 which provided no benefit and with the new complaint of bilateral anterior shoulder pain R>L. His shoulder pain started about 2 months ago. He describes 9/10 sharp/stinging pain without radiation that is exacerbated with overhead activity and improved with rest. He started PT about one month ago. He takes oxycodone which provides some relief. He had a blind subacromial steroid injection in the right shoulder with Dr. Ramirez on 10/26/19 which provided some short term relief.     Interval History 11/5/2020:  Jefferson Boogie presents to the clinic for a follow-up appointment for low back pain. Since the last visit, Jefferson Boogie states the pain has been stable. Current pain intensity is 4/10. He had good relief from the SI joint injection that lasted for about a week. Pain has since returned. He also slipped and fell on his buttock a couple of weeks ago and had increased pain in the right buttock after that which is slowly improving. He continues to have variable benefit with the Nevro SCS for intermittent pain in the right  leg. He is scheduled to meet with representatives today. He is taking celebrex daily and percocet as needed sparingly. He denies any adverse effects and any other health changes.     Interval History 10/5/2020:  The patient returns to clinic today for follow up of low back pain. He is s/p right SI joint injection on 9/9/2020. He reports 25% relief of his right sided low back and buttock pain. He did have good relief the first two days. He continues to report right sided low back and buttock pain. He denies any radicular leg pain. His pain is worse with prolonged standing and walking. He continues to report intermittent pain into his achilles from previous injury. He feels as though this has changed his gait. He continues to report some benefit with Nevro SCS device. He is in contact with representatives. He is currently taking Percocet as needed sparingly with some benefit. He denies any adverse effects. He denies any other health changes. His pain today is 4/10.      Interval History 9/2/2020:  The patient returns to clinic today for follow up of back pain. He reports that his back pain has improved since last audio visit. He continues to report back pain with intermittent radiating pain into his right hip and calf. He has spoken with Bienvenido from [employer] via phone with some programming. He is meeting with Bienvenido today. He had some issues with charging but this has improved. He is currently taking Norco intermittently but this is only lasts 2-3 hours. He denies any adverse effects. He denies any other health changes. His pain today is 8/10.     Interval History 08/27/2020:  Pt was contacted over [employer] for a follow up appointment.  He is currently complaining of right hip and right calf with no associated numbness or weakness.  He continues to use his Nevro device.  He states it has been very frustrating. Inconsistent.  Took 20 mins to 1 hour to charge.  Couldn't get it to start this morning.  He states that there  is no drainage at the battery site, no signs of infection or pain at the site.  Patient is not taking medications at this time.  He wants to speak about medication options because he would like to start playing golf again.     Interval History 8/17/2020:  The patient returns to clinic today for post op wound check. He continues to report benefit with Nevro device. He denies any fever, chills, or drainage. He continues to follow his activity restrictions. He does report a recent achilles tendon injury while swimming. He is seeing Orthopedics. He denies any other health changes. His pain today is 4/10.     Interval History 8/3/2020:  The patient returns to clinic today for post op. He is s/p Nevro battery change on 7/27/2020. He denies any fever, chills, or drainage from incision. He has not completed his antibiotics as he missed two days of the medication. He continues to follow his activity restrictions. He reports relief with the device. He is meeting with Bien[employer] today for programming. He denies any other health changes. His pain today is 2/10     Interval History 7/22/2020:  Pt presents for audio follow up only s/p Right SI joint injection on 7/8/2020. Pt states he has near resolution of focal pain to buttock. He is pleased with result and pain relief. Pt has been in contact with [employer] and will be having battery swap in 5 days. He has difficulty whether stimulator is beneficial and has even had a holiday from using SCS.  He denies any newer areas pain, denies any neuro changes, meds area adequate to control pain without adverse side effects. Pain is 2/10 today.     Interval HPI 6/15/2020:  Jefferson Boogie presents to the clinic for a follow-up appointment for 3 week follow up right hip and right thigh pain. Since the last visit, Jefferson Boogie states the pain has been persistant. Current pain intensity is 5/10. Patient has been working with Bienvenido Varghese, to try and get better coverage of a localized pain  that he still experiences in his right low back area. He does report improvement in symptoms of numbness/tingling. Today, worse pain is 1/10 in severity and localizes to the right SI joint. Pain is worse with extension of his back. It is worse with golfing. Pain relieved by Norco--he takes 1-2 tablets of 5-325 Norco on days that he plays golf. Pain is also improved with being still and not being active. Patient endorses a much more active lifestyle with Norco. Denies new weakness/numbness or bowel/bladder changes.     Previous injection history includes a series of 3 lumbar CHOCO at [facility].      Interval HPI 3/5/20:  Patient presents to the clinic for a follow-up appointment for low back pain. Since the last visit, he states his pain has been unchanged. Current pain intensity is 4/10. He continues to work with [employer] to adjust settings on his SCS. He has stopped using tramadol, and uses norco sparingly. He continues to work with a  for physical therapy.      Interval HPI 1/9/2020:  Patient returns for follow up s/p Nevro SCS. He states he is unsure if it has helped his pain since he had it implanted. He last had an adjustment of his programming about 1 month ago. He does note that once he is done charging his SCS his pain is improved. Pain still worse with prolonged standing and activity such as golf. He still is doing home exercise program. He says that he is trying to do more since getting the SCS. He is still taking the Tramadol with limited pain relief. He takes Tramadol 50 mg twice daily only on days of activity which is once a week. No other health changes.      HPI 11/20/19  Jefferson Boogie returns to clinic today for follow up. He reports increased low back and leg pain over the last few days. He has been in contact with [employer] representatives for programming adjustments. He does report benefit with the device. He reports good days and bad days. His pain is worse with prolonged  standing and activity. He continues to participate in a home exercise routine. He does take Tramadol sparingly but reports limited relief. He denies any other health changes. His pain today is 5/10.     Pain Disability Index Review:  Last 3 PDI Scores 2/10/2022 2021 2021   Pain Disability Index (PDI) 37 [phone]         Pain Medications:     Current medication:  Celebrex   Xanax  See med list     Opioid Contract: not applicable      report:  Reviewed and consistent with medication use as prescribed.     Pain Procedures:   2020- Right SI joint injection  2020- Nevro SCS battery change  2020- Right SIJ injection >80% relief   19 SCS implant  19 SCS trial  2020- Right SI joint injection  2020- SCS battery revision  2020- Right SI joint injection   2020- Right L5-S1 RFA  3/15/2021- Right SI fusion  2021 - Injection R Hip - minimal relief  10/13/2021 - Right SI joint injection  2021 - R SI joing and R piriformis muscle injection - no relief   2022- Caudal CHOCO- no relief      Physical Therapy/Home Exercise: no     Imagin/10/2021 - X ray Hips Bilateral: No radiographic evidence of acute osseous abnormality of the pelvis and hips and without radiographic evidence of osteonecrosis of the femoral heads.     21 MRI L-spine:  FINDINGS:  Lumbar sagittal alignment is slightly is straightened.  There is slight convex right curvature lumbar spine.  There is degenerative disc disease with intervertebral disc height loss and endplate degenerative change at all levels with scattered disc desiccation allowing for degenerative change the lumbar vertebral body heights and contours are within normal is without evidence for acute fracture or subluxation.  There is heterogeneous T1/T2 signal focus within the right aspect of the S1 vertebra most compatible with hemangioma this measures 2.0 cm.     The distal spinal cord and conus is normal in signal and contour  tip of the conus approximates the T12/L1 level.  Please note there is artifact from indwelling spinal stimulator device with leads partially visualized casting artifact entering the dorsal epidural space at the T12 level and extending cranially.     There is abnormal clumping configuration of the filum terminalis a from T12 through L5 with slight thickening of scattered nerve roots most compatible with arachnoiditis.     No aneurysmal dilatation of the visualized abdominal aorta.     T12/L1 through L1/L2: No significant disc bulge, central canal or neural foraminal stenosis.     L2/L3: Posterior disc osteophyte with facet joint arthropathy without significant central canal stenosis with mild bilateral neural foraminal stenosis.     L3/L4:Bulging disc with ligamentum flavum hypertrophy without significant central canal stenosis with mild bilateral neural foraminal stenosis.     L4/L5:Posterior disc osteophyte with ligamentum flavum hypertrophy and facet joint arthropathy without significant central canal stenosis and mild bilateral neural foraminal stenosis.     L5/S1: Posterior disc osteophyte with facet joint arthropathy without significant central canal stenosis with moderate right and mild left neural foraminal stenosis.     This report was flagged in Epic as abnormal.     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  Please note there is spinal stimulator device with leads partially visualized and distorting the study by artifacts.     There is abnormal thickening of the filum configuration most compatible with arachnoiditis.     Please see above for additional details.        Lumbar spine MRI with contrast 2019    FINDINGS: There are 5 lumbar type vertebral bodies lumbar vertebral body height and alignment are maintained. The signal from the lumbar vertebra demonstrates an admixture of red and yellow marrow. There are some Modic type degenerative signal changes involving  the anterior inferior aspect of L5. There is slight anterior disc bulging at the L5-S1 level. All of the lumbar discs are desiccated with severe narrowing of the L2-L3 disc space, moderate narrowing of the L1-2 disc space posteriorly, moderate narrowing of the L5-S1 disc space and mild narrowing of the L3-4 and L4-5 disc spaces.    L5-S1 level: There is bilateral facet joint arthropathy with a slight amount of fluid in both facet joints. There is bilateral, right greater left, foraminal narrowing but no central canal stenosis. There is a mild broad-based posterior protrusion of the L5-S1 disc that extends towards the left-sided foramen and into and possibly through the right-sided foramen. This is similar to what was seen on the prior exam. Incidental note is made of a mild amount of epidural fat that is deposited along the right anterior and left anterior aspect of thecal sac at the L5-S1 disc space level.    L4-L5 level: There is bilateral, left greater than right, facet joint arthropathy without central canal stenosis. There is no bony foraminal narrowing. There is a slight to mild broad-based posterior protrusion of the disc that extends to the medial foramen bilaterally.    L3-L4 level: There is bilateral facet joint arthropathy with slight ligament flavum redundancy but no bony foraminal narrowing or bony central canal stenosis. There is a slight to mild broad-based posterior protrusion of the disc that extends towards the right-sided foramen and into the anteromedial left-sided foramen.    L2-L3 level: There is bilateral facet joint arthropathy without bony central canal stenosis or bony foraminal narrowing. There is a slight to mild bilobed posterior protrusion of the disc at the level of the anteromedial foramen bilaterally and this is best seen on the axial images.    L1-L2 level: There is bilateral facet arthropathy without bony foraminal narrowing or bony central canal stenosis and the posterior disc  margin is unremarkable.    The tip of the conus medullaris extends down to the level of the superior endplate of L1 and the signal from the visualized conus is unremarkable. There is clumping of the cauda equina nerve rootlets throughout the distal thecal sac consistent with arachnoiditis.  Following contrast administration, there is no abnormal enhancement of any of the bony or soft tissue elements of the lumbar spine except for possibly one or 2 facet joints.    Impression:   1. No abnormal enhancement of any bony or soft tissue elements of the lumbar spine except for possibly one or 2 facet joints. Clumping of the nerve rootlets of the cauda equina consistent with arachnoiditis.  3. Desiccation of all of the lumbar disks with multilevel disc space narrowing, multilevel facet joint arthropathy, but no central canal stenosis.  4. Modic type degenerative signal changes in the anterior inferior aspect of L5.  5. There is a mild amount of epidural fat deposit along the right anterior and left anterior aspect of the thecal sac at the L5-S1 level..  6. Other findings as discussed above.    MRI WO Lumbar spine:  1. Since the prior study, there has been interval development of arachnoiditis including multifocal multisegmental clumping of the nerve rootlets of the cauda equina.  2. All of the lumbar discs are desiccated with multilevel disc space narrowing and desiccation. There is multilevel facet joint arthropathy and foraminal narrowing as detailed above. There is no central canal stenosis.  3. There are posteriorly protruding discs at every level in the lumbar spine except L1-L2 and, for the most part, they appear unchanged from the prior study of 3/26/2018.    Thoracic spine MRI:  FINDINGS: Thoracic vertebral body height and alignment are maintained with a few small scattered Schmorl's nodes involving the endplates of several mid to lower thoracic vertebra. The T3-4 vertebra are partially fused. There is some degree  of desiccation of all of the thoracic discs with multilevel disc space narrowing, especially at the T7-T8, T6-T7 and T5-T6 levels. There is no abnormal prevertebral soft tissue thickening. The somewhat heterogeneous appearance to the signal from the thoracic vertebra is presumably secondary to an admixture of red and yellow marrow.    T1-T2 level: There is no bony foraminal narrowing or bony central canal stenosis and the posterior disc margin is unremarkable.    T2-T3 level: There is no bony foraminal narrowing or bony central canal stenosis and the posterior disc margin is unremarkable.    T3-T4 level: There is no bony foraminal narrowing or bony central canal stenosis and the posterior disc margin is unremarkable.    T4-5 level: There is no bony foraminal narrowing or bony central canal stenosis and the posterior disc margin is unremarkable.    T5-T6 level: There is no bony foraminal narrowing or bony central canal stenosis and the posterior disc margin is unremarkable.    T6-T7 level: There is no bony foraminal narrowing or bony central canal stenosis and the posterior disc margin is unremarkable.    T7-T8 level: There is no bony foraminal narrowing or bony central canal stenosis and the posterior disc margin is unremarkable.    T8-T9 level: There is no bony foraminal narrowing or bony central canal stenosis and the posterior disc margin is unremarkable.    T9-T10 level: There is no bony foraminal narrowing or bony central canal stenosis and the posterior disc margin is unremarkable.    T10-T11 level: There is no bony foraminal narrowing or bony central canal stenosis and the posterior disc margin is unremarkable.    T11-T12 level: There is no bony foraminal narrowing or bony central canal stenosis and the posterior disc margin is unremarkable.    T12-L1 level: The tip the conus medullaris extends down to level of the superior endplate of L1. There appears to be some arachnoiditis involving the proximal cauda  equina nerve rootlets. There is no bony foraminal narrowing or bony central canal stenosis at this level.    On the axial images, the immediate paravertebral soft tissues are unremarkable. A small cyst is seen to involve the upper pole the left kidney.    Following contrast administration, there is no abnormal enhancement of any bony or soft tissue elements of the thoracic spine. There is no abnormal enhancement of the subserosal surface of the thoracic spinal cord. Some foci of mild signal intensity in the CSF dorsal to the spinal cord of some of the sagittal sequences presumably is secondary to CSF pulsation artifact.    On the sagittal  image, there is cervical spondylosis and kyphosis with narrowing of all of the disc spaces in the cervical spine.        Allergies: Review of patient's allergies indicates:  No Known Allergies    Current Medications:   Current Outpatient Medications   Medication Sig Dispense Refill    alendronate (FOSAMAX) 70 MG tablet 70 mg every 7 days.       ALPRAZolam (XANAX) 1 MG tablet Take 2 mg by mouth.      amoxicillin (AMOXIL) 500 MG Tab       ascorbic acid, vitamin C, (VITAMIN C) 1000 MG tablet Take 1,000 mg by mouth.      budesonide-formoterol 80-4.5 mcg (SYMBICORT) 80-4.5 mcg/actuation HFAA Inhale 2 puffs into the lungs.      celecoxib (CELEBREX) 200 MG capsule       cephALEXin (KEFLEX) 500 MG capsule       ciclopirox (LOPROX) 0.77 % Crea       clindamycin (CLEOCIN) 300 MG capsule       ergocalciferol (ERGOCALCIFEROL) 50,000 unit Cap       flurazepam (DALMANE) 15 MG Cap Take 30 mg by mouth daily as needed.      fluticasone propionate (FLONASE) 50 mcg/actuation nasal spray daily as needed.      guaiFENesin-codeine 100-10 mg/5 ml (TUSSI-ORGANIDIN NR)  mg/5 mL syrup       irbesartan (AVAPRO) 75 MG tablet 150 mg once daily.       levothyroxine (SYNTHROID) 100 MCG tablet Take 100 mcg by mouth.      mirabegron (MYRBETRIQ ORAL) Take by mouth.      mupirocin (BACTROBAN) 2 %  ointment       neomycin-polymyxin-hydrocortisone (CORTISPORIN) otic solution       prednisolon/gatiflox/bromfenac (PREDNISOL ACE-GATIFLOX-BROMFEN) 1-0.5-0.075 % DrpS Apply 1 drop to eye 3 (three) times daily. (Patient not taking: Reported on 6/17/2022) 5 mL 3    simvastatin (ZOCOR) 20 MG tablet Take 20 mg by mouth every evening.      sulfamethoxazole-trimethoprim 800-160mg (BACTRIM DS) 800-160 mg Tab       tadalafil (CIALIS) 20 MG Tab       temazepam (RESTORIL) 30 mg capsule TK 1 C PO QD HS      terbinafine HCL (LAMISIL) 250 mg tablet       tolterodine (DETROL LA) 4 MG 24 hr capsule        No current facility-administered medications for this visit.     Facility-Administered Medications Ordered in Other Visits   Medication Dose Route Frequency Provider Last Rate Last Admin    balanced salt irrigation intra-ocular solution 1 drop  1 drop Right Eye On Call Procedure Bell Cedeno MD        sodium chloride 0.9% flush 2 mL  2 mL Intravenous PRN Bell Cedeno MD           REVIEW OF SYSTEMS:    GENERAL:  No weight loss, malaise or fevers.  HEENT:  Negative for frequent or significant headaches.  NECK:  Negative for lumps, goiter, pain and significant neck swelling.  RESPIRATORY:  Negative for cough, wheezing or shortness of breath.  CARDIOVASCULAR:  Negative for chest pain, leg swelling or palpitations.  GI:  Negative for abdominal discomfort, blood in stools or black stools or change in bowel habits.  MUSCULOSKELETAL:  See HPI.  SKIN:  Negative for lesions, rash, and itching.  PSYCH:  +ve for sleep disturbance, mood disorder and recent psychosocial stressors.  HEMATOLOGY/LYMPHOLOGY:  Negative for prolonged bleeding, bruising easily or swollen nodes.  NEURO:   No history of headaches, syncope, paralysis, seizures or tremors.  All other reviewed and negative other than HPI.    Past Medical History:  Past Medical History:   Diagnosis Date    Cancer     stage I bladder cancer 50 yrs ago    Hypercholesteremia      Hypertension     Sacroiliitis 7/8/2020    Thyroid disease        Past Surgical History:  Past Surgical History:   Procedure Laterality Date    BLADDER SURGERY      CATARACT EXTRACTION      CATARACT EXTRACTION W/  INTRAOCULAR LENS IMPLANT Right 3/9/2022    Procedure: EXTRACTION, CATARACT, WITH IOL INSERTION;  Surgeon: Bell Cedeno MD;  Location: [facility] OR;  Service: Ophthalmology;  Laterality: Right;    COLONOSCOPY      EPIDURAL STEROID INJECTION N/A 1/12/2022    Procedure: INJECTION, STEROID, EPIDURAL CAUDAL With Catheter needs consent;  Surgeon: Samuel Jimenez MD;  Location: [facility] PAIN MGT;  Service: Pain Management;  Laterality: N/A;    EYE SURGERY      cataracts     FUSION OF SACROILIAC JOINT Right 3/15/2021    Procedure: FUSION, RIGHT SACROILIAC JOINT FUSION;  Surgeon: Samuel Jimenez MD;  Location: [facility] OR;  Service: Pain Management;  Laterality: Right;  C-ARM, PAINTEQ REP     HERNIA REPAIR      INJECTION OF ANESTHETIC AGENT AROUND NERVE Bilateral 6/1/2022    Procedure: BLOCK, NERVE MEDIAL BRANCH L3,4,5 BILATERAL 1st;  Surgeon: Samuel Jimenez MD;  Location: [facility] PAIN MGT;  Service: Pain Management;  Laterality: Bilateral;    INJECTION OF ANESTHETIC AGENT AROUND NERVE Bilateral 8/24/2022    Procedure: Block, Nerve Kenny L3, L4, & L5;  Surgeon: Samuel Jimenez MD;  Location: [facility] PAIN MGT;  Service: Pain Management;  Laterality: Bilateral;    INJECTION OF JOINT Right 7/8/2020    Procedure: INJECTION, JOINT, SACROILIAC (SI);  Surgeon: Samuel Jimenez MD;  Location: [facility] PAIN MGT;  Service: Pain Management;  Laterality: Right;    INJECTION OF JOINT Right 9/9/2020    Procedure: INJECTION, JOINT, SACROILIAC (SI);  Surgeon: Samuel Jimenez MD;  Location: [facility] PAIN MGT;  Service: Pain Management;  Laterality: Right;    INJECTION OF JOINT Right 7/21/2021    Procedure: INJECTION, JOINT, HIP RIGHT;  Surgeon: Samuel Jimenez MD;  Location: [facility] PAIN MGT;  Service: Pain Management;  Laterality: Right;  " CONSENT NEEDED    INJECTION OF JOINT Right 10/13/2021    Procedure: INJECTION, JOINT, SACROILIAC (SI)  NEED CONSENT pt. requesting sedation;  Surgeon: Samuel Jimenez MD;  Location: [facility] PAIN MGT;  Service: Pain Management;  Laterality: Right;    KNEE SURGERY      RADIOFREQUENCY ABLATION Right 2020    Procedure: RADIOFREQUENCY ABLATION, L5-S1-S2 LATERAL BRANCH COOLED;  Surgeon: Samuel Jimenez MD;  Location: [facility] PAIN MGT;  Service: Pain Management;  Laterality: Right;    REPLACEMENT OF NERVE STIMULATOR BATTERY N/A 2020    Procedure: Replacement, SPINAL CORD STIMULATOR BATTERY CHANGE TO NEVRO OMNION BATTERY;  Surgeon: Samuel Jimenez MD;  Location: [facility] OR;  Service: Pain Management;  Laterality: N/A;  C-ARM, NEVRO REP    TRIAL OF SPINAL CORD NERVE STIMULATOR N/A 2019    Procedure: Trial, Neurostimulator, SPINAL CORD STIMULATOR TRIAL;  Surgeon: Samuel Jimenez MD;  Location: [facility] CATH LAB;  Service: Pain Management;  Laterality: N/A;  C-ARM, NEVRO REP       Family History:  No family history on file.    Social History:  Social History     Socioeconomic History    Marital status:    Tobacco Use    Smoking status: Former     Types: Cigarettes     Quit date:      Years since quittin.6    Smokeless tobacco: Never    Tobacco comments:     stopped 40 years ago   Substance and Sexual Activity    Alcohol use: Yes     Alcohol/week: 2.0 standard drinks     Types: 2 Shots of liquor per week     Comment: nightly -scotch    Drug use: Never    Sexual activity: Yes     Partners: Female       OBJECTIVE:    BP (!) 131/53 (BP Location: Right arm, Patient Position: Sitting, BP Method: Large (Automatic))   Pulse (!) 56   Temp 97.1 °F (36.2 °C) (Oral)   Resp 16   Ht 6' 1" (1.854 m)   Wt 90.7 kg (200 lb)   BMI 26.39 kg/m²     PHYSICAL EXAMINATION:    General appearance: Well appearing, in no acute distress, alert and oriented x3.  Psych:  Mood and affect appropriate.  Skin: Skin color, " texture, turgor normal, no rashes or lesions, in both upper and lower body.  Head/face:  Atraumatic, normocephalic. No palpable lymph nodes  Neck: No pain to palpation over the cervical paraspinous muscles. Spurling Negative. No pain with neck flexion, extension, or lateral flexion. .  Cor: RRR  Pulm: CTA  GI: Abdomen soft and non-tender.  Back: Straight leg raising in the sitting and supine positions is negative to radicular pain. mild pain to palpation over the spine or costovertebral angles. Decreased range of motion without pain reproduction. Positive axial loading test bilateral. Positive FABERE,Ganselin and Yeoman's test on the rt side.negative FADIR  Extremities: Tenderness to deep palpation of right piriformis. Peripheral joint ROM is full and pain free without obvious instability or laxity in all four extremities. No deformities, edema, or skin discoloration. Good capillary refill.  Musculoskeletal: No pain with WICHO, FADIR or modified Gaenslen's. Bilateral lower extremity strength is normal and symmetric.  No atrophy or tone abnormalities are noted.  Neuro: Bilateral lower extremity coordination and muscle stretch reflexes are physiologic and symmetric.  No clonus. No loss of sensation is noted.  GAIT: Antalgic with use of assistive device. Utilizes a walker.    ASSESSMENT: 87 y.o. year old male with low back pain, consistent with:     1. Other spondylosis, lumbosacral region        2. Sacroiliitis        3. Spinal cord stimulator status        4. Opioid contract exists        5. Arachnoiditis        6. Chronic pain syndrome                PLAN:     - I have stressed the importance of physical activity and a home exercise plan to help with pain and improve health.  - Patient can continue with medications for now since they are providing benefits, using them appropriately, and without side effects.  - Counseled patient regarding the importance of activity modification, constant sleeping habits, and  physical therapy.  - Schedule for a RFA bilateral L3,4, and L5 to help with axial low back pain and progress with a home exercise program.  - if no relief Will consider intercept  (Interval increased modic changes on MRI)   - RTC 4 wks after MBB    The above plan and management options were discussed at length with patient. Patient is in agreement with the above and verbalized understanding.    Ernesto Sarabia    I have personally reviewed the history and exam of this patient and agree with the resident/fellow/NPs note as stated above.    Samuel Jimenez MD      08/29/2022

## 2022-08-29 NOTE — H&P (VIEW-ONLY)
Chronic patient Established Note (Follow up visit)      SUBJECTIVE:    Interval History 8/29/22:  Jefferson Boogie presents to the clinic for a follow-up appointment for back pain.  He had his second bilateral L3, L4, L5 MBB and reports 100% pain relief.  He would like to proceed with RFA.    Interval History 5/26/22:  Jefferson Boogie presents to the clinic for a follow-up appointment for back pain. Since the last visit, Jefferson Boogie states the pain has been stable. Certain postures he can tolerate in the standing position such as leaning on a shopping cart or support to his posterior thighs. He now uses a walker when covering long distances. He continues to play golf despite it aggravating his pain. He receives some benefit from the SCS whereas other interventions have not helped. He remains interested in the Bruning device.    Interval History 3/24/22:  Jefferson Boogie presents to the clinic for a follow-up appointment for back pain. Since the last visit, Jefferson Boogie states the pain has been worse than usual. Current pain intensity is 8/10. He continues to report benefit with Nevro SCS however he has not been able to get in touch with the representative in order to have his settings adjusted. He continues to take occasional norco 7.5 mg with benefit, particularly when he plays golf.     Interval History 2/10/22:  Patent presents today s/p caudal epidural steroid injections on 1/12/22 since then he has not had any relief. He states his pain has been unchanged and is a 6/10 on average with the worst being 8/10 and best is 4/10. He has been taking percocet 5mg when he golfs or plays with his granddaughter.         Interval History 12/30/21:  Jefferson Boogie presents to clinic for follow up appointment s/p Right SI joint and Right piriformis muscle injection under US guidance on 11/29/21. He did not obtain any noticeable relief with this procedure similar to all of his previous injections. His pain is  "unchanged and constant over the right buttock. He is taking percocet 5mg x 3 on days he is more active, such as playing golf. This helps some, but minimally. Denies any new symptoms or neurological changes. No numbness, tingling, weakness in his lower extremities. Denies bowel/bladder incontinence or saddle anesthesia.         Interval History 11/4/2021:  Jefferson Boogie presents to the clinic for a follow-up appointment for right sided back pain and SI joint pain. Mr. Boogie had a right SI joint injection on 10/13/2021. He did not note significant relief with the injection for any period of time. Pain is still constant over the right buttock and most noticeable when playing golf. He denies any new bowel/bladder incontinence, lower extremity numbness or weakness or saddle anesthesia.     Interval History 8/26/2021:  Jefferson Boogie presents to the clinic for a follow-up appointment for right sided hip pain with right-sided radiculopathy . Since the last visit, Jefferson Boogie states the pain has been "particularly tender today" 7/10. Patient reports playing golf recently and experienced worsening symptoms the following day. Mr. Boogie is s/p right-side hip joint injection with minimal relief. Patient states he has had relief with previous interventions and is open to RFA.     Interval History 6/10/2021:  Jefferson Boogie presents to the clinic for a follow-up appointment. Since the last visit, Jefferson Boogie states the pain has been stable. Current pain intensity is 3/10. States he is still having pain around SIJ that is affecting his ADL      Interval History 4/15/2021:  The patient returns to clinic today for follow up of back pain. He reports that the swelling above the SI fusion incision has resolved. He denies any fever, chills, or drainage from the incision. He does report benefit since the fusion. He continues to report benefit with Nevro SCS. He reports intermittent shoulder pain at this " time. He did receive an injection with Sports Medicine with benefit. He denies any other health changes. His pain today is 2/10.     Interval History 3/25/21:  Mr. Boogie presents to clinic for follow up of bilateral shoulder pain. He is s/p BL subacromial injections on 2/8/21. He reports maximum 20% relief of pain in the Right shoulder. Today the Left shoulder pain is 1/10; Right shoulder pain is 5-7/10.      Interval History 3/22/2021:  The patient returns to clinic today for follow up of back pain. He is s/p right SI fusion on 3/15/2021. He reports some relief at this time. He does report soreness to the incision. He denies any fever, chills, or drainage from incision. He continues to report benefit with Nevro SCS. He denies any other health changes. His pain today is 4/10.     Interval History 1/11/2020:  Patient is here for follow-up of low back pain now s/p sacroiliac RFA on 12/9/20 which provided no benefit and with the new complaint of bilateral anterior shoulder pain R>L. His shoulder pain started about 2 months ago. He describes 9/10 sharp/stinging pain without radiation that is exacerbated with overhead activity and improved with rest. He started PT about one month ago. He takes oxycodone which provides some relief. He had a blind subacromial steroid injection in the right shoulder with Dr. Ramirez on 10/26/19 which provided some short term relief.     Interval History 11/5/2020:  Jefferson Boogie presents to the clinic for a follow-up appointment for low back pain. Since the last visit, Jefferson Boogie states the pain has been stable. Current pain intensity is 4/10. He had good relief from the SI joint injection that lasted for about a week. Pain has since returned. He also slipped and fell on his buttock a couple of weeks ago and had increased pain in the right buttock after that which is slowly improving. He continues to have variable benefit with the Nevro SCS for intermittent pain in the right  leg. He is scheduled to meet with representatives today. He is taking celebrex daily and percocet as needed sparingly. He denies any adverse effects and any other health changes.     Interval History 10/5/2020:  The patient returns to clinic today for follow up of low back pain. He is s/p right SI joint injection on 9/9/2020. He reports 25% relief of his right sided low back and buttock pain. He did have good relief the first two days. He continues to report right sided low back and buttock pain. He denies any radicular leg pain. His pain is worse with prolonged standing and walking. He continues to report intermittent pain into his achilles from previous injury. He feels as though this has changed his gait. He continues to report some benefit with Nevro SCS device. He is in contact with representatives. He is currently taking Percocet as needed sparingly with some benefit. He denies any adverse effects. He denies any other health changes. His pain today is 4/10.      Interval History 9/2/2020:  The patient returns to clinic today for follow up of back pain. He reports that his back pain has improved since last audio visit. He continues to report back pain with intermittent radiating pain into his right hip and calf. He has spoken with Bienvenido from [employer] via phone with some programming. He is meeting with Bienvenido today. He had some issues with charging but this has improved. He is currently taking Norco intermittently but this is only lasts 2-3 hours. He denies any adverse effects. He denies any other health changes. His pain today is 8/10.     Interval History 08/27/2020:  Pt was contacted over [employer] for a follow up appointment.  He is currently complaining of right hip and right calf with no associated numbness or weakness.  He continues to use his Nevro device.  He states it has been very frustrating. Inconsistent.  Took 20 mins to 1 hour to charge.  Couldn't get it to start this morning.  He states that there  is no drainage at the battery site, no signs of infection or pain at the site.  Patient is not taking medications at this time.  He wants to speak about medication options because he would like to start playing golf again.     Interval History 8/17/2020:  The patient returns to clinic today for post op wound check. He continues to report benefit with Nevro device. He denies any fever, chills, or drainage. He continues to follow his activity restrictions. He does report a recent achilles tendon injury while swimming. He is seeing Orthopedics. He denies any other health changes. His pain today is 4/10.     Interval History 8/3/2020:  The patient returns to clinic today for post op. He is s/p Nevro battery change on 7/27/2020. He denies any fever, chills, or drainage from incision. He has not completed his antibiotics as he missed two days of the medication. He continues to follow his activity restrictions. He reports relief with the device. He is meeting with [employer] today for programming. He denies any other health changes. His pain today is 2/10     Interval History 7/22/2020:  Pt presents for audio follow up only s/p Right SI joint injection on 7/8/2020. Pt states he has near resolution of focal pain to buttock. He is pleased with result and pain relief. Pt has been in contact with Nevro and will be having battery swap in 5 days. He has difficulty whether stimulator is beneficial and has even had a holiday from using SCS.  He denies any newer areas pain, denies any neuro changes, meds area adequate to control pain without adverse side effects. Pain is 2/10 today.     Interval HPI 6/15/2020:  Jefferson Boogie presents to the clinic for a follow-up appointment for 3 week follow up right hip and right thigh pain. Since the last visit, Jefferson Boogie states the pain has been persistant. Current pain intensity is 5/10. Patient has been working with Bienvenido Varghese, to try and get better coverage of a localized pain  that he still experiences in his right low back area. He does report improvement in symptoms of numbness/tingling. Today, worse pain is 1/10 in severity and localizes to the right SI joint. Pain is worse with extension of his back. It is worse with golfing. Pain relieved by Norco--he takes 1-2 tablets of 5-325 Norco on days that he plays golf. Pain is also improved with being still and not being active. Patient endorses a much more active lifestyle with Norco. Denies new weakness/numbness or bowel/bladder changes.     Previous injection history includes a series of 3 lumbar CHOCO at [facility].      Interval HPI 3/5/20:  Patient presents to the clinic for a follow-up appointment for low back pain. Since the last visit, he states his pain has been unchanged. Current pain intensity is 4/10. He continues to work with [employer] to adjust settings on his SCS. He has stopped using tramadol, and uses norco sparingly. He continues to work with a  for physical therapy.      Interval HPI 1/9/2020:  Patient returns for follow up s/p Nevro SCS. He states he is unsure if it has helped his pain since he had it implanted. He last had an adjustment of his programming about 1 month ago. He does note that once he is done charging his SCS his pain is improved. Pain still worse with prolonged standing and activity such as golf. He still is doing home exercise program. He says that he is trying to do more since getting the SCS. He is still taking the Tramadol with limited pain relief. He takes Tramadol 50 mg twice daily only on days of activity which is once a week. No other health changes.      HPI 11/20/19  Jefferson Boogie returns to clinic today for follow up. He reports increased low back and leg pain over the last few days. He has been in contact with [employer] representatives for programming adjustments. He does report benefit with the device. He reports good days and bad days. His pain is worse with prolonged  standing and activity. He continues to participate in a home exercise routine. He does take Tramadol sparingly but reports limited relief. He denies any other health changes. His pain today is 5/10.     Pain Disability Index Review:  Last 3 PDI Scores 2/10/2022 2021 2021   Pain Disability Index (PDI) 37 [phone]         Pain Medications:     Current medication:  Celebrex   Xanax  See med list     Opioid Contract: not applicable      report:  Reviewed and consistent with medication use as prescribed.     Pain Procedures:   2020- Right SI joint injection  2020- Nevro SCS battery change  2020- Right SIJ injection >80% relief   19 SCS implant  19 SCS trial  2020- Right SI joint injection  2020- SCS battery revision  2020- Right SI joint injection   2020- Right L5-S1 RFA  3/15/2021- Right SI fusion  2021 - Injection R Hip - minimal relief  10/13/2021 - Right SI joint injection  2021 - R SI joing and R piriformis muscle injection - no relief   2022- Caudal CHOCO- no relief      Physical Therapy/Home Exercise: no     Imagin/10/2021 - X ray Hips Bilateral: No radiographic evidence of acute osseous abnormality of the pelvis and hips and without radiographic evidence of osteonecrosis of the femoral heads.     21 MRI L-spine:  FINDINGS:  Lumbar sagittal alignment is slightly is straightened.  There is slight convex right curvature lumbar spine.  There is degenerative disc disease with intervertebral disc height loss and endplate degenerative change at all levels with scattered disc desiccation allowing for degenerative change the lumbar vertebral body heights and contours are within normal is without evidence for acute fracture or subluxation.  There is heterogeneous T1/T2 signal focus within the right aspect of the S1 vertebra most compatible with hemangioma this measures 2.0 cm.     The distal spinal cord and conus is normal in signal and contour  tip of the conus approximates the T12/L1 level.  Please note there is artifact from indwelling spinal stimulator device with leads partially visualized casting artifact entering the dorsal epidural space at the T12 level and extending cranially.     There is abnormal clumping configuration of the filum terminalis a from T12 through L5 with slight thickening of scattered nerve roots most compatible with arachnoiditis.     No aneurysmal dilatation of the visualized abdominal aorta.     T12/L1 through L1/L2: No significant disc bulge, central canal or neural foraminal stenosis.     L2/L3: Posterior disc osteophyte with facet joint arthropathy without significant central canal stenosis with mild bilateral neural foraminal stenosis.     L3/L4:Bulging disc with ligamentum flavum hypertrophy without significant central canal stenosis with mild bilateral neural foraminal stenosis.     L4/L5:Posterior disc osteophyte with ligamentum flavum hypertrophy and facet joint arthropathy without significant central canal stenosis and mild bilateral neural foraminal stenosis.     L5/S1: Posterior disc osteophyte with facet joint arthropathy without significant central canal stenosis with moderate right and mild left neural foraminal stenosis.     This report was flagged in Epic as abnormal.     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  Please note there is spinal stimulator device with leads partially visualized and distorting the study by artifacts.     There is abnormal thickening of the filum configuration most compatible with arachnoiditis.     Please see above for additional details.        Lumbar spine MRI with contrast 2019    FINDINGS: There are 5 lumbar type vertebral bodies lumbar vertebral body height and alignment are maintained. The signal from the lumbar vertebra demonstrates an admixture of red and yellow marrow. There are some Modic type degenerative signal changes involving  the anterior inferior aspect of L5. There is slight anterior disc bulging at the L5-S1 level. All of the lumbar discs are desiccated with severe narrowing of the L2-L3 disc space, moderate narrowing of the L1-2 disc space posteriorly, moderate narrowing of the L5-S1 disc space and mild narrowing of the L3-4 and L4-5 disc spaces.    L5-S1 level: There is bilateral facet joint arthropathy with a slight amount of fluid in both facet joints. There is bilateral, right greater left, foraminal narrowing but no central canal stenosis. There is a mild broad-based posterior protrusion of the L5-S1 disc that extends towards the left-sided foramen and into and possibly through the right-sided foramen. This is similar to what was seen on the prior exam. Incidental note is made of a mild amount of epidural fat that is deposited along the right anterior and left anterior aspect of thecal sac at the L5-S1 disc space level.    L4-L5 level: There is bilateral, left greater than right, facet joint arthropathy without central canal stenosis. There is no bony foraminal narrowing. There is a slight to mild broad-based posterior protrusion of the disc that extends to the medial foramen bilaterally.    L3-L4 level: There is bilateral facet joint arthropathy with slight ligament flavum redundancy but no bony foraminal narrowing or bony central canal stenosis. There is a slight to mild broad-based posterior protrusion of the disc that extends towards the right-sided foramen and into the anteromedial left-sided foramen.    L2-L3 level: There is bilateral facet joint arthropathy without bony central canal stenosis or bony foraminal narrowing. There is a slight to mild bilobed posterior protrusion of the disc at the level of the anteromedial foramen bilaterally and this is best seen on the axial images.    L1-L2 level: There is bilateral facet arthropathy without bony foraminal narrowing or bony central canal stenosis and the posterior disc  margin is unremarkable.    The tip of the conus medullaris extends down to the level of the superior endplate of L1 and the signal from the visualized conus is unremarkable. There is clumping of the cauda equina nerve rootlets throughout the distal thecal sac consistent with arachnoiditis.  Following contrast administration, there is no abnormal enhancement of any of the bony or soft tissue elements of the lumbar spine except for possibly one or 2 facet joints.    Impression:   1. No abnormal enhancement of any bony or soft tissue elements of the lumbar spine except for possibly one or 2 facet joints. Clumping of the nerve rootlets of the cauda equina consistent with arachnoiditis.  3. Desiccation of all of the lumbar disks with multilevel disc space narrowing, multilevel facet joint arthropathy, but no central canal stenosis.  4. Modic type degenerative signal changes in the anterior inferior aspect of L5.  5. There is a mild amount of epidural fat deposit along the right anterior and left anterior aspect of the thecal sac at the L5-S1 level..  6. Other findings as discussed above.    MRI WO Lumbar spine:  1. Since the prior study, there has been interval development of arachnoiditis including multifocal multisegmental clumping of the nerve rootlets of the cauda equina.  2. All of the lumbar discs are desiccated with multilevel disc space narrowing and desiccation. There is multilevel facet joint arthropathy and foraminal narrowing as detailed above. There is no central canal stenosis.  3. There are posteriorly protruding discs at every level in the lumbar spine except L1-L2 and, for the most part, they appear unchanged from the prior study of 3/26/2018.    Thoracic spine MRI:  FINDINGS: Thoracic vertebral body height and alignment are maintained with a few small scattered Schmorl's nodes involving the endplates of several mid to lower thoracic vertebra. The T3-4 vertebra are partially fused. There is some degree  of desiccation of all of the thoracic discs with multilevel disc space narrowing, especially at the T7-T8, T6-T7 and T5-T6 levels. There is no abnormal prevertebral soft tissue thickening. The somewhat heterogeneous appearance to the signal from the thoracic vertebra is presumably secondary to an admixture of red and yellow marrow.    T1-T2 level: There is no bony foraminal narrowing or bony central canal stenosis and the posterior disc margin is unremarkable.    T2-T3 level: There is no bony foraminal narrowing or bony central canal stenosis and the posterior disc margin is unremarkable.    T3-T4 level: There is no bony foraminal narrowing or bony central canal stenosis and the posterior disc margin is unremarkable.    T4-5 level: There is no bony foraminal narrowing or bony central canal stenosis and the posterior disc margin is unremarkable.    T5-T6 level: There is no bony foraminal narrowing or bony central canal stenosis and the posterior disc margin is unremarkable.    T6-T7 level: There is no bony foraminal narrowing or bony central canal stenosis and the posterior disc margin is unremarkable.    T7-T8 level: There is no bony foraminal narrowing or bony central canal stenosis and the posterior disc margin is unremarkable.    T8-T9 level: There is no bony foraminal narrowing or bony central canal stenosis and the posterior disc margin is unremarkable.    T9-T10 level: There is no bony foraminal narrowing or bony central canal stenosis and the posterior disc margin is unremarkable.    T10-T11 level: There is no bony foraminal narrowing or bony central canal stenosis and the posterior disc margin is unremarkable.    T11-T12 level: There is no bony foraminal narrowing or bony central canal stenosis and the posterior disc margin is unremarkable.    T12-L1 level: The tip the conus medullaris extends down to level of the superior endplate of L1. There appears to be some arachnoiditis involving the proximal cauda  equina nerve rootlets. There is no bony foraminal narrowing or bony central canal stenosis at this level.    On the axial images, the immediate paravertebral soft tissues are unremarkable. A small cyst is seen to involve the upper pole the left kidney.    Following contrast administration, there is no abnormal enhancement of any bony or soft tissue elements of the thoracic spine. There is no abnormal enhancement of the subserosal surface of the thoracic spinal cord. Some foci of mild signal intensity in the CSF dorsal to the spinal cord of some of the sagittal sequences presumably is secondary to CSF pulsation artifact.    On the sagittal  image, there is cervical spondylosis and kyphosis with narrowing of all of the disc spaces in the cervical spine.        Allergies: Review of patient's allergies indicates:  No Known Allergies    Current Medications:   Current Outpatient Medications   Medication Sig Dispense Refill    alendronate (FOSAMAX) 70 MG tablet 70 mg every 7 days.       ALPRAZolam (XANAX) 1 MG tablet Take 2 mg by mouth.      amoxicillin (AMOXIL) 500 MG Tab       ascorbic acid, vitamin C, (VITAMIN C) 1000 MG tablet Take 1,000 mg by mouth.      budesonide-formoterol 80-4.5 mcg (SYMBICORT) 80-4.5 mcg/actuation HFAA Inhale 2 puffs into the lungs.      celecoxib (CELEBREX) 200 MG capsule       cephALEXin (KEFLEX) 500 MG capsule       ciclopirox (LOPROX) 0.77 % Crea       clindamycin (CLEOCIN) 300 MG capsule       ergocalciferol (ERGOCALCIFEROL) 50,000 unit Cap       flurazepam (DALMANE) 15 MG Cap Take 30 mg by mouth daily as needed.      fluticasone propionate (FLONASE) 50 mcg/actuation nasal spray daily as needed.      guaiFENesin-codeine 100-10 mg/5 ml (TUSSI-ORGANIDIN NR)  mg/5 mL syrup       irbesartan (AVAPRO) 75 MG tablet 150 mg once daily.       levothyroxine (SYNTHROID) 100 MCG tablet Take 100 mcg by mouth.      mirabegron (MYRBETRIQ ORAL) Take by mouth.      mupirocin (BACTROBAN) 2 %  ointment       neomycin-polymyxin-hydrocortisone (CORTISPORIN) otic solution       prednisolon/gatiflox/bromfenac (PREDNISOL ACE-GATIFLOX-BROMFEN) 1-0.5-0.075 % DrpS Apply 1 drop to eye 3 (three) times daily. (Patient not taking: Reported on 6/17/2022) 5 mL 3    simvastatin (ZOCOR) 20 MG tablet Take 20 mg by mouth every evening.      sulfamethoxazole-trimethoprim 800-160mg (BACTRIM DS) 800-160 mg Tab       tadalafil (CIALIS) 20 MG Tab       temazepam (RESTORIL) 30 mg capsule TK 1 C PO QD HS      terbinafine HCL (LAMISIL) 250 mg tablet       tolterodine (DETROL LA) 4 MG 24 hr capsule        No current facility-administered medications for this visit.     Facility-Administered Medications Ordered in Other Visits   Medication Dose Route Frequency Provider Last Rate Last Admin    balanced salt irrigation intra-ocular solution 1 drop  1 drop Right Eye On Call Procedure Bell Cedeno MD        sodium chloride 0.9% flush 2 mL  2 mL Intravenous PRN Bell Cedeno MD           REVIEW OF SYSTEMS:    GENERAL:  No weight loss, malaise or fevers.  HEENT:  Negative for frequent or significant headaches.  NECK:  Negative for lumps, goiter, pain and significant neck swelling.  RESPIRATORY:  Negative for cough, wheezing or shortness of breath.  CARDIOVASCULAR:  Negative for chest pain, leg swelling or palpitations.  GI:  Negative for abdominal discomfort, blood in stools or black stools or change in bowel habits.  MUSCULOSKELETAL:  See HPI.  SKIN:  Negative for lesions, rash, and itching.  PSYCH:  +ve for sleep disturbance, mood disorder and recent psychosocial stressors.  HEMATOLOGY/LYMPHOLOGY:  Negative for prolonged bleeding, bruising easily or swollen nodes.  NEURO:   No history of headaches, syncope, paralysis, seizures or tremors.  All other reviewed and negative other than HPI.    Past Medical History:  Past Medical History:   Diagnosis Date    Cancer     stage I bladder cancer 50 yrs ago    Hypercholesteremia      Hypertension     Sacroiliitis 7/8/2020    Thyroid disease        Past Surgical History:  Past Surgical History:   Procedure Laterality Date    BLADDER SURGERY      CATARACT EXTRACTION      CATARACT EXTRACTION W/  INTRAOCULAR LENS IMPLANT Right 3/9/2022    Procedure: EXTRACTION, CATARACT, WITH IOL INSERTION;  Surgeon: Bell Cedeno MD;  Location: [facility] OR;  Service: Ophthalmology;  Laterality: Right;    COLONOSCOPY      EPIDURAL STEROID INJECTION N/A 1/12/2022    Procedure: INJECTION, STEROID, EPIDURAL CAUDAL With Catheter needs consent;  Surgeon: Samuel Jimenez MD;  Location: [facility] PAIN MGT;  Service: Pain Management;  Laterality: N/A;    EYE SURGERY      cataracts     FUSION OF SACROILIAC JOINT Right 3/15/2021    Procedure: FUSION, RIGHT SACROILIAC JOINT FUSION;  Surgeon: Samuel Jimenez MD;  Location: [facility] OR;  Service: Pain Management;  Laterality: Right;  C-ARM, PAINTEQ REP     HERNIA REPAIR      INJECTION OF ANESTHETIC AGENT AROUND NERVE Bilateral 6/1/2022    Procedure: BLOCK, NERVE MEDIAL BRANCH L3,4,5 BILATERAL 1st;  Surgeon: Samuel Jimenez MD;  Location: [facility] PAIN MGT;  Service: Pain Management;  Laterality: Bilateral;    INJECTION OF ANESTHETIC AGENT AROUND NERVE Bilateral 8/24/2022    Procedure: Block, Nerve Kenny L3, L4, & L5;  Surgeon: Samuel Jimenez MD;  Location: [facility] PAIN MGT;  Service: Pain Management;  Laterality: Bilateral;    INJECTION OF JOINT Right 7/8/2020    Procedure: INJECTION, JOINT, SACROILIAC (SI);  Surgeon: Samuel Jimenez MD;  Location: [facility] PAIN MGT;  Service: Pain Management;  Laterality: Right;    INJECTION OF JOINT Right 9/9/2020    Procedure: INJECTION, JOINT, SACROILIAC (SI);  Surgeon: Samuel Jimenez MD;  Location: [facility] PAIN MGT;  Service: Pain Management;  Laterality: Right;    INJECTION OF JOINT Right 7/21/2021    Procedure: INJECTION, JOINT, HIP RIGHT;  Surgeon: Samuel Jimenez MD;  Location: [facility] PAIN MGT;  Service: Pain Management;  Laterality: Right;  " CONSENT NEEDED    INJECTION OF JOINT Right 10/13/2021    Procedure: INJECTION, JOINT, SACROILIAC (SI)  NEED CONSENT pt. requesting sedation;  Surgeon: Samuel Jimenez MD;  Location: [facility] PAIN MGT;  Service: Pain Management;  Laterality: Right;    KNEE SURGERY      RADIOFREQUENCY ABLATION Right 2020    Procedure: RADIOFREQUENCY ABLATION, L5-S1-S2 LATERAL BRANCH COOLED;  Surgeon: Samuel Jimenez MD;  Location: [facility] PAIN MGT;  Service: Pain Management;  Laterality: Right;    REPLACEMENT OF NERVE STIMULATOR BATTERY N/A 2020    Procedure: Replacement, SPINAL CORD STIMULATOR BATTERY CHANGE TO NEVRO OMNION BATTERY;  Surgeon: Samuel Jimenez MD;  Location: [facility] OR;  Service: Pain Management;  Laterality: N/A;  C-ARM, NEVRO REP    TRIAL OF SPINAL CORD NERVE STIMULATOR N/A 2019    Procedure: Trial, Neurostimulator, SPINAL CORD STIMULATOR TRIAL;  Surgeon: Samuel Jimenez MD;  Location: [facility] CATH LAB;  Service: Pain Management;  Laterality: N/A;  C-ARM, NEVRO REP       Family History:  No family history on file.    Social History:  Social History     Socioeconomic History    Marital status:    Tobacco Use    Smoking status: Former     Types: Cigarettes     Quit date:      Years since quittin.6    Smokeless tobacco: Never    Tobacco comments:     stopped 40 years ago   Substance and Sexual Activity    Alcohol use: Yes     Alcohol/week: 2.0 standard drinks     Types: 2 Shots of liquor per week     Comment: nightly -scotch    Drug use: Never    Sexual activity: Yes     Partners: Female       OBJECTIVE:    BP (!) 131/53 (BP Location: Right arm, Patient Position: Sitting, BP Method: Large (Automatic))   Pulse (!) 56   Temp 97.1 °F (36.2 °C) (Oral)   Resp 16   Ht 6' 1" (1.854 m)   Wt 90.7 kg (200 lb)   BMI 26.39 kg/m²     PHYSICAL EXAMINATION:    General appearance: Well appearing, in no acute distress, alert and oriented x3.  Psych:  Mood and affect appropriate.  Skin: Skin color, " texture, turgor normal, no rashes or lesions, in both upper and lower body.  Head/face:  Atraumatic, normocephalic. No palpable lymph nodes  Neck: No pain to palpation over the cervical paraspinous muscles. Spurling Negative. No pain with neck flexion, extension, or lateral flexion. .  Cor: RRR  Pulm: CTA  GI: Abdomen soft and non-tender.  Back: Straight leg raising in the sitting and supine positions is negative to radicular pain. mild pain to palpation over the spine or costovertebral angles. Decreased range of motion without pain reproduction. Positive axial loading test bilateral. Positive FABERE,Ganselin and Yeoman's test on the rt side.negative FADIR  Extremities: Tenderness to deep palpation of right piriformis. Peripheral joint ROM is full and pain free without obvious instability or laxity in all four extremities. No deformities, edema, or skin discoloration. Good capillary refill.  Musculoskeletal: No pain with WICHO, FADIR or modified Gaenslen's. Bilateral lower extremity strength is normal and symmetric.  No atrophy or tone abnormalities are noted.  Neuro: Bilateral lower extremity coordination and muscle stretch reflexes are physiologic and symmetric.  No clonus. No loss of sensation is noted.  GAIT: Antalgic with use of assistive device. Utilizes a walker.    ASSESSMENT: 87 y.o. year old male with low back pain, consistent with:     1. Other spondylosis, lumbosacral region        2. Sacroiliitis        3. Spinal cord stimulator status        4. Opioid contract exists        5. Arachnoiditis        6. Chronic pain syndrome                PLAN:     - I have stressed the importance of physical activity and a home exercise plan to help with pain and improve health.  - Patient can continue with medications for now since they are providing benefits, using them appropriately, and without side effects.  - Counseled patient regarding the importance of activity modification, constant sleeping habits, and  physical therapy.  - Schedule for a RFA bilateral L3,4, and L5 to help with axial low back pain and progress with a home exercise program.  - if no relief Will consider intercept  (Interval increased modic changes on MRI)   - RTC 4 wks after MBB    The above plan and management options were discussed at length with patient. Patient is in agreement with the above and verbalized understanding.    Ernesto Sarabia    I have personally reviewed the history and exam of this patient and agree with the resident/fellow/NPs note as stated above.    Samuel Jimenez MD      08/29/2022

## 2022-08-30 ENCOUNTER — PATIENT MESSAGE (OUTPATIENT)
Dept: PAIN MEDICINE | Facility: OTHER | Age: 87
End: 2022-08-30
Payer: MEDICARE

## 2022-08-30 ENCOUNTER — TELEPHONE (OUTPATIENT)
Dept: ADMINISTRATIVE | Facility: OTHER | Age: 87
End: 2022-08-30
Payer: MEDICARE

## 2022-09-15 ENCOUNTER — PATIENT MESSAGE (OUTPATIENT)
Dept: PAIN MEDICINE | Facility: OTHER | Age: 87
End: 2022-09-15
Payer: MEDICARE

## 2022-09-21 ENCOUNTER — HOSPITAL ENCOUNTER (OUTPATIENT)
Facility: OTHER | Age: 87
Discharge: HOME | End: 2022-09-21
Attending: ANESTHESIOLOGY | Admitting: ANESTHESIOLOGY
Payer: MEDICARE

## 2022-09-21 VITALS
SYSTOLIC BLOOD PRESSURE: 176 MMHG | OXYGEN SATURATION: 98 % | TEMPERATURE: 99 F | DIASTOLIC BLOOD PRESSURE: 77 MMHG | WEIGHT: 194 LBS | HEART RATE: 54 BPM | HEIGHT: 73 IN | BODY MASS INDEX: 25.71 KG/M2 | RESPIRATION RATE: 14 BRPM

## 2022-09-21 DIAGNOSIS — M47.896 OTHER SPONDYLOSIS, LUMBAR REGION: Primary | ICD-10-CM

## 2022-09-21 PROCEDURE — 99152 MOD SED SAME PHYS/QHP 5/>YRS: CPT | Mod: ,,, | Performed by: ANESTHESIOLOGY

## 2022-09-21 PROCEDURE — 64635 DESTROY LUMB/SAC FACET JNT: CPT | Mod: RT | Performed by: ANESTHESIOLOGY

## 2022-09-21 PROCEDURE — 64636 DESTROY L/S FACET JNT ADDL: CPT | Mod: RT | Performed by: ANESTHESIOLOGY

## 2022-09-21 PROCEDURE — 99152 PR MOD CONSCIOUS SEDATION, SAME PHYS, 5+ YRS, FIRST 15 MIN: ICD-10-PCS | Mod: ,,, | Performed by: ANESTHESIOLOGY

## 2022-09-21 PROCEDURE — 25000003 PHARM REV CODE 250: Performed by: ANESTHESIOLOGY

## 2022-09-21 PROCEDURE — 64635 PR DESTROY LUMB/SAC FACET JNT: ICD-10-PCS | Mod: RT,,, | Performed by: ANESTHESIOLOGY

## 2022-09-21 PROCEDURE — 64636 PR DESTROY L/S FACET JNT ADDL: ICD-10-PCS | Mod: RT,,, | Performed by: ANESTHESIOLOGY

## 2022-09-21 PROCEDURE — 64635 DESTROY LUMB/SAC FACET JNT: CPT | Mod: RT,,, | Performed by: ANESTHESIOLOGY

## 2022-09-21 PROCEDURE — 63600175 PHARM REV CODE 636 W HCPCS: Performed by: ANESTHESIOLOGY

## 2022-09-21 PROCEDURE — 64636 DESTROY L/S FACET JNT ADDL: CPT | Mod: RT,,, | Performed by: ANESTHESIOLOGY

## 2022-09-21 PROCEDURE — 99152 MOD SED SAME PHYS/QHP 5/>YRS: CPT | Performed by: ANESTHESIOLOGY

## 2022-09-21 RX ORDER — BUPIVACAINE HYDROCHLORIDE 2.5 MG/ML
INJECTION, SOLUTION EPIDURAL; INFILTRATION; INTRACAUDAL
Status: DISCONTINUED | OUTPATIENT
Start: 2022-09-21 | End: 2022-09-21 | Stop reason: HOSPADM

## 2022-09-21 RX ORDER — LIDOCAINE HYDROCHLORIDE 20 MG/ML
INJECTION, SOLUTION INFILTRATION; PERINEURAL
Status: DISCONTINUED | OUTPATIENT
Start: 2022-09-21 | End: 2022-09-21 | Stop reason: HOSPADM

## 2022-09-21 RX ORDER — FENTANYL CITRATE 50 UG/ML
INJECTION, SOLUTION INTRAMUSCULAR; INTRAVENOUS
Status: DISCONTINUED | OUTPATIENT
Start: 2022-09-21 | End: 2022-09-21 | Stop reason: HOSPADM

## 2022-09-21 RX ORDER — SODIUM CHLORIDE 9 MG/ML
INJECTION, SOLUTION INTRAVENOUS
Status: COMPLETED | OUTPATIENT
Start: 2022-09-21 | End: 2022-09-21

## 2022-09-21 RX ORDER — DEXAMETHASONE SODIUM PHOSPHATE 10 MG/ML
INJECTION INTRAMUSCULAR; INTRAVENOUS
Status: DISCONTINUED | OUTPATIENT
Start: 2022-09-21 | End: 2022-09-21 | Stop reason: HOSPADM

## 2022-09-21 RX ORDER — MIDAZOLAM HYDROCHLORIDE 1 MG/ML
INJECTION INTRAMUSCULAR; INTRAVENOUS
Status: DISCONTINUED | OUTPATIENT
Start: 2022-09-21 | End: 2022-09-21 | Stop reason: HOSPADM

## 2022-09-21 NOTE — OP NOTE
Therapeutic Lumbar Medial Branch Radiofrequency Ablation under Fluoroscopy     The procedure, risks, benefits, and options were discussed with the patient. There are no contraindications to the procedure. The patent expressed understanding and agreed to the procedure. Informed written consent was obtained prior to the start of the procedure and can be found in the patient's chart.        PATIENT NAME: Jefferson Boogie   MRN: [id number]     DATE OF PROCEDURE: 09/21/2022     PROCEDURE:  Right L3, L4, and L5 Lumbar Radiofrequency Ablation under Fluoroscopy    PRE-OP DIAGNOSIS: Other spondylosis, lumbar region [M47.896] Lumbar spondylosis [M47.816]    POST-OP DIAGNOSIS: Same    PHYSICIAN: Samule Jimenez MD      MEDICATIONS INJECTED:  Preservative-free Decadron 10mg with 9cc of Bupivicaine 0.25%    LOCAL ANESTHETIC INJECTED:   Xylocaine 2%    SEDATION: Versed 2mg and Fentanyl 100mcg                                                                                                                                                                                     Conscious sedation ordered by M.D. Patient re-evaluation prior to administration of conscious sedation. No changes noted in patient's status from initial evaluation. The patient's vital signs were monitored by RN and patient remained hemodynamically stable throughout the procedure.    Event Time In   Sedation Start 1016   Sedation End 1023       ESTIMATED BLOOD LOSS:  None    COMPLICATIONS:  None     INTERVAL HISTORY: Patient has clinical and imaging findings suggestive of facet mediated pain. Patients has completed 2 previous diagnostic medial branch blocks at specified levels with at least 80% relief for the expected duration of the local anesthetic utilized.    TECHNIQUE: Time-out was performed to identify the patient and procedure to be performed. With the patient laying in a prone position, the surgical area was prepped and draped in the usual sterile fashion  using ChloraPrep and fenestrated drape. The levels were determined under fluoroscopic guidance. Skin anesthesia was achieved by injecting Lidocaine 2% over the injection sites. A 18 gauge 10mm curved active tip needle was introduced to the anatomic local of the medial branch at each of the above levels using AP, lateral and/or contralateral oblique fluoroscopic imaging. Then sensory and motor testing was performed to confirm that the needle tips were in the correct location. After negative aspiration for blood or CSF was confirmed, 1 mL of the lidocaine 2% listed above was injected slowly at each site. This was followed by thermal lesioning at 80 degrees celsius for 90 seconds. That was followed by slowly injecting 1 mL of the medication mixture listed above at each site. The needles were removed and bleeding was nil. A sterile dressing was applied. No specimens collected. The patient tolerated the procedure well and did not have any procedure related motor deficit at the conclusion of the procedure.    The patient was monitored after the procedure in the recovery area. They were given post-procedure and discharge instructions to follow at home. The patient was discharged in a stable condition.    I reviewed and edited the fellow's note. I conducted my own interview and physical examination. I agree with the findings. I was present and supervising all critical portions of the procedure.    Samuel Jimenez MD

## 2022-09-21 NOTE — DISCHARGE INSTRUCTIONS

## 2022-09-21 NOTE — DISCHARGE SUMMARY
Discharge Note  Short Stay      SUMMARY     Admit Date: 9/21/2022    Attending Physician: Samuel Jimenez      Discharge Physician: Samuel Jimenez      Discharge Date: 9/21/2022 12:18 PM    Procedure(s) (LRB):  RADIOFREQUENCY ABLATION, RIGHT L3-L4-L5 PER DR JIMENEZ (Right)    Final Diagnosis: Other spondylosis, lumbar region [M47.896]    Disposition: Home or self care    Patient Instructions:   Discharge Medication List as of 9/21/2022 10:37 AM        CONTINUE these medications which have NOT CHANGED    Details   alendronate (FOSAMAX) 70 MG tablet 70 mg every 7 days. , Starting Mon 1/11/2021, Historical Med      ALPRAZolam (XANAX) 1 MG tablet Take 2 mg by mouth., Historical Med      amoxicillin (AMOXIL) 500 MG Tab Starting Tue 6/22/2021, Historical Med      ascorbic acid, vitamin C, (VITAMIN C) 1000 MG tablet Take 1,000 mg by mouth., Historical Med      budesonide-formoterol 80-4.5 mcg (SYMBICORT) 80-4.5 mcg/actuation HFAA Inhale 2 puffs into the lungs., Historical Med      celecoxib (CELEBREX) 200 MG capsule Starting Thu 8/26/2021, Historical Med      cephALEXin (KEFLEX) 500 MG capsule Starting Mon 3/15/2021, Historical Med      ciclopirox (LOPROX) 0.77 % Crea Starting Thu 5/27/2021, Historical Med      clindamycin (CLEOCIN) 300 MG capsule Starting Mon 2/7/2022, Historical Med      ergocalciferol (ERGOCALCIFEROL) 50,000 unit Cap Starting Wed 2/9/2022, Historical Med      flurazepam (DALMANE) 15 MG Cap Take 30 mg by mouth daily as needed., Historical Med      fluticasone propionate (FLONASE) 50 mcg/actuation nasal spray daily as needed., Starting Wed 6/13/2018, Historical Med      guaiFENesin-codeine 100-10 mg/5 ml (TUSSI-ORGANIDIN NR)  mg/5 mL syrup Starting Mon 12/20/2021, Historical Med      irbesartan (AVAPRO) 75 MG tablet 150 mg once daily. , Starting Sat 12/14/2019, Historical Med      levothyroxine (SYNTHROID) 100 MCG tablet Take 100 mcg by mouth., Historical Med      mirabegron (MYRBETRIQ ORAL)  Take by mouth., Historical Med      mupirocin (BACTROBAN) 2 % ointment Starting Mon 2/7/2022, Historical Med      neomycin-polymyxin-hydrocortisone (CORTISPORIN) otic solution Starting Tue 8/24/2021, Historical Med      prednisolon/gatiflox/bromfenac (PREDNISOL ACE-GATIFLOX-BROMFEN) 1-0.5-0.075 % DrpS Apply 1 drop to eye 3 (three) times daily., Starting Fri 2/25/2022, Normal      simvastatin (ZOCOR) 20 MG tablet Take 20 mg by mouth every evening., Historical Med      sulfamethoxazole-trimethoprim 800-160mg (BACTRIM DS) 800-160 mg Tab Starting Sat 2/5/2022, Historical Med      tadalafil (CIALIS) 20 MG Tab Starting Sun 1/12/2020, Historical Med      temazepam (RESTORIL) 30 mg capsule TK 1 C PO QD HS, Historical Med      terbinafine HCL (LAMISIL) 250 mg tablet Starting Thu 5/27/2021, Historical Med      tolterodine (DETROL LA) 4 MG 24 hr capsule Starting Thu 7/8/2021, Historical Med                 Discharge Diagnosis: Other spondylosis, lumbar region [M47.896]  Condition on Discharge: Stable with no complications to procedure   Diet on Discharge: Same as before.  Activity: as per instruction sheet.  Discharge to: Home with a responsible adult.  Follow up: 2-4 weeks       Please call my office or pager at [phone] if experienced any weakness or loss of sensation, fever > 101.5, pain uncontrolled with oral medications, persistent nausea/vomiting/or diarrhea, redness or drainage from the incisions, or any other worrisome concerns. If physician on call was not reached or could not communicate with our office for any reason please go to the nearest emergency department        Samuel Jimenez MD

## 2022-09-22 ENCOUNTER — PATIENT MESSAGE (OUTPATIENT)
Dept: PAIN MEDICINE | Facility: CLINIC | Age: 87
End: 2022-09-22
Payer: MEDICARE

## 2022-09-22 ENCOUNTER — PATIENT MESSAGE (OUTPATIENT)
Dept: PAIN MEDICINE | Facility: OTHER | Age: 87
End: 2022-09-22
Payer: MEDICARE

## 2022-10-11 ENCOUNTER — PATIENT MESSAGE (OUTPATIENT)
Dept: PAIN MEDICINE | Facility: OTHER | Age: 87
End: 2022-10-11
Payer: MEDICARE

## 2022-10-12 ENCOUNTER — HOSPITAL ENCOUNTER (OUTPATIENT)
Facility: OTHER | Age: 87
Discharge: HOME | End: 2022-10-12
Attending: ANESTHESIOLOGY | Admitting: ANESTHESIOLOGY
Payer: MEDICARE

## 2022-10-12 VITALS
TEMPERATURE: 98 F | HEIGHT: 73 IN | WEIGHT: 195 LBS | BODY MASS INDEX: 25.84 KG/M2 | DIASTOLIC BLOOD PRESSURE: 67 MMHG | HEART RATE: 57 BPM | OXYGEN SATURATION: 97 % | SYSTOLIC BLOOD PRESSURE: 160 MMHG | RESPIRATION RATE: 14 BRPM

## 2022-10-12 DIAGNOSIS — M19.90 OSTEOARTHRITIS, UNSPECIFIED OSTEOARTHRITIS TYPE, UNSPECIFIED SITE: Primary | ICD-10-CM

## 2022-10-12 DIAGNOSIS — G89.29 CHRONIC PAIN: ICD-10-CM

## 2022-10-12 DIAGNOSIS — M47.819 SPONDYLOSIS WITHOUT MYELOPATHY: ICD-10-CM

## 2022-10-12 PROCEDURE — 25000003 PHARM REV CODE 250: Performed by: STUDENT IN AN ORGANIZED HEALTH CARE EDUCATION/TRAINING PROGRAM

## 2022-10-12 PROCEDURE — 64635 DESTROY LUMB/SAC FACET JNT: CPT | Mod: LT,,, | Performed by: ANESTHESIOLOGY

## 2022-10-12 PROCEDURE — 64635 DESTROY LUMB/SAC FACET JNT: CPT | Mod: LT | Performed by: ANESTHESIOLOGY

## 2022-10-12 PROCEDURE — 63600175 PHARM REV CODE 636 W HCPCS: Performed by: ANESTHESIOLOGY

## 2022-10-12 PROCEDURE — 25000003 PHARM REV CODE 250: Performed by: ANESTHESIOLOGY

## 2022-10-12 PROCEDURE — 64636 DESTROY L/S FACET JNT ADDL: CPT | Mod: LT,,, | Performed by: ANESTHESIOLOGY

## 2022-10-12 PROCEDURE — 64636 DESTROY L/S FACET JNT ADDL: CPT | Mod: LT | Performed by: ANESTHESIOLOGY

## 2022-10-12 PROCEDURE — 64636 PR DESTROY L/S FACET JNT ADDL: ICD-10-PCS | Mod: LT,,, | Performed by: ANESTHESIOLOGY

## 2022-10-12 PROCEDURE — 64635 PR DESTROY LUMB/SAC FACET JNT: ICD-10-PCS | Mod: LT,,, | Performed by: ANESTHESIOLOGY

## 2022-10-12 RX ORDER — SODIUM CHLORIDE 9 MG/ML
500 INJECTION, SOLUTION INTRAVENOUS CONTINUOUS
Status: DISCONTINUED | OUTPATIENT
Start: 2022-10-12 | End: 2022-10-12 | Stop reason: HOSPADM

## 2022-10-12 RX ORDER — LIDOCAINE HYDROCHLORIDE 20 MG/ML
INJECTION, SOLUTION INFILTRATION; PERINEURAL
Status: DISCONTINUED | OUTPATIENT
Start: 2022-10-12 | End: 2022-10-12 | Stop reason: HOSPADM

## 2022-10-12 RX ORDER — MIDAZOLAM HYDROCHLORIDE 1 MG/ML
INJECTION INTRAMUSCULAR; INTRAVENOUS
Status: DISCONTINUED | OUTPATIENT
Start: 2022-10-12 | End: 2022-10-12 | Stop reason: HOSPADM

## 2022-10-12 RX ORDER — FENTANYL CITRATE 50 UG/ML
INJECTION, SOLUTION INTRAMUSCULAR; INTRAVENOUS
Status: DISCONTINUED | OUTPATIENT
Start: 2022-10-12 | End: 2022-10-12 | Stop reason: HOSPADM

## 2022-10-12 RX ORDER — DEXAMETHASONE SODIUM PHOSPHATE 10 MG/ML
INJECTION INTRAMUSCULAR; INTRAVENOUS
Status: DISCONTINUED | OUTPATIENT
Start: 2022-10-12 | End: 2022-10-12 | Stop reason: HOSPADM

## 2022-10-12 RX ORDER — BUPIVACAINE HYDROCHLORIDE 2.5 MG/ML
INJECTION, SOLUTION EPIDURAL; INFILTRATION; INTRACAUDAL
Status: DISCONTINUED | OUTPATIENT
Start: 2022-10-12 | End: 2022-10-12 | Stop reason: HOSPADM

## 2022-10-12 NOTE — DISCHARGE SUMMARY
Discharge Note  Short Stay      SUMMARY     Admit Date: 10/12/2022    Attending Physician: Samuel Jimenez    Discharge Physician: Samuel Jimenez      Discharge Date: 10/12/2022 11:21 AM    Procedure(s) (LRB):  RADIOFREQUENCY ABLATION, LEFT L3-L4-L5 TWO OF TWO (Left)    Final Diagnosis: Other spondylosis, lumbar region [M47.896]    Disposition: Home or self care    Patient Instructions:   Discharge Medication List as of 10/12/2022  9:56 AM        CONTINUE these medications which have NOT CHANGED    Details   alendronate (FOSAMAX) 70 MG tablet 70 mg every 7 days. , Starting Mon 1/11/2021, Historical Med      ALPRAZolam (XANAX) 1 MG tablet Take 2 mg by mouth., Historical Med      amoxicillin (AMOXIL) 500 MG Tab Starting Tue 6/22/2021, Historical Med      ascorbic acid, vitamin C, (VITAMIN C) 1000 MG tablet Take 1,000 mg by mouth., Historical Med      budesonide-formoterol 80-4.5 mcg (SYMBICORT) 80-4.5 mcg/actuation HFAA Inhale 2 puffs into the lungs., Historical Med      celecoxib (CELEBREX) 200 MG capsule Starting Thu 8/26/2021, Historical Med      cephALEXin (KEFLEX) 500 MG capsule Starting Mon 3/15/2021, Historical Med      ciclopirox (LOPROX) 0.77 % Crea Starting Thu 5/27/2021, Historical Med      clindamycin (CLEOCIN) 300 MG capsule Starting Mon 2/7/2022, Historical Med      ergocalciferol (ERGOCALCIFEROL) 50,000 unit Cap Starting Wed 2/9/2022, Historical Med      flurazepam (DALMANE) 15 MG Cap Take 30 mg by mouth daily as needed., Historical Med      fluticasone propionate (FLONASE) 50 mcg/actuation nasal spray daily as needed., Starting Wed 6/13/2018, Historical Med      guaiFENesin-codeine 100-10 mg/5 ml (TUSSI-ORGANIDIN NR)  mg/5 mL syrup Starting Mon 12/20/2021, Historical Med      irbesartan (AVAPRO) 75 MG tablet 150 mg once daily. , Starting Sat 12/14/2019, Historical Med      levothyroxine (SYNTHROID) 100 MCG tablet Take 100 mcg by mouth., Historical Med      mirabegron (MYRBETRIQ ORAL) Take by  mouth., Historical Med      mupirocin (BACTROBAN) 2 % ointment Starting Mon 2/7/2022, Historical Med      neomycin-polymyxin-hydrocortisone (CORTISPORIN) otic solution Starting Tue 8/24/2021, Historical Med      prednisolon/gatiflox/bromfenac (PREDNISOL ACE-GATIFLOX-BROMFEN) 1-0.5-0.075 % DrpS Apply 1 drop to eye 3 (three) times daily., Starting Fri 2/25/2022, Normal      simvastatin (ZOCOR) 20 MG tablet Take 20 mg by mouth every evening., Historical Med      sulfamethoxazole-trimethoprim 800-160mg (BACTRIM DS) 800-160 mg Tab Starting Sat 2/5/2022, Historical Med      tadalafil (CIALIS) 20 MG Tab Starting Sun 1/12/2020, Historical Med      temazepam (RESTORIL) 30 mg capsule TK 1 C PO QD HS, Historical Med      terbinafine HCL (LAMISIL) 250 mg tablet Starting Thu 5/27/2021, Historical Med      tolterodine (DETROL LA) 4 MG 24 hr capsule Starting Thu 7/8/2021, Historical Med                 Discharge Diagnosis: Other spondylosis, lumbar region [M47.896]  Condition on Discharge: Stable with no complications to procedure   Diet on Discharge: Same as before.  Activity: as per instruction sheet.  Discharge to: Home with a responsible adult.  Follow up: 2-4 weeks       Please call my office or pager at [phone] if experienced any weakness or loss of sensation, fever > 101.5, pain uncontrolled with oral medications, persistent nausea/vomiting/or diarrhea, redness or drainage from the incisions, or any other worrisome concerns. If physician on call was not reached or could not communicate with our office for any reason please go to the nearest emergency department        Samuel Jimenez

## 2022-10-12 NOTE — OP NOTE
Therapeutic Lumbar Medial Branch Radiofrequency Ablation under Fluoroscopy     The procedure, risks, benefits, and options were discussed with the patient. There are no contraindications to the procedure. The patent expressed understanding and agreed to the procedure. Informed written consent was obtained prior to the start of the procedure and can be found in the patient's chart.        PATIENT NAME: Jefferson Boogie   MRN: [id number]     DATE OF PROCEDURE: 10/12/2022     PROCEDURE:  Left L3, L4, and L5 Lumbar Radiofrequency Ablation under Fluoroscopy    PRE-OP DIAGNOSIS: Other spondylosis, lumbar region [M47.896] Lumbar spondylosis [M47.816]    POST-OP DIAGNOSIS: Same    PHYSICIAN: Samuel Jimenez MD      MEDICATIONS INJECTED:  Preservative-free Decadron 10mg with 9cc of Bupivicaine 0.25%    LOCAL ANESTHETIC INJECTED:   Xylocaine 2%    SEDATION: Versed 2mg and Fentanyl 100mcg                                                                                                                                                                                     Conscious sedation ordered by M.D. Patient re-evaluation prior to administration of conscious sedation. No changes noted in patient's status from initial evaluation. The patient's vital signs were monitored by RN and patient remained hemodynamically stable throughout the procedure.    Event Time In   Sedation Start 0936   Sedation End 0943       ESTIMATED BLOOD LOSS:  None    COMPLICATIONS:  None     INTERVAL HISTORY: Patient has clinical and imaging findings suggestive of facet mediated pain. Patients has completed 2 previous diagnostic medial branch blocks at specified levels with at least 80% relief for the expected duration of the local anesthetic utilized.    TECHNIQUE: Time-out was performed to identify the patient and procedure to be performed. With the patient laying in a prone position, the surgical area was prepped and draped in the usual sterile fashion  using ChloraPrep and fenestrated drape. The levels were determined under fluoroscopic guidance. Skin anesthesia was achieved by injecting Lidocaine 2% over the injection sites. A 18 gauge 10mm curved active tip needle was introduced to the anatomic local of the medial branch at each of the above levels using AP, lateral and/or contralateral oblique fluoroscopic imaging. Then sensory and motor testing was performed to confirm that the needle tips were in the correct location. After negative aspiration for blood or CSF was confirmed, 1 mL of the lidocaine 2% listed above was injected slowly at each site. This was followed by thermal lesioning at 80 degrees celsius for 90 seconds. That was followed by slowly injecting 1 mL of the medication mixture listed above at each site. The needles were removed and bleeding was nil. A sterile dressing was applied. No specimens collected. The patient tolerated the procedure well and did not have any procedure related motor deficit at the conclusion of the procedure.    The patient was monitored after the procedure in the recovery area. They were given post-procedure and discharge instructions to follow at home. The patient was discharged in a stable condition.    I reviewed and edited the fellow's note. I conducted my own interview and physical examination. I agree with the findings. I was present and supervising all critical portions of the procedure.    Samuel Jimneez MD

## 2022-10-12 NOTE — H&P
HPI  Patient presenting for Procedure(s) (LRB):  RADIOFREQUENCY ABLATION, LEFT L3-L4-L5 TWO OF TWO (Left)     Patient on Anti-coagulation No    No health changes since previous encounter    Past Medical History:   Diagnosis Date    Cancer     stage I bladder cancer 50 yrs ago    Hypercholesteremia     Hypertension     Sacroiliitis 7/8/2020    Thyroid disease      Past Surgical History:   Procedure Laterality Date    BLADDER SURGERY      CATARACT EXTRACTION      CATARACT EXTRACTION W/  INTRAOCULAR LENS IMPLANT Right 3/9/2022    Procedure: EXTRACTION, CATARACT, WITH IOL INSERTION;  Surgeon: Bell Cedeno MD;  Location: [facility] OR;  Service: Ophthalmology;  Laterality: Right;    COLONOSCOPY      EPIDURAL STEROID INJECTION N/A 1/12/2022    Procedure: INJECTION, STEROID, EPIDURAL CAUDAL With Catheter needs consent;  Surgeon: Samuel Jimenez MD;  Location: [facility] PAIN MGT;  Service: Pain Management;  Laterality: N/A;    EYE SURGERY      cataracts     FUSION OF SACROILIAC JOINT Right 3/15/2021    Procedure: FUSION, RIGHT SACROILIAC JOINT FUSION;  Surgeon: Samuel Jimenez MD;  Location: [facility] OR;  Service: Pain Management;  Laterality: Right;  C-ARM, PAINTEQ REP     HERNIA REPAIR      INJECTION OF ANESTHETIC AGENT AROUND NERVE Bilateral 6/1/2022    Procedure: BLOCK, NERVE MEDIAL BRANCH L3,4,5 BILATERAL 1st;  Surgeon: Samuel Jimenez MD;  Location: [facility] PAIN MGT;  Service: Pain Management;  Laterality: Bilateral;    INJECTION OF ANESTHETIC AGENT AROUND NERVE Bilateral 8/24/2022    Procedure: Block, Nerve Kenny L3, L4, & L5;  Surgeon: Samuel Jimenez MD;  Location: [facility] PAIN MGT;  Service: Pain Management;  Laterality: Bilateral;    INJECTION OF JOINT Right 7/8/2020    Procedure: INJECTION, JOINT, SACROILIAC (SI);  Surgeon: Samuel Jimenez MD;  Location: [facility] PAIN MGT;  Service: Pain Management;  Laterality: Right;    INJECTION OF JOINT Right 9/9/2020    Procedure: INJECTION, JOINT, SACROILIAC (SI);  Surgeon: Samuel  STEPHANIE Jimenez MD;  Location: [facility] PAIN MGT;  Service: Pain Management;  Laterality: Right;    INJECTION OF JOINT Right 7/21/2021    Procedure: INJECTION, JOINT, HIP RIGHT;  Surgeon: Samuel Jimenez MD;  Location: [facility] PAIN MGT;  Service: Pain Management;  Laterality: Right;  CONSENT NEEDED    INJECTION OF JOINT Right 10/13/2021    Procedure: INJECTION, JOINT, SACROILIAC (SI)  NEED CONSENT pt. requesting sedation;  Surgeon: Samuel Jimenez MD;  Location: [facility] PAIN MGT;  Service: Pain Management;  Laterality: Right;    KNEE SURGERY      RADIOFREQUENCY ABLATION Right 12/9/2020    Procedure: RADIOFREQUENCY ABLATION, L5-S1-S2 LATERAL BRANCH COOLED;  Surgeon: Samuel Jimenez MD;  Location: [facility] PAIN MGT;  Service: Pain Management;  Laterality: Right;    RADIOFREQUENCY ABLATION Right 9/21/2022    Procedure: RADIOFREQUENCY ABLATION, RIGHT L3-L4-L5 PER DR JIMENEZ;  Surgeon: Samuel Jimenez MD;  Location: [facility] PAIN MGT;  Service: Pain Management;  Laterality: Right;    REPLACEMENT OF NERVE STIMULATOR BATTERY N/A 7/27/2020    Procedure: Replacement, SPINAL CORD STIMULATOR BATTERY CHANGE TO hovelstayRO OMNION BATTERY;  Surgeon: Samuel Jimenez MD;  Location: [facility] OR;  Service: Pain Management;  Laterality: N/A;  C-ARM, NEVRO REP    TRIAL OF SPINAL CORD NERVE STIMULATOR N/A 8/5/2019    Procedure: Trial, Neurostimulator, SPINAL CORD STIMULATOR TRIAL;  Surgeon: Samuel Jimenez MD;  Location: [facility] CATH LAB;  Service: Pain Management;  Laterality: N/A;  C-ARM, NEVRO REP     Review of patient's allergies indicates:  No Known Allergies   Current Facility-Administered Medications   Medication    0.9%  NaCl infusion     Facility-Administered Medications Ordered in Other Encounters   Medication    balanced salt irrigation intra-ocular solution 1 drop    sodium chloride 0.9% flush 2 mL       PMHx, PSHx, Allergies, Medications reviewed in epic    ROS negative except pain complaints in HPI    OBJECTIVE:    BP (!) 155/68 (BP  "Location: Right arm, Patient Position: Lying)   Pulse (!) 59   Temp 97.7 °F (36.5 °C) (Oral)   Resp 16   Ht 6' 1" (1.854 m)   Wt 88.5 kg (195 lb)   SpO2 98%   BMI 25.73 kg/m²     PHYSICAL EXAMINATION:    GENERAL: Well appearing, in no acute distress, alert and oriented x3.  PSYCH:  Mood and affect appropriate.  SKIN: Skin color, texture, turgor normal, no rashes or lesions which will impact the procedure.  CV: RRR with palpation of the radial artery.  PULM: No evidence of respiratory difficulty, symmetric chest rise. Clear to auscultation.  NEURO: Cranial nerves grossly intact.    Plan:    Proceed with procedure as planned Procedure(s) (LRB):  RADIOFREQUENCY ABLATION, LEFT L3-L4-L5 TWO OF TWO (Left)    Sofya Collins  10/12/2022            "

## 2022-10-13 ENCOUNTER — PATIENT MESSAGE (OUTPATIENT)
Dept: PAIN MEDICINE | Facility: OTHER | Age: 87
End: 2022-10-13
Payer: MEDICARE

## 2022-10-20 ENCOUNTER — TELEPHONE (OUTPATIENT)
Dept: PAIN MEDICINE | Facility: CLINIC | Age: 87
End: 2022-10-20
Payer: MEDICARE

## 2022-10-20 NOTE — TELEPHONE ENCOUNTER
Staff called patient to cancel appointment on 11/10/2022 at 9:30 am with Dr. Jimenez due to the provider being out. Staff offer patient 11/21/2022 at 2:45 pm in the back and spine clinic

## 2022-11-18 ENCOUNTER — TELEPHONE (OUTPATIENT)
Dept: SPINE | Facility: CLINIC | Age: 87
End: 2022-11-18
Payer: MEDICARE

## 2022-11-18 NOTE — TELEPHONE ENCOUNTER
This message is for patient in regards to his/her appointment 11/21/22 at 2:45p   With Samuel Chauhan.      For the safety of all patients and staff members. We are requesting during this visit that all patients and visitors to wear required face mask at all times here at [facility].     If you have any questions or concerns please contact [phone]       Staff left voicemail

## 2022-11-21 ENCOUNTER — OFFICE VISIT (OUTPATIENT)
Dept: SPINE | Facility: CLINIC | Age: 87
End: 2022-11-21
Attending: ANESTHESIOLOGY
Payer: MEDICARE

## 2022-11-21 VITALS
TEMPERATURE: 99 F | SYSTOLIC BLOOD PRESSURE: 150 MMHG | RESPIRATION RATE: 18 BRPM | HEART RATE: 60 BPM | WEIGHT: 194 LBS | HEIGHT: 73 IN | OXYGEN SATURATION: 100 % | BODY MASS INDEX: 25.71 KG/M2 | DIASTOLIC BLOOD PRESSURE: 86 MMHG

## 2022-11-21 DIAGNOSIS — G89.4 CHRONIC PAIN SYNDROME: ICD-10-CM

## 2022-11-21 DIAGNOSIS — Z96.89 SPINAL CORD STIMULATOR STATUS: ICD-10-CM

## 2022-11-21 DIAGNOSIS — G03.9 ARACHNOIDITIS: Primary | ICD-10-CM

## 2022-11-21 PROCEDURE — 99213 OFFICE O/P EST LOW 20 MIN: CPT | Mod: PBBFAC | Performed by: ANESTHESIOLOGY

## 2022-11-21 PROCEDURE — 99999 PR PBB SHADOW E&M-EST. PATIENT-LVL III: ICD-10-PCS | Mod: PBBFAC,,, | Performed by: ANESTHESIOLOGY

## 2022-11-21 PROCEDURE — 99214 OFFICE O/P EST MOD 30 MIN: CPT | Mod: S$PBB,GC,, | Performed by: ANESTHESIOLOGY

## 2022-11-21 PROCEDURE — 99999 PR PBB SHADOW E&M-EST. PATIENT-LVL III: CPT | Mod: PBBFAC,,, | Performed by: ANESTHESIOLOGY

## 2022-11-21 PROCEDURE — 99214 PR OFFICE/OUTPT VISIT, EST, LEVL IV, 30-39 MIN: ICD-10-PCS | Mod: S$PBB,GC,, | Performed by: ANESTHESIOLOGY

## 2022-11-21 NOTE — PROGRESS NOTES
Chronic patient Established Note (Follow up visit)      Interval History 11/21/22:  Reports 24 hours of 100% relief for 48 hours, but he reports that now his pain is only approximally 10% better. His pain at rest is 3-4/10. Pain at its worst is 8-9/10. Pain is improved when leaning over a grocery cart. Pain only allows him to walk for 3-4mins.     Interval History 8/29/22:  Jefferson Boogie presents to the clinic for a follow-up appointment for back pain.  He had his second bilateral L3, L4, L5 MBB and reports 100% pain relief. He would like to proceed with RFA.     Interval History 5/26/22:  Jefferson Boogie presents to the clinic for a follow-up appointment for back pain. Since the last visit, Jefferson Boogie states the pain has been stable. Certain postures he can tolerate in the standing position such as leaning on a shopping cart or support to his posterior thighs. He now uses a walker when covering long distances. He continues to play golf despite it aggravating his pain. He receives some benefit from the SCS whereas other interventions have not helped. He remains interested in the Todd device.     Interval History 3/24/22:  Jefferson Boogie presents to the clinic for a follow-up appointment for back pain. Since the last visit, Jefferson Boogie states the pain has been worse than usual. Current pain intensity is 8/10. He continues to report benefit with [employer] SCS however he has not been able to get in touch with the representative in order to have his settings adjusted. He continues to take occasional norco 7.5 mg with benefit, particularly when he plays golf.     Interval History 2/10/22:  Patent presents today s/p caudal epidural steroid injections on 1/12/22 since then he has not had any relief. He states his pain has been unchanged and is a 6/10 on average with the worst being 8/10 and best is 4/10. He has been taking percocet 5mg when he golfs or plays with his granddaughter.      Interval History  "12/30/21:  Jefferson Boogie presents to clinic for follow up appointment s/p Right SI joint and Right piriformis muscle injection under US guidance on 11/29/21. He did not obtain any noticeable relief with this procedure similar to all of his previous injections. His pain is unchanged and constant over the right buttock. He is taking percocet 5mg x 3 on days he is more active, such as playing golf. This helps some, but minimally. Denies any new symptoms or neurological changes. No numbness, tingling, weakness in his lower extremities. Denies bowel/bladder incontinence or saddle anesthesia.      Interval History 11/4/2021:  Jefferson Boogie presents to the clinic for a follow-up appointment for right sided back pain and SI joint pain. Mr. Boogie had a right SI joint injection on 10/13/2021. He did not note significant relief with the injection for any period of time. Pain is still constant over the right buttock and most noticeable when playing golf. He denies any new bowel/bladder incontinence, lower extremity numbness or weakness or saddle anesthesia.     Interval History 8/26/2021:  Jefferson Boogie presents to the clinic for a follow-up appointment for right sided hip pain with right-sided radiculopathy . Since the last visit, Jefferson Boogie states the pain has been "particularly tender today" 7/10. Patient reports playing golf recently and experienced worsening symptoms the following day. Mr. Boogie is s/p right-side hip joint injection with minimal relief. Patient states he has had relief with previous interventions and is open to RFA.     Interval History 6/10/2021:  Jefferson Boogie presents to the clinic for a follow-up appointment. Since the last visit, Jefferson Boogie states the pain has been stable. Current pain intensity is 3/10. States he is still having pain around SIJ that is affecting his ADL      Interval History 4/15/2021:  The patient returns to clinic today for follow up of back " pain. He reports that the swelling above the SI fusion incision has resolved. He denies any fever, chills, or drainage from the incision. He does report benefit since the fusion. He continues to report benefit with [employer] SCS. He reports intermittent shoulder pain at this time. He did receive an injection with Sports Medicine with benefit. He denies any other health changes. His pain today is 2/10.     Interval History 3/25/21:  Mr. Boogie presents to clinic for follow up of bilateral shoulder pain. He is s/p BL subacromial injections on 2/8/21. He reports maximum 20% relief of pain in the Right shoulder. Today the Left shoulder pain is 1/10; Right shoulder pain is 5-7/10.      Interval History 3/22/2021:  The patient returns to clinic today for follow up of back pain. He is s/p right SI fusion on 3/15/2021. He reports some relief at this time. He does report soreness to the incision. He denies any fever, chills, or drainage from incision. He continues to report benefit with Nevro SCS. He denies any other health changes. His pain today is 4/10.     Interval History 1/11/2020:  Patient is here for follow-up of low back pain now s/p sacroiliac RFA on 12/9/20 which provided no benefit and with the new complaint of bilateral anterior shoulder pain R>L. His shoulder pain started about 2 months ago. He describes 9/10 sharp/stinging pain without radiation that is exacerbated with overhead activity and improved with rest. He started PT about one month ago. He takes oxycodone which provides some relief. He had a blind subacromial steroid injection in the right shoulder with Dr. Ramirez on 10/26/19 which provided some short term relief.     Interval History 11/5/2020:  Jefferson Boogie presents to the clinic for a follow-up appointment for low back pain. Since the last visit, Jefferson Boogie states the pain has been stable. Current pain intensity is 4/10. He had good relief from the SI joint injection that lasted for  about a week. Pain has since returned. He also slipped and fell on his buttock a couple of weeks ago and had increased pain in the right buttock after that which is slowly improving. He continues to have variable benefit with the [employer] SCS for intermittent pain in the right leg. He is scheduled to meet with representatives today. He is taking celebrex daily and percocet as needed sparingly. He denies any adverse effects and any other health changes.     Interval History 10/5/2020:  The patient returns to clinic today for follow up of low back pain. He is s/p right SI joint injection on 9/9/2020. He reports 25% relief of his right sided low back and buttock pain. He did have good relief the first two days. He continues to report right sided low back and buttock pain. He denies any radicular leg pain. His pain is worse with prolonged standing and walking. He continues to report intermittent pain into his achilles from previous injury. He feels as though this has changed his gait. He continues to report some benefit with Nevro SCS device. He is in contact with representatives. He is currently taking Percocet as needed sparingly with some benefit. He denies any adverse effects. He denies any other health changes. His pain today is 4/10.      Interval History 9/2/2020:  The patient returns to clinic today for follow up of back pain. He reports that his back pain has improved since last audio visit. He continues to report back pain with intermittent radiating pain into his right hip and calf. He has spoken with Bienvenido from [employer] via phone with some programming. He is meeting with Bienvenido today. He had some issues with charging but this has improved. He is currently taking Norco intermittently but this is only lasts 2-3 hours. He denies any adverse effects. He denies any other health changes. His pain today is 8/10.     Interval History 08/27/2020:  Pt was contacted over [employer] audio for a follow up appointment.  He is  currently complaining of right hip and right calf with no associated numbness or weakness.  He continues to use his [employer] device.  He states it has been very frustrating. Inconsistent.  Took 20 mins to 1 hour to charge.  Couldn't get it to start this morning.  He states that there is no drainage at the battery site, no signs of infection or pain at the site.  Patient is not taking medications at this time.  He wants to speak about medication options because he would like to start playing golf again.     Interval History 8/17/2020:  The patient returns to clinic today for post op wound check. He continues to report benefit with Nevro device. He denies any fever, chills, or drainage. He continues to follow his activity restrictions. He does report a recent achilles tendon injury while swimming. He is seeing Orthopedics. He denies any other health changes. His pain today is 4/10.     Interval History 8/3/2020:  The patient returns to clinic today for post op. He is s/p Nevro battery change on 7/27/2020. He denies any fever, chills, or drainage from incision. He has not completed his antibiotics as he missed two days of the medication. He continues to follow his activity restrictions. He reports relief with the device. He is meeting with Bienvenido today for programming. He denies any other health changes. His pain today is 2/10     Interval History 7/22/2020:  Pt presents for audio follow up only s/p Right SI joint injection on 7/8/2020. Pt states he has near resolution of focal pain to buttock. He is pleased with result and pain relief. Pt has been in contact with [employer] and will be having battery swap in 5 days. He has difficulty whether stimulator is beneficial and has even had a holiday from using SCS.  He denies any newer areas pain, denies any neuro changes, meds area adequate to control pain without adverse side effects. Pain is 2/10 today.     Interval HPI 6/15/2020:  Jefferson Boogie presents to the clinic for a  follow-up appointment for 3 week follow up right hip and right thigh pain. Since the last visit, Jefferson Boogie states the pain has been persistant. Current pain intensity is 5/10. Patient has been working with Bienvenido Varghese, to try and get better coverage of a localized pain that he still experiences in his right low back area. He does report improvement in symptoms of numbness/tingling. Today, worse pain is 1/10 in severity and localizes to the right SI joint. Pain is worse with extension of his back. It is worse with golfing. Pain relieved by Norco--he takes 1-2 tablets of 5-325 Norco on days that he plays golf. Pain is also improved with being still and not being active. Patient endorses a much more active lifestyle with Norco. Denies new weakness/numbness or bowel/bladder changes.     Previous injection history includes a series of 3 lumbar CHOCO at [facility].      Interval HPI 3/5/20:  Patient presents to the clinic for a follow-up appointment for low back pain. Since the last visit, he states his pain has been unchanged. Current pain intensity is 4/10. He continues to work with Leonila to adjust settings on his SCS. He has stopped using tramadol, and uses norco sparingly. He continues to work with a  for physical therapy.      Interval HPI 1/9/2020:  Patient returns for follow up s/p Nevro SCS. He states he is unsure if it has helped his pain since he had it implanted. He last had an adjustment of his programming about 1 month ago. He does note that once he is done charging his SCS his pain is improved. Pain still worse with prolonged standing and activity such as golf. He still is doing home exercise program. He says that he is trying to do more since getting the SCS. He is still taking the Tramadol with limited pain relief. He takes Tramadol 50 mg twice daily only on days of activity which is once a week. No other health changes.      HPI 11/20/19  Jefferson Boogie returns to clinic  today for follow up. He reports increased low back and leg pain over the last few days. He has been in contact with [employer] representatives for programming adjustments. He does report benefit with the device. He reports good days and bad days. His pain is worse with prolonged standing and activity. He continues to participate in a home exercise routine. He does take Tramadol sparingly but reports limited relief. He denies any other health changes. His pain today is 5/10.    Pain Disability Index Review:  Last 3 PDI Scores 2022   Pain Disability Index (PDI) 21 25 32     Pain Medications:  Celebrex   Xanax  See med list     Opioid Contract: not applicable   report:  Reviewed and consistent with medication use as prescribed.     Pain Procedures:   2020- Right SI joint injection  2020- Nevro SCS battery change  2020- Right SIJ injection >80% relief   19 SCS implant  19 SCS trial  2020- Right SI joint injection  2020- SCS battery revision  2020- Right SI joint injection   2020- Right L5-S1 RFA  3/15/2021- Right SI fusion  2021 - Injection R Hip - minimal relief  10/13/2021 - Right SI joint injection  2021 - R SI joing and R piriformis muscle injection - no relief   2022- Caudal CHOCO- no relief   22- Right L3, L4, L5 RFA  10/12/22 -  Left L3, L4, L5 RFA    Physical Therapy/Home Exercise: no     Imagin/10/2021 - X ray Hips Bilateral: No radiographic evidence of acute osseous abnormality of the pelvis and hips and without radiographic evidence of osteonecrosis of the femoral heads.     21 MRI L-spine:  FINDINGS:  Lumbar sagittal alignment is slightly is straightened.  There is slight convex right curvature lumbar spine.  There is degenerative disc disease with intervertebral disc height loss and endplate degenerative change at all levels with scattered disc desiccation allowing for degenerative change the lumbar vertebral body  heights and contours are within normal is without evidence for acute fracture or subluxation.  There is heterogeneous T1/T2 signal focus within the right aspect of the S1 vertebra most compatible with hemangioma this measures 2.0 cm.     The distal spinal cord and conus is normal in signal and contour tip of the conus approximates the T12/L1 level.  Please note there is artifact from indwelling spinal stimulator device with leads partially visualized casting artifact entering the dorsal epidural space at the T12 level and extending cranially.     There is abnormal clumping configuration of the filum terminalis a from T12 through L5 with slight thickening of scattered nerve roots most compatible with arachnoiditis.     No aneurysmal dilatation of the visualized abdominal aorta.     T12/L1 through L1/L2: No significant disc bulge, central canal or neural foraminal stenosis.     L2/L3: Posterior disc osteophyte with facet joint arthropathy without significant central canal stenosis with mild bilateral neural foraminal stenosis.     L3/L4:Bulging disc with ligamentum flavum hypertrophy without significant central canal stenosis with mild bilateral neural foraminal stenosis.     L4/L5:Posterior disc osteophyte with ligamentum flavum hypertrophy and facet joint arthropathy without significant central canal stenosis and mild bilateral neural foraminal stenosis.     L5/S1: Posterior disc osteophyte with facet joint arthropathy without significant central canal stenosis with moderate right and mild left neural foraminal stenosis.     This report was flagged in Epic as abnormal.     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  Please note there is spinal stimulator device with leads partially visualized and distorting the study by artifacts.     There is abnormal thickening of the filum configuration most compatible with arachnoiditis.     Please see above for additional details.            Lumbar spine MRI with contrast 2019    FINDINGS: There are 5 lumbar type vertebral bodies lumbar vertebral body height and alignment are maintained. The signal from the lumbar vertebra demonstrates an admixture of red and yellow marrow. There are some Modic type degenerative signal changes involving the anterior inferior aspect of L5. There is slight anterior disc bulging at the L5-S1 level. All of the lumbar discs are desiccated with severe narrowing of the L2-L3 disc space, moderate narrowing of the L1-2 disc space posteriorly, moderate narrowing of the L5-S1 disc space and mild narrowing of the L3-4 and L4-5 disc spaces.    L5-S1 level: There is bilateral facet joint arthropathy with a slight amount of fluid in both facet joints. There is bilateral, right greater left, foraminal narrowing but no central canal stenosis. There is a mild broad-based posterior protrusion of the L5-S1 disc that extends towards the left-sided foramen and into and possibly through the right-sided foramen. This is similar to what was seen on the prior exam. Incidental note is made of a mild amount of epidural fat that is deposited along the right anterior and left anterior aspect of thecal sac at the L5-S1 disc space level.    L4-L5 level: There is bilateral, left greater than right, facet joint arthropathy without central canal stenosis. There is no bony foraminal narrowing. There is a slight to mild broad-based posterior protrusion of the disc that extends to the medial foramen bilaterally.    L3-L4 level: There is bilateral facet joint arthropathy with slight ligament flavum redundancy but no bony foraminal narrowing or bony central canal stenosis. There is a slight to mild broad-based posterior protrusion of the disc that extends towards the right-sided foramen and into the anteromedial left-sided foramen.    L2-L3 level: There is bilateral facet joint arthropathy without bony central canal stenosis or bony foraminal narrowing.  There is a slight to mild bilobed posterior protrusion of the disc at the level of the anteromedial foramen bilaterally and this is best seen on the axial images.    L1-L2 level: There is bilateral facet arthropathy without bony foraminal narrowing or bony central canal stenosis and the posterior disc margin is unremarkable.    The tip of the conus medullaris extends down to the level of the superior endplate of L1 and the signal from the visualized conus is unremarkable. There is clumping of the cauda equina nerve rootlets throughout the distal thecal sac consistent with arachnoiditis.  Following contrast administration, there is no abnormal enhancement of any of the bony or soft tissue elements of the lumbar spine except for possibly one or 2 facet joints.    Impression:   1. No abnormal enhancement of any bony or soft tissue elements of the lumbar spine except for possibly one or 2 facet joints. Clumping of the nerve rootlets of the cauda equina consistent with arachnoiditis.  3. Desiccation of all of the lumbar disks with multilevel disc space narrowing, multilevel facet joint arthropathy, but no central canal stenosis.  4. Modic type degenerative signal changes in the anterior inferior aspect of L5.  5. There is a mild amount of epidural fat deposit along the right anterior and left anterior aspect of the thecal sac at the L5-S1 level..  6. Other findings as discussed above.    MRI WO Lumbar spine:  1. Since the prior study, there has been interval development of arachnoiditis including multifocal multisegmental clumping of the nerve rootlets of the cauda equina.  2. All of the lumbar discs are desiccated with multilevel disc space narrowing and desiccation. There is multilevel facet joint arthropathy and foraminal narrowing as detailed above. There is no central canal stenosis.  3. There are posteriorly protruding discs at every level in the lumbar spine except L1-L2 and, for the most part, they appear  unchanged from the prior study of 3/26/2018.    Thoracic spine MRI:  FINDINGS: Thoracic vertebral body height and alignment are maintained with a few small scattered Schmorl's nodes involving the endplates of several mid to lower thoracic vertebra. The T3-4 vertebra are partially fused. There is some degree of desiccation of all of the thoracic discs with multilevel disc space narrowing, especially at the T7-T8, T6-T7 and T5-T6 levels. There is no abnormal prevertebral soft tissue thickening. The somewhat heterogeneous appearance to the signal from the thoracic vertebra is presumably secondary to an admixture of red and yellow marrow.    T1-T2 level: There is no bony foraminal narrowing or bony central canal stenosis and the posterior disc margin is unremarkable.    T2-T3 level: There is no bony foraminal narrowing or bony central canal stenosis and the posterior disc margin is unremarkable.    T3-T4 level: There is no bony foraminal narrowing or bony central canal stenosis and the posterior disc margin is unremarkable.    T4-5 level: There is no bony foraminal narrowing or bony central canal stenosis and the posterior disc margin is unremarkable.    T5-T6 level: There is no bony foraminal narrowing or bony central canal stenosis and the posterior disc margin is unremarkable.    T6-T7 level: There is no bony foraminal narrowing or bony central canal stenosis and the posterior disc margin is unremarkable.    T7-T8 level: There is no bony foraminal narrowing or bony central canal stenosis and the posterior disc margin is unremarkable.    T8-T9 level: There is no bony foraminal narrowing or bony central canal stenosis and the posterior disc margin is unremarkable.    T9-T10 level: There is no bony foraminal narrowing or bony central canal stenosis and the posterior disc margin is unremarkable.    T10-T11 level: There is no bony foraminal narrowing or bony central canal stenosis and the posterior disc margin is  unremarkable.    T11-T12 level: There is no bony foraminal narrowing or bony central canal stenosis and the posterior disc margin is unremarkable.    T12-L1 level: The tip the conus medullaris extends down to level of the superior endplate of L1. 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 There is no bony foraminal narrowing or bony central canal stenosis at this level.    On the axial images, the immediate paravertebral soft tissues are unremarkable. A small cyst is seen to involve the upper pole the left kidney.    Following contrast administration, there is no abnormal enhancement of any bony or soft tissue elements of the thoracic spine. There is no abnormal enhancement of the subserosal surface of the thoracic spinal cord. Some foci of mild signal intensity in the CSF dorsal to the spinal cord of some of the sagittal sequences presumably is secondary to CSF pulsation artifact.    On the sagittal  image, there is cervical spondylosis and kyphosis with narrowing of all of the disc spaces in the cervical spine.        Allergies: Review of patient's allergies indicates:  No Known Allergies    Current Medications:   Current Outpatient Medications   Medication Sig Dispense Refill    ALPRAZolam (XANAX) 1 MG tablet Take 2 mg by mouth.      ascorbic acid, vitamin C, (VITAMIN C) 1000 MG tablet Take 1,000 mg by mouth.      celecoxib (CELEBREX) 200 MG capsule       cephALEXin (KEFLEX) 500 MG capsule       fluticasone propionate (FLONASE) 50 mcg/actuation nasal spray daily as needed.      guaiFENesin-codeine 100-10 mg/5 ml (TUSSI-ORGANIDIN NR)  mg/5 mL syrup       irbesartan (AVAPRO) 75 MG tablet 150 mg once daily.       levothyroxine (SYNTHROID) 100 MCG tablet Take 100 mcg by mouth.      mirabegron (MYRBETRIQ ORAL) Take by mouth.      simvastatin (ZOCOR) 20 MG tablet Take 20 mg by mouth every evening.      tadalafil (CIALIS) 20 MG Tab       temazepam (RESTORIL) 30  mg capsule TK 1 C PO QD HS      alendronate (FOSAMAX) 70 MG tablet 70 mg every 7 days.       amoxicillin (AMOXIL) 500 MG Tab       budesonide-formoterol 80-4.5 mcg (SYMBICORT) 80-4.5 mcg/actuation HFAA Inhale 2 puffs into the lungs.      ciclopirox (LOPROX) 0.77 % Crea       clindamycin (CLEOCIN) 300 MG capsule       ergocalciferol (ERGOCALCIFEROL) 50,000 unit Cap       flurazepam (DALMANE) 15 MG Cap Take 30 mg by mouth daily as needed.      mupirocin (BACTROBAN) 2 % ointment       neomycin-polymyxin-hydrocortisone (CORTISPORIN) otic solution       prednisolon/gatiflox/bromfenac (PREDNISOL ACE-GATIFLOX-BROMFEN) 1-0.5-0.075 % DrpS Apply 1 drop to eye 3 (three) times daily. (Patient not taking: Reported on 6/17/2022) 5 mL 3    sulfamethoxazole-trimethoprim 800-160mg (BACTRIM DS) 800-160 mg Tab       terbinafine HCL (LAMISIL) 250 mg tablet       tolterodine (DETROL LA) 4 MG 24 hr capsule        No current facility-administered medications for this visit.     Facility-Administered Medications Ordered in Other Visits   Medication Dose Route Frequency Provider Last Rate Last Admin    balanced salt irrigation intra-ocular solution 1 drop  1 drop Right Eye On Call Procedure Bell Cedeno MD        sodium chloride 0.9% flush 2 mL  2 mL Intravenous PRN Bell Cedeno MD           REVIEW OF SYSTEMS:    GENERAL:  No weight loss, malaise or fevers.  HEENT:  Negative for frequent or significant headaches.  NECK:  Negative for lumps, goiter, pain and significant neck swelling.  RESPIRATORY:  Negative for cough, wheezing or shortness of breath.  CARDIOVASCULAR:  Negative for chest pain, leg swelling or palpitations.  GI:  Negative for abdominal discomfort, blood in stools or black stools or change in bowel habits.  MUSCULOSKELETAL:  See HPI.  SKIN:  Negative for lesions, rash, and itching.  PSYCH:  +ve for sleep disturbance, mood disorder and recent psychosocial stressors.  HEMATOLOGY/LYMPHOLOGY:  Negative for prolonged  bleeding, bruising easily or swollen nodes.  NEURO:   No history of headaches, syncope, paralysis, seizures or tremors.  All other reviewed and negative other than HPI.    Past Medical History:  Past Medical History:   Diagnosis Date    Cancer     stage I bladder cancer 50 yrs ago    Hypercholesteremia     Hypertension     Sacroiliitis 7/8/2020    Thyroid disease        Past Surgical History:  Past Surgical History:   Procedure Laterality Date    BLADDER SURGERY      CATARACT EXTRACTION      CATARACT EXTRACTION W/  INTRAOCULAR LENS IMPLANT Right 3/9/2022    Procedure: EXTRACTION, CATARACT, WITH IOL INSERTION;  Surgeon: Bell Cedeno MD;  Location: [facility] OR;  Service: Ophthalmology;  Laterality: Right;    COLONOSCOPY      EPIDURAL STEROID INJECTION N/A 1/12/2022    Procedure: INJECTION, STEROID, EPIDURAL CAUDAL With Catheter needs consent;  Surgeon: Samuel Jimenez MD;  Location: [facility] PAIN MGT;  Service: Pain Management;  Laterality: N/A;    EYE SURGERY      cataracts     FUSION OF SACROILIAC JOINT Right 3/15/2021    Procedure: FUSION, RIGHT SACROILIAC JOINT FUSION;  Surgeon: Samuel Jimenez MD;  Location: [facility] OR;  Service: Pain Management;  Laterality: Right;  C-ARM, PAINTEQ REP     HERNIA REPAIR      INJECTION OF ANESTHETIC AGENT AROUND NERVE Bilateral 6/1/2022    Procedure: BLOCK, NERVE MEDIAL BRANCH L3,4,5 BILATERAL 1st;  Surgeon: Samuel Jimenez MD;  Location: [facility] PAIN MGT;  Service: Pain Management;  Laterality: Bilateral;    INJECTION OF ANESTHETIC AGENT AROUND NERVE Bilateral 8/24/2022    Procedure: Block, Nerve Kenny L3, L4, & L5;  Surgeon: Samuel Jimenez MD;  Location: [facility] PAIN MGT;  Service: Pain Management;  Laterality: Bilateral;    INJECTION OF JOINT Right 7/8/2020    Procedure: INJECTION, JOINT, SACROILIAC (SI);  Surgeon: Samuel Jimenez MD;  Location: [facility] PAIN MGT;  Service: Pain Management;  Laterality: Right;    INJECTION OF JOINT Right 9/9/2020    Procedure: INJECTION, JOINT,  SACROILIAC (SI);  Surgeon: Samuel Jimenez MD;  Location: [facility] PAIN MGT;  Service: Pain Management;  Laterality: Right;    INJECTION OF JOINT Right 7/21/2021    Procedure: INJECTION, JOINT, HIP RIGHT;  Surgeon: Samuel Jimenez MD;  Location: [facility] PAIN MGT;  Service: Pain Management;  Laterality: Right;  CONSENT NEEDED    INJECTION OF JOINT Right 10/13/2021    Procedure: INJECTION, JOINT, SACROILIAC (SI)  NEED CONSENT pt. requesting sedation;  Surgeon: Samuel Jimenez MD;  Location: [facility] PAIN MGT;  Service: Pain Management;  Laterality: Right;    KNEE SURGERY      RADIOFREQUENCY ABLATION Right 12/9/2020    Procedure: RADIOFREQUENCY ABLATION, L5-S1-S2 LATERAL BRANCH COOLED;  Surgeon: Samuel Jimenez MD;  Location: [facility] PAIN MGT;  Service: Pain Management;  Laterality: Right;    RADIOFREQUENCY ABLATION Right 9/21/2022    Procedure: RADIOFREQUENCY ABLATION, RIGHT L3-L4-L5 PER DR JIMENEZ;  Surgeon: Samuel Jimenez MD;  Location: [facility] PAIN MGT;  Service: Pain Management;  Laterality: Right;    RADIOFREQUENCY ABLATION Left 10/12/2022    Procedure: RADIOFREQUENCY ABLATION, LEFT L3-L4-L5 TWO OF TWO;  Surgeon: Samuel Jimenez MD;  Location: [facility] PAIN MGT;  Service: Pain Management;  Laterality: Left;    REPLACEMENT OF NERVE STIMULATOR BATTERY N/A 7/27/2020    Procedure: Replacement, SPINAL CORD STIMULATOR BATTERY CHANGE TO AtievaION BATTERY;  Surgeon: Samuel Jimenez MD;  Location: [facility] OR;  Service: Pain Management;  Laterality: N/A;  C-ARM, NEVRO REP    TRIAL OF SPINAL CORD NERVE STIMULATOR N/A 8/5/2019    Procedure: Trial, Neurostimulator, SPINAL CORD STIMULATOR TRIAL;  Surgeon: Samuel Jimenez MD;  Location: [facility] CATH LAB;  Service: Pain Management;  Laterality: N/A;  C-ARM, NEVRO REP       Family History:  History reviewed. No pertinent family history.    Social History:  Social History     Socioeconomic History    Marital status:    Tobacco Use    Smoking status: Former     Types:  "Cigarettes     Quit date:      Years since quittin.9    Smokeless tobacco: Never    Tobacco comments:     stopped 40 years ago   Substance and Sexual Activity    Alcohol use: Yes     Alcohol/week: 2.0 standard drinks     Types: 2 Shots of liquor per week     Comment: nightly -scotch    Drug use: Never    Sexual activity: Yes     Partners: Female       OBJECTIVE:    BP (!) 150/86 (BP Location: Right arm, Patient Position: Sitting, BP Method: Medium (Automatic))   Pulse 60   Temp 98.8 °F (37.1 °C) (Oral)   Resp 18   Ht 6' 1" (1.854 m)   Wt 88 kg (194 lb 0.1 oz)   SpO2 100%   BMI 25.60 kg/m²     PHYSICAL EXAMINATION:    General appearance: Well appearing, in no acute distress, alert and oriented x3.  Psych:  Mood and affect appropriate.  Skin: Skin color, texture, turgor normal, no rashes or lesions, in both upper and lower body.  Head/face:  Atraumatic, normocephalic. No palpable lymph nodes  Neck: No pain to palpation over the cervical paraspinous muscles. Spurling Negative. No pain with neck flexion, extension, or lateral flexion. .  Cor: RRR  Pulm: CTA  GI: Abdomen soft and non-tender.  Back: Straight leg raising in the sitting and supine positions is negative to radicular pain. mild pain to palpation over the spine or costovertebral angles. Decreased range of motion without pain reproduction. Positive axial loading test bilateral. Negative FABERE, Ganselin and Yeoman's test on the right side. Negative FADIR.  Extremities: Tenderness to deep palpation of right piriformis. Peripheral joint ROM is full and pain free without obvious instability or laxity in all four extremities. No deformities, edema, or skin discoloration. Good capillary refill.  Musculoskeletal: No pain with WICHO, FADIR or modified Gaenslen's. Bilateral lower extremity strength is normal and symmetric.  No atrophy or tone abnormalities are noted.  Neuro: Bilateral lower extremity coordination and muscle stretch reflexes are " physiologic and symmetric.  No clonus. No loss of sensation is noted.  GAIT: Antalgic with use of assistive device. Utilizes a walker.    ASSESSMENT: 87 y.o. year old male with lower back pain, consistent with      1. Arachnoiditis        2. Chronic pain syndrome        3. Spinal cord stimulator status          PLAN:   - I have stressed the importance of physical activity and a home exercise plan to help with pain and improve health.  - Patient can continue with medications for now since they are providing benefits, using them appropriately, and without side effects.  - Counseled patient regarding the importance of activity modification, constant sleeping habits, and physical therapy.  - We discussed with the patient changing his SCS to the Hubbell device.   - RTC 4 wks to discuss and plan for the SCS Hubbell device change     The above plan and management options were discussed at length with patient. Patient is in agreement with the above and verbalized understanding.    SHELLY MARQUEZ   I have personally reviewed the history and exam of this patient and agree with the resident/fellow/NPs note as stated above.    Samuel Jimenez MD    11/21/2022

## 2023-01-04 DIAGNOSIS — T85.192D FAILURE OF SPINAL CORD STIMULATOR, SUBSEQUENT ENCOUNTER: Primary | ICD-10-CM

## 2023-01-05 ENCOUNTER — PATIENT MESSAGE (OUTPATIENT)
Dept: PAIN MEDICINE | Facility: OTHER | Age: 88
End: 2023-01-05
Payer: MEDICARE

## 2023-01-05 DIAGNOSIS — G89.4 CHRONIC PAIN SYNDROME: Primary | ICD-10-CM

## 2023-01-05 DIAGNOSIS — T85.192D FAILURE OF SPINAL CORD STIMULATOR, SUBSEQUENT ENCOUNTER: ICD-10-CM

## 2023-01-19 ENCOUNTER — TELEPHONE (OUTPATIENT)
Dept: SPINE | Facility: CLINIC | Age: 88
End: 2023-01-19
Payer: MEDICARE

## 2023-01-19 NOTE — TELEPHONE ENCOUNTER
This message is for patient in regards to his/her appointment 1/23/23 at 1:45p   With Samuel Chauhan.      For the safety of all patients and staff members. We are requesting during this visit that all patients and visitors to wear required face mask at all times here at [facility].     If you have any questions or concerns please contact [phone]       Staff left voicemail

## 2023-01-23 ENCOUNTER — OFFICE VISIT (OUTPATIENT)
Dept: SPINE | Facility: CLINIC | Age: 88
End: 2023-01-23
Attending: ANESTHESIOLOGY
Payer: MEDICARE

## 2023-01-23 VITALS
TEMPERATURE: 98 F | DIASTOLIC BLOOD PRESSURE: 68 MMHG | SYSTOLIC BLOOD PRESSURE: 163 MMHG | WEIGHT: 194 LBS | RESPIRATION RATE: 100 BRPM | BODY MASS INDEX: 25.6 KG/M2 | HEART RATE: 56 BPM

## 2023-01-23 DIAGNOSIS — Z96.89 SPINAL CORD STIMULATOR STATUS: ICD-10-CM

## 2023-01-23 DIAGNOSIS — M47.816 LUMBAR SPONDYLOSIS: ICD-10-CM

## 2023-01-23 DIAGNOSIS — G89.4 CHRONIC PAIN SYNDROME: ICD-10-CM

## 2023-01-23 DIAGNOSIS — G03.9 ARACHNOIDITIS: ICD-10-CM

## 2023-01-23 DIAGNOSIS — T85.192D FAILURE OF SPINAL CORD STIMULATOR, SUBSEQUENT ENCOUNTER: Primary | ICD-10-CM

## 2023-01-23 PROCEDURE — 99214 OFFICE O/P EST MOD 30 MIN: CPT | Mod: S$PBB,GC,, | Performed by: ANESTHESIOLOGY

## 2023-01-23 PROCEDURE — 99999 PR PBB SHADOW E&M-EST. PATIENT-LVL IV: CPT | Mod: PBBFAC,,, | Performed by: ANESTHESIOLOGY

## 2023-01-23 PROCEDURE — 99999 PR PBB SHADOW E&M-EST. PATIENT-LVL IV: ICD-10-PCS | Mod: PBBFAC,,, | Performed by: ANESTHESIOLOGY

## 2023-01-23 PROCEDURE — 99214 OFFICE O/P EST MOD 30 MIN: CPT | Mod: PBBFAC | Performed by: ANESTHESIOLOGY

## 2023-01-23 PROCEDURE — 99214 PR OFFICE/OUTPT VISIT, EST, LEVL IV, 30-39 MIN: ICD-10-PCS | Mod: S$PBB,GC,, | Performed by: ANESTHESIOLOGY

## 2023-01-23 NOTE — H&P (VIEW-ONLY)
Chronic patient Established Note (Follow up visit)      SUBJECTIVE:    Jefferson Boogie presents to the clinic for a follow-up appointment for low back pain. Back pain is on right side, radiates to the calf: described as burning. Constant and worsens with standing up. Since the last visit, Jefferson Boogie states the pain has been persistant. Current pain intensity is 5/10.    Interval History 1/23/2023:  Still taking Celebrex, however hasn't noticed a difference in pain with this medication. Location, quality, and severity of pain are unchanged from prior visit and is described above. Here today to discuss removal of Nervo SCS, and implant of Pepin SCS. He states his stimulator has not been helpful for the past 2 years despite multiple reprogramming and adjustment.    Interval History 11/21/22:  Reports 24 hours of 100% relief for 48 hours, but he reports that now his pain is only approximally 10% better. His pain at rest is 3-4/10. Pain at its worst is 8-9/10. Pain is improved when leaning over a grocery cart. Pain only allows him to walk for 3-4mins.      Interval History 8/29/22:  Jefferson Boogie presents to the clinic for a follow-up appointment for back pain.  He had his second bilateral L3, L4, L5 MBB and reports 100% pain relief. He would like to proceed with RFA.     Interval History 5/26/22:  Jefferson Boogie presents to the clinic for a follow-up appointment for back pain. Since the last visit, Jefferson Boogie states the pain has been stable. Certain postures he can tolerate in the standing position such as leaning on a shopping cart or support to his posterior thighs. He now uses a walker when covering long distances. He continues to play golf despite it aggravating his pain. He receives some benefit from the SCS whereas other interventions have not helped. He remains interested in the Pepin device.     Interval History 3/24/22:  Jefferson Boogie presents to the clinic for a follow-up  appointment for back pain. Since the last visit, Jefferson Boogie states the pain has been worse than usual. Current pain intensity is 8/10. He continues to report benefit with Nevro SCS however he has not been able to get in touch with the representative in order to have his settings adjusted. He continues to take occasional norco 7.5 mg with benefit, particularly when he plays golf.     Interval History 2/10/22:  Patent presents today s/p caudal epidural steroid injections on 1/12/22 since then he has not had any relief. He states his pain has been unchanged and is a 6/10 on average with the worst being 8/10 and best is 4/10. He has been taking percocet 5mg when he golfs or plays with his granddaughter.      Interval History 12/30/21:  Jefferson Boogie presents to clinic for follow up appointment s/p Right SI joint and Right piriformis muscle injection under US guidance on 11/29/21. He did not obtain any noticeable relief with this procedure similar to all of his previous injections. His pain is unchanged and constant over the right buttock. He is taking percocet 5mg x 3 on days he is more active, such as playing golf. This helps some, but minimally. Denies any new symptoms or neurological changes. No numbness, tingling, weakness in his lower extremities. Denies bowel/bladder incontinence or saddle anesthesia.      Interval History 11/4/2021:  Jefferson Boogie presents to the clinic for a follow-up appointment for right sided back pain and SI joint pain. Mr. Boogie had a right SI joint injection on 10/13/2021. He did not note significant relief with the injection for any period of time. Pain is still constant over the right buttock and most noticeable when playing golf. He denies any new bowel/bladder incontinence, lower extremity numbness or weakness or saddle anesthesia.     Interval History 8/26/2021:  Jefferson Boogie presents to the clinic for a follow-up appointment for right sided hip pain with  "right-sided radiculopathy . Since the last visit, Jefferson Boogie states the pain has been "particularly tender today" 7/10. Patient reports playing golf recently and experienced worsening symptoms the following day. Mr. Boogie is s/p right-side hip joint injection with minimal relief. Patient states he has had relief with previous interventions and is open to RFA.     Interval History 6/10/2021:  Jefferson Boogie presents to the clinic for a follow-up appointment. Since the last visit, Jefferson Boogie states the pain has been stable. Current pain intensity is 3/10. States he is still having pain around SIJ that is affecting his ADL      Interval History 4/15/2021:  The patient returns to clinic today for follow up of back pain. He reports that the swelling above the SI fusion incision has resolved. He denies any fever, chills, or drainage from the incision. He does report benefit since the fusion. He continues to report benefit with Nevro SCS. He reports intermittent shoulder pain at this time. He did receive an injection with Sports Medicine with benefit. He denies any other health changes. His pain today is 2/10.     Interval History 3/25/21:  Mr. Boogie presents to clinic for follow up of bilateral shoulder pain. He is s/p BL subacromial injections on 2/8/21. He reports maximum 20% relief of pain in the Right shoulder. Today the Left shoulder pain is 1/10; Right shoulder pain is 5-7/10.      Interval History 3/22/2021:  The patient returns to clinic today for follow up of back pain. He is s/p right SI fusion on 3/15/2021. He reports some relief at this time. He does report soreness to the incision. He denies any fever, chills, or drainage from incision. He continues to report benefit with Nevro SCS. He denies any other health changes. His pain today is 4/10.     Interval History 1/11/2020:  Patient is here for follow-up of low back pain now s/p sacroiliac RFA on 12/9/20 which provided no benefit and " with the new complaint of bilateral anterior shoulder pain R>L. His shoulder pain started about 2 months ago. He describes 9/10 sharp/stinging pain without radiation that is exacerbated with overhead activity and improved with rest. He started PT about one month ago. He takes oxycodone which provides some relief. He had a blind subacromial steroid injection in the right shoulder with Dr. Ramirez on 10/26/19 which provided some short term relief.     Interval History 11/5/2020:  Jefferson Boogie presents to the clinic for a follow-up appointment for low back pain. Since the last visit, Jefferson Boogie states the pain has been stable. Current pain intensity is 4/10. He had good relief from the SI joint injection that lasted for about a week. Pain has since returned. He also slipped and fell on his buttock a couple of weeks ago and had increased pain in the right buttock after that which is slowly improving. He continues to have variable benefit with the Nevro SCS for intermittent pain in the right leg. He is scheduled to meet with representatives today. He is taking celebrex daily and percocet as needed sparingly. He denies any adverse effects and any other health changes.     Interval History 10/5/2020:  The patient returns to clinic today for follow up of low back pain. He is s/p right SI joint injection on 9/9/2020. He reports 25% relief of his right sided low back and buttock pain. He did have good relief the first two days. He continues to report right sided low back and buttock pain. He denies any radicular leg pain. His pain is worse with prolonged standing and walking. He continues to report intermittent pain into his achilles from previous injury. He feels as though this has changed his gait. He continues to report some benefit with Nevro SCS device. He is in contact with representatives. He is currently taking Percocet as needed sparingly with some benefit. He denies any adverse effects. He denies any  other health changes. His pain today is 4/10.      Interval History 9/2/2020:  The patient returns to clinic today for follow up of back pain. He reports that his back pain has improved since last audio visit. He continues to report back pain with intermittent radiating pain into his right hip and calf. He has spoken with Bienvenido from [employer] via phone with some programming. He is meeting with Bienvenido today. He had some issues with charging but this has improved. He is currently taking Norco intermittently but this is only lasts 2-3 hours. He denies any adverse effects. He denies any other health changes. His pain today is 8/10.     Interval History 08/27/2020:  Pt was contacted over virtual audio for a follow up appointment.  He is currently complaining of right hip and right calf with no associated numbness or weakness.  He continues to use his Nevro device.  He states it has been very frustrating. Inconsistent.  Took 20 mins to 1 hour to charge.  Couldn't get it to start this morning.  He states that there is no drainage at the battery site, no signs of infection or pain at the site.  Patient is not taking medications at this time.  He wants to speak about medication options because he would like to start playing golf again.     Interval History 8/17/2020:  The patient returns to clinic today for post op wound check. He continues to report benefit with Nevro device. He denies any fever, chills, or drainage. He continues to follow his activity restrictions. He does report a recent achilles tendon injury while swimming. He is seeing Orthopedics. He denies any other health changes. His pain today is 4/10.     Interval History 8/3/2020:  The patient returns to clinic today for post op. He is s/p Nevro battery change on 7/27/2020. He denies any fever, chills, or drainage from incision. He has not completed his antibiotics as he missed two days of the medication. He continues to follow his activity restrictions. He reports  relief with the device. He is meeting with Bienvenido today for programming. He denies any other health changes. His pain today is 2/10     Interval History 7/22/2020:  Pt presents for audio follow up only s/p Right SI joint injection on 7/8/2020. Pt states he has near resolution of focal pain to buttock. He is pleased with result and pain relief. Pt has been in contact with [employer] and will be having battery swap in 5 days. He has difficulty whether stimulator is beneficial and has even had a holiday from using SCS.  He denies any newer areas pain, denies any neuro changes, meds area adequate to control pain without adverse side effects. Pain is 2/10 today.     Interval HPI 6/15/2020:  Jefferson HANSON Boogie presents to the clinic for a follow-up appointment for 3 week follow up right hip and right thigh pain. Since the last visit, Jefferson Boogie states the pain has been persistant. Current pain intensity is 5/10. Patient has been working with Bienvenido Varghese, to try and get better coverage of a localized pain that he still experiences in his right low back area. He does report improvement in symptoms of numbness/tingling. Today, worse pain is 1/10 in severity and localizes to the right SI joint. Pain is worse with extension of his back. It is worse with golfing. Pain relieved by Norco--he takes 1-2 tablets of 5-325 Norco on days that he plays golf. Pain is also improved with being still and not being active. Patient endorses a much more active lifestyle with Norco. Denies new weakness/numbness or bowel/bladder changes.     Previous injection history includes a series of 3 lumbar CHOCO at [facility].      Interval HPI 3/5/20:  Patient presents to the clinic for a follow-up appointment for low back pain. Since the last visit, he states his pain has been unchanged. Current pain intensity is 4/10. He continues to work with Leonila to adjust settings on his SCS. He has stopped using tramadol, and uses norco sparingly. He  continues to work with a  for physical therapy.      Interval HPI 1/9/2020:  Patient returns for follow up s/p Nevro SCS. He states he is unsure if it has helped his pain since he had it implanted. He last had an adjustment of his programming about 1 month ago. He does note that once he is done charging his SCS his pain is improved. Pain still worse with prolonged standing and activity such as golf. He still is doing home exercise program. He says that he is trying to do more since getting the SCS. He is still taking the Tramadol with limited pain relief. He takes Tramadol 50 mg twice daily only on days of activity which is once a week. No other health changes.      HPI 11/20/19  Jefferson Boogie returns to clinic today for follow up. He reports increased low back and leg pain over the last few days. He has been in contact with [employer] representatives for programming adjustments. He does report benefit with the device. He reports good days and bad days. His pain is worse with prolonged standing and activity. He continues to participate in a home exercise routine. He does take Tramadol sparingly but reports limited relief. He denies any other health changes. His pain today is 5/10.    Pain Disability Index Review:  Last 3 PDI Scores 1/23/2023 8/29/2022 8/11/2022   Pain Disability Index (PDI) 35 21 25       Pain Medications:  Celebrex     Opioid Contract: not applicable     report:  Reviewed and consistent with medication use as prescribed.    Pain Procedures  9/9/2020- Right SI joint injection  7/27/2020- Nevro SCS battery change  7/8/2020- Right SIJ injection >80% relief   8/26/19 SCS implant  8/5/19 SCS trial  7/8/2020- Right SI joint injection  7/27/2020- SCS battery revision  9/9/2020- Right SI joint injection   12/9/2020- Right L5-S1 RFA  3/15/2021- Right SI fusion  7/21/2021 - Injection R Hip - minimal relief  10/13/2021 - Right SI joint injection  11/29/2021 - R SI joing and R piriformis  muscle injection - no relief   2022- Caudal CHOCO- no relief   22 - Diagnostic Bilateral L3, L4 and L5 Lumbar Medial Branch Block under Fluoroscopy  22- Right L3, L4, L5 RFA  10/12/22 -  Left L3, L4, L5 RFA    Physical Therapy/Home Exercise: no    Imagin/10/2021 - X ray Hips Bilateral: No radiographic evidence of acute osseous abnormality of the pelvis and hips and without radiographic evidence of osteonecrosis of the femoral heads.     21 MRI L-spine:  FINDINGS:  Lumbar sagittal alignment is slightly is straightened.  There is slight convex right curvature lumbar spine.  There is degenerative disc disease with intervertebral disc height loss and endplate degenerative change at all levels with scattered disc desiccation allowing for degenerative change the lumbar vertebral body heights and contours are within normal is without evidence for acute fracture or subluxation.  There is heterogeneous T1/T2 signal focus within the right aspect of the S1 vertebra most compatible with hemangioma this measures 2.0 cm.     The distal spinal cord and conus is normal in signal and contour tip of the conus approximates the T12/L1 level.  Please note there is artifact from indwelling spinal stimulator device with leads partially visualized casting artifact entering the dorsal epidural space at the T12 level and extending cranially.     There is abnormal clumping configuration of the filum terminalis a from T12 through L5 with slight thickening of scattered nerve roots most compatible with arachnoiditis.     No aneurysmal dilatation of the visualized abdominal aorta.     T12/L1 through L1/L2: No significant disc bulge, central canal or neural foraminal stenosis.     L2/L3: Posterior disc osteophyte with facet joint arthropathy without significant central canal stenosis with mild bilateral neural foraminal stenosis.     L3/L4:Bulging disc with ligamentum flavum hypertrophy without significant central canal  stenosis with mild bilateral neural foraminal stenosis.     L4/L5:Posterior disc osteophyte with ligamentum flavum hypertrophy and facet joint arthropathy without significant central canal stenosis and mild bilateral neural foraminal stenosis.     L5/S1: Posterior disc osteophyte with facet joint arthropathy without significant central canal stenosis with moderate right and mild left neural foraminal stenosis.     This report was flagged in Epic as abnormal.     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  Please note there is spinal stimulator device with leads partially visualized and distorting the study by artifacts.     There is abnormal thickening of the filum configuration most compatible with arachnoiditis.     Please see above for additional details.           Lumbar spine MRI with contrast 2019    FINDINGS: There are 5 lumbar type vertebral bodies lumbar vertebral body height and alignment are maintained. The signal from the lumbar vertebra demonstrates an admixture of red and yellow marrow. There are some Modic type degenerative signal changes involving the anterior inferior aspect of L5. There is slight anterior disc bulging at the L5-S1 level. All of the lumbar discs are desiccated with severe narrowing of the L2-L3 disc space, moderate narrowing of the L1-2 disc space posteriorly, moderate narrowing of the L5-S1 disc space and mild narrowing of the L3-4 and L4-5 disc spaces.    L5-S1 level: There is bilateral facet joint arthropathy with a slight amount of fluid in both facet joints. There is bilateral, right greater left, foraminal narrowing but no central canal stenosis. There is a mild broad-based posterior protrusion of the L5-S1 disc that extends towards the left-sided foramen and into and possibly through the right-sided foramen. This is similar to what was seen on the prior exam. Incidental note is made of a mild amount of epidural fat that is deposited  along the right anterior and left anterior aspect of thecal sac at the L5-S1 disc space level.    L4-L5 level: There is bilateral, left greater than right, facet joint arthropathy without central canal stenosis. There is no bony foraminal narrowing. There is a slight to mild broad-based posterior protrusion of the disc that extends to the medial foramen bilaterally.    L3-L4 level: There is bilateral facet joint arthropathy with slight ligament flavum redundancy but no bony foraminal narrowing or bony central canal stenosis. There is a slight to mild broad-based posterior protrusion of the disc that extends towards the right-sided foramen and into the anteromedial left-sided foramen.    L2-L3 level: There is bilateral facet joint arthropathy without bony central canal stenosis or bony foraminal narrowing. There is a slight to mild bilobed posterior protrusion of the disc at the level of the anteromedial foramen bilaterally and this is best seen on the axial images.    L1-L2 level: There is bilateral facet arthropathy without bony foraminal narrowing or bony central canal stenosis and the posterior disc margin is unremarkable.    The tip of the conus medullaris extends down to the level of the superior endplate of L1 and the signal from the visualized conus is unremarkable. There is clumping of the cauda equina nerve rootlets throughout the distal thecal sac consistent with arachnoiditis.  Following contrast administration, there is no abnormal enhancement of any of the bony or soft tissue elements of the lumbar spine except for possibly one or 2 facet joints.    Impression:   1. No abnormal enhancement of any bony or soft tissue elements of the lumbar spine except for possibly one or 2 facet joints. Clumping of the nerve rootlets of the cauda equina consistent with arachnoiditis.  3. Desiccation of all of the lumbar disks with multilevel disc space narrowing, multilevel facet joint arthropathy, but no central canal  stenosis.  4. Modic type degenerative signal changes in the anterior inferior aspect of L5.  5. There is a mild amount of epidural fat deposit along the right anterior and left anterior aspect of the thecal sac at the L5-S1 level..  6. Other findings as discussed above.    MRI WO Lumbar spine:  1. Since the prior study, there has been interval development of arachnoiditis including multifocal multisegmental clumping of the nerve rootlets of the cauda equina.  2. All of the lumbar discs are desiccated with multilevel disc space narrowing and desiccation. There is multilevel facet joint arthropathy and foraminal narrowing as detailed above. There is no central canal stenosis.  3. There are posteriorly protruding discs at every level in the lumbar spine except L1-L2 and, for the most part, they appear unchanged from the prior study of 3/26/2018.    Thoracic spine MRI:  FINDINGS: Thoracic vertebral body height and alignment are maintained with a few small scattered Schmorl's nodes involving the endplates of several mid to lower thoracic vertebra. The T3-4 vertebra are partially fused. There is some degree of desiccation of all of the thoracic discs with multilevel disc space narrowing, especially at the T7-T8, T6-T7 and T5-T6 levels. There is no abnormal prevertebral soft tissue thickening. The somewhat heterogeneous appearance to the signal from the thoracic vertebra is presumably secondary to an admixture of red and yellow marrow.    T1-T2 level: There is no bony foraminal narrowing or bony central canal stenosis and the posterior disc margin is unremarkable.    T2-T3 level: There is no bony foraminal narrowing or bony central canal stenosis and the posterior disc margin is unremarkable.    T3-T4 level: There is no bony foraminal narrowing or bony central canal stenosis and the posterior disc margin is unremarkable.    T4-5 level: There is no bony foraminal narrowing or bony central canal stenosis and the posterior  disc margin is unremarkable.    T5-T6 level: There is no bony foraminal narrowing or bony central canal stenosis and the posterior disc margin is unremarkable.    T6-T7 level: There is no bony foraminal narrowing or bony central canal stenosis and the posterior disc margin is unremarkable.    T7-T8 level: There is no bony foraminal narrowing or bony central canal stenosis and the posterior disc margin is unremarkable.    T8-T9 level: There is no bony foraminal narrowing or bony central canal stenosis and the posterior disc margin is unremarkable.    T9-T10 level: There is no bony foraminal narrowing or bony central canal stenosis and the posterior disc margin is unremarkable.    T10-T11 level: There is no bony foraminal narrowing or bony central canal stenosis and the posterior disc margin is unremarkable.    T11-T12 level: There is no bony foraminal narrowing or bony central canal stenosis and the posterior disc margin is unremarkable.    T12-L1 level: The tip the conus medullaris extends down to level of the superior endplate of L1. 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 There is no bony foraminal narrowing or bony central canal stenosis at this level.    On the axial images, the immediate paravertebral soft tissues are unremarkable. A small cyst is seen to involve the upper pole the left kidney.    Following contrast administration, there is no abnormal enhancement of any bony or soft tissue elements of the thoracic spine. There is no abnormal enhancement of the subserosal surface of the thoracic spinal cord. Some foci of mild signal intensity in the CSF dorsal to the spinal cord of some of the sagittal sequences presumably is secondary to CSF pulsation artifact.    On the sagittal  image, there is cervical spondylosis and kyphosis with narrowing of all of the disc spaces in the cervical spine.    Allergies: Review of patient's allergies indicates:  No  Known Allergies    Current Medications:   Current Outpatient Medications   Medication Sig Dispense Refill    ALPRAZolam (XANAX) 1 MG tablet Take 2 mg by mouth.      ascorbic acid, vitamin C, (VITAMIN C) 1000 MG tablet Take 1,000 mg by mouth.      celecoxib (CELEBREX) 200 MG capsule       cephALEXin (KEFLEX) 500 MG capsule       fluticasone propionate (FLONASE) 50 mcg/actuation nasal spray daily as needed.      guaiFENesin-codeine 100-10 mg/5 ml (TUSSI-ORGANIDIN NR)  mg/5 mL syrup       irbesartan (AVAPRO) 75 MG tablet 150 mg once daily.       levothyroxine (SYNTHROID) 100 MCG tablet Take 100 mcg by mouth.      mirabegron (MYRBETRIQ ORAL) Take by mouth.      simvastatin (ZOCOR) 20 MG tablet Take 20 mg by mouth every evening.      tadalafil (CIALIS) 20 MG Tab       temazepam (RESTORIL) 30 mg capsule TK 1 C PO QD HS      alendronate (FOSAMAX) 70 MG tablet 70 mg every 7 days.       amoxicillin (AMOXIL) 500 MG Tab       budesonide-formoterol 80-4.5 mcg (SYMBICORT) 80-4.5 mcg/actuation HFAA Inhale 2 puffs into the lungs.      ciclopirox (LOPROX) 0.77 % Crea       clindamycin (CLEOCIN) 300 MG capsule       ergocalciferol (ERGOCALCIFEROL) 50,000 unit Cap       flurazepam (DALMANE) 15 MG Cap Take 30 mg by mouth daily as needed.      mupirocin (BACTROBAN) 2 % ointment       neomycin-polymyxin-hydrocortisone (CORTISPORIN) otic solution       prednisolon/gatiflox/bromfenac (PREDNISOL ACE-GATIFLOX-BROMFEN) 1-0.5-0.075 % DrpS Apply 1 drop to eye 3 (three) times daily. (Patient not taking: Reported on 6/17/2022) 5 mL 3    sulfamethoxazole-trimethoprim 800-160mg (BACTRIM DS) 800-160 mg Tab       terbinafine HCL (LAMISIL) 250 mg tablet       tolterodine (DETROL LA) 4 MG 24 hr capsule        No current facility-administered medications for this visit.     Facility-Administered Medications Ordered in Other Visits   Medication Dose Route Frequency Provider Last Rate Last Admin    balanced salt irrigation intra-ocular solution 1  drop  1 drop Right Eye On Call Procedure Bell Cedeno MD        sodium chloride 0.9% flush 2 mL  2 mL Intravenous PRN Bell Cedeno MD           REVIEW OF SYSTEMS:    GENERAL:  No weight loss, malaise or fevers.  HEENT:  Negative for frequent or significant headaches.  NECK:  Negative for lumps, goiter, pain and significant neck swelling.  RESPIRATORY:  Negative for cough, wheezing or shortness of breath.  CARDIOVASCULAR:  Negative for chest pain, leg swelling or palpitations.  GI:  Negative for abdominal discomfort, blood in stools or black stools or change in bowel habits.  MUSCULOSKELETAL:  See HPI.  SKIN:  Negative for lesions, rash, and itching.  PSYCH:  negative for sleep disturbance, mood disorder and recent psychosocial stressors.  HEMATOLOGY/LYMPHOLOGY:  Negative for prolonged bleeding, bruising easily or swollen nodes.  NEURO:   No history of headaches, syncope, paralysis, seizures or tremors.  All other reviewed and negative other than HPI.    Past Medical History:  Past Medical History:   Diagnosis Date    Cancer     stage I bladder cancer 50 yrs ago    Hypercholesteremia     Hypertension     Sacroiliitis 7/8/2020    Thyroid disease        Past Surgical History:  Past Surgical History:   Procedure Laterality Date    BLADDER SURGERY      CATARACT EXTRACTION      CATARACT EXTRACTION W/  INTRAOCULAR LENS IMPLANT Right 3/9/2022    Procedure: EXTRACTION, CATARACT, WITH IOL INSERTION;  Surgeon: Bell Cedeno MD;  Location: [facility];  Service: Ophthalmology;  Laterality: Right;    COLONOSCOPY      EPIDURAL STEROID INJECTION N/A 1/12/2022    Procedure: INJECTION, STEROID, EPIDURAL CAUDAL With Catheter needs consent;  Surgeon: Saumel Jimenez MD;  [facility];  Service: Pain Management;  Laterality: N/A;    EYE SURGERY      cataracts     FUSION OF SACROILIAC JOINT Right 3/15/2021    Procedure: FUSION, RIGHT SACROILIAC JOINT FUSION;  Surgeon: Samuel Jimenez MD;  Location: [facility] OR;   Service: Pain Management;  Laterality: Right;  C-ARM, PAINTEQ REP     HERNIA REPAIR      INJECTION OF ANESTHETIC AGENT AROUND NERVE Bilateral 6/1/2022    Procedure: BLOCK, NERVE MEDIAL BRANCH L3,4,5 BILATERAL 1st;  Surgeon: Samuel Jimenez MD;  Location: [facility] PAIN MGT;  Service: Pain Management;  Laterality: Bilateral;    INJECTION OF ANESTHETIC AGENT AROUND NERVE Bilateral 8/24/2022    Procedure: Block, Nerve Kenny L3, L4, & L5;  Surgeon: Samuel Jimenez MD;  Location: [facility] PAIN [facility]GT;  Service: Pain Management;  Laterality: Bilateral;    INJECTION OF JOINT Right 7/8/2020    Procedure: INJECTION, JOINT, SACROILIAC (SI);  Surgeon: Samuel Jimenez MD;  Location: [facility]IN [facility]GT;  Service: Pain Management;  Laterality: Right;    INJECTION OF JOINT Right 9/9/2020    Procedure: INJECTION, JOINT, SACROILIAC (SI);  Surgeon: Samuel Jimenez MD;  Location: [facility]IN [facility];  Service: Pain Management;  Laterality: Right;    INJECTION OF JOINT Right 7/21/2021    Procedure: INJECTION, JOINT, HIP RIGHT;  Surgeon: Samuel Jimenez MD;  Location: [facility]IN [facility];  Service: Pain Management;  Laterality: Right;  CONSENT NEEDED    INJECTION OF JOINT Right 10/13/2021    Procedure: INJECTION, JOINT, SACROILIAC (SI)  NEED CONSENT pt. requesting sedation;  Surgeon: Samuel Jimenez MD;  Location: [facility]IN [facility]GT;  Service: Pain Management;  Laterality: Right;    KNEE SURGERY      RADIOFREQUENCY ABLATION Right 12/9/2020    Procedure: RADIOFREQUENCY ABLATION, L5-S1-S2 LATERAL BRANCH COOLED;  Surgeon: Samuel Jimenez MD;  Location: [facility]IN [facility]GT;  Service: Pain Management;  Laterality: Right;    RADIOFREQUENCY ABLATION Right 9/21/2022    Procedure: RADIOFREQUENCY ABLATION, RIGHT L3-L4-L5 PER DR JIMENEZ;  Surgeon: Samuel Jimenez MD;  Location: [facility]IN [facility]GT;  Service: Pain Management;  Laterality: Right;    RADIOFREQUENCY ABLATION Left 10/12/2022    Procedure: RADIOFREQUENCY ABLATION, LEFT L3-L4-L5 TWO OF TWO;  Surgeon: Samuel BROWN  MD Barbara;  Location: [facility] PAIN [facility]GT;  Service: Pain Management;  Laterality: Left;    REPLACEMENT OF NERVE STIMULATOR BATTERY N/A 2020    Procedure: Replacement, SPINAL CORD STIMULATOR BATTERY CHANGE TO NEVRO OMNION BATTERY;  Surgeon: Samuel Jimenez MD;  Location: [facility] OR;  Service: Pain Management;  Laterality: N/A;  C-ARM, NEVRO REP    TRIAL OF SPINAL CORD NERVE STIMULATOR N/A 2019    Procedure: Trial, Neurostimulator, SPINAL CORD STIMULATOR TRIAL;  Surgeon: Samuel Jimenez MD;  Location: [facility] CATH LAB;  Service: Pain Management;  Laterality: N/A;  C-ARM, NEVRO REP       Family History:  History reviewed. No pertinent family history.    Social History:  Social History     Socioeconomic History    Marital status:    Tobacco Use    Smoking status: Former     Types: Cigarettes     Quit date:      Years since quittin.0    Smokeless tobacco: Never    Tobacco comments:     stopped 40 years ago   Substance and Sexual Activity    Alcohol use: Yes     Alcohol/week: 2.0 standard drinks     Types: 2 Shots of liquor per week     Comment: nightly -scotch    Drug use: Never    Sexual activity: Yes     Partners: Female       OBJECTIVE:    BP (!) 163/68 (BP Location: Left arm, Patient Position: Sitting, BP Method: Large (Automatic))   Pulse (!) 56   Temp 97.6 °F (36.4 °C) (Oral)   Resp (!) 100   Wt 88 kg (194 lb 0.1 oz)   BMI 25.60 kg/m²     PHYSICAL EXAMINATION:    General appearance: Well appearing, in no acute distress, alert and oriented x3.  Psych:  Mood and affect appropriate.  Skin: Skin color, texture, turgor normal, no rashes or lesions, in both upper and lower body.  Head/face:  Atraumatic, normocephalic. No palpable lymph nodes  Neck: No pain to palpation over the cervical paraspinous muscles. Spurling Negative. No pain with neck flexion, extension, or lateral flexion. .  Cor: RRR  Pulm: CTA  GI: Abdomen soft and non-tender.  Back: Straight leg raising in the sitting and supine  positions is negative to radicular pain. mild pain to palpation over the spine or costovertebral angles. Decreased range of motion without pain reproduction. Positive axial loading test bilateral. Negative FABERE, Ganselin and Yeoman's test on the right side. Negative FADIR.  Extremities: Tenderness to deep palpation of right piriformis. Peripheral joint ROM is full and pain free without obvious instability or laxity in all four extremities. No deformities, edema, or skin discoloration. Good capillary refill.  Musculoskeletal: No pain with WICHO, FADIR or modified Gaenslen's. Bilateral lower extremity strength is normal and symmetric.  No atrophy or tone abnormalities are noted.  Neuro: Bilateral lower extremity coordination and muscle stretch reflexes are physiologic and symmetric.  No clonus. No loss of sensation is noted.  GAIT: Antalgic gait. Not using walker today.    ASSESSMENT: 87 y.o. year old male with low back pain, consistent with the following diagnoses     1. Failure of spinal cord stimulator, subsequent encounter        2. Chronic pain syndrome        3. Lumbar spondylosis        4. Arachnoiditis        5. Spinal cord stimulator status              PLAN:     - Patient can continue with medications for now since they are providing benefits, using them appropriately, and without side effects  - I have stressed the importance of physical activity and a home exercise plan to help with pain and improve health  - Will plan for removal of current SCS and placement of Lee SCS on 2/13/23  - Counseled patient regarding the importance of activity modification, constant sleeping habits, and physical therapy  - Follow up 1 week after Lee SCS placement for staple removal (already scheduled)    The above plan and management options were discussed at length with patient. Patient is in agreement with the above and verbalized understanding.    Gregory Dailey DO  Neurology PGY-1    I have personally reviewed the  history and exam of this patient and agree with the resident/fellow/NPs note as stated above.    Samuel Jimenez MD      01/23/2023

## 2023-01-23 NOTE — PROGRESS NOTES
Chronic patient Established Note (Follow up visit)      SUBJECTIVE:    Jefferson Boogie presents to the clinic for a follow-up appointment for low back pain. Back pain is on right side, radiates to the calf: described as burning. Constant and worsens with standing up. Since the last visit, Jefferson Boogie states the pain has been persistant. Current pain intensity is 5/10.    Interval History 1/23/2023:  Still taking Celebrex, however hasn't noticed a difference in pain with this medication. Location, quality, and severity of pain are unchanged from prior visit and is described above. Here today to discuss removal of Nervo SCS, and implant of Otse[place] SCS. He states his stimulator has not been helpful for the past 2 years despite multiple reprogramming and adjustment.    Interval History 11/21/22:  Reports 24 hours of 100% relief for 48 hours, but he reports that now his pain is only approximally 10% better. His pain at rest is 3-4/10. Pain at its worst is 8-9/10. Pain is improved when leaning over a grocery cart. Pain only allows him to walk for 3-4mins.      Interval History 8/29/22:  Jefferson Boogie presents to the clinic for a follow-up appointment for back pain.  He had his second bilateral L3, L4, L5 MBB and reports 100% pain relief. He would like to proceed with RFA.     Interval History 5/26/22:  Jefferson Boogie presents to the clinic for a follow-up appointment for back pain. Since the last visit, Jefferson Boogie states the pain has been stable. Certain postures he can tolerate in the standing position such as leaning on a shopping cart or support to his posterior thighs. He now uses a walker when covering long distances. He continues to play golf despite it aggravating his pain. He receives some benefit from the SCS whereas other interventions have not helped. He remains interested in the Otsego device.     Interval History 3/24/22:  Jefferson Boogie presents to the clinic for a follow-up  appointment for back pain. Since the last visit, Jefferson Boogie states the pain has been worse than usual. Current pain intensity is 8/10. He continues to report benefit with Nevro SCS however he has not been able to get in touch with the representative in order to have his settings adjusted. He continues to take occasional norco 7.5 mg with benefit, particularly when he plays golf.     Interval History 2/10/22:  Patent presents today s/p caudal epidural steroid injections on 1/12/22 since then he has not had any relief. He states his pain has been unchanged and is a 6/10 on average with the worst being 8/10 and best is 4/10. He has been taking percocet 5mg when he golfs or plays with his granddaughter.      Interval History 12/30/21:  Jefferson Boogie presents to clinic for follow up appointment s/p Right SI joint and Right piriformis muscle injection under US guidance on 11/29/21. He did not obtain any noticeable relief with this procedure similar to all of his previous injections. His pain is unchanged and constant over the right buttock. He is taking percocet 5mg x 3 on days he is more active, such as playing golf. This helps some, but minimally. Denies any new symptoms or neurological changes. No numbness, tingling, weakness in his lower extremities. Denies bowel/bladder incontinence or saddle anesthesia.      Interval History 11/4/2021:  Jefferson Boogie presents to the clinic for a follow-up appointment for right sided back pain and SI joint pain. Mr. Boogie had a right SI joint injection on 10/13/2021. He did not note significant relief with the injection for any period of time. Pain is still constant over the right buttock and most noticeable when playing golf. He denies any new bowel/bladder incontinence, lower extremity numbness or weakness or saddle anesthesia.     Interval History 8/26/2021:  Jefferson Boogie presents to the clinic for a follow-up appointment for right sided hip pain with  "right-sided radiculopathy . Since the last visit, Jefferosn Boogie states the pain has been "particularly tender today" 7/10. Patient reports playing golf recently and experienced worsening symptoms the following day. Mr. Boogie is s/p right-side hip joint injection with minimal relief. Patient states he has had relief with previous interventions and is open to RFA.     Interval History 6/10/2021:  Jefferson Boogie presents to the clinic for a follow-up appointment. Since the last visit, Jefferson Boogie states the pain has been stable. Current pain intensity is 3/10. States he is still having pain around SIJ that is affecting his ADL      Interval History 4/15/2021:  The patient returns to clinic today for follow up of back pain. He reports that the swelling above the SI fusion incision has resolved. He denies any fever, chills, or drainage from the incision. He does report benefit since the fusion. He continues to report benefit with Nevro SCS. He reports intermittent shoulder pain at this time. He did receive an injection with Sports Medicine with benefit. He denies any other health changes. His pain today is 2/10.     Interval History 3/25/21:  Mr. Boogie presents to clinic for follow up of bilateral shoulder pain. He is s/p BL subacromial injections on 2/8/21. He reports maximum 20% relief of pain in the Right shoulder. Today the Left shoulder pain is 1/10; Right shoulder pain is 5-7/10.      Interval History 3/22/2021:  The patient returns to clinic today for follow up of back pain. He is s/p right SI fusion on 3/15/2021. He reports some relief at this time. He does report soreness to the incision. He denies any fever, chills, or drainage from incision. He continues to report benefit with Nevro SCS. He denies any other health changes. His pain today is 4/10.     Interval History 1/11/2020:  Patient is here for follow-up of low back pain now s/p sacroiliac RFA on 12/9/20 which provided no benefit and " with the new complaint of bilateral anterior shoulder pain R>L. His shoulder pain started about 2 months ago. He describes 9/10 sharp/stinging pain without radiation that is exacerbated with overhead activity and improved with rest. He started PT about one month ago. He takes oxycodone which provides some relief. He had a blind subacromial steroid injection in the right shoulder with Dr. Ramirez on 10/26/19 which provided some short term relief.     Interval History 11/5/2020:  Jefferson Boogie presents to the clinic for a follow-up appointment for low back pain. Since the last visit, Jefferson Boogie states the pain has been stable. Current pain intensity is 4/10. He had good relief from the SI joint injection that lasted for about a week. Pain has since returned. He also slipped and fell on his buttock a couple of weeks ago and had increased pain in the right buttock after that which is slowly improving. He continues to have variable benefit with the Nevro SCS for intermittent pain in the right leg. He is scheduled to meet with representatives today. He is taking celebrex daily and percocet as needed sparingly. He denies any adverse effects and any other health changes.     Interval History 10/5/2020:  The patient returns to clinic today for follow up of low back pain. He is s/p right SI joint injection on 9/9/2020. He reports 25% relief of his right sided low back and buttock pain. He did have good relief the first two days. He continues to report right sided low back and buttock pain. He denies any radicular leg pain. His pain is worse with prolonged standing and walking. He continues to report intermittent pain into his achilles from previous injury. He feels as though this has changed his gait. He continues to report some benefit with Nevro SCS device. He is in contact with representatives. He is currently taking Percocet as needed sparingly with some benefit. He denies any adverse effects. He denies any  other health changes. His pain today is 4/10.      Interval History 9/2/2020:  The patient returns to clinic today for follow up of back pain. He reports that his back pain has improved since last audio visit. He continues to report back pain with intermittent radiating pain into his right hip and calf. He has spoken with Bienvenido from [employer] via phone with some programming. He is meeting with Bienvenido today. He had some issues with charging but this has improved. He is currently taking Norco intermittently but this is only lasts 2-3 hours. He denies any adverse effects. He denies any other health changes. His pain today is 8/10.     Interval History 08/27/2020:  Pt was contacted over virtual audio for a follow up appointment.  He is currently complaining of right hip and right calf with no associated numbness or weakness.  He continues to use his Nevro device.  He states it has been very frustrating. Inconsistent.  Took 20 mins to 1 hour to charge.  Couldn't get it to start this morning.  He states that there is no drainage at the battery site, no signs of infection or pain at the site.  Patient is not taking medications at this time.  He wants to speak about medication options because he would like to start playing golf again.     Interval History 8/17/2020:  The patient returns to clinic today for post op wound check. He continues to report benefit with Nevro device. He denies any fever, chills, or drainage. He continues to follow his activity restrictions. He does report a recent achilles tendon injury while swimming. He is seeing Orthopedics. He denies any other health changes. His pain today is 4/10.     Interval History 8/3/2020:  The patient returns to clinic today for post op. He is s/p Nevro battery change on 7/27/2020. He denies any fever, chills, or drainage from incision. He has not completed his antibiotics as he missed two days of the medication. He continues to follow his activity restrictions. He reports  relief with the device. He is meeting with Bienvenido today for programming. He denies any other health changes. His pain today is 2/10     Interval History 7/22/2020:  Pt presents for audio follow up only s/p Right SI joint injection on 7/8/2020. Pt states he has near resolution of focal pain to buttock. He is pleased with result and pain relief. Pt has been in contact with [employer] and will be having battery swap in 5 days. He has difficulty whether stimulator is beneficial and has even had a holiday from using SCS.  He denies any newer areas pain, denies any neuro changes, meds area adequate to control pain without adverse side effects. Pain is 2/10 today.     Interval HPI 6/15/2020:  Jefferson HANSON Boogie presents to the clinic for a follow-up appointment for 3 week follow up right hip and right thigh pain. Since the last visit, Jefferson Boogie states the pain has been persistant. Current pain intensity is 5/10. Patient has been working with Bienvenido Varghese, to try and get better coverage of a localized pain that he still experiences in his right low back area. He does report improvement in symptoms of numbness/tingling. Today, worse pain is 1/10 in severity and localizes to the right SI joint. Pain is worse with extension of his back. It is worse with golfing. Pain relieved by Norco--he takes 1-2 tablets of 5-325 Norco on days that he plays golf. Pain is also improved with being still and not being active. Patient endorses a much more active lifestyle with Norco. Denies new weakness/numbness or bowel/bladder changes.     Previous injection history includes a series of 3 lumbar CHOCO at [facility].      Interval HPI 3/5/20:  Patient presents to the clinic for a follow-up appointment for low back pain. Since the last visit, he states his pain has been unchanged. Current pain intensity is 4/10. He continues to work with Leonila to adjust settings on his SCS. He has stopped using tramadol, and uses norco sparingly. He  continues to work with a  for physical therapy.      Interval HPI 1/9/2020:  Patient returns for follow up s/p Nevro SCS. He states he is unsure if it has helped his pain since he had it implanted. He last had an adjustment of his programming about 1 month ago. He does note that once he is done charging his SCS his pain is improved. Pain still worse with prolonged standing and activity such as golf. He still is doing home exercise program. He says that he is trying to do more since getting the SCS. He is still taking the Tramadol with limited pain relief. He takes Tramadol 50 mg twice daily only on days of activity which is once a week. No other health changes.      HPI 11/20/19  Jefferson Boogie returns to clinic today for follow up. He reports increased low back and leg pain over the last few days. He has been in contact with [employer] representatives for programming adjustments. He does report benefit with the device. He reports good days and bad days. His pain is worse with prolonged standing and activity. He continues to participate in a home exercise routine. He does take Tramadol sparingly but reports limited relief. He denies any other health changes. His pain today is 5/10.    Pain Disability Index Review:  Last 3 PDI Scores 1/23/2023 8/29/2022 8/11/2022   Pain Disability Index (PDI) 35 21 25       Pain Medications:  Celebrex     Opioid Contract: not applicable     report:  Reviewed and consistent with medication use as prescribed.    Pain Procedures  9/9/2020- Right SI joint injection  7/27/2020- Nevro SCS battery change  7/8/2020- Right SIJ injection >80% relief   8/26/19 SCS implant  8/5/19 SCS trial  7/8/2020- Right SI joint injection  7/27/2020- SCS battery revision  9/9/2020- Right SI joint injection   12/9/2020- Right L5-S1 RFA  3/15/2021- Right SI fusion  7/21/2021 - Injection R Hip - minimal relief  10/13/2021 - Right SI joint injection  11/29/2021 - R SI joing and R piriformis  muscle injection - no relief   2022- Caudal CHOCO- no relief   22 - Diagnostic Bilateral L3, L4 and L5 Lumbar Medial Branch Block under Fluoroscopy  22- Right L3, L4, L5 RFA  10/12/22 -  Left L3, L4, L5 RFA    Physical Therapy/Home Exercise: no    Imagin/10/2021 - X ray Hips Bilateral: No radiographic evidence of acute osseous abnormality of the pelvis and hips and without radiographic evidence of osteonecrosis of the femoral heads.     21 MRI L-spine:  FINDINGS:  Lumbar sagittal alignment is slightly is straightened.  There is slight convex right curvature lumbar spine.  There is degenerative disc disease with intervertebral disc height loss and endplate degenerative change at all levels with scattered disc desiccation allowing for degenerative change the lumbar vertebral body heights and contours are within normal is without evidence for acute fracture or subluxation.  There is heterogeneous T1/T2 signal focus within the right aspect of the S1 vertebra most compatible with hemangioma this measures 2.0 cm.     The distal spinal cord and conus is normal in signal and contour tip of the conus approximates the T12/L1 level.  Please note there is artifact from indwelling spinal stimulator device with leads partially visualized casting artifact entering the dorsal epidural space at the T12 level and extending cranially.     There is abnormal clumping configuration of the filum terminalis a from T12 through L5 with slight thickening of scattered nerve roots most compatible with arachnoiditis.     No aneurysmal dilatation of the visualized abdominal aorta.     T12/L1 through L1/L2: No significant disc bulge, central canal or neural foraminal stenosis.     L2/L3: Posterior disc osteophyte with facet joint arthropathy without significant central canal stenosis with mild bilateral neural foraminal stenosis.     L3/L4:Bulging disc with ligamentum flavum hypertrophy without significant central canal  stenosis with mild bilateral neural foraminal stenosis.     L4/L5:Posterior disc osteophyte with ligamentum flavum hypertrophy and facet joint arthropathy without significant central canal stenosis and mild bilateral neural foraminal stenosis.     L5/S1: Posterior disc osteophyte with facet joint arthropathy without significant central canal stenosis with moderate right and mild left neural foraminal stenosis.     This report was flagged in Epic as abnormal.     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  Please note there is spinal stimulator device with leads partially visualized and distorting the study by artifacts.     There is abnormal thickening of the filum configuration most compatible with arachnoiditis.     Please see above for additional details.           Lumbar spine MRI with contrast 2019    FINDINGS: There are 5 lumbar type vertebral bodies lumbar vertebral body height and alignment are maintained. The signal from the lumbar vertebra demonstrates an admixture of red and yellow marrow. There are some Modic type degenerative signal changes involving the anterior inferior aspect of L5. There is slight anterior disc bulging at the L5-S1 level. All of the lumbar discs are desiccated with severe narrowing of the L2-L3 disc space, moderate narrowing of the L1-2 disc space posteriorly, moderate narrowing of the L5-S1 disc space and mild narrowing of the L3-4 and L4-5 disc spaces.    L5-S1 level: There is bilateral facet joint arthropathy with a slight amount of fluid in both facet joints. There is bilateral, right greater left, foraminal narrowing but no central canal stenosis. There is a mild broad-based posterior protrusion of the L5-S1 disc that extends towards the left-sided foramen and into and possibly through the right-sided foramen. This is similar to what was seen on the prior exam. Incidental note is made of a mild amount of epidural fat that is deposited  along the right anterior and left anterior aspect of thecal sac at the L5-S1 disc space level.    L4-L5 level: There is bilateral, left greater than right, facet joint arthropathy without central canal stenosis. There is no bony foraminal narrowing. There is a slight to mild broad-based posterior protrusion of the disc that extends to the medial foramen bilaterally.    L3-L4 level: There is bilateral facet joint arthropathy with slight ligament flavum redundancy but no bony foraminal narrowing or bony central canal stenosis. There is a slight to mild broad-based posterior protrusion of the disc that extends towards the right-sided foramen and into the anteromedial left-sided foramen.    L2-L3 level: There is bilateral facet joint arthropathy without bony central canal stenosis or bony foraminal narrowing. There is a slight to mild bilobed posterior protrusion of the disc at the level of the anteromedial foramen bilaterally and this is best seen on the axial images.    L1-L2 level: There is bilateral facet arthropathy without bony foraminal narrowing or bony central canal stenosis and the posterior disc margin is unremarkable.    The tip of the conus medullaris extends down to the level of the superior endplate of L1 and the signal from the visualized conus is unremarkable. There is clumping of the cauda equina nerve rootlets throughout the distal thecal sac consistent with arachnoiditis.  Following contrast administration, there is no abnormal enhancement of any of the bony or soft tissue elements of the lumbar spine except for possibly one or 2 facet joints.    Impression:   1. No abnormal enhancement of any bony or soft tissue elements of the lumbar spine except for possibly one or 2 facet joints. Clumping of the nerve rootlets of the cauda equina consistent with arachnoiditis.  3. Desiccation of all of the lumbar disks with multilevel disc space narrowing, multilevel facet joint arthropathy, but no central canal  stenosis.  4. Modic type degenerative signal changes in the anterior inferior aspect of L5.  5. There is a mild amount of epidural fat deposit along the right anterior and left anterior aspect of the thecal sac at the L5-S1 level..  6. Other findings as discussed above.    MRI WO Lumbar spine:  1. Since the prior study, there has been interval development of arachnoiditis including multifocal multisegmental clumping of the nerve rootlets of the cauda equina.  2. All of the lumbar discs are desiccated with multilevel disc space narrowing and desiccation. There is multilevel facet joint arthropathy and foraminal narrowing as detailed above. There is no central canal stenosis.  3. There are posteriorly protruding discs at every level in the lumbar spine except L1-L2 and, for the most part, they appear unchanged from the prior study of 3/26/2018.    Thoracic spine MRI:  FINDINGS: Thoracic vertebral body height and alignment are maintained with a few small scattered Schmorl's nodes involving the endplates of several mid to lower thoracic vertebra. The T3-4 vertebra are partially fused. There is some degree of desiccation of all of the thoracic discs with multilevel disc space narrowing, especially at the T7-T8, T6-T7 and T5-T6 levels. There is no abnormal prevertebral soft tissue thickening. The somewhat heterogeneous appearance to the signal from the thoracic vertebra is presumably secondary to an admixture of red and yellow marrow.    T1-T2 level: There is no bony foraminal narrowing or bony central canal stenosis and the posterior disc margin is unremarkable.    T2-T3 level: There is no bony foraminal narrowing or bony central canal stenosis and the posterior disc margin is unremarkable.    T3-T4 level: There is no bony foraminal narrowing or bony central canal stenosis and the posterior disc margin is unremarkable.    T4-5 level: There is no bony foraminal narrowing or bony central canal stenosis and the posterior  disc margin is unremarkable.    T5-T6 level: There is no bony foraminal narrowing or bony central canal stenosis and the posterior disc margin is unremarkable.    T6-T7 level: There is no bony foraminal narrowing or bony central canal stenosis and the posterior disc margin is unremarkable.    T7-T8 level: There is no bony foraminal narrowing or bony central canal stenosis and the posterior disc margin is unremarkable.    T8-T9 level: There is no bony foraminal narrowing or bony central canal stenosis and the posterior disc margin is unremarkable.    T9-T10 level: There is no bony foraminal narrowing or bony central canal stenosis and the posterior disc margin is unremarkable.    T10-T11 level: There is no bony foraminal narrowing or bony central canal stenosis and the posterior disc margin is unremarkable.    T11-T12 level: There is no bony foraminal narrowing or bony central canal stenosis and the posterior disc margin is unremarkable.    T12-L1 level: The tip the conus medullaris extends down to level of the superior endplate of L1. 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 There is no bony foraminal narrowing or bony central canal stenosis at this level.    On the axial images, the immediate paravertebral soft tissues are unremarkable. A small cyst is seen to involve the upper pole the left kidney.    Following contrast administration, there is no abnormal enhancement of any bony or soft tissue elements of the thoracic spine. There is no abnormal enhancement of the subserosal surface of the thoracic spinal cord. Some foci of mild signal intensity in the CSF dorsal to the spinal cord of some of the sagittal sequences presumably is secondary to CSF pulsation artifact.    On the sagittal  image, there is cervical spondylosis and kyphosis with narrowing of all of the disc spaces in the cervical spine.    Allergies: Review of patient's allergies indicates:  No  Known Allergies    Current Medications:   Current Outpatient Medications   Medication Sig Dispense Refill    ALPRAZolam (XANAX) 1 MG tablet Take 2 mg by mouth.      ascorbic acid, vitamin C, (VITAMIN C) 1000 MG tablet Take 1,000 mg by mouth.      celecoxib (CELEBREX) 200 MG capsule       cephALEXin (KEFLEX) 500 MG capsule       fluticasone propionate (FLONASE) 50 mcg/actuation nasal spray daily as needed.      guaiFENesin-codeine 100-10 mg/5 ml (TUSSI-ORGANIDIN NR)  mg/5 mL syrup       irbesartan (AVAPRO) 75 MG tablet 150 mg once daily.       levothyroxine (SYNTHROID) 100 MCG tablet Take 100 mcg by mouth.      mirabegron (MYRBETRIQ ORAL) Take by mouth.      simvastatin (ZOCOR) 20 MG tablet Take 20 mg by mouth every evening.      tadalafil (CIALIS) 20 MG Tab       temazepam (RESTORIL) 30 mg capsule TK 1 C PO QD HS      alendronate (FOSAMAX) 70 MG tablet 70 mg every 7 days.       amoxicillin (AMOXIL) 500 MG Tab       budesonide-formoterol 80-4.5 mcg (SYMBICORT) 80-4.5 mcg/actuation HFAA Inhale 2 puffs into the lungs.      ciclopirox (LOPROX) 0.77 % Crea       clindamycin (CLEOCIN) 300 MG capsule       ergocalciferol (ERGOCALCIFEROL) 50,000 unit Cap       flurazepam (DALMANE) 15 MG Cap Take 30 mg by mouth daily as needed.      mupirocin (BACTROBAN) 2 % ointment       neomycin-polymyxin-hydrocortisone (CORTISPORIN) otic solution       prednisolon/gatiflox/bromfenac (PREDNISOL ACE-GATIFLOX-BROMFEN) 1-0.5-0.075 % DrpS Apply 1 drop to eye 3 (three) times daily. (Patient not taking: Reported on 6/17/2022) 5 mL 3    sulfamethoxazole-trimethoprim 800-160mg (BACTRIM DS) 800-160 mg Tab       terbinafine HCL (LAMISIL) 250 mg tablet       tolterodine (DETROL LA) 4 MG 24 hr capsule        No current facility-administered medications for this visit.     Facility-Administered Medications Ordered in Other Visits   Medication Dose Route Frequency Provider Last Rate Last Admin    balanced salt irrigation intra-ocular solution 1  drop  1 drop Right Eye On Call Procedure Bell Cedeno MD        sodium chloride 0.9% flush 2 mL  2 mL Intravenous PRN Bell Cedeno MD           REVIEW OF SYSTEMS:    GENERAL:  No weight loss, malaise or fevers.  HEENT:  Negative for frequent or significant headaches.  NECK:  Negative for lumps, goiter, pain and significant neck swelling.  RESPIRATORY:  Negative for cough, wheezing or shortness of breath.  CARDIOVASCULAR:  Negative for chest pain, leg swelling or palpitations.  GI:  Negative for abdominal discomfort, blood in stools or black stools or change in bowel habits.  MUSCULOSKELETAL:  See HPI.  SKIN:  Negative for lesions, rash, and itching.  PSYCH:  negative for sleep disturbance, mood disorder and recent psychosocial stressors.  HEMATOLOGY/LYMPHOLOGY:  Negative for prolonged bleeding, bruising easily or swollen nodes.  NEURO:   No history of headaches, syncope, paralysis, seizures or tremors.  All other reviewed and negative other than HPI.    Past Medical History:  Past Medical History:   Diagnosis Date    Cancer     stage I bladder cancer 50 yrs ago    Hypercholesteremia     Hypertension     Sacroiliitis 7/8/2020    Thyroid disease        Past Surgical History:  Past Surgical History:   Procedure Laterality Date    BLADDER SURGERY      CATARACT EXTRACTION      CATARACT EXTRACTION W/  INTRAOCULAR LENS IMPLANT Right 3/9/2022    Procedure: EXTRACTION, CATARACT, WITH IOL INSERTION;  Surgeon: Bell Cedeno MD;  Location: [facility];  Service: Ophthalmology;  Laterality: Right;    COLONOSCOPY      EPIDURAL STEROID INJECTION N/A 1/12/2022    Procedure: INJECTION, STEROID, EPIDURAL CAUDAL With Catheter needs consent;  Surgeon: Samuel Jimenez MD;  [facility];  Service: Pain Management;  Laterality: N/A;    EYE SURGERY      cataracts     FUSION OF SACROILIAC JOINT Right 3/15/2021    Procedure: FUSION, RIGHT SACROILIAC JOINT FUSION;  Surgeon: Samuel Jimenez MD;  Location: [facility] OR;   Service: Pain Management;  Laterality: Right;  C-ARM, PAINTEQ REP     HERNIA REPAIR      INJECTION OF ANESTHETIC AGENT AROUND NERVE Bilateral 6/1/2022    Procedure: BLOCK, NERVE MEDIAL BRANCH L3,4,5 BILATERAL 1st;  Surgeon: Samuel Jimenez MD;  Location: [facility] PAIN MGT;  Service: Pain Management;  Laterality: Bilateral;    INJECTION OF ANESTHETIC AGENT AROUND NERVE Bilateral 8/24/2022    Procedure: Block, Nerve Kenny L3, L4, & L5;  Surgeon: Samuel Jimenez MD;  Location: [facility] PAIN [facility]GT;  Service: Pain Management;  Laterality: Bilateral;    INJECTION OF JOINT Right 7/8/2020    Procedure: INJECTION, JOINT, SACROILIAC (SI);  Surgeon: Samuel Jimenez MD;  Location: [facility]IN [facility]GT;  Service: Pain Management;  Laterality: Right;    INJECTION OF JOINT Right 9/9/2020    Procedure: INJECTION, JOINT, SACROILIAC (SI);  Surgeon: Samuel Jimenez MD;  Location: [facility]IN [facility]GT;  Service: Pain Management;  Laterality: Right;    INJECTION OF JOINT Right 7/21/2021    Procedure: INJECTION, JOINT, HIP RIGHT;  Surgeon: Samuel Jimenez MD;  Location: [facility]IN [facility]GT;  Service: Pain Management;  Laterality: Right;  CONSENT NEEDED    INJECTION OF JOINT Right 10/13/2021    Procedure: INJECTION, JOINT, SACROILIAC (SI)  NEED CONSENT pt. requesting sedation;  Surgeon: Samuel Jimenez MD;  Location: [facility] PAIN [facility]GT;  Service: Pain Management;  Laterality: Right;    KNEE SURGERY      RADIOFREQUENCY ABLATION Right 12/9/2020    Procedure: RADIOFREQUENCY ABLATION, L5-S1-S2 LATERAL BRANCH COOLED;  Surgeon: Samuel Jimenez MD;  Location: [facility] PAIN [facility]GT;  Service: Pain Management;  Laterality: Right;    RADIOFREQUENCY ABLATION Right 9/21/2022    Procedure: RADIOFREQUENCY ABLATION, RIGHT L3-L4-L5 PER DR JIMENEZ;  Surgeon: Samuel Jimenez MD;  Location: [facility]IN [facility]GT;  Service: Pain Management;  Laterality: Right;    RADIOFREQUENCY ABLATION Left 10/12/2022    Procedure: RADIOFREQUENCY ABLATION, LEFT L3-L4-L5 TWO OF TWO;  Surgeon: Samuel BROWN  MD Barbara;  Location: [facility] PAIN MGT;  Service: Pain Management;  Laterality: Left;    REPLACEMENT OF NERVE STIMULATOR BATTERY N/A 2020    Procedure: Replacement, SPINAL CORD STIMULATOR BATTERY CHANGE TO NEVRO OMNION BATTERY;  Surgeon: Samuel Jimenez MD;  Location: [facility] OR;  Service: Pain Management;  Laterality: N/A;  C-ARM, NEVRO REP    TRIAL OF SPINAL CORD NERVE STIMULATOR N/A 2019    Procedure: Trial, Neurostimulator, SPINAL CORD STIMULATOR TRIAL;  Surgeon: Samuel Jimenez MD;  Location: [facility] CATH LAB;  Service: Pain Management;  Laterality: N/A;  C-ARM, NEVRO REP       Family History:  History reviewed. No pertinent family history.    Social History:  Social History     Socioeconomic History    Marital status:    Tobacco Use    Smoking status: Former     Types: Cigarettes     Quit date:      Years since quittin.0    Smokeless tobacco: Never    Tobacco comments:     stopped 40 years ago   Substance and Sexual Activity    Alcohol use: Yes     Alcohol/week: 2.0 standard drinks     Types: 2 Shots of liquor per week     Comment: nightly -scotch    Drug use: Never    Sexual activity: Yes     Partners: Female       OBJECTIVE:    BP (!) 163/68 (BP Location: Left arm, Patient Position: Sitting, BP Method: Large (Automatic))   Pulse (!) 56   Temp 97.6 °F (36.4 °C) (Oral)   Resp (!) 100   Wt 88 kg (194 lb 0.1 oz)   BMI 25.60 kg/m²     PHYSICAL EXAMINATION:    General appearance: Well appearing, in no acute distress, alert and oriented x3.  Psych:  Mood and affect appropriate.  Skin: Skin color, texture, turgor normal, no rashes or lesions, in both upper and lower body.  Head/face:  Atraumatic, normocephalic. No palpable lymph nodes  Neck: No pain to palpation over the cervical paraspinous muscles. Spurling Negative. No pain with neck flexion, extension, or lateral flexion. .  Cor: RRR  Pulm: CTA  GI: Abdomen soft and non-tender.  Back: Straight leg raising in the sitting and supine  positions is negative to radicular pain. mild pain to palpation over the spine or costovertebral angles. Decreased range of motion without pain reproduction. Positive axial loading test bilateral. Negative FABERE, Ganselin and Yeoman's test on the right side. Negative FADIR.  Extremities: Tenderness to deep palpation of right piriformis. Peripheral joint ROM is full and pain free without obvious instability or laxity in all four extremities. No deformities, edema, or skin discoloration. Good capillary refill.  Musculoskeletal: No pain with WICHO, FADIR or modified Gaenslen's. Bilateral lower extremity strength is normal and symmetric.  No atrophy or tone abnormalities are noted.  Neuro: Bilateral lower extremity coordination and muscle stretch reflexes are physiologic and symmetric.  No clonus. No loss of sensation is noted.  GAIT: Antalgic gait. Not using walker today.    ASSESSMENT: 87 y.o. year old male with low back pain, consistent with the following diagnoses     1. Failure of spinal cord stimulator, subsequent encounter        2. Chronic pain syndrome        3. Lumbar spondylosis        4. Arachnoiditis        5. Spinal cord stimulator status              PLAN:     - Patient can continue with medications for now since they are providing benefits, using them appropriately, and without side effects  - I have stressed the importance of physical activity and a home exercise plan to help with pain and improve health  - Will plan for removal of current SCS and placement of [place] SCS on 2/13/23  - Counseled patient regarding the importance of activity modification, constant sleeping habits, and physical therapy  - Follow up 1 week after [place] SCS placement for staple removal (already scheduled)    The above plan and management options were discussed at length with patient. Patient is in agreement with the above and verbalized understanding.    Gregory Dailey DO  Neurology PGY-1    I have personally reviewed the  history and exam of this patient and agree with the resident/fellow/NPs note as stated above.    Samuel Jimenez MD      01/23/2023

## 2023-02-03 DIAGNOSIS — T85.192D FAILURE OF SPINAL CORD STIMULATOR, SUBSEQUENT ENCOUNTER: Primary | ICD-10-CM

## 2023-02-03 DIAGNOSIS — G89.4 CHRONIC PAIN SYNDROME: ICD-10-CM

## 2023-02-03 DIAGNOSIS — G03.9 ARACHNOIDITIS: ICD-10-CM

## 2023-02-07 ENCOUNTER — OFFICE VISIT (OUTPATIENT)
Dept: PSYCHIATRY | Facility: CLINIC | Age: 88
End: 2023-02-07
Payer: MEDICARE

## 2023-02-07 DIAGNOSIS — G03.9 ARACHNOIDITIS: ICD-10-CM

## 2023-02-07 DIAGNOSIS — G89.4 CHRONIC PAIN SYNDROME: ICD-10-CM

## 2023-02-07 DIAGNOSIS — T85.192D FAILURE OF SPINAL CORD STIMULATOR, SUBSEQUENT ENCOUNTER: ICD-10-CM

## 2023-02-07 DIAGNOSIS — Z01.818 PREOPERATIVE EVALUATION TO RULE OUT SURGICAL CONTRAINDICATION: Primary | ICD-10-CM

## 2023-02-07 PROCEDURE — 90791 PSYCH DIAGNOSTIC EVALUATION: CPT | Mod: ,,, | Performed by: PSYCHOLOGIST

## 2023-02-07 PROCEDURE — 99211 OFF/OP EST MAY X REQ PHY/QHP: CPT | Mod: PBBFAC | Performed by: PSYCHOLOGIST

## 2023-02-07 PROCEDURE — 99999 PR PBB SHADOW E&M-EST. PATIENT-LVL I: ICD-10-PCS | Mod: PBBFAC,,, | Performed by: PSYCHOLOGIST

## 2023-02-07 PROCEDURE — 99999 PR PBB SHADOW E&M-EST. PATIENT-LVL I: CPT | Mod: PBBFAC,,, | Performed by: PSYCHOLOGIST

## 2023-02-07 PROCEDURE — 90791 PR PSYCHIATRIC DIAGNOSTIC EVALUATION: ICD-10-PCS | Mod: ,,, | Performed by: PSYCHOLOGIST

## 2023-02-07 NOTE — LETTER
February 7, 2023        Pain Management             [facility] [address]  [address]  [facility] [address]  Phone: [phone]   Patient: Jefferson Boogie   MR Number: [id number]   YOB: 1935   Date of Visit: 2/7/2023       Dear Dr. Jimenez:    Thank you for referring Jefferson Boogie to me for evaluation. Below are the relevant portions of my assessment and plan of care.    This evaluation revealed NO contraindications to SCS from a psychological perspective. Currently he reports no psychiatric problems, major psychosocial stressors, or other problems that would contraindicate surgery or impact his ability to engage in treatment. He has adequate and appropriate knowledge and expectations regarding surgery, and is motivated and willing to engage in behaviors to achieve successful outcomes with SCS. He has multiple protective factors that should help him during surgery and recovery. There are no recommendations for psychological intervention at this time.        If you have questions, please do not hesitate to call me.    Sincerely,       Yisel Vogt, PhD
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
## 2023-02-08 ENCOUNTER — PATIENT MESSAGE (OUTPATIENT)
Dept: SURGERY | Facility: OTHER | Age: 88
End: 2023-02-08
Payer: MEDICARE

## 2023-02-08 ENCOUNTER — HOSPITAL ENCOUNTER (OUTPATIENT)
Dept: PREADMISSION TESTING | Facility: OTHER | Age: 88
Discharge: HOME | End: 2023-02-08
Attending: ANESTHESIOLOGY
Payer: MEDICARE

## 2023-02-08 ENCOUNTER — ANESTHESIA EVENT (OUTPATIENT)
Dept: SURGERY | Facility: OTHER | Age: 88
End: 2023-02-08
Payer: MEDICARE

## 2023-02-08 VITALS
BODY MASS INDEX: 25.71 KG/M2 | OXYGEN SATURATION: 98 % | SYSTOLIC BLOOD PRESSURE: 155 MMHG | DIASTOLIC BLOOD PRESSURE: 66 MMHG | RESPIRATION RATE: 16 BRPM | TEMPERATURE: 97 F | HEART RATE: 56 BPM | WEIGHT: 194 LBS | HEIGHT: 73 IN

## 2023-02-08 RX ORDER — SODIUM CHLORIDE, SODIUM LACTATE, POTASSIUM CHLORIDE, CALCIUM CHLORIDE 600; 310; 30; 20 MG/100ML; MG/100ML; MG/100ML; MG/100ML
INJECTION, SOLUTION INTRAVENOUS CONTINUOUS
Status: CANCELLED | OUTPATIENT
Start: 2023-02-08

## 2023-02-08 NOTE — ANESTHESIA PREPROCEDURE EVALUATION
02/08/2023  Jefferson Boogie is a 87 y.o., male.      Pre-op Assessment    I have reviewed the Patient Summary Reports.     I have reviewed the Nursing Notes. I have reviewed the NPO Status.   I have reviewed the Medications.     Review of Systems  Anesthesia Hx:  Denies Family Hx of Anesthesia complications.   Denies Personal Hx of Anesthesia complications.   Social:  Former Smoker    Hematology/Oncology:  Hematology Normal       -- Cancer in past history (bladder):  Oncology Comments: Bladder Ca     EENT/Dental:EENT/Dental Normal   Cardiovascular:   Hypertension hyperlipidemia    Pulmonary:   Recent URI Dry cough X 1 day  No fevers  No CP   Renal/:  Renal/ Normal     Hepatic/GI:  Hepatic/GI Normal    Musculoskeletal:   Arthritis     Neurological:   Neuromuscular Disease, (arachnoiditis following CHOCO 3 years ago)   Chronic Pain Syndrome (occ percocet)   Endocrine:   Hypothyroidism    Dermatological:  Skin Normal    Psych:  Psychiatric Normal           Physical Exam  General: Well nourished, Cooperative, Alert and Oriented    Airway:  Mallampati: II   TM Distance: Normal  Neck ROM: Normal ROM    Dental:  Caps / Implants        Anesthesia Plan  Type of Anesthesia, risks & benefits discussed:    Anesthesia Type: MAC  Intra-op Monitoring Plan: Standard ASA Monitors  Post Op Pain Control Plan: multimodal analgesia  Informed Consent: Informed consent signed with the Patient and all parties understand the risks and agree with anesthesia plan.  All questions answered.   ASA Score: 2  Anesthesia Plan Notes: No labs    Ready For Surgery From Anesthesia Perspective.     .

## 2023-02-08 NOTE — DISCHARGE INSTRUCTIONS
Information to Prepare you for your Surgery    PRE-ADMIT TESTING -  [phone]    [address] [facility]        Your surgery has been scheduled at [facility]. We are pleased to have the opportunity to serve you. For Further Information please call [phone].    On the day of surgery please report to the Information Desk on the 1st floor.    CONTACT YOUR PHYSICIAN'S OFFICE THE DAY PRIOR TO YOUR SURGERY TO OBTAIN YOUR ARRIVAL TIME.     The evening before surgery do not eat anything after 9 p.m. ( this includes hard candy, chewing gum and mints).  You may only have GATORADE, POWERADE AND WATER  from 9 p.m. until you leave your home.   DO NOT DRINK ANY LIQUIDS ON THE WAY TO THE HOSPITAL.      Why does your anesthesiologist allow you to drink Gatorade/Powerade before surgery?  Gatorade/Powerade helps to increase your comfort before surgery and to decrease your nausea after surgery. The carbohydrates in Gatorade/Powerade help reduce your body's stress response to surgery.  If you are a diabetic-drink only water prior to surgery.      Current Visitor policy(12/27/2021) - Patients may have 2 visitors pre and post procedure. Only 2 visitors will be allowed in the Surgical building with the patient. No one under the age of 12 will be allowed into the facility.    SPECIAL MEDICATION INSTRUCTIONS: TAKE medications checked off by the Anesthesiologist on your Medication List.    Surgery Patients:  If you take ASPIRIN - Your PHYSICIAN/SURGEON will need to inform you IF/OR when you need to stop taking aspirin prior to your surgery.     Starting one week prior to surgery, do not take NSAIDS (Advil, Aleve, Anaprox, BC, Celebrex, Diclofenac, Goody's, Ibuprofen, Indocin, Indomethacin, Motrin, Naproxen, Voltaren, etc)   If you are not sure if you should take a medicine please call your surgeon's office.  You may take Tylenol.     Do Not Wear any make-up (especially eye  make-up) to surgery. Please remove any false eyelashes or eyelash extensions. If you arrive the day of surgery with makeup/eyelashes on you will be required to remove prior to surgery. (There is a risk of corneal abrasions if eye makeup/eyelash extensions are not removed)    Leave all valuables at home.   Do not wear any jewelry or watches, including any metal in body piercings. Jewelry must be removed prior to coming to the hospital.  There is a possibility that rings that are unable to be removed may be cut off if they are on the surgical extremity.    Please remove all hair extensions, wigs, clips and any other metal accessories/ ornaments from your hair.  These items may pose a flammable/fire risk in Surgery and must be removed.    Do not shave your surgical area at least 5 days prior to your surgery. The surgical prep will be performed at the hospital according to Infection Control regulations.    Contact Lens must be removed before surgery. Please either do not wear contact lens or bring a case and solution for storage.  Please bring a container for eyeglasses &/or dentures.  Bring any paperwork your physician has provided, such as consent forms, history and physicals, doctor's orders, etc.   Bring comfortable clothes that are loose fitting to wear upon discharge. Take into consideration the type of surgery being performed.  Maintain your diet as advised per your physician the day prior to surgery.    Adequate rest the night before surgery is advised.   Park in the Parking lot behind the [employer] [facility] or in the [employer] [facility] across the street from the parking lot. Parking is complimentary.  If you will be discharged the same day as your procedure, please arrange for a responsible adult to drive you home or to accompany you if traveling by taxi.   YOU WILL NOT BE PERMITTED TO DRIVE OR TO LEAVE THE HOSPITAL ALONE AFTER SURGERY.   If you are being discharged the same day, it is strongly recommended  that you arrange for someone to remain with you for the first 24 hrs following your surgery.    The Surgeon will speak to your family/visitor after your surgery regarding the outcome of your surgery and post op care.  The Surgeon may speak to you after your surgery, but there is a possibility you may not remember the details.  Please check with your family members regarding the conversation with the Surgeon.    We strongly recommend whoever is bringing you home be present for discharge instructions.  This will ensure a thorough understanding for your post op home care.    Visitors-Refer to current Visitor policy handouts.    Thank you for your cooperation.  The Staff of [facility].            Bathing Instructions with Hibiclens    Shower the evening before and morning of your procedure with Chlorhexidine (Hibiclens)  Do not use Chlorhexidine on your face or genitals. Do not get in your eyes.  Wash your face with water and your regular face wash/soap  Use your regular shampoo  Apply Chlorhexidine (Hibiclens) directly on your skin or on a wet washcloth and wash gently. When showering: Move away from the shower stream when applying Chlorhexidine (Hibiclens) to avoid rinsing off too soon.  Rinse thoroughly with warm water  Do not dilute Chlorhexidine (Hibiclens)   Dry off as usual. Do not apply deodorant, powder, body lotion, perfume, after shave or cologne the morning of your procedure.

## 2023-02-09 ENCOUNTER — PATIENT MESSAGE (OUTPATIENT)
Dept: PAIN MEDICINE | Facility: CLINIC | Age: 88
End: 2023-02-09
Payer: MEDICARE

## 2023-02-13 ENCOUNTER — ANESTHESIA (OUTPATIENT)
Dept: SURGERY | Facility: OTHER | Age: 88
End: 2023-02-13
Payer: MEDICARE

## 2023-02-13 ENCOUNTER — HOSPITAL ENCOUNTER (OUTPATIENT)
Facility: OTHER | Age: 88
Discharge: HOME | End: 2023-02-13
Attending: ANESTHESIOLOGY | Admitting: ANESTHESIOLOGY
Payer: MEDICARE

## 2023-02-13 VITALS
BODY MASS INDEX: 25.71 KG/M2 | TEMPERATURE: 98 F | HEART RATE: 73 BPM | RESPIRATION RATE: 16 BRPM | WEIGHT: 194 LBS | OXYGEN SATURATION: 97 % | HEIGHT: 73 IN | SYSTOLIC BLOOD PRESSURE: 151 MMHG | DIASTOLIC BLOOD PRESSURE: 69 MMHG

## 2023-02-13 DIAGNOSIS — G89.4 CHRONIC PAIN SYNDROME: ICD-10-CM

## 2023-02-13 DIAGNOSIS — T85.192D FAILURE OF SPINAL CORD STIMULATOR, SUBSEQUENT ENCOUNTER: Primary | ICD-10-CM

## 2023-02-13 DIAGNOSIS — G03.9 ARACHNOIDITIS: ICD-10-CM

## 2023-02-13 DIAGNOSIS — M54.89 VERTEBROGENIC PAIN: ICD-10-CM

## 2023-02-13 DIAGNOSIS — Z96.89 SPINAL CORD STIMULATOR STATUS: ICD-10-CM

## 2023-02-13 PROCEDURE — 63600175 PHARM REV CODE 636 W HCPCS: Performed by: ANESTHESIOLOGY

## 2023-02-13 PROCEDURE — 27201423 OPTIME MED/SURG SUP & DEVICES STERILE SUPPLY: Performed by: ANESTHESIOLOGY

## 2023-02-13 PROCEDURE — 71000015 HC POSTOP RECOV 1ST HR: Performed by: ANESTHESIOLOGY

## 2023-02-13 PROCEDURE — 25000003 PHARM REV CODE 250: Performed by: ANESTHESIOLOGY

## 2023-02-13 PROCEDURE — 25000003 PHARM REV CODE 250: Performed by: STUDENT IN AN ORGANIZED HEALTH CARE EDUCATION/TRAINING PROGRAM

## 2023-02-13 PROCEDURE — 63685 PR IMPLANT SPINAL NEUROSTIM/RECEIVER: ICD-10-PCS | Mod: 59,,, | Performed by: ANESTHESIOLOGY

## 2023-02-13 PROCEDURE — 37000009 HC ANESTHESIA EA ADD 15 MINS: Performed by: ANESTHESIOLOGY

## 2023-02-13 PROCEDURE — 71000016 HC POSTOP RECOV ADDL HR: Performed by: ANESTHESIOLOGY

## 2023-02-13 PROCEDURE — C1826 OPTIME GENERATOR, W/CLOSED LOOP LEADS AND RECHARGEABLE BATTERY: HCPCS | Performed by: ANESTHESIOLOGY

## 2023-02-13 PROCEDURE — 36000707: Performed by: ANESTHESIOLOGY

## 2023-02-13 PROCEDURE — 01941 ANES NEUROMD/NTRVRT CRV/THRC: CPT | Performed by: ANESTHESIOLOGY

## 2023-02-13 PROCEDURE — 63663 PR REVISION SPINAL NEUROSTIM ELECTRODE PERC ARRAY, INCL FLUORO: ICD-10-PCS | Mod: ,,, | Performed by: ANESTHESIOLOGY

## 2023-02-13 PROCEDURE — 88300 PR  SURG PATH,GROSS,LEVEL I: ICD-10-PCS | Mod: 26,,, | Performed by: PATHOLOGY

## 2023-02-13 PROCEDURE — C1778 LEAD, NEUROSTIMULATOR: HCPCS | Performed by: ANESTHESIOLOGY

## 2023-02-13 PROCEDURE — 63600175 PHARM REV CODE 636 W HCPCS: Performed by: STUDENT IN AN ORGANIZED HEALTH CARE EDUCATION/TRAINING PROGRAM

## 2023-02-13 PROCEDURE — 63663 REVISE SPINE ELTRD PERQ ARAY: CPT | Mod: ,,, | Performed by: ANESTHESIOLOGY

## 2023-02-13 PROCEDURE — 88300 SURGICAL PATH GROSS: CPT | Mod: 26,,, | Performed by: PATHOLOGY

## 2023-02-13 PROCEDURE — 88300 SURGICAL PATH GROSS: CPT | Performed by: PATHOLOGY

## 2023-02-13 PROCEDURE — 37000008 HC ANESTHESIA 1ST 15 MINUTES: Performed by: ANESTHESIOLOGY

## 2023-02-13 PROCEDURE — 36000706: Performed by: ANESTHESIOLOGY

## 2023-02-13 PROCEDURE — 27800903 OPTIME MED/SURG SUP & DEVICES OTHER IMPLANTS: Performed by: ANESTHESIOLOGY

## 2023-02-13 PROCEDURE — 63685 INS/RPLC SPI NPG/RCVR POCKET: CPT | Mod: 59,,, | Performed by: ANESTHESIOLOGY

## 2023-02-13 DEVICE — IMPLANTABLE DEVICE: Type: IMPLANTABLE DEVICE | Site: BACK | Status: FUNCTIONAL

## 2023-02-13 RX ORDER — SODIUM CHLORIDE 9 MG/ML
INJECTION, SOLUTION INTRAVENOUS CONTINUOUS
Status: DISCONTINUED | OUTPATIENT
Start: 2023-02-13 | End: 2023-02-13 | Stop reason: HOSPADM

## 2023-02-13 RX ORDER — ONDANSETRON 2 MG/ML
4 INJECTION INTRAMUSCULAR; INTRAVENOUS DAILY PRN
Status: CANCELLED | OUTPATIENT
Start: 2023-02-13

## 2023-02-13 RX ORDER — OXYCODONE HYDROCHLORIDE 5 MG/1
5 TABLET ORAL
Status: CANCELLED | OUTPATIENT
Start: 2023-02-13

## 2023-02-13 RX ORDER — SODIUM CHLORIDE 0.9 % (FLUSH) 0.9 %
3 SYRINGE (ML) INJECTION
Status: CANCELLED | OUTPATIENT
Start: 2023-02-13

## 2023-02-13 RX ORDER — SODIUM CHLORIDE 9 MG/ML
500 INJECTION, SOLUTION INTRAVENOUS CONTINUOUS
Status: DISCONTINUED | OUTPATIENT
Start: 2023-02-13 | End: 2023-02-13 | Stop reason: HOSPADM

## 2023-02-13 RX ORDER — BACITRACIN ZINC 500 UNIT/G
OINTMENT (GRAM) TOPICAL
Status: DISCONTINUED | OUTPATIENT
Start: 2023-02-13 | End: 2023-02-13 | Stop reason: HOSPADM

## 2023-02-13 RX ORDER — LIDOCAINE HYDROCHLORIDE 10 MG/ML
INJECTION INFILTRATION; PERINEURAL
Status: DISCONTINUED | OUTPATIENT
Start: 2023-02-13 | End: 2023-02-13 | Stop reason: HOSPADM

## 2023-02-13 RX ORDER — CEPHALEXIN 500 MG/1
500 CAPSULE ORAL EVERY 6 HOURS
Qty: 28 CAPSULE | Refills: 0 | Status: SHIPPED | OUTPATIENT
Start: 2023-02-13 | End: 2023-02-20

## 2023-02-13 RX ORDER — LABETALOL HYDROCHLORIDE 5 MG/ML
INJECTION, SOLUTION INTRAVENOUS
Status: DISCONTINUED | OUTPATIENT
Start: 2023-02-13 | End: 2023-02-13

## 2023-02-13 RX ORDER — CODEINE PHOSPHATE AND GUAIFENESIN 10; 100 MG/5ML; MG/5ML
5 SOLUTION ORAL 3 TIMES DAILY PRN
Qty: 118 ML | Refills: 0 | Status: SHIPPED | OUTPATIENT
Start: 2023-02-13 | End: 2023-02-23

## 2023-02-13 RX ORDER — DEXMEDETOMIDINE HYDROCHLORIDE 100 UG/ML
INJECTION, SOLUTION INTRAVENOUS
Status: DISCONTINUED | OUTPATIENT
Start: 2023-02-13 | End: 2023-02-13

## 2023-02-13 RX ORDER — HYDROMORPHONE HYDROCHLORIDE 2 MG/ML
0.4 INJECTION, SOLUTION INTRAMUSCULAR; INTRAVENOUS; SUBCUTANEOUS EVERY 5 MIN PRN
Status: CANCELLED | OUTPATIENT
Start: 2023-02-13

## 2023-02-13 RX ORDER — FENTANYL CITRATE 50 UG/ML
INJECTION, SOLUTION INTRAMUSCULAR; INTRAVENOUS
Status: DISCONTINUED | OUTPATIENT
Start: 2023-02-13 | End: 2023-02-13

## 2023-02-13 RX ORDER — CEFAZOLIN SODIUM 1 G/3ML
2 INJECTION, POWDER, FOR SOLUTION INTRAMUSCULAR; INTRAVENOUS
Status: COMPLETED | OUTPATIENT
Start: 2023-02-13 | End: 2023-02-13

## 2023-02-13 RX ORDER — HYDROCODONE BITARTRATE AND ACETAMINOPHEN 5; 325 MG/1; MG/1
1 TABLET ORAL EVERY 8 HOURS PRN
Qty: 21 TABLET | Refills: 0 | Status: SHIPPED | OUTPATIENT
Start: 2023-02-13 | End: 2023-02-20

## 2023-02-13 RX ORDER — MEPERIDINE HYDROCHLORIDE 25 MG/ML
12.5 INJECTION INTRAMUSCULAR; INTRAVENOUS; SUBCUTANEOUS ONCE AS NEEDED
Status: CANCELLED | OUTPATIENT
Start: 2023-02-13 | End: 2023-02-14

## 2023-02-13 RX ORDER — VANCOMYCIN HYDROCHLORIDE 1 G/20ML
INJECTION, POWDER, LYOPHILIZED, FOR SOLUTION INTRAVENOUS
Status: DISCONTINUED | OUTPATIENT
Start: 2023-02-13 | End: 2023-02-13 | Stop reason: HOSPADM

## 2023-02-13 RX ORDER — ONDANSETRON 2 MG/ML
INJECTION INTRAMUSCULAR; INTRAVENOUS
Status: DISCONTINUED | OUTPATIENT
Start: 2023-02-13 | End: 2023-02-13

## 2023-02-13 RX ORDER — HYDRALAZINE HYDROCHLORIDE 20 MG/ML
INJECTION INTRAMUSCULAR; INTRAVENOUS
Status: DISCONTINUED | OUTPATIENT
Start: 2023-02-13 | End: 2023-02-13

## 2023-02-13 RX ORDER — SODIUM CHLORIDE, SODIUM LACTATE, POTASSIUM CHLORIDE, CALCIUM CHLORIDE 600; 310; 30; 20 MG/100ML; MG/100ML; MG/100ML; MG/100ML
INJECTION, SOLUTION INTRAVENOUS CONTINUOUS
Status: DISCONTINUED | OUTPATIENT
Start: 2023-02-13 | End: 2023-02-13 | Stop reason: HOSPADM

## 2023-02-13 RX ORDER — BUPIVACAINE HYDROCHLORIDE AND EPINEPHRINE 5; 5 MG/ML; UG/ML
INJECTION, SOLUTION EPIDURAL; INTRACAUDAL; PERINEURAL
Status: DISCONTINUED | OUTPATIENT
Start: 2023-02-13 | End: 2023-02-13 | Stop reason: HOSPADM

## 2023-02-13 RX ADMIN — FENTANYL CITRATE 100 MCG: 50 INJECTION, SOLUTION INTRAMUSCULAR; INTRAVENOUS at 09:02

## 2023-02-13 RX ADMIN — HYDRALAZINE HYDROCHLORIDE 10 MG: 20 INJECTION INTRAMUSCULAR; INTRAVENOUS at 08:02

## 2023-02-13 RX ADMIN — DEXMEDETOMIDINE HYDROCHLORIDE 10 MCG: 100 INJECTION, SOLUTION, CONCENTRATE INTRAVENOUS at 09:02

## 2023-02-13 RX ADMIN — ONDANSETRON HYDROCHLORIDE 4 MG: 2 INJECTION INTRAMUSCULAR; INTRAVENOUS at 08:02

## 2023-02-13 RX ADMIN — HYDRALAZINE HYDROCHLORIDE 10 MG: 20 INJECTION INTRAMUSCULAR; INTRAVENOUS at 09:02

## 2023-02-13 RX ADMIN — LABETALOL HYDROCHLORIDE 10 MG: 5 INJECTION INTRAVENOUS at 09:02

## 2023-02-13 RX ADMIN — DEXMEDETOMIDINE HYDROCHLORIDE 20 MCG: 100 INJECTION, SOLUTION, CONCENTRATE INTRAVENOUS at 09:02

## 2023-02-13 RX ADMIN — FENTANYL CITRATE 100 MCG: 50 INJECTION, SOLUTION INTRAMUSCULAR; INTRAVENOUS at 08:02

## 2023-02-13 RX ADMIN — FENTANYL CITRATE 50 MCG: 50 INJECTION, SOLUTION INTRAMUSCULAR; INTRAVENOUS at 08:02

## 2023-02-13 RX ADMIN — CEFAZOLIN 2 G: 330 INJECTION, POWDER, FOR SOLUTION INTRAMUSCULAR; INTRAVENOUS at 08:02

## 2023-02-13 RX ADMIN — SODIUM CHLORIDE, SODIUM LACTATE, POTASSIUM CHLORIDE, AND CALCIUM CHLORIDE: .6; .31; .03; .02 INJECTION, SOLUTION INTRAVENOUS at 08:02

## 2023-02-13 NOTE — ANESTHESIA POSTPROCEDURE EVALUATION
Anesthesia Post Evaluation    Patient: Jefferson Boogie    Procedure(s) Performed: Procedure(s) (LRB):  SPINAL CORD STIMULATOR EXPLANT AND REIMPLANT SALUDA REP (N/A)    Final Anesthesia Type: MAC      Patient location during evaluation: [facility]  Patient participation: Yes- Able to Participate  Level of consciousness: awake and awake and alert  Post-procedure vital signs: reviewed and stable  Pain management: adequate  Airway patency: patent    PONV status at discharge: No PONV  Anesthetic complications: no      Cardiovascular status: blood pressure returned to baseline and hemodynamically stable  Respiratory status: unassisted and room air  Hydration status: euvolemic  Follow-up not needed.          Vitals Value Taken Time   /69 02/13/23 0700   Temp 36.6 °C (97.8 °F) 02/13/23 0659   Pulse 65 02/13/23 0659   Resp 18 02/13/23 0659   SpO2 98 % 02/13/23 0659         No case tracking events are documented in the log.      Pain/Kamron Score: No data recorded

## 2023-02-13 NOTE — DISCHARGE SUMMARY
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      ascorbic acid, vitamin C, (VITAMIN C) 1000 MG tablet Take 1,000 mg by mouth.      celecoxib (CELEBREX) 200 MG capsule       ergocalciferol, vitamin D2, (VITAMIN D ORAL) Take by mouth every 30 days.      guaiFENesin-codeine 100-10 mg/5 ml (TUSSI-ORGANIDIN NR)  mg/5 mL syrup Take by mouth as needed.      irbesartan (AVAPRO) 75 MG tablet 150 mg once daily.       levothyroxine (SYNTHROID) 100 MCG tablet Take 100 mcg by mouth.      mirabegron (MYRBETRIQ ORAL) Take by mouth.      simvastatin (ZOCOR) 20 MG tablet Take 20 mg by mouth every evening.      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  Activity: as per instruction sheet.  Discharge to: Home with a responsible adult.  Follow up: 2-4 weeks    Wound care:  1 - in case of sutures/staples leave underlying strips in place until follow up.  2 - No soaking bath or swimming until after follow up. Sponge bath to front is ok   3 -Take your pain medication as needed.   4 -Take over the counter stool softener while taking pain medication to prevent constipation.   5 -If no bowel movement in 2  days take miralax twice a day.  6 -Ok for light activity (light housework, walking, stair climbing) no strenuous exercise until after follow up.   7 -No lifting greater than 20 pounds or 1 gallon of milk until after follow up.  8 - Please call my office if experienced any weakness or loss of sensation, fever > 101.5, pain uncontrolled with oral medications, persistent nausea/vomiting/or diarrhea, redness or drainage from the incisions, or any other worrisome concerns. If physician on call was not reached or could not communicate with our office for any reason please go to the nearest emergency department   9 -Please keep abdominal binder (if ordered) on during the day time and activity, but remove before bedtime or at rest        Please call my office or pager at [phone] if experienced any weakness or loss of sensation, fever > 101.5, pain uncontrolled with oral medications, persistent nausea/vomiting/or diarrhea, redness or drainage from the incisions, or any other worrisome concerns. If physician on call was not reached or could not communicate with our office for any reason please go to the nearest emergency department        Samuel Jimenez

## 2023-02-13 NOTE — OP NOTE
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 Chronic pain syndrome    3. Spinal cord stimulator status      Post-Procedure diagnosis: SAME      PROCEDURE: SPINAL CORD STIMULATOR explant and new [place] SCS IMPLANT    SURGEON:DR. Samuel Jimenez MD    ASST: RESIDENT / FELLOW PHYSICIAN, MD    OPERATION: explant of previous SCS leads and IPG and implant of Percutaneous SCS lead x 2 and evoke system implantable pulse generator.    ANESTHESIA: MAC    PREOPERATIVE ANTIBIOTICS: 2 g ancef IV given 30 minutes prior to incision, see anesthesia record for time.  OPERATIVE PROCEDURE: PATIENT was brought to the operating room and was placed in the prone position. He was prepped and draped in the usual sterile fashion with chlorhexidine and chlorhexidine three times. With fluoroscopic guidance the location of previous midline and left IPG old incisions were identified and local anesthetic consisting of 1% Lidocaine and 0.5 bupivacaine with epinephrine was injected subcutaneously over the area. An approximately 5 cm longitudinal incision was made in the midline. The incision was dissected down to the supraspinous ligament. Hemostasis was established and a self-retaining retractor was used to allow adequate visualization. Same was done for the longitudinal 5 cm incision above The left iliac crest.  After visualizing the old IPG and removal from the pocket.  The 2 electrodes were cut and the IPG was sent for gross identification.  From the midline incision both old anchors were cut loose from fascia and the 2 electrodes were pulled out of the patient.  After checking with fluoroscopy to make sure that no hardware were left behind. A 14 gauge touhy was then introduced with the paraspinous approach under fluoroscopic guidance into the T12/L1  interlaminar space.  The entry of the Tuohy into the epidural space was verified by using loss of resistance to air with a glass 5 mL syringe. The lead was passed through the needle and advanced up to top of T7 with fluoroscopic guidance. A second lead was then placed in identical fashion and placed adjacent to the first with tip at top of T7. The leads were then connected to cables for testing and mapping paresthesia  to painful area The leads were then anchored to the supraspinous ligaments with Fix-it apparatus using the anchoring devices.   The tunneling was then done between the lead and the old pocket site after the administration of additional local anesthetic subcutaneously.  The tunneling tool with a wedged tip attachment was introduced subcutaneously from the lead incision and guided to the pocket. The leads were inserted into the tunneling catheter and advanced to the pocket site. The leads were then inserted into the implantable new evoke pulse generator and the screws were tightened with the hexwrench. The generator was then introduced into the pocket and remote tested to ensure adequate impedence and placement of the leads into the IPG. Both surgical wounds were then irrigated with saline followed by 1g vancomycin powder divided on both wounds then the wounds were closed with #2-0 Vicryl suture in 2 separate layers. This was followed-up staples. A sterile dressing was applied. No specimens collected.    Patient was taken to recovery in a stable condition with no change in neurological exam         Blood Loss: Nill  Specimen: None        Samuel Jimenez MD

## 2023-02-14 ENCOUNTER — PATIENT MESSAGE (OUTPATIENT)
Dept: PAIN MEDICINE | Facility: CLINIC | Age: 88
End: 2023-02-14
Payer: MEDICARE

## 2023-02-14 ENCOUNTER — PATIENT MESSAGE (OUTPATIENT)
Dept: SPINE | Facility: CLINIC | Age: 88
End: 2023-02-14
Payer: MEDICARE

## 2023-02-16 ENCOUNTER — TELEPHONE (OUTPATIENT)
Dept: PAIN MEDICINE | Facility: CLINIC | Age: 88
End: 2023-02-16
Payer: MEDICARE

## 2023-02-16 NOTE — TELEPHONE ENCOUNTER
----- Message from Aidetom Mcfarlane sent at 2/16/2023  8:59 AM CST -----  Regarding: appointment change  Type:  Patient Returning Call    Who Called:lilliana     Who Left Message for Patient: unsure     Does the patient know what this is regarding?:yes    Would the patient rather a call back or a response via [facility]? Call     Best Call Back Number:[phone]    Additional Information: Pt stated he CAN come in at 8.on 2-20.  His ringer was off, please give him a call back to let him know what his options are.

## 2023-02-20 ENCOUNTER — OFFICE VISIT (OUTPATIENT)
Dept: PAIN MEDICINE | Facility: CLINIC | Age: 88
End: 2023-02-20
Payer: MEDICARE

## 2023-02-20 VITALS
OXYGEN SATURATION: 100 % | SYSTOLIC BLOOD PRESSURE: 129 MMHG | RESPIRATION RATE: 18 BRPM | WEIGHT: 200.63 LBS | TEMPERATURE: 98 F | HEART RATE: 58 BPM | DIASTOLIC BLOOD PRESSURE: 59 MMHG | BODY MASS INDEX: 26.47 KG/M2

## 2023-02-20 DIAGNOSIS — M47.819 SPONDYLOSIS WITHOUT MYELOPATHY: Primary | ICD-10-CM

## 2023-02-20 DIAGNOSIS — G03.9 ARACHNOIDITIS: ICD-10-CM

## 2023-02-20 DIAGNOSIS — G89.29 OTHER CHRONIC PAIN: ICD-10-CM

## 2023-02-20 DIAGNOSIS — Z96.89 SPINAL CORD STIMULATOR STATUS: ICD-10-CM

## 2023-02-20 LAB
FINAL PATHOLOGIC DIAGNOSIS: NORMAL
GROSS: NORMAL
Lab: NORMAL

## 2023-02-20 PROCEDURE — 99024 PR POST-OP FOLLOW-UP VISIT: ICD-10-PCS | Mod: POP,,, | Performed by: NURSE PRACTITIONER

## 2023-02-20 PROCEDURE — 99214 OFFICE O/P EST MOD 30 MIN: CPT | Mod: PBBFAC | Performed by: NURSE PRACTITIONER

## 2023-02-20 PROCEDURE — 99024 POSTOP FOLLOW-UP VISIT: CPT | Mod: POP,,, | Performed by: NURSE PRACTITIONER

## 2023-02-20 PROCEDURE — 99999 PR PBB SHADOW E&M-EST. PATIENT-LVL IV: CPT | Mod: PBBFAC,,, | Performed by: NURSE PRACTITIONER

## 2023-02-20 PROCEDURE — 99999 PR PBB SHADOW E&M-EST. PATIENT-LVL IV: ICD-10-PCS | Mod: PBBFAC,,, | Performed by: NURSE PRACTITIONER

## 2023-02-20 NOTE — PROGRESS NOTES
Chronic patient Established Note (Follow up visit)      SUBJECTIVE:    Interval History 2/20/2023:  Mr Boogie presents for follow up of Lantry SCS replacement. He states doing well. No constitutional signs symptoms concerning for infection.  No new areas of pain or neurological changes since procedure. No voicing of s/s concerning for cauda equina syndrome.     Interval History 1/23/2023:  Still taking Celebrex, however hasn't noticed a difference in pain with this medication. Location, quality, and severity of pain are unchanged from prior visit and is described above. Here today to discuss removal of Nervo SCS, and implant of Lantry SCS. He states his stimulator has not been helpful for the past 2 years despite multiple reprogramming and adjustment.    Interval History 11/21/22:  Reports 24 hours of 100% relief for 48 hours, but he reports that now his pain is only approximally 10% better. His pain at rest is 3-4/10. Pain at its worst is 8-9/10. Pain is improved when leaning over a grocery cart. Pain only allows him to walk for 3-4mins.      Interval History 8/29/22:  Jefferson Boogie presents to the clinic for a follow-up appointment for back pain.  He had his second bilateral L3, L4, L5 MBB and reports 100% pain relief. He would like to proceed with RFA.     Interval History 5/26/22:  Jefferson Boogie presents to the clinic for a follow-up appointment for back pain. Since the last visit, Jefferson Boogie states the pain has been stable. Certain postures he can tolerate in the standing position such as leaning on a shopping cart or support to his posterior thighs. He now uses a walker when covering long distances. He continues to play golf despite it aggravating his pain. He receives some benefit from the SCS whereas other interventions have not helped. He remains interested in the Lantry device.     Interval History 3/24/22:  Jefferson Boogie presents to the clinic for a follow-up appointment for back  pain. Since the last visit, Jefferson Boogei states the pain has been worse than usual. Current pain intensity is 8/10. He continues to report benefit with Nevro SCS however he has not been able to get in touch with the representative in order to have his settings adjusted. He continues to take occasional norco 7.5 mg with benefit, particularly when he plays golf.     Interval History 2/10/22:  Patent presents today s/p caudal epidural steroid injections on 1/12/22 since then he has not had any relief. He states his pain has been unchanged and is a 6/10 on average with the worst being 8/10 and best is 4/10. He has been taking percocet 5mg when he golfs or plays with his granddaughter.      Interval History 12/30/21:  Jefferson Boogie presents to clinic for follow up appointment s/p Right SI joint and Right piriformis muscle injection under US guidance on 11/29/21. He did not obtain any noticeable relief with this procedure similar to all of his previous injections. His pain is unchanged and constant over the right buttock. He is taking percocet 5mg x 3 on days he is more active, such as playing golf. This helps some, but minimally. Denies any new symptoms or neurological changes. No numbness, tingling, weakness in his lower extremities. Denies bowel/bladder incontinence or saddle anesthesia.      Interval History 11/4/2021:  Jefferson Boogie presents to the clinic for a follow-up appointment for right sided back pain and SI joint pain. Mr. Boogie had a right SI joint injection on 10/13/2021. He did not note significant relief with the injection for any period of time. Pain is still constant over the right buttock and most noticeable when playing golf. He denies any new bowel/bladder incontinence, lower extremity numbness or weakness or saddle anesthesia.     Interval History 8/26/2021:  Jefferson Boogie presents to the clinic for a follow-up appointment for right sided hip pain with right-sided radiculopathy  ". Since the last visit, Jefferson Boogie states the pain has been "particularly tender today" 7/10. Patient reports playing golf recently and experienced worsening symptoms the following day. Mr. Boogie is s/p right-side hip joint injection with minimal relief. Patient states he has had relief with previous interventions and is open to RFA.     Interval History 6/10/2021:  Jefferson Boogie presents to the clinic for a follow-up appointment. Since the last visit, Jefferson Boogie states the pain has been stable. Current pain intensity is 3/10. States he is still having pain around SIJ that is affecting his ADL      Interval History 4/15/2021:  The patient returns to clinic today for follow up of back pain. He reports that the swelling above the SI fusion incision has resolved. He denies any fever, chills, or drainage from the incision. He does report benefit since the fusion. He continues to report benefit with Nevro SCS. He reports intermittent shoulder pain at this time. He did receive an injection with Sports Medicine with benefit. He denies any other health changes. His pain today is 2/10.     Interval History 3/25/21:  Mr. Boogie presents to clinic for follow up of bilateral shoulder pain. He is s/p BL subacromial injections on 2/8/21. He reports maximum 20% relief of pain in the Right shoulder. Today the Left shoulder pain is 1/10; Right shoulder pain is 5-7/10.      Interval History 3/22/2021:  The patient returns to clinic today for follow up of back pain. He is s/p right SI fusion on 3/15/2021. He reports some relief at this time. He does report soreness to the incision. He denies any fever, chills, or drainage from incision. He continues to report benefit with Nevro SCS. He denies any other health changes. His pain today is 4/10.     Interval History 1/11/2020:  Patient is here for follow-up of low back pain now s/p sacroiliac RFA on 12/9/20 which provided no benefit and with the new complaint of " bilateral anterior shoulder pain R>L. His shoulder pain started about 2 months ago. He describes 9/10 sharp/stinging pain without radiation that is exacerbated with overhead activity and improved with rest. He started PT about one month ago. He takes oxycodone which provides some relief. He had a blind subacromial steroid injection in the right shoulder with Dr. Ramirez on 10/26/19 which provided some short term relief.     Interval History 11/5/2020:  Jefferson Boogie presents to the clinic for a follow-up appointment for low back pain. Since the last visit, Jefferson Boogie states the pain has been stable. Current pain intensity is 4/10. He had good relief from the SI joint injection that lasted for about a week. Pain has since returned. He also slipped and fell on his buttock a couple of weeks ago and had increased pain in the right buttock after that which is slowly improving. He continues to have variable benefit with the Nevro SCS for intermittent pain in the right leg. He is scheduled to meet with representatives today. He is taking celebrex daily and percocet as needed sparingly. He denies any adverse effects and any other health changes.     Interval History 10/5/2020:  The patient returns to clinic today for follow up of low back pain. He is s/p right SI joint injection on 9/9/2020. He reports 25% relief of his right sided low back and buttock pain. He did have good relief the first two days. He continues to report right sided low back and buttock pain. He denies any radicular leg pain. His pain is worse with prolonged standing and walking. He continues to report intermittent pain into his achilles from previous injury. He feels as though this has changed his gait. He continues to report some benefit with Nevro SCS device. He is in contact with representatives. He is currently taking Percocet as needed sparingly with some benefit. He denies any adverse effects. He denies any other health changes. His pain  today is 4/10.      Interval History 9/2/2020:  The patient returns to clinic today for follow up of back pain. He reports that his back pain has improved since last audio visit. He continues to report back pain with intermittent radiating pain into his right hip and calf. He has spoken with Bienvenido from [employer] via phone with some programming. He is meeting with Bienvenido today. He had some issues with charging but this has improved. He is currently taking Norco intermittently but this is only lasts 2-3 hours. He denies any adverse effects. He denies any other health changes. His pain today is 8/10.     Interval History 08/27/2020:  Pt was contacted over virtual audio for a follow up appointment.  He is currently complaining of right hip and right calf with no associated numbness or weakness.  He continues to use his Nevro device.  He states it has been very frustrating. Inconsistent.  Took 20 mins to 1 hour to charge.  Couldn't get it to start this morning.  He states that there is no drainage at the battery site, no signs of infection or pain at the site.  Patient is not taking medications at this time.  He wants to speak about medication options because he would like to start playing golf again.     Interval History 8/17/2020:  The patient returns to clinic today for post op wound check. He continues to report benefit with Nevro device. He denies any fever, chills, or drainage. He continues to follow his activity restrictions. He does report a recent achilles tendon injury while swimming. He is seeing Orthopedics. He denies any other health changes. His pain today is 4/10.     Interval History 8/3/2020:  The patient returns to clinic today for post op. He is s/p Nevro battery change on 7/27/2020. He denies any fever, chills, or drainage from incision. He has not completed his antibiotics as he missed two days of the medication. He continues to follow his activity restrictions. He reports relief with the device. He is  meeting with Bienvenido today for programming. He denies any other health changes. His pain today is 2/10     Interval History 7/22/2020:  Pt presents for audio follow up only s/p Right SI joint injection on 7/8/2020. Pt states he has near resolution of focal pain to buttock. He is pleased with result and pain relief. Pt has been in contact with [employer] and will be having battery swap in 5 days. He has difficulty whether stimulator is beneficial and has even had a holiday from using SCS.  He denies any newer areas pain, denies any neuro changes, meds area adequate to control pain without adverse side effects. Pain is 2/10 today.     Interval HPI 6/15/2020:  Jefferson Boogie presents to the clinic for a follow-up appointment for 3 week follow up right hip and right thigh pain. Since the last visit, Jefferson Boogie states the pain has been persistant. Current pain intensity is 5/10. Patient has been working with Bienvenido Varghese, to try and get better coverage of a localized pain that he still experiences in his right low back area. He does report improvement in symptoms of numbness/tingling. Today, worse pain is 1/10 in severity and localizes to the right SI joint. Pain is worse with extension of his back. It is worse with golfing. Pain relieved by Norco--he takes 1-2 tablets of 5-325 Norco on days that he plays golf. Pain is also improved with being still and not being active. Patient endorses a much more active lifestyle with Norco. Denies new weakness/numbness or bowel/bladder changes.     Previous injection history includes a series of 3 lumbar CHOCO at [facility].      Interval HPI 3/5/20:  Patient presents to the clinic for a follow-up appointment for low back pain. Since the last visit, he states his pain has been unchanged. Current pain intensity is 4/10. He continues to work with [employer] to adjust settings on his SCS. He has stopped using tramadol, and uses norco sparingly. He continues to work with a personal   for physical therapy.      Interval HPI 1/9/2020:  Patient returns for follow up s/p Nevro SCS. He states he is unsure if it has helped his pain since he had it implanted. He last had an adjustment of his programming about 1 month ago. He does note that once he is done charging his SCS his pain is improved. Pain still worse with prolonged standing and activity such as golf. He still is doing home exercise program. He says that he is trying to do more since getting the SCS. He is still taking the Tramadol with limited pain relief. He takes Tramadol 50 mg twice daily only on days of activity which is once a week. No other health changes.      HPI 11/20/19  Jefferson Boogie returns to clinic today for follow up. He reports increased low back and leg pain over the last few days. He has been in contact with [employer] representatives for programming adjustments. He does report benefit with the device. He reports good days and bad days. His pain is worse with prolonged standing and activity. He continues to participate in a home exercise routine. He does take Tramadol sparingly but reports limited relief. He denies any other health changes. His pain today is 5/10.    Pain Disability Index Review:  Last 3 PDI Scores 2/20/2023 1/23/2023 8/29/2022   Pain Disability Index (PDI) 28 [phone] 21       Pain Medications:  Celebrex     Opioid Contract: not applicable     report:  Reviewed and consistent with medication use as prescribed.    Pain Procedures  9/9/2020- Right SI joint injection  7/27/2020- Nevro SCS battery change  7/8/2020- Right SIJ injection >80% relief   8/26/19 SCS implant  8/5/19 SCS trial  7/8/2020- Right SI joint injection  7/27/2020- SCS battery revision  9/9/2020- Right SI joint injection   12/9/2020- Right L5-S1 RFA  3/15/2021- Right SI fusion  7/21/2021 - Injection R Hip - minimal relief  10/13/2021 - Right SI joint injection  11/29/2021 - R SI joing and R piriformis muscle injection - no relief    2022- Caudal CHOCO- no relief   22 - Diagnostic Bilateral L3, L4 and L5 Lumbar Medial Branch Block under Fluoroscopy  22- Right L3, L4, L5 RFA  10/12/22 -  Left L3, L4, L5 RFA    Physical Therapy/Home Exercise: no    Imagin/10/2021 - X ray Hips Bilateral: No radiographic evidence of acute osseous abnormality of the pelvis and hips and without radiographic evidence of osteonecrosis of the femoral heads.     21 MRI L-spine:  FINDINGS:  Lumbar sagittal alignment is slightly is straightened.  There is slight convex right curvature lumbar spine.  There is degenerative disc disease with intervertebral disc height loss and endplate degenerative change at all levels with scattered disc desiccation allowing for degenerative change the lumbar vertebral body heights and contours are within normal is without evidence for acute fracture or subluxation.  There is heterogeneous T1/T2 signal focus within the right aspect of the S1 vertebra most compatible with hemangioma this measures 2.0 cm.     The distal spinal cord and conus is normal in signal and contour tip of the conus approximates the T12/L1 level.  Please note there is artifact from indwelling spinal stimulator device with leads partially visualized casting artifact entering the dorsal epidural space at the T12 level and extending cranially.     There is abnormal clumping configuration of the filum terminalis a from T12 through L5 with slight thickening of scattered nerve roots most compatible with arachnoiditis.     No aneurysmal dilatation of the visualized abdominal aorta.     T12/L1 through L1/L2: No significant disc bulge, central canal or neural foraminal stenosis.     L2/L3: Posterior disc osteophyte with facet joint arthropathy without significant central canal stenosis with mild bilateral neural foraminal stenosis.     L3/L4:Bulging disc with ligamentum flavum hypertrophy without significant central canal stenosis with mild bilateral  neural foraminal stenosis.     L4/L5:Posterior disc osteophyte with ligamentum flavum hypertrophy and facet joint arthropathy without significant central canal stenosis and mild bilateral neural foraminal stenosis.     L5/S1: Posterior disc osteophyte with facet joint arthropathy without significant central canal stenosis with moderate right and mild left neural foraminal stenosis.     This report was flagged in Epic as abnormal.     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  Please note there is spinal stimulator device with leads partially visualized and distorting the study by artifacts.     There is abnormal thickening of the filum configuration most compatible with arachnoiditis.     Please see above for additional details.           Lumbar spine MRI with contrast 2019    FINDINGS: There are 5 lumbar type vertebral bodies lumbar vertebral body height and alignment are maintained. The signal from the lumbar vertebra demonstrates an admixture of red and yellow marrow. There are some Modic type degenerative signal changes involving the anterior inferior aspect of L5. There is slight anterior disc bulging at the L5-S1 level. All of the lumbar discs are desiccated with severe narrowing of the L2-L3 disc space, moderate narrowing of the L1-2 disc space posteriorly, moderate narrowing of the L5-S1 disc space and mild narrowing of the L3-4 and L4-5 disc spaces.    L5-S1 level: There is bilateral facet joint arthropathy with a slight amount of fluid in both facet joints. There is bilateral, right greater left, foraminal narrowing but no central canal stenosis. There is a mild broad-based posterior protrusion of the L5-S1 disc that extends towards the left-sided foramen and into and possibly through the right-sided foramen. This is similar to what was seen on the prior exam. Incidental note is made of a mild amount of epidural fat that is deposited along the right anterior and  left anterior aspect of thecal sac at the L5-S1 disc space level.    L4-L5 level: There is bilateral, left greater than right, facet joint arthropathy without central canal stenosis. There is no bony foraminal narrowing. There is a slight to mild broad-based posterior protrusion of the disc that extends to the medial foramen bilaterally.    L3-L4 level: There is bilateral facet joint arthropathy with slight ligament flavum redundancy but no bony foraminal narrowing or bony central canal stenosis. There is a slight to mild broad-based posterior protrusion of the disc that extends towards the right-sided foramen and into the anteromedial left-sided foramen.    L2-L3 level: There is bilateral facet joint arthropathy without bony central canal stenosis or bony foraminal narrowing. There is a slight to mild bilobed posterior protrusion of the disc at the level of the anteromedial foramen bilaterally and this is best seen on the axial images.    L1-L2 level: There is bilateral facet arthropathy without bony foraminal narrowing or bony central canal stenosis and the posterior disc margin is unremarkable.    The tip of the conus medullaris extends down to the level of the superior endplate of L1 and the signal from the visualized conus is unremarkable. There is clumping of the cauda equina nerve rootlets throughout the distal thecal sac consistent with arachnoiditis.  Following contrast administration, there is no abnormal enhancement of any of the bony or soft tissue elements of the lumbar spine except for possibly one or 2 facet joints.    Impression:   1. No abnormal enhancement of any bony or soft tissue elements of the lumbar spine except for possibly one or 2 facet joints. Clumping of the nerve rootlets of the cauda equina consistent with arachnoiditis.  3. Desiccation of all of the lumbar disks with multilevel disc space narrowing, multilevel facet joint arthropathy, but no central canal stenosis.  4. Modic type  degenerative signal changes in the anterior inferior aspect of L5.  5. There is a mild amount of epidural fat deposit along the right anterior and left anterior aspect of the thecal sac at the L5-S1 level..  6. Other findings as discussed above.    MRI WO Lumbar spine:  1. Since the prior study, there has been interval development of arachnoiditis including multifocal multisegmental clumping of the nerve rootlets of the cauda equina.  2. All of the lumbar discs are desiccated with multilevel disc space narrowing and desiccation. There is multilevel facet joint arthropathy and foraminal narrowing as detailed above. There is no central canal stenosis.  3. There are posteriorly protruding discs at every level in the lumbar spine except L1-L2 and, for the most part, they appear unchanged from the prior study of 3/26/2018.    Thoracic spine MRI:  FINDINGS: Thoracic vertebral body height and alignment are maintained with a few small scattered Schmorl's nodes involving the endplates of several mid to lower thoracic vertebra. The T3-4 vertebra are partially fused. There is some degree of desiccation of all of the thoracic discs with multilevel disc space narrowing, especially at the T7-T8, T6-T7 and T5-T6 levels. There is no abnormal prevertebral soft tissue thickening. The somewhat heterogeneous appearance to the signal from the thoracic vertebra is presumably secondary to an admixture of red and yellow marrow.    T1-T2 level: There is no bony foraminal narrowing or bony central canal stenosis and the posterior disc margin is unremarkable.    T2-T3 level: There is no bony foraminal narrowing or bony central canal stenosis and the posterior disc margin is unremarkable.    T3-T4 level: There is no bony foraminal narrowing or bony central canal stenosis and the posterior disc margin is unremarkable.    T4-5 level: There is no bony foraminal narrowing or bony central canal stenosis and the posterior disc margin is  unremarkable.    T5-T6 level: There is no bony foraminal narrowing or bony central canal stenosis and the posterior disc margin is unremarkable.    T6-T7 level: There is no bony foraminal narrowing or bony central canal stenosis and the posterior disc margin is unremarkable.    T7-T8 level: There is no bony foraminal narrowing or bony central canal stenosis and the posterior disc margin is unremarkable.    T8-T9 level: There is no bony foraminal narrowing or bony central canal stenosis and the posterior disc margin is unremarkable.    T9-T10 level: There is no bony foraminal narrowing or bony central canal stenosis and the posterior disc margin is unremarkable.    T10-T11 level: There is no bony foraminal narrowing or bony central canal stenosis and the posterior disc margin is unremarkable.    T11-T12 level: There is no bony foraminal narrowing or bony central canal stenosis and the posterior disc margin is unremarkable.    T12-L1 level: The tip the conus medullaris extends down to level of the superior endplate of L1. 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 There is no bony foraminal narrowing or bony central canal stenosis at this level.    On the axial images, the immediate paravertebral soft tissues are unremarkable. A small cyst is seen to involve the upper pole the left kidney.    Following contrast administration, there is no abnormal enhancement of any bony or soft tissue elements of the thoracic spine. There is no abnormal enhancement of the subserosal surface of the thoracic spinal cord. Some foci of mild signal intensity in the CSF dorsal to the spinal cord of some of the sagittal sequences presumably is secondary to CSF pulsation artifact.    On the sagittal  image, there is cervical spondylosis and kyphosis with narrowing of all of the disc spaces in the cervical spine.    Allergies: Review of patient's allergies indicates:  No Known  Allergies    Current Medications:   Current Outpatient Medications   Medication Sig Dispense Refill    ascorbic acid, vitamin C, (VITAMIN C) 1000 MG tablet Take 1,000 mg by mouth.      celecoxib (CELEBREX) 200 MG capsule       cephALEXin (KEFLEX) 500 MG capsule Take 1 capsule (500 mg total) by mouth every 6 (six) hours. for 7 days 28 capsule 0    ergocalciferol, vitamin D2, (VITAMIN D ORAL) Take by mouth every 30 days.      guaiFENesin-codeine 100-10 mg/5 ml (GUAIFENESIN AC)  mg/5 mL syrup Take 5 mLs by mouth 3 (three) times daily as needed for Cough. 118 mL 0    HYDROcodone-acetaminophen (NORCO) 5-325 mg per tablet Take 1 tablet by mouth every 8 (eight) hours as needed for Pain. 21 tablet 0    irbesartan (AVAPRO) 75 MG tablet 150 mg once daily.       levothyroxine (SYNTHROID) 100 MCG tablet Take 100 mcg by mouth.      mirabegron (MYRBETRIQ ORAL) Take by mouth.      simvastatin (ZOCOR) 20 MG tablet Take 20 mg by mouth every evening.      tadalafil (CIALIS) 20 MG Tab       temazepam (RESTORIL) 30 mg capsule TK 1 C PO QD HS       No current facility-administered medications for this visit.     Facility-Administered Medications Ordered in Other Visits   Medication Dose Route Frequency Provider Last Rate Last Admin    balanced salt irrigation intra-ocular solution 1 drop  1 drop Right Eye On Call Procedure Bell Cedeno MD        sodium chloride 0.9% flush 2 mL  2 mL Intravenous PRN Bell Cedeno MD           REVIEW OF SYSTEMS:    GENERAL:  No weight loss, malaise or fevers.  HEENT:  Negative for frequent or significant headaches.  NECK:  Negative for lumps, goiter, pain and significant neck swelling.  RESPIRATORY:  Negative for cough, wheezing or shortness of breath.  CARDIOVASCULAR:  Negative for chest pain, leg swelling or palpitations.  GI:  Negative for abdominal discomfort, blood in stools or black stools or change in bowel habits.  MUSCULOSKELETAL:  See HPI.  SKIN:  Negative for lesions, rash, and  itching.  PSYCH:  negative for sleep disturbance, mood disorder and recent psychosocial stressors.  HEMATOLOGY/LYMPHOLOGY:  Negative for prolonged bleeding, bruising easily or swollen nodes.  NEURO:   No history of headaches, syncope, paralysis, seizures or tremors.  All other reviewed and negative other than HPI.    Past Medical History:  Past Medical History:   Diagnosis Date    Bronchitis     Cancer     stage I bladder cancer 50 yrs ago    Hypercholesteremia     Hypertension     Sacroiliitis 07/08/2020    Thyroid disease        Past Surgical History:  Past Surgical History:   Procedure Laterality Date    BLADDER SURGERY      CATARACT EXTRACTION      CATARACT EXTRACTION W/  INTRAOCULAR LENS IMPLANT Right 3/9/2022    Procedure: EXTRACTION, CATARACT, WITH IOL INSERTION;  Surgeon: Bell Cedeno MD;  Location: [facility] OR;  Service: Ophthalmology;  Laterality: Right;    COLONOSCOPY      EPIDURAL STEROID INJECTION N/A 1/12/2022    Procedure: INJECTION, STEROID, EPIDURAL CAUDAL With Catheter needs consent;  Surgeon: Samuel Jimenez MD;  Location: [facility] PAIN MGT;  Service: Pain Management;  Laterality: N/A;    EYE SURGERY      cataracts     FUSION OF SACROILIAC JOINT Right 3/15/2021    Procedure: FUSION, RIGHT SACROILIAC JOINT FUSION;  Surgeon: Samuel Jimenez MD;  Location: [facility] OR;  Service: Pain Management;  Laterality: Right;  C-ARM, PAINTEQ REP     HERNIA REPAIR      INJECTION OF ANESTHETIC AGENT AROUND NERVE Bilateral 6/1/2022    Procedure: BLOCK, NERVE MEDIAL BRANCH L3,4,5 BILATERAL 1st;  Surgeon: Samuel Jimenez MD;  Location: [facility] PAIN MGT;  Service: Pain Management;  Laterality: Bilateral;    INJECTION OF ANESTHETIC AGENT AROUND NERVE Bilateral 8/24/2022    Procedure: Block, Nerve Kenny L3, L4, & L5;  Surgeon: Samuel Jimenez MD;  Location: [facility] PAIN MGT;  Service: Pain Management;  Laterality: Bilateral;    INJECTION OF JOINT Right 7/8/2020    Procedure: INJECTION, JOINT, SACROILIAC (SI);  Surgeon:  Samuel Jimenez MD;  Location: [facility] PAIN [facility]GT;  Service: Pain Management;  Laterality: Right;    INJECTION OF JOINT Right 9/9/2020    Procedure: INJECTION, JOINT, SACROILIAC (SI);  Surgeon: Samuel Jimenez MD;  Location: [facility] PAIN MGT;  Service: Pain Management;  Laterality: Right;    INJECTION OF JOINT Right 7/21/2021    Procedure: INJECTION, JOINT, HIP RIGHT;  Surgeon: Samuel Jimenez MD;  Location: [facility] PAIN MGT;  Service: Pain Management;  Laterality: Right;  CONSENT NEEDED    INJECTION OF JOINT Right 10/13/2021    Procedure: INJECTION, JOINT, SACROILIAC (SI)  NEED CONSENT pt. requesting sedation;  Surgeon: Samuel Jimenez MD;  Location: [facility] PAIN MGT;  Service: Pain Management;  Laterality: Right;    KNEE SURGERY      RADIOFREQUENCY ABLATION Right 12/9/2020    Procedure: RADIOFREQUENCY ABLATION, L5-S1-S2 LATERAL BRANCH COOLED;  Surgeon: Samuel Jimenez MD;  Location: [facility] PAIN MGT;  Service: Pain Management;  Laterality: Right;    RADIOFREQUENCY ABLATION Right 9/21/2022    Procedure: RADIOFREQUENCY ABLATION, RIGHT L3-L4-L5 PER DR JIMENEZ;  Surgeon: Samuel Jimenez MD;  Location: [facility] PAIN MGT;  Service: Pain Management;  Laterality: Right;    RADIOFREQUENCY ABLATION Left 10/12/2022    Procedure: RADIOFREQUENCY ABLATION, LEFT L3-L4-L5 TWO OF TWO;  Surgeon: Samuel Jimenez MD;  Location: [facility] PAIN MGT;  Service: Pain Management;  Laterality: Left;    REPLACEMENT OF NERVE STIMULATOR BATTERY N/A 7/27/2020    Procedure: Replacement, SPINAL CORD STIMULATOR BATTERY CHANGE TO NEVRO OMNION BATTERY;  Surgeon: Samuel Jimenez MD;  Location: [facility] OR;  Service: Pain Management;  Laterality: N/A;  C-ARM, NEVRO REP    REPLACEMENT OF SPINAL CORD STIMULATOR  2/13/2023    Procedure: REPLACEMENT, SPINAL CORD STIMULATOR;  Surgeon: Samuel Jimenez MD;  Location: [facility] OR;  Service: Pain Management;;    REVISION PROCEDURE INVOLVING SPINAL CORD NEUROSTIMULATOR N/A 2/13/2023    Procedure: SPINAL CORD  STIMULATOR EXPLANT AND REIMPLANT SALUDA REP;  Surgeon: Samuel Jimenez MD;  Location: [facility] OR;  Service: Pain Management;  Laterality: N/A;    TRIAL OF SPINAL CORD NERVE STIMULATOR N/A 2019    Procedure: Trial, Neurostimulator, SPINAL CORD STIMULATOR TRIAL;  Surgeon: Samuel Jimenez MD;  Location: [facility] CATH LAB;  Service: Pain Management;  Laterality: N/A;  C-ARM, NEVRO REP       Family History:  History reviewed. No pertinent family history.    Social History:  Social History     Socioeconomic History    Marital status:    Tobacco Use    Smoking status: Former     Types: Cigarettes     Quit date:      Years since quittin.1    Smokeless tobacco: Never    Tobacco comments:     stopped 40 years ago   Substance and Sexual Activity    Alcohol use: Yes     Alcohol/week: 1.0 standard drink     Types: 1 Shots of liquor per week     Comment: nightly -scotch    Drug use: Never    Sexual activity: Yes     Partners: Female       OBJECTIVE:    BP (!) 129/59   Pulse (!) 58   Temp 97.9 °F (36.6 °C) (Oral)   Resp 18   Wt 91 kg (200 lb 9.9 oz)   SpO2 100%   BMI 26.47 kg/m²       PHYSICAL EXAMINATION:  Voice normal.  Normal affect without evidence of distress.  Skin: SCS sites without redness, warmth or drainage. Staples removed, steri strips applied for reinforcement. Mild swelling without gross fluctuance of induration to left sided battery site more concerning for seroma.     Prior   PHYSICAL EXAMINATION:    General appearance: Well appearing, in no acute distress, alert and oriented x3.  Psych:  Mood and affect appropriate.  Skin: Skin color, texture, turgor normal, no rashes or lesions, in both upper and lower body.  Head/face:  Atraumatic, normocephalic. No palpable lymph nodes  Neck: No pain to palpation over the cervical paraspinous muscles. Spurling Negative. No pain with neck flexion, extension, or lateral flexion. .  Cor: RRR  Pulm: CTA  GI: Abdomen soft and non-tender.  Back: Straight leg  raising in the sitting and supine positions is negative to radicular pain. mild pain to palpation over the spine or costovertebral angles. Decreased range of motion without pain reproduction. Positive axial loading test bilateral. Negative FABERE, Ganselin and Yeoman's test on the right side. Negative FADIR.  Extremities: Tenderness to deep palpation of right piriformis. Peripheral joint ROM is full and pain free without obvious instability or laxity in all four extremities. No deformities, edema, or skin discoloration. Good capillary refill.  Musculoskeletal: No pain with WICHO, FADIR or modified Gaenslen's. Bilateral lower extremity strength is normal and symmetric.  No atrophy or tone abnormalities are noted.  Neuro: Bilateral lower extremity coordination and muscle stretch reflexes are physiologic and symmetric.  No clonus. No loss of sensation is noted.  GAIT: Antalgic gait. Not using walker today.    ASSESSMENT: 87 y.o. year old male with low back pain, consistent with the following diagnoses     1. Spondylosis without myelopathy        2. Other chronic pain        3. Arachnoiditis        4. Spinal cord stimulator status                PLAN:     - Prior records reviewed  - s/p SCS replacement with [place] device  - Staples removed, no s/s concerning for dehiscence or infection. Mild battery site swelling without gross fluctuance or induration. (Possible seroma, will continue to monitor)  - Steri-strips applied for reinforcement of surgical sites  - RTC 4 days for site re-evaluation, sooner if needed     The above plan and management options were discussed at length with patient. Patient is in agreement with the above and verbalized understanding.  Hugo Mora NP      02/20/2023

## 2023-02-22 ENCOUNTER — PATIENT MESSAGE (OUTPATIENT)
Dept: PAIN MEDICINE | Facility: CLINIC | Age: 88
End: 2023-02-22
Payer: MEDICARE

## 2023-02-22 ENCOUNTER — PATIENT MESSAGE (OUTPATIENT)
Dept: SPINE | Facility: CLINIC | Age: 88
End: 2023-02-22
Payer: MEDICARE

## 2023-02-23 ENCOUNTER — PATIENT MESSAGE (OUTPATIENT)
Dept: PAIN MEDICINE | Facility: CLINIC | Age: 88
End: 2023-02-23
Payer: MEDICARE

## 2023-03-30 ENCOUNTER — OFFICE VISIT (OUTPATIENT)
Dept: PAIN MEDICINE | Facility: CLINIC | Age: 88
End: 2023-03-30
Attending: ANESTHESIOLOGY
Payer: MEDICARE

## 2023-03-30 VITALS
DIASTOLIC BLOOD PRESSURE: 57 MMHG | WEIGHT: 200.63 LBS | RESPIRATION RATE: 17 BRPM | BODY MASS INDEX: 26.59 KG/M2 | HEART RATE: 61 BPM | OXYGEN SATURATION: 100 % | HEIGHT: 73 IN | SYSTOLIC BLOOD PRESSURE: 153 MMHG

## 2023-03-30 DIAGNOSIS — M54.16 LUMBAR RADICULOPATHY: ICD-10-CM

## 2023-03-30 DIAGNOSIS — M47.816 LUMBAR SPONDYLOSIS: ICD-10-CM

## 2023-03-30 DIAGNOSIS — Z96.89 S/P INSERTION OF SPINAL CORD STIMULATOR: Primary | ICD-10-CM

## 2023-03-30 PROCEDURE — 99213 OFFICE O/P EST LOW 20 MIN: CPT | Mod: PBBFAC | Performed by: ANESTHESIOLOGY

## 2023-03-30 PROCEDURE — 99999 PR PBB SHADOW E&M-EST. PATIENT-LVL III: ICD-10-PCS | Mod: PBBFAC,,, | Performed by: ANESTHESIOLOGY

## 2023-03-30 PROCEDURE — 99214 PR OFFICE/OUTPT VISIT, EST, LEVL IV, 30-39 MIN: ICD-10-PCS | Mod: S$PBB,GC,, | Performed by: ANESTHESIOLOGY

## 2023-03-30 PROCEDURE — 99214 OFFICE O/P EST MOD 30 MIN: CPT | Mod: S$PBB,GC,, | Performed by: ANESTHESIOLOGY

## 2023-03-30 PROCEDURE — 99999 PR PBB SHADOW E&M-EST. PATIENT-LVL III: CPT | Mod: PBBFAC,,, | Performed by: ANESTHESIOLOGY

## 2023-03-30 RX ORDER — OXYCODONE AND ACETAMINOPHEN 7.5; 325 MG/1; MG/1
1 TABLET ORAL EVERY 8 HOURS PRN
Qty: 90 TABLET | Refills: 0 | Status: SHIPPED | OUTPATIENT
Start: 2023-03-30 | End: 2023-05-04 | Stop reason: SDUPTHER

## 2023-03-30 NOTE — PROGRESS NOTES
Chronic patient Established Note (Follow up visit)      SUBJECTIVE:    Interval History 3/30/2023:  Mr Boogie presents 5-6 weeks after implantation of his Meho[place] SCS system. He reports no significant change in his pains, which are primarily down the RLE.  No constitutional signs symptoms concerning for infection.  No new areas of pain or neurological changes since procedure. No voicing of s/s concerning for cauda equina syndrome. He does endorse improvement in sleep. He is worried that his implant was a failure because his pain has not changed.    Interval History 2/20/2023:  Mr Boogie presents for follow up of Mehoopany SCS replacement. He states doing well. No constitutional signs symptoms concerning for infection.  No new areas of pain or neurological changes since procedure. No voicing of s/s concerning for cauda equina syndrome.     Interval History 1/23/2023:  Still taking Celebrex, however hasn't noticed a difference in pain with this medication. Location, quality, and severity of pain are unchanged from prior visit and is described above. Here today to discuss removal of N[place]o SCS, and implant of [place]ho[place]any SCS. He states his stimulator has not been helpful for the past 2 years despite multiple reprogramming and adjustment.    Interval History 11/21/22:  Reports 24 hours of 100% relief for 48 hours, but he reports that now his pain is only approximally 10% better. His pain at rest is 3-4/10. Pain at its worst is 8-9/10. Pain is improved when leaning over a grocery cart. Pain only allows him to walk for 3-4mins.      Interval History 8/29/22:  Jefferson Boogie presents to the clinic for a follow-up appointment for back pain.  He had his second bilateral L3, L4, L5 MBB and reports 100% pain relief. He would like to proceed with RFA.     Interval History 5/26/22:  Jefferson Boogie presents to the clinic for a follow-up appointment for back pain. Since the last visit, Jefferson Boogie states the pain has been  stable. Certain postures he can tolerate in the standing position such as leaning on a shopping cart or support to his posterior thighs. He now uses a walker when covering long distances. He continues to play golf despite it aggravating his pain. He receives some benefit from the SCS whereas other interventions have not helped. He remains interested in the [place] device.     Interval History 3/24/22:  Jefferson Boogie presents to the clinic for a follow-up appointment for back pain. Since the last visit, Jefferson Boogie states the pain has been worse than usual. Current pain intensity is 8/10. He continues to report benefit with Nevro SCS however he has not been able to get in touch with the representative in order to have his settings adjusted. He continues to take occasional norco 7.5 mg with benefit, particularly when he plays golf.     Interval History 2/10/22:  Patent presents today s/p caudal epidural steroid injections on 1/12/22 since then he has not had any relief. He states his pain has been unchanged and is a 6/10 on average with the worst being 8/10 and best is 4/10. He has been taking percocet 5mg when he golfs or plays with his granddaughter.      Interval History 12/30/21:  Jefferson Boogie presents to clinic for follow up appointment s/p Right SI joint and Right piriformis muscle injection under US guidance on 11/29/21. He did not obtain any noticeable relief with this procedure similar to all of his previous injections. His pain is unchanged and constant over the right buttock. He is taking percocet 5mg x 3 on days he is more active, such as playing golf. This helps some, but minimally. Denies any new symptoms or neurological changes. No numbness, tingling, weakness in his lower extremities. Denies bowel/bladder incontinence or saddle anesthesia.      Interval History 11/4/2021:  Jefferson Boogie presents to the clinic for a follow-up appointment for right sided back pain and SI joint pain.   "Keagan had a right SI joint injection on 10/13/2021. He did not note significant relief with the injection for any period of time. Pain is still constant over the right buttock and most noticeable when playing golf. He denies any new bowel/bladder incontinence, lower extremity numbness or weakness or saddle anesthesia.     Interval History 8/26/2021:  Jefferson Boogie presents to the clinic for a follow-up appointment for right sided hip pain with right-sided radiculopathy . Since the last visit, Jefferson Boogie states the pain has been "particularly tender today" 7/10. Patient reports playing golf recently and experienced worsening symptoms the following day. Mr. Boogie is s/p right-side hip joint injection with minimal relief. Patient states he has had relief with previous interventions and is open to RFA.     Interval History 6/10/2021:  Jefferson Boogie presents to the clinic for a follow-up appointment. Since the last visit, Jefferson Boogie states the pain has been stable. Current pain intensity is 3/10. States he is still having pain around SIJ that is affecting his ADL      Interval History 4/15/2021:  The patient returns to clinic today for follow up of back pain. He reports that the swelling above the SI fusion incision has resolved. He denies any fever, chills, or drainage from the incision. He does report benefit since the fusion. He continues to report benefit with Nevro SCS. He reports intermittent shoulder pain at this time. He did receive an injection with Sports Medicine with benefit. He denies any other health changes. His pain today is 2/10.     Interval History 3/25/21:  Mr. Boogie presents to clinic for follow up of bilateral shoulder pain. He is s/p BL subacromial injections on 2/8/21. He reports maximum 20% relief of pain in the Right shoulder. Today the Left shoulder pain is 1/10; Right shoulder pain is 5-7/10.      Interval History 3/22/2021:  The patient returns to clinic " today for follow up of back pain. He is s/p right SI fusion on 3/15/2021. He reports some relief at this time. He does report soreness to the incision. He denies any fever, chills, or drainage from incision. He continues to report benefit with Nevro SCS. He denies any other health changes. His pain today is 4/10.     Interval History 1/11/2020:  Patient is here for follow-up of low back pain now s/p sacroiliac RFA on 12/9/20 which provided no benefit and with the new complaint of bilateral anterior shoulder pain R>L. His shoulder pain started about 2 months ago. He describes 9/10 sharp/stinging pain without radiation that is exacerbated with overhead activity and improved with rest. He started PT about one month ago. He takes oxycodone which provides some relief. He had a blind subacromial steroid injection in the right shoulder with Dr. Ramirez on 10/26/19 which provided some short term relief.     Interval History 11/5/2020:  Jefferson Boogie presents to the clinic for a follow-up appointment for low back pain. Since the last visit, Jefferson Boogie states the pain has been stable. Current pain intensity is 4/10. He had good relief from the SI joint injection that lasted for about a week. Pain has since returned. He also slipped and fell on his buttock a couple of weeks ago and had increased pain in the right buttock after that which is slowly improving. He continues to have variable benefit with the Nevro SCS for intermittent pain in the right leg. He is scheduled to meet with representatives today. He is taking celebrex daily and percocet as needed sparingly. He denies any adverse effects and any other health changes.     Interval History 10/5/2020:  The patient returns to clinic today for follow up of low back pain. He is s/p right SI joint injection on 9/9/2020. He reports 25% relief of his right sided low back and buttock pain. He did have good relief the first two days. He continues to report right sided  low back and buttock pain. He denies any radicular leg pain. His pain is worse with prolonged standing and walking. He continues to report intermittent pain into his achilles from previous injury. He feels as though this has changed his gait. He continues to report some benefit with Nevro SCS device. He is in contact with representatives. He is currently taking Percocet as needed sparingly with some benefit. He denies any adverse effects. He denies any other health changes. His pain today is 4/10.      Interval History 9/2/2020:  The patient returns to clinic today for follow up of back pain. He reports that his back pain has improved since last audio visit. He continues to report back pain with intermittent radiating pain into his right hip and calf. He has spoken with Bienvenido from [employer] via phone with some programming. He is meeting with Bienvenido today. He had some issues with charging but this has improved. He is currently taking Norco intermittently but this is only lasts 2-3 hours. He denies any adverse effects. He denies any other health changes. His pain today is 8/10.     Interval History 08/27/2020:  Pt was contacted over [employer] audio for a follow up appointment.  He is currently complaining of right hip and right calf with no associated numbness or weakness.  He continues to use his Nevro device.  He states it has been very frustrating. Inconsistent.  Took 20 mins to 1 hour to charge.  Couldn't get it to start this morning.  He states that there is no drainage at the battery site, no signs of infection or pain at the site.  Patient is not taking medications at this time.  He wants to speak about medication options because he would like to start playing golf again.     Interval History 8/17/2020:  The patient returns to clinic today for post op wound check. He continues to report benefit with Nevro device. He denies any fever, chills, or drainage. He continues to follow his activity restrictions. He does report a  recent achilles tendon injury while swimming. He is seeing Orthopedics. He denies any other health changes. His pain today is 4/10.     Interval History 8/3/2020:  The patient returns to clinic today for post op. He is s/p [place] battery change on 7/27/2020. He denies any fever, chills, or drainage from incision. He has not completed his antibiotics as he missed two days of the medication. He continues to follow his activity restrictions. He reports relief with the device. He is meeting with Bienvenido today for programming. He denies any other health changes. His pain today is 2/10     Interval History 7/22/2020:  Pt presents for audio follow up only s/p Right SI joint injection on 7/8/2020. Pt states he has near resolution of focal pain to buttock. He is pleased with result and pain relief. Pt has been in contact with [employer] and will be having battery swap in 5 days. He has difficulty whether stimulator is beneficial and has even had a holiday from using SCS.  He denies any newer areas pain, denies any neuro changes, meds area adequate to control pain without adverse side effects. Pain is 2/10 today.     Interval HPI 6/15/2020:  Jefferson HANSON Boogie presents to the clinic for a follow-up appointment for 3 week follow up right hip and right thigh pain. Since the last visit, Jefferson HANSON Keagan states the pain has been persistant. Current pain intensity is 5/10. Patient has been working with Bienvenido Varghese, to try and get better coverage of a localized pain that he still experiences in his right low back area. He does report improvement in symptoms of numbness/tingling. Today, worse pain is 1/10 in severity and localizes to the right SI joint. Pain is worse with extension of his back. It is worse with golfing. Pain relieved by Norco--he takes 1-2 tablets of 5-325 Norco on days that he plays golf. Pain is also improved with being still and not being active. Patient endorses a much more active lifestyle with Norco. Denies new  weakness/numbness or bowel/bladder changes.     Previous injection history includes a series of 3 lumbar CHOCO at [facility].      Interval HPI 3/5/20:  Patient presents to the clinic for a follow-up appointment for low back pain. Since the last visit, he states his pain has been unchanged. Current pain intensity is 4/10. He continues to work with [employer] to adjust settings on his SCS. He has stopped using tramadol, and uses norco sparingly. He continues to work with a  for physical therapy.      Interval HPI 1/9/2020:  Patient returns for follow up s/p Nevro SCS. He states he is unsure if it has helped his pain since he had it implanted. He last had an adjustment of his programming about 1 month ago. He does note that once he is done charging his SCS his pain is improved. Pain still worse with prolonged standing and activity such as golf. He still is doing home exercise program. He says that he is trying to do more since getting the SCS. He is still taking the Tramadol with limited pain relief. He takes Tramadol 50 mg twice daily only on days of activity which is once a week. No other health changes.      HPI 11/20/19  eJfferson Boogie returns to clinic today for follow up. He reports increased low back and leg pain over the last few days. He has been in contact with [employer] representatives for programming adjustments. He does report benefit with the device. He reports good days and bad days. His pain is worse with prolonged standing and activity. He continues to participate in a home exercise routine. He does take Tramadol sparingly but reports limited relief. He denies any other health changes. His pain today is 5/10.    Pain Disability Index Review:  Last 3 PDI Scores 3/30/2023 2/20/2023 1/23/2023   Pain Disability Index (PDI) 35 28 35       Pain Medications:  Celebrex     Opioid Contract: not applicable     report:  Reviewed and consistent with medication use as prescribed.    Pain  Procedures  2020- Right SI joint injection  2020- Nevro SCS battery change  2020- Right SIJ injection >80% relief   19 SCS implant  19 SCS trial  2020- Right SI joint injection  2020- SCS battery revision  2020- Right SI joint injection   2020- Right L5-S1 RFA  3/15/2021- Right SI fusion  2021 - Injection R Hip - minimal relief  10/13/2021 - Right SI joint injection  2021 - R SI joing and R piriformis muscle injection - no relief   2022- Caudal CHOCO- no relief   22 - Diagnostic Bilateral L3, L4 and L5 Lumbar Medial Branch Block under Fluoroscopy  22- Right L3, L4, L5 RFA  10/12/22 -  Left L3, L4, L5 RFA  2023 - [place] SCS implant    Physical Therapy/Home Exercise: no    Imagin/10/2021 - X ray Hips Bilateral: No radiographic evidence of acute osseous abnormality of the pelvis and hips and without radiographic evidence of osteonecrosis of the femoral heads.     21 MRI L-spine:  FINDINGS:  Lumbar sagittal alignment is slightly is straightened.  There is slight convex right curvature lumbar spine.  There is degenerative disc disease with intervertebral disc height loss and endplate degenerative change at all levels with scattered disc desiccation allowing for degenerative change the lumbar vertebral body heights and contours are within normal is without evidence for acute fracture or subluxation.  There is heterogeneous T1/T2 signal focus within the right aspect of the S1 vertebra most compatible with hemangioma this measures 2.0 cm.     The distal spinal cord and conus is normal in signal and contour tip of the conus approximates the T12/L1 level.  Please note there is artifact from indwelling spinal stimulator device with leads partially visualized casting artifact entering the dorsal epidural space at the T12 level and extending cranially.     There is abnormal clumping configuration of the filum terminalis a from T12 through L5 with  slight thickening of scattered nerve roots most compatible with arachnoiditis.     No aneurysmal dilatation of the visualized abdominal aorta.     T12/L1 through L1/L2: No significant disc bulge, central canal or neural foraminal stenosis.     L2/L3: Posterior disc osteophyte with facet joint arthropathy without significant central canal stenosis with mild bilateral neural foraminal stenosis.     L3/L4:Bulging disc with ligamentum flavum hypertrophy without significant central canal stenosis with mild bilateral neural foraminal stenosis.     L4/L5:Posterior disc osteophyte with ligamentum flavum hypertrophy and facet joint arthropathy without significant central canal stenosis and mild bilateral neural foraminal stenosis.     L5/S1: Posterior disc osteophyte with facet joint arthropathy without significant central canal stenosis with moderate right and mild left neural foraminal stenosis.     This report was flagged in Epic as abnormal.     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  Please note there is spinal stimulator device with leads partially visualized and distorting the study by artifacts.     There is abnormal thickening of the filum configuration most compatible with arachnoiditis.     Please see above for additional details.     Thoracic spine MRI:  FINDINGS: Thoracic vertebral body height and alignment are maintained with a few small scattered Schmorl's nodes involving the endplates of several mid to lower thoracic vertebra. The T3-4 vertebra are partially fused. There is some degree of desiccation of all of the thoracic discs with multilevel disc space narrowing, especially at the T7-T8, T6-T7 and T5-T6 levels. There is no abnormal prevertebral soft tissue thickening. The somewhat heterogeneous appearance to the signal from the thoracic vertebra is presumably secondary to an admixture of red and yellow marrow.    T1-T2 level: There is no bony foraminal  narrowing or bony central canal stenosis and the posterior disc margin is unremarkable.    T2-T3 level: There is no bony foraminal narrowing or bony central canal stenosis and the posterior disc margin is unremarkable.    T3-T4 level: There is no bony foraminal narrowing or bony central canal stenosis and the posterior disc margin is unremarkable.    T4-5 level: There is no bony foraminal narrowing or bony central canal stenosis and the posterior disc margin is unremarkable.    T5-T6 level: There is no bony foraminal narrowing or bony central canal stenosis and the posterior disc margin is unremarkable.    T6-T7 level: There is no bony foraminal narrowing or bony central canal stenosis and the posterior disc margin is unremarkable.    T7-T8 level: There is no bony foraminal narrowing or bony central canal stenosis and the posterior disc margin is unremarkable.    T8-T9 level: There is no bony foraminal narrowing or bony central canal stenosis and the posterior disc margin is unremarkable.    T9-T10 level: There is no bony foraminal narrowing or bony central canal stenosis and the posterior disc margin is unremarkable.    T10-T11 level: There is no bony foraminal narrowing or bony central canal stenosis and the posterior disc margin is unremarkable.    T11-T12 level: There is no bony foraminal narrowing or bony central canal stenosis and the posterior disc margin is unremarkable.    T12-L1 level: The tip the conus medullaris extends down to level of the superior endplate of L1. 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 There is no bony foraminal narrowing or bony central canal stenosis at this level.    On the axial images, the immediate paravertebral soft tissues are unremarkable. A small cyst is seen to involve the upper pole the left kidney.    Following contrast administration, there is no abnormal enhancement of any bony or soft tissue elements of the thoracic  spine. There is no abnormal enhancement of the subserosal surface of the thoracic spinal cord. Some foci of mild signal intensity in the CSF dorsal to the spinal cord of some of the sagittal sequences presumably is secondary to CSF pulsation artifact.    On the sagittal  image, there is cervical spondylosis and kyphosis with narrowing of all of the disc spaces in the cervical spine.    Allergies: Review of patient's allergies indicates:  No Known Allergies    Current Medications:   Current Outpatient Medications   Medication Sig Dispense Refill    ascorbic acid, vitamin C, (VITAMIN C) 1000 MG tablet Take 1,000 mg by mouth.      celecoxib (CELEBREX) 200 MG capsule       ergocalciferol, vitamin D2, (VITAMIN D ORAL) Take by mouth every 30 days.      irbesartan (AVAPRO) 75 MG tablet 150 mg once daily.       levothyroxine (SYNTHROID) 100 MCG tablet Take 100 mcg by mouth.      mirabegron (MYRBETRIQ ORAL) Take by mouth.      simvastatin (ZOCOR) 20 MG tablet Take 20 mg by mouth every evening.      tadalafil (CIALIS) 20 MG Tab       temazepam (RESTORIL) 30 mg capsule TK 1 C PO QD HS       No current facility-administered medications for this visit.     Facility-Administered Medications Ordered in Other Visits   Medication Dose Route Frequency Provider Last Rate Last Admin    balanced salt irrigation intra-ocular solution 1 drop  1 drop Right Eye On Call Procedure Bell Cedeno MD        sodium chloride 0.9% flush 2 mL  2 mL Intravenous PRN Bell Cedeno MD           REVIEW OF SYSTEMS:    GENERAL:  No weight loss, malaise or fevers.  HEENT:  Negative for frequent or significant headaches.  NECK:  Negative for lumps, goiter, pain and significant neck swelling.  RESPIRATORY:  Negative for cough, wheezing or shortness of breath.  CARDIOVASCULAR:  Negative for chest pain, leg swelling or palpitations.  GI:  Negative for abdominal discomfort, blood in stools or black stools or change in bowel habits.  MUSCULOSKELETAL:   See HPI.  SKIN:  Negative for lesions, rash, and itching.  PSYCH:  negative for sleep disturbance, mood disorder and recent psychosocial stressors.  HEMATOLOGY/LYMPHOLOGY:  Negative for prolonged bleeding, bruising easily or swollen nodes.  NEURO:   No history of headaches, syncope, paralysis, seizures or tremors.  All other reviewed and negative other than HPI.    Past Medical History:  Past Medical History:   Diagnosis Date    Bronchitis     Cancer     stage I bladder cancer 50 yrs ago    Hypercholesteremia     Hypertension     Sacroiliitis 07/08/2020    Thyroid disease        Past Surgical History:  Past Surgical History:   Procedure Laterality Date    BLADDER SURGERY      CATARACT EXTRACTION      CATARACT EXTRACTION W/  INTRAOCULAR LENS IMPLANT Right 3/9/2022    Procedure: EXTRACTION, CATARACT, WITH IOL INSERTION;  Surgeon: Bell Cedeno MD;  Location: [facility] OR;  Service: Ophthalmology;  Laterality: Right;    COLONOSCOPY      EPIDURAL STEROID INJECTION N/A 1/12/2022    Procedure: INJECTION, STEROID, EPIDURAL CAUDAL With Catheter needs consent;  Surgeon: Samuel Jimenez MD;  Location: [facility] PAIN MGT;  Service: Pain Management;  Laterality: N/A;    EYE SURGERY      cataracts     FUSION OF SACROILIAC JOINT Right 3/15/2021    Procedure: FUSION, RIGHT SACROILIAC JOINT FUSION;  Surgeon: Samuel Jimenez MD;  Location: [facility] OR;  Service: Pain Management;  Laterality: Right;  C-ARM, PAINTEQ REP     HERNIA REPAIR      INJECTION OF ANESTHETIC AGENT AROUND NERVE Bilateral 6/1/2022    Procedure: BLOCK, NERVE MEDIAL BRANCH L3,4,5 BILATERAL 1st;  Surgeon: Samuel Jimenez MD;  Location: [facility] PAIN MGT;  Service: Pain Management;  Laterality: Bilateral;    INJECTION OF ANESTHETIC AGENT AROUND NERVE Bilateral 8/24/2022    Procedure: Block, Nerve Kenny L3, L4, & L5;  Surgeon: Samuel Jimenez MD;  Location: [facility] PAIN MGT;  Service: Pain Management;  Laterality: Bilateral;    INJECTION OF JOINT Right 7/8/2020     Procedure: INJECTION, JOINT, SACROILIAC (SI);  Surgeon: Samuel Jimenez MD;  Location: [facility] PAIN MGT;  Service: Pain Management;  Laterality: Right;    INJECTION OF JOINT Right 9/9/2020    Procedure: INJECTION, JOINT, SACROILIAC (SI);  Surgeon: Samuel Jimenez MD;  Location: [facility] PAIN MGT;  Service: Pain Management;  Laterality: Right;    INJECTION OF JOINT Right 7/21/2021    Procedure: INJECTION, JOINT, HIP RIGHT;  Surgeon: Samuel Jimenez MD;  Location: [facility] PAIN MGT;  Service: Pain Management;  Laterality: Right;  CONSENT NEEDED    INJECTION OF JOINT Right 10/13/2021    Procedure: INJECTION, JOINT, SACROILIAC (SI)  NEED CONSENT pt. requesting sedation;  Surgeon: Samuel Jimenez MD;  Location: [facility];  Service: Pain Management;  Laterality: Right;    KNEE SURGERY      RADIOFREQUENCY ABLATION Right 12/9/2020    Procedure: RADIOFREQUENCY ABLATION, L5-S1-S2 LATERAL BRANCH COOLED;  Surgeon: Samuel Jimenez MD;  Location: [facility];  Service: Pain Management;  Laterality: Right;    RADIOFREQUENCY ABLATION Right 9/21/2022    Procedure: RADIOFREQUENCY ABLATION, RIGHT L3-L4-L5 PER DR JIMENEZ;  Surgeon: Samuel Jimenez MD;  Location: [facility];  Service: Pain Management;  Laterality: Right;    RADIOFREQUENCY ABLATION Left 10/12/2022    Procedure: RADIOFREQUENCY ABLATION, LEFT L3-L4-L5 TWO OF TWO;  Surgeon: Samuel Jimenez MD;  Location: [facility];  Service: Pain Management;  Laterality: Left;    REPLACEMENT OF NERVE STIMULATOR BATTERY N/A 7/27/2020    Procedure: Replacement, SPINAL CORD STIMULATOR BATTERY CHANGE TO NEVRO OMNION BATTERY;  Surgeon: Samuel Jimenez MD;  Location: [facility] OR;  Service: Pain Management;  Laterality: N/A;  C-ARM, NEVRO REP    REPLACEMENT OF SPINAL CORD STIMULATOR  2/13/2023    Procedure: REPLACEMENT, SPINAL CORD STIMULATOR;  Surgeon: Samuel Jimenez MD;  Location: [facility] OR;  Service: Pain Management;;    REVISION PROCEDURE INVOLVING SPINAL CORD  "NEUROSTIMULATOR N/A 2023    Procedure: SPINAL CORD STIMULATOR EXPLANT AND REIMPLANT SALUDA REP;  Surgeon: Samuel Jimenez MD;  Location: [facility] OR;  Service: Pain Management;  Laterality: N/A;    TRIAL OF SPINAL CORD NERVE STIMULATOR N/A 2019    Procedure: Trial, Neurostimulator, SPINAL CORD STIMULATOR TRIAL;  Surgeon: Samuel Jimenez MD;  Location: [facility] CATH LAB;  Service: Pain Management;  Laterality: N/A;  C-ARM, NEVRO REP       Family History:  History reviewed. No pertinent family history.    Social History:  Social History     Socioeconomic History    Marital status:    Tobacco Use    Smoking status: Former     Types: Cigarettes     Quit date:      Years since quittin.2    Smokeless tobacco: Never    Tobacco comments:     stopped 40 years ago   Substance and Sexual Activity    Alcohol use: Yes     Alcohol/week: 1.0 standard drink     Types: 1 Shots of liquor per week     Comment: nightly -scotch    Drug use: Never    Sexual activity: Yes     Partners: Female       OBJECTIVE:    BP (!) 153/57 (BP Location: Right arm, Patient Position: Sitting, BP Method: Small (Automatic))   Pulse 61   Resp 17   Ht 6' 1" (1.854 m)   Wt 91 kg (200 lb 9.9 oz)   SpO2 100%   BMI 26.47 kg/m²       PHYSICAL EXAMINATION:  Voice normal.  Normal affect without evidence of distress.  Skin: SCS sites without redness, warmth, swelling, or drainage. Scar is flat and well-healed.  General appearance: Well appearing, in no acute distress, alert and oriented x3.  Psych:  Mood and affect appropriate.  Head/face:  Atraumatic, normocephalic. No palpable lymph nodes  Cor: regular rate  Pulm: normal WOB  GI: Abdomen soft and non-tender.  Back: Straight leg raising in the sitting and supine positions is negative to radicular pain. mild pain to palpation over the spine or costovertebral angles. Decreased range of motion without pain reproduction.  Extremities: Peripheral joint ROM is full and pain free without " obvious instability or laxity in all four extremities. No deformities, edema, or skin discoloration. Good capillary refill.  Musculoskeletal: No pain with WICHO FADIR. No atrophy or tone abnormalities are noted.  Neuro: DTRs 1+ in BLE except 2+ right patellar. Strength is full except 4-/5 left APF and 4+/5 left ADF.  No clonus. Decreased sensation to light touch at bilateral medial malleoli, otherwise intact in BLE.   GAIT: Antalgic gait with cane    ASSESSMENT: 87 y.o. year old male with low back pain, consistent with the following diagnoses     1. S/P insertion of spinal cord stimulator        2. Lumbar radiculopathy        3. Lumbar spondylosis            PLAN:     - I have stressed the importance of physical activity and a home exercise plan to help with pain and improve health.  - Patient can continue with medications for now since they are providing benefits, using them appropriately, and without side effects.  - Prior records reviewed  - s/p SCS replacement with [place] device  - No concerns regarding IPG site  - Discussed with patient that the [place] SCS system often takes months to become efficacious as it gradually 'learns' the patient's electrophysiologic profile. The patient was very happy to hear this.   - Refill Hydrocodone-Acetaminophen 5-325mg   - RTC 4-6 weeks. Call or message with any questions or concerns.  - Counseled patient regarding the importance of activity modification, constant sleeping habits, and physical therapy.    The above plan and management options were discussed at length with patient. Patient is in agreement with the above and verbalized understanding.    Darrian Pandya MD  [employer] [facility]   PGY-IV     I have personally reviewed the history and exam of this patient and agree with the resident/fellow/NPs note as stated above.    Samuel Jimenez MD    03/30/2023

## 2023-03-31 ENCOUNTER — TELEPHONE (OUTPATIENT)
Dept: PAIN MEDICINE | Facility: CLINIC | Age: 88
End: 2023-03-31
Payer: MEDICARE

## 2023-03-31 NOTE — TELEPHONE ENCOUNTER
----- Message from Mira Swain sent at 3/31/2023  1:00 PM CDT -----  Regarding: Medication  Refill  Request                  Name of Who is Calling:  [facility]    Who Left The Message:  [facility]      What is the request in detail:      As per [facility] please give a call back regarding regarding the medication oxyCODONE-acetaminophen (PERCOCET) 7.5-325 mg per tablet..  Please further advise.   Thank you       Preferred Pharmacy: [facility] - [address]   Reference Number: [id number]  Phone: [phone]  Fax:  [phone]

## 2023-04-02 ENCOUNTER — PATIENT MESSAGE (OUTPATIENT)
Dept: PAIN MEDICINE | Facility: CLINIC | Age: 88
End: 2023-04-02
Payer: MEDICARE

## 2023-04-06 RX ORDER — OXYCODONE AND ACETAMINOPHEN 7.5; 325 MG/1; MG/1
1 TABLET ORAL EVERY 8 HOURS PRN
Qty: 90 TABLET | Refills: 0 | OUTPATIENT
Start: 2023-04-06 | End: 2023-05-06

## 2023-04-06 NOTE — TELEPHONE ENCOUNTER
Staff spoke with pt and medication was sent to pharmacy on march 30th. Pt verbalized understanding.

## 2023-04-07 ENCOUNTER — PATIENT MESSAGE (OUTPATIENT)
Dept: PAIN MEDICINE | Facility: CLINIC | Age: 88
End: 2023-04-07
Payer: MEDICARE

## 2023-04-10 ENCOUNTER — PATIENT MESSAGE (OUTPATIENT)
Dept: PAIN MEDICINE | Facility: CLINIC | Age: 88
End: 2023-04-10
Payer: MEDICARE

## 2023-04-17 RX ORDER — DULOXETIN HYDROCHLORIDE 30 MG/1
30 CAPSULE, DELAYED RELEASE ORAL 2 TIMES DAILY
Qty: 60 CAPSULE | Refills: 2 | Status: SHIPPED | OUTPATIENT
Start: 2023-04-17 | End: 2023-07-17 | Stop reason: SDUPTHER

## 2023-05-04 ENCOUNTER — TELEPHONE (OUTPATIENT)
Dept: OPHTHALMOLOGY | Facility: CLINIC | Age: 88
End: 2023-05-04
Payer: MEDICARE

## 2023-05-04 ENCOUNTER — OFFICE VISIT (OUTPATIENT)
Dept: PAIN MEDICINE | Facility: CLINIC | Age: 88
End: 2023-05-04
Attending: ANESTHESIOLOGY
Payer: MEDICARE

## 2023-05-04 DIAGNOSIS — G03.9 ARACHNOIDITIS: ICD-10-CM

## 2023-05-04 DIAGNOSIS — Z96.89 SPINAL CORD STIMULATOR STATUS: ICD-10-CM

## 2023-05-04 DIAGNOSIS — M46.1 SACROILIITIS: ICD-10-CM

## 2023-05-04 DIAGNOSIS — M54.15 RADICULOPATHY OF THORACOLUMBAR REGION: Primary | ICD-10-CM

## 2023-05-04 DIAGNOSIS — G89.4 CHRONIC PAIN SYNDROME: ICD-10-CM

## 2023-05-04 PROCEDURE — 99214 PR OFFICE/OUTPT VISIT, EST, LEVL IV, 30-39 MIN: ICD-10-PCS | Mod: 95,GC,, | Performed by: ANESTHESIOLOGY

## 2023-05-04 PROCEDURE — 99214 OFFICE O/P EST MOD 30 MIN: CPT | Mod: 95,GC,, | Performed by: ANESTHESIOLOGY

## 2023-05-04 RX ORDER — OXYCODONE AND ACETAMINOPHEN 7.5; 325 MG/1; MG/1
1 TABLET ORAL EVERY 8 HOURS PRN
Qty: 90 TABLET | Refills: 0 | Status: SHIPPED | OUTPATIENT
Start: 2023-05-04 | End: 2023-05-25

## 2023-05-04 NOTE — PROGRESS NOTES
Chronic Pain-Tele-Medicine-Established Note (Follow up visit)      The patient location is: Work  The chief complaint leading to consultation is: Follow-up from SCS implantation  Visit type: Virtual visit with synchronous audio and video  Total time spent with patient: 17 minutes  Each patient to whom he or she provides medical services by telemedicine is:  (1) informed of the relationship between the physician and patient and the respective role of any other health care provider with respect to management of the patient; and (2) notified that he or she may decline to receive medical services by telemedicine and may withdraw from such care at any time.    Notes:   Interval History 5/4/23:  Patient seen today via virtual follow-up after implantation of his [employer] SCS in February. He reports no change in his pain since his last visit. Most of his pain is into his right hip with intermittent radiation into his RLE. He denies any new symptoms. No red flag symptoms. He is scheduled to meet with the [employer] reps from reprogramming next week. He remains optimistic. In discussing his current medications, he has run out of the hydrocodone he was previously taking and has only been taking his Cymbalta once per day rather two.     Interval History 3/30/2023:  Mr Boogie presents 5-6 weeks after implantation of his [employer] SCS system. He reports no significant change in his pains, which are primarily down the RLE.  No constitutional signs symptoms concerning for infection.  No new areas of pain or neurological changes since procedure. No voicing of s/s concerning for cauda equina syndrome. He does endorse improvement in sleep. He is worried that his implant was a failure because his pain has not changed.     Interval History 2/20/2023:  Mr Boogie presents for follow up of [employer] SCS replacement. He states doing well. No constitutional signs symptoms concerning for infection.  No new areas of pain or neurological changes  since procedure. No voicing of s/s concerning for cauda equina syndrome.      Interval History 1/23/2023:  Still taking Celebrex, however hasn't noticed a difference in pain with this medication. Location, quality, and severity of pain are unchanged from prior visit and is described above. Here today to discuss removal of [employer] SCS, and implant of [place] SCS. He states his stimulator has not been helpful for the past 2 years despite multiple reprogramming and adjustment.     Interval History 11/21/22:  Reports 24 hours of 100% relief for 48 hours, but he reports that now his pain is only approximally 10% better. His pain at rest is 3-4/10. Pain at its worst is 8-9/10. Pain is improved when leaning over a grocery cart. Pain only allows him to walk for 3-4mins.      Interval History 8/29/22:  Jefferson Boogie presents to the clinic for a follow-up appointment for back pain.  He had his second bilateral L3, L4, L5 MBB and reports 100% pain relief. He would like to proceed with RFA.     Interval History 5/26/22:  Jefferson Boogie presents to the clinic for a follow-up appointment for back pain. Since the last visit, Jefferson Boogie states the pain has been stable. Certain postures he can tolerate in the standing position such as leaning on a shopping cart or support to his posterior thighs. He now uses a walker when covering long distances. He continues to play golf despite it aggravating his pain. He receives some benefit from the SCS whereas other interventions have not helped. He remains interested in the [place] device.     Interval History 3/24/22:  Jefferson Boogie presents to the clinic for a follow-up appointment for back pain. Since the last visit, Jefferson Boogie states the pain has been worse than usual. Current pain intensity is 8/10. He continues to report benefit with [employer] SCS however he has not been able to get in touch with the representative in order to have his settings adjusted. He continues to  "take occasional norco 7.5 mg with benefit, particularly when he plays golf.     Interval History 2/10/22:  Patent presents today s/p caudal epidural steroid injections on 1/12/22 since then he has not had any relief. He states his pain has been unchanged and is a 6/10 on average with the worst being 8/10 and best is 4/10. He has been taking percocet 5mg when he golfs or plays with his granddaughter.      Interval History 12/30/21:  Jefferson Boogie presents to clinic for follow up appointment s/p Right SI joint and Right piriformis muscle injection under US guidance on 11/29/21. He did not obtain any noticeable relief with this procedure similar to all of his previous injections. His pain is unchanged and constant over the right buttock. He is taking percocet 5mg x 3 on days he is more active, such as playing golf. This helps some, but minimally. Denies any new symptoms or neurological changes. No numbness, tingling, weakness in his lower extremities. Denies bowel/bladder incontinence or saddle anesthesia.      Interval History 11/4/2021:  Jefferson Boogie presents to the clinic for a follow-up appointment for right sided back pain and SI joint pain. Mr. Boogie had a right SI joint injection on 10/13/2021. He did not note significant relief with the injection for any period of time. Pain is still constant over the right buttock and most noticeable when playing golf. He denies any new bowel/bladder incontinence, lower extremity numbness or weakness or saddle anesthesia.     Interval History 8/26/2021:  Jefferson Boogie presents to the clinic for a follow-up appointment for right sided hip pain with right-sided radiculopathy . Since the last visit, Jefferson Boogie states the pain has been "particularly tender today" 7/10. Patient reports playing golf recently and experienced worsening symptoms the following day. Mr. Boogie is s/p right-side hip joint injection with minimal relief. Patient states he has had " relief with previous interventions and is open to RFA.     Interval History 6/10/2021:  Jefferson Boogie presents to the clinic for a follow-up appointment. Since the last visit, Jefferson Boogie states the pain has been stable. Current pain intensity is 3/10. States he is still having pain around SIJ that is affecting his ADL      Interval History 4/15/2021:  The patient returns to clinic today for follow up of back pain. He reports that the swelling above the SI fusion incision has resolved. He denies any fever, chills, or drainage from the incision. He does report benefit since the fusion. He continues to report benefit with Nevro SCS. He reports intermittent shoulder pain at this time. He did receive an injection with Sports Medicine with benefit. He denies any other health changes. His pain today is 2/10.     Interval History 3/25/21:  Mr. Boogie presents to clinic for follow up of bilateral shoulder pain. He is s/p BL subacromial injections on 2/8/21. He reports maximum 20% relief of pain in the Right shoulder. Today the Left shoulder pain is 1/10; Right shoulder pain is 5-7/10.      Interval History 3/22/2021:  The patient returns to clinic today for follow up of back pain. He is s/p right SI fusion on 3/15/2021. He reports some relief at this time. He does report soreness to the incision. He denies any fever, chills, or drainage from incision. He continues to report benefit with Nevro SCS. He denies any other health changes. His pain today is 4/10.     Interval History 1/11/2020:  Patient is here for follow-up of low back pain now s/p sacroiliac RFA on 12/9/20 which provided no benefit and with the new complaint of bilateral anterior shoulder pain R>L. His shoulder pain started about 2 months ago. He describes 9/10 sharp/stinging pain without radiation that is exacerbated with overhead activity and improved with rest. He started PT about one month ago. He takes oxycodone which provides some relief. He  had a blind subacromial steroid injection in the right shoulder with Dr. Ramirez on 10/26/19 which provided some short term relief.     Interval History 11/5/2020:  Jefferson Boogie presents to the clinic for a follow-up appointment for low back pain. Since the last visit, Jefferson Boogie states the pain has been stable. Current pain intensity is 4/10. He had good relief from the SI joint injection that lasted for about a week. Pain has since returned. He also slipped and fell on his buttock a couple of weeks ago and had increased pain in the right buttock after that which is slowly improving. He continues to have variable benefit with the Nevro SCS for intermittent pain in the right leg. He is scheduled to meet with representatives today. He is taking celebrex daily and percocet as needed sparingly. He denies any adverse effects and any other health changes.     Interval History 10/5/2020:  The patient returns to clinic today for follow up of low back pain. He is s/p right SI joint injection on 9/9/2020. He reports 25% relief of his right sided low back and buttock pain. He did have good relief the first two days. He continues to report right sided low back and buttock pain. He denies any radicular leg pain. His pain is worse with prolonged standing and walking. He continues to report intermittent pain into his achilles from previous injury. He feels as though this has changed his gait. He continues to report some benefit with Nevro SCS device. He is in contact with representatives. He is currently taking Percocet as needed sparingly with some benefit. He denies any adverse effects. He denies any other health changes. His pain today is 4/10.      Interval History 9/2/2020:  The patient returns to clinic today for follow up of back pain. He reports that his back pain has improved since last audio visit. He continues to report back pain with intermittent radiating pain into his right hip and calf. He has spoken  with Bienvenido from [employer] via phone with some programming. He is meeting with Bienvenido today. He had some issues with charging but this has improved. He is currently taking Norco intermittently but this is only lasts 2-3 hours. He denies any adverse effects. He denies any other health changes. His pain today is 8/10.     Interval History 08/27/2020:  Pt was contacted over virtual audio for a follow up appointment.  He is currently complaining of right hip and right calf with no associated numbness or weakness.  He continues to use his Nevro device.  He states it has been very frustrating. Inconsistent.  Took 20 mins to 1 hour to charge.  Couldn't get it to start this morning.  He states that there is no drainage at the battery site, no signs of infection or pain at the site.  Patient is not taking medications at this time.  He wants to speak about medication options because he would like to start playing golf again.     Interval History 8/17/2020:  The patient returns to clinic today for post op wound check. He continues to report benefit with Nevro device. He denies any fever, chills, or drainage. He continues to follow his activity restrictions. He does report a recent achilles tendon injury while swimming. He is seeing Orthopedics. He denies any other health changes. His pain today is 4/10.     Interval History 8/3/2020:  The patient returns to clinic today for post op. He is s/p Nevro battery change on 7/27/2020. He denies any fever, chills, or drainage from incision. He has not completed his antibiotics as he missed two days of the medication. He continues to follow his activity restrictions. He reports relief with the device. He is meeting with [employer] today for programming. He denies any other health changes. His pain today is 2/10     Interval History 7/22/2020:  Pt presents for audio follow up only s/p Right SI joint injection on 7/8/2020. Pt states he has near resolution of focal pain to buttock. He is pleased with  result and pain relief. Pt has been in contact with [employer] and will be having battery swap in 5 days. He has difficulty whether stimulator is beneficial and has even had a holiday from using SCS.  He denies any newer areas pain, denies any neuro changes, meds area adequate to control pain without adverse side effects. Pain is 2/10 today.     Interval HPI 6/15/2020:  Jefferson Boogie presents to the clinic for a follow-up appointment for 3 week follow up right hip and right thigh pain. Since the last visit, Jefferson Boogie states the pain has been persistant. Current pain intensity is 5/10. Patient has been working with Bienvenido Varghese, to try and get better coverage of a localized pain that he still experiences in his right low back area. He does report improvement in symptoms of numbness/tingling. Today, worse pain is 1/10 in severity and localizes to the right SI joint. Pain is worse with extension of his back. It is worse with golfing. Pain relieved by Norco--he takes 1-2 tablets of 5-325 Norco on days that he plays golf. Pain is also improved with being still and not being active. Patient endorses a much more active lifestyle with Norco. Denies new weakness/numbness or bowel/bladder changes.     Previous injection history includes a series of 3 lumbar CHOCO at [facility].      Interval HPI 3/5/20:  Patient presents to the clinic for a follow-up appointment for low back pain. Since the last visit, he states his pain has been unchanged. Current pain intensity is 4/10. He continues to work with [employer] to adjust settings on his SCS. He has stopped using tramadol, and uses norco sparingly. He continues to work with a  for physical therapy.      Interval HPI 1/9/2020:  Patient returns for follow up s/p Nevro SCS. He states he is unsure if it has helped his pain since he had it implanted. He last had an adjustment of his programming about 1 month ago. He does note that once he is done charging his  SCS his pain is improved. Pain still worse with prolonged standing and activity such as golf. He still is doing home exercise program. He says that he is trying to do more since getting the SCS. He is still taking the Tramadol with limited pain relief. He takes Tramadol 50 mg twice daily only on days of activity which is once a week. No other health changes.      HPI 19  Jefferson Boogie returns to clinic today for follow up. He reports increased low back and leg pain over the last few days. He has been in contact with [employer] representatives for programming adjustments. He does report benefit with the device. He reports good days and bad days. His pain is worse with prolonged standing and activity. He continues to participate in a home exercise routine. He does take Tramadol sparingly but reports limited relief. He denies any other health changes. His pain today is 5/10.     Pain Medications:  Celebrex   Cymbalta     Opioid Contract: not applicable      report:  Reviewed and consistent with medication use as prescribed.     Pain Procedures  2020- Right SI joint injection  2020- Nevro SCS battery change  2020- Right SIJ injection >80% relief   19 SCS implant  19 SCS trial  2020- Right SI joint injection  2020- SCS battery revision  2020- Right SI joint injection   2020- Right L5-S1 RFA  3/15/2021- Right SI fusion  2021 - Injection R Hip - minimal relief  10/13/2021 - Right SI joint injection  2021 - R SI joing and R piriformis muscle injection - no relief   2022- Caudal CHOCO- no relief   22 - Diagnostic Bilateral L3, L4 and L5 Lumbar Medial Branch Block under Fluoroscopy  22- Right L3, L4, L5 RFA  10/12/22 -  Left L3, L4, L5 RFA  2023 - [place] SCS implant     Physical Therapy/Home Exercise: no     Imagin/10/2021 - X ray Hips Bilateral: No radiographic evidence of acute osseous abnormality of the pelvis and hips and without  radiographic evidence of osteonecrosis of the femoral heads.     9/18/21 MRI L-spine:  FINDINGS:  Lumbar sagittal alignment is slightly is straightened.  There is slight convex right curvature lumbar spine.  There is degenerative disc disease with intervertebral disc height loss and endplate degenerative change at all levels with scattered disc desiccation allowing for degenerative change the lumbar vertebral body heights and contours are within normal is without evidence for acute fracture or subluxation.  There is heterogeneous T1/T2 signal focus within the right aspect of the S1 vertebra most compatible with hemangioma this measures 2.0 cm.     The distal spinal cord and conus is normal in signal and contour tip of the conus approximates the T12/L1 level.  Please note there is artifact from indwelling spinal stimulator device with leads partially visualized casting artifact entering the dorsal epidural space at the T12 level and extending cranially.     There is abnormal clumping configuration of the filum terminalis a from T12 through L5 with slight thickening of scattered nerve roots most compatible with arachnoiditis.     No aneurysmal dilatation of the visualized abdominal aorta.     T12/L1 through L1/L2: No significant disc bulge, central canal or neural foraminal stenosis.     L2/L3: Posterior disc osteophyte with facet joint arthropathy without significant central canal stenosis with mild bilateral neural foraminal stenosis.     L3/L4:Bulging disc with ligamentum flavum hypertrophy without significant central canal stenosis with mild bilateral neural foraminal stenosis.     L4/L5:Posterior disc osteophyte with ligamentum flavum hypertrophy and facet joint arthropathy without significant central canal stenosis and mild bilateral neural foraminal stenosis.     L5/S1: Posterior disc osteophyte with facet joint arthropathy without significant central canal stenosis with moderate right and mild left neural  foraminal stenosis.     This report was flagged in Epic as abnormal.     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  Please note there is spinal stimulator device with leads partially visualized and distorting the study by artifacts.     There is abnormal thickening of the filum configuration most compatible with arachnoiditis.     Please see above for additional details.     Thoracic spine MRI:  FINDINGS: Thoracic vertebral body height and alignment are maintained with a few small scattered Schmorl's nodes involving the endplates of several mid to lower thoracic vertebra. The T3-4 vertebra are partially fused. There is some degree of desiccation of all of the thoracic discs with multilevel disc space narrowing, especially at the T7-T8, T6-T7 and T5-T6 levels. There is no abnormal prevertebral soft tissue thickening. The somewhat heterogeneous appearance to the signal from the thoracic vertebra is presumably secondary to an admixture of red and yellow marrow.    T1-T2 level: There is no bony foraminal narrowing or bony central canal stenosis and the posterior disc margin is unremarkable.    T2-T3 level: There is no bony foraminal narrowing or bony central canal stenosis and the posterior disc margin is unremarkable.    T3-T4 level: There is no bony foraminal narrowing or bony central canal stenosis and the posterior disc margin is unremarkable.    T4-5 level: There is no bony foraminal narrowing or bony central canal stenosis and the posterior disc margin is unremarkable.    T5-T6 level: There is no bony foraminal narrowing or bony central canal stenosis and the posterior disc margin is unremarkable.    T6-T7 level: There is no bony foraminal narrowing or bony central canal stenosis and the posterior disc margin is unremarkable.    T7-T8 level: There is no bony foraminal narrowing or bony central canal stenosis and the posterior disc margin is unremarkable.    T8-T9 level:  There is no bony foraminal narrowing or bony central canal stenosis and the posterior disc margin is unremarkable.    T9-T10 level: There is no bony foraminal narrowing or bony central canal stenosis and the posterior disc margin is unremarkable.    T10-T11 level: There is no bony foraminal narrowing or bony central canal stenosis and the posterior disc margin is unremarkable.    T11-T12 level: There is no bony foraminal narrowing or bony central canal stenosis and the posterior disc margin is unremarkable.    T12-L1 level: The tip the conus medullaris extends down to level of the superior endplate of L1. 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 There is no bony foraminal narrowing or bony central canal stenosis at this level.    On the axial images, the immediate paravertebral soft tissues are unremarkable. A small cyst is seen to involve the upper pole the left kidney.    Following contrast administration, there is no abnormal enhancement of any bony or soft tissue elements of the thoracic spine. There is no abnormal enhancement of the subserosal surface of the thoracic spinal cord. Some foci of mild signal intensity in the CSF dorsal to the spinal cord of some of the sagittal sequences presumably is secondary to CSF pulsation artifact.    On the sagittal  image, there is cervical spondylosis and kyphosis with narrowing of all of the disc spaces in the cervical spine.     Past Medical History:   Diagnosis Date    Bronchitis     Cancer     stage I bladder cancer 50 yrs ago    Hypercholesteremia     Hypertension     Sacroiliitis 07/08/2020    Thyroid disease      Past Surgical History:   Procedure Laterality Date    BLADDER SURGERY      CATARACT EXTRACTION      CATARACT EXTRACTION W/  INTRAOCULAR LENS IMPLANT Right 3/9/2022    Procedure: EXTRACTION, CATARACT, WITH IOL INSERTION;  Surgeon: Bell Cedeno MD;  Location: [facility];  Service: Ophthalmology;   Laterality: Right;    COLONOSCOPY      EPIDURAL STEROID INJECTION N/A 1/12/2022    Procedure: INJECTION, STEROID, EPIDURAL CAUDAL With Catheter needs consent;  Surgeon: Samuel Jimenez MD;  Location: [facility], [place], operated by [facility];  Service: Pain Management;  Laterality: N/A;    EYE SURGERY      cataracts     FUSION OF SACROILIAC JOINT Right 3/15/2021    Procedure: FUSION, RIGHT SACROILIAC JOINT FUSION;  Surgeon: Samuel Jimenez MD;  Location: [facility], [place], operated by [facility];  Service: Pain Management;  Laterality: Right;  C-ARM, PAINTEQ REP     HERNIA REPAIR      INJECTION OF ANESTHETIC AGENT AROUND NERVE Bilateral 6/1/2022    Procedure: BLOCK, NERVE MEDIAL BRANCH L3,4,5 BILATERAL 1st;  Surgeon: Samuel Jimenez MD;  Location: [facility], [place], operated by [facility];  Service: Pain Management;  Laterality: Bilateral;    INJECTION OF ANESTHETIC AGENT AROUND NERVE Bilateral 8/24/2022    Procedure: Block, Nerve Kenny L3, L4, & L5;  Surgeon: Samuel Jimenez MD;  Location: [facility], [place], operated by [facility];  Service: Pain Management;  Laterality: Bilateral;    INJECTION OF JOINT Right 7/8/2020    Procedure: INJECTION, JOINT, SACROILIAC (SI);  Surgeon: Samuel Jimenez MD;  Location: [facility], [place], operated by [facility];  Service: Pain Management;  Laterality: Right;    INJECTION OF JOINT Right 9/9/2020    Procedure: INJECTION, JOINT, SACROILIAC (SI);  Surgeon: Samuel Jimenez MD;  Location: [facility], [place], operated by [facility];  Service: Pain Management;  Laterality: Right;    INJECTION OF JOINT Right 7/21/2021    Procedure: INJECTION, JOINT, HIP RIGHT;  Surgeon: Samuel Jimenez MD;  Location: [facility], [place], operated by [facility];  Service: Pain Management;  Laterality: Right;  CONSENT NEEDED    INJECTION OF JOINT Right 10/13/2021    Procedure: INJECTION, JOINT, SACROILIAC (SI)  NEED CONSENT pt. requesting sedation;  Surgeon: Samuel Jimenez MD;  Location: [facility], [place], operated by [facility];  Service: Pain Management;  Laterality: Right;    KNEE SURGERY      RADIOFREQUENCY ABLATION Right 12/9/2020    Procedure: RADIOFREQUENCY ABLATION, L5-S1-S2 LATERAL BRANCH COOLED;   Surgeon: Samuel Jimenez MD;  Location: [facility];  Service: Pain Management;  Laterality: Right;    RADIOFREQUENCY ABLATION Right 2022    Procedure: RADIOFREQUENCY ABLATION, RIGHT L3-L4-L5 PER DR JIMENEZ;  Surgeon: Samuel Jimenez MD;  Location: [facility] PAIN MGT;  Service: Pain Management;  Laterality: Right;    RADIOFREQUENCY ABLATION Left 10/12/2022    Procedure: RADIOFREQUENCY ABLATION, LEFT L3-L4-L5 TWO OF TWO;  Surgeon: Samuel Jimenez MD;  Location: [facility] PAIN [facility]GT;  Service: Pain Management;  Laterality: Left;    REPLACEMENT OF NERVE STIMULATOR BATTERY N/A 2020    Procedure: Replacement, SPINAL CORD STIMULATOR BATTERY CHANGE TO NEVRO OMNION BATTERY;  Surgeon: Samuel Jimenez MD;  Location: [facility] OR;  Service: Pain Management;  Laterality: N/A;  C-ARM, NEVRO REP    REPLACEMENT OF SPINAL CORD STIMULATOR  2023    Procedure: REPLACEMENT, SPINAL CORD STIMULATOR;  Surgeon: Samuel Jimenez MD;  Location: [facility] OR;  Service: Pain Management;;    REVISION PROCEDURE INVOLVING SPINAL CORD NEUROSTIMULATOR N/A 2023    Procedure: SPINAL CORD STIMULATOR EXPLANT AND REIMPLANT SALUDA REP;  Surgeon: Samuel Jimenez MD;  Location: [facility] OR;  Service: Pain Management;  Laterality: N/A;    TRIAL OF SPINAL CORD NERVE STIMULATOR N/A 2019    Procedure: Trial, Neurostimulator, SPINAL CORD STIMULATOR TRIAL;  Surgeon: Samuel Jimenez MD;  Location: [facility] CATH LAB;  Service: Pain Management;  Laterality: N/A;  C-ARM, NEVRO REP     Social History     Socioeconomic History    Marital status:    Tobacco Use    Smoking status: Former     Types: Cigarettes     Quit date: 1980     Years since quittin.3    Smokeless tobacco: Never    Tobacco comments:     stopped 40 years ago   Substance and Sexual Activity    Alcohol use: Yes     Alcohol/week: 1.0 standard drink     Types: 1 Shots of liquor per week     Comment: nightly -scotch    Drug use: Never    Sexual activity: Yes     Partners:  Female     No family history on file.    Review of patient's allergies indicates:  No Known Allergies    Current Outpatient Medications   Medication Sig    ascorbic acid, vitamin C, (VITAMIN C) 1000 MG tablet Take 1,000 mg by mouth.    celecoxib (CELEBREX) 200 MG capsule     DULoxetine (CYMBALTA) 30 MG capsule Take 1 capsule (30 mg total) by mouth 2 (two) times daily.    ergocalciferol, vitamin D2, (VITAMIN D ORAL) Take by mouth every 30 days.    irbesartan (AVAPRO) 75 MG tablet 150 mg once daily.     levothyroxine (SYNTHROID) 100 MCG tablet Take 100 mcg by mouth.    mirabegron (MYRBETRIQ ORAL) Take by mouth.    simvastatin (ZOCOR) 20 MG tablet Take 20 mg by mouth every evening.    tadalafil (CIALIS) 20 MG Tab     temazepam (RESTORIL) 30 mg capsule TK 1 C PO QD HS     No current facility-administered medications for this visit.     Facility-Administered Medications Ordered in Other Visits   Medication    balanced salt irrigation intra-ocular solution 1 drop    sodium chloride 0.9% flush 2 mL       REVIEW OF SYSTEMS:    GENERAL:  No weight loss, malaise or fevers.  HEENT:   No recent changes in vision or hearing  NECK:  Negative for lumps, no difficulty with swallowing.  RESPIRATORY:  Negative for cough, wheezing or shortness of breath, patient denies any recent URI.  CARDIOVASCULAR:  Negative for chest pain, leg swelling or palpitations.  GI:  Negative for abdominal discomfort, blood in stools or black stools or change in bowel habits.  MUSCULOSKELETAL:  See HPI.  SKIN:  Negative for lesions, rash, and itching.  PSYCH:  No mood disorder or recent psychosocial stressors.  Patients sleep is not disturbed secondary to pain.  HEMATOLOGY/LYMPHOLOGY:  Negative for prolonged bleeding, bruising easily or swollen nodes.  Patient is not currently taking any anti-coagulants  NEURO:   No history of headaches, syncope, paralysis, seizures or tremors.  All other reviewed and negative other than HPI.    OBJECTIVE:    General  appearance: Well appearing, in no acute distress, alert and oriented x3.  Psych:  Mood and affect appropriate.    ASSESSMENT: 87 y.o. year old male with low back pain, consistent with     1. Radiculopathy of thoracolumbar region        2. Arachnoiditis        3. Chronic pain syndrome        4. Sacroiliitis        5. Spinal cord stimulator status          PLAN:   - I have stressed the importance of physical activity and a home exercise plan to help with pain and improve health.  - Patient can continue with medications for now since they are providing benefits, using them appropriately, and without side effects.  - Counseled patient on starting to take Cymbalta 30mg twice daily rather than once to appreciate the full benefits.  - Refilled Rx for Percocet 7.5-325mg q8 hrs prn.  - He will follow up with Sha sanford regarding reprogramming next week.   - RTC 4-6 weeks or sooner if needed. He will contact us with any difficulty.  - Counseled patient regarding the importance of activity modification, constant sleeping habits, and physical therapy.    The above plan and management options were discussed at length with patient. Patient is in agreement with the above and verbalized understanding. It will be communicated with the referring physician via electronic record, fax, or mail.      Darron Espinal MD  [facility], PGY-4  I have personally reviewed the history and personally discussed the plan with patient through video visit and agree with the resident/fellow/NPs note as stated above.    Samuel Jimenez MD  5/4/23

## 2023-05-04 NOTE — H&P (VIEW-ONLY)
Chronic Pain-Tele-Medicine-Established Note (Follow up visit)      The patient location is: Work  The chief complaint leading to consultation is: Follow-up from SCS implantation  Visit type: Virtual visit with synchronous audio and video  Total time spent with patient: 17 minutes  Each patient to whom he or she provides medical services by telemedicine is:  (1) informed of the relationship between the physician and patient and the respective role of any other health care provider with respect to management of the patient; and (2) notified that he or she may decline to receive medical services by telemedicine and may withdraw from such care at any time.    Notes:   Interval History 5/4/23:  Patient seen today via virtual follow-up after implantation of his [place] SCS in February. He reports no change in his pain since his last visit. Most of his pain is into his right hip with intermittent radiation into his RLE. He denies any new symptoms. No red flag symptoms. He is scheduled to meet with the [employer] reps from reprogramming next week. He remains optimistic. In discussing his current medications, he has run out of the hydrocodone he was previously taking and has only been taking his Cymbalta once per day rather two.     Interval History 3/30/2023:  Mr Boogie presents 5-6 weeks after implantation of his [place] SCS system. He reports no significant change in his pains, which are primarily down the RLE.  No constitutional signs symptoms concerning for infection.  No new areas of pain or neurological changes since procedure. No voicing of s/s concerning for cauda equina syndrome. He does endorse improvement in sleep. He is worried that his implant was a failure because his pain has not changed.     Interval History 2/20/2023:  Mr Bogoie presents for follow up of [place] SCS replacement. He states doing well. No constitutional signs symptoms concerning for infection.  No new areas of pain or neurological changes  since procedure. No voicing of s/s concerning for cauda equina syndrome.      Interval History 1/23/2023:  Still taking Celebrex, however hasn't noticed a difference in pain with this medication. Location, quality, and severity of pain are unchanged from prior visit and is described above. Here today to discuss removal of [place] SCS, and implant of [place] SCS. He states his stimulator has not been helpful for the past 2 years despite multiple reprogramming and adjustment.     Interval History 11/21/22:  Reports 24 hours of 100% relief for 48 hours, but he reports that now his pain is only approximally 10% better. His pain at rest is 3-4/10. Pain at its worst is 8-9/10. Pain is improved when leaning over a grocery cart. Pain only allows him to walk for 3-4mins.      Interval History 8/29/22:  Jefferson Boogie presents to the clinic for a follow-up appointment for back pain.  He had his second bilateral L3, L4, L5 MBB and reports 100% pain relief. He would like to proceed with RFA.     Interval History 5/26/22:  Jefferson Boogie presents to the clinic for a follow-up appointment for back pain. Since the last visit, Jefferson Boogie states the pain has been stable. Certain postures he can tolerate in the standing position such as leaning on a shopping cart or support to his posterior thighs. He now uses a walker when covering long distances. He continues to play golf despite it aggravating his pain. He receives some benefit from the SCS whereas other interventions have not helped. He remains interested in the [place] device.     Interval History 3/24/22:  Jefferson Boogie presents to the clinic for a follow-up appointment for back pain. Since the last visit, Jefferson Boogie states the pain has been worse than usual. Current pain intensity is 8/10. He continues to report benefit with [place] SCS however he has not been able to get in touch with the representative in order to have his settings adjusted. He continues to  "take occasional norco 7.5 mg with benefit, particularly when he plays golf.     Interval History 2/10/22:  Patent presents today s/p caudal epidural steroid injections on 1/12/22 since then he has not had any relief. He states his pain has been unchanged and is a 6/10 on average with the worst being 8/10 and best is 4/10. He has been taking percocet 5mg when he golfs or plays with his granddaughter.      Interval History 12/30/21:  Jefferson Boogie presents to clinic for follow up appointment s/p Right SI joint and Right piriformis muscle injection under US guidance on 11/29/21. He did not obtain any noticeable relief with this procedure similar to all of his previous injections. His pain is unchanged and constant over the right buttock. He is taking percocet 5mg x 3 on days he is more active, such as playing golf. This helps some, but minimally. Denies any new symptoms or neurological changes. No numbness, tingling, weakness in his lower extremities. Denies bowel/bladder incontinence or saddle anesthesia.      Interval History 11/4/2021:  Jefferson Boogie presents to the clinic for a follow-up appointment for right sided back pain and SI joint pain. Mr. Boogie had a right SI joint injection on 10/13/2021. He did not note significant relief with the injection for any period of time. Pain is still constant over the right buttock and most noticeable when playing golf. He denies any new bowel/bladder incontinence, lower extremity numbness or weakness or saddle anesthesia.     Interval History 8/26/2021:  Jefferson Boogie presents to the clinic for a follow-up appointment for right sided hip pain with right-sided radiculopathy . Since the last visit, Jefferson Boogie states the pain has been "particularly tender today" 7/10. Patient reports playing golf recently and experienced worsening symptoms the following day. Mr. Boogie is s/p right-side hip joint injection with minimal relief. Patient states he has had " relief with previous interventions and is open to RFA.     Interval History 6/10/2021:  Jefferson Boogie presents to the clinic for a follow-up appointment. Since the last visit, Jefferson Boogie states the pain has been stable. Current pain intensity is 3/10. States he is still having pain around SIJ that is affecting his ADL      Interval History 4/15/2021:  The patient returns to clinic today for follow up of back pain. He reports that the swelling above the SI fusion incision has resolved. He denies any fever, chills, or drainage from the incision. He does report benefit since the fusion. He continues to report benefit with [place] SCS. He reports intermittent shoulder pain at this time. He did receive an injection with Sports Medicine with benefit. He denies any other health changes. His pain today is 2/10.     Interval History 3/25/21:  Mr. Boogie presents to clinic for follow up of bilateral shoulder pain. He is s/p BL subacromial injections on 2/8/21. He reports maximum 20% relief of pain in the Right shoulder. Today the Left shoulder pain is 1/10; Right shoulder pain is 5-7/10.      Interval History 3/22/2021:  The patient returns to clinic today for follow up of back pain. He is s/p right SI fusion on 3/15/2021. He reports some relief at this time. He does report soreness to the incision. He denies any fever, chills, or drainage from incision. He continues to report benefit with [place] SCS. He denies any other health changes. His pain today is 4/10.     Interval History 1/11/2020:  Patient is here for follow-up of low back pain now s/p sacroiliac RFA on 12/9/20 which provided no benefit and with the new complaint of bilateral anterior shoulder pain R>L. His shoulder pain started about 2 months ago. He describes 9/10 sharp/stinging pain without radiation that is exacerbated with overhead activity and improved with rest. He started PT about one month ago. He takes oxycodone which provides some relief. He  had a blind subacromial steroid injection in the right shoulder with Dr. Ramirez on 10/26/19 which provided some short term relief.     Interval History 11/5/2020:  Jefferson Boogie presents to the clinic for a follow-up appointment for low back pain. Since the last visit, Jefferson Boogie states the pain has been stable. Current pain intensity is 4/10. He had good relief from the SI joint injection that lasted for about a week. Pain has since returned. He also slipped and fell on his buttock a couple of weeks ago and had increased pain in the right buttock after that which is slowly improving. He continues to have variable benefit with the [place] SCS for intermittent pain in the right leg. He is scheduled to meet with representatives today. He is taking celebrex daily and percocet as needed sparingly. He denies any adverse effects and any other health changes.     Interval History 10/5/2020:  The patient returns to clinic today for follow up of low back pain. He is s/p right SI joint injection on 9/9/2020. He reports 25% relief of his right sided low back and buttock pain. He did have good relief the first two days. He continues to report right sided low back and buttock pain. He denies any radicular leg pain. His pain is worse with prolonged standing and walking. He continues to report intermittent pain into his achilles from previous injury. He feels as though this has changed his gait. He continues to report some benefit with [place] SCS device. He is in contact with representatives. He is currently taking Percocet as needed sparingly with some benefit. He denies any adverse effects. He denies any other health changes. His pain today is 4/10.      Interval History 9/2/2020:  The patient returns to clinic today for follow up of back pain. He reports that his back pain has improved since last audio visit. He continues to report back pain with intermittent radiating pain into his right hip and calf. He has spoken  with Bienvenido from [employer] via phone with some programming. He is meeting with Bienvenido today. He had some issues with charging but this has improved. He is currently taking Norco intermittently but this is only lasts 2-3 hours. He denies any adverse effects. He denies any other health changes. His pain today is 8/10.     Interval History 08/27/2020:  Pt was contacted over virtual audio for a follow up appointment.  He is currently complaining of right hip and right calf with no associated numbness or weakness.  He continues to use his Nevro device.  He states it has been very frustrating. Inconsistent.  Took 20 mins to 1 hour to charge.  Couldn't get it to start this morning.  He states that there is no drainage at the battery site, no signs of infection or pain at the site.  Patient is not taking medications at this time.  He wants to speak about medication options because he would like to start playing golf again.     Interval History 8/17/2020:  The patient returns to clinic today for post op wound check. He continues to report benefit with Nevro device. He denies any fever, chills, or drainage. He continues to follow his activity restrictions. He does report a recent achilles tendon injury while swimming. He is seeing Orthopedics. He denies any other health changes. His pain today is 4/10.     Interval History 8/3/2020:  The patient returns to clinic today for post op. He is s/p Nevro battery change on 7/27/2020. He denies any fever, chills, or drainage from incision. He has not completed his antibiotics as he missed two days of the medication. He continues to follow his activity restrictions. He reports relief with the device. He is meeting with Bienvenido today for programming. He denies any other health changes. His pain today is 2/10     Interval History 7/22/2020:  Pt presents for audio follow up only s/p Right SI joint injection on 7/8/2020. Pt states he has near resolution of focal pain to buttock. He is pleased with  result and pain relief. Pt has been in contact with [employer] and will be having battery swap in 5 days. He has difficulty whether stimulator is beneficial and has even had a holiday from using SCS.  He denies any newer areas pain, denies any neuro changes, meds area adequate to control pain without adverse side effects. Pain is 2/10 today.     Interval HPI 6/15/2020:  Jefferson Boogie presents to the clinic for a follow-up appointment for 3 week follow up right hip and right thigh pain. Since the last visit, Jefferson Boogie states the pain has been persistant. Current pain intensity is 5/10. Patient has been working with Bienvenido Varghese, to try and get better coverage of a localized pain that he still experiences in his right low back area. He does report improvement in symptoms of numbness/tingling. Today, worse pain is 1/10 in severity and localizes to the right SI joint. Pain is worse with extension of his back. It is worse with golfing. Pain relieved by Norco--he takes 1-2 tablets of 5-325 Norco on days that he plays golf. Pain is also improved with being still and not being active. Patient endorses a much more active lifestyle with Norco. Denies new weakness/numbness or bowel/bladder changes.     Previous injection history includes a series of 3 lumbar CHOCO at [facility].      Interval HPI 3/5/20:  Patient presents to the clinic for a follow-up appointment for low back pain. Since the last visit, he states his pain has been unchanged. Current pain intensity is 4/10. He continues to work with [employer] to adjust settings on his SCS. He has stopped using tramadol, and uses norco sparingly. He continues to work with a  for physical therapy.      Interval HPI 1/9/2020:  Patient returns for follow up s/p [place] SCS. He states he is unsure if it has helped his pain since he had it implanted. He last had an adjustment of his programming about 1 month ago. He does note that once he is done charging his  SCS his pain is improved. Pain still worse with prolonged standing and activity such as golf. He still is doing home exercise program. He says that he is trying to do more since getting the SCS. He is still taking the Tramadol with limited pain relief. He takes Tramadol 50 mg twice daily only on days of activity which is once a week. No other health changes.      HPI 19  Jefferson Boogie returns to clinic today for follow up. He reports increased low back and leg pain over the last few days. He has been in contact with [email or web address] representatives for programming adjustments. He does report benefit with the device. He reports good days and bad days. His pain is worse with prolonged standing and activity. He continues to participate in a home exercise routine. He does take Tramadol sparingly but reports limited relief. He denies any other health changes. His pain today is 5/10.     Pain Medications:  Celebrex   Cymbalta     Opioid Contract: not applicable      report:  Reviewed and consistent with medication use as prescribed.     Pain Procedures  2020- Right SI joint injection  2020- Nevro SCS battery change  2020- Right SIJ injection >80% relief   19 SCS implant  19 SCS trial  2020- Right SI joint injection  2020- SCS battery revision  2020- Right SI joint injection   2020- Right L5-S1 RFA  3/15/2021- Right SI fusion  2021 - Injection R Hip - minimal relief  10/13/2021 - Right SI joint injection  2021 - R SI joing and R piriformis muscle injection - no relief   2022- Caudal CHOCO- no relief   22 - Diagnostic Bilateral L3, L4 and L5 Lumbar Medial Branch Block under Fluoroscopy  22- Right L3, L4, L5 RFA  10/12/22 -  Left L3, L4, L5 RFA  2023 - [place] SCS implant     Physical Therapy/Home Exercise: no     Imagin/10/2021 - X ray Hips Bilateral: No radiographic evidence of acute osseous abnormality of the pelvis and hips and without  radiographic evidence of osteonecrosis of the femoral heads.     9/18/21 MRI L-spine:  FINDINGS:  Lumbar sagittal alignment is slightly is straightened.  There is slight convex right curvature lumbar spine.  There is degenerative disc disease with intervertebral disc height loss and endplate degenerative change at all levels with scattered disc desiccation allowing for degenerative change the lumbar vertebral body heights and contours are within normal is without evidence for acute fracture or subluxation.  There is heterogeneous T1/T2 signal focus within the right aspect of the S1 vertebra most compatible with hemangioma this measures 2.0 cm.     The distal spinal cord and conus is normal in signal and contour tip of the conus approximates the T12/L1 level.  Please note there is artifact from indwelling spinal stimulator device with leads partially visualized casting artifact entering the dorsal epidural space at the T12 level and extending cranially.     There is abnormal clumping configuration of the filum terminalis a from T12 through L5 with slight thickening of scattered nerve roots most compatible with arachnoiditis.     No aneurysmal dilatation of the visualized abdominal aorta.     T12/L1 through L1/L2: No significant disc bulge, central canal or neural foraminal stenosis.     L2/L3: Posterior disc osteophyte with facet joint arthropathy without significant central canal stenosis with mild bilateral neural foraminal stenosis.     L3/L4:Bulging disc with ligamentum flavum hypertrophy without significant central canal stenosis with mild bilateral neural foraminal stenosis.     L4/L5:Posterior disc osteophyte with ligamentum flavum hypertrophy and facet joint arthropathy without significant central canal stenosis and mild bilateral neural foraminal stenosis.     L5/S1: Posterior disc osteophyte with facet joint arthropathy without significant central canal stenosis with moderate right and mild left neural  foraminal stenosis.     This report was flagged in Epic as abnormal.     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  Please note there is spinal stimulator device with leads partially visualized and distorting the study by artifacts.     There is abnormal thickening of the filum configuration most compatible with arachnoiditis.     Please see above for additional details.     Thoracic spine MRI:  FINDINGS: Thoracic vertebral body height and alignment are maintained with a few small scattered Schmorl's nodes involving the endplates of several mid to lower thoracic vertebra. The T3-4 vertebra are partially fused. There is some degree of desiccation of all of the thoracic discs with multilevel disc space narrowing, especially at the T7-T8, T6-T7 and T5-T6 levels. There is no abnormal prevertebral soft tissue thickening. The somewhat heterogeneous appearance to the signal from the thoracic vertebra is presumably secondary to an admixture of red and yellow marrow.    T1-T2 level: There is no bony foraminal narrowing or bony central canal stenosis and the posterior disc margin is unremarkable.    T2-T3 level: There is no bony foraminal narrowing or bony central canal stenosis and the posterior disc margin is unremarkable.    T3-T4 level: There is no bony foraminal narrowing or bony central canal stenosis and the posterior disc margin is unremarkable.    T4-5 level: There is no bony foraminal narrowing or bony central canal stenosis and the posterior disc margin is unremarkable.    T5-T6 level: There is no bony foraminal narrowing or bony central canal stenosis and the posterior disc margin is unremarkable.    T6-T7 level: There is no bony foraminal narrowing or bony central canal stenosis and the posterior disc margin is unremarkable.    T7-T8 level: There is no bony foraminal narrowing or bony central canal stenosis and the posterior disc margin is unremarkable.    T8-T9 level:  There is no bony foraminal narrowing or bony central canal stenosis and the posterior disc margin is unremarkable.    T9-T10 level: There is no bony foraminal narrowing or bony central canal stenosis and the posterior disc margin is unremarkable.    T10-T11 level: There is no bony foraminal narrowing or bony central canal stenosis and the posterior disc margin is unremarkable.    T11-T12 level: There is no bony foraminal narrowing or bony central canal stenosis and the posterior disc margin is unremarkable.    T12-L1 level: The tip the conus medullaris extends down to level of the superior endplate of L1. 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 There is no bony foraminal narrowing or bony central canal stenosis at this level.    On the axial images, the immediate paravertebral soft tissues are unremarkable. A small cyst is seen to involve the upper pole the left kidney.    Following contrast administration, there is no abnormal enhancement of any bony or soft tissue elements of the thoracic spine. There is no abnormal enhancement of the subserosal surface of the thoracic spinal cord. Some foci of mild signal intensity in the CSF dorsal to the spinal cord of some of the sagittal sequences presumably is secondary to CSF pulsation artifact.    On the sagittal  image, there is cervical spondylosis and kyphosis with narrowing of all of the disc spaces in the cervical spine.     Past Medical History:   Diagnosis Date    Bronchitis     Cancer     stage I bladder cancer 50 yrs ago    Hypercholesteremia     Hypertension     Sacroiliitis 07/08/2020    Thyroid disease      Past Surgical History:   Procedure Laterality Date    BLADDER SURGERY      CATARACT EXTRACTION      CATARACT EXTRACTION W/  INTRAOCULAR LENS IMPLANT Right 3/9/2022    Procedure: EXTRACTION, CATARACT, WITH IOL INSERTION;  Surgeon: Bell Cedeno MD;  [facility];  Service: Ophthalmology;   Laterality: Right;    COLONOSCOPY      EPIDURAL STEROID INJECTION N/A 1/12/2022    Procedure: INJECTION, STEROID, EPIDURAL CAUDAL With Catheter needs consent;  Surgeon: Samuel Jimenez MD;  Location: [facility] PAIN [facility]GT;  Service: Pain Management;  Laterality: N/A;    EYE SURGERY      cataracts     FUSION OF SACROILIAC JOINT Right 3/15/2021    Procedure: FUSION, RIGHT SACROILIAC JOINT FUSION;  Surgeon: Samuel Jimenez MD;  Location: [facility] OR;  Service: Pain Management;  Laterality: Right;  C-ARM, PAINTEQ REP     HERNIA REPAIR      INJECTION OF ANESTHETIC AGENT AROUND NERVE Bilateral 6/1/2022    Procedure: BLOCK, NERVE MEDIAL BRANCH L3,4,5 BILATERAL 1st;  Surgeon: Samuel Jimenez MD;  Location: [facility]IN [facility]GT;  Service: Pain Management;  Laterality: Bilateral;    INJECTION OF ANESTHETIC AGENT AROUND NERVE Bilateral 8/24/2022    Procedure: Block, Nerve Kenny L3, L4, & L5;  Surgeon: Samuel Jimenez MD;  Location: [facility];  Service: Pain Management;  Laterality: Bilateral;    INJECTION OF JOINT Right 7/8/2020    Procedure: INJECTION, JOINT, SACROILIAC (SI);  Surgeon: Samuel Jimenez MD;  Location: [facility]IN [facility];  Service: Pain Management;  Laterality: Right;    INJECTION OF JOINT Right 9/9/2020    Procedure: INJECTION, JOINT, SACROILIAC (SI);  Surgeon: Samuel Jimenez MD;  Location: [facility]IN [facility];  Service: Pain Management;  Laterality: Right;    INJECTION OF JOINT Right 7/21/2021    Procedure: INJECTION, JOINT, HIP RIGHT;  Surgeon: Samuel Jimenez MD;  Location: [facility];  Service: Pain Management;  Laterality: Right;  CONSENT NEEDED    INJECTION OF JOINT Right 10/13/2021    Procedure: INJECTION, JOINT, SACROILIAC (SI)  NEED CONSENT pt. requesting sedation;  Surgeon: Samuel Jimenez MD;  Location: [facility]IN [facility]GT;  Service: Pain Management;  Laterality: Right;    KNEE SURGERY      RADIOFREQUENCY ABLATION Right 12/9/2020    Procedure: RADIOFREQUENCY ABLATION, L5-S1-S2 LATERAL BRANCH COOLED;   Surgeon: Samuel Jimenez MD;  Location: [facility] PAIN [facility]GT;  Service: Pain Management;  Laterality: Right;    RADIOFREQUENCY ABLATION Right 2022    Procedure: RADIOFREQUENCY ABLATION, RIGHT L3-L4-L5 PER DR JIMENEZ;  Surgeon: Samuel Jimenez MD;  Location: [facility] PAIN [facility]GT;  Service: Pain Management;  Laterality: Right;    RADIOFREQUENCY ABLATION Left 10/12/2022    Procedure: RADIOFREQUENCY ABLATION, LEFT L3-L4-L5 TWO OF TWO;  Surgeon: Samuel Jimenez MD;  [facility] [facility];  Service: Pain Management;  Laterality: Left;    REPLACEMENT OF NERVE STIMULATOR BATTERY N/A 2020    Procedure: Replacement, SPINAL CORD STIMULATOR BATTERY CHANGE TO NEVRO OMNION BATTERY;  Surgeon: Samuel Jimenez MD;  Location: [facility] OR;  Service: Pain Management;  Laterality: N/A;  C-ARM, NEVRO REP    REPLACEMENT OF SPINAL CORD STIMULATOR  2023    Procedure: REPLACEMENT, SPINAL CORD STIMULATOR;  Surgeon: Samuel Jimenez MD;  Location: [facility] OR;  Service: Pain Management;;    REVISION PROCEDURE INVOLVING SPINAL CORD NEUROSTIMULATOR N/A 2023    Procedure: SPINAL CORD STIMULATOR EXPLANT AND REIMPLANT SALUDA REP;  Surgeon: Samuel Jimenez MD;  Location: [facility] OR;  Service: Pain Management;  Laterality: N/A;    TRIAL OF SPINAL CORD NERVE STIMULATOR N/A 2019    Procedure: Trial, Neurostimulator, SPINAL CORD STIMULATOR TRIAL;  Surgeon: Samuel Jimenez MD;  Location: [facility] CATH LAB;  Service: Pain Management;  Laterality: N/A;  C-ARM, NEVRO REP     Social History     Socioeconomic History    Marital status:    Tobacco Use    Smoking status: Former     Types: Cigarettes     Quit date: 1980     Years since quittin.3    Smokeless tobacco: Never    Tobacco comments:     stopped 40 years ago   Substance and Sexual Activity    Alcohol use: Yes     Alcohol/week: 1.0 standard drink     Types: 1 Shots of liquor per week     Comment: nightly -scotch    Drug use: Never    Sexual activity: Yes     Partners:  Female     No family history on file.    Review of patient's allergies indicates:  No Known Allergies    Current Outpatient Medications   Medication Sig    ascorbic acid, vitamin C, (VITAMIN C) 1000 MG tablet Take 1,000 mg by mouth.    celecoxib (CELEBREX) 200 MG capsule     DULoxetine (CYMBALTA) 30 MG capsule Take 1 capsule (30 mg total) by mouth 2 (two) times daily.    ergocalciferol, vitamin D2, (VITAMIN D ORAL) Take by mouth every 30 days.    irbesartan (AVAPRO) 75 MG tablet 150 mg once daily.     levothyroxine (SYNTHROID) 100 MCG tablet Take 100 mcg by mouth.    mirabegron (MYRBETRIQ ORAL) Take by mouth.    simvastatin (ZOCOR) 20 MG tablet Take 20 mg by mouth every evening.    tadalafil (CIALIS) 20 MG Tab     temazepam (RESTORIL) 30 mg capsule TK 1 C PO QD HS     No current facility-administered medications for this visit.     Facility-Administered Medications Ordered in Other Visits   Medication    balanced salt irrigation intra-ocular solution 1 drop    sodium chloride 0.9% flush 2 mL       REVIEW OF SYSTEMS:    GENERAL:  No weight loss, malaise or fevers.  HEENT:   No recent changes in vision or hearing  NECK:  Negative for lumps, no difficulty with swallowing.  RESPIRATORY:  Negative for cough, wheezing or shortness of breath, patient denies any recent URI.  CARDIOVASCULAR:  Negative for chest pain, leg swelling or palpitations.  GI:  Negative for abdominal discomfort, blood in stools or black stools or change in bowel habits.  MUSCULOSKELETAL:  See HPI.  SKIN:  Negative for lesions, rash, and itching.  PSYCH:  No mood disorder or recent psychosocial stressors.  Patients sleep is not disturbed secondary to pain.  HEMATOLOGY/LYMPHOLOGY:  Negative for prolonged bleeding, bruising easily or swollen nodes.  Patient is not currently taking any anti-coagulants  NEURO:   No history of headaches, syncope, paralysis, seizures or tremors.  All other reviewed and negative other than HPI.    OBJECTIVE:    General  appearance: Well appearing, in no acute distress, alert and oriented x3.  Psych:  Mood and affect appropriate.    ASSESSMENT: 87 y.o. year old male with low back pain, consistent with     1. Radiculopathy of thoracolumbar region        2. Arachnoiditis        3. Chronic pain syndrome        4. Sacroiliitis        5. Spinal cord stimulator status          PLAN:   - I have stressed the importance of physical activity and a home exercise plan to help with pain and improve health.  - Patient can continue with medications for now since they are providing benefits, using them appropriately, and without side effects.  - Counseled patient on starting to take Cymbalta 30mg twice daily rather than once to appreciate the full benefits.  - Refilled Rx for Percocet 7.5-325mg q8 hrs prn.  - He will follow up with Sha sanford regarding reprogramming next week.   - RTC 4-6 weeks or sooner if needed. He will contact us with any difficulty.  - Counseled patient regarding the importance of activity modification, constant sleeping habits, and physical therapy.    The above plan and management options were discussed at length with patient. Patient is in agreement with the above and verbalized understanding. It will be communicated with the referring physician via electronic record, fax, or mail.      Darron Espinal MD  [facility], PGY-4  I have personally reviewed the history and personally discussed the plan with patient through video visit and agree with the resident/fellow/NPs note as stated above.    Samuel Jimenez MD  5/4/23

## 2023-05-09 ENCOUNTER — TELEPHONE (OUTPATIENT)
Dept: PAIN MEDICINE | Facility: CLINIC | Age: 88
End: 2023-05-09
Payer: MEDICARE

## 2023-05-09 NOTE — TELEPHONE ENCOUNTER
----- Message from Ludmila Irene sent at 5/9/2023  9:05 AM CDT -----  Name of Who is Calling:[facility]              What is the request in detail:Requesting a call back regarding Percocet.               Can the clinic reply by [facility]:              [facility]:[phone]  Ref #: [id number]

## 2023-05-11 ENCOUNTER — PATIENT MESSAGE (OUTPATIENT)
Dept: PAIN MEDICINE | Facility: CLINIC | Age: 88
End: 2023-05-11
Payer: MEDICARE

## 2023-05-24 ENCOUNTER — TELEPHONE (OUTPATIENT)
Dept: PAIN MEDICINE | Facility: CLINIC | Age: 88
End: 2023-05-24
Payer: MEDICARE

## 2023-05-24 NOTE — TELEPHONE ENCOUNTER
This message is for patient in regards to his/her appointment 5/24/23 at 9:30 am With Dr. Jimenez at [facility] on the 9th floor [id number].If you have any questions or concerns please contact [phone]

## 2023-05-25 ENCOUNTER — OFFICE VISIT (OUTPATIENT)
Dept: PAIN MEDICINE | Facility: CLINIC | Age: 88
End: 2023-05-25
Attending: ANESTHESIOLOGY
Payer: MEDICARE

## 2023-05-25 VITALS
SYSTOLIC BLOOD PRESSURE: 160 MMHG | DIASTOLIC BLOOD PRESSURE: 60 MMHG | HEIGHT: 73 IN | BODY MASS INDEX: 26.56 KG/M2 | HEART RATE: 62 BPM | RESPIRATION RATE: 17 BRPM | OXYGEN SATURATION: 100 % | WEIGHT: 200.38 LBS

## 2023-05-25 DIAGNOSIS — M46.1 SACROILIITIS: Primary | ICD-10-CM

## 2023-05-25 DIAGNOSIS — M79.18 MYOFASCIAL MUSCLE PAIN: ICD-10-CM

## 2023-05-25 PROCEDURE — 99214 PR OFFICE/OUTPT VISIT, EST, LEVL IV, 30-39 MIN: ICD-10-PCS | Mod: S$PBB,GC,, | Performed by: ANESTHESIOLOGY

## 2023-05-25 PROCEDURE — 99213 OFFICE O/P EST LOW 20 MIN: CPT | Mod: PBBFAC | Performed by: ANESTHESIOLOGY

## 2023-05-25 PROCEDURE — 99999 PR PBB SHADOW E&M-EST. PATIENT-LVL III: CPT | Mod: PBBFAC,,, | Performed by: ANESTHESIOLOGY

## 2023-05-25 PROCEDURE — 99999 PR PBB SHADOW E&M-EST. PATIENT-LVL III: ICD-10-PCS | Mod: PBBFAC,,, | Performed by: ANESTHESIOLOGY

## 2023-05-25 PROCEDURE — 99214 OFFICE O/P EST MOD 30 MIN: CPT | Mod: S$PBB,GC,, | Performed by: ANESTHESIOLOGY

## 2023-05-25 RX ORDER — HYDROCODONE BITARTRATE AND ACETAMINOPHEN 10; 325 MG/1; MG/1
1 TABLET ORAL EVERY 12 HOURS PRN
Qty: 60 TABLET | Refills: 0 | Status: SHIPPED | OUTPATIENT
Start: 2023-05-25 | End: 2023-05-25 | Stop reason: SDUPTHER

## 2023-05-25 RX ORDER — HYDROCODONE BITARTRATE AND ACETAMINOPHEN 10; 325 MG/1; MG/1
1 TABLET ORAL EVERY 12 HOURS PRN
Qty: 60 TABLET | Refills: 0 | Status: SHIPPED | OUTPATIENT
Start: 2023-06-02 | End: 2024-03-14 | Stop reason: SDUPTHER

## 2023-05-25 NOTE — PROGRESS NOTES
Chronic Pain-Tele-Medicine-Established Note (Follow up visit)        Notes:     Interval history 5/25/23:  In the interim he has met with the [place] reps for reprogramming. He has his good and bad days but feels that the pain is manageable. The reps with [employer] are present for the visit today. Today his pain is a 6.5/10. Still taking percocet and cymbalta (missed a few days), but these medications do not provide much relief. Still pain with walking and sitting, but the pain with laying down is better.     Interval History 5/4/23:  Patient seen today via virtual follow-up after implantation of his [place] SCS in February. He reports no change in his pain since his last visit. Most of his pain is into his right hip with intermittent radiation into his RLE. He denies any new symptoms. No red flag symptoms. He is scheduled to meet with the [employer] reps from reprogramming next week. He remains optimistic. In discussing his current medications, he has run out of the hydrocodone he was previously taking and has only been taking his Cymbalta once per day rather two.     Interval History 3/30/2023:  Mr Boogie presents 5-6 weeks after implantation of his [place] SCS system. He reports no significant change in his pains, which are primarily down the RLE.  No constitutional signs symptoms concerning for infection.  No new areas of pain or neurological changes since procedure. No voicing of s/s concerning for cauda equina syndrome. He does endorse improvement in sleep. He is worried that his implant was a failure because his pain has not changed.     Interval History 2/20/2023:  Mr Boogie presents for follow up of [place] SCS replacement. He states doing well. No constitutional signs symptoms concerning for infection.  No new areas of pain or neurological changes since procedure. No voicing of s/s concerning for cauda equina syndrome.      Interval History 1/23/2023:  Still taking Celebrex, however hasn't noticed a  difference in pain with this medication. Location, quality, and severity of pain are unchanged from prior visit and is described above. Here today to discuss removal of [place] SCS, and implant of [place] SCS. He states his stimulator has not been helpful for the past 2 years despite multiple reprogramming and adjustment.     Interval History 11/21/22:  Reports 24 hours of 100% relief for 48 hours, but he reports that now his pain is only approximally 10% better. His pain at rest is 3-4/10. Pain at its worst is 8-9/10. Pain is improved when leaning over a grocery cart. Pain only allows him to walk for 3-4mins.      Interval History 8/29/22:  Jefferson Boogie presents to the clinic for a follow-up appointment for back pain.  He had his second bilateral L3, L4, L5 MBB and reports 100% pain relief. He would like to proceed with RFA.     Interval History 5/26/22:  Jefferson Boogie presents to the clinic for a follow-up appointment for back pain. Since the last visit, Jefferson Boogie states the pain has been stable. Certain postures he can tolerate in the standing position such as leaning on a shopping cart or support to his posterior thighs. He now uses a walker when covering long distances. He continues to play golf despite it aggravating his pain. He receives some benefit from the SCS whereas other interventions have not helped. He remains interested in the [place] device.     Interval History 3/24/22:  Jefferson Boogie presents to the clinic for a follow-up appointment for back pain. Since the last visit, Jefferson Boogie states the pain has been worse than usual. Current pain intensity is 8/10. He continues to report benefit with [employer] SCS however he has not been able to get in touch with the representative in order to have his settings adjusted. He continues to take occasional norco 7.5 mg with benefit, particularly when he plays golf.     Interval History 2/10/22:  Patent presents today s/p caudal epidural  "steroid injections on 1/12/22 since then he has not had any relief. He states his pain has been unchanged and is a 6/10 on average with the worst being 8/10 and best is 4/10. He has been taking percocet 5mg when he golfs or plays with his granddaughter.      Interval History 12/30/21:  Jefferson Boogie presents to clinic for follow up appointment s/p Right SI joint and Right piriformis muscle injection under US guidance on 11/29/21. He did not obtain any noticeable relief with this procedure similar to all of his previous injections. His pain is unchanged and constant over the right buttock. He is taking percocet 5mg x 3 on days he is more active, such as playing golf. This helps some, but minimally. Denies any new symptoms or neurological changes. No numbness, tingling, weakness in his lower extremities. Denies bowel/bladder incontinence or saddle anesthesia.      Interval History 11/4/2021:  Jefferson Boogie presents to the clinic for a follow-up appointment for right sided back pain and SI joint pain. Mr. Boogie had a right SI joint injection on 10/13/2021. He did not note significant relief with the injection for any period of time. Pain is still constant over the right buttock and most noticeable when playing golf. He denies any new bowel/bladder incontinence, lower extremity numbness or weakness or saddle anesthesia.     Interval History 8/26/2021:  Jefferson Boogie presents to the clinic for a follow-up appointment for right sided hip pain with right-sided radiculopathy . Since the last visit, Jefferson Boogie states the pain has been "particularly tender today" 7/10. Patient reports playing golf recently and experienced worsening symptoms the following day. Mr. Boogie is s/p right-side hip joint injection with minimal relief. Patient states he has had relief with previous interventions and is open to RFA.     Interval History 6/10/2021:  Jefferson Boogie presents to the clinic for a follow-up " appointment. Since the last visit, Jefferson Boogie states the pain has been stable. Current pain intensity is 3/10. States he is still having pain around SIJ that is affecting his ADL      Interval History 4/15/2021:  The patient returns to clinic today for follow up of back pain. He reports that the swelling above the SI fusion incision has resolved. He denies any fever, chills, or drainage from the incision. He does report benefit since the fusion. He continues to report benefit with [employer] SCS. He reports intermittent shoulder pain at this time. He did receive an injection with Sports Medicine with benefit. He denies any other health changes. His pain today is 2/10.     Interval History 3/25/21:  Mr. Boogie presents to clinic for follow up of bilateral shoulder pain. He is s/p BL subacromial injections on 2/8/21. He reports maximum 20% relief of pain in the Right shoulder. Today the Left shoulder pain is 1/10; Right shoulder pain is 5-7/10.      Interval History 3/22/2021:  The patient returns to clinic today for follow up of back pain. He is s/p right SI fusion on 3/15/2021. He reports some relief at this time. He does report soreness to the incision. He denies any fever, chills, or drainage from incision. He continues to report benefit with Nev[employer] SCS. He denies any other health changes. His pain today is 4/10.     Interval History 1/11/2020:  Patient is here for follow-up of low back pain now s/p sacroiliac RFA on 12/9/20 which provided no benefit and with the new complaint of bilateral anterior shoulder pain R>L. His shoulder pain started about 2 months ago. He describes 9/10 sharp/stinging pain without radiation that is exacerbated with overhead activity and improved with rest. He started PT about one month ago. He takes oxycodone which provides some relief. He had a blind subacromial steroid injection in the right shoulder with Dr. Ramirez on 10/26/19 which provided some short term relief.     Interval  History 11/5/2020:  Jefferson Boogie presents to the clinic for a follow-up appointment for low back pain. Since the last visit, Jefferson Boogie states the pain has been stable. Current pain intensity is 4/10. He had good relief from the SI joint injection that lasted for about a week. Pain has since returned. He also slipped and fell on his buttock a couple of weeks ago and had increased pain in the right buttock after that which is slowly improving. He continues to have variable benefit with the [employer] SCS for intermittent pain in the right leg. He is scheduled to meet with representatives today. He is taking celebrex daily and percocet as needed sparingly. He denies any adverse effects and any other health changes.     Interval History 10/5/2020:  The patient returns to clinic today for follow up of low back pain. He is s/p right SI joint injection on 9/9/2020. He reports 25% relief of his right sided low back and buttock pain. He did have good relief the first two days. He continues to report right sided low back and buttock pain. He denies any radicular leg pain. His pain is worse with prolonged standing and walking. He continues to report intermittent pain into his achilles from previous injury. He feels as though this has changed his gait. He continues to report some benefit with Nevro SCS device. He is in contact with representatives. He is currently taking Percocet as needed sparingly with some benefit. He denies any adverse effects. He denies any other health changes. His pain today is 4/10.      Interval History 9/2/2020:  The patient returns to clinic today for follow up of back pain. He reports that his back pain has improved since last audio visit. He continues to report back pain with intermittent radiating pain into his right hip and calf. He has spoken with Bienvenido from [employer] via phone with some programming. He is meeting with Bienvenido today. He had some issues with charging but this has improved. He is  currently taking Norco intermittently but this is only lasts 2-3 hours. He denies any adverse effects. He denies any other health changes. His pain today is 8/10.     Interval History 08/27/2020:  Pt was contacted over virtual audio for a follow up appointment.  He is currently complaining of right hip and right calf with no associated numbness or weakness.  He continues to use his [employer] device.  He states it has been very frustrating. Inconsistent.  Took 20 mins to 1 hour to charge.  Couldn't get it to start this morning.  He states that there is no drainage at the battery site, no signs of infection or pain at the site.  Patient is not taking medications at this time.  He wants to speak about medication options because he would like to start playing golf again.     Interval History 8/17/2020:  The patient returns to clinic today for post op wound check. He continues to report benefit with Nevro device. He denies any fever, chills, or drainage. He continues to follow his activity restrictions. He does report a recent achilles tendon injury while swimming. He is seeing Orthopedics. He denies any other health changes. His pain today is 4/10.     Interval History 8/3/2020:  The patient returns to clinic today for post op. He is s/p Nevro battery change on 7/27/2020. He denies any fever, chills, or drainage from incision. He has not completed his antibiotics as he missed two days of the medication. He continues to follow his activity restrictions. He reports relief with the device. He is meeting with Bien[employer] today for programming. He denies any other health changes. His pain today is 2/10     Interval History 7/22/2020:  Pt presents for audio follow up only s/p Right SI joint injection on 7/8/2020. Pt states he has near resolution of focal pain to buttock. He is pleased with result and pain relief. Pt has been in contact with [employer] and will be having battery swap in 5 days. He has difficulty whether stimulator is  beneficial and has even had a holiday from using SCS.  He denies any newer areas pain, denies any neuro changes, meds area adequate to control pain without adverse side effects. Pain is 2/10 today.     Interval HPI 6/15/2020:  Jefferson Boogie presents to the clinic for a follow-up appointment for 3 week follow up right hip and right thigh pain. Since the last visit, Jefferson Boogie states the pain has been persistant. Current pain intensity is 5/10. Patient has been working with Bienvenido Varghese, to try and get better coverage of a localized pain that he still experiences in his right low back area. He does report improvement in symptoms of numbness/tingling. Today, worse pain is 1/10 in severity and localizes to the right SI joint. Pain is worse with extension of his back. It is worse with golfing. Pain relieved by Norco--he takes 1-2 tablets of 5-325 Norco on days that he plays golf. Pain is also improved with being still and not being active. Patient endorses a much more active lifestyle with Norco. Denies new weakness/numbness or bowel/bladder changes.     Previous injection history includes a series of 3 lumbar CHOCO at [facility].      Interval HPI 3/5/20:  Patient presents to the clinic for a follow-up appointment for low back pain. Since the last visit, he states his pain has been unchanged. Current pain intensity is 4/10. He continues to work with [employer] to adjust settings on his SCS. He has stopped using tramadol, and uses norco sparingly. He continues to work with a  for physical therapy.      Interval HPI 1/9/2020:  Patient returns for follow up s/p [place] SCS. He states he is unsure if it has helped his pain since he had it implanted. He last had an adjustment of his programming about 1 month ago. He does note that once he is done charging his SCS his pain is improved. Pain still worse with prolonged standing and activity such as golf. He still is doing home exercise program. He says  that he is trying to do more since getting the SCS. He is still taking the Tramadol with limited pain relief. He takes Tramadol 50 mg twice daily only on days of activity which is once a week. No other health changes.      HPI 19  Jefferson Boogie returns to clinic today for follow up. He reports increased low back and leg pain over the last few days. He has been in contact with [employer] representatives for programming adjustments. He does report benefit with the device. He reports good days and bad days. His pain is worse with prolonged standing and activity. He continues to participate in a home exercise routine. He does take Tramadol sparingly but reports limited relief. He denies any other health changes. His pain today is 5/10.     Pain Medications:  Celebrex   Cymbalta     Opioid Contract: not applicable      report:  Reviewed and consistent with medication use as prescribed.     Pain Procedures  2020- Right SI joint injection  2020- Nevro SCS battery change  2020- Right SIJ injection >80% relief   19 SCS implant  19 SCS trial  2020- Right SI joint injection  2020- SCS battery revision  2020- Right SI joint injection   2020- Right L5-S1 RFA  3/15/2021- Right SI fusion  2021 - Injection R Hip - minimal relief  10/13/2021 - Right SI joint injection  2021 - R SI joing and R piriformis muscle injection - no relief   2022- Caudal CHOCO- no relief   22 - Diagnostic Bilateral L3, L4 and L5 Lumbar Medial Branch Block under Fluoroscopy  22- Right L3, L4, L5 RFA  10/12/22 -  Left L3, L4, L5 RFA  2023 - [place] SCS implant     Physical Therapy/Home Exercise: no     Imagin/10/2021 - X ray Hips Bilateral: No radiographic evidence of acute osseous abnormality of the pelvis and hips and without radiographic evidence of osteonecrosis of the femoral heads.     21 MRI L-spine:  FINDINGS:  Lumbar sagittal alignment is slightly is straightened.   There is slight convex right curvature lumbar spine.  There is degenerative disc disease with intervertebral disc height loss and endplate degenerative change at all levels with scattered disc desiccation allowing for degenerative change the lumbar vertebral body heights and contours are within normal is without evidence for acute fracture or subluxation.  There is heterogeneous T1/T2 signal focus within the right aspect of the S1 vertebra most compatible with hemangioma this measures 2.0 cm.     The distal spinal cord and conus is normal in signal and contour tip of the conus approximates the T12/L1 level.  Please note there is artifact from indwelling spinal stimulator device with leads partially visualized casting artifact entering the dorsal epidural space at the T12 level and extending cranially.     There is abnormal clumping configuration of the filum terminalis a from T12 through L5 with slight thickening of scattered nerve roots most compatible with arachnoiditis.     No aneurysmal dilatation of the visualized abdominal aorta.     T12/L1 through L1/L2: No significant disc bulge, central canal or neural foraminal stenosis.     L2/L3: Posterior disc osteophyte with facet joint arthropathy without significant central canal stenosis with mild bilateral neural foraminal stenosis.     L3/L4:Bulging disc with ligamentum flavum hypertrophy without significant central canal stenosis with mild bilateral neural foraminal stenosis.     L4/L5:Posterior disc osteophyte with ligamentum flavum hypertrophy and facet joint arthropathy without significant central canal stenosis and mild bilateral neural foraminal stenosis.     L5/S1: Posterior disc osteophyte with facet joint arthropathy without significant central canal stenosis with moderate right and mild left neural foraminal stenosis.     This report was flagged in Epic as abnormal.     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  Please note there is spinal stimulator device with leads partially visualized and distorting the study by artifacts.     There is abnormal thickening of the filum configuration most compatible with arachnoiditis.     Please see above for additional details.     Thoracic spine MRI:  FINDINGS: Thoracic vertebral body height and alignment are maintained with a few small scattered Schmorl's nodes involving the endplates of several mid to lower thoracic vertebra. The T3-4 vertebra are partially fused. There is some degree of desiccation of all of the thoracic discs with multilevel disc space narrowing, especially at the T7-T8, T6-T7 and T5-T6 levels. There is no abnormal prevertebral soft tissue thickening. The somewhat heterogeneous appearance to the signal from the thoracic vertebra is presumably secondary to an admixture of red and yellow marrow.    T1-T2 level: There is no bony foraminal narrowing or bony central canal stenosis and the posterior disc margin is unremarkable.    T2-T3 level: There is no bony foraminal narrowing or bony central canal stenosis and the posterior disc margin is unremarkable.    T3-T4 level: There is no bony foraminal narrowing or bony central canal stenosis and the posterior disc margin is unremarkable.    T4-5 level: There is no bony foraminal narrowing or bony central canal stenosis and the posterior disc margin is unremarkable.    T5-T6 level: There is no bony foraminal narrowing or bony central canal stenosis and the posterior disc margin is unremarkable.    T6-T7 level: There is no bony foraminal narrowing or bony central canal stenosis and the posterior disc margin is unremarkable.    T7-T8 level: There is no bony foraminal narrowing or bony central canal stenosis and the posterior disc margin is unremarkable.    T8-T9 level: There is no bony foraminal narrowing or bony central canal stenosis and the posterior disc margin is unremarkable.    T9-T10 level: There is no bony  foraminal narrowing or bony central canal stenosis and the posterior disc margin is unremarkable.    T10-T11 level: There is no bony foraminal narrowing or bony central canal stenosis and the posterior disc margin is unremarkable.    T11-T12 level: There is no bony foraminal narrowing or bony central canal stenosis and the posterior disc margin is unremarkable.    T12-L1 level: The tip the conus medullaris extends down to level of the superior endplate of L1. 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 There is no bony foraminal narrowing or bony central canal stenosis at this level.    On the axial images, the immediate paravertebral soft tissues are unremarkable. A small cyst is seen to involve the upper pole the left kidney.    Following contrast administration, there is no abnormal enhancement of any bony or soft tissue elements of the thoracic spine. There is no abnormal enhancement of the subserosal surface of the thoracic spinal cord. Some foci of mild signal intensity in the CSF dorsal to the spinal cord of some of the sagittal sequences presumably is secondary to CSF pulsation artifact.    On the sagittal  image, there is cervical spondylosis and kyphosis with narrowing of all of the disc spaces in the cervical spine.     Past Medical History:   Diagnosis Date    Bronchitis     Cancer     stage I bladder cancer 50 yrs ago    Hypercholesteremia     Hypertension     Sacroiliitis 07/08/2020    Thyroid disease      Past Surgical History:   Procedure Laterality Date    BLADDER SURGERY      CATARACT EXTRACTION      CATARACT EXTRACTION W/  INTRAOCULAR LENS IMPLANT Right 3/9/2022    Procedure: EXTRACTION, CATARACT, WITH IOL INSERTION;  Surgeon: Bell Cedeno MD;  Location: [facility];  Service: Ophthalmology;  Laterality: Right;    COLONOSCOPY      EPIDURAL STEROID INJECTION N/A 1/12/2022    Procedure: INJECTION, STEROID, EPIDURAL CAUDAL With Catheter needs  consent;  Surgeon: Samuel Jimenez MD;  Location: [facility]IN [facility];  Service: Pain Management;  Laterality: N/A;    EYE SURGERY      cataracts     FUSION OF SACROILIAC JOINT Right 3/15/2021    Procedure: FUSION, RIGHT SACROILIAC JOINT FUSION;  Surgeon: Samuel Jimenez MD;  Location: [facility] OR;  Service: Pain Management;  Laterality: Right;  C-ARM, PAINTEQ REP     HERNIA REPAIR      INJECTION OF ANESTHETIC AGENT AROUND NERVE Bilateral 6/1/2022    Procedure: BLOCK, NERVE MEDIAL BRANCH L3,4,5 BILATERAL 1st;  Surgeon: Samuel Jimenez MD;  Location: [facility];  Service: Pain Management;  Laterality: Bilateral;    INJECTION OF ANESTHETIC AGENT AROUND NERVE Bilateral 8/24/2022    Procedure: Block, Nerve Kenny L3, L4, & L5;  Surgeon: Samuel Jimenez MD;  Location: [facility];  Service: Pain Management;  Laterality: Bilateral;    INJECTION OF JOINT Right 7/8/2020    Procedure: INJECTION, JOINT, SACROILIAC (SI);  Surgeon: Samuel Jimenez MD;  Location: [facility];  Service: Pain Management;  Laterality: Right;    INJECTION OF JOINT Right 9/9/2020    Procedure: INJECTION, JOINT, SACROILIAC (SI);  Surgeon: Samuel Jimenez MD;  Location: [facility];  Service: Pain Management;  Laterality: Right;    INJECTION OF JOINT Right 7/21/2021    Procedure: INJECTION, JOINT, HIP RIGHT;  Surgeon: Samuel Jimenez MD;  Location: [facility];  Service: Pain Management;  Laterality: Right;  CONSENT NEEDED    INJECTION OF JOINT Right 10/13/2021    Procedure: INJECTION, JOINT, SACROILIAC (SI)  NEED CONSENT pt. requesting sedation;  Surgeon: Samuel Jimenez MD;  Location: [facility];  Service: Pain Management;  Laterality: Right;    KNEE SURGERY      RADIOFREQUENCY ABLATION Right 12/9/2020    Procedure: RADIOFREQUENCY ABLATION, L5-S1-S2 LATERAL BRANCH COOLED;  Surgeon: Samuel Jimenez MD;  Location: [facility];  Service: Pain Management;  Laterality: Right;    RADIOFREQUENCY ABLATION Right 9/21/2022     Procedure: RADIOFREQUENCY ABLATION, RIGHT L3-L4-L5 PER DR JIMENEZ;  Surgeon: Samuel Jimenez MD;  Location: [facility] PAIN [facility]GT;  Service: Pain Management;  Laterality: Right;    RADIOFREQUENCY ABLATION Left 10/12/2022    Procedure: RADIOFREQUENCY ABLATION, LEFT L3-L4-L5 TWO OF TWO;  Surgeon: Samuel Jimenez MD;  Location: [facility]IN [facility];  Service: Pain Management;  Laterality: Left;    REPLACEMENT OF NERVE STIMULATOR BATTERY N/A 2020    Procedure: Replacement, SPINAL CORD STIMULATOR BATTERY CHANGE TO NEVRO OMNION BATTERY;  Surgeon: Samuel Jimenez MD;  Location: [facility] OR;  Service: Pain Management;  Laterality: N/A;  C-ARM, NEVRO REP    REPLACEMENT OF SPINAL CORD STIMULATOR  2023    Procedure: REPLACEMENT, SPINAL CORD STIMULATOR;  Surgeon: Samuel Jimenez MD;  Location: [facility] OR;  Service: Pain Management;;    REVISION PROCEDURE INVOLVING SPINAL CORD NEUROSTIMULATOR N/A 2023    Procedure: SPINAL CORD STIMULATOR EXPLANT AND REIMPLANT SALUDA REP;  Surgeon: Samuel Jimenez MD;  Location: [facility] OR;  Service: Pain Management;  Laterality: N/A;    TRIAL OF SPINAL CORD NERVE STIMULATOR N/A 2019    Procedure: Trial, Neurostimulator, SPINAL CORD STIMULATOR TRIAL;  Surgeon: Samuel Jimenez MD;  Location: [facility] CATH LAB;  Service: Pain Management;  Laterality: N/A;  C-ARM, NEVRO REP     Social History     Socioeconomic History    Marital status:    Tobacco Use    Smoking status: Former     Types: Cigarettes     Quit date: 1980     Years since quittin.4    Smokeless tobacco: Never    Tobacco comments:     stopped 40 years ago   Substance and Sexual Activity    Alcohol use: Yes     Alcohol/week: 1.0 standard drink     Types: 1 Shots of liquor per week     Comment: nightly -scotch    Drug use: Never    Sexual activity: Yes     Partners: Female     No family history on file.    Review of patient's allergies indicates:  No Known Allergies    Current Outpatient Medications   Medication Sig     ascorbic acid, vitamin C, (VITAMIN C) 1000 MG tablet Take 1,000 mg by mouth.    celecoxib (CELEBREX) 200 MG capsule     DULoxetine (CYMBALTA) 30 MG capsule Take 1 capsule (30 mg total) by mouth 2 (two) times daily.    ergocalciferol, vitamin D2, (VITAMIN D ORAL) Take by mouth every 30 days.    irbesartan (AVAPRO) 75 MG tablet 150 mg once daily.     levothyroxine (SYNTHROID) 100 MCG tablet Take 100 mcg by mouth.    mirabegron (MYRBETRIQ ORAL) Take by mouth.    oxyCODONE-acetaminophen (PERCOCET) 7.5-325 mg per tablet Take 1 tablet by mouth every 8 (eight) hours as needed for Pain.    simvastatin (ZOCOR) 20 MG tablet Take 20 mg by mouth every evening.    tadalafil (CIALIS) 20 MG Tab     temazepam (RESTORIL) 30 mg capsule TK 1 C PO QD HS     No current facility-administered medications for this visit.     Facility-Administered Medications Ordered in Other Visits   Medication    balanced salt irrigation intra-ocular solution 1 drop    sodium chloride 0.9% flush 2 mL       REVIEW OF SYSTEMS:    GENERAL:  No weight loss, malaise or fevers.  HEENT:   No recent changes in vision or hearing  NECK:  Negative for lumps, no difficulty with swallowing.  RESPIRATORY:  Negative for cough, wheezing or shortness of breath, patient denies any recent URI.  CARDIOVASCULAR:  Negative for chest pain, leg swelling or palpitations.  GI:  Negative for abdominal discomfort, blood in stools or black stools or change in bowel habits.  MUSCULOSKELETAL:  See HPI.  SKIN:  Negative for lesions, rash, and itching.  PSYCH:  No mood disorder or recent psychosocial stressors.  Patients sleep is not disturbed secondary to pain.  HEMATOLOGY/LYMPHOLOGY:  Negative for prolonged bleeding, bruising easily or swollen nodes.  Patient is not currently taking any anti-coagulants  NEURO:   No history of headaches, syncope, paralysis, seizures or tremors.  All other reviewed and negative other than HPI.    OBJECTIVE:    PHYSICAL EXAMINATION:  Voice normal.  Normal  affect without evidence of distress.  Skin: SCS sites without redness, warmth, swelling, or drainage. Scar is flat and well-healed.  General appearance: Well appearing, in no acute distress, alert and oriented x3.  Psych:  Mood and affect appropriate.  Head/face:  Atraumatic, normocephalic. No palpable lymph nodes  Cor: regular rate  Pulm: normal WOB  GI: Abdomen soft and non-tender.  Back: Straight leg raising in the sitting supine positions is negative to radicular pain. Mild pain to palpation over the spine or none over costovertebral angles. Decreased range of motion without pain reproduction.  Extremities: Peripheral joint ROM is full and pain free without obvious instability or laxity in all four extremities. No deformities, edema, or skin discoloration. Good capillary refill.  Musculoskeletal: No atrophy or tone abnormalities are noted.  Neuro: Sensation intact bilaterally.   GAIT: Antalgic gait with cane    ASSESSMENT: 87 y.o. year old male with low back pain, consistent with     1. Sacroiliitis  Procedure Request Order for Pain Management      2. Myofascial muscle pain  Procedure Request Order for Pain Management          PLAN:   - I have stressed the importance of physical activity and a home exercise plan to help with pain and improve health.  - Patient can continue with medications for now since they are providing benefits, using them appropriately, and without side effects.  - Counseled patient regarding the importance of activity modification, constant sleeping habits, and physical therapy.  - Can continue Cymbalta 30mg twice daily rather than once to appreciate the full benefits.  - Switched Percocet 7.5-325mg q8 hrs prn to Norco  q12hrs prn  - [place] rep present during today's visit.   - RTC 4-6 weeks or sooner if needed. He will contact us with any difficulty.    The above plan and management options were discussed at length with patient. Patient is in agreement with the above and verbalized  understanding. It will be communicated with the referring physician via electronic record, fax, or mail.    Abdullahi Ferguson, DO anesthesia resident   I have personally reviewed the history and exam of this patient and agree with the resident/fellow/NPs note as stated above.    Samuel Jimenez MD    05/25/2023

## 2023-05-29 ENCOUNTER — TELEPHONE (OUTPATIENT)
Dept: ADMINISTRATIVE | Facility: OTHER | Age: 88
End: 2023-05-29
Payer: MEDICARE

## 2023-05-29 ENCOUNTER — TELEPHONE (OUTPATIENT)
Dept: PAIN MEDICINE | Facility: CLINIC | Age: 88
End: 2023-05-29
Payer: MEDICARE

## 2023-05-29 ENCOUNTER — PATIENT MESSAGE (OUTPATIENT)
Dept: PAIN MEDICINE | Facility: OTHER | Age: 88
End: 2023-05-29
Payer: MEDICARE

## 2023-05-29 NOTE — TELEPHONE ENCOUNTER
----- Message from Rosi Khoury sent at 5/29/2023 10:50 AM CDT -----  Contact: AMY NERI [[id number]][phone]  Patient is schedule for a procedure on 7/5 but will be out of town will need to reschedule his procedure. Patient can be reached at. [phone]

## 2023-05-30 ENCOUNTER — PATIENT MESSAGE (OUTPATIENT)
Dept: PAIN MEDICINE | Facility: OTHER | Age: 88
End: 2023-05-30
Payer: MEDICARE

## 2023-05-31 ENCOUNTER — TELEPHONE (OUTPATIENT)
Dept: PAIN MEDICINE | Facility: CLINIC | Age: 88
End: 2023-05-31
Payer: MEDICARE

## 2023-05-31 ENCOUNTER — HOSPITAL ENCOUNTER (OUTPATIENT)
Facility: OTHER | Age: 88
Discharge: HOME | End: 2023-05-31
Attending: ANESTHESIOLOGY | Admitting: ANESTHESIOLOGY
Payer: MEDICARE

## 2023-05-31 VITALS
SYSTOLIC BLOOD PRESSURE: 155 MMHG | TEMPERATURE: 98 F | HEIGHT: 73 IN | DIASTOLIC BLOOD PRESSURE: 70 MMHG | HEART RATE: 52 BPM | BODY MASS INDEX: 25.84 KG/M2 | WEIGHT: 195 LBS | RESPIRATION RATE: 16 BRPM | OXYGEN SATURATION: 97 %

## 2023-05-31 DIAGNOSIS — G89.29 CHRONIC PAIN: ICD-10-CM

## 2023-05-31 DIAGNOSIS — M46.1 SACROILIITIS: Primary | ICD-10-CM

## 2023-05-31 PROCEDURE — 27096 INJECT SACROILIAC JOINT: CPT | Mod: RT | Performed by: ANESTHESIOLOGY

## 2023-05-31 PROCEDURE — 27096 INJECT SACROILIAC JOINT: CPT | Mod: RT,,, | Performed by: ANESTHESIOLOGY

## 2023-05-31 PROCEDURE — 25000003 PHARM REV CODE 250: Performed by: ANESTHESIOLOGY

## 2023-05-31 PROCEDURE — 20552 NJX 1/MLT TRIGGER POINT 1/2: CPT | Mod: 59,51,RT, | Performed by: ANESTHESIOLOGY

## 2023-05-31 PROCEDURE — 20552 NJX 1/MLT TRIGGER POINT 1/2: CPT | Mod: RT | Performed by: ANESTHESIOLOGY

## 2023-05-31 PROCEDURE — 20552 PR INJECT TRIGGER POINT, 1 OR 2: ICD-10-PCS | Mod: 59,51,RT, | Performed by: ANESTHESIOLOGY

## 2023-05-31 PROCEDURE — 63600175 PHARM REV CODE 636 W HCPCS: Performed by: ANESTHESIOLOGY

## 2023-05-31 PROCEDURE — 27096 PR INJECTION,SACROILIAC JOINT: ICD-10-PCS | Mod: RT,,, | Performed by: ANESTHESIOLOGY

## 2023-05-31 PROCEDURE — 25500020 PHARM REV CODE 255: Performed by: ANESTHESIOLOGY

## 2023-05-31 RX ORDER — TRIAMCINOLONE ACETONIDE 40 MG/ML
INJECTION, SUSPENSION INTRA-ARTICULAR; INTRAMUSCULAR
Status: DISCONTINUED | OUTPATIENT
Start: 2023-05-31 | End: 2023-05-31 | Stop reason: HOSPADM

## 2023-05-31 RX ORDER — SODIUM CHLORIDE 9 MG/ML
INJECTION, SOLUTION INTRAVENOUS CONTINUOUS
Status: DISCONTINUED | OUTPATIENT
Start: 2023-06-01 | End: 2023-05-31 | Stop reason: HOSPADM

## 2023-05-31 RX ORDER — LIDOCAINE HYDROCHLORIDE 20 MG/ML
INJECTION, SOLUTION INFILTRATION; PERINEURAL
Status: DISCONTINUED | OUTPATIENT
Start: 2023-05-31 | End: 2023-05-31 | Stop reason: HOSPADM

## 2023-05-31 RX ORDER — BUPIVACAINE HYDROCHLORIDE 2.5 MG/ML
INJECTION, SOLUTION EPIDURAL; INFILTRATION; INTRACAUDAL
Status: DISCONTINUED | OUTPATIENT
Start: 2023-05-31 | End: 2023-05-31 | Stop reason: HOSPADM

## 2023-05-31 NOTE — DISCHARGE INSTRUCTIONS

## 2023-05-31 NOTE — TELEPHONE ENCOUNTER
----- Message from Julio Cesar Motta sent at 5/31/2023 10:58 AM CDT -----  Regarding: Reschedule  Name of Who is Calling:  Patient          What is the request in detail:  Patient would like to reschedule his 07/31/2023 appointment he stated he had his injection on today 05/31/2023            Can the clinic reply by [facility]: Yes            What Number to Call Back if not in [facility]: [phone]

## 2023-05-31 NOTE — DISCHARGE SUMMARY
Discharge Note  Short Stay      SUMMARY     Admit Date: 5/31/2023    Attending Physician: Samuel Jimenez    Discharge Physician: Zachariah N Weilenman      Discharge Date: 5/31/2023 10:27 AM    Procedure(s) (LRB):  INJECTION,SACROILIAC JOINT, RIGHT SI AND RIGHT PIRIFORMIS (Right)    Final Diagnosis: * No pre-op diagnosis entered *    Disposition: Home or self care    Patient Instructions:   Current Discharge Medication List        CONTINUE these medications which have NOT CHANGED    Details   ascorbic acid, vitamin C, (VITAMIN C) 1000 MG tablet Take 1,000 mg by mouth.      celecoxib (CELEBREX) 200 MG capsule       DULoxetine (CYMBALTA) 30 MG capsule Take 1 capsule (30 mg total) by mouth 2 (two) times daily.  Qty: 60 capsule, Refills: 2      ergocalciferol, vitamin D2, (VITAMIN D ORAL) Take by mouth every 30 days.      HYDROcodone-acetaminophen (NORCO)  mg per tablet Take 1 tablet by mouth every 12 (twelve) hours as needed for Pain.  Qty: 60 tablet, Refills: 0    Comments: Quantity prescribed more than 7 day supply? Yes, quantity medically necessary      irbesartan (AVAPRO) 75 MG tablet 150 mg once daily.       levothyroxine (SYNTHROID) 100 MCG tablet Take 100 mcg by mouth.      mirabegron (MYRBETRIQ ORAL) Take by mouth.      simvastatin (ZOCOR) 20 MG tablet Take 20 mg by mouth every evening.      tadalafil (CIALIS) 20 MG Tab       temazepam (RESTORIL) 30 mg capsule TK 1 C PO QD HS                 Discharge Diagnosis: * No pre-op diagnosis entered *  Condition on Discharge: Stable with no complications to procedure   Diet on Discharge: Same as before.  Activity: as per instruction sheet.  Discharge to: Home with a responsible adult.  Follow up: 2-4 weeks       Please call my office or pager at [phone] if experienced any weakness or loss of sensation, fever > 101.5, pain uncontrolled with oral medications, persistent nausea/vomiting/or diarrhea, redness or drainage from the incisions, or any other  worrisome concerns. If physician on call was not reached or could not communicate with our office for any reason please go to the nearest emergency department        Samuel Jimenez

## 2023-05-31 NOTE — OP NOTE
Sacroiliac Joint Injection and Piriformis Trigger Point Injection under Fluoroscopic Guidance    The procedure, risks, benefits, and options were discussed with the patient. There are no contraindications to the procedure. The patent expressed understanding and agreed to the procedure. Informed written consent was obtained prior to the start of the procedure and can be found in the patient's chart.    PATIENT NAME: Jefferson Boogie   MRN: [id number]     DATE OF PROCEDURE: 05/31/2023    PROCEDURE: Right Sacroiliac Joint Injection and Piriformis Trigger Point Injection under Fluoroscopic Guidance    PRE-OP DIAGNOSIS:   Sacroiliitis   Myofacial pain     POST-OP DIAGNOSIS: Same    PHYSICIAN: Samuel Jimenez MD    ASSISTANTS: Zack Weilenman, MD     MEDICATIONS INJECTED: Preservative-free Kenalog 80mg with 6cc of Bupivacine 0.25%     LOCAL ANESTHETIC INJECTED: Xylocaine 2%     SEDATION: None    ESTIMATED BLOOD LOSS: None    COMPLICATIONS: None    TECHNIQUE: Time-out was performed to identify the patient and procedure to be performed. With the patient laying in a prone position, the surgical area was prepped and draped in the usual sterile fashion using ChloraPrep and a fenestrated drape. The sacroiliac joint was determined under fluoroscopy guidance. Skin anesthesia was achieved by injecting Lidocaine 2% over the injection site. The sacroiliac joint was  then approached with a 22 gauge, 3.5 inch spinal quinke needle that was introduced under fluoroscopic guidance in the AP and Lateral views. Once the needle tip was in the area of the joint, and there was no blood, contrast dye Omnipaque (300mg/mL) was injected to confirm placement and there was no vascular runoff. Fluoroscopic imaging in the AP and lateral views revealed a clear outline of the joint space. 4 mL of the medication mixture listed above was injected slowly intraarticular and unique-articular. Displacement of the radio opaque contrast after injection of the  medication confirmed that the medication went into the area of the joint. The needle was then redirected towards the piriformis muscle. Once the piriformis muscle was thought to be encountered, Contrast dye  Omnipaque (300mg/mL) was injected to confirm placement and there was no vascular runoff. Confirmation of spread was identified within the piriformis muscle using AP fluoroscopic views. Then  4 mL of the medication mixture listed above was injected slowly. The needles were removed and bleeding was nil. A sterile dressing was applied. No specimens collected. The patient tolerated the procedure well. The needles were removed and bleeding was nil.The patient tolerated the procedure well.       The patient was monitored after the procedure in the recovery area. They were given post-procedure and discharge instructions to follow at home. The patient was discharged in a stable condition.    I reviewed and edited the fellow's note. I conducted my own interview and physical examination. I agree with the findings. I was present and supervising all critical portions of the procedure.    Samuel Jimenez MD

## 2023-06-06 ENCOUNTER — OFFICE VISIT (OUTPATIENT)
Dept: OPHTHALMOLOGY | Facility: CLINIC | Age: 88
End: 2023-06-06
Payer: MEDICARE

## 2023-06-06 DIAGNOSIS — H16.223 KERATOCONJUNCTIVITIS SICCA NOT SPECIFIED AS SJOGREN'S, BILATERAL: Primary | ICD-10-CM

## 2023-06-06 PROCEDURE — 99999 PR PBB SHADOW E&M-EST. PATIENT-LVL II: CPT | Mod: PBBFAC,,, | Performed by: OPHTHALMOLOGY

## 2023-06-06 PROCEDURE — 99212 OFFICE O/P EST SF 10 MIN: CPT | Mod: PBBFAC | Performed by: OPHTHALMOLOGY

## 2023-06-06 PROCEDURE — 99213 OFFICE O/P EST LOW 20 MIN: CPT | Mod: S$PBB,ICN,CMP, | Performed by: OPHTHALMOLOGY

## 2023-06-06 PROCEDURE — 99213 PR OFFICE/OUTPT VISIT, EST, LEVL III, 20-29 MIN: ICD-10-PCS | Mod: S$PBB,ICN,CMP, | Performed by: OPHTHALMOLOGY

## 2023-06-06 PROCEDURE — 99999 PR PBB SHADOW E&M-EST. PATIENT-LVL II: ICD-10-PCS | Mod: PBBFAC,,, | Performed by: OPHTHALMOLOGY

## 2023-06-06 NOTE — PROGRESS NOTES
HPI    Dr. Buckley    S/p phaco w/IOL OS ~15 years ago - dr leader  Phacoemulsification with placement of intraocular lens, right   eye.03/09/2022    Patient states he has double vision at night when watching tv for about 6   months.   Last edited by Jeannine Tabares MA on 6/6/2023  9:35 AM.            Assessment /Plan     For exam results, see Encounter Report.    Keratoconjunctivitis sicca not specified as Sjogren's, bilateral      S/p phaco w/IOL OS ~15 years ago - dr leader  Phacoemulsification with placement of intraocular lens, right   eye.03/09/2022    Va excellent    Pt notes some double VA (suspect binocular) with night time tv watching - asked pt to confirm this is binocular    Unable to elicit double VA in clinic today - no phoria no tropia    F/up me 1 yr sooner prn

## 2023-06-22 ENCOUNTER — OFFICE VISIT (OUTPATIENT)
Dept: PAIN MEDICINE | Facility: CLINIC | Age: 88
End: 2023-06-22
Attending: ANESTHESIOLOGY
Payer: MEDICARE

## 2023-06-22 VITALS
SYSTOLIC BLOOD PRESSURE: 138 MMHG | HEIGHT: 73 IN | HEART RATE: 55 BPM | BODY MASS INDEX: 26.5 KG/M2 | DIASTOLIC BLOOD PRESSURE: 51 MMHG | RESPIRATION RATE: 18 BRPM | WEIGHT: 199.94 LBS

## 2023-06-22 DIAGNOSIS — G57.01 PIRIFORMIS SYNDROME OF RIGHT SIDE: ICD-10-CM

## 2023-06-22 DIAGNOSIS — G03.9 ARACHNOIDITIS: ICD-10-CM

## 2023-06-22 DIAGNOSIS — G89.4 CHRONIC PAIN SYNDROME: Primary | ICD-10-CM

## 2023-06-22 DIAGNOSIS — Z96.89 SPINAL CORD STIMULATOR STATUS: ICD-10-CM

## 2023-06-22 DIAGNOSIS — Z96.89 S/P INSERTION OF SPINAL CORD STIMULATOR: ICD-10-CM

## 2023-06-22 DIAGNOSIS — M46.1 SACROILIITIS: ICD-10-CM

## 2023-06-22 PROCEDURE — 99999 PR PBB SHADOW E&M-EST. PATIENT-LVL IV: ICD-10-PCS | Mod: PBBFAC,,, | Performed by: ANESTHESIOLOGY

## 2023-06-22 PROCEDURE — 99214 OFFICE O/P EST MOD 30 MIN: CPT | Mod: PBBFAC | Performed by: ANESTHESIOLOGY

## 2023-06-22 PROCEDURE — 99999 PR PBB SHADOW E&M-EST. PATIENT-LVL IV: CPT | Mod: PBBFAC,,, | Performed by: ANESTHESIOLOGY

## 2023-06-22 PROCEDURE — 99214 PR OFFICE/OUTPT VISIT, EST, LEVL IV, 30-39 MIN: ICD-10-PCS | Mod: S$PBB,,, | Performed by: ANESTHESIOLOGY

## 2023-06-22 PROCEDURE — 99214 OFFICE O/P EST MOD 30 MIN: CPT | Mod: S$PBB,,, | Performed by: ANESTHESIOLOGY

## 2023-06-22 RX ORDER — TIZANIDINE 4 MG/1
4 TABLET ORAL NIGHTLY PRN
Qty: 30 TABLET | Refills: 0 | Status: SHIPPED | OUTPATIENT
Start: 2023-06-22 | End: 2023-07-22

## 2023-06-22 NOTE — PROGRESS NOTES
Chronic patient Established Note (Follow up visit)      SUBJECTIVE:    Interval History 6/22/2023  Jefferson Boogie presents to the clinic for a follow-up appointment for chroniic LBP. Pt was last seen in clinic on 5/25 and scheduled for right SIJ and piriformis injection--performed 6/22. Pt reports 40% relief x 1-2 days. He continues to work with the rep with regards to programming the [place] SCS device. Pt reports that he is getting some decent relief from the device with current program, but feels like there is still some progress to be made. Continues to complain of pain of similar character and quality to that of prior eval. Localized in the right lowerlumbar spine/gluteal area as well as the right lateral leg. Since the last visit, Jefferson Boogie states the pain has been improving. Current pain intensity is 5/10. He continues to exercise daily with stationary bike and weight training. Continues to take Cymbalta and Celebrex daily. He also takes Norco 10 very sparingly, maybe 1-2 per week. Overall the current regimen of SCS, medications and occasional injections have provided pt with enough relief for him to remain functional, and able to participate in his hobbies.     Interval history 5/25/23:  In the interim he has met with the [employer] reps for reprogramming. He has his good and bad days but feels that the pain is manageable. The reps with [employer] are present for the visit today. Today his pain is a 6.5/10. Still taking percocet and cymbalta (missed a few days), but these medications do not provide much relief. Still pain with walking and sitting, but the pain with laying down is better.      Interval History 5/4/23:  Patient seen today via virtual follow-up after implantation of his [place] SCS in February. He reports no change in his pain since his last visit. Most of his pain is into his right hip with intermittent radiation into his RLE. He denies any new symptoms. No red flag symptoms. He is scheduled  to meet with the [place] reps from reprogramming next week. He remains optimistic. In discussing his current medications, he has run out of the hydrocodone he was previously taking and has only been taking his Cymbalta once per day rather two.      Interval History 3/30/2023:  Mr Boogie presents 5-6 weeks after implantation of his [place] SCS system. He reports no significant change in his pains, which are primarily down the RLE.  No constitutional signs symptoms concerning for infection.  No new areas of pain or neurological changes since procedure. No voicing of s/s concerning for cauda equina syndrome. He does endorse improvement in sleep. He is worried that his implant was a failure because his pain has not changed.     Interval History 2/20/2023:  Mr Boogie presents for follow up of [place] SCS replacement. He states doing well. No constitutional signs symptoms concerning for infection.  No new areas of pain or neurological changes since procedure. No voicing of s/s concerning for cauda equina syndrome.      Interval History 1/23/2023:  Still taking Celebrex, however hasn't noticed a difference in pain with this medication. Location, quality, and severity of pain are unchanged from prior visit and is described above. Here today to discuss removal of [place] SCS, and implant of [place] SCS. He states his stimulator has not been helpful for the past 2 years despite multiple reprogramming and adjustment.     Interval History 11/21/22:  Reports 24 hours of 100% relief for 48 hours, but he reports that now his pain is only approximally 10% better. His pain at rest is 3-4/10. Pain at its worst is 8-9/10. Pain is improved when leaning over a grocery cart. Pain only allows him to walk for 3-4mins.      Interval History 8/29/22:  Jefferson Boogie presents to the clinic for a follow-up appointment for back pain.  He had his second bilateral L3, L4, L5 MBB and reports 100% pain relief. He would like to proceed with  RFA.     Interval History 5/26/22:  Jefferson Boogie presents to the clinic for a follow-up appointment for back pain. Since the last visit, Jefferson Boogie states the pain has been stable. Certain postures he can tolerate in the standing position such as leaning on a shopping cart or support to his posterior thighs. He now uses a walker when covering long distances. He continues to play golf despite it aggravating his pain. He receives some benefit from the SCS whereas other interventions have not helped. He remains interested in the [place] device.     Interval History 3/24/22:  Jefferson Boogie presents to the clinic for a follow-up appointment for back pain. Since the last visit, Jefferson Boogie states the pain has been worse than usual. Current pain intensity is 8/10. He continues to report benefit with [employer] SCS however he has not been able to get in touch with the representative in order to have his settings adjusted. He continues to take occasional norco 7.5 mg with benefit, particularly when he plays golf.     Interval History 2/10/22:  Patent presents today s/p caudal epidural steroid injections on 1/12/22 since then he has not had any relief. He states his pain has been unchanged and is a 6/10 on average with the worst being 8/10 and best is 4/10. He has been taking percocet 5mg when he golfs or plays with his granddaughter.      Interval History 12/30/21:  Jefferson Boogie presents to clinic for follow up appointment s/p Right SI joint and Right piriformis muscle injection under US guidance on 11/29/21. He did not obtain any noticeable relief with this procedure similar to all of his previous injections. His pain is unchanged and constant over the right buttock. He is taking percocet 5mg x 3 on days he is more active, such as playing golf. This helps some, but minimally. Denies any new symptoms or neurological changes. No numbness, tingling, weakness in his lower extremities. Denies bowel/bladder  "incontinence or saddle anesthesia.      Interval History 11/4/2021:  Jefferson Boogie presents to the clinic for a follow-up appointment for right sided back pain and SI joint pain. Mr. Boogie had a right SI joint injection on 10/13/2021. He did not note significant relief with the injection for any period of time. Pain is still constant over the right buttock and most noticeable when playing golf. He denies any new bowel/bladder incontinence, lower extremity numbness or weakness or saddle anesthesia.     Interval History 8/26/2021:  Jefferson Boogie presents to the clinic for a follow-up appointment for right sided hip pain with right-sided radiculopathy . Since the last visit, Jefferson Boogie states the pain has been "particularly tender today" 7/10. Patient reports playing golf recently and experienced worsening symptoms the following day. Mr. Boogie is s/p right-side hip joint injection with minimal relief. Patient states he has had relief with previous interventions and is open to RFA.     Interval History 6/10/2021:  Jefferson Boogie presents to the clinic for a follow-up appointment. Since the last visit, Jefferson Boogie states the pain has been stable. Current pain intensity is 3/10. States he is still having pain around SIJ that is affecting his ADL      Interval History 4/15/2021:  The patient returns to clinic today for follow up of back pain. He reports that the swelling above the SI fusion incision has resolved. He denies any fever, chills, or drainage from the incision. He does report benefit since the fusion. He continues to report benefit with Nevro SCS. He reports intermittent shoulder pain at this time. He did receive an injection with Sports Medicine with benefit. He denies any other health changes. His pain today is 2/10.     Interval History 3/25/21:  Mr. Boogie presents to clinic for follow up of bilateral shoulder pain. He is s/p BL subacromial injections on 2/8/21. He reports " maximum 20% relief of pain in the Right shoulder. Today the Left shoulder pain is 1/10; Right shoulder pain is 5-7/10.      Interval History 3/22/2021:  The patient returns to clinic today for follow up of back pain. He is s/p right SI fusion on 3/15/2021. He reports some relief at this time. He does report soreness to the incision. He denies any fever, chills, or drainage from incision. He continues to report benefit with Nevro SCS. He denies any other health changes. His pain today is 4/10.     Interval History 1/11/2020:  Patient is here for follow-up of low back pain now s/p sacroiliac RFA on 12/9/20 which provided no benefit and with the new complaint of bilateral anterior shoulder pain R>L. His shoulder pain started about 2 months ago. He describes 9/10 sharp/stinging pain without radiation that is exacerbated with overhead activity and improved with rest. He started PT about one month ago. He takes oxycodone which provides some relief. He had a blind subacromial steroid injection in the right shoulder with Dr. Ramirez on 10/26/19 which provided some short term relief.     Interval History 11/5/2020:  Jefferson Boogie presents to the clinic for a follow-up appointment for low back pain. Since the last visit, Jefferson Boogie states the pain has been stable. Current pain intensity is 4/10. He had good relief from the SI joint injection that lasted for about a week. Pain has since returned. He also slipped and fell on his buttock a couple of weeks ago and had increased pain in the right buttock after that which is slowly improving. He continues to have variable benefit with the Nevro SCS for intermittent pain in the right leg. He is scheduled to meet with representatives today. He is taking celebrex daily and percocet as needed sparingly. He denies any adverse effects and any other health changes.     Interval History 10/5/2020:  The patient returns to clinic today for follow up of low back pain. He is s/p  right SI joint injection on 9/9/2020. He reports 25% relief of his right sided low back and buttock pain. He did have good relief the first two days. He continues to report right sided low back and buttock pain. He denies any radicular leg pain. His pain is worse with prolonged standing and walking. He continues to report intermittent pain into his achilles from previous injury. He feels as though this has changed his gait. He continues to report some benefit with Nevro SCS device. He is in contact with representatives. He is currently taking Percocet as needed sparingly with some benefit. He denies any adverse effects. He denies any other health changes. His pain today is 4/10.      Interval History 9/2/2020:  The patient returns to clinic today for follow up of back pain. He reports that his back pain has improved since last audio visit. He continues to report back pain with intermittent radiating pain into his right hip and calf. He has spoken with Bienvenido from [employer] via phone with some programming. He is meeting with Bienvenido today. He had some issues with charging but this has improved. He is currently taking Norco intermittently but this is only lasts 2-3 hours. He denies any adverse effects. He denies any other health changes. His pain today is 8/10.     Interval History 08/27/2020:  Pt was contacted over [employer] audio for a follow up appointment.  He is currently complaining of right hip and right calf with no associated numbness or weakness.  He continues to use his Nevro device.  He states it has been very frustrating. Inconsistent.  Took 20 mins to 1 hour to charge.  Couldn't get it to start this morning.  He states that there is no drainage at the battery site, no signs of infection or pain at the site.  Patient is not taking medications at this time.  He wants to speak about medication options because he would like to start playing golf again.     Interval History 8/17/2020:  The patient returns to clinic  today for post op wound check. He continues to report benefit with Nevro device. He denies any fever, chills, or drainage. He continues to follow his activity restrictions. He does report a recent achilles tendon injury while swimming. He is seeing Orthopedics. He denies any other health changes. His pain today is 4/10.     Interval History 8/3/2020:  The patient returns to clinic today for post op. He is s/p Nevro battery change on 7/27/2020. He denies any fever, chills, or drainage from incision. He has not completed his antibiotics as he missed two days of the medication. He continues to follow his activity restrictions. He reports relief with the device. He is meeting with [employer] today for programming. He denies any other health changes. His pain today is 2/10     Interval History 7/22/2020:  Pt presents for audio follow up only s/p Right SI joint injection on 7/8/2020. Pt states he has near resolution of focal pain to buttock. He is pleased with result and pain relief. Pt has been in contact with [employer] and will be having battery swap in 5 days. He has difficulty whether stimulator is beneficial and has even had a holiday from using SCS.  He denies any newer areas pain, denies any neuro changes, meds area adequate to control pain without adverse side effects. Pain is 2/10 today.     Interval HPI 6/15/2020:  Jefferson HANSON Boogie presents to the clinic for a follow-up appointment for 3 week follow up right hip and right thigh pain. Since the last visit, Jefferson S Keagan states the pain has been persistant. Current pain intensity is 5/10. Patient has been working with Bienvenido Varghese, to try and get better coverage of a localized pain that he still experiences in his right low back area. He does report improvement in symptoms of numbness/tingling. Today, worse pain is 1/10 in severity and localizes to the right SI joint. Pain is worse with extension of his back. It is worse with golfing. Pain relieved by Norco--he  takes 1-2 tablets of 5-325 Norco on days that he plays golf. Pain is also improved with being still and not being active. Patient endorses a much more active lifestyle with Norco. Denies new weakness/numbness or bowel/bladder changes.     Previous injection history includes a series of 3 lumbar CHOCO at [facility].      Interval HPI 3/5/20:  Patient presents to the clinic for a follow-up appointment for low back pain. Since the last visit, he states his pain has been unchanged. Current pain intensity is 4/10. He continues to work with [employer] to adjust settings on his SCS. He has stopped using tramadol, and uses norco sparingly. He continues to work with a  for physical therapy.      Interval HPI 1/9/2020:  Patient returns for follow up s/p [place] SCS. He states he is unsure if it has helped his pain since he had it implanted. He last had an adjustment of his programming about 1 month ago. He does note that once he is done charging his SCS his pain is improved. Pain still worse with prolonged standing and activity such as golf. He still is doing home exercise program. He says that he is trying to do more since getting the SCS. He is still taking the Tramadol with limited pain relief. He takes Tramadol 50 mg twice daily only on days of activity which is once a week. No other health changes.      HPI 11/20/19  Jefferson Boogie returns to clinic today for follow up. He reports increased low back and leg pain over the last few days. He has been in contact with [employer] representatives for programming adjustments. He does report benefit with the device. He reports good days and bad days. His pain is worse with prolonged standing and activity. He continues to participate in a home exercise routine. He does take Tramadol sparingly but reports limited relief. He denies any other health changes. His pain today is 5/10.     Pain Disability Index Review:  Last 3 PDI Scores 6/22/2023 5/25/2023 3/30/2023   Pain  Disability Index (PDI) 30 32 35       Pain Medications:  Celebrex   Cymbalta     Opioid Contract: not applicable      report:  Reviewed and consistent with medication use as prescribed.     Pain Procedures  2020- Right SI joint injection  2020- [place]ro SCS battery change  2020- Right SIJ injection >80% relief   19 SCS implant  19 SCS trial  2020- Right SI joint injection  2020- SCS battery revision  2020- Right SI joint injection   2020- Right L5-S1 RFA  3/15/2021- Right SI fusion  2021 - Injection R Hip - minimal relief  10/13/2021 - Right SI joint injection  2021 - R SI joing and R piriformis muscle injection - no relief   2022- Caudal CHOCO- no relief   22 - Diagnostic Bilateral L3, L4 and L5 Lumbar Medial Branch Block under Fluoroscopy  22- Right L3, L4, L5 RFA  10/12/22 -  Left L3, L4, L5 RFA  2023 - [place] SCS implant  2023 - Right SIJ and piriformis injection     Physical Therapy/Home Exercise: no     Imagin/10/2021 - X ray Hips Bilateral: No radiographic evidence of acute osseous abnormality of the pelvis and hips and without radiographic evidence of osteonecrosis of the femoral heads.     21 MRI L-spine:  FINDINGS:  Lumbar sagittal alignment is slightly is straightened.  There is slight convex right curvature lumbar spine.  There is degenerative disc disease with intervertebral disc height loss and endplate degenerative change at all levels with scattered disc desiccation allowing for degenerative change the lumbar vertebral body heights and contours are within normal is without evidence for acute fracture or subluxation.  There is heterogeneous T1/T2 signal focus within the right aspect of the S1 vertebra most compatible with hemangioma this measures 2.0 cm.     The distal spinal cord and conus is normal in signal and contour tip of the conus approximates the T12/L1 level.  Please note there is artifact from indwelling  spinal stimulator device with leads partially visualized casting artifact entering the dorsal epidural space at the T12 level and extending cranially.     There is abnormal clumping configuration of the filum terminalis a from T12 through L5 with slight thickening of scattered nerve roots most compatible with arachnoiditis.     No aneurysmal dilatation of the visualized abdominal aorta.     T12/L1 through L1/L2: No significant disc bulge, central canal or neural foraminal stenosis.     L2/L3: Posterior disc osteophyte with facet joint arthropathy without significant central canal stenosis with mild bilateral neural foraminal stenosis.     L3/L4:Bulging disc with ligamentum flavum hypertrophy without significant central canal stenosis with mild bilateral neural foraminal stenosis.     L4/L5:Posterior disc osteophyte with ligamentum flavum hypertrophy and facet joint arthropathy without significant central canal stenosis and mild bilateral neural foraminal stenosis.     L5/S1: Posterior disc osteophyte with facet joint arthropathy without significant central canal stenosis with moderate right and mild left neural foraminal stenosis.     This report was flagged in Epic as abnormal.     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  Please note there is spinal stimulator device with leads partially visualized and distorting the study by artifacts.     There is abnormal thickening of the filum configuration most compatible with arachnoiditis.     Please see above for additional details.     Thoracic spine MRI:  FINDINGS: Thoracic vertebral body height and alignment are maintained with a few small scattered Schmorl's nodes involving the endplates of several mid to lower thoracic vertebra. The T3-4 vertebra are partially fused. There is some degree of desiccation of all of the thoracic discs with multilevel disc space narrowing, especially at the T7-T8, T6-T7 and T5-T6 levels. There  is no abnormal prevertebral soft tissue thickening. The somewhat heterogeneous appearance to the signal from the thoracic vertebra is presumably secondary to an admixture of red and yellow marrow.    T1-T2 level: There is no bony foraminal narrowing or bony central canal stenosis and the posterior disc margin is unremarkable.    T2-T3 level: There is no bony foraminal narrowing or bony central canal stenosis and the posterior disc margin is unremarkable.    T3-T4 level: There is no bony foraminal narrowing or bony central canal stenosis and the posterior disc margin is unremarkable.    T4-5 level: There is no bony foraminal narrowing or bony central canal stenosis and the posterior disc margin is unremarkable.    T5-T6 level: There is no bony foraminal narrowing or bony central canal stenosis and the posterior disc margin is unremarkable.    T6-T7 level: There is no bony foraminal narrowing or bony central canal stenosis and the posterior disc margin is unremarkable.    T7-T8 level: There is no bony foraminal narrowing or bony central canal stenosis and the posterior disc margin is unremarkable.    T8-T9 level: There is no bony foraminal narrowing or bony central canal stenosis and the posterior disc margin is unremarkable.    T9-T10 level: There is no bony foraminal narrowing or bony central canal stenosis and the posterior disc margin is unremarkable.    T10-T11 level: There is no bony foraminal narrowing or bony central canal stenosis and the posterior disc margin is unremarkable.    T11-T12 level: There is no bony foraminal narrowing or bony central canal stenosis and the posterior disc margin is unremarkable.    T12-L1 level: The tip the conus medullaris extends down to level of the superior endplate of L1. 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 There is no bony foraminal narrowing or bony central canal stenosis at this level.    On the axial images, the  immediate paravertebral soft tissues are unremarkable. A small cyst is seen to involve the upper pole the left kidney.    Following contrast administration, there is no abnormal enhancement of any bony or soft tissue elements of the thoracic spine. There is no abnormal enhancement of the subserosal surface of the thoracic spinal cord. Some foci of mild signal intensity in the CSF dorsal to the spinal cord of some of the sagittal sequences presumably is secondary to CSF pulsation artifact.    On the sagittal  image, there is cervical spondylosis and kyphosis with narrowing of all of the disc spaces in the cervical spine.    Allergies: Review of patient's allergies indicates:  No Known Allergies    Current Medications:   Current Outpatient Medications   Medication Sig Dispense Refill    ascorbic acid, vitamin C, (VITAMIN C) 1000 MG tablet Take 1,000 mg by mouth.      celecoxib (CELEBREX) 200 MG capsule       DULoxetine (CYMBALTA) 30 MG capsule Take 1 capsule (30 mg total) by mouth 2 (two) times daily. 60 capsule 2    ergocalciferol, vitamin D2, (VITAMIN D ORAL) Take by mouth every 30 days.      HYDROcodone-acetaminophen (NORCO)  mg per tablet Take 1 tablet by mouth every 12 (twelve) hours as needed for Pain. 60 tablet 0    irbesartan (AVAPRO) 75 MG tablet 150 mg once daily.       levothyroxine (SYNTHROID) 100 MCG tablet Take 100 mcg by mouth.      mirabegron (MYRBETRIQ ORAL) Take by mouth.      simvastatin (ZOCOR) 20 MG tablet Take 20 mg by mouth every evening.      tadalafil (CIALIS) 20 MG Tab       temazepam (RESTORIL) 30 mg capsule TK 1 C PO QD HS      tiZANidine (ZANAFLEX) 4 MG tablet Take 1 tablet (4 mg total) by mouth nightly as needed (muscle spasms.). 30 tablet 0     No current facility-administered medications for this visit.     Facility-Administered Medications Ordered in Other Visits   Medication Dose Route Frequency Provider Last Rate Last Admin    balanced salt irrigation intra-ocular solution  1 drop  1 drop Right Eye On Call Procedure Bell Cedeno MD        sodium chloride 0.9% flush 2 mL  2 mL Intravenous PRN Bell Cedeno MD           REVIEW OF SYSTEMS:    GENERAL:  No weight loss, malaise or fevers.  HEENT:  Negative for frequent or significant headaches.  NECK:  Negative for lumps, goiter, pain and significant neck swelling.  RESPIRATORY:  Negative for cough, wheezing or shortness of breath.  CARDIOVASCULAR:  Negative for chest pain, leg swelling or palpitations.  GI:  Negative for abdominal discomfort, blood in stools or black stools or change in bowel habits.  MUSCULOSKELETAL:  See HPI.  SKIN:  Negative for lesions, rash, and itching.  PSYCH:  +ve for sleep disturbance, mood disorder and recent psychosocial stressors.  HEMATOLOGY/LYMPHOLOGY:  Negative for prolonged bleeding, bruising easily or swollen nodes.  NEURO:   No history of headaches, syncope, paralysis, seizures or tremors.  All other reviewed and negative other than HPI.    Past Medical History:  Past Medical History:   Diagnosis Date    Bronchitis     Cancer     stage I bladder cancer 50 yrs ago    Hypercholesteremia     Hypertension     Sacroiliitis 07/08/2020    Thyroid disease        Past Surgical History:  Past Surgical History:   Procedure Laterality Date    BLADDER SURGERY      CATARACT EXTRACTION      CATARACT EXTRACTION W/  INTRAOCULAR LENS IMPLANT Right 3/9/2022    Procedure: EXTRACTION, CATARACT, WITH IOL INSERTION;  Surgeon: Bell Cedeno MD;  Location: [facility] [place] OR;  Service: Ophthalmology;  Laterality: Right;    COLONOSCOPY      EPIDURAL STEROID INJECTION N/A 1/12/2022    Procedure: INJECTION, STEROID, EPIDURAL CAUDAL With Catheter needs consent;  Surgeon: Samuel Jimenez MD;  [facility];  Service: Pain Management;  Laterality: N/A;    EYE SURGERY      cataracts     FUSION OF SACROILIAC JOINT Right 3/15/2021    Procedure: FUSION, RIGHT SACROILIAC JOINT FUSION;  Surgeon: Samuel Jimenez MD;   Location: [facility] OR;  Service: Pain Management;  Laterality: Right;  C-ARM, PAINTEQ REP     HERNIA REPAIR      INJECTION OF ANESTHETIC AGENT AROUND NERVE Bilateral 6/1/2022    Procedure: BLOCK, NERVE MEDIAL BRANCH L3,4,5 BILATERAL 1st;  Surgeon: Samuel Jimenez MD;  Location: [facility] PAIN MGT;  Service: Pain Management;  Laterality: Bilateral;    INJECTION OF ANESTHETIC AGENT AROUND NERVE Bilateral 8/24/2022    Procedure: Block, Nerve Kenny L3, L4, & L5;  Surgeon: Samuel Jimenez MD;  Location: [facility] PAIN MGT;  Service: Pain Management;  Laterality: Bilateral;    INJECTION OF JOINT Right 7/8/2020    Procedure: INJECTION, JOINT, SACROILIAC (SI);  Surgeon: Samuel Jimenez MD;  Location: [facility] PAIN MGT;  Service: Pain Management;  Laterality: Right;    INJECTION OF JOINT Right 9/9/2020    Procedure: INJECTION, JOINT, SACROILIAC (SI);  Surgeon: Samuel Jimenez MD;  Location: [facility] PAIN MGT;  Service: Pain Management;  Laterality: Right;    INJECTION OF JOINT Right 7/21/2021    Procedure: INJECTION, JOINT, HIP RIGHT;  Surgeon: Samuel Jimenez MD;  Location: [facility] PAIN MGT;  Service: Pain Management;  Laterality: Right;  CONSENT NEEDED    INJECTION OF JOINT Right 10/13/2021    Procedure: INJECTION, JOINT, SACROILIAC (SI)  NEED CONSENT pt. requesting sedation;  Surgeon: Samuel Jimenez MD;  Location: [facility] PAIN MGT;  Service: Pain Management;  Laterality: Right;    INJECTION, SACROILIAC JOINT Right 5/31/2023    Procedure: INJECTION,SACROILIAC JOINT, RIGHT SI AND RIGHT PIRIFORMIS;  Surgeon: Samuel Jimenez MD;  Location: [facility] PAIN MGT;  Service: Pain Management;  Laterality: Right;    KNEE SURGERY      RADIOFREQUENCY ABLATION Right 12/9/2020    Procedure: RADIOFREQUENCY ABLATION, L5-S1-S2 LATERAL BRANCH COOLED;  Surgeon: Samuel Jimenez MD;  Location: [facility] PAIN MGT;  Service: Pain Management;  Laterality: Right;    RADIOFREQUENCY ABLATION Right 9/21/2022    Procedure: RADIOFREQUENCY ABLATION, RIGHT L3-L4-L5 PER  DR JIMENEZ;  Surgeon: Samuel Jimenez MD;  Location: [facility] PAIN MGT;  Service: Pain Management;  Laterality: Right;    RADIOFREQUENCY ABLATION Left 10/12/2022    Procedure: RADIOFREQUENCY ABLATION, LEFT L3-L4-L5 TWO OF TWO;  Surgeon: Samuel Jimenez MD;  Location: [facility] PAIN MGT;  Service: Pain Management;  Laterality: Left;    REPLACEMENT OF NERVE STIMULATOR BATTERY N/A 2020    Procedure: Replacement, SPINAL CORD STIMULATOR BATTERY CHANGE TO NEVRO OMNION BATTERY;  Surgeon: Samuel Jimenez MD;  Location: [facility] OR;  Service: Pain Management;  Laterality: N/A;  C-ARM, NEVRO REP    REPLACEMENT OF SPINAL CORD STIMULATOR  2023    Procedure: REPLACEMENT, SPINAL CORD STIMULATOR;  Surgeon: Samuel Jimenez MD;  Location: [facility] OR;  Service: Pain Management;;    REVISION PROCEDURE INVOLVING SPINAL CORD NEUROSTIMULATOR N/A 2023    Procedure: SPINAL CORD STIMULATOR EXPLANT AND REIMPLANT SALUDA REP;  Surgeon: Samuel Jimenez MD;  Location: [facility] OR;  Service: Pain Management;  Laterality: N/A;    TRIAL OF SPINAL CORD NERVE STIMULATOR N/A 2019    Procedure: Trial, Neurostimulator, SPINAL CORD STIMULATOR TRIAL;  Surgeon: Samuel Jimenez MD;  Location: [facility] CATH LAB;  Service: Pain Management;  Laterality: N/A;  C-ARM, NEVRO REP       Family History:  History reviewed. No pertinent family history.    Social History:  Social History     Socioeconomic History    Marital status:    Tobacco Use    Smoking status: Former     Types: Cigarettes     Quit date: 1980     Years since quittin.5    Smokeless tobacco: Never    Tobacco comments:     stopped 40 years ago   Substance and Sexual Activity    Alcohol use: Yes     Alcohol/week: 1.0 standard drink     Types: 1 Shots of liquor per week     Comment: nightly -scotch    Drug use: Never    Sexual activity: Yes     Partners: Female       OBJECTIVE:    BP (!) 138/51 (BP Location: Left arm, Patient Position: Sitting, BP Method: Large (Automatic))    "Pulse (!) 55   Resp 18   Ht 6' 1" (1.854 m)   Wt 90.7 kg (199 lb 15.3 oz)   BMI 26.38 kg/m²     PHYSICAL EXAMINATION:    General appearance: Well appearing, in no acute distress, alert and oriented x3.  Psych:  Mood and affect appropriate.  Skin: Skin color, texture, turgor normal, no rashes or lesions, in both upper and lower body.  Head/face:  Atraumatic, normocephalic. No palpable lymph nodes  Cor: RRR  Pulm: CTA  GI: Abdomen soft and non-tender.  INSPECTION: There is no swelling, ecchymoses, erythema, gross deformity, no step-offs noted, no asymmetry. SCS surgical incision scar noted.   PALPATION: no midline tenderness to palpation. No paraspinal muscle tenderness to palpation. Right SIJ and piriformis TTP.. No trapezius / rhomboid tenderness to palpation. SCS battery to left of midline in lowerlumbar spine.   ROM: Limited in nearly all planes. No reproduction of pain.   STRENGTH TESTING: Manual muscle testing 5/5 muscle strength in bilateral hip flexion, knee extension, knee flexion, ankle dorsal flexion.   NEUROVASCULAR: sensation to light touch from lower back to anterior and posterior low extremities grossly intact.  DTRs 1+ bilateral and equal at patella and Achilles, no clonus noted   Gait: Antalgic, walks with cane.     ASSESSMENT: 88 y.o. year old male with chronic right sided back and right lateral leg pain, consistent with:     1. Chronic pain syndrome  CT Pelvis Without Contrast      2. Arachnoiditis        3. Sacroiliitis  CT Pelvis Without Contrast      4. Spinal cord stimulator status        5. S/P insertion of spinal cord stimulator        6. Piriformis syndrome of right side  CT Pelvis Without Contrast            PLAN:     - I have stressed the importance of physical activity and a home exercise plan to help with pain and improve health.  - Patient can continue with medications for now since they are providing benefits, using them appropriately, and without side effects.  - Continue SCS " therapy. Pt to [facility] to work with rep regarding optimization.   - Order CT Plvis  - Continue current medications (Celebrex and Cymbalta).   - Ordered Tizanidine 4 mg nightly as needed for muscle spasms.   - Continue Hydrocodone sparingly as needed. When taken, medication offers significant benefit from pain exacerbations.   - RTC in 6-8 wks. At that time, we can review CT imaging  Can eval for repeat piriformis injection and possible repeat SI fusion.   - Counseled patient regarding the importance of activity modification, constant sleeping habits, and physical therapy.    The above plan and management options were discussed at length with patient. Patient is in agreement with the above and verbalized understanding.    Jace Soares   I have personally reviewed the history and exam of this patient and agree with the resident/fellow/NPs note as stated above.    Samuel Jimenez MD    06/22/2023

## 2023-07-10 ENCOUNTER — HOSPITAL ENCOUNTER (OUTPATIENT)
Dept: RADIOLOGY | Facility: OTHER | Age: 88
Discharge: HOME | End: 2023-07-10
Attending: STUDENT IN AN ORGANIZED HEALTH CARE EDUCATION/TRAINING PROGRAM
Payer: MEDICARE

## 2023-07-10 DIAGNOSIS — G57.01 PIRIFORMIS SYNDROME OF RIGHT SIDE: ICD-10-CM

## 2023-07-10 DIAGNOSIS — G89.4 CHRONIC PAIN SYNDROME: ICD-10-CM

## 2023-07-10 DIAGNOSIS — M46.1 SACROILIITIS: ICD-10-CM

## 2023-07-10 PROCEDURE — 72192 CT PELVIS W/O DYE: CPT | Mod: TC

## 2023-07-10 PROCEDURE — 72192 CT PELVIS WITHOUT CONTRAST: ICD-10-PCS | Mod: 26,,, | Performed by: INTERNAL MEDICINE

## 2023-07-10 PROCEDURE — 72192 CT PELVIS W/O DYE: CPT | Mod: 26,,, | Performed by: INTERNAL MEDICINE

## 2023-07-17 RX ORDER — DULOXETIN HYDROCHLORIDE 30 MG/1
CAPSULE, DELAYED RELEASE ORAL
Qty: 60 CAPSULE | Refills: 2 | Status: SHIPPED | OUTPATIENT
Start: 2023-07-17

## 2023-07-19 ENCOUNTER — PATIENT MESSAGE (OUTPATIENT)
Dept: PAIN MEDICINE | Facility: CLINIC | Age: 88
End: 2023-07-19
Payer: MEDICARE

## 2023-07-25 NOTE — TELEPHONE ENCOUNTER
"[facility]  Discharge Instructions for   Percutaneous Nephrostomy   Tube Placement    After you go home:  Have an adult stay with you for 24 hours.  Drink plenty of fluids.  Resume a regular diet unless otherwise ordered by your physician.    For 24 Hours:  Relax and take it easy.  Do not drive or operate machines at home or at work.  No alcohol for 24 hours.  Do not make any important or legal decisions.  Do not do any strenuous exercise or lifting greater that 10 lbs for at least 2 days following your procedure.    CALL THE PHYSICIAN IF:  You start bleeding from the procedure site. If you do start to bleed from the site lie down and hold some pressure on the site. Your physician will tell you if you need to return to the hospital.  You develop nausea or vomiting.  You develop hives or a rash or any unexplained itching.    ADDITIONAL INSTRUCTIONS:  Please call for the following problems:  1. No urine draining from the nephrostomy tube. Check that tube is not kinked.  2. Urine leaking around tube.  3. Urine becomes very foul smelling or new or fresh blood in urine  4. The skin around the tube is red, painful, or has drainage.  5. You have pain in your back, over your kidney.  6. You have a fever of 100.5 F and chills  7. You feel nauseated and \"just not right.\"  8.You need to call [phone]    Change the dressing initially the next day to check the insertion site.  After that change every other day.  Clean around tube site with washcloth and antibacterial soap.      [facility] INTERVENTIONAL RADIOLOGY DEPARTMENT  Procedure Physician:Dr Alvarado Date:July 25, 2023  Telephone Numbers:  [phone]      Monday-Friday 7:30 am to 4:00 pm                       [phone]     After 4:30 PM Monday-Friday, Weekends and Holidays. Ask for Interventional Radiologist on Call. Someone is available 24 hours a day.  [facility] toll free number: [phone] Monday- Friday 8:00AM -4:30PM.    I  " Spoke to patient via , per Dr. Jimenez they called twice and left voicemail for patient. While patient was on the line, informed that Dr. Jimenez was going to call him again.

## 2023-08-10 ENCOUNTER — OFFICE VISIT (OUTPATIENT)
Dept: PAIN MEDICINE | Facility: CLINIC | Age: 88
End: 2023-08-10
Attending: ANESTHESIOLOGY
Payer: MEDICARE

## 2023-08-10 ENCOUNTER — LAB VISIT (OUTPATIENT)
Dept: LAB | Facility: OTHER | Age: 88
End: 2023-08-10
Attending: EMERGENCY MEDICINE
Payer: MEDICARE

## 2023-08-10 VITALS
OXYGEN SATURATION: 100 % | HEIGHT: 73 IN | DIASTOLIC BLOOD PRESSURE: 63 MMHG | RESPIRATION RATE: 18 BRPM | SYSTOLIC BLOOD PRESSURE: 134 MMHG | WEIGHT: 198.88 LBS | BODY MASS INDEX: 26.36 KG/M2 | HEART RATE: 57 BPM

## 2023-08-10 DIAGNOSIS — C67.9 MALIGNANT NEOPLASM OF URINARY BLADDER, UNSPECIFIED SITE: ICD-10-CM

## 2023-08-10 DIAGNOSIS — G89.4 CHRONIC PAIN SYNDROME: Primary | ICD-10-CM

## 2023-08-10 DIAGNOSIS — G89.29 CHRONIC PELVIC PAIN IN MALE: ICD-10-CM

## 2023-08-10 DIAGNOSIS — Z96.89 SPINAL CORD STIMULATOR STATUS: ICD-10-CM

## 2023-08-10 DIAGNOSIS — R10.2 CHRONIC PELVIC PAIN IN MALE: ICD-10-CM

## 2023-08-10 DIAGNOSIS — M46.1 SACROILIITIS: ICD-10-CM

## 2023-08-10 DIAGNOSIS — G57.01 PIRIFORMIS SYNDROME OF RIGHT SIDE: ICD-10-CM

## 2023-08-10 DIAGNOSIS — M89.9 LYTIC LESION OF BONE ON X-RAY: ICD-10-CM

## 2023-08-10 DIAGNOSIS — G03.9 ARACHNOIDITIS: ICD-10-CM

## 2023-08-10 LAB
CREAT SERPL-MCNC: 0.8 MG/DL (ref 0.5–1.4)
EST. GFR  (NO RACE VARIABLE): >60 ML/MIN/1.73 M^2

## 2023-08-10 PROCEDURE — 99999 PR PBB SHADOW E&M-EST. PATIENT-LVL V: ICD-10-PCS | Mod: PBBFAC,,, | Performed by: ANESTHESIOLOGY

## 2023-08-10 PROCEDURE — 99999 PR PBB SHADOW E&M-EST. PATIENT-LVL V: CPT | Mod: PBBFAC,,, | Performed by: ANESTHESIOLOGY

## 2023-08-10 PROCEDURE — 99214 PR OFFICE/OUTPT VISIT, EST, LEVL IV, 30-39 MIN: ICD-10-PCS | Mod: S$PBB,GC,, | Performed by: ANESTHESIOLOGY

## 2023-08-10 PROCEDURE — 82565 ASSAY OF CREATININE: CPT | Performed by: EMERGENCY MEDICINE

## 2023-08-10 PROCEDURE — 99214 OFFICE O/P EST MOD 30 MIN: CPT | Mod: S$PBB,GC,, | Performed by: ANESTHESIOLOGY

## 2023-08-10 PROCEDURE — 99215 OFFICE O/P EST HI 40 MIN: CPT | Mod: PBBFAC | Performed by: ANESTHESIOLOGY

## 2023-08-10 PROCEDURE — 36415 COLL VENOUS BLD VENIPUNCTURE: CPT | Performed by: EMERGENCY MEDICINE

## 2023-08-10 NOTE — PROGRESS NOTES
Chronic patient Established Note (Follow up visit)    Interval History 8/10/2023:  Patient reports that his pain post right SIJ and piriformis was persistent until it decreased 50% on 07/04/23, but then the pain returned after a few days. He reports that during that period he was able to run after a bus in [facility]. The pain is improved, but not as much as previous in his right buttock and calf. Patient reports that he has been working with the [employer] Rep, but not finding the correct programming as of yet.  No fevers, chills, unexplained weight loss.  Hx of bladder cancer in 1967. Remembers falling while skiing and landed on his buttocks 30 years ago, but no other pelvic trauma. Patient was traveling to [place] and recently came back to [place]   We discussed his recent pelvic CT in details especially for the accidental finding of irregular bone lesion. Explained to patient the need to investigate this further with our urology and oncology team.    Interval History 6/22/2023  Jefferson Boogie presents to the clinic for a follow-up appointment for chroniic LBP. Pt was last seen in clinic on 5/25 and scheduled for right SIJ and piriformis injection--performed 6/22. Pt reports 40% relief x 1-2 days. He continues to work with the rep with regards to programming the [place] SCS device. Pt reports that he is getting some decent relief from the device with current program, but feels like there is still some progress to be made. Continues to complain of pain of similar character and quality to that of prior eval. Localized in the right lowerlumbar spine/gluteal area as well as the right lateral leg. Since the last visit, Jefferson Boogie states the pain has been improving. Current pain intensity is 5/10. He continues to exercise daily with stationary bike and weight training. Continues to take Cymbalta and Celebrex daily. He also takes Norco 10 very sparingly, maybe 1-2 per week. Overall the current regimen of SCS, medications and  occasional injections have provided pt with enough relief for him to remain functional, and able to participate in his hobbies.      Interval history 5/25/23:  In the interim he has met with the [employer] reps for reprogramming. He has his good and bad days but feels that the pain is manageable. The reps with [employer] are present for the visit today. Today his pain is a 6.5/10. Still taking percocet and cymbalta (missed a few days), but these medications do not provide much relief. Still pain with walking and sitting, but the pain with laying down is better.      Interval History 5/4/23:  Patient seen today via virtual follow-up after implantation of his [place] SCS in February. He reports no change in his pain since his last visit. Most of his pain is into his right hip with intermittent radiation into his RLE. He denies any new symptoms. No red flag symptoms. He is scheduled to meet with the [employer] reps from reprogramming next week. He remains optimistic. In discussing his current medications, he has run out of the hydrocodone he was previously taking and has only been taking his Cymbalta once per day rather two.      Interval History 3/30/2023:  Mr Boogie presents 5-6 weeks after implantation of his [place] SCS system. He reports no significant change in his pains, which are primarily down the RLE.  No constitutional signs symptoms concerning for infection.  No new areas of pain or neurological changes since procedure. No voicing of s/s concerning for cauda equina syndrome. He does endorse improvement in sleep. He is worried that his implant was a failure because his pain has not changed.     Interval History 2/20/2023:  Mr Boogie presents for follow up of [place] SCS replacement. He states doing well. No constitutional signs symptoms concerning for infection.  No new areas of pain or neurological changes since procedure. No voicing of s/s concerning for cauda equina syndrome.      Interval History  1/23/2023:  Still taking Celebrex, however hasn't noticed a difference in pain with this medication. Location, quality, and severity of pain are unchanged from prior visit and is described above. Here today to discuss removal of [place] SCS, and implant of [place] SCS. He states his stimulator has not been helpful for the past 2 years despite multiple reprogramming and adjustment.     Interval History 11/21/22:  Reports 24 hours of 100% relief for 48 hours, but he reports that now his pain is only approximally 10% better. His pain at rest is 3-4/10. Pain at its worst is 8-9/10. Pain is improved when leaning over a grocery cart. Pain only allows him to walk for 3-4mins.      Interval History 8/29/22:  Jefferson Boogie presents to the clinic for a follow-up appointment for back pain.  He had his second bilateral L3, L4, L5 MBB and reports 100% pain relief. He would like to proceed with RFA.     Interval History 5/26/22:  Jefferson Boogie presents to the clinic for a follow-up appointment for back pain. Since the last visit, Jefferson Boogie states the pain has been stable. Certain postures he can tolerate in the standing position such as leaning on a shopping cart or support to his posterior thighs. He now uses a walker when covering long distances. He continues to play golf despite it aggravating his pain. He receives some benefit from the SCS whereas other interventions have not helped. He remains interested in the [place] device.     Interval History 3/24/22:  Jefferson Boogie presents to the clinic for a follow-up appointment for back pain. Since the last visit, Jefferson Boogie states the pain has been worse than usual. Current pain intensity is 8/10. He continues to report benefit with Nevro SCS however he has not been able to get in touch with the representative in order to have his settings adjusted. He continues to take occasional norco 7.5 mg with benefit, particularly when he plays golf.     Interval  "History 2/10/22:  Patent presents today s/p caudal epidural steroid injections on 1/12/22 since then he has not had any relief. He states his pain has been unchanged and is a 6/10 on average with the worst being 8/10 and best is 4/10. He has been taking percocet 5mg when he golfs or plays with his granddaughter.      Interval History 12/30/21:  Jefferson Boogie presents to clinic for follow up appointment s/p Right SI joint and Right piriformis muscle injection under US guidance on 11/29/21. He did not obtain any noticeable relief with this procedure similar to all of his previous injections. His pain is unchanged and constant over the right buttock. He is taking percocet 5mg x 3 on days he is more active, such as playing golf. This helps some, but minimally. Denies any new symptoms or neurological changes. No numbness, tingling, weakness in his lower extremities. Denies bowel/bladder incontinence or saddle anesthesia.      Interval History 11/4/2021:  Jefferson Boogie presents to the clinic for a follow-up appointment for right sided back pain and SI joint pain. Mr. Boogie had a right SI joint injection on 10/13/2021. He did not note significant relief with the injection for any period of time. Pain is still constant over the right buttock and most noticeable when playing golf. He denies any new bowel/bladder incontinence, lower extremity numbness or weakness or saddle anesthesia.     Interval History 8/26/2021:  Jefferson Boogie presents to the clinic for a follow-up appointment for right sided hip pain with right-sided radiculopathy . Since the last visit, Jefferson Boogie states the pain has been "particularly tender today" 7/10. Patient reports playing golf recently and experienced worsening symptoms the following day. Mr. Boogie is s/p right-side hip joint injection with minimal relief. Patient states he has had relief with previous interventions and is open to RFA.     Interval History " 6/10/2021:  Jefferson Boogie presents to the clinic for a follow-up appointment. Since the last visit, Jefferson Boogie states the pain has been stable. Current pain intensity is 3/10. States he is still having pain around SIJ that is affecting his ADL      Interval History 4/15/2021:  The patient returns to clinic today for follow up of back pain. He reports that the swelling above the SI fusion incision has resolved. He denies any fever, chills, or drainage from the incision. He does report benefit since the fusion. He continues to report benefit with Nevro SCS. He reports intermittent shoulder pain at this time. He did receive an injection with Sports Medicine with benefit. He denies any other health changes. His pain today is 2/10.     Interval History 3/25/21:  Mr. Boogie presents to clinic for follow up of bilateral shoulder pain. He is s/p BL subacromial injections on 2/8/21. He reports maximum 20% relief of pain in the Right shoulder. Today the Left shoulder pain is 1/10; Right shoulder pain is 5-7/10.      Interval History 3/22/2021:  The patient returns to clinic today for follow up of back pain. He is s/p right SI fusion on 3/15/2021. He reports some relief at this time. He does report soreness to the incision. He denies any fever, chills, or drainage from incision. He continues to report benefit with Nevro SCS. He denies any other health changes. His pain today is 4/10.     Interval History 1/11/2020:  Patient is here for follow-up of low back pain now s/p sacroiliac RFA on 12/9/20 which provided no benefit and with the new complaint of bilateral anterior shoulder pain R>L. His shoulder pain started about 2 months ago. He describes 9/10 sharp/stinging pain without radiation that is exacerbated with overhead activity and improved with rest. He started PT about one month ago. He takes oxycodone which provides some relief. He had a blind subacromial steroid injection in the right shoulder with   [facility] on 10/26/19 which provided some short term relief.     Interval History 11/5/2020:  Jefferson Boogie presents to the clinic for a follow-up appointment for low back pain. Since the last visit, Jefferson Boogie states the pain has been stable. Current pain intensity is 4/10. He had good relief from the SI joint injection that lasted for about a week. Pain has since returned. He also slipped and fell on his buttock a couple of weeks ago and had increased pain in the right buttock after that which is slowly improving. He continues to have variable benefit with the Nevro SCS for intermittent pain in the right leg. He is scheduled to meet with representatives today. He is taking celebrex daily and percocet as needed sparingly. He denies any adverse effects and any other health changes.     Interval History 10/5/2020:  The patient returns to clinic today for follow up of low back pain. He is s/p right SI joint injection on 9/9/2020. He reports 25% relief of his right sided low back and buttock pain. He did have good relief the first two days. He continues to report right sided low back and buttock pain. He denies any radicular leg pain. His pain is worse with prolonged standing and walking. He continues to report intermittent pain into his achilles from previous injury. He feels as though this has changed his gait. He continues to report some benefit with Nevro SCS device. He is in contact with representatives. He is currently taking Percocet as needed sparingly with some benefit. He denies any adverse effects. He denies any other health changes. His pain today is 4/10.      Interval History 9/2/2020:  The patient returns to clinic today for follow up of back pain. He reports that his back pain has improved since last audio visit. He continues to report back pain with intermittent radiating pain into his right hip and calf. He has spoken with Bienvenido from [employer] via phone with some programming. He is meeting with  Bienvenido today. He had some issues with charging but this has improved. He is currently taking Norco intermittently but this is only lasts 2-3 hours. He denies any adverse effects. He denies any other health changes. His pain today is 8/10.     Interval History 08/27/2020:  Pt was contacted over virtual audio for a follow up appointment.  He is currently complaining of right hip and right calf with no associated numbness or weakness.  He continues to use his Nevro device.  He states it has been very frustrating. Inconsistent.  Took 20 mins to 1 hour to charge.  Couldn't get it to start this morning.  He states that there is no drainage at the battery site, no signs of infection or pain at the site.  Patient is not taking medications at this time.  He wants to speak about medication options because he would like to start playing golf again.     Interval History 8/17/2020:  The patient returns to clinic today for post op wound check. He continues to report benefit with Nevro device. He denies any fever, chills, or drainage. He continues to follow his activity restrictions. He does report a recent achilles tendon injury while swimming. He is seeing Orthopedics. He denies any other health changes. His pain today is 4/10.     Interval History 8/3/2020:  The patient returns to clinic today for post op. He is s/p Nevro battery change on 7/27/2020. He denies any fever, chills, or drainage from incision. He has not completed his antibiotics as he missed two days of the medication. He continues to follow his activity restrictions. He reports relief with the device. He is meeting with B[employer] today for programming. He denies any other health changes. His pain today is 2/10     Interval History 7/22/2020:  Pt presents for audio follow up only s/p Right SI joint injection on 7/8/2020. Pt states he has near resolution of focal pain to buttock. He is pleased with result and pain relief. Pt has been in contact with [employer] and will be  having battery swap in 5 days. He has difficulty whether stimulator is beneficial and has even had a holiday from using SCS.  He denies any newer areas pain, denies any neuro changes, meds area adequate to control pain without adverse side effects. Pain is 2/10 today.     Interval HPI 6/15/2020:  Jefferson Boogie presents to the clinic for a follow-up appointment for 3 week follow up right hip and right thigh pain. Since the last visit, Jefferson Boogie states the pain has been persistant. Current pain intensity is 5/10. Patient has been working with Bienvenido Varghese, to try and get better coverage of a localized pain that he still experiences in his right low back area. He does report improvement in symptoms of numbness/tingling. Today, worse pain is 1/10 in severity and localizes to the right SI joint. Pain is worse with extension of his back. It is worse with golfing. Pain relieved by Norco--he takes 1-2 tablets of 5-325 Norco on days that he plays golf. Pain is also improved with being still and not being active. Patient endorses a much more active lifestyle with Norco. Denies new weakness/numbness or bowel/bladder changes.     Previous injection history includes a series of 3 lumbar CHOCO at [facility].      Interval HPI 3/5/20:  Patient presents to the clinic for a follow-up appointment for low back pain. Since the last visit, he states his pain has been unchanged. Current pain intensity is 4/10. He continues to work with [employer] to adjust settings on his SCS. He has stopped using tramadol, and uses norco sparingly. He continues to work with a  for physical therapy.      Interval HPI 1/9/2020:  Patient returns for follow up s/p Nevro SCS. He states he is unsure if it has helped his pain since he had it implanted. He last had an adjustment of his programming about 1 month ago. He does note that once he is done charging his SCS his pain is improved. Pain still worse with prolonged standing and  activity such as golf. He still is doing home exercise program. He says that he is trying to do more since getting the SCS. He is still taking the Tramadol with limited pain relief. He takes Tramadol 50 mg twice daily only on days of activity which is once a week. No other health changes.      HPI 19  Jefferson Boogie returns to clinic today for follow up. He reports increased low back and leg pain over the last few days. He has been in contact with [employer] representatives for programming adjustments. He does report benefit with the device. He reports good days and bad days. His pain is worse with prolonged standing and activity. He continues to participate in a home exercise routine. He does take Tramadol sparingly but reports limited relief. He denies any other health changes. His pain today is 5/10.     Pain Disability Index Review:  Last 3 PDI Scores 2023 2023 3/30/2023   Pain Disability Index (PDI) 30 [phone] 35       Pain Medications:  Pain Medications:  Celebrex   Cymbalta     Opioid Contract: not applicable      report:  Reviewed and consistent with medication use as prescribed.     Pain Procedures  2020- Right SI joint injection  2020- Nevro SCS battery change  2020- Right SIJ injection >80% relief   19 SCS implant  19 SCS trial  2020- Right SI joint injection  2020- SCS battery revision  2020- Right SI joint injection   2020- Right L5-S1 RFA  3/15/2021- Right SI fusion  2021 - Injection R Hip - minimal relief  10/13/2021 - Right SI joint injection  2021 - R SI joing and R piriformis muscle injection - no relief   2022- Caudal CHOCO- no relief   22 - Diagnostic Bilateral L3, L4 and L5 Lumbar Medial Branch Block under Fluoroscopy  22- Right L3, L4, L5 RFA  10/12/22 -  Left L3, L4, L5 RFA  2023 - [place] SCS implant  2023 - Right SIJ and piriformis injection   Physical Therapy/Home Exercise: no     Imagin/10/2021 - X  ray Hips Bilateral: No radiographic evidence of acute osseous abnormality of the pelvis and hips and without radiographic evidence of osteonecrosis of the femoral heads.     9/18/21 MRI L-spine:  FINDINGS:  Lumbar sagittal alignment is slightly is straightened.  There is slight convex right curvature lumbar spine.  There is degenerative disc disease with intervertebral disc height loss and endplate degenerative change at all levels with scattered disc desiccation allowing for degenerative change the lumbar vertebral body heights and contours are within normal is without evidence for acute fracture or subluxation.  There is heterogeneous T1/T2 signal focus within the right aspect of the S1 vertebra most compatible with hemangioma this measures 2.0 cm.     The distal spinal cord and conus is normal in signal and contour tip of the conus approximates the T12/L1 level.  Please note there is artifact from indwelling spinal stimulator device with leads partially visualized casting artifact entering the dorsal epidural space at the T12 level and extending cranially.     There is abnormal clumping configuration of the filum terminalis a from T12 through L5 with slight thickening of scattered nerve roots most compatible with arachnoiditis.     No aneurysmal dilatation of the visualized abdominal aorta.     T12/L1 through L1/L2: No significant disc bulge, central canal or neural foraminal stenosis.     L2/L3: Posterior disc osteophyte with facet joint arthropathy without significant central canal stenosis with mild bilateral neural foraminal stenosis.     L3/L4:Bulging disc with ligamentum flavum hypertrophy without significant central canal stenosis with mild bilateral neural foraminal stenosis.     L4/L5:Posterior disc osteophyte with ligamentum flavum hypertrophy and facet joint arthropathy without significant central canal stenosis and mild bilateral neural foraminal stenosis.     L5/S1: Posterior disc osteophyte with  facet joint arthropathy without significant central canal stenosis with moderate right and mild left neural foraminal stenosis.     This report was flagged in Epic as abnormal.     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  Please note there is spinal stimulator device with leads partially visualized and distorting the study by artifacts.     There is abnormal thickening of the filum configuration most compatible with arachnoiditis.     Please see above for additional details.     Thoracic spine MRI:  FINDINGS: Thoracic vertebral body height and alignment are maintained with a few small scattered Schmorl's nodes involving the endplates of several mid to lower thoracic vertebra. The T3-4 vertebra are partially fused. There is some degree of desiccation of all of the thoracic discs with multilevel disc space narrowing, especially at the T7-T8, T6-T7 and T5-T6 levels. There is no abnormal prevertebral soft tissue thickening. The somewhat heterogeneous appearance to the signal from the thoracic vertebra is presumably secondary to an admixture of red and yellow marrow.    T1-T2 level: There is no bony foraminal narrowing or bony central canal stenosis and the posterior disc margin is unremarkable.    T2-T3 level: There is no bony foraminal narrowing or bony central canal stenosis and the posterior disc margin is unremarkable.    T3-T4 level: There is no bony foraminal narrowing or bony central canal stenosis and the posterior disc margin is unremarkable.    T4-5 level: There is no bony foraminal narrowing or bony central canal stenosis and the posterior disc margin is unremarkable.    T5-T6 level: There is no bony foraminal narrowing or bony central canal stenosis and the posterior disc margin is unremarkable.    T6-T7 level: There is no bony foraminal narrowing or bony central canal stenosis and the posterior disc margin is unremarkable.    T7-T8 level: There is no bony  foraminal narrowing or bony central canal stenosis and the posterior disc margin is unremarkable.    T8-T9 level: There is no bony foraminal narrowing or bony central canal stenosis and the posterior disc margin is unremarkable.    T9-T10 level: There is no bony foraminal narrowing or bony central canal stenosis and the posterior disc margin is unremarkable.    T10-T11 level: There is no bony foraminal narrowing or bony central canal stenosis and the posterior disc margin is unremarkable.    T11-T12 level: There is no bony foraminal narrowing or bony central canal stenosis and the posterior disc margin is unremarkable.    T12-L1 level: The tip the conus medullaris extends down to level of the superior endplate of L1. 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 There is no bony foraminal narrowing or bony central canal stenosis at this level.    On the axial images, the immediate paravertebral soft tissues are unremarkable. A small cyst is seen to involve the upper pole the left kidney.    Following contrast administration, there is no abnormal enhancement of any bony or soft tissue elements of the thoracic spine. There is no abnormal enhancement of the subserosal surface of the thoracic spinal cord. Some foci of mild signal intensity in the CSF dorsal to the spinal cord of some of the sagittal sequences presumably is secondary to CSF pulsation artifact.    On the sagittal  image, there is cervical spondylosis and kyphosis with narrowing of all of the disc spaces in the cervical spine.    EXAMINATION:  CT PELVIS WITHOUT CONTRAST     CLINICAL HISTORY:  History fo percutanous SI fusion.;  Chronic pain syndrome     TECHNIQUE:  CT of the pelvis, without intravenous contrast.     COMPARISON:  None     FINDINGS:  BONE: Diffuse osteopenia.  No fracture or osteonecrosis.  Few lytic and sclerotic lesions scattered throughout the pelvis, including a lesion in the right iliac  bone with irregularity of the overlying cortex (2:103).     JOINT: Postoperative changes from right sacroiliac joint fusion.  The implant is in satisfactory position.  No osseous fusion across the joint space.     Degenerative changes both sacroiliac joints.  No erosions or ankylosis.  Degenerative changes also involve the lower lumbar spine, both hip joints, and pubic symphysis.  Chondrocalcinosis of the pubic symphysis.  No significant joint effusion.     SOFT TISSUE: Normal muscle bulk. Regional tendons are intact.  Calcific tendinopathy involving the proximal hamstring tendons bilaterally.  No bursal collection.     MISCELLANEOUS: Circumferential bladder wall thickening.  Prostatic calcifications.  Colonic diverticulosis.  Atherosclerosis.     Impression:     Postoperative changes in the right sacroiliac joint.  No osseous fusion.     Few lytic and sclerotic lesions scattered throughout the pelvis, concerning for metastases or myeloma.  No prior studies are available for comparison.     Bladder wall thickening, which could be secondary to outlet obstruction or cystitis.  Correlate with urinalysis.     Other findings as described.     This report was flagged in Epic as abnormal.        Electronically signed by: Koby Cote  Date:                                            07/10/2023  Time:                                           11:54  Allergies: Review of patient's allergies indicates:  No Known Allergies    Current Medications:   Current Outpatient Medications   Medication Sig Dispense Refill    ascorbic acid, vitamin C, (VITAMIN C) 1000 MG tablet Take 1,000 mg by mouth.      celecoxib (CELEBREX) 200 MG capsule       DULoxetine (CYMBALTA) 30 MG capsule TAKE 1 CAPSULE(30 MG) BY MOUTH TWICE DAILY 60 capsule 2    ergocalciferol, vitamin D2, (VITAMIN D ORAL) Take by mouth every 30 days.      irbesartan (AVAPRO) 75 MG tablet 150 mg once daily.       levothyroxine (SYNTHROID) 100 MCG tablet Take 100 mcg by mouth.       mirabegron (MYRBETRIQ ORAL) Take by mouth.      simvastatin (ZOCOR) 20 MG tablet Take 20 mg by mouth every evening.      tadalafil (CIALIS) 20 MG Tab       temazepam (RESTORIL) 30 mg capsule TK 1 C PO QD HS       No current facility-administered medications for this visit.     Facility-Administered Medications Ordered in Other Visits   Medication Dose Route Frequency Provider Last Rate Last Admin    balanced salt irrigation intra-ocular solution 1 drop  1 drop Right Eye On Call Procedure Bell Cedeno MD        sodium chloride 0.9% flush 2 mL  2 mL Intravenous PRN Bell Cedeno MD           REVIEW OF SYSTEMS:     GENERAL:  No weight loss, malaise or fevers.  HEENT:  Negative for frequent or significant headaches.  NECK:  Negative for lumps, goiter, pain and significant neck swelling.  RESPIRATORY:  Negative for cough, wheezing or shortness of breath.  CARDIOVASCULAR:  Negative for chest pain, leg swelling or palpitations.  GI:  Negative for abdominal discomfort, blood in stools or black stools or change in bowel habits.  MUSCULOSKELETAL:  See HPI.  SKIN:  Negative for lesions, rash, and itching.  PSYCH:  +ve for sleep disturbance, mood disorder and recent psychosocial stressors.  HEMATOLOGY/LYMPHOLOGY:  Negative for prolonged bleeding, bruising easily or swollen nodes.  NEURO:   No history of headaches, syncope, paralysis, seizures or tremors.  All other reviewed and negative other than HPI.    Past Medical History:  Past Medical History:   Diagnosis Date    Bronchitis     Cancer     stage I bladder cancer 50 yrs ago    Hypercholesteremia     Hypertension     Sacroiliitis 07/08/2020    Thyroid disease        Past Surgical History:  Past Surgical History:   Procedure Laterality Date    BLADDER SURGERY      CATARACT EXTRACTION      CATARACT EXTRACTION W/  INTRAOCULAR LENS IMPLANT Right 3/9/2022    Procedure: EXTRACTION, CATARACT, WITH IOL INSERTION;  Surgeon: Bell Cedeno MD;  Location: [facility] OR;   Service: Ophthalmology;  Laterality: Right;    COLONOSCOPY      EPIDURAL STEROID INJECTION N/A 1/12/2022    Procedure: INJECTION, STEROID, EPIDURAL CAUDAL With Catheter needs consent;  Surgeon: Samuel Jimenez MD;  Location: [facility] PAIN MGT;  Service: Pain Management;  Laterality: N/A;    EYE SURGERY      cataracts     FUSION OF SACROILIAC JOINT Right 3/15/2021    Procedure: FUSION, RIGHT SACROILIAC JOINT FUSION;  Surgeon: Samuel Jimenez MD;  Location: [facility] OR;  Service: Pain Management;  Laterality: Right;  C-ARM, PAINTEQ REP     HERNIA REPAIR      INJECTION OF ANESTHETIC AGENT AROUND NERVE Bilateral 6/1/2022    Procedure: BLOCK, NERVE MEDIAL BRANCH L3,4,5 BILATERAL 1st;  Surgeon: Samuel Jimenez MD;  Location: [facility] PAIN MGT;  Service: Pain Management;  Laterality: Bilateral;    INJECTION OF ANESTHETIC AGENT AROUND NERVE Bilateral 8/24/2022    Procedure: Block, Nerve Eknny L3, L4, & L5;  Surgeon: Samuel Jimenez MD;  Location: [facility] PAIN MGT;  Service: Pain Management;  Laterality: Bilateral;    INJECTION OF JOINT Right 7/8/2020    Procedure: INJECTION, JOINT, SACROILIAC (SI);  Surgeon: Samuel Jimenez MD;  Location: [facility] PAIN MGT;  Service: Pain Management;  Laterality: Right;    INJECTION OF JOINT Right 9/9/2020    Procedure: INJECTION, JOINT, SACROILIAC (SI);  Surgeon: Samuel Jimenez MD;  Location: [facility] PAIN MGT;  Service: Pain Management;  Laterality: Right;    INJECTION OF JOINT Right 7/21/2021    Procedure: INJECTION, JOINT, HIP RIGHT;  Surgeon: Samuel Jimenez MD;  Location: [facility] PAIN MGT;  Service: Pain Management;  Laterality: Right;  CONSENT NEEDED    INJECTION OF JOINT Right 10/13/2021    Procedure: INJECTION, JOINT, SACROILIAC (SI)  NEED CONSENT pt. requesting sedation;  Surgeon: Samuel Jimenez MD;  Location: [facility] PAIN MGT;  Service: Pain Management;  Laterality: Right;    INJECTION, SACROILIAC JOINT Right 5/31/2023    Procedure: INJECTION,SACROILIAC JOINT, RIGHT SI AND RIGHT  PIRIFORMIS;  Surgeon: Samuel Jimenez MD;  Location: [facility] PAIN MGT;  Service: Pain Management;  Laterality: Right;    KNEE SURGERY      RADIOFREQUENCY ABLATION Right 12/9/2020    Procedure: RADIOFREQUENCY ABLATION, L5-S1-S2 LATERAL BRANCH COOLED;  Surgeon: Samuel Jimenez MD;  Location: [facility] PAIN MGT;  Service: Pain Management;  Laterality: Right;    RADIOFREQUENCY ABLATION Right 9/21/2022    Procedure: RADIOFREQUENCY ABLATION, RIGHT L3-L4-L5 PER DR JIMENEZ;  Surgeon: Samuel Jimenez MD;  Location: [facility] PAIN MGT;  Service: Pain Management;  Laterality: Right;    RADIOFREQUENCY ABLATION Left 10/12/2022    Procedure: RADIOFREQUENCY ABLATION, LEFT L3-L4-L5 TWO OF TWO;  Surgeon: Samuel Jimenez MD;  Location: [facility] PAIN MGT;  Service: Pain Management;  Laterality: Left;    REPLACEMENT OF NERVE STIMULATOR BATTERY N/A 7/27/2020    Procedure: Replacement, SPINAL CORD STIMULATOR BATTERY CHANGE TO NEVRO OMNION BATTERY;  Surgeon: Samuel Jimenez MD;  Location: [facility] OR;  Service: Pain Management;  Laterality: N/A;  C-ARM, NEVRO REP    REPLACEMENT OF SPINAL CORD STIMULATOR  2/13/2023    Procedure: REPLACEMENT, SPINAL CORD STIMULATOR;  Surgeon: Samuel Jimenez MD;  Location: [facility] OR;  Service: Pain Management;;    REVISION PROCEDURE INVOLVING SPINAL CORD NEUROSTIMULATOR N/A 2/13/2023    Procedure: SPINAL CORD STIMULATOR EXPLANT AND REIMPLANT SALUDA REP;  Surgeon: Samuel Jimenez MD;  Location: [facility] OR;  Service: Pain Management;  Laterality: N/A;    TRIAL OF SPINAL CORD NERVE STIMULATOR N/A 8/5/2019    Procedure: Trial, Neurostimulator, SPINAL CORD STIMULATOR TRIAL;  Surgeon: Samuel Jimenez MD;  Location: [facility] CATH LAB;  Service: Pain Management;  Laterality: N/A;  C-ARM, NEVRO REP       Family History:  No family history on file.    Social History:  Social History     Socioeconomic History    Marital status:    Tobacco Use    Smoking status: Former     Current packs/day: 0.00     Types:  "Cigarettes     Quit date:      Years since quittin.6    Smokeless tobacco: Never    Tobacco comments:     stopped 40 years ago   Substance and Sexual Activity    Alcohol use: Yes     Alcohol/week: 1.0 standard drink of alcohol     Types: 1 Shots of liquor per week     Comment: nightly -scotch    Drug use: Never    Sexual activity: Yes     Partners: Female       OBJECTIVE:    /63 (BP Location: Left arm, Patient Position: Sitting, BP Method: Small (Automatic))   Pulse (!) 57   Resp 18   Ht 6' 1" (1.854 m)   Wt 90.2 kg (198 lb 13.7 oz)   SpO2 100%   BMI 26.24 kg/m²     PHYSICAL EXAMINATION:    General appearance: Well appearing, in no acute distress, alert and oriented x3.  Psych:  Mood and affect appropriate.  Skin: Skin color, texture, turgor normal, no rashes or lesions, in both upper and lower body.  Head/face:  Atraumatic, normocephalic. No palpable lymph nodes  Neck: No pain to palpation over the cervical paraspinous muscles. Spurling Negative. No pain with neck flexion, extension, or lateral flexion. .  Cor: RRR  Pulm: CTA  GI: Abdomen soft and non-tender.  Back: Straight leg raising in the sitting and supine positions is negative to radicular pain. mild pain to palpation over the spine or costovertebral angles. Decreased range of motion without pain reproduction.-ve axial loading test bilateral.  Positive tenderness over RT SIJ with positive thigh and sacral thrust test, Positive FABERE,Ganselin and Yeoman's test on the rt side.negative FADIR  Extremities: Tenderness to deep palpation of right piriformis. Peripheral joint ROM is full and pain free without obvious instability or laxity in all four extremities. No deformities, edema, or skin discoloration. Good capillary refill.  Musculoskeletal: No pain with WICHO, FADIR or modified Gaenslen's. Bilateral lower extremity strength is normal and symmetric.  No atrophy or tone abnormalities are noted.  Neuro: Bilateral lower extremity " coordination and muscle stretch reflexes are physiologic and symmetric.  No clonus. No loss of sensation is noted.  GAIT: Antalgic gait. Not using walker today.    ASSESSMENT: 88 y.o. year old male with back pain, consistent with      1. Chronic pain syndrome        2. Arachnoiditis        3. Sacroiliitis        4. Piriformis syndrome of right side        5. Spinal cord stimulator status        6. Chronic pelvic pain in male  CT Abdomen Pelvis With Contrast    Creatinine, serum      7. Lytic lesion of bone on x-ray  CT Abdomen Pelvis With Contrast    Creatinine, serum    Ambulatory referral/consult to Urology      8. Malignant neoplasm of urinary bladder, unspecified site  Ambulatory referral/consult to Urology            PLAN:   - I have stressed the importance of physical activity and a home exercise plan to help with pain and improve health.  - Patient can continue with medications for now since they are providing benefits, using them appropriately, and without side effects.  - Counseled patient regarding the importance of activity modification and physical therapy.  - Patient can continue with medications for now since they are providing benefits, using them appropriately, and without side effects.  - Continue SCS therapy. Pt to [facility] to work with rep regarding optimization.   - Order CT Plvis with contrast to futher detail the lesion in bone and bladder  - Obtain creatinine prior to CT  - Urgent consult to Urology placed  - Continue current medications (Celebrex and Cymbalta).   - Continue Hydrocodone sparingly as needed. When taken, medication offers significant benefit from pain exacerbations.   - RTC in 2-4 wks.     The above plan and management options were discussed at length with patient. Patient is in agreement with the above and verbalized understanding.    SHELLY MARQUEZ   I have personally reviewed the history and exam of this patient and agree with the resident/fellow/NPs note as stated  above.    Greater than 25 minutes spent in total in todays visit with the patient, with more than half that time direct face to face counseling and education with the patient today. We discussed the disease process, prognosis, treatment plan, and risks and benefits.    Samuel Jimenez MD    08/10/2023

## 2023-08-13 ENCOUNTER — PATIENT MESSAGE (OUTPATIENT)
Dept: PAIN MEDICINE | Facility: CLINIC | Age: 88
End: 2023-08-13
Payer: MEDICARE

## 2023-08-13 NOTE — PROGRESS NOTES
[facility]  Multidisciplinary Urologic Oncology Clinic      Subjective:      Jefferson Boogie is a 88 y.o. male who presents today regarding his   Referred by pain management    Pt has history of bladder cancer in 1969.  He was told no follow up was needed.  Had cysto for may years then annually  Was treated by a urologist in the [place]  He was followed by Dr Mathew     No hematuria    He currently sees Dr Fausto Nieto for Ed and general urology issues.    Recently noted to have lytic bone lesion on CT of pelvis done to evaluate a nerve stimulator.    The following portions of the patient's history were reviewed and updated as appropriate: allergies, current medications, past family history, past medical history, past social history, past surgical history and problem list.    Review of Systems  Pertinent items are noted in HPI.  A comprehensive multipoint review of systems was negative except as otherwise stated in the HPI.    Past Medical History:   Diagnosis Date    Bronchitis     Cancer     stage I bladder cancer 50 yrs ago    Hypercholesteremia     Hypertension     Sacroiliitis 07/08/2020    Thyroid disease      Past Surgical History:   Procedure Laterality Date    BLADDER SURGERY      CATARACT EXTRACTION      CATARACT EXTRACTION W/  INTRAOCULAR LENS IMPLANT Right 3/9/2022    Procedure: EXTRACTION, CATARACT, WITH IOL INSERTION;  Surgeon: Bell Cedeno MD;  Location: [facility] OR;  Service: Ophthalmology;  Laterality: Right;    COLONOSCOPY      EPIDURAL STEROID INJECTION N/A 1/12/2022    Procedure: INJECTION, STEROID, EPIDURAL CAUDAL With Catheter needs consent;  Surgeon: Samuel Jimenez MD;  Location: [facility] PAIN MGT;  Service: Pain Management;  Laterality: N/A;    EYE SURGERY      cataracts     FUSION OF SACROILIAC JOINT Right 3/15/2021    Procedure: FUSION, RIGHT SACROILIAC JOINT FUSION;  Surgeon: Samuel Jimenez MD;  Location: [facility] OR;  Service: Pain Management;  Laterality: Right;  C-ARM,  PAINTEQ REP     HERNIA REPAIR      INJECTION OF ANESTHETIC AGENT AROUND NERVE Bilateral 6/1/2022    Procedure: BLOCK, NERVE MEDIAL BRANCH L3,4,5 BILATERAL 1st;  Surgeon: Samuel Jimenez MD;  Location: [facility] PAIN MGT;  Service: Pain Management;  Laterality: Bilateral;    INJECTION OF ANESTHETIC AGENT AROUND NERVE Bilateral 8/24/2022    Procedure: Block, Nerve Kenny L3, L4, & L5;  Surgeon: Samuel Jimenez MD;  Location: [facility] PAIN MGT;  Service: Pain Management;  Laterality: Bilateral;    INJECTION OF JOINT Right 7/8/2020    Procedure: INJECTION, JOINT, SACROILIAC (SI);  Surgeon: Samuel Jimenez MD;  Location: [facility] PAIN MGT;  Service: Pain Management;  Laterality: Right;    INJECTION OF JOINT Right 9/9/2020    Procedure: INJECTION, JOINT, SACROILIAC (SI);  Surgeon: Samuel Jimenez MD;  Location: [facility] PAIN MGT;  Service: Pain Management;  Laterality: Right;    INJECTION OF JOINT Right 7/21/2021    Procedure: INJECTION, JOINT, HIP RIGHT;  Surgeon: Samuel Jimenez MD;  Location: [facility] PAIN MGT;  Service: Pain Management;  Laterality: Right;  CONSENT NEEDED    INJECTION OF JOINT Right 10/13/2021    Procedure: INJECTION, JOINT, SACROILIAC (SI)  NEED CONSENT pt. requesting sedation;  Surgeon: Samuel Jimenez MD;  Location: [facility] PAIN MGT;  Service: Pain Management;  Laterality: Right;    INJECTION, SACROILIAC JOINT Right 5/31/2023    Procedure: INJECTION,SACROILIAC JOINT, RIGHT SI AND RIGHT PIRIFORMIS;  Surgeon: Samuel Jimenez MD;  Location: [facility] PAIN MGT;  Service: Pain Management;  Laterality: Right;    KNEE SURGERY      RADIOFREQUENCY ABLATION Right 12/9/2020    Procedure: RADIOFREQUENCY ABLATION, L5-S1-S2 LATERAL BRANCH COOLED;  Surgeon: Samuel Jimenez MD;  Location: [facility] PAIN MGT;  Service: Pain Management;  Laterality: Right;    RADIOFREQUENCY ABLATION Right 9/21/2022    Procedure: RADIOFREQUENCY ABLATION, RIGHT L3-L4-L5 PER DR JIMENEZ;  Surgeon: Samuel Jimenez MD;  Location: [facility] PAIN MGT;   Service: Pain Management;  Laterality: Right;    RADIOFREQUENCY ABLATION Left 10/12/2022    Procedure: RADIOFREQUENCY ABLATION, LEFT L3-L4-L5 TWO OF TWO;  Surgeon: Samuel Jimenez MD;  Location: [facility] PAIN MGT;  Service: Pain Management;  Laterality: Left;    REPLACEMENT OF NERVE STIMULATOR BATTERY N/A 7/27/2020    Procedure: Replacement, SPINAL CORD STIMULATOR BATTERY CHANGE TO NEVRO OMNION BATTERY;  Surgeon: Samuel Jimenez MD;  Location: [facility] OR;  Service: Pain Management;  Laterality: N/A;  C-ARM, NEVRO REP    REPLACEMENT OF SPINAL CORD STIMULATOR  2/13/2023    Procedure: REPLACEMENT, SPINAL CORD STIMULATOR;  Surgeon: Samuel Jimenez MD;  Location: [facility] OR;  Service: Pain Management;;    REVISION PROCEDURE INVOLVING SPINAL CORD NEUROSTIMULATOR N/A 2/13/2023    Procedure: SPINAL CORD STIMULATOR EXPLANT AND REIMPLANT SALUDA REP;  Surgeon: Samuel Jimenez MD;  Location: [facility] OR;  Service: Pain Management;  Laterality: N/A;    TRIAL OF SPINAL CORD NERVE STIMULATOR N/A 8/5/2019    Procedure: Trial, Neurostimulator, SPINAL CORD STIMULATOR TRIAL;  Surgeon: Samuel Jimenez MD;  Location: [facility] CATH LAB;  Service: Pain Management;  Laterality: N/A;  C-ARM, NEVRO REP       Review of patient's allergies indicates:  No Known Allergies       Objective:   Vitals: There were no vitals taken for this visit.    Physical Exam   All counseling    Physical Exam    Lab Review   Urinalysis demonstrates no specimen      UA neg 3/2023  UA neg 1/2023  UA neg 9/2023    Lab Results   Component Value Date    WBC 6.54 03/12/2021    HGB 13.7 (L) 03/12/2021    HCT 41.3 03/12/2021    MCV 92 03/12/2021     03/12/2021     Lab Results   Component Value Date    CREATININE 0.8 08/10/2023    BUN 19 03/12/2021       Imaging  CT PELVIS WITHOUT CONTRAST     CLINICAL HISTORY:  History fo percutanous SI fusion.;  Chronic pain syndrome     TECHNIQUE:  CT of the pelvis, without intravenous contrast.     COMPARISON:  None      FINDINGS:  BONE: Diffuse osteopenia.  No fracture or osteonecrosis.  Few lytic and sclerotic lesions scattered throughout the pelvis, including a lesion in the right iliac bone with irregularity of the overlying cortex (2:103).     JOINT: Postoperative changes from right sacroiliac joint fusion.  The implant is in satisfactory position.  No osseous fusion across the joint space.     Degenerative changes both sacroiliac joints.  No erosions or ankylosis.  Degenerative changes also involve the lower lumbar spine, both hip joints, and pubic symphysis.  Chondrocalcinosis of the pubic symphysis.  No significant joint effusion.     SOFT TISSUE: Normal muscle bulk. Regional tendons are intact.  Calcific tendinopathy involving the proximal hamstring tendons bilaterally.  No bursal collection.     MISCELLANEOUS: Circumferential bladder wall thickening.  Prostatic calcifications.  Colonic diverticulosis.  Atherosclerosis.     Impression:     Postoperative changes in the right sacroiliac joint.  No osseous fusion.     Few lytic and sclerotic lesions scattered throughout the pelvis, concerning for metastases or myeloma.  No prior studies are available for comparison.     Bladder wall thickening, which could be secondary to outlet obstruction or cystitis.  Correlate with urinalysis.     Other findings as described.     This report was flagged in Epic as abnormal.        Electronically signed by: Koby Cote  Date:                                            07/10/2023  Time:                                           11:54      Assessment and Plan:   History of bladder cancer; remote history DAISHA  I doubt his bone lesions are related to his history of bladder ca  Will get urine cytology.  If this is +, we will schedule cysto and CT urogram  If neg, no additional work up needed    If he needs additional work up, we will ask his urologist Dr Fausto Nieto to do this    Bone lesion  Seeing heme onc, Dr Nielson, today.  She is working  this up.  We discuss and she agrees that this does not appear related to his history of bladder ca

## 2023-08-14 ENCOUNTER — PATIENT MESSAGE (OUTPATIENT)
Dept: HEMATOLOGY/ONCOLOGY | Facility: CLINIC | Age: 88
End: 2023-08-14
Payer: MEDICARE

## 2023-08-14 ENCOUNTER — TELEPHONE (OUTPATIENT)
Dept: HEMATOLOGY/ONCOLOGY | Facility: CLINIC | Age: 88
End: 2023-08-14
Payer: MEDICARE

## 2023-08-14 ENCOUNTER — HOSPITAL ENCOUNTER (OUTPATIENT)
Dept: RADIOLOGY | Facility: HOSPITAL | Age: 88
Discharge: HOME | End: 2023-08-14
Attending: EMERGENCY MEDICINE
Payer: MEDICARE

## 2023-08-14 DIAGNOSIS — G89.29 CHRONIC PELVIC PAIN IN MALE: ICD-10-CM

## 2023-08-14 DIAGNOSIS — R10.2 CHRONIC PELVIC PAIN IN MALE: ICD-10-CM

## 2023-08-14 DIAGNOSIS — M89.9 LYTIC LESION OF BONE ON X-RAY: ICD-10-CM

## 2023-08-14 PROCEDURE — 74177 CT ABD & PELVIS W/CONTRAST: CPT | Mod: TC

## 2023-08-14 PROCEDURE — 74177 CT ABDOMEN PELVIS WITH CONTRAST: ICD-10-PCS | Mod: 26,,, | Performed by: RADIOLOGY

## 2023-08-14 PROCEDURE — 74177 CT ABD & PELVIS W/CONTRAST: CPT | Mod: 26,,, | Performed by: RADIOLOGY

## 2023-08-14 PROCEDURE — 25500020 PHARM REV CODE 255: Performed by: EMERGENCY MEDICINE

## 2023-08-14 RX ADMIN — IOHEXOL 100 ML: 350 INJECTION, SOLUTION INTRAVENOUS at 05:08

## 2023-08-14 NOTE — NURSING
Oncology Navigation   Intake  Cancer Type:   Internal / External Referral: Internal  Initial Nurse Navigator Contact: 08/14/23  Date Worked: 08/14/23  Multiple appointments: Yes  Reason if booked > 7 days after scheduling: Additional tests/procedures     Treatment     Surgical Oncologist: Isra Bustillo  Consult Date: 08/15/23    Medical Oncologist: Nela Nielson  Consult Date: 08/15/23                       Acuity      Follow Up  No follow-ups on file.

## 2023-08-14 NOTE — NURSING
Oncology nurse navigator contacted patient for discussion of referral to Dr. Bustillo and request for multi-disciplinary visit with Dr. Nielson. Patient also has new imaging ordered which could be helpful prior to tomorrow's scheduled appointments. Appointment for CT A/P rescheduled for today. Dates, times, and locations discussed with patient. All questions/concerns addressed. Patient verbalized understanding. Contact information provided for further assistance.

## 2023-08-14 NOTE — TELEPHONE ENCOUNTER
----- Message from Hermila Solorio RN sent at 8/11/2023  2:45 PM CDT -----  Regarding: RE: Urgent Referral  I was not 100% certain of that, so I asked you to confirm bc Abby was out this week. There is a MD in the morning available    ----- Message -----  From: Garcia Bains RN  Sent: 8/11/2023   2:43 PM CDT  To: Lyndsey Johnston RN; Hermila Solorio RN; #  Subject: RE: Urgent Referral                              Dr. Bustillo is requesting this patient be scheduled in Multi D with him and [facility] Tuesday can you please help coordinate   ----- Message -----  From: Hermila Solorio RN  Sent: 8/11/2023   2:35 PM CDT  To: Garcia Bains RN  Subject: FW: Urgent Referral                              Can I use Dr Nielson's  MD slot on Tuesday per Dr Bustillo message to see him and [facility]?    ----- Message -----  From: Samuel Jimenez MD  Sent: 8/10/2023   4:57 PM CDT  To: Nela Nielson MD; Isra Bustillo MD; #  Subject: RE: Urgent Referral                              Thank you   ----- Message -----  From: Isra Bustillo MD  Sent: 8/10/2023  10:43 AM CDT  To: Nela Nielson MD; Samuel Jimenez MD; #  Subject: RE: Urgent Referral                              Team,    Please schedule this patient to see me and heme onc at [facility].  I am copying Dr Nielson with heme onc and her staff on this    Thanks    Sc      ----- Message -----  From: Jim Cat MD  Sent: 8/10/2023  10:30 AM CDT  To: Isra Bustillo MD; Samuel Jimenez MD  Subject: Urgent Referral                                  Dr. Bustillo,   Good morning, Sir! We did a CT non-con for this patient for chronic right sacroiliac joint pain that was not responding to our therapy recently and he has a new lytic lesion in his pelvis. He has a hx of bladder cancer in 1967 which he was told was cured and did not need follow up. Would you mind seeing him for us? He has a spinal cord stimulator from a new company with whom we are working  to get MRI approval, but until then we ordered a Ct with contrast today. He is a very nice nick.   Thanks in advanceASHOK

## 2023-08-15 ENCOUNTER — PATIENT MESSAGE (OUTPATIENT)
Dept: PAIN MEDICINE | Facility: CLINIC | Age: 88
End: 2023-08-15
Payer: MEDICARE

## 2023-08-15 ENCOUNTER — OFFICE VISIT (OUTPATIENT)
Dept: UROLOGY | Facility: CLINIC | Age: 88
End: 2023-08-15
Payer: MEDICARE

## 2023-08-15 ENCOUNTER — LAB VISIT (OUTPATIENT)
Dept: LAB | Facility: HOSPITAL | Age: 88
End: 2023-08-15
Attending: INTERNAL MEDICINE
Payer: MEDICARE

## 2023-08-15 ENCOUNTER — OFFICE VISIT (OUTPATIENT)
Dept: HEMATOLOGY/ONCOLOGY | Facility: CLINIC | Age: 88
End: 2023-08-15
Payer: MEDICARE

## 2023-08-15 VITALS
SYSTOLIC BLOOD PRESSURE: 142 MMHG | HEART RATE: 61 BPM | OXYGEN SATURATION: 97 % | WEIGHT: 200.38 LBS | WEIGHT: 200.5 LBS | HEIGHT: 73 IN | TEMPERATURE: 97 F | BODY MASS INDEX: 26.57 KG/M2 | SYSTOLIC BLOOD PRESSURE: 142 MMHG | HEIGHT: 73 IN | BODY MASS INDEX: 26.56 KG/M2 | RESPIRATION RATE: 18 BRPM | OXYGEN SATURATION: 97 % | RESPIRATION RATE: 18 BRPM | HEART RATE: 61 BPM | DIASTOLIC BLOOD PRESSURE: 59 MMHG | DIASTOLIC BLOOD PRESSURE: 59 MMHG

## 2023-08-15 DIAGNOSIS — M89.9 BONE LESION: ICD-10-CM

## 2023-08-15 DIAGNOSIS — R77.1 ABNORMALITY OF GLOBULIN: ICD-10-CM

## 2023-08-15 DIAGNOSIS — M89.9 LYTIC LESION OF BONE ON X-RAY: ICD-10-CM

## 2023-08-15 DIAGNOSIS — M89.9 LYTIC LESION OF BONE ON X-RAY: Primary | ICD-10-CM

## 2023-08-15 DIAGNOSIS — C67.9 MALIGNANT NEOPLASM OF URINARY BLADDER, UNSPECIFIED SITE: ICD-10-CM

## 2023-08-15 DIAGNOSIS — Z12.5 ENCOUNTER FOR SCREENING FOR MALIGNANT NEOPLASM OF PROSTATE: ICD-10-CM

## 2023-08-15 DIAGNOSIS — Z85.51 HISTORY OF BLADDER CANCER: Primary | ICD-10-CM

## 2023-08-15 LAB
ALBUMIN SERPL BCP-MCNC: 3.9 G/DL (ref 3.5–5.2)
ALP SERPL-CCNC: 58 U/L (ref 55–135)
ALT SERPL W/O P-5'-P-CCNC: 35 U/L (ref 10–44)
ANION GAP SERPL CALC-SCNC: 4 MMOL/L (ref 8–16)
AST SERPL-CCNC: 29 U/L (ref 10–40)
B2 MICROGLOB SERPL-MCNC: 2.7 UG/ML (ref 0–2.5)
BASOPHILS # BLD AUTO: 0.03 K/UL (ref 0–0.2)
BASOPHILS NFR BLD: 0.5 % (ref 0–1.9)
BILIRUB SERPL-MCNC: 1.1 MG/DL (ref 0.1–1)
BUN SERPL-MCNC: 10 MG/DL (ref 8–23)
CALCIUM SERPL-MCNC: 8.5 MG/DL (ref 8.7–10.5)
CEA SERPL-MCNC: 3.8 NG/ML (ref 0–5)
CHLORIDE SERPL-SCNC: 106 MMOL/L (ref 95–110)
CO2 SERPL-SCNC: 29 MMOL/L (ref 23–29)
COMPLEXED PSA SERPL-MCNC: 0.17 NG/ML (ref 0–4)
CREAT SERPL-MCNC: 0.9 MG/DL (ref 0.5–1.4)
DIFFERENTIAL METHOD: ABNORMAL
EOSINOPHIL # BLD AUTO: 0.2 K/UL (ref 0–0.5)
EOSINOPHIL NFR BLD: 2.3 % (ref 0–8)
ERYTHROCYTE [DISTWIDTH] IN BLOOD BY AUTOMATED COUNT: 13.8 % (ref 11.5–14.5)
EST. GFR  (NO RACE VARIABLE): >60 ML/MIN/1.73 M^2
GLUCOSE SERPL-MCNC: 92 MG/DL (ref 70–110)
HCT VFR BLD AUTO: 42.4 % (ref 40–54)
HGB BLD-MCNC: 14 G/DL (ref 14–18)
IGA SERPL-MCNC: 96 MG/DL (ref 40–350)
IGG SERPL-MCNC: 552 MG/DL (ref 650–1600)
IGM SERPL-MCNC: 15 MG/DL (ref 50–300)
IMM GRANULOCYTES # BLD AUTO: 0.05 K/UL (ref 0–0.04)
IMM GRANULOCYTES NFR BLD AUTO: 0.8 % (ref 0–0.5)
LYMPHOCYTES # BLD AUTO: 0.9 K/UL (ref 1–4.8)
LYMPHOCYTES NFR BLD: 14.4 % (ref 18–48)
MCH RBC QN AUTO: 31.2 PG (ref 27–31)
MCHC RBC AUTO-ENTMCNC: 33 G/DL (ref 32–36)
MCV RBC AUTO: 94 FL (ref 82–98)
MONOCYTES # BLD AUTO: 0.7 K/UL (ref 0.3–1)
MONOCYTES NFR BLD: 11.2 % (ref 4–15)
NEUTROPHILS # BLD AUTO: 4.6 K/UL (ref 1.8–7.7)
NEUTROPHILS NFR BLD: 70.8 % (ref 38–73)
NRBC BLD-RTO: 0 /100 WBC
PLATELET # BLD AUTO: 148 K/UL (ref 150–450)
PMV BLD AUTO: 11.1 FL (ref 9.2–12.9)
POTASSIUM SERPL-SCNC: 4.7 MMOL/L (ref 3.5–5.1)
PROT SERPL-MCNC: 6.2 G/DL (ref 6–8.4)
RBC # BLD AUTO: 4.49 M/UL (ref 4.6–6.2)
SODIUM SERPL-SCNC: 139 MMOL/L (ref 136–145)
WBC # BLD AUTO: 6.51 K/UL (ref 3.9–12.7)

## 2023-08-15 PROCEDURE — 88112 CYTOPATH CELL ENHANCE TECH: CPT | Mod: 26,,, | Performed by: PATHOLOGY

## 2023-08-15 PROCEDURE — 99205 PR OFFICE/OUTPT VISIT, NEW, LEVL V, 60-74 MIN: ICD-10-PCS | Mod: S$PBB,,, | Performed by: INTERNAL MEDICINE

## 2023-08-15 PROCEDURE — 84165 PROTEIN E-PHORESIS SERUM: CPT | Mod: 26,,, | Performed by: PATHOLOGY

## 2023-08-15 PROCEDURE — 88112 PR  CYTOPATH, CELL ENHANCE TECH: ICD-10-PCS | Mod: 26,,, | Performed by: PATHOLOGY

## 2023-08-15 PROCEDURE — 82232 ASSAY OF BETA-2 PROTEIN: CPT | Performed by: INTERNAL MEDICINE

## 2023-08-15 PROCEDURE — 88112 CYTOPATH CELL ENHANCE TECH: CPT | Performed by: PATHOLOGY

## 2023-08-15 PROCEDURE — 99205 OFFICE O/P NEW HI 60 MIN: CPT | Mod: S$PBB,,, | Performed by: INTERNAL MEDICINE

## 2023-08-15 PROCEDURE — 86334 IMMUNOFIX E-PHORESIS SERUM: CPT | Mod: 26,,, | Performed by: PATHOLOGY

## 2023-08-15 PROCEDURE — 99999 PR PBB SHADOW E&M-EST. PATIENT-LVL IV: ICD-10-PCS | Mod: PBBFAC,,, | Performed by: UROLOGY

## 2023-08-15 PROCEDURE — 86334 PATHOLOGIST INTERPRETATION IFE: ICD-10-PCS | Mod: 26,,, | Performed by: PATHOLOGY

## 2023-08-15 PROCEDURE — 83521 IG LIGHT CHAINS FREE EACH: CPT | Performed by: INTERNAL MEDICINE

## 2023-08-15 PROCEDURE — 99999 PR PBB SHADOW E&M-EST. PATIENT-LVL IV: ICD-10-PCS | Mod: PBBFAC,,, | Performed by: INTERNAL MEDICINE

## 2023-08-15 PROCEDURE — 99204 PR OFFICE/OUTPT VISIT, NEW, LEVL IV, 45-59 MIN: ICD-10-PCS | Mod: S$PBB,,, | Performed by: UROLOGY

## 2023-08-15 PROCEDURE — 99214 OFFICE O/P EST MOD 30 MIN: CPT | Mod: PBBFAC,27 | Performed by: INTERNAL MEDICINE

## 2023-08-15 PROCEDURE — 99999 PR PBB SHADOW E&M-EST. PATIENT-LVL IV: CPT | Mod: PBBFAC,,, | Performed by: UROLOGY

## 2023-08-15 PROCEDURE — 82378 CARCINOEMBRYONIC ANTIGEN: CPT | Performed by: INTERNAL MEDICINE

## 2023-08-15 PROCEDURE — 80053 COMPREHEN METABOLIC PANEL: CPT | Performed by: INTERNAL MEDICINE

## 2023-08-15 PROCEDURE — 85025 COMPLETE CBC W/AUTO DIFF WBC: CPT | Performed by: INTERNAL MEDICINE

## 2023-08-15 PROCEDURE — 82784 ASSAY IGA/IGD/IGG/IGM EACH: CPT | Mod: 59 | Performed by: INTERNAL MEDICINE

## 2023-08-15 PROCEDURE — 99204 OFFICE O/P NEW MOD 45 MIN: CPT | Mod: S$PBB,,, | Performed by: UROLOGY

## 2023-08-15 PROCEDURE — 99214 OFFICE O/P EST MOD 30 MIN: CPT | Mod: PBBFAC | Performed by: UROLOGY

## 2023-08-15 PROCEDURE — 84153 ASSAY OF PSA TOTAL: CPT | Performed by: INTERNAL MEDICINE

## 2023-08-15 PROCEDURE — 36415 COLL VENOUS BLD VENIPUNCTURE: CPT | Performed by: INTERNAL MEDICINE

## 2023-08-15 PROCEDURE — 86334 IMMUNOFIX E-PHORESIS SERUM: CPT | Performed by: INTERNAL MEDICINE

## 2023-08-15 PROCEDURE — 99999 PR PBB SHADOW E&M-EST. PATIENT-LVL IV: CPT | Mod: PBBFAC,,, | Performed by: INTERNAL MEDICINE

## 2023-08-15 PROCEDURE — 84165 PROTEIN E-PHORESIS SERUM: CPT | Performed by: INTERNAL MEDICINE

## 2023-08-15 PROCEDURE — 84165 PATHOLOGIST INTERPRETATION SPE: ICD-10-PCS | Mod: 26,,, | Performed by: PATHOLOGY

## 2023-08-15 RX ORDER — MELOXICAM 15 MG/1
15 TABLET ORAL
COMMUNITY
Start: 2023-04-20

## 2023-08-15 RX ORDER — FINASTERIDE 5 MG/1
1 TABLET, FILM COATED ORAL DAILY
COMMUNITY
Start: 2023-07-04

## 2023-08-15 RX ORDER — ASPIRIN 81 MG/1
81 TABLET ORAL DAILY
COMMUNITY

## 2023-08-15 RX ORDER — ERGOCALCIFEROL (VITAMIN D2) 200 MCG/ML
DROPS ORAL
COMMUNITY

## 2023-08-15 NOTE — PROGRESS NOTES
Subjective     Patient ID: Jefferson Boogie is a 88 y.o. male.    Chief Complaint: New Pt    HPI    Referred after abnormal CT results  Referring MD: Dr. Jimenez    Reports the following leading to scans:  - pain x 4-5 years  2 different stimulators  Switched from explant to implant recently with improved control  States he actually had a few days of complete resolution of pain recently while he was in [facility]    - pain is in his low back/buttock and down right calf  He is able to remain active- plays golf  No other pain    No weight loss    Oncology History:  1967- Bladder cancer - treated at the [facility] in [facility]  Stage I- treated surgically  Cystoscopy surveillance following    Pain led to below imaging  - 8/14/2023 CT Abd/Pelvis: Results pending    - 7/10/2023 CT Pelvis:  FINDINGS:  BONE: Diffuse osteopenia.  No fracture or osteonecrosis.  Few lytic and sclerotic lesions scattered throughout the pelvis, including a lesion in the right iliac bone with irregularity of the overlying cortex (2:103).  JOINT: Postoperative changes from right sacroiliac joint fusion.  The implant is in satisfactory position.  No osseous fusion across the joint space.  Degenerative changes both sacroiliac joints.  No erosions or ankylosis.  Degenerative changes also involve the lower lumbar spine, both hip joints, and pubic symphysis.  Chondrocalcinosis of the pubic symphysis.  No significant joint effusion.  SOFT TISSUE: Normal muscle bulk. Regional tendons are intact.  Calcific tendinopathy involving the proximal hamstring tendons bilaterally.  No bursal collection.  MISCELLANEOUS: Circumferential bladder wall thickening.  Prostatic calcifications.  Colonic diverticulosis.  Atherosclerosis.  Impression:  Postoperative changes in the right sacroiliac joint.  No osseous fusion.  Few lytic and sclerotic lesions scattered throughout the pelvis, concerning for metastases or myeloma.  No prior studies are available for  comparison.  Bladder wall thickening, which could be secondary to outlet obstruction or cystitis.  Correlate with urinalysis.  Other findings as described.    PMH:  No other surgeries than bladder and pain management  Cataract surgery  Osteopenia-  stopped medication due to dental issues (Prolia)- taking Calcium and Vit D    Active Ambulatory Problems     Diagnosis Date Noted    Chronic pain syndrome 2019    Arachnoiditis 2019    Radiculopathy of thoracolumbar region 2019    Opioid contract exists 2020    Sacroiliitis 2020    Chronic pain 2020    Shoulder pain 2021    Degenerative joint disease (DJD) of hip 2021    Spinal cord stimulator status 2021    Vertebrogenic pain 2022     Resolved Ambulatory Problems     Diagnosis Date Noted    Pain of right sacroiliac joint 06/15/2020    CRPS (complex regional pain syndrome type I) 2020    Failed spinal cord stimulator 2020     Past Medical History:   Diagnosis Date    Bronchitis     Cancer     Hypercholesteremia     Hypertension     Thyroid disease      FH:  Mother  of pancreatic cancer at age 60  Father lived until 87 yo  No other cancers    SH:  Lives with wife  2 healthy kids  Retired [employer]  Distant smoking history  + EtOH    Review of Systems   Constitutional:  Negative for activity change, appetite change, chills, fatigue, fever and unexpected weight change.   HENT:  Positive for hearing loss. Negative for nasal congestion, postnasal drip, rhinorrhea, sore throat and trouble swallowing.    Eyes:  Negative for visual disturbance.   Respiratory:  Negative for cough and shortness of breath.    Cardiovascular:  Negative for chest pain, palpitations and leg swelling.   Gastrointestinal:  Negative for abdominal distention, abdominal pain, change in bowel habit, constipation, diarrhea, nausea, vomiting, reflux and change in bowel habit.    Genitourinary:  Negative for decreased urine volume, difficulty urinating, dysuria, frequency and urgency.   Musculoskeletal:  Positive for arthralgias and back pain. Negative for gait problem, leg pain and myalgias.        Uses cane for steadiness but not when golfing   Neurological:  Negative for dizziness, weakness, light-headedness, numbness and headaches.   Psychiatric/Behavioral:  Negative for dysphoric mood. The patient is not nervous/anxious.           Objective     Physical Exam  Vitals and nursing note reviewed.   Constitutional:       Appearance: Normal appearance. He is normal weight.   HENT:      Head: Normocephalic and atraumatic.   Eyes:      General: No scleral icterus.     Extraocular Movements: Extraocular movements intact.      Conjunctiva/sclera: Conjunctivae normal.      Pupils: Pupils are equal, round, and reactive to light.   Cardiovascular:      Rate and Rhythm: Normal rate and regular rhythm.      Heart sounds: Normal heart sounds. No murmur heard.     No friction rub. No gallop.   Pulmonary:      Effort: Pulmonary effort is normal. No respiratory distress.      Breath sounds: Normal breath sounds. No wheezing, rhonchi or rales.   Abdominal:      General: Abdomen is flat. Bowel sounds are normal. There is no distension.      Palpations: Abdomen is soft. There is no mass.      Tenderness: There is no guarding or rebound.   Musculoskeletal:         General: No swelling, tenderness or deformity. Normal range of motion.      Cervical back: Normal range of motion and neck supple. No rigidity.      Right lower leg: No edema.      Left lower leg: No edema.   Lymphadenopathy:      Cervical: No cervical adenopathy.   Skin:     General: Skin is warm and dry.      Coloration: Skin is not jaundiced or pale.      Findings: No bruising or rash.   Neurological:      General: No focal deficit present.      Mental Status: He is alert and oriented to person, place, and time.      Sensory: No sensory  deficit.      Motor: No weakness.      Coordination: Coordination normal.      Gait: Gait normal.   Psychiatric:         Mood and Affect: Mood normal.         Behavior: Behavior normal.         Thought Content: Thought content normal.         Judgment: Judgment normal.     Labs- reviewed     Assessment and Plan     1. Lytic lesion of bone on x-ray  -     PROTEIN ELECTROPHORESIS, SERUM; Future; Expected date: 08/15/2023  -     IMMUNOGLOBULINS (IGG, IGA, IGM) QUANTITATIVE; Future; Expected date: 08/15/2023  -     BETA 2 MICROGLOBULIN; Future; Expected date: 08/15/2023  -     FREE LT CHAIN ANAL; Future; Expected date: 08/15/2023  -     IMMUNOFIXATION ELECTROPHORESIS, SERUM; Future; Expected date: 08/15/2023  -     CBC W/ AUTO DIFFERENTIAL; Future; Expected date: 08/15/2023  -     Comprehensive Metabolic Panel; Future  -     CEA; Future; Expected date: 08/15/2023  -     PSA, Screening; Future; Expected date: 08/15/2023    2. Abnormality of globulin  -     CEA; Future; Expected date: 08/15/2023    3. Encounter for screening for malignant neoplasm of prostate  -     PSA, Screening; Future; Expected date: 08/15/2023      Reviewed lab results and imaging results with patient  Recommend tumor markers and myeloma work up  Discussed differential  PET scan also ordered    Will call as labs result and make further plans    1 hour total- 40 minutes direct with patient, 20 minutes in chart review and care coordination    Route Chart for Scheduling    Med Onc Chart Routing      Follow up with physician . PET scan; RTC post- can overbook   Follow up with RAMAN    Infusion scheduling note    Injection scheduling note    Labs    Imaging    Pharmacy appointment    Other referrals

## 2023-08-15 NOTE — LETTER
August 15, 2023        Fausto Nieto III, MD  [address]  [address]  [place] [place] [address]             [facility]rology [address] [place] [id number]  Phone: [phone]   Patient: Jefferson Boogie   MR Number: [id number]   YOB: 1935   Date of Visit: 8/15/2023       Dear Dr. Nieto:    Thank you for referring Jefferson Boogie to me for evaluation. Attached you will find relevant portions of my assessment and plan of care.    If you have questions, please do not hesitate to call me. I look forward to following Jefferson Boogie along with you.    Sincerely,      Isra Bustillo MD            CC    No Recipients    Enclosure

## 2023-08-16 LAB
ALBUMIN SERPL ELPH-MCNC: 3.86 G/DL (ref 3.35–5.55)
ALPHA1 GLOB SERPL ELPH-MCNC: 0.27 G/DL (ref 0.17–0.41)
ALPHA2 GLOB SERPL ELPH-MCNC: 0.54 G/DL (ref 0.43–0.99)
B-GLOBULIN SERPL ELPH-MCNC: 0.64 G/DL (ref 0.5–1.1)
GAMMA GLOB SERPL ELPH-MCNC: 0.49 G/DL (ref 0.67–1.58)
INTERPRETATION SERPL IFE-IMP: NORMAL
KAPPA LC SER QL IA: 12.18 MG/DL (ref 0.33–1.94)
KAPPA LC/LAMBDA SER IA: 17.91 (ref 0.26–1.65)
LAMBDA LC SER QL IA: 0.68 MG/DL (ref 0.57–2.63)
PATHOLOGIST INTERPRETATION IFE: NORMAL
PATHOLOGIST INTERPRETATION SPE: NORMAL
PROT SERPL-MCNC: 5.8 G/DL (ref 6–8.4)

## 2023-08-17 ENCOUNTER — TELEPHONE (OUTPATIENT)
Dept: HEMATOLOGY/ONCOLOGY | Facility: CLINIC | Age: 88
End: 2023-08-17
Payer: MEDICARE

## 2023-08-17 LAB
FINAL PATHOLOGIC DIAGNOSIS: NORMAL
Lab: NORMAL

## 2023-08-17 NOTE — PROGRESS NOTES
Dr Bustillo called Mr Boogie with these results.  No need for any additional  evaluation at this time.  He will follow up with Dr Nielson, heme onc, and we will ask her to send him back if any  issues arise.

## 2023-08-21 ENCOUNTER — HOSPITAL ENCOUNTER (OUTPATIENT)
Dept: RADIOLOGY | Facility: HOSPITAL | Age: 88
Discharge: HOME | End: 2023-08-21
Attending: INTERNAL MEDICINE
Payer: MEDICARE

## 2023-08-21 DIAGNOSIS — M89.9 LYTIC LESION OF BONE ON X-RAY: ICD-10-CM

## 2023-08-21 DIAGNOSIS — R77.1 ABNORMALITY OF GLOBULIN: ICD-10-CM

## 2023-08-21 PROCEDURE — A9552 F18 FDG: HCPCS

## 2023-08-21 PROCEDURE — 78815 NM PET CT ROUTINE: ICD-10-PCS | Mod: 26,PI,, | Performed by: STUDENT IN AN ORGANIZED HEALTH CARE EDUCATION/TRAINING PROGRAM

## 2023-08-21 PROCEDURE — 78815 PET IMAGE W/CT SKULL-THIGH: CPT | Mod: 26,PI,, | Performed by: STUDENT IN AN ORGANIZED HEALTH CARE EDUCATION/TRAINING PROGRAM

## 2023-08-22 ENCOUNTER — TELEPHONE (OUTPATIENT)
Dept: HEMATOLOGY/ONCOLOGY | Facility: CLINIC | Age: 88
End: 2023-08-22
Payer: MEDICARE

## 2023-08-22 NOTE — TELEPHONE ENCOUNTER
----- Message from Jose Daniel De sent at 8/22/2023  2:17 PM CDT -----  Contact: Dr. Brian Sorto @ [phone]  Dr. Brian Sorto's office called the Oncology referral line asking to speak with Dr. Nielson

## 2023-08-28 ENCOUNTER — OFFICE VISIT (OUTPATIENT)
Dept: HEMATOLOGY/ONCOLOGY | Facility: CLINIC | Age: 88
End: 2023-08-28
Payer: MEDICARE

## 2023-08-28 VITALS
BODY MASS INDEX: 27.11 KG/M2 | RESPIRATION RATE: 18 BRPM | HEART RATE: 65 BPM | SYSTOLIC BLOOD PRESSURE: 163 MMHG | TEMPERATURE: 97 F | DIASTOLIC BLOOD PRESSURE: 72 MMHG | HEIGHT: 72 IN | OXYGEN SATURATION: 97 % | WEIGHT: 200.19 LBS

## 2023-08-28 DIAGNOSIS — R93.89 ABNORMAL FINDING OF DIAGNOSTIC IMAGING: ICD-10-CM

## 2023-08-28 PROCEDURE — 99215 OFFICE O/P EST HI 40 MIN: CPT | Mod: PBBFAC | Performed by: INTERNAL MEDICINE

## 2023-08-28 PROCEDURE — 99213 OFFICE O/P EST LOW 20 MIN: CPT | Mod: S$PBB,,, | Performed by: INTERNAL MEDICINE

## 2023-08-28 PROCEDURE — 99213 PR OFFICE/OUTPT VISIT, EST, LEVL III, 20-29 MIN: ICD-10-PCS | Mod: S$PBB,,, | Performed by: INTERNAL MEDICINE

## 2023-08-28 PROCEDURE — 99999 PR PBB SHADOW E&M-EST. PATIENT-LVL V: ICD-10-PCS | Mod: PBBFAC,,, | Performed by: INTERNAL MEDICINE

## 2023-08-28 PROCEDURE — 99999 PR PBB SHADOW E&M-EST. PATIENT-LVL V: CPT | Mod: PBBFAC,,, | Performed by: INTERNAL MEDICINE

## 2023-08-28 NOTE — PROGRESS NOTES
Subjective     Patient ID: Jefferson Boogie is a 88 y.o. male.    Chief Complaint: Lytic lesion of bone on x-ray    HPI    Returns for follow up after work up completed below  He was referred after imaging raised concerns of lytic and sclerotic bone lesions    Tumor markers:  PSA = 0.17 ng/ml  CEA= 3.8 ng/ml    Myeloma peripheral blood assesment:                    Imaging:  - 8/21/2023 PET scan:  FINDINGS:  Quality of the study: Adequate.  In the head and neck, there are no hypermetabolic lesions worrisome for malignancy. There are no hypermetabolic mucosal lesions, and there are no pathologically enlarged or hypermetabolic lymph nodes.  In the chest, there are no hypermetabolic lesions worrisome for malignancy.  There are no pathologically enlarged or hypermetabolic lymph nodes.  Stable 0.6 cm right middle lobe nodule, below the size threshold for PET.  In the abdomen and pelvis, there is physiologic tracer distribution within the abdominal organs and excretion into the genitourinary system.  Mildly enlarged hypermetabolic preaortic lymph node just inferior to the renal vein measuring 1.4 cm (series 3, image 144) with maximum SUV of 6.5.  In the bones, there are no hypermetabolic lesions worrisome for malignancy.  No significant increased radiotracer uptake within the previously described right iliac bone lesion.  Similar appearance of several scattered small mixed lucent and sclerotic foci throughout the pelvis without significant radiotracer uptake.  Additional CT findings: Spinal cord stimulator device and leads noted.  Surgical changes of the right sacroiliac joint.  Calcific atherosclerosis of the aorta and coronary arteries.  Mild ground-glass density within the dependent bilateral lower lobes favored to represent atelectasis.  Small hepatic cyst.  Left renal cyst.  Colonic diverticulosis.  Impression:  1. Mildly enlarged hypermetabolic preaortic lymph node within the upper abdomen suspicious for metastatic  disease or lymphoproliferative process.  2. No hypermetabolic bone lesion.  Stable CT appearance of previously described pelvic bone lesions without significant radiotracer uptake.  3. Stable subcentimeter right middle lobe pulmonary nodule, below the size threshold for PET.    History:  - Initially he was referred after abnormal CT results- Referring MD: Dr. Jimenez - pain clinic     - Reports the following leading to scans:  pain x 4-5 years  2 different stimulators  Switched from explant to implant recently with improved control  States he actually had a few days of complete resolution of pain recently while he was in [place] [facility]  Describes pain as located in his low back/buttock and radiating down right calf  He is able to remain active- plays golf  No other pain     No weight loss     Oncology History:  1967- Bladder cancer - treated at the [facility] in [facility]  Stage I- treated surgically  Cystoscopy surveillance following     Pain led to below imaging  - 8/14/2023 CT Abd/Pelvis:  FINDINGS:  Chest, no significant pleural or pericardial fluid.  7 mm ground-glass opacity right middle lobe series 2, image 8.  Remainder of the lungs are clear.  The abdomen, liver is mildly enlarged 18.3 cm.  Hypodensity hepatic dome image 33 too small to characterize.  Statistically a cyst.  Portal vein is patent.  No biliary dilatation.  Gallbladder mildly distended though otherwise unremarkable.  Fatty replacement of the pancreas.  No mass or pancreatic ductal dilatation.  Spleen upper limits for normal in size.  No adrenal mass.  2.4 cm hypodensity apex of the left kidney consistent with a cyst.  Additional hypodensities too small to characterize. No solid enhancing masses.  No hydronephrosis.  Ureters are fairly well visualized to the bladder.  No obstructive uropathy.  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  Separate origins of the splenic and hepatic arteries.  SMA is patent.  Likewise renal arteries  are patent.  14 mm preaortic node series 2, image 59.  Additional scattered periaortic nodes noted.  In the pelvis, no convincing pelvic adenopathy.  Nonenlarged nodes are present.  Calcifications in nonenlarged prostate.  No significant bladder wall thickening.  Evaluation of the bowel demonstrates colonic diverticula.  No focal wall thickening or pericolonic soft tissue stranding.  Multiple fluid-filled loops of small bowel.  No focal dilatation.  Appendix is normal.  No convincing mesenteric adenopathy.  Presumed epidural stimulator.  Postop change right SI joint.  No fusion.  Degenerative changes noted.  Slightly expansile lesion right ilium image 105 appears similar.  Sclerotic focus anterior aspect of the left ilium also similar.  Probable bone island left acetabulum.  Additional lucent lesions in the pelvis better demonstrated on prior pelvic CT.  No convincing new lesions seen.  Impression:  Slightly expansile lesion right ilium as well as scattered lucent and sclerotic foci of the pelvis all appear similar.  If further evaluation is desired MRI or bone scan may be helpful.  Comparison with prior studies would be most beneficial.  Mildly enlarged preaortic node.  Additional nonenlarged para-aortic nodes noted.  7 mm ground-glass opacity right middle lobe.  For a ground glass nodule 6 mm or larger, Fleischner Society 2017 guidelines recommend follow up with non-contrast chest CT at 6-12 months after discovery. If this nodule persists at that time, additional follow up with non-contrast chest CT is recommended every 2 years until 5 years of stability have been documented.  Hypodensities in the liver and kidneys as above.  Additional findings above  This report was flagged in Epic as abnormal.    - 7/10/2023 CT Pelvis:  FINDINGS:  BONE: Diffuse osteopenia.  No fracture or osteonecrosis.  Few lytic and sclerotic lesions scattered throughout the pelvis, including a lesion in the right iliac bone with irregularity  of the overlying cortex (2:103).  JOINT: Postoperative changes from right sacroiliac joint fusion.  The implant is in satisfactory position.  No osseous fusion across the joint space.  Degenerative changes both sacroiliac joints.  No erosions or ankylosis.  Degenerative changes also involve the lower lumbar spine, both hip joints, and pubic symphysis.  Chondrocalcinosis of the pubic symphysis.  No significant joint effusion.  SOFT TISSUE: Normal muscle bulk. Regional tendons are intact.  Calcific tendinopathy involving the proximal hamstring tendons bilaterally.  No bursal collection.  MISCELLANEOUS: Circumferential bladder wall thickening.  Prostatic calcifications.  Colonic diverticulosis.  Atherosclerosis.  Impression:  Postoperative changes in the right sacroiliac joint.  No osseous fusion.  Few lytic and sclerotic lesions scattered throughout the pelvis, concerning for metastases or myeloma.  No prior studies are available for comparison.  Bladder wall thickening, which could be secondary to outlet obstruction or cystitis.  Correlate with urinalysis.  Other findings as described.     PMH:  No other surgeries than bladder and pain management  Cataract surgery  Osteopenia-  stopped medication due to dental issues (Prolia)- taking Calcium and Vit D          Active Ambulatory Problems     Diagnosis Date Noted    Chronic pain syndrome 07/30/2019    Arachnoiditis 07/30/2019    Radiculopathy of thoracolumbar region 07/30/2019    Opioid contract exists 01/20/2020    Sacroiliitis 07/08/2020    Chronic pain 09/09/2020    Shoulder pain 03/25/2021    Degenerative joint disease (DJD) of hip 07/21/2021    Spinal cord stimulator status 08/26/2021    Vertebrogenic pain 05/26/2022           Resolved Ambulatory Problems     Diagnosis Date Noted    Pain of right sacroiliac joint 06/15/2020    CRPS (complex regional pain syndrome type I) 07/27/2020    Failed spinal cord stimulator 08/27/2020           Past Medical  History:   Diagnosis Date    Bronchitis      Cancer      Hypercholesteremia      Hypertension      Thyroid disease        FH:  Mother  of pancreatic cancer at age 60  Father lived until 87 yo  No other cancers     SH:  Lives with wife  2 healthy kids  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 Abnormal finding of diagnostic imaging        Reviewed findings  We will pursue a bone biopsy   Noted elevated kappa light chain and kappa/lambda ratio- he has normal renal fxn and is not anemic    Route Chart for Scheduling    Med Onc Chart Routing  Urgent    Follow up with physician . IR bone biopsy   Follow up with RAMAN    Infusion scheduling note    Injection scheduling note    Labs    Imaging    Pharmacy appointment    Other referrals

## 2023-08-30 PROBLEM — R93.89 ABNORMAL FINDING OF DIAGNOSTIC IMAGING: Status: ACTIVE | Noted: 2023-08-30

## 2023-09-07 DIAGNOSIS — R93.89 ABNORMAL FINDING OF DIAGNOSTIC IMAGING: Primary | ICD-10-CM

## 2023-09-07 DIAGNOSIS — M89.9 LYTIC LESION OF BONE ON X-RAY: ICD-10-CM

## 2023-09-20 ENCOUNTER — TELEPHONE (OUTPATIENT)
Dept: PAIN MEDICINE | Facility: CLINIC | Age: 88
End: 2023-09-20
Payer: MEDICARE

## 2023-09-20 ENCOUNTER — PATIENT MESSAGE (OUTPATIENT)
Dept: PAIN MEDICINE | Facility: CLINIC | Age: 88
End: 2023-09-20
Payer: MEDICARE

## 2023-09-20 DIAGNOSIS — G57.01 PIRIFORMIS SYNDROME OF RIGHT SIDE: Primary | ICD-10-CM

## 2023-09-20 NOTE — TELEPHONE ENCOUNTER
----- Message from Samuel Jimenez MD sent at 9/20/2023  2:44 PM CDT -----  Pls schedule for in office procedure of piriformis ms injection   ----- Message -----  From: Kelly Decker MA  Sent: 9/20/2023   2:27 PM CDT  To: Samuel Jimenez MD    Hi Dr. Jimenez,     See pt message below:     Hello Samuel.  Status quo even through [facility] rep is always available. Is there a next step with you? Injection?  Always grateful for your care.  Jefferson

## 2023-10-05 ENCOUNTER — PATIENT MESSAGE (OUTPATIENT)
Dept: PAIN MEDICINE | Facility: CLINIC | Age: 88
End: 2023-10-05
Payer: MEDICARE

## 2023-10-19 ENCOUNTER — PROCEDURE VISIT (OUTPATIENT)
Dept: PAIN MEDICINE | Facility: CLINIC | Age: 88
End: 2023-10-19
Attending: ANESTHESIOLOGY
Payer: MEDICARE

## 2023-10-19 VITALS
TEMPERATURE: 98 F | WEIGHT: 203.25 LBS | SYSTOLIC BLOOD PRESSURE: 149 MMHG | DIASTOLIC BLOOD PRESSURE: 69 MMHG | HEART RATE: 55 BPM | RESPIRATION RATE: 18 BRPM | BODY MASS INDEX: 27.57 KG/M2 | OXYGEN SATURATION: 98 %

## 2023-10-19 DIAGNOSIS — M79.18 MYOFASCIAL PAIN: Primary | ICD-10-CM

## 2023-10-19 PROCEDURE — 76942 ECHO GUIDE FOR BIOPSY: CPT | Mod: 26,S$PBB,, | Performed by: ANESTHESIOLOGY

## 2023-10-19 PROCEDURE — 20552 PR INJECT TRIGGER POINT, 1 OR 2: ICD-10-PCS | Mod: S$PBB,,, | Performed by: ANESTHESIOLOGY

## 2023-10-19 PROCEDURE — 76942 PR U/S GUIDANCE FOR NEEDLE GUIDANCE: ICD-10-PCS | Mod: 26,S$PBB,, | Performed by: ANESTHESIOLOGY

## 2023-10-19 PROCEDURE — 76942 ECHO GUIDE FOR BIOPSY: CPT | Mod: PBBFAC | Performed by: ANESTHESIOLOGY

## 2023-10-19 PROCEDURE — 20552 NJX 1/MLT TRIGGER POINT 1/2: CPT | Mod: PBBFAC | Performed by: ANESTHESIOLOGY

## 2023-10-19 PROCEDURE — 20552 NJX 1/MLT TRIGGER POINT 1/2: CPT | Mod: S$PBB,,, | Performed by: ANESTHESIOLOGY

## 2023-10-26 NOTE — PROCEDURES
Procedures  Patient Name: Jefferson Boogie  MRN: [id number]    INFORMED CONSENT: The procedure, risks, benefits and options were discussed with patient. There are no contraindications to the procedure. The patient expressed understanding and agreed to proceed. The personnel performing the procedure was discussed. I verify that I personally obtained Jefferson's consent prior to the start of the procedure and the signed consent can be found on the patient's chart.    Procedure Date: 10/19/2023    Anesthesia: Topical    Pre Procedure diagnosis:   1. Myofascial pain      Post-Procedure diagnosis: SAME      Sedation: None    PROCEDURE: Right PIRIFORMIS MUSCLE INJECTION UNDER U/S GUIDE        DESCRIPTION OF PROCEDURE: The patient was brought to the procedure room.  The patient was positioned prone on the procedure table. . The skin was prepped with chlorhexidine three times, and draped in a sterile fashion. The gluteus simran and piriformis muscles were visualized and demarcated by the sheath and appears as a hyperechoic band. The 22-gauge 4 inch stimplex needle was advanced until ultrasound visualization showed it traversing the gluteus simran and piercing  the piriformis muscle.  After negative aspiration for blood,  5 ml of Bupivacaine 0.25% and 40 mg depomedrol was slowly administered. The needle was removed and bleeding was nil.  A sterile dressing was applied.     Blood Loss: Nill  Specimen: None    Samuel Jimenez MD

## 2023-11-03 ENCOUNTER — TELEPHONE (OUTPATIENT)
Dept: HEMATOLOGY/ONCOLOGY | Facility: CLINIC | Age: 88
End: 2023-11-03
Payer: MEDICARE

## 2023-11-03 NOTE — TELEPHONE ENCOUNTER
"----- Message from Maida Dasilva sent at 11/3/2023 12:34 PM CDT -----  Consult/Advisory:       Name Of Caller: Self       Contact Preference?: [phone]       Provider Name:       Does patient feel the need to be seen today? No       What is the nature of the call?: Returning call to office.            Additional Notes:  "Thank you for all that you do for our patients                "

## 2023-11-03 NOTE — TELEPHONE ENCOUNTER
Spoke to patient, unsure who may have been calling. Will follow up if able to determine that the call may have been about.

## 2023-11-10 ENCOUNTER — TELEPHONE (OUTPATIENT)
Dept: PAIN MEDICINE | Facility: CLINIC | Age: 88
End: 2023-11-10
Payer: MEDICARE

## 2023-11-10 NOTE — TELEPHONE ENCOUNTER
Staff contact pt regarding rescheduling appt on 11/27/23 with  due to provider being in surgery. Pt appt was rescheduled to 1/4/23 for 8:30 am, pt stated he will be on a cruise starting on 12/7 up into the new year.

## 2023-11-27 ENCOUNTER — TELEPHONE (OUTPATIENT)
Dept: HEMATOLOGY/ONCOLOGY | Facility: CLINIC | Age: 88
End: 2023-11-27
Payer: MEDICARE

## 2023-12-08 ENCOUNTER — PATIENT MESSAGE (OUTPATIENT)
Dept: PAIN MEDICINE | Facility: CLINIC | Age: 88
End: 2023-12-08
Payer: MEDICARE

## 2023-12-08 ENCOUNTER — TELEPHONE (OUTPATIENT)
Dept: PAIN MEDICINE | Facility: CLINIC | Age: 88
End: 2023-12-08
Payer: MEDICARE

## 2023-12-08 NOTE — TELEPHONE ENCOUNTER
Staff contacted patient to confirm appointment on 1/4/24. There was no answer, left voicemail.

## 2024-01-08 ENCOUNTER — TELEPHONE (OUTPATIENT)
Dept: HEMATOLOGY/ONCOLOGY | Facility: CLINIC | Age: 89
End: 2024-01-08
Payer: MEDICARE

## 2024-01-08 NOTE — TELEPHONE ENCOUNTER
Spoke to patient, he will not come for his visit today. He will come to clinic for an inperson visit if Dr. Nielson feels it is necessary, other wise he does not prefer to come to clinic.

## 2024-01-16 ENCOUNTER — OFFICE VISIT (OUTPATIENT)
Dept: PAIN MEDICINE | Facility: CLINIC | Age: 89
End: 2024-01-16
Attending: ANESTHESIOLOGY
Payer: MEDICARE

## 2024-01-16 VITALS
RESPIRATION RATE: 18 BRPM | BODY MASS INDEX: 27.17 KG/M2 | HEART RATE: 60 BPM | OXYGEN SATURATION: 100 % | TEMPERATURE: 98 F | DIASTOLIC BLOOD PRESSURE: 64 MMHG | WEIGHT: 200.63 LBS | SYSTOLIC BLOOD PRESSURE: 149 MMHG | HEIGHT: 72 IN

## 2024-01-16 DIAGNOSIS — G58.8 CLUNEAL NEUROPATHY: Primary | ICD-10-CM

## 2024-01-16 DIAGNOSIS — M53.3 SACROILIAC JOINT DYSFUNCTION: ICD-10-CM

## 2024-01-16 DIAGNOSIS — R05.8 COUGH PRESENT FOR GREATER THAN 3 WEEKS: ICD-10-CM

## 2024-01-16 DIAGNOSIS — Z96.89 S/P INSERTION OF SPINAL CORD STIMULATOR: ICD-10-CM

## 2024-01-16 DIAGNOSIS — G03.9 ARACHNOIDITIS: ICD-10-CM

## 2024-01-16 PROCEDURE — 99214 OFFICE O/P EST MOD 30 MIN: CPT | Mod: PBBFAC | Performed by: ANESTHESIOLOGY

## 2024-01-16 PROCEDURE — 99999 PR PBB SHADOW E&M-EST. PATIENT-LVL IV: CPT | Mod: PBBFAC,,, | Performed by: ANESTHESIOLOGY

## 2024-01-16 PROCEDURE — 99214 OFFICE O/P EST MOD 30 MIN: CPT | Mod: S$PBB,GC,, | Performed by: ANESTHESIOLOGY

## 2024-01-16 RX ORDER — CODEINE PHOSPHATE AND GUAIFENESIN 10; 100 MG/5ML; MG/5ML
5 SOLUTION ORAL 3 TIMES DAILY PRN
Qty: 118 ML | Refills: 0 | Status: SHIPPED | OUTPATIENT
Start: 2024-01-16 | End: 2024-01-26

## 2024-01-16 NOTE — PROGRESS NOTES
Chronic patient Established Note (Follow up visit)      SUBJECTIVE:    Interval history 01/16/2024:  Jefferson Boogie presents to the clinic for a follow-up appointment for low back, right hip, and right leg pain. Since the last visit, Jefferson Boogie states the pain has been persistant. Currently his right hip and leg pain is his biggest problem. He continues to endorse sharp stabbing pain in his hip and leg. Pain is exacerbated by activity, standing, laying on right side, lifting, bending. Pain is improved with rest, exercising, stretching, medications. Continues with Celebrex and Cymbalta. Current pain intensity is 6/10. Patient continues to endorse benefit from SCS. Patient denies red flags including weakness, unexpected weight loss/gain, night sweats/fevers, saddle anesthesia, and symptoms of CAREN.    Pain Disability Index Review:      1/16/2024     2:28 PM 10/19/2023     2:57 PM 8/10/2023     9:34 AM   Last 3 PDI Scores   Pain Disability Index (PDI) 21 42 28     Interval History 8/10/2023:  Patient reports that his pain post right SIJ and piriformis was persistent until it decreased 50% on 07/04/23, but then the pain returned after a few days. He reports that during that period he was able to run after a bus in [facility]. The pain is improved, but not as much as previous in his right buttock and calf. Patient reports that he has been working with the [employer], but not finding the correct programming as of yet.  No fevers, chills, unexplained weight loss.  Hx of bladder cancer in 1967. Remembers falling while skiing and landed on his buttocks 30 years ago, but no other pelvic trauma. Patient was traveling to [place] and recently came back to [place]   We discussed his recent pelvic CT in details especially for the accidental finding of irregular bone lesion. Explained to patient the need to investigate this further with our urology and oncology team.     Interval History 6/22/2023  Jefferson Boogie presents to the clinic  for a follow-up appointment for chroniic LBP. Pt was last seen in clinic on 5/25 and scheduled for right SIJ and piriformis injection--performed 6/22. Pt reports 40% relief x 1-2 days. He continues to work with the rep with regards to programming the White SCS device. Pt reports that he is getting some decent relief from the device with current program, but feels like there is still some progress to be made. Continues to complain of pain of similar character and quality to that of prior eval. Localized in the right lowerlumbar spine/gluteal area as well as the right lateral leg. Since the last visit, Jefferson HANSON Keagan states the pain has been improving. Current pain intensity is 5/10. He continues to exercise daily with stationary bike and weight training. Continues to take Cymbalta and Celebrex daily. He also takes Norco 10 very sparingly, maybe 1-2 per week. Overall the current regimen of SCS, medications and occasional injections have provided pt with enough relief for him to remain functional, and able to participate in his hobbies.      Interval history 5/25/23:  In the interim he has met with the [employer] reps for reprogramming. He has his good and bad days but feels that the pain is manageable. The reps with [employer] are present for the visit today. Today his pain is a 6.5/10. Still taking percocet and cymbalta (missed a few days), but these medications do not provide much relief. Still pain with walking and sitting, but the pain with laying down is better.      Interval History 5/4/23:  Patient seen today via virtual follow-up after implantation of his White SCS in February. He reports no change in his pain since his last visit. Most of his pain is into his right hip with intermittent radiation into his RLE. He denies any new symptoms. No red flag symptoms. He is scheduled to meet with the [employer] reps from reprogramming next week. He remains optimistic. In discussing his current medications, he has run out of  the hydrocodone he was previously taking and has only been taking his Cymbalta once per day rather two.      Interval History 3/30/2023:  Mr Boogie presents 5-6 weeks after implantation of his Kidder SCS system. He reports no significant change in his pains, which are primarily down the RLE.  No constitutional signs symptoms concerning for infection.  No new areas of pain or neurological changes since procedure. No voicing of s/s concerning for cauda equina syndrome. He does endorse improvement in sleep. He is worried that his implant was a failure because his pain has not changed.     Interval History 2/20/2023:  Mr Boogie presents for follow up of Kidder SCS replacement. He states doing well. No constitutional signs symptoms concerning for infection.  No new areas of pain or neurological changes since procedure. No voicing of s/s concerning for cauda equina syndrome.      Interval History 1/23/2023:  Still taking Celebrex, however hasn't noticed a difference in pain with this medication. Location, quality, and severity of pain are unchanged from prior visit and is described above. Here today to discuss removal of Nervo SCS, and implant of Kidder SCS. He states his stimulator has not been helpful for the past 2 years despite multiple reprogramming and adjustment.     Interval History 11/21/22:  Reports 24 hours of 100% relief for 48 hours, but he reports that now his pain is only approximally 10% better. His pain at rest is 3-4/10. Pain at its worst is 8-9/10. Pain is improved when leaning over a grocery cart. Pain only allows him to walk for 3-4mins.      Interval History 8/29/22:  Jefferson Boogie presents to the clinic for a follow-up appointment for back pain.  He had his second bilateral L3, L4, L5 MBB and reports 100% pain relief. He would like to proceed with RFA.     Interval History 5/26/22:  Jefferson Boogie presents to the clinic for a follow-up appointment for back pain. Since the last visit,  Jefferson Boogie states the pain has been stable. Certain postures he can tolerate in the standing position such as leaning on a shopping cart or support to his posterior thighs. He now uses a walker when covering long distances. He continues to play golf despite it aggravating his pain. He receives some benefit from the SCS whereas other interventions have not helped. He remains interested in the Taylor device.     Interval History 3/24/22:  Jefferson Boogie presents to the clinic for a follow-up appointment for back pain. Since the last visit, Jefferson Boogie states the pain has been worse than usual. Current pain intensity is 8/10. He continues to report benefit with Nevro SCS however he has not been able to get in touch with the representative in order to have his settings adjusted. He continues to take occasional norco 7.5 mg with benefit, particularly when he plays golf.     Interval History 2/10/22:  Patent presents today s/p caudal epidural steroid injections on 1/12/22 since then he has not had any relief. He states his pain has been unchanged and is a 6/10 on average with the worst being 8/10 and best is 4/10. He has been taking percocet 5mg when he golfs or plays with his granddaughter.      Interval History 12/30/21:  Jefferson Boogie presents to clinic for follow up appointment s/p Right SI joint and Right piriformis muscle injection under US guidance on 11/29/21. He did not obtain any noticeable relief with this procedure similar to all of his previous injections. His pain is unchanged and constant over the right buttock. He is taking percocet 5mg x 3 on days he is more active, such as playing golf. This helps some, but minimally. Denies any new symptoms or neurological changes. No numbness, tingling, weakness in his lower extremities. Denies bowel/bladder incontinence or saddle anesthesia.      Interval History 11/4/2021:  Jefferson Boogie presents to the clinic for a follow-up appointment for  "right sided back pain and SI joint pain. Mr. Boogie had a right SI joint injection on 10/13/2021. He did not note significant relief with the injection for any period of time. Pain is still constant over the right buttock and most noticeable when playing golf. He denies any new bowel/bladder incontinence, lower extremity numbness or weakness or saddle anesthesia.     Interval History 8/26/2021:  Jefferson Boogie presents to the clinic for a follow-up appointment for right sided hip pain with right-sided radiculopathy . Since the last visit, Jefferson Boogie states the pain has been "particularly tender today" 7/10. Patient reports playing golf recently and experienced worsening symptoms the following day. Mr. Boogie is s/p right-side hip joint injection with minimal relief. Patient states he has had relief with previous interventions and is open to RFA.     Interval History 6/10/2021:  Jefferson Boogie presents to the clinic for a follow-up appointment. Since the last visit, Jefferson Boogie states the pain has been stable. Current pain intensity is 3/10. States he is still having pain around SIJ that is affecting his ADL      Interval History 4/15/2021:  The patient returns to clinic today for follow up of back pain. He reports that the swelling above the SI fusion incision has resolved. He denies any fever, chills, or drainage from the incision. He does report benefit since the fusion. He continues to report benefit with Nevro SCS. He reports intermittent shoulder pain at this time. He did receive an injection with Sports Medicine with benefit. He denies any other health changes. His pain today is 2/10.     Interval History 3/25/21:  Mr. Boogie presents to clinic for follow up of bilateral shoulder pain. He is s/p BL subacromial injections on 2/8/21. He reports maximum 20% relief of pain in the Right shoulder. Today the Left shoulder pain is 1/10; Right shoulder pain is 5-7/10.      Interval History " 3/22/2021:  The patient returns to clinic today for follow up of back pain. He is s/p right SI fusion on 3/15/2021. He reports some relief at this time. He does report soreness to the incision. He denies any fever, chills, or drainage from incision. He continues to report benefit with Nevro SCS. He denies any other health changes. His pain today is 4/10.     Interval History 1/11/2020:  Patient is here for follow-up of low back pain now s/p sacroiliac RFA on 12/9/20 which provided no benefit and with the new complaint of bilateral anterior shoulder pain R>L. His shoulder pain started about 2 months ago. He describes 9/10 sharp/stinging pain without radiation that is exacerbated with overhead activity and improved with rest. He started PT about one month ago. He takes oxycodone which provides some relief. He had a blind subacromial steroid injection in the right shoulder with Dr. Ramirez on 10/26/19 which provided some short term relief.     Interval History 11/5/2020:  Jefferson Boogie presents to the clinic for a follow-up appointment for low back pain. Since the last visit, Jefferson Boogie states the pain has been stable. Current pain intensity is 4/10. He had good relief from the SI joint injection that lasted for about a week. Pain has since returned. He also slipped and fell on his buttock a couple of weeks ago and had increased pain in the right buttock after that which is slowly improving. He continues to have variable benefit with the Nevro SCS for intermittent pain in the right leg. He is scheduled to meet with representatives today. He is taking celebrex daily and percocet as needed sparingly. He denies any adverse effects and any other health changes.     Interval History 10/5/2020:  The patient returns to clinic today for follow up of low back pain. He is s/p right SI joint injection on 9/9/2020. He reports 25% relief of his right sided low back and buttock pain. He did have good relief the first two  days. He continues to report right sided low back and buttock pain. He denies any radicular leg pain. His pain is worse with prolonged standing and walking. He continues to report intermittent pain into his achilles from previous injury. He feels as though this has changed his gait. He continues to report some benefit with Nevro SCS device. He is in contact with representatives. He is currently taking Percocet as needed sparingly with some benefit. He denies any adverse effects. He denies any other health changes. His pain today is 4/10.      Interval History 9/2/2020:  The patient returns to clinic today for follow up of back pain. He reports that his back pain has improved since last audio visit. He continues to report back pain with intermittent radiating pain into his right hip and calf. He has spoken with Bienvenido from [employer] via phone with some programming. He is meeting with Bienvenido today. He had some issues with charging but this has improved. He is currently taking Norco intermittently but this is only lasts 2-3 hours. He denies any adverse effects. He denies any other health changes. His pain today is 8/10.     Interval History 08/27/2020:  Pt was contacted over virtual audio for a follow up appointment.  He is currently complaining of right hip and right calf with no associated numbness or weakness.  He continues to use his Nevro device.  He states it has been very frustrating. Inconsistent.  Took 20 mins to 1 hour to charge.  Couldn't get it to start this morning.  He states that there is no drainage at the battery site, no signs of infection or pain at the site.  Patient is not taking medications at this time.  He wants to speak about medication options because he would like to start playing golf again.     Interval History 8/17/2020:  The patient returns to clinic today for post op wound check. He continues to report benefit with Nevro device. He denies any fever, chills, or drainage. He continues to follow  his activity restrictions. He does report a recent achilles tendon injury while swimming. He is seeing Orthopedics. He denies any other health changes. His pain today is 4/10.     Interval History 8/3/2020:  The patient returns to clinic today for post op. He is s/p Gaviro battery change on 7/27/2020. He denies any fever, chills, or drainage from incision. He has not completed his antibiotics as he missed two days of the medication. He continues to follow his activity restrictions. He reports relief with the device. He is meeting with Bienvenido today for programming. He denies any other health changes. His pain today is 2/10     Interval History 7/22/2020:  Pt presents for audio follow up only s/p Right SI joint injection on 7/8/2020. Pt states he has near resolution of focal pain to buttock. He is pleased with result and pain relief. Pt has been in contact with [employer] and will be having battery swap in 5 days. He has difficulty whether stimulator is beneficial and has even had a holiday from using SCS.  He denies any newer areas pain, denies any neuro changes, meds area adequate to control pain without adverse side effects. Pain is 2/10 today.     Interval HPI 6/15/2020:  Jefferson HANSON Boogie presents to the clinic for a follow-up appointment for 3 week follow up right hip and right thigh pain. Since the last visit, Jefferson HANSON Keagan states the pain has been persistant. Current pain intensity is 5/10. Patient has been working with Bienvenido Varghese, to try and get better coverage of a localized pain that he still experiences in his right low back area. He does report improvement in symptoms of numbness/tingling. Today, worse pain is 1/10 in severity and localizes to the right SI joint. Pain is worse with extension of his back. It is worse with golfing. Pain relieved by Norco--he takes 1-2 tablets of 5-325 Norco on days that he plays golf. Pain is also improved with being still and not being active. Patient endorses a much more  active lifestyle with Norco. Denies new weakness/numbness or bowel/bladder changes.     Previous injection history includes a series of 3 lumbar CHOCO at [facility].      Interval HPI 3/5/20:  Patient presents to the clinic for a follow-up appointment for low back pain. Since the last visit, he states his pain has been unchanged. Current pain intensity is 4/10. He continues to work with [email or web address] to adjust settings on his SCS. He has stopped using tramadol, and uses norco sparingly. He continues to work with a  for physical therapy.      Interval HPI 1/9/2020:  Patient returns for follow up s/p Nevro SCS. He states he is unsure if it has helped his pain since he had it implanted. He last had an adjustment of his programming about 1 month ago. He does note that once he is done charging his SCS his pain is improved. Pain still worse with prolonged standing and activity such as golf. He still is doing home exercise program. He says that he is trying to do more since getting the SCS. He is still taking the Tramadol with limited pain relief. He takes Tramadol 50 mg twice daily only on days of activity which is once a week. No other health changes.      HPI 11/20/19  Jefferson Boogie returns to clinic today for follow up. He reports increased low back and leg pain over the last few days. He has been in contact with [email or web address] representatives for programming adjustments. He does report benefit with the device. He reports good days and bad days. His pain is worse with prolonged standing and activity. He continues to participate in a home exercise routine. He does take Tramadol sparingly but reports limited relief. He denies any other health changes. His pain today is 5/10.      Pain Disability Index Review:  Last 3 PDI Scores 6/22/2023 5/25/2023 3/30/2023   Pain Disability Index (PDI) 30 32 35         Pain Medications:  Pain Medications:  Celebrex   Cymbalta     Opioid Contract: not applicable      report:   Reviewed and consistent with medication use as prescribed.     Pain Procedures  2020- Right SI joint injection  2020- Nevro SCS battery change  2020- Right SIJ injection >80% relief   19 SCS implant  19 SCS trial  2020- Right SI joint injection  2020- SCS battery revision  2020- Right SI joint injection   2020- Right L5-S1 RFA  3/15/2021- Right SI fusion  2021 - Injection R Hip - minimal relief  10/13/2021 - Right SI joint injection  2021 - R SI joing and R piriformis muscle injection - no relief   2022- Caudal CHOCO- no relief   22 - Diagnostic Bilateral L3, L4 and L5 Lumbar Medial Branch Block under Fluoroscopy  22- Right L3, L4, L5 RFA  10/12/22 -  Left L3, L4, L5 RFA  2023 - Rowan SCS implant  2023 - Right SIJ and piriformis injection     Physical Therapy/Home Exercise: no     Imagin/10/2021 - X ray Hips Bilateral: No radiographic evidence of acute osseous abnormality of the pelvis and hips and without radiographic evidence of osteonecrosis of the femoral heads.     21 MRI L-spine:  FINDINGS:  Lumbar sagittal alignment is slightly is straightened.  There is slight convex right curvature lumbar spine.  There is degenerative disc disease with intervertebral disc height loss and endplate degenerative change at all levels with scattered disc desiccation allowing for degenerative change the lumbar vertebral body heights and contours are within normal is without evidence for acute fracture or subluxation.  There is heterogeneous T1/T2 signal focus within the right aspect of the S1 vertebra most compatible with hemangioma this measures 2.0 cm.     The distal spinal cord and conus is normal in signal and contour tip of the conus approximates the T12/L1 level.  Please note there is artifact from indwelling spinal stimulator device with leads partially visualized casting artifact entering the dorsal epidural space at the T12 level and  extending cranially.     There is abnormal clumping configuration of the filum terminalis a from T12 through L5 with slight thickening of scattered nerve roots most compatible with arachnoiditis.     No aneurysmal dilatation of the visualized abdominal aorta.     T12/L1 through L1/L2: No significant disc bulge, central canal or neural foraminal stenosis.     L2/L3: Posterior disc osteophyte with facet joint arthropathy without significant central canal stenosis with mild bilateral neural foraminal stenosis.     L3/L4:Bulging disc with ligamentum flavum hypertrophy without significant central canal stenosis with mild bilateral neural foraminal stenosis.     L4/L5:Posterior disc osteophyte with ligamentum flavum hypertrophy and facet joint arthropathy without significant central canal stenosis and mild bilateral neural foraminal stenosis.     L5/S1: Posterior disc osteophyte with facet joint arthropathy without significant central canal stenosis with moderate right and mild left neural foraminal stenosis.     This report was flagged in Epic as abnormal.     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  Please note there is spinal stimulator device with leads partially visualized and distorting the study by artifacts.     There is abnormal thickening of the filum configuration most compatible with arachnoiditis.     Please see above for additional details.     Thoracic spine MRI:  FINDINGS: Thoracic vertebral body height and alignment are maintained with a few small scattered Schmorl's nodes involving the endplates of several mid to lower thoracic vertebra. The T3-4 vertebra are partially fused. There is some degree of desiccation of all of the thoracic discs with multilevel disc space narrowing, especially at the T7-T8, T6-T7 and T5-T6 levels. There is no abnormal prevertebral soft tissue thickening. The somewhat heterogeneous appearance to the signal from the thoracic vertebra  is presumably secondary to an admixture of red and yellow marrow.    T1-T2 level: There is no bony foraminal narrowing or bony central canal stenosis and the posterior disc margin is unremarkable.    T2-T3 level: There is no bony foraminal narrowing or bony central canal stenosis and the posterior disc margin is unremarkable.    T3-T4 level: There is no bony foraminal narrowing or bony central canal stenosis and the posterior disc margin is unremarkable.    T4-5 level: There is no bony foraminal narrowing or bony central canal stenosis and the posterior disc margin is unremarkable.    T5-T6 level: There is no bony foraminal narrowing or bony central canal stenosis and the posterior disc margin is unremarkable.    T6-T7 level: There is no bony foraminal narrowing or bony central canal stenosis and the posterior disc margin is unremarkable.    T7-T8 level: There is no bony foraminal narrowing or bony central canal stenosis and the posterior disc margin is unremarkable.    T8-T9 level: There is no bony foraminal narrowing or bony central canal stenosis and the posterior disc margin is unremarkable.    T9-T10 level: There is no bony foraminal narrowing or bony central canal stenosis and the posterior disc margin is unremarkable.    T10-T11 level: There is no bony foraminal narrowing or bony central canal stenosis and the posterior disc margin is unremarkable.    T11-T12 level: There is no bony foraminal narrowing or bony central canal stenosis and the posterior disc margin is unremarkable.    T12-L1 level: The tip the conus medullaris extends down to level of the superior endplate of L1. 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 There is no bony foraminal narrowing or bony central canal stenosis at this level.    On the axial images, the immediate paravertebral soft tissues are unremarkable. A small cyst is seen to involve the upper pole the left kidney.    Following  contrast administration, there is no abnormal enhancement of any bony or soft tissue elements of the thoracic spine. There is no abnormal enhancement of the subserosal surface of the thoracic spinal cord. Some foci of mild signal intensity in the CSF dorsal to the spinal cord of some of the sagittal sequences presumably is secondary to CSF pulsation artifact.    On the sagittal  image, there is cervical spondylosis and kyphosis with narrowing of all of the disc spaces in the cervical spine.     EXAMINATION:  CT PELVIS WITHOUT CONTRAST     CLINICAL HISTORY:  History fo percutanous SI fusion.;  Chronic pain syndrome     TECHNIQUE:  CT of the pelvis, without intravenous contrast.     COMPARISON:  None     FINDINGS:  BONE: Diffuse osteopenia.  No fracture or osteonecrosis.  Few lytic and sclerotic lesions scattered throughout the pelvis, including a lesion in the right iliac bone with irregularity of the overlying cortex (2:103).     JOINT: Postoperative changes from right sacroiliac joint fusion.  The implant is in satisfactory position.  No osseous fusion across the joint space.     Degenerative changes both sacroiliac joints.  No erosions or ankylosis.  Degenerative changes also involve the lower lumbar spine, both hip joints, and pubic symphysis.  Chondrocalcinosis of the pubic symphysis.  No significant joint effusion.     SOFT TISSUE: Normal muscle bulk. Regional tendons are intact.  Calcific tendinopathy involving the proximal hamstring tendons bilaterally.  No bursal collection.     MISCELLANEOUS: Circumferential bladder wall thickening.  Prostatic calcifications.  Colonic diverticulosis.  Atherosclerosis.     Impression:     Postoperative changes in the right sacroiliac joint.  No osseous fusion.     Few lytic and sclerotic lesions scattered throughout the pelvis, concerning for metastases or myeloma.  No prior studies are available for comparison.     Bladder wall thickening, which could be secondary to  outlet obstruction or cystitis.  Correlate with urinalysis.     Other findings as described.     This report was flagged in Epic as abnormal.        Electronically signed by: Koby Cote  Date:                                            07/10/2023  Time:                                           11:54    Allergies: Review of patient's allergies indicates:  No Known Allergies    Current Medications:   Current Outpatient Medications   Medication Sig Dispense Refill    celecoxib (CELEBREX) 200 MG capsule       DULoxetine (CYMBALTA) 30 MG capsule TAKE 1 CAPSULE(30 MG) BY MOUTH TWICE DAILY 60 capsule 2    ergocalciferol, vitamin D2, (VITAMIN D ORAL) Take by mouth every 30 days.      finasteride (PROSCAR) 5 mg tablet Take 1 tablet by mouth once daily.      irbesartan (AVAPRO) 75 MG tablet 150 mg once daily.       levothyroxine (SYNTHROID) 100 MCG tablet Take 100 mcg by mouth.      meloxicam (MOBIC) 15 MG tablet Take 15 mg by mouth.      mirabegron (MYRBETRIQ ORAL) Take by mouth.      simvastatin (ZOCOR) 20 MG tablet Take 20 mg by mouth every evening.      ascorbic acid, vitamin C, (VITAMIN C) 1000 MG tablet Take 1,000 mg by mouth.      aspirin (ECOTRIN) 81 MG EC tablet Take 81 mg by mouth once daily.      ergocalciferol (DRISDOL) 200 mcg/mL (8,000 unit/mL) Drop Take by mouth.      guaiFENesin-codeine 100-10 mg/5 ml (TUSSI-ORGANIDIN NR)  mg/5 mL syrup Take 5 mLs by mouth 3 (three) times daily as needed for Cough. 118 mL 0    tadalafil (CIALIS) 20 MG Tab       temazepam (RESTORIL) 30 mg capsule TK 1 C PO QD HS       No current facility-administered medications for this visit.     Facility-Administered Medications Ordered in Other Visits   Medication Dose Route Frequency Provider Last Rate Last Admin    balanced salt irrigation intra-ocular solution 1 drop  1 drop Right Eye On Call Procedure Bell Cedeno MD        sodium chloride 0.9% flush 2 mL  2 mL Intravenous PRN Bell Cedeno MD           REVIEW OF  SYSTEMS:    GENERAL:  No weight loss, malaise or fevers.  HEENT:  Negative for frequent or significant headaches.  NECK:  Negative for lumps, goiter, pain and significant neck swelling.  RESPIRATORY:  Negative for cough, wheezing or shortness of breath.  CARDIOVASCULAR:  Negative for chest pain, leg swelling or palpitations.  GI:  Negative for abdominal discomfort, blood in stools or black stools or change in bowel habits.  MUSCULOSKELETAL:  See HPI.  SKIN:  Negative for lesions, rash, and itching.  PSYCH: +ve for mood disorder and recent psychosocial stressors. + sleep disturbance  HEMATOLOGY/LYMPHOLOGY:  Negative for prolonged bleeding, bruising easily or swollen nodes.  NEURO:   No history of headaches, syncope, paralysis, seizures or tremors.  All other reviewed and negative other than HPI.    Past Medical History:  Past Medical History:   Diagnosis Date    Bronchitis     Cancer     stage I bladder cancer 50 yrs ago    Hypercholesteremia     Hypertension     Sacroiliitis 07/08/2020    Thyroid disease        Past Surgical History:  Past Surgical History:   Procedure Laterality Date    BLADDER SURGERY      CATARACT EXTRACTION      CATARACT EXTRACTION W/  INTRAOCULAR LENS IMPLANT Right 3/9/2022    Procedure: EXTRACTION, CATARACT, WITH IOL INSERTION;  Surgeon: Bell Cedeno MD;  Location: [facility], [place], operated by [facility] OR;  Service: Ophthalmology;  Laterality: Right;    COLONOSCOPY      EPIDURAL STEROID INJECTION N/A 1/12/2022    Procedure: INJECTION, STEROID, EPIDURAL CAUDAL With Catheter needs consent;  Surgeon: Samuel Jimenez MD;  [facility];  Service: Pain Management;  Laterality: N/A;    EYE SURGERY      cataracts     FUSION OF SACROILIAC JOINT Right 3/15/2021    Procedure: FUSION, RIGHT SACROILIAC JOINT FUSION;  Surgeon: Samuel Jimenez MD;  Location: [facility], [place], operated by [facility] OR;  Service: Pain Management;  Laterality: Right;  C-ARM, PAINTEQ REP     HERNIA REPAIR      INJECTION OF ANESTHETIC AGENT AROUND NERVE Bilateral 6/1/2022     Procedure: BLOCK, NERVE MEDIAL BRANCH L3,4,5 BILATERAL 1st;  Surgeon: Samuel Jimenez MD;  Location: [facility], [place], operated by [facility] PAIN MGT;  Service: Pain Management;  Laterality: Bilateral;    INJECTION OF ANESTHETIC AGENT AROUND NERVE Bilateral 8/24/2022    Procedure: Block, Nerve Kenny L3, L4, & L5;  Surgeon: Samuel Jimenez MD;  Location: [facility], [place], operated by [facility] PAIN MGT;  Service: Pain Management;  Laterality: Bilateral;    INJECTION OF JOINT Right 7/8/2020    Procedure: INJECTION, JOINT, SACROILIAC (SI);  Surgeon: Samuel Jimenez MD;  Location: [facility], [place], operated by [facility] PAIN MGT;  Service: Pain Management;  Laterality: Right;    INJECTION OF JOINT Right 9/9/2020    Procedure: INJECTION, JOINT, SACROILIAC (SI);  Surgeon: Samuel Jimenez MD;  Location: [facility], [place], operated by [facility] PAIN MGT;  Service: Pain Management;  Laterality: Right;    INJECTION OF JOINT Right 7/21/2021    Procedure: INJECTION, JOINT, HIP RIGHT;  Surgeon: Samuel Jimenez MD;  Location: [facility], [place], operated by [facility] PAIN MGT;  Service: Pain Management;  Laterality: Right;  CONSENT NEEDED    INJECTION OF JOINT Right 10/13/2021    Procedure: INJECTION, JOINT, SACROILIAC (SI)  NEED CONSENT pt. requesting sedation;  Surgeon: Samuel Jimenez MD;  Location: [facility], [place], operated by [facility] PAIN MGT;  Service: Pain Management;  Laterality: Right;    INJECTION, SACROILIAC JOINT Right 5/31/2023    Procedure: INJECTION,SACROILIAC JOINT, RIGHT SI AND RIGHT PIRIFORMIS;  Surgeon: Samuel Jimenez MD;  Location: [facility], [place], operated by [facility] PAIN MGT;  Service: Pain Management;  Laterality: Right;    KNEE SURGERY      RADIOFREQUENCY ABLATION Right 12/9/2020    Procedure: RADIOFREQUENCY ABLATION, L5-S1-S2 LATERAL BRANCH COOLED;  Surgeon: Samuel Jimenez MD;  Location: [facility], [place], operated by [facility] PAIN MGT;  Service: Pain Management;  Laterality: Right;    RADIOFREQUENCY ABLATION Right 9/21/2022    Procedure: RADIOFREQUENCY ABLATION, RIGHT L3-L4-L5 PER DR JIMENEZ;  Surgeon: Samuel Jimenez MD;  Location: [facility], [place], operated by [facility] PAIN MGT;  Service: Pain Management;  Laterality: Right;    RADIOFREQUENCY ABLATION Left 10/12/2022     Procedure: RADIOFREQUENCY ABLATION, LEFT L3-L4-L5 TWO OF TWO;  Surgeon: Samuel Jimenez MD;  Location: [facility] PAIN MGT;  Service: Pain Management;  Laterality: Left;    REPLACEMENT OF NERVE STIMULATOR BATTERY N/A 2020    Procedure: Replacement, SPINAL CORD STIMULATOR BATTERY CHANGE TO NEVRO OMNION BATTERY;  Surgeon: Samuel Jimenez MD;  Location: [facility] OR;  Service: Pain Management;  Laterality: N/A;  C-ARM, NEVRO REP    REPLACEMENT OF SPINAL CORD STIMULATOR  2023    Procedure: REPLACEMENT, SPINAL CORD STIMULATOR;  Surgeon: Samuel Jimenez MD;  Location: [facility] OR;  Service: Pain Management;;    REVISION PROCEDURE INVOLVING SPINAL CORD NEUROSTIMULATOR N/A 2023    Procedure: SPINAL CORD STIMULATOR EXPLANT AND REIMPLANT SALUDA REP;  Surgeon: Samuel Jimenez MD;  Location: [facility] OR;  Service: Pain Management;  Laterality: N/A;    TRIAL OF SPINAL CORD NERVE STIMULATOR N/A 2019    Procedure: Trial, Neurostimulator, SPINAL CORD STIMULATOR TRIAL;  Surgeon: Samuel Jimenez MD;  Location: [facility] CATH LAB;  Service: Pain Management;  Laterality: N/A;  C-ARM, NEVRO REP       Family History:  History reviewed. No pertinent family history.    Social History:  Social History     Socioeconomic History    Marital status:    Tobacco Use    Smoking status: Former     Current packs/day: 0.00     Types: Cigarettes     Quit date: 1980     Years since quittin.0    Smokeless tobacco: Never    Tobacco comments:     stopped 40 years ago   Substance and Sexual Activity    Alcohol use: Yes     Alcohol/week: 1.0 standard drink of alcohol     Types: 1 Shots of liquor per week     Comment: nightly -scotch    Drug use: Never    Sexual activity: Yes     Partners: Female       OBJECTIVE:    BP (!) 149/64   Pulse 60   Temp 98 °F (36.7 °C)   Resp 18   Ht 6' (1.829 m)   Wt 91 kg (200 lb 9.9 oz)   SpO2 100%   BMI 27.21 kg/m²     PHYSICAL EXAMINATION:    General appearance: Well appearing, in no acute  distress, alert and oriented x3.  Psych:  Mood and affect appropriate.  Skin: Skin color, texture, turgor normal, no rashes or lesions, in both upper and lower body.  Head/face:  Atraumatic, normocephalic. No palpable lymph nodes  Neck: No pain to palpation over the cervical paraspinous muscles. Spurling Negative. No pain with neck flexion, extension, or lateral flexion. .  Cor: RRR  Pulm: CTA  GI: Abdomen soft and non-tender.  Back: Straight leg raising in the sitting and supine positions is negative to radicular pain. Moderate pain to palpation over the lumbosacral spinee speclay around the right ischial ridge. Normal range of motion with mild pain reproduction. -ve axial loading test bilateral. Positive tenderness over both SIJ with positive thigh and sacral thrust test, Positive FABERE,Ganselin and Yeoman's test on the both side.negative FADIR  Extremities: Peripheral joint ROM is full and pain free without obvious instability or laxity in all four extremities. No deformities, edema, or skin discoloration. Good capillary refill.  Musculoskeletal: Shoulder, hip, and knee provocative maneuvers are negative. Bilateral upper and lower extremity strength is normal and symmetric.  No atrophy or tone abnormalities are noted.   Neuro: Bilateral upper and lower extremity coordination and muscle stretch reflexes are physiologic and symmetric.  Plantar response are downgoing. No loss of sensation is noted.  Gait: Antalgic. Ambulating independently at this time.    ASSESSMENT: 88 y.o. year old male with low back, right hip, and right leg pain, consistent with     1. Cluneal neuropathy  Procedure Order to Pain Management      2. Sacroiliac joint dysfunction  CANCELED: Procedure Order to Pain Management      3. Arachnoiditis        4. Cough present for greater than 3 weeks  guaiFENesin-codeine 100-10 mg/5 ml (TUSSI-ORGANIDIN NR)  mg/5 mL syrup      5. S/P insertion of spinal cord stimulator              PLAN:     -  Previous records and imaging reviewed  - I have stressed the importance of physical activity and a home exercise plan to help with pain and improve health.  - Patient can continue with medications for now since they are providing benefits, using them appropriately, and without side effects.  - Schedule cluneal nerve block if no improvement will schedule patient for revision of Right SIJ Fusion as previous graft provided relief, but did not achieve arthrodesis per read of imaging.  - Continue SCS therapy. Pt to [facility] to work with rep regarding optimization.   - RTC 2 weeks after procedure.  - Counseled patient regarding the importance of activity modification, constant sleeping habits, and physical therapy.    The above plan and management options were discussed at length with patient. Patient is in agreement with the above and verbalized understanding.    Scott Brizuela M.D.  PGY-5  Interventional Pain Management Fellow  [facility]  Pager: [phone]   I have personally reviewed the history and exam of this patient and agree with the resident/fellow/NPs note as stated above.    Samuel Jimenez MD    01/16/2024

## 2024-01-27 ENCOUNTER — PATIENT MESSAGE (OUTPATIENT)
Dept: HEMATOLOGY/ONCOLOGY | Facility: CLINIC | Age: 89
End: 2024-01-27
Payer: MEDICARE

## 2024-01-29 ENCOUNTER — PATIENT MESSAGE (OUTPATIENT)
Dept: HEMATOLOGY/ONCOLOGY | Facility: CLINIC | Age: 89
End: 2024-01-29
Payer: MEDICARE

## 2024-02-01 PROBLEM — G58.8 CLUNEAL NEUROPATHY: Status: ACTIVE | Noted: 2024-02-01

## 2024-02-01 PROBLEM — M47.817 SPONDYLOSIS OF LUMBOSACRAL REGION: Status: ACTIVE | Noted: 2024-02-01

## 2024-02-02 ENCOUNTER — PATIENT MESSAGE (OUTPATIENT)
Dept: PAIN MEDICINE | Facility: OTHER | Age: 89
End: 2024-02-02
Payer: MEDICARE

## 2024-02-02 DIAGNOSIS — G58.8 CLUNEAL NEUROPATHY: Primary | ICD-10-CM

## 2024-02-05 ENCOUNTER — OFFICE VISIT (OUTPATIENT)
Dept: HEMATOLOGY/ONCOLOGY | Facility: CLINIC | Age: 89
End: 2024-02-05
Payer: MEDICARE

## 2024-02-05 VITALS
DIASTOLIC BLOOD PRESSURE: 79 MMHG | HEART RATE: 68 BPM | OXYGEN SATURATION: 98 % | WEIGHT: 207.25 LBS | HEIGHT: 72 IN | BODY MASS INDEX: 28.07 KG/M2 | TEMPERATURE: 97 F | RESPIRATION RATE: 17 BRPM | SYSTOLIC BLOOD PRESSURE: 183 MMHG

## 2024-02-05 DIAGNOSIS — R93.89 ABNORMAL COMPUTED TOMOGRAPHY SCAN: ICD-10-CM

## 2024-02-05 DIAGNOSIS — G89.4 CHRONIC PAIN SYNDROME: Primary | ICD-10-CM

## 2024-02-05 PROCEDURE — 99999 PR PBB SHADOW E&M-EST. PATIENT-LVL IV: CPT | Mod: PBBFAC,,, | Performed by: INTERNAL MEDICINE

## 2024-02-05 PROCEDURE — 99213 OFFICE O/P EST LOW 20 MIN: CPT | Mod: S$PBB,,, | Performed by: INTERNAL MEDICINE

## 2024-02-05 PROCEDURE — 99214 OFFICE O/P EST MOD 30 MIN: CPT | Mod: PBBFAC | Performed by: INTERNAL MEDICINE

## 2024-02-05 NOTE — PROGRESS NOTES
Subjective     Patient ID: Jefferson Boogie is a 88 y.o. male.    Chief Complaint: Abnormal finding of diagnostic imaging    HPI    Returns for follow up   He was originally referred for imaging that raised concerns of lytic and sclerotic bone lesions    He had this imaging work up for pain > 5 years in duration  He sees the pain clinic and reports some improvement    Work up below     Tumor markers:  PSA = 0.17 ng/ml  CEA= 3.8 ng/ml     Myeloma peripheral blood assesment:                         We discussed above and he decline bmbx with negative PET and after discussion with Pathology    Imaging:  - 8/21/2023 PET scan:  FINDINGS:  Quality of the study: Adequate.  In the head and neck, there are no hypermetabolic lesions worrisome for malignancy. There are no hypermetabolic mucosal lesions, and there are no pathologically enlarged or hypermetabolic lymph nodes.  In the chest, there are no hypermetabolic lesions worrisome for malignancy.  There are no pathologically enlarged or hypermetabolic lymph nodes.  Stable 0.6 cm right middle lobe nodule, below the size threshold for PET.  In the abdomen and pelvis, there is physiologic tracer distribution within the abdominal organs and excretion into the genitourinary system.  Mildly enlarged hypermetabolic preaortic lymph node just inferior to the renal vein measuring 1.4 cm (series 3, image 144) with maximum SUV of 6.5.  In the bones, there are no hypermetabolic lesions worrisome for malignancy.  No significant increased radiotracer uptake within the previously described right iliac bone lesion.  Similar appearance of several scattered small mixed lucent and sclerotic foci throughout the pelvis without significant radiotracer uptake.  Additional CT findings: Spinal cord stimulator device and leads noted.  Surgical changes of the right sacroiliac joint.  Calcific atherosclerosis of the aorta and coronary arteries.  Mild ground-glass density within the dependent bilateral  lower lobes favored to represent atelectasis.  Small hepatic cyst.  Left renal cyst.  Colonic diverticulosis.  Impression:  1. Mildly enlarged hypermetabolic preaortic lymph node within the upper abdomen suspicious for metastatic disease or lymphoproliferative process.  2. No hypermetabolic bone lesion.  Stable CT appearance of previously described pelvic bone lesions without significant radiotracer uptake.  3. Stable subcentimeter right middle lobe pulmonary nodule, below the size threshold for PET.     History:  - Initially he was referred after abnormal CT results- Referring MD: Dr. Jimenez - pain clinic      - Reports the following leading to scans:  pain x 4-5 years  2 different stimulators  Switched from explant to implant recently with improved control  States he actually had a few days of complete resolution of pain recently while he was in [facility]  Describes pain as located in his low back/buttock and radiating down right calf  He is able to remain active- plays golf  No other pain     No weight loss     Oncology History:  1967- Bladder cancer - treated at the [facility] in [facility]  Stage I- treated surgically  Cystoscopy surveillance following     Pain led to below imaging  - 8/14/2023 CT Abd/Pelvis:  FINDINGS:  Chest, no significant pleural or pericardial fluid.  7 mm ground-glass opacity right middle lobe series 2, image 8.  Remainder of the lungs are clear.  The abdomen, liver is mildly enlarged 18.3 cm.  Hypodensity hepatic dome image 33 too small to characterize.  Statistically a cyst.  Portal vein is patent.  No biliary dilatation.  Gallbladder mildly distended though otherwise unremarkable.  Fatty replacement of the pancreas.  No mass or pancreatic ductal dilatation.  Spleen upper limits for normal in size.  No adrenal mass.  2.4 cm hypodensity apex of the left kidney consistent with a cyst.  Additional hypodensities too small to characterize. No solid enhancing masses.  No hydronephrosis.  Ureters  are fairly well visualized to the bladder.  No obstructive uropathy.  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  Separate origins of the splenic and hepatic arteries.  SMA is patent.  Likewise renal arteries are patent.  14 mm preaortic node series 2, image 59.  Additional scattered periaortic nodes noted.  In the pelvis, no convincing pelvic adenopathy.  Nonenlarged nodes are present.  Calcifications in nonenlarged prostate.  No significant bladder wall thickening.  Evaluation of the bowel demonstrates colonic diverticula.  No focal wall thickening or pericolonic soft tissue stranding.  Multiple fluid-filled loops of small bowel.  No focal dilatation.  Appendix is normal.  No convincing mesenteric adenopathy.  Presumed epidural stimulator.  Postop change right SI joint.  No fusion.  Degenerative changes noted.  Slightly expansile lesion right ilium image 105 appears similar.  Sclerotic focus anterior aspect of the left ilium also similar.  Probable bone island left acetabulum.  Additional lucent lesions in the pelvis better demonstrated on prior pelvic CT.  No convincing new lesions seen.  Impression:  Slightly expansile lesion right ilium as well as scattered lucent and sclerotic foci of the pelvis all appear similar.  If further evaluation is desired MRI or bone scan may be helpful.  Comparison with prior studies would be most beneficial.  Mildly enlarged preaortic node.  Additional nonenlarged para-aortic nodes noted.  7 mm ground-glass opacity right middle lobe.  For a ground glass nodule 6 mm or larger, Fleischner Society 2017 guidelines recommend follow up with non-contrast chest CT at 6-12 months after discovery. If this nodule persists at that time, additional follow up with non-contrast chest CT is recommended every 2 years until 5 years of stability have been documented.  Hypodensities in the liver and kidneys as above.  Additional findings above  This report was flagged in Epic  as abnormal.     - 7/10/2023 CT Pelvis:  FINDINGS:  BONE: Diffuse osteopenia.  No fracture or osteonecrosis.  Few lytic and sclerotic lesions scattered throughout the pelvis, including a lesion in the right iliac bone with irregularity of the overlying cortex (2:103).  JOINT: Postoperative changes from right sacroiliac joint fusion.  The implant is in satisfactory position.  No osseous fusion across the joint space.  Degenerative changes both sacroiliac joints.  No erosions or ankylosis.  Degenerative changes also involve the lower lumbar spine, both hip joints, and pubic symphysis.  Chondrocalcinosis of the pubic symphysis.  No significant joint effusion.  SOFT TISSUE: Normal muscle bulk. Regional tendons are intact.  Calcific tendinopathy involving the proximal hamstring tendons bilaterally.  No bursal collection.  MISCELLANEOUS: Circumferential bladder wall thickening.  Prostatic calcifications.  Colonic diverticulosis.  Atherosclerosis.  Impression:  Postoperative changes in the right sacroiliac joint.  No osseous fusion.  Few lytic and sclerotic lesions scattered throughout the pelvis, concerning for metastases or myeloma.  No prior studies are available for comparison.  Bladder wall thickening, which could be secondary to outlet obstruction or cystitis.  Correlate with urinalysis.  Other findings as described.     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    Spinal cord stimulator status 08/26/2021    Vertebrogenic pain 05/26/2022               Resolved Ambulatory Problems     Diagnosis Date Noted    Pain of right sacroiliac joint 06/15/2020    CRPS (complex regional pain syndrome type I) 2020    Failed spinal cord stimulator 2020              Past Medical History:   Diagnosis Date    Bronchitis      Cancer      Hypercholesteremia      Hypertension      Thyroid disease        FH:  Mother  of pancreatic cancer at age 60  Father lived until 89 yo  No other cancers     SH:  Lives with wife  2 healthy kids  Retired [employer]  Distant smoking history  + EtOH    Review of Systems   Constitutional:  Negative for activity change, appetite change, chills, fatigue, fever and unexpected weight change.   HENT:  Positive for hearing loss. Negative for nasal congestion, postnasal drip, rhinorrhea, sore throat and trouble swallowing.    Eyes:  Negative for visual disturbance.   Respiratory:  Negative for cough and shortness of breath.    Cardiovascular:  Negative for chest pain, palpitations and leg swelling.   Gastrointestinal:  Negative for abdominal distention, abdominal pain, change in bowel habit, constipation, diarrhea, nausea, vomiting and reflux.   Genitourinary:  Negative for decreased urine volume, difficulty urinating, dysuria, frequency and urgency.   Musculoskeletal:  Positive for arthralgias. Negative for back pain, gait problem, leg pain and myalgias.        Uses cane for steadiness but not when golfing   Neurological:  Negative for dizziness, weakness, light-headedness, numbness and headaches.   Psychiatric/Behavioral:  Negative for dysphoric mood. The patient is not nervous/anxious.           Objective     Physical Exam  Vitals and nursing note reviewed.   Constitutional:       General: He is not in acute distress.     Appearance: Normal appearance. He is normal weight. He is not ill-appearing.   HENT:      Head: Normocephalic and atraumatic.   Eyes:      General: No scleral icterus.      Extraocular Movements: Extraocular movements intact.      Conjunctiva/sclera: Conjunctivae normal.      Pupils: Pupils are equal, round, and reactive to light.   Cardiovascular:      Rate and Rhythm: Normal rate and regular rhythm.      Heart sounds: Normal heart sounds. No murmur heard.     No friction rub. No gallop.   Pulmonary:      Effort: Pulmonary effort is normal. No respiratory distress.      Breath sounds: Normal breath sounds. No wheezing, rhonchi or rales.   Abdominal:      General: Abdomen is flat. Bowel sounds are normal. There is no distension.      Palpations: Abdomen is soft. There is no mass.      Tenderness: There is no guarding or rebound.   Musculoskeletal:         General: No swelling, tenderness or deformity. Normal range of motion.      Cervical back: Normal range of motion and neck supple. No rigidity.      Right lower leg: No edema.      Left lower leg: No edema.   Lymphadenopathy:      Cervical: No cervical adenopathy.   Skin:     General: Skin is warm and dry.      Coloration: Skin is not jaundiced or pale.      Findings: No bruising or rash.   Neurological:      General: No focal deficit present.      Mental Status: He is alert and oriented to person, place, and time.      Sensory: No sensory deficit.      Motor: No weakness.      Coordination: Coordination normal.      Gait: Gait normal.   Psychiatric:         Mood and Affect: Mood normal.         Behavior: Behavior normal.         Thought Content: Thought content normal.         Judgment: Judgment normal.          Assessment and Plan     1. Chronic pain syndrome    2. Abnormal computed tomography scan      Agrees to repeat labs and possible repeat imaging    Route Chart for Scheduling    Med Onc Chart Routing  Urgent    Follow up with physician . Link labs to next appt he has upcoming- cbc, cmp, spep and b2 microglobulin- see me in 8 weeks or so with PET prior   Follow up with RAMAN    Infusion scheduling note    Injection scheduling  note    Labs    Imaging    Pharmacy appointment    Other referrals

## 2024-02-28 ENCOUNTER — HOSPITAL ENCOUNTER (OUTPATIENT)
Facility: OTHER | Age: 89
Discharge: HOME | End: 2024-02-28
Attending: ANESTHESIOLOGY | Admitting: ANESTHESIOLOGY
Payer: MEDICARE

## 2024-02-28 VITALS
DIASTOLIC BLOOD PRESSURE: 72 MMHG | HEART RATE: 60 BPM | RESPIRATION RATE: 16 BRPM | HEIGHT: 73 IN | TEMPERATURE: 98 F | BODY MASS INDEX: 25.84 KG/M2 | OXYGEN SATURATION: 98 % | SYSTOLIC BLOOD PRESSURE: 164 MMHG | WEIGHT: 195 LBS

## 2024-02-28 DIAGNOSIS — G89.29 CHRONIC PAIN: ICD-10-CM

## 2024-02-28 DIAGNOSIS — G58.8 CLUNEAL NEUROPATHY: Primary | ICD-10-CM

## 2024-02-28 PROCEDURE — 25000003 PHARM REV CODE 250: Performed by: ANESTHESIOLOGY

## 2024-02-28 PROCEDURE — 25500020 PHARM REV CODE 255: Performed by: ANESTHESIOLOGY

## 2024-02-28 PROCEDURE — 64450 NJX AA&/STRD OTHER PN/BRANCH: CPT | Mod: RT,,, | Performed by: ANESTHESIOLOGY

## 2024-02-28 PROCEDURE — 64450 NJX AA&/STRD OTHER PN/BRANCH: CPT | Mod: RT | Performed by: ANESTHESIOLOGY

## 2024-02-28 PROCEDURE — 63600175 PHARM REV CODE 636 W HCPCS: Performed by: ANESTHESIOLOGY

## 2024-02-28 RX ORDER — DEXAMETHASONE SODIUM PHOSPHATE 10 MG/ML
INJECTION INTRAMUSCULAR; INTRAVENOUS
Status: DISCONTINUED | OUTPATIENT
Start: 2024-02-28 | End: 2024-02-28 | Stop reason: HOSPADM

## 2024-02-28 RX ORDER — BUPIVACAINE HYDROCHLORIDE 2.5 MG/ML
INJECTION, SOLUTION EPIDURAL; INFILTRATION; INTRACAUDAL
Status: DISCONTINUED | OUTPATIENT
Start: 2024-02-28 | End: 2024-02-28 | Stop reason: HOSPADM

## 2024-02-28 RX ORDER — LIDOCAINE HYDROCHLORIDE 20 MG/ML
INJECTION, SOLUTION INFILTRATION; PERINEURAL
Status: DISCONTINUED | OUTPATIENT
Start: 2024-02-28 | End: 2024-02-28 | Stop reason: HOSPADM

## 2024-02-28 RX ORDER — MIDAZOLAM HYDROCHLORIDE 1 MG/ML
INJECTION INTRAMUSCULAR; INTRAVENOUS
Status: DISCONTINUED | OUTPATIENT
Start: 2024-02-28 | End: 2024-02-28 | Stop reason: HOSPADM

## 2024-02-28 RX ORDER — SODIUM CHLORIDE 9 MG/ML
INJECTION, SOLUTION INTRAVENOUS CONTINUOUS
Status: DISCONTINUED | OUTPATIENT
Start: 2024-02-28 | End: 2024-02-28 | Stop reason: HOSPADM

## 2024-02-28 NOTE — DISCHARGE INSTRUCTIONS

## 2024-02-28 NOTE — DISCHARGE SUMMARY
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      aspirin (ECOTRIN) 81 MG EC tablet Take 81 mg by mouth once daily.      celecoxib (CELEBREX) 200 MG capsule       DULoxetine (CYMBALTA) 30 MG capsule TAKE 1 CAPSULE(30 MG) BY MOUTH TWICE DAILY  Qty: 60 capsule, Refills: 2      ergocalciferol (DRISDOL) 200 mcg/mL (8,000 unit/mL) Drop Take by mouth.      ergocalciferol, vitamin D2, (VITAMIN D ORAL) Take by mouth every 30 days.      finasteride (PROSCAR) 5 mg tablet Take 1 tablet by mouth once daily.      irbesartan (AVAPRO) 75 MG tablet 150 mg once daily.       levothyroxine (SYNTHROID) 100 MCG tablet Take 100 mcg by mouth.      meloxicam (MOBIC) 15 MG tablet Take 15 mg by mouth.      mirabegron (MYRBETRIQ ORAL) Take by mouth.      simvastatin (ZOCOR) 20 MG tablet Take 20 mg by mouth every evening.      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  Activity: as per instruction sheet.  Discharge to: Home with a responsible adult.  Follow up: 2-4 weeks       Please call my office or pager at [phone] if experienced any weakness or loss of sensation, fever > 101.5, pain uncontrolled with oral medications, persistent nausea/vomiting/or diarrhea, redness or drainage from the incisions, or any other worrisome concerns. If  physician on call was not reached or could not communicate with our office for any reason please go to the nearest emergency department

## 2024-02-28 NOTE — OP NOTE
Diagnostic/Therapeutic Cluneal Nerve Block under Fluoroscopic Guidance    The procedure, risks, benefits, and options were discussed with the patient. There are no contraindications to the procedure. The patent expressed understanding and agreed to the procedure. Informed written consent was obtained prior to the start of the procedure and can be found in the patient's chart.    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 Patient re-evaluation prior to administration of conscious sedation. No changes noted in patient's status from initial evaluation. The patient's vital signs were monitored by RN and patient remained hemodynamically stable throughout the procedure.    Event Time In   Sedation Start 0843   Sedation End 0849       ESTIMATED BLOOD LOSS: None    COMPLICATIONS: None    TECHNIQUE: Time-out was performed to identify the patient and procedure to be performed. With the patient laying in a prone position, the surgical area was prepped and draped in the usual sterile fashion using ChloraPrep and a fenestrated drape.The area was determined under fluoroscopy guidance. Skin anesthesia was achieved by injecting Lidocaine 2% over the injection site. A 22 gauge, 3.5 inch spinal quinke needle was advanced into the area of the cluneal nerves. Once the final needle tip position was confirmed on  fluoroscopy, negative pressure was applied to confirm no intravascular placement. 4 mL of the medication mixture listed above was injected slowly. The needles were removed and bleeding was nil. A sterile dressing was applied. No specimens collected. The patient tolerated the procedure well.     The patient was monitored after the procedure in the recovery area. They were given post-procedure and discharge instructions to follow at home. The patient was discharged in a stable condition.    Darron Espinal MD    I reviewed and edited the fellow's note. I conducted my own interview and physical examination. I agree with the findings. I was present and supervising all critical portions of the procedure.

## 2024-02-28 NOTE — H&P
HPI  Patient presenting for Procedure(s) (LRB):  BLOCK, NERVE RIGHT CLUNEAL (Right)     Patient on Anti-coagulation No    No health changes since previous encounter    Past Medical History:   Diagnosis Date    Bronchitis     Cancer     stage I bladder cancer 50 yrs ago    Hypercholesteremia     Hypertension     Sacroiliitis 07/08/2020    Thyroid disease      Past Surgical History:   Procedure Laterality Date    BLADDER SURGERY      CATARACT EXTRACTION      CATARACT EXTRACTION W/  INTRAOCULAR LENS IMPLANT Right 3/9/2022    Procedure: EXTRACTION, CATARACT, WITH IOL INSERTION;  Surgeon: Bell Cedeno MD;  Location: [facility] OR;  Service: Ophthalmology;  Laterality: Right;    COLONOSCOPY      EPIDURAL STEROID INJECTION N/A 1/12/2022    Procedure: INJECTION, STEROID, EPIDURAL CAUDAL With Catheter needs consent;  Surgeon: Samuel Jimenez MD;  Location: [facility] PAIN MGT;  Service: Pain Management;  Laterality: N/A;    EYE SURGERY      cataracts     FUSION OF SACROILIAC JOINT Right 3/15/2021    Procedure: FUSION, RIGHT SACROILIAC JOINT FUSION;  Surgeon: Samuel Jimenez MD;  Location: [facility] OR;  Service: Pain Management;  Laterality: Right;  C-ARM, PAINTEQ REP     HERNIA REPAIR      INJECTION OF ANESTHETIC AGENT AROUND NERVE Bilateral 6/1/2022    Procedure: BLOCK, NERVE MEDIAL BRANCH L3,4,5 BILATERAL 1st;  Surgeon: Samuel Jimenez MD;  Location: [facility] PAIN MGT;  Service: Pain Management;  Laterality: Bilateral;    INJECTION OF ANESTHETIC AGENT AROUND NERVE Bilateral 8/24/2022    Procedure: Block, Nerve Kenny L3, L4, & L5;  Surgeon: Samuel Jimenez MD;  Location: [facility] PAIN MGT;  Service: Pain Management;  Laterality: Bilateral;    INJECTION OF JOINT Right 7/8/2020    Procedure: INJECTION, JOINT, SACROILIAC (SI);  Surgeon: Samuel Jimenez MD;  Location: [facility] PAIN MGT;  Service: Pain Management;  Laterality: Right;    INJECTION OF JOINT Right 9/9/2020    Procedure: INJECTION, JOINT, SACROILIAC (SI);  Surgeon: Samuel BROWN  MD Barbara;  Location: [facility]IN [facility]GT;  Service: Pain Management;  Laterality: Right;    INJECTION OF JOINT Right 7/21/2021    Procedure: INJECTION, JOINT, HIP RIGHT;  Surgeon: Samuel Jimenez MD;  Location: [facility]IN [facility]GT;  Service: Pain Management;  Laterality: Right;  CONSENT NEEDED    INJECTION OF JOINT Right 10/13/2021    Procedure: INJECTION, JOINT, SACROILIAC (SI)  NEED CONSENT pt. requesting sedation;  Surgeon: Samuel Jimenez MD;  Location: [facility];  Service: Pain Management;  Laterality: Right;    INJECTION, SACROILIAC JOINT Right 5/31/2023    Procedure: INJECTION,SACROILIAC JOINT, RIGHT SI AND RIGHT PIRIFORMIS;  Surgeon: Samuel Jimenez MD;  Location: [facility]IN [facility]GT;  Service: Pain Management;  Laterality: Right;    KNEE SURGERY      RADIOFREQUENCY ABLATION Right 12/9/2020    Procedure: RADIOFREQUENCY ABLATION, L5-S1-S2 LATERAL BRANCH COOLED;  Surgeon: Samuel Jimenez MD;  Location: [facility]IN [facility];  Service: Pain Management;  Laterality: Right;    RADIOFREQUENCY ABLATION Right 9/21/2022    Procedure: RADIOFREQUENCY ABLATION, RIGHT L3-L4-L5 PER DR JIMENEZ;  Surgeon: Samuel Jimenez MD;  Location: [facility];  Service: Pain Management;  Laterality: Right;    RADIOFREQUENCY ABLATION Left 10/12/2022    Procedure: RADIOFREQUENCY ABLATION, LEFT L3-L4-L5 TWO OF TWO;  Surgeon: Samuel Jimenez MD;  Location: [facility];  Service: Pain Management;  Laterality: Left;    REPLACEMENT OF NERVE STIMULATOR BATTERY N/A 7/27/2020    Procedure: Replacement, SPINAL CORD STIMULATOR BATTERY CHANGE TO NEVRO OMNION BATTERY;  Surgeon: Samuel Jimenez MD;  Location: [facility] OR;  Service: Pain Management;  Laterality: N/A;  C-ARM, NEVRO REP    REPLACEMENT OF SPINAL CORD STIMULATOR  2/13/2023    Procedure: REPLACEMENT, SPINAL CORD STIMULATOR;  Surgeon: Samuel Jimenez MD;  Location: [facility] OR;  Service: Pain Management;;    REVISION PROCEDURE INVOLVING SPINAL CORD NEUROSTIMULATOR N/A 2/13/2023     Procedure: SPINAL CORD STIMULATOR EXPLANT AND REIMPLANT SALUDA REP;  Surgeon: Samuel Jimenez MD;  Location: [facility] OR;  Service: Pain Management;  Laterality: N/A;    TRIAL OF SPINAL CORD NERVE STIMULATOR N/A 8/5/2019    Procedure: Trial, Neurostimulator, SPINAL CORD STIMULATOR TRIAL;  Surgeon: Samuel Jimenez MD;  Location: [facility] CATH LAB;  Service: Pain Management;  Laterality: N/A;  C-ARM, NEVRO REP     Review of patient's allergies indicates:  No Known Allergies   Current Facility-Administered Medications   Medication    0.9%  NaCl infusion     Facility-Administered Medications Ordered in Other Encounters   Medication    balanced salt irrigation intra-ocular solution 1 drop    sodium chloride 0.9% flush 2 mL       PMHx, PSHx, Allergies, Medications reviewed in epic    ROS negative except pain complaints in HPI    OBJECTIVE:    There were no vitals taken for this visit.    PHYSICAL EXAMINATION:    GENERAL: Well appearing, in no acute distress, alert and oriented x3.  PSYCH:  Mood and affect appropriate.  SKIN: Skin color, texture, turgor normal, no rashes or lesions which will impact the procedure.  CV: RRR with palpation of the radial artery.  PULM: No evidence of respiratory difficulty, symmetric chest rise. Clear to auscultation.  NEURO: Cranial nerves grossly intact.    Plan:    Proceed with procedure as planned Procedure(s) (LRB):  BLOCK, NERVE RIGHT CLUNEAL (Right)    Mehdi Fiordaliza  02/28/2024

## 2024-02-29 ENCOUNTER — OFFICE VISIT (OUTPATIENT)
Dept: PAIN MEDICINE | Facility: CLINIC | Age: 89
End: 2024-02-29
Attending: ANESTHESIOLOGY
Payer: MEDICARE

## 2024-02-29 VITALS
SYSTOLIC BLOOD PRESSURE: 137 MMHG | WEIGHT: 205.94 LBS | BODY MASS INDEX: 27.17 KG/M2 | TEMPERATURE: 98 F | DIASTOLIC BLOOD PRESSURE: 63 MMHG | RESPIRATION RATE: 18 BRPM | OXYGEN SATURATION: 98 % | HEART RATE: 53 BPM

## 2024-02-29 DIAGNOSIS — G58.8 CLUNEAL NEUROPATHY: Primary | ICD-10-CM

## 2024-02-29 DIAGNOSIS — M47.819 SPONDYLOSIS WITHOUT MYELOPATHY: ICD-10-CM

## 2024-02-29 DIAGNOSIS — M53.3 SACROILIAC JOINT DYSFUNCTION: ICD-10-CM

## 2024-02-29 DIAGNOSIS — Z96.89 S/P INSERTION OF SPINAL CORD STIMULATOR: ICD-10-CM

## 2024-02-29 DIAGNOSIS — G89.4 CHRONIC PAIN SYNDROME: ICD-10-CM

## 2024-02-29 PROCEDURE — 99214 OFFICE O/P EST MOD 30 MIN: CPT | Mod: PBBFAC | Performed by: ANESTHESIOLOGY

## 2024-02-29 PROCEDURE — 99999 PR PBB SHADOW E&M-EST. PATIENT-LVL IV: CPT | Mod: PBBFAC,,, | Performed by: ANESTHESIOLOGY

## 2024-02-29 PROCEDURE — 99214 OFFICE O/P EST MOD 30 MIN: CPT | Mod: S$PBB,GC,, | Performed by: ANESTHESIOLOGY

## 2024-02-29 NOTE — PROGRESS NOTES
Chronic patient Established Note (Follow up visit)      SUBJECTIVE:  Interval History 2/29/24:  Jefferson Boogie presents to the clinic for follow-up. He is s/p right cluneal nerve block 2/28/24. Today, he says that he does not know if he has had any difference in his symptoms so far. We spoke briefly yesterday about additional options, and he is here today for further discussion.    Interval History 01/16/2024:  Jefferson Boogie presents to the clinic for a follow-up appointment for low back, right hip, and right leg pain. Since the last visit, Jefferson Boogie states the pain has been persistant. Currently his right hip and leg pain is his biggest problem. He continues to endorse sharp stabbing pain in his hip and leg. Pain is exacerbated by activity, standing, laying on right side, lifting, bending. Pain is improved with rest, exercising, stretching, medications. Continues with Celebrex and Cymbalta. Current pain intensity is 6/10. Patient continues to endorse benefit from SCS. Patient denies red flags including weakness, unexpected weight loss/gain, night sweats/fevers, saddle anesthesia, and symptoms of CAREN.    Interval History 8/10/2023:  Patient reports that his pain post right SIJ and piriformis was persistent until it decreased 50% on 7/4/23, but then the pain returned after a few days. He reports that during that period he was able to run after a bus in [facility]. The pain is improved, but not as much as previous in his right buttock and calf. Patient reports that he has been working with the [employer], but not finding the correct programming as of yet.  No fevers, chills, unexplained weight loss.  Hx of bladder cancer in 1967. Remembers falling while skiing and landed on his buttocks 30 years ago, but no other pelvic trauma. Patient was traveling to [place] and recently came back to [place].  We discussed his recent pelvic CT in details especially for the accidental finding of irregular bone lesion. Explained to  patient the need to investigate this further with our urology and oncology team.     Interval History 6/22/2023  Jefferson Boogie presents to the clinic for a follow-up appointment for chroniic LBP. Pt was last seen in clinic on 5/25 and scheduled for right SIJ and piriformis injection--performed 6/22. Pt reports 40% relief x 1-2 days. He continues to work with the rep with regards to programming the [employer] SCS device. Pt reports that he is getting some decent relief from the device with current program, but feels like there is still some progress to be made. Continues to complain of pain of similar character and quality to that of prior eval. Localized in the right lowerlumbar spine/gluteal area as well as the right lateral leg. Since the last visit, Jefferson Boogie states the pain has been improving. Current pain intensity is 5/10. He continues to exercise daily with stationary bike and weight training. Continues to take Cymbalta and Celebrex daily. He also takes Norco 10 very sparingly, maybe 1-2 per week. Overall the current regimen of SCS, medications and occasional injections have provided pt with enough relief for him to remain functional, and able to participate in his hobbies.      Interval history 5/25/23:  In the interim he has met with the [employer] reps for reprogramming. He has his good and bad days but feels that the pain is manageable. The reps with [employer] are present for the visit today. Today his pain is a 6.5/10. Still taking percocet and cymbalta (missed a few days), but these medications do not provide much relief. Still pain with walking and sitting, but the pain with laying down is better.      Interval History 5/4/23:  Patient seen today via virtual follow-up after implantation of his [employer] SCS in February. He reports no change in his pain since his last visit. Most of his pain is into his right hip with intermittent radiation into his RLE. He denies any new symptoms. No red flag symptoms.  He is scheduled to meet with the [employer] reps from reprogramming next week. He remains optimistic. In discussing his current medications, he has run out of the hydrocodone he was previously taking and has only been taking his Cymbalta once per day rather two.      Interval History 3/30/2023:  Mr Boogie presents 5-6 weeks after implantation of his [employer] SCS system. He reports no significant change in his pains, which are primarily down the RLE.  No constitutional signs symptoms concerning for infection.  No new areas of pain or neurological changes since procedure. No voicing of s/s concerning for cauda equina syndrome. He does endorse improvement in sleep. He is worried that his implant was a failure because his pain has not changed.     Interval History 2/20/2023:  Mr Boogie presents for follow up of [employer] SCS replacement. He states doing well. No constitutional signs symptoms concerning for infection.  No new areas of pain or neurological changes since procedure. No voicing of s/s concerning for cauda equina syndrome.      Interval History 1/23/2023:  Still taking Celebrex, however hasn't noticed a difference in pain with this medication. Location, quality, and severity of pain are unchanged from prior visit and is described above. Here today to discuss removal of Nerv[employer] SCS, and implant of [employer] SCS. He states his stimulator has not been helpful for the past 2 years despite multiple reprogramming and adjustment.     Interval History 11/21/22:  Reports 24 hours of 100% relief for 48 hours, but he reports that now his pain is only approximally 10% better. His pain at rest is 3-4/10. Pain at its worst is 8-9/10. Pain is improved when leaning over a grocery cart. Pain only allows him to walk for 3-4mins.      Interval History 8/29/22:  Jefferson Boogie presents to the clinic for a follow-up appointment for back pain.  He had his second bilateral L3, L4, L5 MBB and reports 100% pain relief. He would like to  proceed with RFA.     Interval History 5/26/22:  Jefferson Boogie presents to the clinic for a follow-up appointment for back pain. Since the last visit, Jefferson Boogie states the pain has been stable. Certain postures he can tolerate in the standing position such as leaning on a shopping cart or support to his posterior thighs. He now uses a walker when covering long distances. He continues to play golf despite it aggravating his pain. He receives some benefit from the SCS whereas other interventions have not helped. He remains interested in the [employer] device.     Interval History 3/24/22:  Jefferson Boogie presents to the clinic for a follow-up appointment for back pain. Since the last visit, Jefferson Boogie states the pain has been worse than usual. Current pain intensity is 8/10. He continues to report benefit with [employer]ro SCS however he has not been able to get in touch with the representative in order to have his settings adjusted. He continues to take occasional norco 7.5 mg with benefit, particularly when he plays golf.     Interval History 2/10/22:  Patent presents today s/p caudal epidural steroid injections on 1/12/22 since then he has not had any relief. He states his pain has been unchanged and is a 6/10 on average with the worst being 8/10 and best is 4/10. He has been taking percocet 5mg when he golfs or plays with his granddaughter.      Interval History 12/30/21:  Jefferson Boogie presents to clinic for follow up appointment s/p Right SI joint and Right piriformis muscle injection under US guidance on 11/29/21. He did not obtain any noticeable relief with this procedure similar to all of his previous injections. His pain is unchanged and constant over the right buttock. He is taking percocet 5mg x 3 on days he is more active, such as playing golf. This helps some, but minimally. Denies any new symptoms or neurological changes. No numbness, tingling, weakness in his lower extremities. Denies  "bowel/bladder incontinence or saddle anesthesia.      Interval History 11/4/2021:  Jefferson Boogie presents to the clinic for a follow-up appointment for right sided back pain and SI joint pain. Mr. oBogie had a right SI joint injection on 10/13/2021. He did not note significant relief with the injection for any period of time. Pain is still constant over the right buttock and most noticeable when playing golf. He denies any new bowel/bladder incontinence, lower extremity numbness or weakness or saddle anesthesia.     Interval History 8/26/2021:  Jefferson Boogie presents to the clinic for a follow-up appointment for right sided hip pain with right-sided radiculopathy . Since the last visit, Jefferson Boogie states the pain has been "particularly tender today" 7/10. Patient reports playing golf recently and experienced worsening symptoms the following day. Mr. Boogie is s/p right-side hip joint injection with minimal relief. Patient states he has had relief with previous interventions and is open to RFA.     Interval History 6/10/2021:  Jefferson Boogie presents to the clinic for a follow-up appointment. Since the last visit, Jefferson Boogie states the pain has been stable. Current pain intensity is 3/10. States he is still having pain around SIJ that is affecting his ADL      Interval History 4/15/2021:  The patient returns to clinic today for follow up of back pain. He reports that the swelling above the SI fusion incision has resolved. He denies any fever, chills, or drainage from the incision. He does report benefit since the fusion. He continues to report benefit with Nevro SCS. He reports intermittent shoulder pain at this time. He did receive an injection with Sports Medicine with benefit. He denies any other health changes. His pain today is 2/10.     Interval History 3/25/21:  Mr. Boogie presents to clinic for follow up of bilateral shoulder pain. He is s/p BL subacromial injections on " 2/8/21. He reports maximum 20% relief of pain in the Right shoulder. Today the Left shoulder pain is 1/10; Right shoulder pain is 5-7/10.      Interval History 3/22/2021:  The patient returns to clinic today for follow up of back pain. He is s/p right SI fusion on 3/15/2021. He reports some relief at this time. He does report soreness to the incision. He denies any fever, chills, or drainage from incision. He continues to report benefit with Nevro SCS. He denies any other health changes. His pain today is 4/10.     Interval History 1/11/2020:  Patient is here for follow-up of low back pain now s/p sacroiliac RFA on 12/9/20 which provided no benefit and with the new complaint of bilateral anterior shoulder pain R>L. His shoulder pain started about 2 months ago. He describes 9/10 sharp/stinging pain without radiation that is exacerbated with overhead activity and improved with rest. He started PT about one month ago. He takes oxycodone which provides some relief. He had a blind subacromial steroid injection in the right shoulder with Dr. Ramirez on 10/26/19 which provided some short term relief.     Interval History 11/5/2020:  Jefferson Boogie presents to the clinic for a follow-up appointment for low back pain. Since the last visit, Jefferson Couchstein states the pain has been stable. Current pain intensity is 4/10. He had good relief from the SI joint injection that lasted for about a week. Pain has since returned. He also slipped and fell on his buttock a couple of weeks ago and had increased pain in the right buttock after that which is slowly improving. He continues to have variable benefit with the Nevro SCS for intermittent pain in the right leg. He is scheduled to meet with representatives today. He is taking celebrex daily and percocet as needed sparingly. He denies any adverse effects and any other health changes.     Interval History 10/5/2020:  The patient returns to clinic today for follow up of low back  pain. He is s/p right SI joint injection on 9/9/2020. He reports 25% relief of his right sided low back and buttock pain. He did have good relief the first two days. He continues to report right sided low back and buttock pain. He denies any radicular leg pain. His pain is worse with prolonged standing and walking. He continues to report intermittent pain into his achilles from previous injury. He feels as though this has changed his gait. He continues to report some benefit with Nevro SCS device. He is in contact with representatives. He is currently taking Percocet as needed sparingly with some benefit. He denies any adverse effects. He denies any other health changes. His pain today is 4/10.      Interval History 9/2/2020:  The patient returns to clinic today for follow up of back pain. He reports that his back pain has improved since last audio visit. He continues to report back pain with intermittent radiating pain into his right hip and calf. He has spoken with Bienvenido from [employer] via phone with some programming. He is meeting with Bienvenido today. He had some issues with charging but this has improved. He is currently taking Norco intermittently but this is only lasts 2-3 hours. He denies any adverse effects. He denies any other health changes. His pain today is 8/10.     Interval History 08/27/2020:  Pt was contacted over [employer] audio for a follow up appointment.  He is currently complaining of right hip and right calf with no associated numbness or weakness.  He continues to use his Nevro device.  He states it has been very frustrating. Inconsistent.  Took 20 mins to 1 hour to charge.  Couldn't get it to start this morning.  He states that there is no drainage at the battery site, no signs of infection or pain at the site.  Patient is not taking medications at this time.  He wants to speak about medication options because he would like to start playing golf again.     Interval History 8/17/2020:  The patient  returns to clinic today for post op wound check. He continues to report benefit with Nevro device. He denies any fever, chills, or drainage. He continues to follow his activity restrictions. He does report a recent achilles tendon injury while swimming. He is seeing Orthopedics. He denies any other health changes. His pain today is 4/10.     Interval History 8/3/2020:  The patient returns to clinic today for post op. He is s/p Nevro battery change on 7/27/2020. He denies any fever, chills, or drainage from incision. He has not completed his antibiotics as he missed two days of the medication. He continues to follow his activity restrictions. He reports relief with the device. He is meeting with [employer] today for programming. He denies any other health changes. His pain today is 2/10     Interval History 7/22/2020:  Pt presents for audio follow up only s/p Right SI joint injection on 7/8/2020. Pt states he has near resolution of focal pain to buttock. He is pleased with result and pain relief. Pt has been in contact with [employer] and will be having battery swap in 5 days. He has difficulty whether stimulator is beneficial and has even had a holiday from using SCS.  He denies any newer areas pain, denies any neuro changes, meds area adequate to control pain without adverse side effects. Pain is 2/10 today.     Interval HPI 6/15/2020:  Jefferson Boogie presents to the clinic for a follow-up appointment for 3 week follow up right hip and right thigh pain. Since the last visit, Jefferson Boogie states the pain has been persistant. Current pain intensity is 5/10. Patient has been working with Bienvenido Varghese, to try and get better coverage of a localized pain that he still experiences in his right low back area. He does report improvement in symptoms of numbness/tingling. Today, worse pain is 1/10 in severity and localizes to the right SI joint. Pain is worse with extension of his back. It is worse with golfing. Pain relieved  by Norco--he takes 1-2 tablets of 5-325 Norco on days that he plays golf. Pain is also improved with being still and not being active. Patient endorses a much more active lifestyle with Norco. Denies new weakness/numbness or bowel/bladder changes.     Previous injection history includes a series of 3 lumbar CHOCO at [facility].      Interval HPI 3/5/20:  Patient presents to the clinic for a follow-up appointment for low back pain. Since the last visit, he states his pain has been unchanged. Current pain intensity is 4/10. He continues to work with [employer] to adjust settings on his SCS. He has stopped using tramadol, and uses norco sparingly. He continues to work with a  for physical therapy.      Interval HPI 1/9/2020:  Patient returns for follow up s/p Nevro SCS. He states he is unsure if it has helped his pain since he had it implanted. He last had an adjustment of his programming about 1 month ago. He does note that once he is done charging his SCS his pain is improved. Pain still worse with prolonged standing and activity such as golf. He still is doing home exercise program. He says that he is trying to do more since getting the SCS. He is still taking the Tramadol with limited pain relief. He takes Tramadol 50 mg twice daily only on days of activity which is once a week. No other health changes.      HPI 11/20/19  Jefferson Boogie returns to clinic today for follow up. He reports increased low back and leg pain over the last few days. He has been in contact with [employer] representatives for programming adjustments. He does report benefit with the device. He reports good days and bad days. His pain is worse with prolonged standing and activity. He continues to participate in a home exercise routine. He does take Tramadol sparingly but reports limited relief. He denies any other health changes. His pain today is 5/10.      Pain Medications:  Mobic  Cymbalta     Opioid Contract: not applicable       report:  Reviewed and consistent with medication use as prescribed.     Pain Procedures  9/9/2020 - Right SI joint injection  7/27/2020 - Nevro SCS battery change  7/8/2020 - Right SIJ injection >80% relief   8/26/19 - SCS implant  8/5/19 - SCS trial  7/8/2020 - Right SI joint injection  7/27/2020 - SCS battery revision  9/9/2020 - Right SI joint injection   12/9/2020 - Right L5-S1 RFA  3/15/2021 - Right SI fusion  7/21/2021 - Injection R Hip - minimal relief  10/13/2021 - Right SI joint injection  11/29/2021 - R SI joing and R piriformis muscle injection - no relief   1/12/2022 - Caudal CHOCO- no relief   8/24/22 - Diagnostic Bilateral L3, L4 and L5 Lumbar Medial Branch Block under Fluoroscopy  09/21/22 - Right L3, L4, L5 RFA  10/12/22 -  Left L3, L4, L5 RFA  2/13/23 - [place] SCS implant  5/31/23 - Right SIJ and piriformis injection   2/28/23 -  Right cluneal nerve block    Physical Therapy/Home Exercise: no     Imaging:   EXAMINATION:  CT PELVIS WITHOUT CONTRAST     CLINICAL HISTORY:  History fo percutanous SI fusion.;  Chronic pain syndrome     TECHNIQUE:  CT of the pelvis, without intravenous contrast.     COMPARISON:  None     FINDINGS:  BONE: Diffuse osteopenia.  No fracture or osteonecrosis.  Few lytic and sclerotic lesions scattered throughout the pelvis, including a lesion in the right iliac bone with irregularity of the overlying cortex (2:103).     JOINT: Postoperative changes from right sacroiliac joint fusion.  The implant is in satisfactory position.  No osseous fusion across the joint space.     Degenerative changes both sacroiliac joints.  No erosions or ankylosis.  Degenerative changes also involve the lower lumbar spine, both hip joints, and pubic symphysis.  Chondrocalcinosis of the pubic symphysis.  No significant joint effusion.     SOFT TISSUE: Normal muscle bulk. Regional tendons are intact.  Calcific tendinopathy involving the proximal hamstring tendons bilaterally.  No bursal  collection.     MISCELLANEOUS: Circumferential bladder wall thickening.  Prostatic calcifications.  Colonic diverticulosis.  Atherosclerosis.     Impression:     Postoperative changes in the right sacroiliac joint.  No osseous fusion.     Few lytic and sclerotic lesions scattered throughout the pelvis, concerning for metastases or myeloma.  No prior studies are available for comparison.     Bladder wall thickening, which could be secondary to outlet obstruction or cystitis.  Correlate with urinalysis.     Other findings as described.     This report was flagged in Epic as abnormal.    6/10/2021 - X ray Hips Bilateral: No radiographic evidence of acute osseous abnormality of the pelvis and hips and without radiographic evidence of osteonecrosis of the femoral heads.     9/18/21 MRI L-spine:  FINDINGS:  Lumbar sagittal alignment is slightly is straightened.  There is slight convex right curvature lumbar spine.  There is degenerative disc disease with intervertebral disc height loss and endplate degenerative change at all levels with scattered disc desiccation allowing for degenerative change the lumbar vertebral body heights and contours are within normal is without evidence for acute fracture or subluxation.  There is heterogeneous T1/T2 signal focus within the right aspect of the S1 vertebra most compatible with hemangioma this measures 2.0 cm.     The distal spinal cord and conus is normal in signal and contour tip of the conus approximates the T12/L1 level.  Please note there is artifact from indwelling spinal stimulator device with leads partially visualized casting artifact entering the dorsal epidural space at the T12 level and extending cranially.     There is abnormal clumping configuration of the filum terminalis a from T12 through L5 with slight thickening of scattered nerve roots most compatible with arachnoiditis.     No aneurysmal dilatation of the visualized abdominal aorta.     T12/L1 through L1/L2: No  significant disc bulge, central canal or neural foraminal stenosis.     L2/L3: Posterior disc osteophyte with facet joint arthropathy without significant central canal stenosis with mild bilateral neural foraminal stenosis.     L3/L4:Bulging disc with ligamentum flavum hypertrophy without significant central canal stenosis with mild bilateral neural foraminal stenosis.     L4/L5:Posterior disc osteophyte with ligamentum flavum hypertrophy and facet joint arthropathy without significant central canal stenosis and mild bilateral neural foraminal stenosis.     L5/S1: Posterior disc osteophyte with facet joint arthropathy without significant central canal stenosis with moderate right and mild left neural foraminal stenosis.     This report was flagged in Epic as abnormal.     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  Please note there is spinal stimulator device with leads partially visualized and distorting the study by artifacts.     There is abnormal thickening of the filum configuration most compatible with arachnoiditis.     Please see above for additional details.     Thoracic spine MRI:  FINDINGS: Thoracic vertebral body height and alignment are maintained with a few small scattered Schmorl's nodes involving the endplates of several mid to lower thoracic vertebra. The T3-4 vertebra are partially fused. There is some degree of desiccation of all of the thoracic discs with multilevel disc space narrowing, especially at the T7-T8, T6-T7 and T5-T6 levels. There is no abnormal prevertebral soft tissue thickening. The somewhat heterogeneous appearance to the signal from the thoracic vertebra is presumably secondary to an admixture of red and yellow marrow.    T1-T2 level: There is no bony foraminal narrowing or bony central canal stenosis and the posterior disc margin is unremarkable.    T2-T3 level: There is no bony foraminal narrowing or bony central canal stenosis and the  posterior disc margin is unremarkable.    T3-T4 level: There is no bony foraminal narrowing or bony central canal stenosis and the posterior disc margin is unremarkable.    T4-5 level: There is no bony foraminal narrowing or bony central canal stenosis and the posterior disc margin is unremarkable.    T5-T6 level: There is no bony foraminal narrowing or bony central canal stenosis and the posterior disc margin is unremarkable.    T6-T7 level: There is no bony foraminal narrowing or bony central canal stenosis and the posterior disc margin is unremarkable.    T7-T8 level: There is no bony foraminal narrowing or bony central canal stenosis and the posterior disc margin is unremarkable.    T8-T9 level: There is no bony foraminal narrowing or bony central canal stenosis and the posterior disc margin is unremarkable.    T9-T10 level: There is no bony foraminal narrowing or bony central canal stenosis and the posterior disc margin is unremarkable.    T10-T11 level: There is no bony foraminal narrowing or bony central canal stenosis and the posterior disc margin is unremarkable.    T11-T12 level: There is no bony foraminal narrowing or bony central canal stenosis and the posterior disc margin is unremarkable.    T12-L1 level: The tip the conus medullaris extends down to level of the superior endplate of L1. 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 There is no bony foraminal narrowing or bony central canal stenosis at this level.    On the axial images, the immediate paravertebral soft tissues are unremarkable. A small cyst is seen to involve the upper pole the left kidney.    Following contrast administration, there is no abnormal enhancement of any bony or soft tissue elements of the thoracic spine. There is no abnormal enhancement of the subserosal surface of the thoracic spinal cord. Some foci of mild signal intensity in the CSF dorsal to the spinal cord of some of the  sagittal sequences presumably is secondary to CSF pulsation artifact.    On the sagittal  image, there is cervical spondylosis and kyphosis with narrowing of all of the disc spaces in the cervical spine.     EXAMINATION:  CT PELVIS WITHOUT CONTRAST     CLINICAL HISTORY:  History fo percutanous SI fusion.;  Chronic pain syndrome     TECHNIQUE:  CT of the pelvis, without intravenous contrast.     COMPARISON:  None     FINDINGS:  BONE: Diffuse osteopenia.  No fracture or osteonecrosis.  Few lytic and sclerotic lesions scattered throughout the pelvis, including a lesion in the right iliac bone with irregularity of the overlying cortex (2:103).     JOINT: Postoperative changes from right sacroiliac joint fusion.  The implant is in satisfactory position.  No osseous fusion across the joint space.     Degenerative changes both sacroiliac joints.  No erosions or ankylosis.  Degenerative changes also involve the lower lumbar spine, both hip joints, and pubic symphysis.  Chondrocalcinosis of the pubic symphysis.  No significant joint effusion.     SOFT TISSUE: Normal muscle bulk. Regional tendons are intact.  Calcific tendinopathy involving the proximal hamstring tendons bilaterally.  No bursal collection.     MISCELLANEOUS: Circumferential bladder wall thickening.  Prostatic calcifications.  Colonic diverticulosis.  Atherosclerosis.     Impression:     Postoperative changes in the right sacroiliac joint.  No osseous fusion.     Few lytic and sclerotic lesions scattered throughout the pelvis, concerning for metastases or myeloma.  No prior studies are available for comparison.     Bladder wall thickening, which could be secondary to outlet obstruction or cystitis.  Correlate with urinalysis.     Other findings as described.     This report was flagged in Epic as abnormal.        Electronically signed by: Koby Cote  Date:                                            07/10/2023  Time:                                            11:54    Allergies: Review of patient's allergies indicates:  No Known Allergies    Current Medications:   Current Outpatient Medications   Medication Sig Dispense Refill    ascorbic acid, vitamin C, (VITAMIN C) 1000 MG tablet Take 1,000 mg by mouth.      aspirin (ECOTRIN) 81 MG EC tablet Take 81 mg by mouth once daily.      celecoxib (CELEBREX) 200 MG capsule       DULoxetine (CYMBALTA) 30 MG capsule TAKE 1 CAPSULE(30 MG) BY MOUTH TWICE DAILY 60 capsule 2    ergocalciferol (DRISDOL) 200 mcg/mL (8,000 unit/mL) Drop Take by mouth.      ergocalciferol, vitamin D2, (VITAMIN D ORAL) Take by mouth every 30 days.      finasteride (PROSCAR) 5 mg tablet Take 1 tablet by mouth once daily.      irbesartan (AVAPRO) 75 MG tablet 150 mg once daily.       levothyroxine (SYNTHROID) 100 MCG tablet Take 100 mcg by mouth.      meloxicam (MOBIC) 15 MG tablet Take 15 mg by mouth.      mirabegron (MYRBETRIQ ORAL) Take by mouth.      simvastatin (ZOCOR) 20 MG tablet Take 20 mg by mouth every evening.      tadalafil (CIALIS) 20 MG Tab       temazepam (RESTORIL) 30 mg capsule TK 1 C PO QD HS       No current facility-administered medications for this visit.     Facility-Administered Medications Ordered in Other Visits   Medication Dose Route Frequency Provider Last Rate Last Admin    balanced salt irrigation intra-ocular solution 1 drop  1 drop Right Eye On Call Procedure Bell Cedeno MD        sodium chloride 0.9% flush 2 mL  2 mL Intravenous PRN Bell Cedeno MD           REVIEW OF SYSTEMS:    GENERAL:  No weight loss, malaise or fevers.  HEENT:  Negative for frequent or significant headaches.  NECK:  Negative for lumps, goiter, pain and significant neck swelling.  RESPIRATORY:  Negative for cough, wheezing or shortness of breath.  CARDIOVASCULAR:  Negative for chest pain, leg swelling or palpitations.  GI:  Negative for abdominal discomfort, blood in stools or black stools or change in bowel habits.  MUSCULOSKELETAL:  See  HPI.  SKIN:  Negative for lesions, rash, and itching.  PSYCH:  + for sleep disturbance, mood disorder and recent psychosocial stressors.  HEMATOLOGY/LYMPHOLOGY:  Negative for prolonged bleeding, bruising easily or swollen nodes.  NEURO:   No history of headaches, syncope, paralysis, seizures or tremors.  All other reviewed and negative other than HPI.    Past Medical History:  Past Medical History:   Diagnosis Date    Bronchitis     Cancer     stage I bladder cancer 50 yrs ago    Hypercholesteremia     Hypertension     Sacroiliitis 07/08/2020    Thyroid disease        Past Surgical History:  Past Surgical History:   Procedure Laterality Date    BLADDER SURGERY      CATARACT EXTRACTION      CATARACT EXTRACTION W/  INTRAOCULAR LENS IMPLANT Right 3/9/2022    Procedure: EXTRACTION, CATARACT, WITH IOL INSERTION;  Surgeon: Bell Cedeno MD;  Location: [facility], [place], operated by [facility] OR;  Service: Ophthalmology;  Laterality: Right;    COLONOSCOPY      EPIDURAL STEROID INJECTION N/A 1/12/2022    Procedure: INJECTION, STEROID, EPIDURAL CAUDAL With Catheter needs consent;  Surgeon: Samuel Jimenez MD;  Location: [facility], [place], operated by [facility] PAIN MGT;  Service: Pain Management;  Laterality: N/A;    EYE SURGERY      cataracts     FUSION OF SACROILIAC JOINT Right 3/15/2021    Procedure: FUSION, RIGHT SACROILIAC JOINT FUSION;  Surgeon: Samuel Jimenez MD;  Location: [facility], [place], operated by [facility] OR;  Service: Pain Management;  Laterality: Right;  C-ARM, PAINTEQ REP     HERNIA REPAIR      INJECTION OF ANESTHETIC AGENT AROUND NERVE Bilateral 6/1/2022    Procedure: BLOCK, NERVE MEDIAL BRANCH L3,4,5 BILATERAL 1st;  Surgeon: Samuel Jimenez MD;  Location: [facility], [place], operated by [facility] PAIN MGT;  Service: Pain Management;  Laterality: Bilateral;    INJECTION OF ANESTHETIC AGENT AROUND NERVE Bilateral 8/24/2022    Procedure: Block, Nerve Kenny L3, L4, & L5;  Surgeon: Samuel Jimenez MD;  Location: [facility], [place], operated by [facility] PAIN MGT;  Service: Pain Management;  Laterality: Bilateral;    INJECTION OF ANESTHETIC AGENT AROUND NERVE Right  2/28/2024    Procedure: BLOCK, NERVE RIGHT CLUNEAL;  Surgeon: Samuel Jimenez MD;  Location: [facility] PAIN MGT;  Service: Pain Management;  Laterality: Right;  [phone]    INJECTION OF JOINT Right 7/8/2020    Procedure: INJECTION, JOINT, SACROILIAC (SI);  Surgeon: Samuel Jimenez MD;  Location: [facility] PAIN MGT;  Service: Pain Management;  Laterality: Right;    INJECTION OF JOINT Right 9/9/2020    Procedure: INJECTION, JOINT, SACROILIAC (SI);  Surgeon: Samuel Jimenez MD;  Location: [facility] PAIN MGT;  Service: Pain Management;  Laterality: Right;    INJECTION OF JOINT Right 7/21/2021    Procedure: INJECTION, JOINT, HIP RIGHT;  Surgeon: Samuel Jimenez MD;  Location: [facility] PAIN MGT;  Service: Pain Management;  Laterality: Right;  CONSENT NEEDED    INJECTION OF JOINT Right 10/13/2021    Procedure: INJECTION, JOINT, SACROILIAC (SI)  NEED CONSENT pt. requesting sedation;  Surgeon: Samuel Jimenez MD;  Location: [facility] PAIN MGT;  Service: Pain Management;  Laterality: Right;    INJECTION, SACROILIAC JOINT Right 5/31/2023    Procedure: INJECTION,SACROILIAC JOINT, RIGHT SI AND RIGHT PIRIFORMIS;  Surgeon: Samuel Jimenez MD;  Location: [facility] PAIN MGT;  Service: Pain Management;  Laterality: Right;    KNEE SURGERY      RADIOFREQUENCY ABLATION Right 12/9/2020    Procedure: RADIOFREQUENCY ABLATION, L5-S1-S2 LATERAL BRANCH COOLED;  Surgeon: Samuel Jimenez MD;  Location: [facility] PAIN MGT;  Service: Pain Management;  Laterality: Right;    RADIOFREQUENCY ABLATION Right 9/21/2022    Procedure: RADIOFREQUENCY ABLATION, RIGHT L3-L4-L5 PER DR JIMENEZ;  Surgeon: Samuel Jimenez MD;  Location: [facility] PAIN MGT;  Service: Pain Management;  Laterality: Right;    RADIOFREQUENCY ABLATION Left 10/12/2022    Procedure: RADIOFREQUENCY ABLATION, LEFT L3-L4-L5 TWO OF TWO;  Surgeon: Samuel Jimenez MD;  Location: [facility] PAIN MGT;  Service: Pain Management;  Laterality: Left;    REPLACEMENT OF NERVE STIMULATOR BATTERY N/A 7/27/2020     Procedure: Replacement, SPINAL CORD STIMULATOR BATTERY CHANGE TO NEVRO OMNION BATTERY;  Surgeon: Samuel Jimenez MD;  Location: [facility] OR;  Service: Pain Management;  Laterality: N/A;  C-ARM, NEVRO REP    REPLACEMENT OF SPINAL CORD STIMULATOR  2023    Procedure: REPLACEMENT, SPINAL CORD STIMULATOR;  Surgeon: Samuel Jimenez MD;  Location: [facility] OR;  Service: Pain Management;;    REVISION PROCEDURE INVOLVING SPINAL CORD NEUROSTIMULATOR N/A 2023    Procedure: SPINAL CORD STIMULATOR EXPLANT AND REIMPLANT SALUDA REP;  Surgeon: Samuel Jimenez MD;  Location: [facility] OR;  Service: Pain Management;  Laterality: N/A;    TRIAL OF SPINAL CORD NERVE STIMULATOR N/A 2019    Procedure: Trial, Neurostimulator, SPINAL CORD STIMULATOR TRIAL;  Surgeon: Samuel Jimenez MD;  Location: [facility] CATH LAB;  Service: Pain Management;  Laterality: N/A;  C-ARM, NEVRO REP     Family History:  No family history on file.    Social History:  Social History     Socioeconomic History    Marital status:    Tobacco Use    Smoking status: Former     Current packs/day: 0.00     Types: Cigarettes     Quit date:      Years since quittin.1    Smokeless tobacco: Never    Tobacco comments:     stopped 40 years ago   Substance and Sexual Activity    Alcohol use: Yes     Alcohol/week: 1.0 standard drink of alcohol     Types: 1 Shots of liquor per week     Comment: nightly -scotch    Drug use: Never    Sexual activity: Yes     Partners: Female       OBJECTIVE:    /63   Pulse (!) 53   Temp 97.5 °F (36.4 °C)   Resp 18   Wt 93.4 kg (205 lb 14.6 oz)   SpO2 98%   BMI 27.17 kg/m²     PHYSICAL EXAM:     GENERAL: Alert and oriented x 3, no acute distress  PSYCH:  Mood and affect appropriate.  SKIN: Skin color, texture, turgor normal, no rashes or lesions.  HEENT:  Normocephalic, atraumatic. Cranial nerves grossly intact.  PULM: Non-labored breathing, symmetric chest rise.  BACK: Normal range of motion. There is tenderness  to palpation over the right SIJ and piriformis area. WICHO, sacral compression, and thigh thrust positive on the right.   EXTREMITIES: No deformities, edema, or skin discoloration.   MUSCULOSKELETAL: Bilateral upper and lower extremity strength is normal and symmetric. No atrophy is noted.  NEURO: Bilateral upper and lower extremity coordination and muscle stretch reflexes are physiologic and symmetric. No clonus. Absent Johnson. No tremor.    GAIT: Antalgic.    ASSESSMENT: 88 y.o. year old male with chronic low back pain, consistent with:     1. Cluneal neuropathy        2. Sacroiliac joint dysfunction        3. Spondylosis without myelopathy        4. Chronic pain syndrome        5. S/P insertion of spinal cord stimulator          PLAN:   - I have counseled the patient on importance of smoking cessation and specifically poor outcomes related to spine and spine surgery.  - I have stressed the importance of physical activity and a home exercise plan to help with pain and improve health.  - Referral placed yesterday to [facility]  - We discussed treatment options going forward including both temporary and permanent PNS. We also discussed repeating SIJ and piriformis injections and possible SIJ fusion revision if symptoms persist, and he is open to these options.   - RTC 2-3 weeks to assess improvement following cluneal nerve block.  - Counseled patient regarding the importance of activity modification, constant sleeping habits, and physical therapy.    The above plan and management options were discussed at length with patient. Patient is in agreement with the above and verbalized understanding.    Darron Espinal MD  [facility]   I have personally reviewed the history and exam of this patient and agree with the resident/fellow/NPs note as stated above.    Samuel Jimenez MD  2/29/24

## 2024-03-14 ENCOUNTER — OFFICE VISIT (OUTPATIENT)
Dept: PAIN MEDICINE | Facility: CLINIC | Age: 89
End: 2024-03-14
Attending: ANESTHESIOLOGY
Payer: MEDICARE

## 2024-03-14 VITALS
HEART RATE: 58 BPM | DIASTOLIC BLOOD PRESSURE: 60 MMHG | HEIGHT: 73 IN | RESPIRATION RATE: 18 BRPM | OXYGEN SATURATION: 100 % | SYSTOLIC BLOOD PRESSURE: 129 MMHG | BODY MASS INDEX: 27.26 KG/M2 | WEIGHT: 205.69 LBS

## 2024-03-14 DIAGNOSIS — M46.1 SACROILIITIS: Primary | ICD-10-CM

## 2024-03-14 DIAGNOSIS — G57.01 PIRIFORMIS SYNDROME OF RIGHT SIDE: ICD-10-CM

## 2024-03-14 DIAGNOSIS — M79.18 MYOFASCIAL PAIN: ICD-10-CM

## 2024-03-14 PROCEDURE — 99214 OFFICE O/P EST MOD 30 MIN: CPT | Mod: S$PBB,GC,, | Performed by: ANESTHESIOLOGY

## 2024-03-14 PROCEDURE — 99213 OFFICE O/P EST LOW 20 MIN: CPT | Mod: PBBFAC | Performed by: ANESTHESIOLOGY

## 2024-03-14 PROCEDURE — 99999 PR PBB SHADOW E&M-EST. PATIENT-LVL III: CPT | Mod: PBBFAC,,, | Performed by: ANESTHESIOLOGY

## 2024-03-14 RX ORDER — HYDROCODONE BITARTRATE AND ACETAMINOPHEN 10; 325 MG/1; MG/1
1 TABLET ORAL EVERY 8 HOURS PRN
Qty: 90 TABLET | Refills: 0 | Status: SHIPPED | OUTPATIENT
Start: 2024-03-14 | End: 2024-03-15 | Stop reason: SDUPTHER

## 2024-03-14 NOTE — PROGRESS NOTES
Chronic patient Established Note (Follow up visit)      SUBJECTIVE:  Interval History 3/14/2024:  Jefferson Boogie presents for follow up. He is here today to discuss further options for treatment after right cluneal nerve block on 2/28/2024. He was here for the same reason two weeks ago, and we decided to give the block a bit more time to work before investigating other options. He reports no significant changes in his symptoms. He does say that he can manage his symptoms satisfactorily using 1.5 of his oxycodone  every 3 hours when doing things like golf and walking. He said he may use the medication once or twice a month and denies significant side effects.     Interval History 2/29/24:  Jefferson Boogie presents to the clinic for follow-up. He is s/p right cluneal nerve block 2/28/24. Today, he says that he does not know if he has had any difference in his symptoms so far. We spoke briefly yesterday about additional options, and he is here today for further discussion.     Interval History 01/16/2024:  Jefferson Boogie presents to the clinic for a follow-up appointment for low back, right hip, and right leg pain. Since the last visit, Jefferson Boogie states the pain has been persistant. Currently his right hip and leg pain is his biggest problem. He continues to endorse sharp stabbing pain in his hip and leg. Pain is exacerbated by activity, standing, laying on right side, lifting, bending. Pain is improved with rest, exercising, stretching, medications. Continues with Celebrex and Cymbalta. Current pain intensity is 6/10. Patient continues to endorse benefit from SCS. Patient denies red flags including weakness, unexpected weight loss/gain, night sweats/fevers, saddle anesthesia, and symptoms of CAREN.    Interval History 8/10/2023:  Patient reports that his pain post right SIJ and piriformis was persistent until it decreased 50% on 7/4/23, but then the pain returned after a few days. He reports that  during that period he was able to run after a bus in [facility]. The pain is improved, but not as much as previous in his right buttock and calf. Patient reports that he has been working with the [employer] Rep, but not finding the correct programming as of yet.  No fevers, chills, unexplained weight loss.  Hx of bladder cancer in 1967. Remembers falling while skiing and landed on his buttocks 30 years ago, but no other pelvic trauma. Patient was traveling to [place] and recently came back to [place].  We discussed his recent pelvic CT in details especially for the accidental finding of irregular bone lesion. Explained to patient the need to investigate this further with our urology and oncology team.     Interval History 6/22/2023  Jefferson Boogie presents to the clinic for a follow-up appointment for chroniic LBP. Pt was last seen in clinic on 5/25 and scheduled for right SIJ and piriformis injection--performed 6/22. Pt reports 40% relief x 1-2 days. He continues to work with the rep with regards to programming the [employer] SCS device. Pt reports that he is getting some decent relief from the device with current program, but feels like there is still some progress to be made. Continues to complain of pain of similar character and quality to that of prior eval. Localized in the right lowerlumbar spine/gluteal area as well as the right lateral leg. Since the last visit, Jefferson Boogie states the pain has been improving. Current pain intensity is 5/10. He continues to exercise daily with stationary bike and weight training. Continues to take Cymbalta and Celebrex daily. He also takes Norco 10 very sparingly, maybe 1-2 per week. Overall the current regimen of SCS, medications and occasional injections have provided pt with enough relief for him to remain functional, and able to participate in his hobbies.      Interval history 5/25/23:  In the interim he has met with the [employer] reps for reprogramming. He has his good and bad days but  feels that the pain is manageable. The reps with [employer] are present for the visit today. Today his pain is a 6.5/10. Still taking percocet and cymbalta (missed a few days), but these medications do not provide much relief. Still pain with walking and sitting, but the pain with laying down is better.      Interval History 5/4/23:  Patient seen today via virtual follow-up after implantation of his [place] SCS in February. He reports no change in his pain since his last visit. Most of his pain is into his right hip with intermittent radiation into his RLE. He denies any new symptoms. No red flag symptoms. He is scheduled to meet with the [place] reps from reprogramming next week. He remains optimistic. In discussing his current medications, he has run out of the hydrocodone he was previously taking and has only been taking his Cymbalta once per day rather two.      Interval History 3/30/2023:  Mr Boogie presents 5-6 weeks after implantation of his [place] SCS system. He reports no significant change in his pains, which are primarily down the RLE.  No constitutional signs symptoms concerning for infection.  No new areas of pain or neurological changes since procedure. No voicing of s/s concerning for cauda equina syndrome. He does endorse improvement in sleep. He is worried that his implant was a failure because his pain has not changed.     Interval History 2/20/2023:  Mr Boogie presents for follow up of [place] SCS replacement. He states doing well. No constitutional signs symptoms concerning for infection.  No new areas of pain or neurological changes since procedure. No voicing of s/s concerning for cauda equina syndrome.      Interval History 1/23/2023:  Still taking Celebrex, however hasn't noticed a difference in pain with this medication. Location, quality, and severity of pain are unchanged from prior visit and is described above. Here today to discuss removal of [place] SCS, and implant of [place] SCS. He  states his stimulator has not been helpful for the past 2 years despite multiple reprogramming and adjustment.     Interval History 11/21/22:  Reports 24 hours of 100% relief for 48 hours, but he reports that now his pain is only approximally 10% better. His pain at rest is 3-4/10. Pain at its worst is 8-9/10. Pain is improved when leaning over a grocery cart. Pain only allows him to walk for 3-4mins.      Interval History 8/29/22:  Jefferson Boogie presents to the clinic for a follow-up appointment for back pain.  He had his second bilateral L3, L4, L5 MBB and reports 100% pain relief. He would like to proceed with RFA.     Interval History 5/26/22:  Jefferson Boogie presents to the clinic for a follow-up appointment for back pain. Since the last visit, Jefferson Boogie states the pain has been stable. Certain postures he can tolerate in the standing position such as leaning on a shopping cart or support to his posterior thighs. He now uses a walker when covering long distances. He continues to play golf despite it aggravating his pain. He receives some benefit from the SCS whereas other interventions have not helped. He remains interested in the [place] device.     Interval History 3/24/22:  Jefferson Boogie presents to the clinic for a follow-up appointment for back pain. Since the last visit, Jefferson Boogie states the pain has been worse than usual. Current pain intensity is 8/10. He continues to report benefit with [employer] SCS however he has not been able to get in touch with the representative in order to have his settings adjusted. He continues to take occasional norco 7.5 mg with benefit, particularly when he plays golf.     Interval History 2/10/22:  Patent presents today s/p caudal epidural steroid injections on 1/12/22 since then he has not had any relief. He states his pain has been unchanged and is a 6/10 on average with the worst being 8/10 and best is 4/10. He has been taking percocet 5mg when he  "golfs or plays with his granddaughter.      Interval History 12/30/21:  Jefferson Boogie presents to clinic for follow up appointment s/p Right SI joint and Right piriformis muscle injection under US guidance on 11/29/21. He did not obtain any noticeable relief with this procedure similar to all of his previous injections. His pain is unchanged and constant over the right buttock. He is taking percocet 5mg x 3 on days he is more active, such as playing golf. This helps some, but minimally. Denies any new symptoms or neurological changes. No numbness, tingling, weakness in his lower extremities. Denies bowel/bladder incontinence or saddle anesthesia.      Interval History 11/4/2021:  Jefferson Boogie presents to the clinic for a follow-up appointment for right sided back pain and SI joint pain. Mr. Boogie had a right SI joint injection on 10/13/2021. He did not note significant relief with the injection for any period of time. Pain is still constant over the right buttock and most noticeable when playing golf. He denies any new bowel/bladder incontinence, lower extremity numbness or weakness or saddle anesthesia.     Interval History 8/26/2021:  Jefferson Boogie presents to the clinic for a follow-up appointment for right sided hip pain with right-sided radiculopathy . Since the last visit, Jefferson Boogie states the pain has been "particularly tender today" 7/10. Patient reports playing golf recently and experienced worsening symptoms the following day. Mr. Boogie is s/p right-side hip joint injection with minimal relief. Patient states he has had relief with previous interventions and is open to RFA.     Interval History 6/10/2021:  Jefferson Boogie presents to the clinic for a follow-up appointment. Since the last visit, Jefferson Boogie states the pain has been stable. Current pain intensity is 3/10. States he is still having pain around SIJ that is affecting his ADL      Interval History " 4/15/2021:  The patient returns to clinic today for follow up of back pain. He reports that the swelling above the SI fusion incision has resolved. He denies any fever, chills, or drainage from the incision. He does report benefit since the fusion. He continues to report benefit with [employer] SCS. He reports intermittent shoulder pain at this time. He did receive an injection with Sports Medicine with benefit. He denies any other health changes. His pain today is 2/10.     Interval History 3/25/21:  Mr. Boogie presents to clinic for follow up of bilateral shoulder pain. He is s/p BL subacromial injections on 2/8/21. He reports maximum 20% relief of pain in the Right shoulder. Today the Left shoulder pain is 1/10; Right shoulder pain is 5-7/10.      Interval History 3/22/2021:  The patient returns to clinic today for follow up of back pain. He is s/p right SI fusion on 3/15/2021. He reports some relief at this time. He does report soreness to the incision. He denies any fever, chills, or drainage from incision. He continues to report benefit with [employer] SCS. He denies any other health changes. His pain today is 4/10.     Interval History 1/11/2020:  Patient is here for follow-up of low back pain now s/p sacroiliac RFA on 12/9/20 which provided no benefit and with the new complaint of bilateral anterior shoulder pain R>L. His shoulder pain started about 2 months ago. He describes 9/10 sharp/stinging pain without radiation that is exacerbated with overhead activity and improved with rest. He started PT about one month ago. He takes oxycodone which provides some relief. He had a blind subacromial steroid injection in the right shoulder with Dr. Ramirez on 10/26/19 which provided some short term relief.     Interval History 11/5/2020:  Jefferson Boogie presents to the clinic for a follow-up appointment for low back pain. Since the last visit, Jefferson Boogie states the pain has been stable. Current pain intensity is  4/10. He had good relief from the SI joint injection that lasted for about a week. Pain has since returned. He also slipped and fell on his buttock a couple of weeks ago and had increased pain in the right buttock after that which is slowly improving. He continues to have variable benefit with the [employer] SCS for intermittent pain in the right leg. He is scheduled to meet with representatives today. He is taking celebrex daily and percocet as needed sparingly. He denies any adverse effects and any other health changes.     Interval History 10/5/2020:  The patient returns to clinic today for follow up of low back pain. He is s/p right SI joint injection on 9/9/2020. He reports 25% relief of his right sided low back and buttock pain. He did have good relief the first two days. He continues to report right sided low back and buttock pain. He denies any radicular leg pain. His pain is worse with prolonged standing and walking. He continues to report intermittent pain into his achilles from previous injury. He feels as though this has changed his gait. He continues to report some benefit with Nevro SCS device. He is in contact with representatives. He is currently taking Percocet as needed sparingly with some benefit. He denies any adverse effects. He denies any other health changes. His pain today is 4/10.      Interval History 9/2/2020:  The patient returns to clinic today for follow up of back pain. He reports that his back pain has improved since last audio visit. He continues to report back pain with intermittent radiating pain into his right hip and calf. He has spoken with Bienvenido from [employer] via phone with some programming. He is meeting with Bienvenido today. He had some issues with charging but this has improved. He is currently taking Norco intermittently but this is only lasts 2-3 hours. He denies any adverse effects. He denies any other health changes. His pain today is 8/10.     Interval History 08/27/2020:  Pt was  contacted over [employer] for a follow up appointment.  He is currently complaining of right hip and right calf with no associated numbness or weakness.  He continues to use his [employer]ro device.  He states it has been very frustrating. Inconsistent.  Took 20 mins to 1 hour to charge.  Couldn't get it to start this morning.  He states that there is no drainage at the battery site, no signs of infection or pain at the site.  Patient is not taking medications at this time.  He wants to speak about medication options because he would like to start playing golf again.     Interval History 8/17/2020:  The patient returns to clinic today for post op wound check. He continues to report benefit with Nevro device. He denies any fever, chills, or drainage. He continues to follow his activity restrictions. He does report a recent achilles tendon injury while swimming. He is seeing Orthopedics. He denies any other health changes. His pain today is 4/10.     Interval History 8/3/2020:  The patient returns to clinic today for post op. He is s/p Nevro battery change on 7/27/2020. He denies any fever, chills, or drainage from incision. He has not completed his antibiotics as he missed two days of the medication. He continues to follow his activity restrictions. He reports relief with the device. He is meeting with Bienvenido javier for programming. He denies any other health changes. His pain today is 2/10     Interval History 7/22/2020:  Pt presents for audio follow up only s/p Right SI joint injection on 7/8/2020. Pt states he has near resolution of focal pain to buttock. He is pleased with result and pain relief. Pt has been in contact with [employer] and will be having battery swap in 5 days. He has difficulty whether stimulator is beneficial and has even had a holiday from using SCS.  He denies any newer areas pain, denies any neuro changes, meds area adequate to control pain without adverse side effects. Pain is 2/10 today.     Interval  HPI 6/15/2020:  Jefferson Boogie presents to the clinic for a follow-up appointment for 3 week follow up right hip and right thigh pain. Since the last visit, Jefferson Boogie states the pain has been persistant. Current pain intensity is 5/10. Patient has been working with Bienvenido Varghese, to try and get better coverage of a localized pain that he still experiences in his right low back area. He does report improvement in symptoms of numbness/tingling. Today, worse pain is 1/10 in severity and localizes to the right SI joint. Pain is worse with extension of his back. It is worse with golfing. Pain relieved by Norco--he takes 1-2 tablets of 5-325 Norco on days that he plays golf. Pain is also improved with being still and not being active. Patient endorses a much more active lifestyle with Norco. Denies new weakness/numbness or bowel/bladder changes.     Previous injection history includes a series of 3 lumbar CHOCO at [facility].      Interval HPI 3/5/20:  Patient presents to the clinic for a follow-up appointment for low back pain. Since the last visit, he states his pain has been unchanged. Current pain intensity is 4/10. He continues to work with Leon[employer] to adjust settings on his SCS. He has stopped using tramadol, and uses norco sparingly. He continues to work with a  for physical therapy.      Interval HPI 1/9/2020:  Patient returns for follow up s/p [place] SCS. He states he is unsure if it has helped his pain since he had it implanted. He last had an adjustment of his programming about 1 month ago. He does note that once he is done charging his SCS his pain is improved. Pain still worse with prolonged standing and activity such as golf. He still is doing home exercise program. He says that he is trying to do more since getting the SCS. He is still taking the Tramadol with limited pain relief. He takes Tramadol 50 mg twice daily only on days of activity which is once a week. No other health  changes.      HPI 11/20/19  Jefferson Boogie returns to clinic today for follow up. He reports increased low back and leg pain over the last few days. He has been in contact with [employer] representatives for programming adjustments. He does report benefit with the device. He reports good days and bad days. His pain is worse with prolonged standing and activity. He continues to participate in a home exercise routine. He does take Tramadol sparingly but reports limited relief. He denies any other health changes. His pain today is 5/10.      Pain Medications:  Oxycodone-acetaminophen 10-325mg very rarely  Mobic  Cymbalta     Opioid Contract: not applicable      report:  Reviewed and consistent with medication use as prescribed. Has not filled oxycodone since June of last year, says he has plenty left.      Pain Procedures  9/9/2020 - Right SI joint injection  7/27/2020 - Nevro SCS battery change  7/8/2020 - Right SIJ injection >80% relief   8/26/19 - SCS implant  8/5/19 - SCS trial  7/8/2020 - Right SI joint injection  7/27/2020 - SCS battery revision  9/9/2020 - Right SI joint injection   12/9/2020 - Right L5-S1 RFA  3/15/2021 - Right SI fusion  7/21/2021 - Injection R Hip - minimal relief  10/13/2021 - Right SI joint injection  11/29/2021 - R SI joing and R piriformis muscle injection - no relief   1/12/2022 - Caudal CHOCO- no relief   8/24/22 - Diagnostic Bilateral L3, L4 and L5 Lumbar Medial Branch Block under Fluoroscopy  09/21/22 - Right L3, L4, L5 RFA  10/12/22 -  Left L3, L4, L5 RFA  2/13/23 - [place] SCS implant  5/31/23 - Right SIJ and piriformis injection   2/28/23 -  Right cluneal nerve block    Physical Therapy/Home Exercise: no     Imaging:   EXAMINATION:  CT PELVIS WITHOUT CONTRAST     CLINICAL HISTORY:  History fo percutanous SI fusion.;  Chronic pain syndrome     TECHNIQUE:  CT of the pelvis, without intravenous contrast.     COMPARISON:  None     FINDINGS:  BONE: Diffuse osteopenia.  No fracture or  osteonecrosis.  Few lytic and sclerotic lesions scattered throughout the pelvis, including a lesion in the right iliac bone with irregularity of the overlying cortex (2:103).     JOINT: Postoperative changes from right sacroiliac joint fusion.  The implant is in satisfactory position.  No osseous fusion across the joint space.     Degenerative changes both sacroiliac joints.  No erosions or ankylosis.  Degenerative changes also involve the lower lumbar spine, both hip joints, and pubic symphysis.  Chondrocalcinosis of the pubic symphysis.  No significant joint effusion.     SOFT TISSUE: Normal muscle bulk. Regional tendons are intact.  Calcific tendinopathy involving the proximal hamstring tendons bilaterally.  No bursal collection.     MISCELLANEOUS: Circumferential bladder wall thickening.  Prostatic calcifications.  Colonic diverticulosis.  Atherosclerosis.     Impression:     Postoperative changes in the right sacroiliac joint.  No osseous fusion.     Few lytic and sclerotic lesions scattered throughout the pelvis, concerning for metastases or myeloma.  No prior studies are available for comparison.     Bladder wall thickening, which could be secondary to outlet obstruction or cystitis.  Correlate with urinalysis.     Other findings as described.     This report was flagged in Epic as abnormal.    6/10/2021 - X ray Hips Bilateral: No radiographic evidence of acute osseous abnormality of the pelvis and hips and without radiographic evidence of osteonecrosis of the femoral heads.     9/18/21 MRI L-spine:  FINDINGS:  Lumbar sagittal alignment is slightly is straightened.  There is slight convex right curvature lumbar spine.  There is degenerative disc disease with intervertebral disc height loss and endplate degenerative change at all levels with scattered disc desiccation allowing for degenerative change the lumbar vertebral body heights and contours are within normal is without evidence for acute fracture or  subluxation.  There is heterogeneous T1/T2 signal focus within the right aspect of the S1 vertebra most compatible with hemangioma this measures 2.0 cm.     The distal spinal cord and conus is normal in signal and contour tip of the conus approximates the T12/L1 level.  Please note there is artifact from indwelling spinal stimulator device with leads partially visualized casting artifact entering the dorsal epidural space at the T12 level and extending cranially.     There is abnormal clumping configuration of the filum terminalis a from T12 through L5 with slight thickening of scattered nerve roots most compatible with arachnoiditis.     No aneurysmal dilatation of the visualized abdominal aorta.     T12/L1 through L1/L2: No significant disc bulge, central canal or neural foraminal stenosis.     L2/L3: Posterior disc osteophyte with facet joint arthropathy without significant central canal stenosis with mild bilateral neural foraminal stenosis.     L3/L4:Bulging disc with ligamentum flavum hypertrophy without significant central canal stenosis with mild bilateral neural foraminal stenosis.     L4/L5:Posterior disc osteophyte with ligamentum flavum hypertrophy and facet joint arthropathy without significant central canal stenosis and mild bilateral neural foraminal stenosis.     L5/S1: Posterior disc osteophyte with facet joint arthropathy without significant central canal stenosis with moderate right and mild left neural foraminal stenosis.     This report was flagged in Epic as abnormal.     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  Please note there is spinal stimulator device with leads partially visualized and distorting the study by artifacts.     There is abnormal thickening of the filum configuration most compatible with arachnoiditis.     Please see above for additional details.     Thoracic spine MRI:  FINDINGS: Thoracic vertebral body height and alignment are  maintained with a few small scattered Schmorl's nodes involving the endplates of several mid to lower thoracic vertebra. The T3-4 vertebra are partially fused. There is some degree of desiccation of all of the thoracic discs with multilevel disc space narrowing, especially at the T7-T8, T6-T7 and T5-T6 levels. There is no abnormal prevertebral soft tissue thickening. The somewhat heterogeneous appearance to the signal from the thoracic vertebra is presumably secondary to an admixture of red and yellow marrow.    T1-T2 level: There is no bony foraminal narrowing or bony central canal stenosis and the posterior disc margin is unremarkable.    T2-T3 level: There is no bony foraminal narrowing or bony central canal stenosis and the posterior disc margin is unremarkable.    T3-T4 level: There is no bony foraminal narrowing or bony central canal stenosis and the posterior disc margin is unremarkable.    T4-5 level: There is no bony foraminal narrowing or bony central canal stenosis and the posterior disc margin is unremarkable.    T5-T6 level: There is no bony foraminal narrowing or bony central canal stenosis and the posterior disc margin is unremarkable.    T6-T7 level: There is no bony foraminal narrowing or bony central canal stenosis and the posterior disc margin is unremarkable.    T7-T8 level: There is no bony foraminal narrowing or bony central canal stenosis and the posterior disc margin is unremarkable.    T8-T9 level: There is no bony foraminal narrowing or bony central canal stenosis and the posterior disc margin is unremarkable.    T9-T10 level: There is no bony foraminal narrowing or bony central canal stenosis and the posterior disc margin is unremarkable.    T10-T11 level: There is no bony foraminal narrowing or bony central canal stenosis and the posterior disc margin is unremarkable.    T11-T12 level: There is no bony foraminal narrowing or bony central canal stenosis and the posterior disc margin is  unremarkable.    T12-L1 level: The tip the conus medullaris extends down to level of the superior endplate of L1. 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 There is no bony foraminal narrowing or bony central canal stenosis at this level.    On the axial images, the immediate paravertebral soft tissues are unremarkable. A small cyst is seen to involve the upper pole the left kidney.    Following contrast administration, there is no abnormal enhancement of any bony or soft tissue elements of the thoracic spine. There is no abnormal enhancement of the subserosal surface of the thoracic spinal cord. Some foci of mild signal intensity in the CSF dorsal to the spinal cord of some of the sagittal sequences presumably is secondary to CSF pulsation artifact.    On the sagittal  image, there is cervical spondylosis and kyphosis with narrowing of all of the disc spaces in the cervical spine.     EXAMINATION:  CT PELVIS WITHOUT CONTRAST     CLINICAL HISTORY:  History fo percutanous SI fusion.;  Chronic pain syndrome     TECHNIQUE:  CT of the pelvis, without intravenous contrast.     COMPARISON:  None     FINDINGS:  BONE: Diffuse osteopenia.  No fracture or osteonecrosis.  Few lytic and sclerotic lesions scattered throughout the pelvis, including a lesion in the right iliac bone with irregularity of the overlying cortex (2:103).     JOINT: Postoperative changes from right sacroiliac joint fusion.  The implant is in satisfactory position.  No osseous fusion across the joint space.     Degenerative changes both sacroiliac joints.  No erosions or ankylosis.  Degenerative changes also involve the lower lumbar spine, both hip joints, and pubic symphysis.  Chondrocalcinosis of the pubic symphysis.  No significant joint effusion.     SOFT TISSUE: Normal muscle bulk. Regional tendons are intact.  Calcific tendinopathy involving the proximal hamstring tendons bilaterally.  No bursal  collection.     MISCELLANEOUS: Circumferential bladder wall thickening.  Prostatic calcifications.  Colonic diverticulosis.  Atherosclerosis.     Impression:     Postoperative changes in the right sacroiliac joint.  No osseous fusion.     Few lytic and sclerotic lesions scattered throughout the pelvis, concerning for metastases or myeloma.  No prior studies are available for comparison.     Bladder wall thickening, which could be secondary to outlet obstruction or cystitis.  Correlate with urinalysis.     Other findings as described.     This report was flagged in Epic as abnormal.        Electronically signed by: Koby Cote  Date:                                            07/10/2023  Time:                                           11:54    Allergies: Review of patient's allergies indicates:  No Known Allergies    Current Medications:   Current Outpatient Medications   Medication Sig Dispense Refill    ascorbic acid, vitamin C, (VITAMIN C) 1000 MG tablet Take 1,000 mg by mouth.      aspirin (ECOTRIN) 81 MG EC tablet Take 81 mg by mouth once daily.      celecoxib (CELEBREX) 200 MG capsule       DULoxetine (CYMBALTA) 30 MG capsule TAKE 1 CAPSULE(30 MG) BY MOUTH TWICE DAILY 60 capsule 2    ergocalciferol (DRISDOL) 200 mcg/mL (8,000 unit/mL) Drop Take by mouth.      ergocalciferol, vitamin D2, (VITAMIN D ORAL) Take by mouth every 30 days.      finasteride (PROSCAR) 5 mg tablet Take 1 tablet by mouth once daily.      HYDROcodone-acetaminophen (NORCO)  mg per tablet Take 1 tablet by mouth every 8 (eight) hours as needed for Pain. 90 tablet 0    irbesartan (AVAPRO) 75 MG tablet 150 mg once daily.       levothyroxine (SYNTHROID) 100 MCG tablet Take 100 mcg by mouth.      meloxicam (MOBIC) 15 MG tablet Take 15 mg by mouth.      mirabegron (MYRBETRIQ ORAL) Take by mouth.      simvastatin (ZOCOR) 20 MG tablet Take 20 mg by mouth every evening.      tadalafil (CIALIS) 20 MG Tab       temazepam (RESTORIL) 30 mg capsule  TK 1 C PO QD HS       No current facility-administered medications for this visit.     Facility-Administered Medications Ordered in Other Visits   Medication Dose Route Frequency Provider Last Rate Last Admin    balanced salt irrigation intra-ocular solution 1 drop  1 drop Right Eye On Call Procedure Bell Cedeno MD        sodium chloride 0.9% flush 2 mL  2 mL Intravenous PRN Bell Cedeno MD           REVIEW OF SYSTEMS:  GENERAL:  No weight loss, malaise or fevers.  HEENT:  Negative for frequent or significant headaches.  NECK:  Negative for lumps, goiter, pain and significant neck swelling.  RESPIRATORY:  Negative for cough, wheezing or shortness of breath.  CARDIOVASCULAR:  Negative for chest pain, leg swelling or palpitations.  GI:  Negative for abdominal discomfort, blood in stools or black stools or change in bowel habits.  MUSCULOSKELETAL:  See HPI.  SKIN:  Negative for lesions, rash, and itching.  PSYCH:  Negative for sleep disturbance, mood disorder and recent psychosocial stressors.  HEMATOLOGY/LYMPHOLOGY:  Negative for prolonged bleeding, bruising easily or swollen nodes.  NEURO:   No history of headaches, syncope, paralysis, seizures or tremors.  All other reviewed and negative other than HPI.    Past Medical History:  Past Medical History:   Diagnosis Date    Bronchitis     Cancer     stage I bladder cancer 50 yrs ago    Hypercholesteremia     Hypertension     Sacroiliitis 07/08/2020    Thyroid disease        Past Surgical History:  Past Surgical History:   Procedure Laterality Date    BLADDER SURGERY      CATARACT EXTRACTION      CATARACT EXTRACTION W/  INTRAOCULAR LENS IMPLANT Right 3/9/2022    Procedure: EXTRACTION, CATARACT, WITH IOL INSERTION;  Surgeon: Bell Cedeno MD;  Location: [facility];  Service: Ophthalmology;  Laterality: Right;    COLONOSCOPY      EPIDURAL STEROID INJECTION N/A 1/12/2022    Procedure: INJECTION, STEROID, EPIDURAL CAUDAL With Catheter needs consent;  Surgeon:  Samuel Jimenez MD;  Location: [facility] PAIN MGT;  Service: Pain Management;  Laterality: N/A;    EYE SURGERY      cataracts     FUSION OF SACROILIAC JOINT Right 3/15/2021    Procedure: FUSION, RIGHT SACROILIAC JOINT FUSION;  Surgeon: Samuel Jimenez MD;  Location: [facility] OR;  Service: Pain Management;  Laterality: Right;  C-ARM, PAINTEQ REP     HERNIA REPAIR      INJECTION OF ANESTHETIC AGENT AROUND NERVE Bilateral 6/1/2022    Procedure: BLOCK, NERVE MEDIAL BRANCH L3,4,5 BILATERAL 1st;  Surgeon: Samuel Jimenez MD;  Location: [facility] PAIN MGT;  Service: Pain Management;  Laterality: Bilateral;    INJECTION OF ANESTHETIC AGENT AROUND NERVE Bilateral 8/24/2022    Procedure: Block, Nerve Kenny L3, L4, & L5;  Surgeon: Samuel Jimenez MD;  Location: [facility] PAIN MGT;  Service: Pain Management;  Laterality: Bilateral;    INJECTION OF ANESTHETIC AGENT AROUND NERVE Right 2/28/2024    Procedure: BLOCK, NERVE RIGHT CLUNEAL;  Surgeon: Samuel Jimenez MD;  Location: [facility] PAIN MGT;  Service: Pain Management;  Laterality: Right;  [phone]    INJECTION OF JOINT Right 7/8/2020    Procedure: INJECTION, JOINT, SACROILIAC (SI);  Surgeon: Samuel Jimenez MD;  Location: [facility] PAIN MGT;  Service: Pain Management;  Laterality: Right;    INJECTION OF JOINT Right 9/9/2020    Procedure: INJECTION, JOINT, SACROILIAC (SI);  Surgeon: Samuel Jimenez MD;  Location: [facility] PAIN MGT;  Service: Pain Management;  Laterality: Right;    INJECTION OF JOINT Right 7/21/2021    Procedure: INJECTION, JOINT, HIP RIGHT;  Surgeon: Samuel Jimenez MD;  Location: [facility] PAIN MGT;  Service: Pain Management;  Laterality: Right;  CONSENT NEEDED    INJECTION OF JOINT Right 10/13/2021    Procedure: INJECTION, JOINT, SACROILIAC (SI)  NEED CONSENT pt. requesting sedation;  Surgeon: Samuel Jimenez MD;  Location: [facility] PAIN MGT;  Service: Pain Management;  Laterality: Right;    INJECTION, SACROILIAC JOINT Right 5/31/2023    Procedure: INJECTION,SACROILIAC  JOINT, RIGHT SI AND RIGHT PIRIFORMIS;  Surgeon: Samuel Jimenez MD;  Location: [facility] PAIN MGT;  Service: Pain Management;  Laterality: Right;    KNEE SURGERY      RADIOFREQUENCY ABLATION Right 12/9/2020    Procedure: RADIOFREQUENCY ABLATION, L5-S1-S2 LATERAL BRANCH COOLED;  Surgeon: Samuel Jimenez MD;  Location: [facility] PAIN MGT;  Service: Pain Management;  Laterality: Right;    RADIOFREQUENCY ABLATION Right 9/21/2022    Procedure: RADIOFREQUENCY ABLATION, RIGHT L3-L4-L5 PER DR JIMENEZ;  Surgeon: Samuel Jimenez MD;  Location: [facility] PAIN MGT;  Service: Pain Management;  Laterality: Right;    RADIOFREQUENCY ABLATION Left 10/12/2022    Procedure: RADIOFREQUENCY ABLATION, LEFT L3-L4-L5 TWO OF TWO;  Surgeon: Samuel Jimenez MD;  Location: [facility] PAIN MGT;  Service: Pain Management;  Laterality: Left;    REPLACEMENT OF NERVE STIMULATOR BATTERY N/A 7/27/2020    Procedure: Replacement, SPINAL CORD STIMULATOR BATTERY CHANGE TO NEVRO OMNION BATTERY;  Surgeon: Samuel Jimenez MD;  Location: [facility] OR;  Service: Pain Management;  Laterality: N/A;  C-ARM, NEVRO REP    REPLACEMENT OF SPINAL CORD STIMULATOR  2/13/2023    Procedure: REPLACEMENT, SPINAL CORD STIMULATOR;  Surgeon: Samuel Jimenez MD;  Location: [facility] OR;  Service: Pain Management;;    REVISION PROCEDURE INVOLVING SPINAL CORD NEUROSTIMULATOR N/A 2/13/2023    Procedure: SPINAL CORD STIMULATOR EXPLANT AND REIMPLANT SALUDA REP;  Surgeon: Samuel Jimenez MD;  Location: [facility] OR;  Service: Pain Management;  Laterality: N/A;    TRIAL OF SPINAL CORD NERVE STIMULATOR N/A 8/5/2019    Procedure: Trial, Neurostimulator, SPINAL CORD STIMULATOR TRIAL;  Surgeon: Samuel Jimenez MD;  Location: [facility] CATH LAB;  Service: Pain Management;  Laterality: N/A;  C-ARM, NEVRO REP     Family History:  No family history on file.    Social History:  Social History     Socioeconomic History    Marital status:    Tobacco Use    Smoking status: Former     Current packs/day:  "0.00     Types: Cigarettes     Quit date: 1980     Years since quittin.2    Smokeless tobacco: Never    Tobacco comments:     stopped 40 years ago   Substance and Sexual Activity    Alcohol use: Yes     Alcohol/week: 1.0 standard drink of alcohol     Types: 1 Shots of liquor per week     Comment: nightly -scotch    Drug use: Never    Sexual activity: Yes     Partners: Female       OBJECTIVE:    /60 (BP Location: Right arm, Patient Position: Sitting)   Pulse (!) 58   Resp 18   Ht 6' 1" (1.854 m)   Wt 93.3 kg (205 lb 11 oz)   SpO2 100%   BMI 27.14 kg/m²     PHYSICAL EXAM:     GENERAL: Alert and oriented x 3, no acute distress  PSYCH:  Mood and affect appropriate.  SKIN: Skin color, texture, turgor normal, no rashes or lesions.  HEENT:  Normocephalic, atraumatic. Cranial nerves grossly intact.  PULM: Non-labored breathing, symmetric chest rise.  BACK: Normal range of motion. TTP right SIJ and piriformis. WICHO, sacral compression, and thigh thrust positive on right.   EXTREMITIES: No deformities, edema, or skin discoloration.   MUSCULOSKELETAL: Bilateral upper and lower extremity strength is normal and symmetric. No atrophy is noted.  NEURO: Bilateral upper and lower extremity coordination and muscle stretch reflexes are physiologic and symmetric. No clonus. Absent Johnson. No tremor.    GAIT: Antalgic, mildly    ASSESSMENT: 88 y.o. year old male with chronic low back pain, consistent with:     1. Sacroiliitis  Procedure Request Order for Pain Management      2. Myofascial pain        3. Piriformis syndrome of right side            PLAN:   - I have stressed the importance of physical activity and a home exercise plan to help with pain and improve health.  - New prescription for oxycodone-acetaminophen 10-325mg q8 PRN. He uses this very sparingly.   - Schedule for right SIJ & piriformis injections prior to his trip [place].   - if pain persist will proceed with revising his SIJ fusion using painteq bone " allograft   - Counseled patient regarding the importance of activity modification, constant sleeping habits, and physical therapy.    The above plan and management options were discussed at length with patient. Patient is in agreement with the above and verbalized understanding.    Darrian Pandya MD  [employer] Pain Fellow   I have personally reviewed the history and exam of this patient and agree with the resident/fellow/NPs note as stated above.    Samuel Jimenez MD  3/14/24

## 2024-03-14 NOTE — H&P (VIEW-ONLY)
Chronic patient Established Note (Follow up visit)      SUBJECTIVE:  Interval History 3/14/2024:  Jefferson Boogie presents for follow up. He is here today to discuss further options for treatment after right cluneal nerve block on 2/28/2024. He was here for the same reason two weeks ago, and we decided to give the block a bit more time to work before investigating other options. He reports no significant changes in his symptoms. He does say that he can manage his symptoms satisfactorily using 1.5 of his oxycodone  every 3 hours when doing things like golf and walking. He said he may use the medication once or twice a month and denies significant side effects.     Interval History 2/29/24:  Jefferson Boogie presents to the clinic for follow-up. He is s/p right cluneal nerve block 2/28/24. Today, he says that he does not know if he has had any difference in his symptoms so far. We spoke briefly yesterday about additional options, and he is here today for further discussion.     Interval History 01/16/2024:  Jefferson Boogie presents to the clinic for a follow-up appointment for low back, right hip, and right leg pain. Since the last visit, Jefferson Boogie states the pain has been persistant. Currently his right hip and leg pain is his biggest problem. He continues to endorse sharp stabbing pain in his hip and leg. Pain is exacerbated by activity, standing, laying on right side, lifting, bending. Pain is improved with rest, exercising, stretching, medications. Continues with Celebrex and Cymbalta. Current pain intensity is 6/10. Patient continues to endorse benefit from SCS. Patient denies red flags including weakness, unexpected weight loss/gain, night sweats/fevers, saddle anesthesia, and symptoms of CAREN.    Interval History 8/10/2023:  Patient reports that his pain post right SIJ and piriformis was persistent until it decreased 50% on 7/4/23, but then the pain returned after a few days. He reports that  during that period he was able to run after a bus in [facility]. The pain is improved, but not as much as previous in his right buttock and calf. Patient reports that he has been working with the [employer] Rep, but not finding the correct programming as of yet.  No fevers, chills, unexplained weight loss.  Hx of bladder cancer in 1967. Remembers falling while skiing and landed on his buttocks 30 years ago, but no other pelvic trauma. Patient was traveling to [place] and recently came back to [place].  We discussed his recent pelvic CT in details especially for the accidental finding of irregular bone lesion. Explained to patient the need to investigate this further with our urology and oncology team.     Interval History 6/22/2023  Jfeferson Boogie presents to the clinic for a follow-up appointment for chroniic LBP. Pt was last seen in clinic on 5/25 and scheduled for right SIJ and piriformis injection--performed 6/22. Pt reports 40% relief x 1-2 days. He continues to work with the rep with regards to programming the [employer] SCS device. Pt reports that he is getting some decent relief from the device with current program, but feels like there is still some progress to be made. Continues to complain of pain of similar character and quality to that of prior eval. Localized in the right lowerlumbar spine/gluteal area as well as the right lateral leg. Since the last visit, Jefferson Boogie states the pain has been improving. Current pain intensity is 5/10. He continues to exercise daily with stationary bike and weight training. Continues to take Cymbalta and Celebrex daily. He also takes Norco 10 very sparingly, maybe 1-2 per week. Overall the current regimen of SCS, medications and occasional injections have provided pt with enough relief for him to remain functional, and able to participate in his hobbies.      Interval history 5/25/23:  In the interim he has met with the [employer] reps for reprogramming. He has his good and bad days but  feels that the pain is manageable. The reps with [employer] are present for the visit today. Today his pain is a 6.5/10. Still taking percocet and cymbalta (missed a few days), but these medications do not provide much relief. Still pain with walking and sitting, but the pain with laying down is better.      Interval History 5/4/23:  Patient seen today via virtual follow-up after implantation of his [employer] SCS in February. He reports no change in his pain since his last visit. Most of his pain is into his right hip with intermittent radiation into his RLE. He denies any new symptoms. No red flag symptoms. He is scheduled to meet with the [employer] reps from reprogramming next week. He remains optimistic. In discussing his current medications, he has run out of the hydrocodone he was previously taking and has only been taking his Cymbalta once per day rather two.      Interval History 3/30/2023:  Mr Boogie presents 5-6 weeks after implantation of his Eddy SCS system. He reports no significant change in his pains, which are primarily down the RLE.  No constitutional signs symptoms concerning for infection.  No new areas of pain or neurological changes since procedure. No voicing of s/s concerning for cauda equina syndrome. He does endorse improvement in sleep. He is worried that his implant was a failure because his pain has not changed.     Interval History 2/20/2023:  Mr Boogie presents for follow up of Eddy SCS replacement. He states doing well. No constitutional signs symptoms concerning for infection.  No new areas of pain or neurological changes since procedure. No voicing of s/s concerning for cauda equina syndrome.      Interval History 1/23/2023:  Still taking Celebrex, however hasn't noticed a difference in pain with this medication. Location, quality, and severity of pain are unchanged from prior visit and is described above. Here today to discuss removal of Nervo SCS, and implant of Eddy SCS. He  states his stimulator has not been helpful for the past 2 years despite multiple reprogramming and adjustment.     Interval History 11/21/22:  Reports 24 hours of 100% relief for 48 hours, but he reports that now his pain is only approximally 10% better. His pain at rest is 3-4/10. Pain at its worst is 8-9/10. Pain is improved when leaning over a grocery cart. Pain only allows him to walk for 3-4mins.      Interval History 8/29/22:  Jefferson Boogie presents to the clinic for a follow-up appointment for back pain.  He had his second bilateral L3, L4, L5 MBB and reports 100% pain relief. He would like to proceed with RFA.     Interval History 5/26/22:  Jefferson Boogie presents to the clinic for a follow-up appointment for back pain. Since the last visit, Jefferson Boogie states the pain has been stable. Certain postures he can tolerate in the standing position such as leaning on a shopping cart or support to his posterior thighs. He now uses a walker when covering long distances. He continues to play golf despite it aggravating his pain. He receives some benefit from the SCS whereas other interventions have not helped. He remains interested in the [place] device.     Interval History 3/24/22:  Jefferson Boogie presents to the clinic for a follow-up appointment for back pain. Since the last visit, Jefferson Boogie states the pain has been worse than usual. Current pain intensity is 8/10. He continues to report benefit with [employer] SCS however he has not been able to get in touch with the representative in order to have his settings adjusted. He continues to take occasional norco 7.5 mg with benefit, particularly when he plays golf.     Interval History 2/10/22:  Patent presents today s/p caudal epidural steroid injections on 1/12/22 since then he has not had any relief. He states his pain has been unchanged and is a 6/10 on average with the worst being 8/10 and best is 4/10. He has been taking percocet 5mg when he  "golfs or plays with his granddaughter.      Interval History 12/30/21:  Jefferson Boogie presents to clinic for follow up appointment s/p Right SI joint and Right piriformis muscle injection under US guidance on 11/29/21. He did not obtain any noticeable relief with this procedure similar to all of his previous injections. His pain is unchanged and constant over the right buttock. He is taking percocet 5mg x 3 on days he is more active, such as playing golf. This helps some, but minimally. Denies any new symptoms or neurological changes. No numbness, tingling, weakness in his lower extremities. Denies bowel/bladder incontinence or saddle anesthesia.      Interval History 11/4/2021:  Jefferson Boogie presents to the clinic for a follow-up appointment for right sided back pain and SI joint pain. Mr. Boogie had a right SI joint injection on 10/13/2021. He did not note significant relief with the injection for any period of time. Pain is still constant over the right buttock and most noticeable when playing golf. He denies any new bowel/bladder incontinence, lower extremity numbness or weakness or saddle anesthesia.     Interval History 8/26/2021:  Jefferson Boogie presents to the clinic for a follow-up appointment for right sided hip pain with right-sided radiculopathy . Since the last visit, Jefferson Boogie states the pain has been "particularly tender today" 7/10. Patient reports playing golf recently and experienced worsening symptoms the following day. Mr. Boogie is s/p right-side hip joint injection with minimal relief. Patient states he has had relief with previous interventions and is open to RFA.     Interval History 6/10/2021:  Jefferson Boogie presents to the clinic for a follow-up appointment. Since the last visit, Jefferson Boogie states the pain has been stable. Current pain intensity is 3/10. States he is still having pain around SIJ that is affecting his ADL      Interval History " 4/15/2021:  The patient returns to clinic today for follow up of back pain. He reports that the swelling above the SI fusion incision has resolved. He denies any fever, chills, or drainage from the incision. He does report benefit since the fusion. He continues to report benefit with Nevro SCS. He reports intermittent shoulder pain at this time. He did receive an injection with Sports Medicine with benefit. He denies any other health changes. His pain today is 2/10.     Interval History 3/25/21:  Mr. Boogie presents to clinic for follow up of bilateral shoulder pain. He is s/p BL subacromial injections on 2/8/21. He reports maximum 20% relief of pain in the Right shoulder. Today the Left shoulder pain is 1/10; Right shoulder pain is 5-7/10.      Interval History 3/22/2021:  The patient returns to clinic today for follow up of back pain. He is s/p right SI fusion on 3/15/2021. He reports some relief at this time. He does report soreness to the incision. He denies any fever, chills, or drainage from incision. He continues to report benefit with Nevro SCS. He denies any other health changes. His pain today is 4/10.     Interval History 1/11/2020:  Patient is here for follow-up of low back pain now s/p sacroiliac RFA on 12/9/20 which provided no benefit and with the new complaint of bilateral anterior shoulder pain R>L. His shoulder pain started about 2 months ago. He describes 9/10 sharp/stinging pain without radiation that is exacerbated with overhead activity and improved with rest. He started PT about one month ago. He takes oxycodone which provides some relief. He had a blind subacromial steroid injection in the right shoulder with Dr. Ramirez on 10/26/19 which provided some short term relief.     Interval History 11/5/2020:  Jefferson Boogie presents to the clinic for a follow-up appointment for low back pain. Since the last visit, Jefferson Boogie states the pain has been stable. Current pain intensity is  4/10. He had good relief from the SI joint injection that lasted for about a week. Pain has since returned. He also slipped and fell on his buttock a couple of weeks ago and had increased pain in the right buttock after that which is slowly improving. He continues to have variable benefit with the Nevro SCS for intermittent pain in the right leg. He is scheduled to meet with representatives today. He is taking celebrex daily and percocet as needed sparingly. He denies any adverse effects and any other health changes.     Interval History 10/5/2020:  The patient returns to clinic today for follow up of low back pain. He is s/p right SI joint injection on 9/9/2020. He reports 25% relief of his right sided low back and buttock pain. He did have good relief the first two days. He continues to report right sided low back and buttock pain. He denies any radicular leg pain. His pain is worse with prolonged standing and walking. He continues to report intermittent pain into his achilles from previous injury. He feels as though this has changed his gait. He continues to report some benefit with Nevro SCS device. He is in contact with representatives. He is currently taking Percocet as needed sparingly with some benefit. He denies any adverse effects. He denies any other health changes. His pain today is 4/10.      Interval History 9/2/2020:  The patient returns to clinic today for follow up of back pain. He reports that his back pain has improved since last audio visit. He continues to report back pain with intermittent radiating pain into his right hip and calf. He has spoken with Bienvenido from [facility] via phone with some programming. He is meeting with Bienvenido today. He had some issues with charging but this has improved. He is currently taking Norco intermittently but this is only lasts 2-3 hours. He denies any adverse effects. He denies any other health changes. His pain today is 8/10.     Interval History 08/27/2020:  Pt was  contacted over [employer] for a follow up appointment.  He is currently complaining of right hip and right calf with no associated numbness or weakness.  He continues to use his Nevro device.  He states it has been very frustrating. Inconsistent.  Took 20 mins to 1 hour to charge.  Couldn't get it to start this morning.  He states that there is no drainage at the battery site, no signs of infection or pain at the site.  Patient is not taking medications at this time.  He wants to speak about medication options because he would like to start playing golf again.     Interval History 8/17/2020:  The patient returns to clinic today for post op wound check. He continues to report benefit with Nevro device. He denies any fever, chills, or drainage. He continues to follow his activity restrictions. He does report a recent achilles tendon injury while swimming. He is seeing Orthopedics. He denies any other health changes. His pain today is 4/10.     Interval History 8/3/2020:  The patient returns to clinic today for post op. He is s/p Nevro battery change on 7/27/2020. He denies any fever, chills, or drainage from incision. He has not completed his antibiotics as he missed two days of the medication. He continues to follow his activity restrictions. He reports relief with the device. He is meeting with Bienvenido javier for programming. He denies any other health changes. His pain today is 2/10     Interval History 7/22/2020:  Pt presents for audio follow up only s/p Right SI joint injection on 7/8/2020. Pt states he has near resolution of focal pain to buttock. He is pleased with result and pain relief. Pt has been in contact with [employer] and will be having battery swap in 5 days. He has difficulty whether stimulator is beneficial and has even had a holiday from using SCS.  He denies any newer areas pain, denies any neuro changes, meds area adequate to control pain without adverse side effects. Pain is 2/10 today.     Interval  HPI 6/15/2020:  Jefferson Boogie presents to the clinic for a follow-up appointment for 3 week follow up right hip and right thigh pain. Since the last visit, Jefferson Boogie states the pain has been persistant. Current pain intensity is 5/10. Patient has been working with Bienvenido Varghese, to try and get better coverage of a localized pain that he still experiences in his right low back area. He does report improvement in symptoms of numbness/tingling. Today, worse pain is 1/10 in severity and localizes to the right SI joint. Pain is worse with extension of his back. It is worse with golfing. Pain relieved by Norco--he takes 1-2 tablets of 5-325 Norco on days that he plays golf. Pain is also improved with being still and not being active. Patient endorses a much more active lifestyle with Norco. Denies new weakness/numbness or bowel/bladder changes.     Previous injection history includes a series of 3 lumbar CHOCO at [facility].      Interval HPI 3/5/20:  Patient presents to the clinic for a follow-up appointment for low back pain. Since the last visit, he states his pain has been unchanged. Current pain intensity is 4/10. He continues to work with Leon[employer] to adjust settings on his SCS. He has stopped using tramadol, and uses norco sparingly. He continues to work with a  for physical therapy.      Interval HPI 1/9/2020:  Patient returns for follow up s/p Nevro SCS. He states he is unsure if it has helped his pain since he had it implanted. He last had an adjustment of his programming about 1 month ago. He does note that once he is done charging his SCS his pain is improved. Pain still worse with prolonged standing and activity such as golf. He still is doing home exercise program. He says that he is trying to do more since getting the SCS. He is still taking the Tramadol with limited pain relief. He takes Tramadol 50 mg twice daily only on days of activity which is once a week. No other health  changes.      HPI 11/20/19  Jefferson Boogie returns to clinic today for follow up. He reports increased low back and leg pain over the last few days. He has been in contact with [employer] representatives for programming adjustments. He does report benefit with the device. He reports good days and bad days. His pain is worse with prolonged standing and activity. He continues to participate in a home exercise routine. He does take Tramadol sparingly but reports limited relief. He denies any other health changes. His pain today is 5/10.      Pain Medications:  Oxycodone-acetaminophen 10-325mg very rarely  Mobic  Cymbalta     Opioid Contract: not applicable      report:  Reviewed and consistent with medication use as prescribed. Has not filled oxycodone since June of last year, says he has plenty left.      Pain Procedures  9/9/2020 - Right SI joint injection  7/27/2020 - Nevro SCS battery change  7/8/2020 - Right SIJ injection >80% relief   8/26/19 - SCS implant  8/5/19 - SCS trial  7/8/2020 - Right SI joint injection  7/27/2020 - SCS battery revision  9/9/2020 - Right SI joint injection   12/9/2020 - Right L5-S1 RFA  3/15/2021 - Right SI fusion  7/21/2021 - Injection R Hip - minimal relief  10/13/2021 - Right SI joint injection  11/29/2021 - R SI joing and R piriformis muscle injection - no relief   1/12/2022 - Caudal CHOCO- no relief   8/24/22 - Diagnostic Bilateral L3, L4 and L5 Lumbar Medial Branch Block under Fluoroscopy  09/21/22 - Right L3, L4, L5 RFA  10/12/22 -  Left L3, L4, L5 RFA  2/13/23 - [place] SCS implant  5/31/23 - Right SIJ and piriformis injection   2/28/23 -  Right cluneal nerve block    Physical Therapy/Home Exercise: no     Imaging:   EXAMINATION:  CT PELVIS WITHOUT CONTRAST     CLINICAL HISTORY:  History fo percutanous SI fusion.;  Chronic pain syndrome     TECHNIQUE:  CT of the pelvis, without intravenous contrast.     COMPARISON:  None     FINDINGS:  BONE: Diffuse osteopenia.  No fracture or  osteonecrosis.  Few lytic and sclerotic lesions scattered throughout the pelvis, including a lesion in the right iliac bone with irregularity of the overlying cortex (2:103).     JOINT: Postoperative changes from right sacroiliac joint fusion.  The implant is in satisfactory position.  No osseous fusion across the joint space.     Degenerative changes both sacroiliac joints.  No erosions or ankylosis.  Degenerative changes also involve the lower lumbar spine, both hip joints, and pubic symphysis.  Chondrocalcinosis of the pubic symphysis.  No significant joint effusion.     SOFT TISSUE: Normal muscle bulk. Regional tendons are intact.  Calcific tendinopathy involving the proximal hamstring tendons bilaterally.  No bursal collection.     MISCELLANEOUS: Circumferential bladder wall thickening.  Prostatic calcifications.  Colonic diverticulosis.  Atherosclerosis.     Impression:     Postoperative changes in the right sacroiliac joint.  No osseous fusion.     Few lytic and sclerotic lesions scattered throughout the pelvis, concerning for metastases or myeloma.  No prior studies are available for comparison.     Bladder wall thickening, which could be secondary to outlet obstruction or cystitis.  Correlate with urinalysis.     Other findings as described.     This report was flagged in Epic as abnormal.    6/10/2021 - X ray Hips Bilateral: No radiographic evidence of acute osseous abnormality of the pelvis and hips and without radiographic evidence of osteonecrosis of the femoral heads.     9/18/21 MRI L-spine:  FINDINGS:  Lumbar sagittal alignment is slightly is straightened.  There is slight convex right curvature lumbar spine.  There is degenerative disc disease with intervertebral disc height loss and endplate degenerative change at all levels with scattered disc desiccation allowing for degenerative change the lumbar vertebral body heights and contours are within normal is without evidence for acute fracture or  subluxation.  There is heterogeneous T1/T2 signal focus within the right aspect of the S1 vertebra most compatible with hemangioma this measures 2.0 cm.     The distal spinal cord and conus is normal in signal and contour tip of the conus approximates the T12/L1 level.  Please note there is artifact from indwelling spinal stimulator device with leads partially visualized casting artifact entering the dorsal epidural space at the T12 level and extending cranially.     There is abnormal clumping configuration of the filum terminalis a from T12 through L5 with slight thickening of scattered nerve roots most compatible with arachnoiditis.     No aneurysmal dilatation of the visualized abdominal aorta.     T12/L1 through L1/L2: No significant disc bulge, central canal or neural foraminal stenosis.     L2/L3: Posterior disc osteophyte with facet joint arthropathy without significant central canal stenosis with mild bilateral neural foraminal stenosis.     L3/L4:Bulging disc with ligamentum flavum hypertrophy without significant central canal stenosis with mild bilateral neural foraminal stenosis.     L4/L5:Posterior disc osteophyte with ligamentum flavum hypertrophy and facet joint arthropathy without significant central canal stenosis and mild bilateral neural foraminal stenosis.     L5/S1: Posterior disc osteophyte with facet joint arthropathy without significant central canal stenosis with moderate right and mild left neural foraminal stenosis.     This report was flagged in Epic as abnormal.     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  Please note there is spinal stimulator device with leads partially visualized and distorting the study by artifacts.     There is abnormal thickening of the filum configuration most compatible with arachnoiditis.     Please see above for additional details.     Thoracic spine MRI:  FINDINGS: Thoracic vertebral body height and alignment are  maintained with a few small scattered Schmorl's nodes involving the endplates of several mid to lower thoracic vertebra. The T3-4 vertebra are partially fused. There is some degree of desiccation of all of the thoracic discs with multilevel disc space narrowing, especially at the T7-T8, T6-T7 and T5-T6 levels. There is no abnormal prevertebral soft tissue thickening. The somewhat heterogeneous appearance to the signal from the thoracic vertebra is presumably secondary to an admixture of red and yellow marrow.    T1-T2 level: There is no bony foraminal narrowing or bony central canal stenosis and the posterior disc margin is unremarkable.    T2-T3 level: There is no bony foraminal narrowing or bony central canal stenosis and the posterior disc margin is unremarkable.    T3-T4 level: There is no bony foraminal narrowing or bony central canal stenosis and the posterior disc margin is unremarkable.    T4-5 level: There is no bony foraminal narrowing or bony central canal stenosis and the posterior disc margin is unremarkable.    T5-T6 level: There is no bony foraminal narrowing or bony central canal stenosis and the posterior disc margin is unremarkable.    T6-T7 level: There is no bony foraminal narrowing or bony central canal stenosis and the posterior disc margin is unremarkable.    T7-T8 level: There is no bony foraminal narrowing or bony central canal stenosis and the posterior disc margin is unremarkable.    T8-T9 level: There is no bony foraminal narrowing or bony central canal stenosis and the posterior disc margin is unremarkable.    T9-T10 level: There is no bony foraminal narrowing or bony central canal stenosis and the posterior disc margin is unremarkable.    T10-T11 level: There is no bony foraminal narrowing or bony central canal stenosis and the posterior disc margin is unremarkable.    T11-T12 level: There is no bony foraminal narrowing or bony central canal stenosis and the posterior disc margin is  unremarkable.    T12-L1 level: The tip the conus medullaris extends down to level of the superior endplate of L1. 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 There is no bony foraminal narrowing or bony central canal stenosis at this level.    On the axial images, the immediate paravertebral soft tissues are unremarkable. A small cyst is seen to involve the upper pole the left kidney.    Following contrast administration, there is no abnormal enhancement of any bony or soft tissue elements of the thoracic spine. There is no abnormal enhancement of the subserosal surface of the thoracic spinal cord. Some foci of mild signal intensity in the CSF dorsal to the spinal cord of some of the sagittal sequences presumably is secondary to CSF pulsation artifact.    On the sagittal  image, there is cervical spondylosis and kyphosis with narrowing of all of the disc spaces in the cervical spine.     EXAMINATION:  CT PELVIS WITHOUT CONTRAST     CLINICAL HISTORY:  History fo percutanous SI fusion.;  Chronic pain syndrome     TECHNIQUE:  CT of the pelvis, without intravenous contrast.     COMPARISON:  None     FINDINGS:  BONE: Diffuse osteopenia.  No fracture or osteonecrosis.  Few lytic and sclerotic lesions scattered throughout the pelvis, including a lesion in the right iliac bone with irregularity of the overlying cortex (2:103).     JOINT: Postoperative changes from right sacroiliac joint fusion.  The implant is in satisfactory position.  No osseous fusion across the joint space.     Degenerative changes both sacroiliac joints.  No erosions or ankylosis.  Degenerative changes also involve the lower lumbar spine, both hip joints, and pubic symphysis.  Chondrocalcinosis of the pubic symphysis.  No significant joint effusion.     SOFT TISSUE: Normal muscle bulk. Regional tendons are intact.  Calcific tendinopathy involving the proximal hamstring tendons bilaterally.  No bursal  collection.     MISCELLANEOUS: Circumferential bladder wall thickening.  Prostatic calcifications.  Colonic diverticulosis.  Atherosclerosis.     Impression:     Postoperative changes in the right sacroiliac joint.  No osseous fusion.     Few lytic and sclerotic lesions scattered throughout the pelvis, concerning for metastases or myeloma.  No prior studies are available for comparison.     Bladder wall thickening, which could be secondary to outlet obstruction or cystitis.  Correlate with urinalysis.     Other findings as described.     This report was flagged in Epic as abnormal.        Electronically signed by: Koby Cote  Date:                                            07/10/2023  Time:                                           11:54    Allergies: Review of patient's allergies indicates:  No Known Allergies    Current Medications:   Current Outpatient Medications   Medication Sig Dispense Refill    ascorbic acid, vitamin C, (VITAMIN C) 1000 MG tablet Take 1,000 mg by mouth.      aspirin (ECOTRIN) 81 MG EC tablet Take 81 mg by mouth once daily.      celecoxib (CELEBREX) 200 MG capsule       DULoxetine (CYMBALTA) 30 MG capsule TAKE 1 CAPSULE(30 MG) BY MOUTH TWICE DAILY 60 capsule 2    ergocalciferol (DRISDOL) 200 mcg/mL (8,000 unit/mL) Drop Take by mouth.      ergocalciferol, vitamin D2, (VITAMIN D ORAL) Take by mouth every 30 days.      finasteride (PROSCAR) 5 mg tablet Take 1 tablet by mouth once daily.      HYDROcodone-acetaminophen (NORCO)  mg per tablet Take 1 tablet by mouth every 8 (eight) hours as needed for Pain. 90 tablet 0    irbesartan (AVAPRO) 75 MG tablet 150 mg once daily.       levothyroxine (SYNTHROID) 100 MCG tablet Take 100 mcg by mouth.      meloxicam (MOBIC) 15 MG tablet Take 15 mg by mouth.      mirabegron (MYRBETRIQ ORAL) Take by mouth.      simvastatin (ZOCOR) 20 MG tablet Take 20 mg by mouth every evening.      tadalafil (CIALIS) 20 MG Tab       temazepam (RESTORIL) 30 mg capsule  TK 1 C PO QD HS       No current facility-administered medications for this visit.     Facility-Administered Medications Ordered in Other Visits   Medication Dose Route Frequency Provider Last Rate Last Admin    balanced salt irrigation intra-ocular solution 1 drop  1 drop Right Eye On Call Procedure Bell Cedeno MD        sodium chloride 0.9% flush 2 mL  2 mL Intravenous PRN Bell Cedeno MD           REVIEW OF SYSTEMS:  GENERAL:  No weight loss, malaise or fevers.  HEENT:  Negative for frequent or significant headaches.  NECK:  Negative for lumps, goiter, pain and significant neck swelling.  RESPIRATORY:  Negative for cough, wheezing or shortness of breath.  CARDIOVASCULAR:  Negative for chest pain, leg swelling or palpitations.  GI:  Negative for abdominal discomfort, blood in stools or black stools or change in bowel habits.  MUSCULOSKELETAL:  See HPI.  SKIN:  Negative for lesions, rash, and itching.  PSYCH:  Negative for sleep disturbance, mood disorder and recent psychosocial stressors.  HEMATOLOGY/LYMPHOLOGY:  Negative for prolonged bleeding, bruising easily or swollen nodes.  NEURO:   No history of headaches, syncope, paralysis, seizures or tremors.  All other reviewed and negative other than HPI.    Past Medical History:  Past Medical History:   Diagnosis Date    Bronchitis     Cancer     stage I bladder cancer 50 yrs ago    Hypercholesteremia     Hypertension     Sacroiliitis 07/08/2020    Thyroid disease        Past Surgical History:  Past Surgical History:   Procedure Laterality Date    BLADDER SURGERY      CATARACT EXTRACTION      CATARACT EXTRACTION W/  INTRAOCULAR LENS IMPLANT Right 3/9/2022    Procedure: EXTRACTION, CATARACT, WITH IOL INSERTION;  Surgeon: Bell Cedeno MD;  Location: [facility];  Service: Ophthalmology;  Laterality: Right;    COLONOSCOPY      EPIDURAL STEROID INJECTION N/A 1/12/2022    Procedure: INJECTION, STEROID, EPIDURAL CAUDAL With Catheter needs consent;  Surgeon:  Samuel Jimenez MD;  Location: [facility] PAIN [facility]GT;  Service: Pain Management;  Laterality: N/A;    EYE SURGERY      cataracts     FUSION OF SACROILIAC JOINT Right 3/15/2021    Procedure: FUSION, RIGHT SACROILIAC JOINT FUSION;  Surgeon: Samuel Jimenez MD;  Location: [facility] OR;  Service: Pain Management;  Laterality: Right;  C-ARM, PAINTEQ REP     HERNIA REPAIR      INJECTION OF ANESTHETIC AGENT AROUND NERVE Bilateral 6/1/2022    Procedure: BLOCK, NERVE MEDIAL BRANCH L3,4,5 BILATERAL 1st;  Surgeon: Samuel Jimenez MD;  Location: [facility]IN [facility]GT;  Service: Pain Management;  Laterality: Bilateral;    INJECTION OF ANESTHETIC AGENT AROUND NERVE Bilateral 8/24/2022    Procedure: Block, Nerve Kenny L3, L4, & L5;  Surgeon: Samuel Jimenez MD;  Location: [facility]IN [facility];  Service: Pain Management;  Laterality: Bilateral;    INJECTION OF ANESTHETIC AGENT AROUND NERVE Right 2/28/2024    Procedure: BLOCK, NERVE RIGHT CLUNEAL;  Surgeon: Samuel Jimenez MD;  Location: [facility];  Service: Pain Management;  Laterality: Right;  [phone]    INJECTION OF JOINT Right 7/8/2020    Procedure: INJECTION, JOINT, SACROILIAC (SI);  Surgeon: Samuel Jimenez MD;  Location: [facility];  Service: Pain Management;  Laterality: Right;    INJECTION OF JOINT Right 9/9/2020    Procedure: INJECTION, JOINT, SACROILIAC (SI);  Surgeon: Samuel Jimenez MD;  Location: [facility];  Service: Pain Management;  Laterality: Right;    INJECTION OF JOINT Right 7/21/2021    Procedure: INJECTION, JOINT, HIP RIGHT;  Surgeon: Samuel Jimenez MD;  Location: [facility];  Service: Pain Management;  Laterality: Right;  CONSENT NEEDED    INJECTION OF JOINT Right 10/13/2021    Procedure: INJECTION, JOINT, SACROILIAC (SI)  NEED CONSENT pt. requesting sedation;  Surgeon: Samuel Jimenez MD;  Location: [facility]IN [facility];  Service: Pain Management;  Laterality: Right;    INJECTION, SACROILIAC JOINT Right 5/31/2023    Procedure: INJECTION,SACROILIAC  JOINT, RIGHT SI AND RIGHT PIRIFORMIS;  Surgeon: Samuel Jimenez MD;  Location: [facility];  Service: Pain Management;  Laterality: Right;    KNEE SURGERY      RADIOFREQUENCY ABLATION Right 12/9/2020    Procedure: RADIOFREQUENCY ABLATION, L5-S1-S2 LATERAL BRANCH COOLED;  Surgeon: Samuel Jimenez MD;  Location: [facility];  Service: Pain Management;  Laterality: Right;    RADIOFREQUENCY ABLATION Right 9/21/2022    Procedure: RADIOFREQUENCY ABLATION, RIGHT L3-L4-L5 PER DR JIMENEZ;  Surgeon: Samuel Jimenez MD;  Location: [facility];  Service: Pain Management;  Laterality: Right;    RADIOFREQUENCY ABLATION Left 10/12/2022    Procedure: RADIOFREQUENCY ABLATION, LEFT L3-L4-L5 TWO OF TWO;  Surgeon: Saumel Jimenez MD;  Location: [facility];  Service: Pain Management;  Laterality: Left;    REPLACEMENT OF NERVE STIMULATOR BATTERY N/A 7/27/2020    Procedure: Replacement, SPINAL CORD STIMULATOR BATTERY CHANGE TO NEVRO OMNION BATTERY;  Surgeon: Samuel Jimenez MD;  Location: [facility] OR;  Service: Pain Management;  Laterality: N/A;  C-ARM, NEVRO REP    REPLACEMENT OF SPINAL CORD STIMULATOR  2/13/2023    Procedure: REPLACEMENT, SPINAL CORD STIMULATOR;  Surgeon: Samuel Jimenez MD;  Location: [facility] OR;  Service: Pain Management;;    REVISION PROCEDURE INVOLVING SPINAL CORD NEUROSTIMULATOR N/A 2/13/2023    Procedure: SPINAL CORD STIMULATOR EXPLANT AND REIMPLANT SALUDA REP;  Surgeon: Samuel Jimenez MD;  Location: [facility] OR;  Service: Pain Management;  Laterality: N/A;    TRIAL OF SPINAL CORD NERVE STIMULATOR N/A 8/5/2019    Procedure: Trial, Neurostimulator, SPINAL CORD STIMULATOR TRIAL;  Surgeon: Samuel Jimenez MD;  Location: [facility] CATH LAB;  Service: Pain Management;  Laterality: N/A;  C-ARM, NEVRO REP     Family History:  No family history on file.    Social History:  Social History     Socioeconomic History    Marital status:    Tobacco Use    Smoking status: Former     Current packs/day:  "0.00     Types: Cigarettes     Quit date: 1980     Years since quittin.2    Smokeless tobacco: Never    Tobacco comments:     stopped 40 years ago   Substance and Sexual Activity    Alcohol use: Yes     Alcohol/week: 1.0 standard drink of alcohol     Types: 1 Shots of liquor per week     Comment: nightly -scotch    Drug use: Never    Sexual activity: Yes     Partners: Female       OBJECTIVE:    /60 (BP Location: Right arm, Patient Position: Sitting)   Pulse (!) 58   Resp 18   Ht 6' 1" (1.854 m)   Wt 93.3 kg (205 lb 11 oz)   SpO2 100%   BMI 27.14 kg/m²     PHYSICAL EXAM:     GENERAL: Alert and oriented x 3, no acute distress  PSYCH:  Mood and affect appropriate.  SKIN: Skin color, texture, turgor normal, no rashes or lesions.  HEENT:  Normocephalic, atraumatic. Cranial nerves grossly intact.  PULM: Non-labored breathing, symmetric chest rise.  BACK: Normal range of motion. TTP right SIJ and piriformis. WICHO, sacral compression, and thigh thrust positive on right.   EXTREMITIES: No deformities, edema, or skin discoloration.   MUSCULOSKELETAL: Bilateral upper and lower extremity strength is normal and symmetric. No atrophy is noted.  NEURO: Bilateral upper and lower extremity coordination and muscle stretch reflexes are physiologic and symmetric. No clonus. Absent Johnson. No tremor.    GAIT: Antalgic, mildly    ASSESSMENT: 88 y.o. year old male with chronic low back pain, consistent with:     1. Sacroiliitis  Procedure Request Order for Pain Management      2. Myofascial pain        3. Piriformis syndrome of right side            PLAN:   - I have stressed the importance of physical activity and a home exercise plan to help with pain and improve health.  - New prescription for oxycodone-acetaminophen 10-325mg q8 PRN. He uses this very sparingly.   - Schedule for right SIJ & piriformis injections prior to [place].   - if pain persist will proceed with revising his SIJ fusion using painteq bone " allograft   - Counseled patient regarding the importance of activity modification, constant sleeping habits, and physical therapy.    The above plan and management options were discussed at length with patient. Patient is in agreement with the above and verbalized understanding.    Darrian Pandya MD  [employer] Pain Fellow   I have personally reviewed the history and exam of this patient and agree with the resident/fellow/NPs note as stated above.    Samuel Jimenez MD  3/14/24

## 2024-03-15 DIAGNOSIS — M46.1 SACROILIITIS: Primary | ICD-10-CM

## 2024-03-15 RX ORDER — HYDROCODONE BITARTRATE AND ACETAMINOPHEN 10; 325 MG/1; MG/1
1 TABLET ORAL EVERY 8 HOURS PRN
Qty: 90 TABLET | Refills: 0 | Status: SHIPPED | OUTPATIENT
Start: 2024-03-15 | End: 2024-03-22 | Stop reason: SDUPTHER

## 2024-03-19 ENCOUNTER — PATIENT MESSAGE (OUTPATIENT)
Dept: PAIN MEDICINE | Facility: OTHER | Age: 89
End: 2024-03-19
Payer: MEDICARE

## 2024-03-19 ENCOUNTER — TELEPHONE (OUTPATIENT)
Dept: PAIN MEDICINE | Facility: CLINIC | Age: 89
End: 2024-03-19
Payer: MEDICARE

## 2024-03-19 NOTE — TELEPHONE ENCOUNTER
Staff called pharmacy to assist with their medication concerns. Pharmacy was closed staff will call pharmacy tomorrow.    ----- Message from Lois Gottlieb MA sent at 3/19/2024  3:14 PM CDT -----  Name of Who is Calling: Laura with [facility] calling on behalf of AMY NERI [[id number]]                What is the request in detail: They are calling due to a drug interaction with HYDROcodone-acetaminophen (NORCO)  mg per tablet and 2 other RX from another provider. Please assist.                Can the clinic reply by [facility]: No                What Number to Call Back if not [email or web address]: [phone]  ref#[id number]

## 2024-03-19 NOTE — TELEPHONE ENCOUNTER
----- Message from Lois Gottlieb MA sent at 3/19/2024  3:14 PM CDT -----  Name of Who is Calling: Laura with [facility] calling on behalf of AMY NERI [[id number]]                What is the request in detail: They are calling due to a drug interaction with HYDROcodone-acetaminophen (NORCO)  mg per tablet and 2 other RX from another provider. Please assist.                Can the clinic reply by [facility]: No                What Number to Call Back [facility] not [facility]: [phone]  ref#[id number]

## 2024-03-22 ENCOUNTER — TELEPHONE (OUTPATIENT)
Dept: PAIN MEDICINE | Facility: CLINIC | Age: 89
End: 2024-03-22
Payer: MEDICARE

## 2024-03-22 RX ORDER — HYDROCODONE BITARTRATE AND ACETAMINOPHEN 10; 325 MG/1; MG/1
1 TABLET ORAL EVERY 8 HOURS PRN
Qty: 90 TABLET | Refills: 0 | Status: SHIPPED | OUTPATIENT
Start: 2024-03-22 | End: 2024-04-21

## 2024-03-22 NOTE — TELEPHONE ENCOUNTER
Patient requesting refill on norco  Last office visit 2/29/2024   shows last refill on 6/6/2023  Patient does have a pain contract on file with [facility]  Patient last UDS no uds on file was consistent with current therapy

## 2024-03-22 NOTE — TELEPHONE ENCOUNTER
----- Message from Carolee Rodriguez sent at 3/22/2024  1:06 PM CDT -----  Regarding: rx concerns  Type:  Patient Returning Call      Name of who is calling:Jeanette/ Naseem Jin        What is request in detail:Jeanette is requesting a call back with concerns about Rx for patient.  HYDROcodone-acetaminophen (NORCO)  mg per tablet, today last day they can hold rx  Ref# [id number]    Can clinic reply by [facility]:no        What number to call back [facility] not [facility]:[phone]

## 2024-03-27 NOTE — OR NURSING
"Ambulates to bathroom, voids a moderate amount  With "good stream".  "
"Ambulates to bathroom, voids a small amt, " a trickle" .  "
Dr. Jimenez in.  Notified of drainage on dressing, checks pt.  Larger abdominal binder placed on pt at Liz. Barbara's request.  
no

## 2024-04-08 ENCOUNTER — PATIENT MESSAGE (OUTPATIENT)
Dept: PAIN MEDICINE | Facility: OTHER | Age: 89
End: 2024-04-08
Payer: MEDICARE

## 2024-04-10 ENCOUNTER — HOSPITAL ENCOUNTER (OUTPATIENT)
Facility: OTHER | Age: 89
Discharge: HOME | End: 2024-04-10
Attending: ANESTHESIOLOGY | Admitting: ANESTHESIOLOGY
Payer: MEDICARE

## 2024-04-10 VITALS
HEART RATE: 57 BPM | HEIGHT: 73 IN | TEMPERATURE: 98 F | RESPIRATION RATE: 16 BRPM | OXYGEN SATURATION: 97 % | BODY MASS INDEX: 25.98 KG/M2 | DIASTOLIC BLOOD PRESSURE: 65 MMHG | SYSTOLIC BLOOD PRESSURE: 160 MMHG | WEIGHT: 196 LBS

## 2024-04-10 DIAGNOSIS — M46.1 SACROILIITIS: ICD-10-CM

## 2024-04-10 DIAGNOSIS — G57.00 PIRIFORMIS SYNDROME, UNSPECIFIED LATERALITY: Primary | ICD-10-CM

## 2024-04-10 DIAGNOSIS — G89.29 CHRONIC PAIN: ICD-10-CM

## 2024-04-10 PROCEDURE — 27096 INJECT SACROILIAC JOINT: CPT | Mod: RT | Performed by: ANESTHESIOLOGY

## 2024-04-10 PROCEDURE — 20552 NJX 1/MLT TRIGGER POINT 1/2: CPT | Mod: 59,RT | Performed by: ANESTHESIOLOGY

## 2024-04-10 PROCEDURE — 20552 NJX 1/MLT TRIGGER POINT 1/2: CPT | Mod: 59,RT,, | Performed by: ANESTHESIOLOGY

## 2024-04-10 PROCEDURE — 27096 INJECT SACROILIAC JOINT: CPT | Mod: RT,,, | Performed by: ANESTHESIOLOGY

## 2024-04-10 PROCEDURE — 63600175 PHARM REV CODE 636 W HCPCS: Performed by: ANESTHESIOLOGY

## 2024-04-10 PROCEDURE — 25500020 PHARM REV CODE 255: Performed by: ANESTHESIOLOGY

## 2024-04-10 PROCEDURE — 25000003 PHARM REV CODE 250: Performed by: ANESTHESIOLOGY

## 2024-04-10 RX ORDER — LIDOCAINE HYDROCHLORIDE 20 MG/ML
INJECTION, SOLUTION INFILTRATION; PERINEURAL
Status: DISCONTINUED | OUTPATIENT
Start: 2024-04-10 | End: 2024-04-10 | Stop reason: HOSPADM

## 2024-04-10 RX ORDER — TRIAMCINOLONE ACETONIDE 40 MG/ML
INJECTION, SUSPENSION INTRA-ARTICULAR; INTRAMUSCULAR
Status: DISCONTINUED | OUTPATIENT
Start: 2024-04-10 | End: 2024-04-10 | Stop reason: HOSPADM

## 2024-04-10 RX ORDER — BUPIVACAINE HYDROCHLORIDE 2.5 MG/ML
INJECTION, SOLUTION EPIDURAL; INFILTRATION; INTRACAUDAL
Status: DISCONTINUED | OUTPATIENT
Start: 2024-04-10 | End: 2024-04-10 | Stop reason: HOSPADM

## 2024-04-10 RX ORDER — SODIUM CHLORIDE 9 MG/ML
INJECTION, SOLUTION INTRAVENOUS CONTINUOUS
Status: DISCONTINUED | OUTPATIENT
Start: 2024-04-10 | End: 2024-04-10 | Stop reason: HOSPADM

## 2024-04-10 NOTE — DISCHARGE INSTRUCTIONS

## 2024-04-10 NOTE — OP NOTE
Sacroiliac Joint and Piriformis Muscle Injection under Fluoroscopic Guidance    The procedure, risks, benefits, and options were discussed with the patient. There are no contraindications to the procedure. The patent expressed understanding and agreed to the procedure. Informed written consent was obtained prior to the start of the procedure and can be found in the patient's chart.    PATIENT NAME: Jefferson Boogie   MRN: [id number]     DATE OF PROCEDURE: 04/10/2024    PROCEDURE: Right Sacroiliac Joint Injection under Fluoroscopic Guidance  PROCEDURE: Right Piriformis Muscle Injection under Fluoroscopic Guidance    PRE-OP DIAGNOSIS: Sacroiliitis [M46.1]  Myofacial pain     POST-OP DIAGNOSIS: Same    PHYSICIAN: Samuel Jimenez MD    ASSISTANTS: LAUREN Jeong MD  [employer] PM&R,, pain fellow      MEDICATIONS INJECTED: Preservative-free Kenalog 40mg with 7cc of Bupivacine 0.25%     LOCAL ANESTHETIC INJECTED: Xylocaine 2%     SEDATION: None    ESTIMATED BLOOD LOSS: None    COMPLICATIONS: None    TECHNIQUE: Time-out was performed to identify the patient and procedure to be performed. With the patient laying in a prone position, the surgical area was prepped and draped in the usual sterile fashion using ChloraPrep and a fenestrated drape. The sacroiliac joint was determined under fluoroscopy guidance. Skin anesthesia was achieved by injecting Lidocaine 2% over the injection site. The sacroiliac joint was  then approached with a 22 gauge, 3.5 inch spinal quinke needle that was introduced under fluoroscopic guidance in the AP and Lateral views. Once the needle tip was in the area of the joint, and there was no blood, contrast dye Omnipaque (300mg/mL) was injected to confirm placement and there was no vascular runoff. Fluoroscopic imaging in the AP and lateral views revealed a clear outline of the joint space. 4 mL of the medication mixture listed above was injected slowly intraarticular and unique-articular. Displacement of  the radio opaque contrast after injection of the medication confirmed that the medication went into the area of the joint. The needles were removed and bleeding was nil.  A sterile dressing was applied. No specimens collected. The patient tolerated the procedure well.     TECHNIQUE: Time-out was performed to identify the patient and procedure to be performed. With the patient laying in a prone position, the surgical area was prepped and draped in the usual sterile fashion using ChloraPrep and a fenestrated drape.The area overlying the right piriformis muscle was identified under fluoroscopy in the AP view. Skin anesthesia was achieved by injecting Lidocaine 2% over the injection sites. A 22 gauge,  3.5 inch spinal quinke needle was then advanced 3 cm inferior and 3 cm lateral to the inferior aspect of the SI joint. Once the piriformis muscle was thought to be encountered, Contrast dye  Omnipaque (300mg/mL) was injected to confirm placement and there was no vascular runoff. Confirmation of spread was identified within the piriformis muscle using AP fluoroscopic views. Then  4 mL of the medication mixture listed above was injected slowly. The needles were removed and bleeding was nil. A sterile dressing was applied. No specimens collected. The patient tolerated the procedure well.     The patient was monitored after the procedure in the recovery area. They were given post-procedure and discharge instructions to follow at home. The patient was discharged in a stable condition.    Scott Brizuela MD    I reviewed and edited the fellow's note. I conducted my own interview and physical examination. I agree with the findings. I was present and supervising all critical portions of the procedure.  Samuel Jimenez MD

## 2024-04-11 NOTE — PROGRESS NOTES
[facility]  Physical Therapy Lumbar Evaluation      Name: Jefferson Boogie  Clinic Number: [id number]    Therapy Diagnosis:   Encounter Diagnoses   Name Primary?    Cluneal neuropathy     Primary osteoarthritis of both hips Yes    Spondylosis of lumbosacral region, unspecified spinal osteoarthritis complication status      Physician: Darron Espinal MD    Physician Orders: PT Eval and Treat  Medical Diagnosis from Referral: G58.8 (ICD-10-CM) - Cluneal neuropathy  Evaluation Date: 4/12/2024  Authorization Period Expiration: 2/27/2025  Plan of Care Expiration: 6/21/2024  Reassessment Due: 5/12/2024  Visit # / Visits authorized: 1/1 (pending)  MedX testing visit 2    Time In: 10:05 AM  Time Out: 11:06 AM  Total Billable Time: 58 minutes  INSURANCE and OUTCOMES: Fee for Service with FOTO Outcomes 1/3    Precautions: standard and HTN, cancer    Pattern of pain determined: 1PEN    Subjective     Date of onset: ~2019/2020  History of current condition: Jefferson reports that all of a sudden 4-5 years ago he started getting low back pain and couldn't walk long distances without stopping and resting. He proceeded to see a doctor in [place], [place] and was diagnosis with arachnoiditis. This doctor referred him to a local doctor [place] who implemented two spinal cord stimulators along with a number of injections, ablations & SIJ injections as well. Patient reports no significant benefit beyond a few days from the aforementioned treatments. However, the most recent SIJ injection helped him to sit longer, but standing and walking for prolong periods is difficult and becomes painful. His doctor      Medical History:   Past Medical History:   Diagnosis Date    Bronchitis     Cancer     stage I bladder cancer 50 yrs ago    Hypercholesteremia     Hypertension     Sacroiliitis 07/08/2020    Thyroid disease      Surgical History:   Jefferson Boogie  has a past surgical history that includes  Knee surgery; Bladder surgery; Hernia repair; Trial of spinal cord nerve stimulator (N/A, 8/5/2019); Injection of joint (Right, 7/8/2020); Replacement of nerve stimulator battery (N/A, 7/27/2020); Injection of joint (Right, 9/9/2020); Radiofrequency ablation (Right, 12/9/2020); Colonoscopy; Eye surgery; Fusion of sacroiliac joint (Right, 3/15/2021); Injection of joint (Right, 7/21/2021); Cataract extraction; Injection of joint (Right, 10/13/2021); Epidural steroid injection (N/A, 1/12/2022); Cataract extraction w/  intraocular lens implant (Right, 3/9/2022); Injection of anesthetic agent around nerve (Bilateral, 6/1/2022); Injection of anesthetic agent around nerve (Bilateral, 8/24/2022); Radiofrequency ablation (Right, 9/21/2022); Radiofrequency ablation (Left, 10/12/2022); Revision procedure involving spinal cord neurostimulator (N/A, 2/13/2023); Replacement of spinal cord stimulator (2/13/2023); injection, sacroiliac joint (Right, 5/31/2023); Injection of anesthetic agent around nerve (Right, 2/28/2024); and Injection of joint (Right, 4/10/2024).    Medications:   Jefferson has a current medication list which includes the following prescription(s): ascorbic acid (vitamin c), aspirin, celecoxib, duloxetine, ergocalciferol, ergocalciferol (vitamin d2), finasteride, hydrocodone-acetaminophen, irbesartan, levothyroxine, meloxicam, mirabegron, simvastatin, tadalafil, and temazepam, and the following Facility-Administered Medications: balanced salt irrigation and sodium chloride 0.9%.    Allergies:   Review of patient's allergies indicates:  No Known Allergies     Imaging: CT Scan Chest 1/2/2024 FINDINGS:   Chest:     Thoracic inlet: Unremarkable     Heart and great vessels: The heart is not enlarged. There is no pericardial fluid or thickening. The thoracic aorta is of normal course and caliber. No filling defects are identified within the pulmonary arteries to suggest a thromboembolism.     Lymphatics: Unremarkable  "    Central airways: The trachea and proximal airways are patent.     Lung parenchyma and pleural space: Minor dependent changes are noted at both lung bases. There is stable scarring at the right lung apex. No pneumothorax. No pleural fluid.     Upper abdomen: Unremarkable   Bone windows: Unremarkable     Prior Therapy: [facility] ~2023 (Pt. 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  Where is your pain the worst? Left buttock  2.  Is your pain constant or intermittent? intermittent  3.  Does bending forward make your typical pain worse? no  4.  Since the start of your back pain, has there been a change in your bowel or bladder? Mild incontinence   5.  What can't you do now that you use to be able to do? Play golf,     Pts goals: "reduce pain"    Red Flag Screening:   Cough/Sneeze Strain: (--)  Bladder/Bowel: (--)  Falls: (--)  Night pain: (--)  Unexplained weight loss: (--)  General health: "Pretty good"    Objective      Postural examination/scapula alignment: Rounded shoulder, Head forward, Affected scapula abducted, Affected scapula downwardly rotated, Abnormal trunk flexion, Slouched posture, Increased kyphosis, and Decreased lordosis  Joint integrity: Firm end feeling  Skin integrity:WNL "   Edema: None  Correction of posture: better with lumbar roll  Standing: Right increased weight bearing, left trunk deviation, left hip IR & ankle pronation, excessive forward head, flexed knees,     MOVEMENT LOSS - Lumbar   Norms ROM Loss Initial   Flexion Fingers touch toes, sacral angle >/= 70 deg, uniform spinal curvature, posterior weight shift  moderate loss   Extension ASIS surpasses toes, spine of scapulae surpasses heels, uniform spinal curve major loss   Side glide Right  major loss P!   Side glide Left  moderate loss   Rotation Right PT observes contralateral shoulder major loss   Rotation Left PT observes contralateral shoulder major loss     Lower Extremity Strength  Right LE  Left LE    Hip flexion: 3+/5 Hip flexion: 3+/5   Hip extension:  3/5 Hip extension: 3-/5   Hip abduction: 4-/5 Hip abduction: 4-/5   Hip adduction:  4/5 Hip adduction:  4/5   Hip External Rotation 3/5 Hip External Rotation 3+/5   Hip Internal rotation   3+/5 Hip Internal rotation 3+/5   Knee Flexion 4/5 Knee Flexion 4+/5   Knee Extension 4+/5 Knee Extension 4+/5   Ankle dorsiflexion: 4-/5 Ankle dorsiflexion: 4-/5   Ankle plantarflexion: 3+/5 Ankle plantarflexion: 3+/5     GAIT:  Assistive Device used: straight cane  Level of Assistance: independent  Patient displays the following gait deviations: unsteady gait, decreased step length, increased base of support, decreased weight shift, hip hiking, and antalgic gait.     Special Tests:   Test Name  Test Result   Prone Instability Test (--)   SI Joint Provocation Test (--)   Straight Leg Raise (--)   Neural Tension Test (--)   Crossed Straight Leg Raise (+) B/L   Walking on toes Not able   Walking on heels  Able   BL Quadrant -    NEUROLOGICAL SCREENING:     Sensory deficits: All LQ dermatomes WFL    Reflexes:    Left Right   Patella Tendon 2+ 2+   Achilles Tendon 1+ 1+   Babinski  (--) (--)   Clonus (--) (--)     REPEATED TEST MOVEMENTS:    Baseline symptoms:  Repeated Flexion in  "Standing no effect   Repeated Extension in Standing end range pain   Repeated Flexion in lying better   Repeated Extension in lying  no effect       STATIC TESTS and other movements:   Prone lie no effect    Prone lie on elbows produced    Sitting slouched  no effect   Sitting erect no effect   Standing slouched no effect   Standing erect  no effect   Lying prone in extension better   Long sitting   no effect   Sustained flexion no effect   Sustained prone using mat better     Lumbar testing Visit 2    OUTCOMES SELECTION:   Intake Outcome Measure for FOTO Lumbar Survey    Therapist reviewed FOTO scores for Jefferson Boogie on 4/12/2024.   FOTO documents entered into [facility] - see Media section.    Intake Score: 56% functional ability  Goal Score: 59% functional ability        Treatment     Total Treatment time separate from Evaluation: 10 minutes    Jefferson received therapeutic exercises to develop/improve posture, lumbar ROM, strength, and muscular endurance for 2 minutes including the following exercises:     PPT x 5" sec.   Prone lying x1'    Bridges x 10  + DKTC next visit*    Written Home Exercises Provided: yes.    HEP AS FOLLOWS:  PPT   Prone lying   Bridges    Exercises were reviewed and Jefferson was able to demonstrate them prior to the end of the session. Jefferson demonstrated fair  understanding of the education provided.     See EMR under Patient Instructions for exercises provided 4/12/2024.    Jefferson received the following manual therapy techniques: Joint mobilizations, Manual traction, Myofacial release, Manual Lymphatic Drainage, Soft tissue Mobilization, and Friction Massage were applied to the: - for 0 minutes.     MedX Testing:  MedX testing to be performed next visit    Therapeutic Education/Activity provided for 8 minutes:   - Patient was given an [facility] Visit 1 handout which discusses the following:  - what to expect in therapy  - an overview of the program, including health coaching and " wellness  - importance of spinal hygiene, proper posture, lifting mechanics, sleep quality, and nutrition/hydration   - Blas roll trialed, recommended, and purchase information was provided.  - Patient received a handout regarding anticipated muscular soreness following the isometric test and strategies for management were reviewed with patient including stretching, using ice and scheduled rest.   - Patient received verbal education on the following:   - Healthy Back program   - purpose of the isometric test  - safe progression of lumbar strengthening, wellness approach, and systemic strengthening.   - safe usage of MedX machine and testing protocols.    Jefferson received cold pack for 0 minutes to lumbar spine in [place].    Assessment     Jefferson is a 88 y.o. male referred to [facility] with a medical diagnosis of G58.8 (ICD-10-CM) - Cluneal neuropathy. Pt presents with G58.8 (ICD-10-CM) - Cluneal neuropathy that began approximately 4-5 years ago with an insidious onset. Patient noticed that he was having a sudden onset of pain after walking long distances. Patient was diagnosed with arachnoidis shortly after via an out of state specialist in [place], [place]. He was then transferred to a local [place] physician for care who implemented 2 spinal simulators, multiple lumbosacral injection and ablations. Patient reports minimal success with these interventions. Physical examination reveals grossly limited lumbar range of motion with greatest limitations in lumbar extension, bilateral rotation and left side bending which is also painful; grossly limited bilateral hip and bilateral plantarflexion MMTs; pain provocation with repeated end range lumbar extension and sustained prone lying on elbows; positive bilateral Crossed Straight Leg raise; multiple gait deviations including a wide base of support, decreased bilateral terminal hip extension, an antalgic and and unsteady gait pattern; and pain alleviation  with prone lying on stomach. Patient's impairments affect his ability to perform prolong standing, household and community ambulation, golf and perform ADLs that involve prolong standing or walking. Given the severity of the patient's lumbar spine, his high amount of previously minimally successful medical interventions, his past medical history, and overall health for his age, a partial recovery is expected within 10-12 weeks. His rehab potential is dependent on compliance with PT recommendations and adherence to his home exercise program. Skilled PT intervention is required to address these key impairments and to provide and progress with an appropriate home exercise program. This evaluation is of low complexity due to the changing nature of the patients presentation as well as the comorbidities and medical factors included in this evaluation.The patient has been educated in the evaluation findings, prognosis, and plan of care, HEP and is in agreement and willing to participate in therapy.     Pain Pattern: 1PEN       Pt prognosis is Good.     Pt will benefit from skilled outpatient Physical Therapy to address the deficits stated above and in the chart below, to provide pt/family education, and to maximize pt's level of independence. Based on the above history and physical examination an active physical therapy program is recommended.      Plan of care discussed with patient: Yes  Pt's spiritual, cultural and educational needs considered and patient is agreeable to the plan of care and goals as stated below:     Anticipated Barriers for therapy: Chronicity of symptoms, age (multiple co-morbidities)    PT Evaluation Completed? Yes    Medical necessity is demonstrated by the following problem list:    History  Co-morbidities and personal factors that may impact the plan of care [] LOW: no personal factors / co-morbidities  [] MODERATE: 1-2 personal factors / co-morbidities  [x] HIGH: 3+ personal factors /  co-morbidities    Moderate / High Support Documentation:   Co-morbidities affecting plan of care:    Bronchitis     Cancer     stage I bladder cancer 50 yrs ago    Hypercholesteremia     Hypertension     Sacroiliitis     Thyroid disease      Personal Factors:   age     Examination  Body Structures and Functions, activity limitations and participation restrictions that may impact the plan of care [x] LOW: addressing 1-2 elements  [] MODERATE: 3+ elements  [] HIGH: 4+ elements (please support below)    Moderate / High Support Documentation:     Clinical Presentation [x] LOW: stable  [] MODERATE: Evolving  [] HIGH: Unstable     Decision Making/ Complexity Score: low         GOALS: Pt is in agreement with the following goals.    Short term goals:  6 weeks or 10 visits   - Pt will demonstrate increased lumbar MedX ROM by at least 5 degrees from the initial ROM value with improvements noted in functional ROM and ability to perform ADLs. Appropriate and Ongoing  - Pt will demonstrate increased MedX average isometric strength value by 10% from initial test resulting in improved ability to perform bending, lifting, and carrying activities safely, confidently. Appropriate and Ongoing  - Pt will report a reduction in worst pain score by 1-2 points for improved tolerance for prolong standing and ADLs that require at least 3 minutes of standing. Appropriate and Ongoing  - Pt able to perform HEP correctly with minimal cueing or supervision from therapist to encourage independent management of symptoms. Appropriate and Ongoing    Long term goals: 10 weeks or 20 visits   - Pt will demonstrate increased lumbar MedX ROM by at least 10 degrees from initial ROM value, resulting in improved ability to perform functional forward bending while standing and sitting. Appropriate and Ongoing  - Pt will demonstrate increased MedX average isometric strength value by 20% from initial test resulting in improved ability to perform bending,  lifting, and carrying activities safely and confidently. Appropriate and Ongoing  - Pt to demonstrate ability to independently control and reduce their pain through posture positioning and mechanical movements throughout a typical day. Appropriate and Ongoing  - Pt will demonstrate reduced pain and improved functional outcomes as reported on the FOTO by reaching an intake score of >/= 59% functional ability in order to demonstrate subjective improvement in patient's condition. . Appropriate and Ongoing  - Pt will demonstrate independence with the HEP at discharge. Appropriate and Ongoing  - Patient able to perform community ambulation > 200 ft. With no greater than 2/10 low back pain and no breaks. Appropriate and Ongoing    Plan     Outpatient physical therapy 2x week for 10 weeks or 20 visits to include the following:   - Patient education  - Therapeutic exercise  - Manual therapy  - Performance testing   - Neuromuscular Re-education  - Therapeutic activity   - Modalities    Pt may be seen by PTA as part of the rehabilitation team.     Therapist: Katina Hidalgo, PT  4/12/2024

## 2024-04-12 ENCOUNTER — CLINICAL SUPPORT (OUTPATIENT)
Dept: REHABILITATION | Facility: OTHER | Age: 89
End: 2024-04-12
Payer: MEDICARE

## 2024-04-12 DIAGNOSIS — M47.817 SPONDYLOSIS OF LUMBOSACRAL REGION, UNSPECIFIED SPINAL OSTEOARTHRITIS COMPLICATION STATUS: ICD-10-CM

## 2024-04-12 DIAGNOSIS — M16.0 PRIMARY OSTEOARTHRITIS OF BOTH HIPS: Primary | ICD-10-CM

## 2024-04-12 DIAGNOSIS — G58.8 CLUNEAL NEUROPATHY: ICD-10-CM

## 2024-04-12 PROCEDURE — 97161 PT EVAL LOW COMPLEX 20 MIN: CPT

## 2024-04-12 PROCEDURE — 97530 THERAPEUTIC ACTIVITIES: CPT

## 2024-04-12 NOTE — PLAN OF CARE
[facility]  Physical Therapy Lumbar Evaluation      Name: Jefferson Boogie  Clinic Number: [id number]    Therapy Diagnosis:   Encounter Diagnoses   Name Primary?    Cluneal neuropathy     Primary osteoarthritis of both hips Yes    Spondylosis of lumbosacral region, unspecified spinal osteoarthritis complication status      Physician: Darron Espinal MD    Physician Orders: PT Eval and Treat  Medical Diagnosis from Referral: G58.8 (ICD-10-CM) - Cluneal neuropathy  Evaluation Date: 4/12/2024  Authorization Period Expiration: 2/27/2025  Plan of Care Expiration: 6/21/2024  Reassessment Due: 5/12/2024  Visit # / Visits authorized: 1/1 (pending)  MedX testing visit 2    Time In: 10:05 AM  Time Out: 11:06 AM  Total Billable Time: 58 minutes  INSURANCE and OUTCOMES: Fee for Service with FOTO Outcomes 1/3    Precautions: standard and HTN, cancer    Pattern of pain determined: 1PEN    Subjective     Date of onset: ~2019/2020  History of current condition: Jefferson reports that all of a sudden 4-5 years ago he started getting low back pain and couldn't walk long distances without stopping and resting. He proceeded to see a doctor in [place], [place] and was diagnosis with arachnoiditis. This doctor referred him to a local doctor [place] who implemented two spinal cord stimulators along with a number of injections, ablations & SIJ injections as well. Patient reports no significant benefit beyond a few days from the aforementioned treatments. However, the most recent SIJ injection helped him to sit longer, but standing and walking for prolong periods is difficult and becomes painful. His doctor      Medical History:   Past Medical History:   Diagnosis Date    Bronchitis     Cancer     stage I bladder cancer 50 yrs ago    Hypercholesteremia     Hypertension     Sacroiliitis 07/08/2020    Thyroid disease      Surgical History:   Jefferson Boogie  has a past surgical history that includes  Knee surgery; Bladder surgery; Hernia repair; Trial of spinal cord nerve stimulator (N/A, 8/5/2019); Injection of joint (Right, 7/8/2020); Replacement of nerve stimulator battery (N/A, 7/27/2020); Injection of joint (Right, 9/9/2020); Radiofrequency ablation (Right, 12/9/2020); Colonoscopy; Eye surgery; Fusion of sacroiliac joint (Right, 3/15/2021); Injection of joint (Right, 7/21/2021); Cataract extraction; Injection of joint (Right, 10/13/2021); Epidural steroid injection (N/A, 1/12/2022); Cataract extraction w/  intraocular lens implant (Right, 3/9/2022); Injection of anesthetic agent around nerve (Bilateral, 6/1/2022); Injection of anesthetic agent around nerve (Bilateral, 8/24/2022); Radiofrequency ablation (Right, 9/21/2022); Radiofrequency ablation (Left, 10/12/2022); Revision procedure involving spinal cord neurostimulator (N/A, 2/13/2023); Replacement of spinal cord stimulator (2/13/2023); injection, sacroiliac joint (Right, 5/31/2023); Injection of anesthetic agent around nerve (Right, 2/28/2024); and Injection of joint (Right, 4/10/2024).    Medications:   Jefferson has a current medication list which includes the following prescription(s): ascorbic acid (vitamin c), aspirin, celecoxib, duloxetine, ergocalciferol, ergocalciferol (vitamin d2), finasteride, hydrocodone-acetaminophen, irbesartan, levothyroxine, meloxicam, mirabegron, simvastatin, tadalafil, and temazepam, and the following Facility-Administered Medications: balanced salt irrigation and sodium chloride 0.9%.    Allergies:   Review of patient's allergies indicates:  No Known Allergies     Imaging: CT Scan Chest 1/2/2024 FINDINGS:   Chest:     Thoracic inlet: Unremarkable     Heart and great vessels: The heart is not enlarged. There is no pericardial fluid or thickening. The thoracic aorta is of normal course and caliber. No filling defects are identified within the pulmonary arteries to suggest a thromboembolism.     Lymphatics: Unremarkable  "    Central airways: The trachea and proximal airways are patent.     Lung parenchyma and pleural space: Minor dependent changes are noted at both lung bases. There is stable scarring at the right lung apex. No pneumothorax. No pleural fluid.     Upper abdomen: Unremarkable   Bone windows: Unremarkable     Prior Therapy: [facility] ~2023 (Pt. 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  Where is your pain the worst? Left buttock  2.  Is your pain constant or intermittent? intermittent  3.  Does bending forward make your typical pain worse? no  4.  Since the start of your back pain, has there been a change in your bowel or bladder? Mild incontinence   5.  What can't you do now that you use to be able to do? Play golf,     Pts goals: "reduce pain"    Red Flag Screening:   Cough/Sneeze Strain: (--)  Bladder/Bowel: (--)  Falls: (--)  Night pain: (--)  Unexplained weight loss: (--)  General health: "Pretty good"    Objective      Postural examination/scapula alignment: Rounded shoulder, Head forward, Affected scapula abducted, Affected scapula downwardly rotated, Abnormal trunk flexion, Slouched posture, Increased kyphosis, and Decreased lordosis  Joint integrity: Firm end feeling  Skin integrity:WNL "   Edema: None  Correction of posture: better with lumbar roll  Standing: Right increased weight bearing, left trunk deviation, left hip IR & ankle pronation, excessive forward head, flexed knees,     MOVEMENT LOSS - Lumbar   Norms ROM Loss Initial   Flexion Fingers touch toes, sacral angle >/= 70 deg, uniform spinal curvature, posterior weight shift  moderate loss   Extension ASIS surpasses toes, spine of scapulae surpasses heels, uniform spinal curve major loss   Side glide Right  major loss P!   Side glide Left  moderate loss   Rotation Right PT observes contralateral shoulder major loss   Rotation Left PT observes contralateral shoulder major loss     Lower Extremity Strength  Right LE  Left LE    Hip flexion: 3+/5 Hip flexion: 3+/5   Hip extension:  3/5 Hip extension: 3-/5   Hip abduction: 4-/5 Hip abduction: 4-/5   Hip adduction:  4/5 Hip adduction:  4/5   Hip External Rotation 3/5 Hip External Rotation 3+/5   Hip Internal rotation   3+/5 Hip Internal rotation 3+/5   Knee Flexion 4/5 Knee Flexion 4+/5   Knee Extension 4+/5 Knee Extension 4+/5   Ankle dorsiflexion: 4-/5 Ankle dorsiflexion: 4-/5   Ankle plantarflexion: 3+/5 Ankle plantarflexion: 3+/5     GAIT:  Assistive Device used: straight cane  Level of Assistance: independent  Patient displays the following gait deviations: unsteady gait, decreased step length, increased base of support, decreased weight shift, hip hiking, and antalgic gait.     Special Tests:   Test Name  Test Result   Prone Instability Test (--)   SI Joint Provocation Test (--)   Straight Leg Raise (--)   Neural Tension Test (--)   Crossed Straight Leg Raise (+) B/L   Walking on toes Not able   Walking on heels  Able   BL Quadrant -    NEUROLOGICAL SCREENING:     Sensory deficits: All LQ dermatomes WFL    Reflexes:    Left Right   Patella Tendon 2+ 2+   Achilles Tendon 1+ 1+   Babinski  (--) (--)   Clonus (--) (--)     REPEATED TEST MOVEMENTS:    Baseline symptoms:  Repeated Flexion in  "Standing no effect   Repeated Extension in Standing end range pain   Repeated Flexion in lying better   Repeated Extension in lying  no effect       STATIC TESTS and other movements:   Prone lie no effect    Prone lie on elbows produced    Sitting slouched  no effect   Sitting erect no effect   Standing slouched no effect   Standing erect  no effect   Lying prone in extension better   Long sitting   no effect   Sustained flexion no effect   Sustained prone using mat better     Lumbar testing Visit 2    OUTCOMES SELECTION:   Intake Outcome Measure for FOTO Lumbar Survey    Therapist reviewed FOTO scores for Jefferson Boogie on 4/12/2024.   FOTO documents entered into [facility] - see Media section.    Intake Score: 56% functional ability  Goal Score: 59% functional ability        Treatment     Total Treatment time separate from Evaluation: 10 minutes    Jefferson received therapeutic exercises to develop/improve posture, lumbar ROM, strength, and muscular endurance for 2 minutes including the following exercises:     PPT x 5" sec.   Prone lying x1'    Bridges x 10  + DKTC next visit*    Written Home Exercises Provided: yes.    HEP AS FOLLOWS:  PPT   Prone lying   Bridges    Exercises were reviewed and Jefferson was able to demonstrate them prior to the end of the session. Jefferson demonstrated fair  understanding of the education provided.     See EMR under Patient Instructions for exercises provided 4/12/2024.    Jefferson received the following manual therapy techniques: Joint mobilizations, Manual traction, Myofacial release, Manual Lymphatic Drainage, Soft tissue Mobilization, and Friction Massage were applied to the: - for 0 minutes.     MedX Testing:  MedX testing to be performed next visit    Therapeutic Education/Activity provided for 8 minutes:   - Patient was given an [facility] 1 handout which discusses the following:  - what to expect in therapy  - an overview of the program, including health coaching and " wellness  - importance of spinal hygiene, proper posture, lifting mechanics, sleep quality, and nutrition/hydration   - Blas roll trialed, recommended, and purchase information was provided.  - Patient received a handout regarding anticipated muscular soreness following the isometric test and strategies for management were reviewed with patient including stretching, using ice and scheduled rest.   - Patient received verbal education on the following:   - Healthy Back program   - purpose of the isometric test  - safe progression of lumbar strengthening, wellness approach, and systemic strengthening.   - safe usage of MedX machine and testing protocols.    Jefferson received cold pack for 0 minutes to lumbar spine in [place].    Assessment     Jefferson is a 88 y.o. male referred to [facility] with a medical diagnosis of G58.8 (ICD-10-CM) - Cluneal neuropathy. Pt presents with G58.8 (ICD-10-CM) - Cluneal neuropathy that began approximately 4-5 years ago with an insidious onset. Patient noticed that he was having a sudden onset of pain after walking long distances. Patient was diagnosed with arachnoidis shortly after via an out of state specialist in [place], [place]. He was then transferred to a local [place] physician for care who implemented 2 spinal simulators, multiple lumbosacral injection and ablations. Patient reports minimal success with these interventions. Physical examination reveals grossly limited lumbar range of motion with greatest limitations in lumbar extension, bilateral rotation and left side bending which is also painful; grossly limited bilateral hip and bilateral plantarflexion MMTs; pain provocation with repeated end range lumbar extension and sustained prone lying on elbows; positive bilateral Crossed Straight Leg raise; multiple gait deviations including a wide base of support, decreased bilateral terminal hip extension, an antalgic and and unsteady gait pattern; and pain alleviation  with prone lying on stomach. Patient's impairments affect his ability to perform prolong standing, household and community ambulation, golf and perform ADLs that involve prolong standing or walking. Given the severity of the patient's lumbar spine, his high amount of previously minimally successful medical interventions, his past medical history, and overall health for his age, a partial recovery is expected within 10-12 weeks. His rehab potential is dependent on compliance with PT recommendations and adherence to his home exercise program. Skilled PT intervention is required to address these key impairments and to provide and progress with an appropriate home exercise program. This evaluation is of low complexity due to the changing nature of the patients presentation as well as the comorbidities and medical factors included in this evaluation.The patient has been educated in the evaluation findings, prognosis, and plan of care, HEP and is in agreement and willing to participate in therapy.     Pain Pattern: 1PEN       Pt prognosis is Good.     Pt will benefit from skilled outpatient Physical Therapy to address the deficits stated above and in the chart below, to provide pt/family education, and to maximize pt's level of independence. Based on the above history and physical examination an active physical therapy program is recommended.      Plan of care discussed with patient: Yes  Pt's spiritual, cultural and educational needs considered and patient is agreeable to the plan of care and goals as stated below:     Anticipated Barriers for therapy: Chronicity of symptoms, age (multiple co-morbidities)    PT Evaluation Completed? Yes    Medical necessity is demonstrated by the following problem list:    History  Co-morbidities and personal factors that may impact the plan of care [] LOW: no personal factors / co-morbidities  [] MODERATE: 1-2 personal factors / co-morbidities  [x] HIGH: 3+ personal factors /  co-morbidities    Moderate / High Support Documentation:   Co-morbidities affecting plan of care:    Bronchitis     Cancer     stage I bladder cancer 50 yrs ago    Hypercholesteremia     Hypertension     Sacroiliitis     Thyroid disease      Personal Factors:   age     Examination  Body Structures and Functions, activity limitations and participation restrictions that may impact the plan of care [x] LOW: addressing 1-2 elements  [] MODERATE: 3+ elements  [] HIGH: 4+ elements (please support below)    Moderate / High Support Documentation:     Clinical Presentation [x] LOW: stable  [] MODERATE: Evolving  [] HIGH: Unstable     Decision Making/ Complexity Score: low         GOALS: Pt is in agreement with the following goals.    Short term goals:  6 weeks or 10 visits   - Pt will demonstrate increased lumbar MedX ROM by at least 5 degrees from the initial ROM value with improvements noted in functional ROM and ability to perform ADLs. Appropriate and Ongoing  - Pt will demonstrate increased MedX average isometric strength value by 10% from initial test resulting in improved ability to perform bending, lifting, and carrying activities safely, confidently. Appropriate and Ongoing  - Pt will report a reduction in worst pain score by 1-2 points for improved tolerance for prolong standing and ADLs that require at least 3 minutes of standing. Appropriate and Ongoing  - Pt able to perform HEP correctly with minimal cueing or supervision from therapist to encourage independent management of symptoms. Appropriate and Ongoing    Long term goals: 10 weeks or 20 visits   - Pt will demonstrate increased lumbar MedX ROM by at least 10 degrees from initial ROM value, resulting in improved ability to perform functional forward bending while standing and sitting. Appropriate and Ongoing  - Pt will demonstrate increased MedX average isometric strength value by 20% from initial test resulting in improved ability to perform bending,  lifting, and carrying activities safely and confidently. Appropriate and Ongoing  - Pt to demonstrate ability to independently control and reduce their pain through posture positioning and mechanical movements throughout a typical day. Appropriate and Ongoing  - Pt will demonstrate reduced pain and improved functional outcomes as reported on the FOTO by reaching an intake score of >/= 59% functional ability in order to demonstrate subjective improvement in patient's condition. . Appropriate and Ongoing  - Pt will demonstrate independence with the HEP at discharge. Appropriate and Ongoing  - Patient able to perform community ambulation > 200 ft. With no greater than 2/10 low back pain and no breaks. Appropriate and Ongoing    Plan     Outpatient physical therapy 2x week for 10 weeks or 20 visits to include the following:   - Patient education  - Therapeutic exercise  - Manual therapy  - Performance testing   - Neuromuscular Re-education  - Therapeutic activity   - Modalities    Pt may be seen by PTA as part of the rehabilitation team.     Therapist: Katina Hidalgo, PT  4/12/2024

## 2024-04-17 ENCOUNTER — CLINICAL SUPPORT (OUTPATIENT)
Dept: REHABILITATION | Facility: OTHER | Age: 89
End: 2024-04-17
Payer: MEDICARE

## 2024-04-17 DIAGNOSIS — M16.0 PRIMARY OSTEOARTHRITIS OF BOTH HIPS: Primary | ICD-10-CM

## 2024-04-17 DIAGNOSIS — M47.817 SPONDYLOSIS OF LUMBOSACRAL REGION, UNSPECIFIED SPINAL OSTEOARTHRITIS COMPLICATION STATUS: ICD-10-CM

## 2024-04-17 PROCEDURE — 97110 THERAPEUTIC EXERCISES: CPT

## 2024-04-17 PROCEDURE — 97112 NEUROMUSCULAR REEDUCATION: CPT

## 2024-04-17 NOTE — PROGRESS NOTES
"[facility]  Physical Therapy Treatment Note     Name: Jefferson Boogie  Clinic Number: [id number]    Therapy Diagnosis:   Encounter Diagnoses   Name Primary?    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  Evaluation Date: 4/12/2024  Authorization Period Expiration: 2/27/2025  Plan of Care Expiration: 6/21/2024  Reassessment Due: 5/12/2024  Visit # / Visits authorized: 1/1 (pending)  MedX testing visit 2    PTA Visit #: 0/5     Time In: 1:30 PM  Time Out: 2:30 PM   Total Billable Time: 60 minutes  INSURANCE and OUTCOMES: Fee for Service with FOTO Outcomes 1/3    Precautions: standard and HTN, cancer     Pattern of pain determined: 1PEN       Subjective     Jefferson Boogie reports feeling better compared to last session which he is unsure if it's due to injections received last week or opioid taken yesterday.  He has not been compliant with HEP. Patient has 0/10 pain at start of session, 6/10 pain after exercises which he believed is due to "working muscles he hasn't in a while", and 3/10 pain reduction after medX machine. He reported pain reduction at end of session and noticed his balance improved.     Patient reports tolerating previous visit well.  Patient reports their pain to be 0/10 on a 0-10 scale with 0 being no pain and 10 being the worst pain imaginable.  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[employer]  Leisure: Go to the gym, listen to music, read   Pt goals: "reduce pain"    Objective      Lumbar  Isometric Testing on Med X equipment: Testing administered by PT    Test Initial Baseline Midpoint Final   Date 4/12/24     ROM 0-39 deg     Max Peak Torque " "169      Min Peak Torque 39      Flex/Ext Ratio 4:1     % variance  normative data 37     % change from initial test N/A visit 1           4/17/2024     1:48 PM   [facility]   Visit Number 2   VAS Pain Rating 0   Lumbar Stretches - Slouch Overcorrection 10   Flexion in Lying 10   Flexion in Sitting 10   Lumbar Extension Seat Pad 0   Femur Restraint 6   Top Dead Center 24   Counterweight 146   Lumbar Flexion 39   Lumbar Extension 0   Lumbar Peak Torque 169 ft. lbs.   Min Torque 39   Test Percent Below Normative Data 37 %   Lumbar Weight 60 lbs   Repetitions 15   Rating of Perceived Exertion 4   Ice - Z Lie (in min.) 0        Outcomes:  Intake Score: 56%  Visit 6 Score:   Visit 10 Score:   Discharge Score:  Goal Score: 59%     Treatment     Jefferson received the treatments listed below:        Medical MedX Treatment as follows:  MedX testing performed day 2: Patient  received neuromuscular education to engage spinal musculature correctly for motor control and engagement of musculature for 15 minutes including the MedX exercise component and practice and standard testing. MedX dynamic exercise and baseline isometric test performed with instructions to guide the patient safely through the testing procedure. Patient instructed to perform isometric test correctly and safely while building to an optimal force with a pain-free effort. Patient also instructed that they should feel support/pressure from MedX restraints but no pain/discomfort, and encouraged to report any pain to therapist. Patient demonstrated appropriate understanding of information and tolerance of test.  Education regarding purpose of test, safety during test given, and reviewed possible more soreness and strategies.         Jefferson participated in neuromuscular re-education activities to improve balance, coordination, proprioception, motor control and/or posture for 10 minutes. The following activities were included:  Bridges x15  PPT x15x5" sec. " "  +supine TA contractions, c/ PB x10x5" hold    Jefferson participated in therapeutic exercises to develop strength, endurance, ROM, flexibility, and posture for 35 minutes including:  +UBE 5'  Prone lying x1'  - NP  +SOC x15x3" hold  +DKTC w/ PB, x10x5" hold  +LTR, x15x3" hold    Peripheral muscle strengthening which included one set of 15-20 repetitions at a slow and controlled 10-13 second per rep pace focused on strengthening supporting musculature in order to improve body mechanics and functional mobility. Patient and therapist focused on proper form during treatment to ensure optimal strengthening of each targeted muscle group.  Machines utilized included:Torso rotation, Leg Ext, Leg Curl, Chest Press, and Rowing  To be added next visit:Triceps, Biceps, Hip Abd, and Leg Press      Jefferson participated in dynamic functional therapeutic activities to improve functional performance and simulate household and community activities for 00  minutes. The following activities were included:      Jefferson received manual therapy techniques for 00  minutes. The following activities were included:      Pt given cold pack for 00 minutes to low back in Z lie position.    Patient Education and Home Exercises     Home exercises include:  PPT   Prone lying   Bridges  Cardio program (V5): -  Lifting education (V11): -  Fridge Magnet Discharge handout (date given): -      Education provided:   - PT role and POC  - HEP  -energy conservation techniques     Written Home Exercises Provided: Patient instructed to cont prior HEP.  Exercises were reviewed and Jefferson was able to demonstrate them prior to the end of the session.  Jefferson demonstrated good  understanding of the education provided.     See EMR under Patient Instructions for exercises provided 4/17/2024.    Assessment   Jefferson presents to second healthy back visit reporting no adverse symptoms at start of session, was able to demo HEP with Mod VC for form. Educated patient on proper " energy conservation techniques to avoid further exacerbation of symptoms and reduce fatigue. Patient required moderate VC to perform exercises correctly and for proper breathing technique during exertion to avoid exacerbation of symptoms, avoid fatigue and receive optimal benefit of therapy. Patient responded well to session with good feedback. Pt was able to tolerate Medical MedX machine well as follows:  MedX exercise started  and patient tolerated  neuro reeducation training, strengthening, and endurance training on the lumbar MedX at 50% of max peak torque according to the initial visit isometric test. Pt was able to complete 15 reps, with 4 RPE.   Pt was also able to complete half of the peripheral strengthening exercises without increased discomfort and will complete the complete circuit next visit as tolerated.    Patient is making good progress towards established goals.  Pt will continue to benefit from skilled outpatient physical therapy to address the deficits stated in the impairment chart, provide pt/family education and to maximize pt's level of independence in the home and community environment.     Anticipated Barriers for therapy: cognition   Pt's spiritual, cultural and educational needs considered and pt agreeable to plan of care and goals as stated below:     Goals:   Short term goals:  6 weeks or 10 visits   - Pt will demonstrate increased lumbar MedX ROM by at least 5 degrees from the initial ROM value with improvements noted in functional ROM and ability to perform ADLs. Appropriate and Ongoing  - Pt will demonstrate increased MedX average isometric strength value by 10% from initial test resulting in improved ability to perform bending, lifting, and carrying activities safely, confidently. Appropriate and Ongoing  - Pt will report a reduction in worst pain score by 1-2 points for improved tolerance for prolong standing and ADLs that require at least 3 minutes of standing. Appropriate and  Ongoing  - Pt able to perform HEP correctly with minimal cueing or supervision from therapist to encourage independent management of symptoms. Appropriate and Ongoing     Long term goals: 10 weeks or 20 visits   - Pt will demonstrate increased lumbar MedX ROM by at least 10 degrees from initial ROM value, resulting in improved ability to perform functional forward bending while standing and sitting. Appropriate and Ongoing  - Pt will demonstrate increased MedX average isometric strength value by 20% from initial test resulting in improved ability to perform bending, lifting, and carrying activities safely and confidently. Appropriate and Ongoing  - Pt to demonstrate ability to independently control and reduce their pain through posture positioning and mechanical movements throughout a typical day. Appropriate and Ongoing  - Pt will demonstrate reduced pain and improved functional outcomes as reported on the FOTO by reaching an intake score of >/= 59% functional ability in order to demonstrate subjective improvement in patient's condition. . Appropriate and Ongoing  - Pt will demonstrate independence with the HEP at discharge. Appropriate and Ongoing  - Patient able to perform community ambulation > 200 ft. With no greater than 2/10 low back pain and no breaks. Appropriate and Ongoing    Plan     Continue with established Plan of Care towards established PT goals.     Therapist: Debbie Grier, PT  4/17/2024

## 2024-04-26 ENCOUNTER — CLINICAL SUPPORT (OUTPATIENT)
Dept: REHABILITATION | Facility: OTHER | Age: 89
End: 2024-04-26
Payer: MEDICARE

## 2024-04-26 DIAGNOSIS — M47.817 SPONDYLOSIS OF LUMBOSACRAL REGION, UNSPECIFIED SPINAL OSTEOARTHRITIS COMPLICATION STATUS: ICD-10-CM

## 2024-04-26 DIAGNOSIS — M16.0 PRIMARY OSTEOARTHRITIS OF BOTH HIPS: Primary | ICD-10-CM

## 2024-04-26 PROCEDURE — 97110 THERAPEUTIC EXERCISES: CPT

## 2024-04-26 PROCEDURE — 97112 NEUROMUSCULAR REEDUCATION: CPT

## 2024-04-26 NOTE — PROGRESS NOTES
"[facility]  Physical Therapy Treatment Note     Name: Jefferson Boogie  Clinic Number: [id number]    Therapy Diagnosis:   Encounter Diagnoses   Name Primary?    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     Patient reports tolerating previous visit well.  Patient reports their pain to be 4/10 on a 0-10 scale with 0 being no pain and 10 being the worst pain imaginable.  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[employer]  Leisure: Go to the gym, listen to music, read   Pt goals: "reduce pain"    Objective      Lumbar  Isometric Testing on Med X equipment: Testing administered by PT    Test Initial Baseline Midpoint Final   Date 4/12/24     ROM 0-39 deg     Max Peak Torque 169      Min Peak Torque 39      Flex/Ext Ratio 4:1     % variance  normative data 37     % change from initial test N/A visit 1           4/26/2024    10:15 AM   [facility]   Visit Number 3   VAS Pain Rating 4   Lumbar Stretches - Slouch Overcorrection 10   Flexion in Lying 10   Flexion in Sitting 10   Lumbar Weight 60 lbs   Repetitions 15   Rating of " "Perceived Exertion 7   Ice - Z Lie (in min.) 0   Plan to reduce resistance next session for better patient's tolerance.       Outcomes:  Intake Score: 56%  Visit 6 Score:   Visit 10 Score:   Discharge Score:  Goal Score: 59%     Treatment     Jefferson received the treatments listed below:        Medical MedX Treatment as follows:  MedX testing performed day 2: Patient  received neuromuscular education to engage spinal musculature correctly for motor control and engagement of musculature for 15 minutes including the MedX exercise component and practice and standard testing. MedX dynamic exercise and baseline isometric test performed with instructions to guide the patient safely through the testing procedure. Patient instructed to perform isometric test correctly and safely while building to an optimal force with a pain-free effort. Patient also instructed that they should feel support/pressure from MedX restraints but no pain/discomfort, and encouraged to report any pain to therapist. Patient demonstrated appropriate understanding of information and tolerance of test.  Education regarding purpose of test, safety during test given, and reviewed possible more soreness and strategies.         Jefferson participated in neuromuscular re-education activities to improve balance, coordination, proprioception, motor control and/or posture for 10 minutes. The following activities were included:  Bridges x15  PPT x15x5" sec.; seated with pillow, occasional tactile cueing   supine TA contractions, c/ PB x10x5" hold    Jefferson participated in therapeutic exercises to develop strength, endurance, ROM, flexibility, and posture for 35 minutes including:  UBE 5'  Prone lying x1'  - NP  SOC x15x3" hold  DKTC w/ PB, x10x5" hold  LTR, x15x3" hold    Peripheral muscle strengthening which included one set of 15-20 repetitions at a slow and controlled 10-13 second per rep pace focused on strengthening supporting musculature in order to improve body " mechanics and functional mobility. Patient and therapist focused on proper form during treatment to ensure optimal strengthening of each targeted muscle group.  Machines utilized included:Torso rotation, Leg Ext, Leg Curl, Chest Press, and Rowing  To be added next visit:Triceps, Biceps, Hip Abd, and Leg Press      Jefferson participated in dynamic functional therapeutic activities to improve functional performance and simulate household and community activities for 00  minutes. The following activities were included:      Jefferson received manual therapy techniques for 00  minutes. The following activities were included:      Pt given cold pack for 00 minutes to low back in Z lie position.    Patient Education and Home Exercises     Home exercises include:  PPT   Prone lying   Bridges  Cardio program (V5): -  Lifting education (V11): -  Fridge Magnet Discharge handout (date given): -      Education provided:   - PT role and POC  - HEP  -energy conservation techniques     Written Home Exercises Provided: Patient instructed to cont prior HEP.  Exercises were reviewed and Jefferson was able to demonstrate them prior to the end of the session.  Jefferson demonstrated good  understanding of the education provided.     See EMR under Patient Instructions for exercises provided 4/17/2024.    Assessment     Jefferson presents to healthy back visit reporting minimal low back symptoms at start of session. Patient continues to require moderate VC to perform exercises correctly for optimal benefit of therapy. Patient responded well to session as he was able to perform exercises without further exacerbation of symptoms. Pt was also able to complete all of the peripheral strengthening exercises this session without increased discomfort. Modified supine PPT to seated position for better tactile cueing to encourage patient to activate proper sequence of muscles. Due to 7/10 RPE on MedX dynamic exercise, plan to reduce resistance next session for  better patient's tolerance. MedX machines added this session are as followed: triceps, bicep curls, leg press and hip ABD.     Patient is making good progress towards established goals.  Pt will continue to benefit from skilled outpatient physical therapy to address the deficits stated in the impairment chart, provide pt/family education and to maximize pt's level of independence in the home and community environment.     Anticipated Barriers for therapy: cognition   Pt's spiritual, cultural and educational needs considered and pt agreeable to plan of care and goals as stated below:     Goals:   Short term goals:  6 weeks or 10 visits   - Pt will demonstrate increased lumbar MedX ROM by at least 5 degrees from the initial ROM value with improvements noted in functional ROM and ability to perform ADLs. Appropriate and Ongoing  - Pt will demonstrate increased MedX average isometric strength value by 10% from initial test resulting in improved ability to perform bending, lifting, and carrying activities safely, confidently. Appropriate and Ongoing  - Pt will report a reduction in worst pain score by 1-2 points for improved tolerance for prolong standing and ADLs that require at least 3 minutes of standing. Appropriate and Ongoing  - Pt able to perform HEP correctly with minimal cueing or supervision from therapist to encourage independent management of symptoms. Appropriate and Ongoing     Long term goals: 10 weeks or 20 visits   - Pt will demonstrate increased lumbar MedX ROM by at least 10 degrees from initial ROM value, resulting in improved ability to perform functional forward bending while standing and sitting. Appropriate and Ongoing  - Pt will demonstrate increased MedX average isometric strength value by 20% from initial test resulting in improved ability to perform bending, lifting, and carrying activities safely and confidently. Appropriate and Ongoing  - Pt to demonstrate ability to independently control  and reduce their pain through posture positioning and mechanical movements throughout a typical day. Appropriate and Ongoing  - Pt will demonstrate reduced pain and improved functional outcomes as reported on the FOTO by reaching an intake score of >/= 59% functional ability in order to demonstrate subjective improvement in patient's condition. . Appropriate and Ongoing  - Pt will demonstrate independence with the HEP at discharge. Appropriate and Ongoing  - Patient able to perform community ambulation > 200 ft. With no greater than 2/10 low back pain and no breaks. Appropriate and Ongoing    Plan     Continue with established Plan of Care towards established PT goals.     Therapist: Debbie Grier, PT  4/26/2024

## 2024-05-01 ENCOUNTER — CLINICAL SUPPORT (OUTPATIENT)
Dept: REHABILITATION | Facility: OTHER | Age: 89
End: 2024-05-01
Payer: MEDICARE

## 2024-05-01 DIAGNOSIS — G58.8 CLUNEAL NEUROPATHY: Primary | ICD-10-CM

## 2024-05-01 DIAGNOSIS — M47.817 SPONDYLOSIS OF LUMBOSACRAL REGION, UNSPECIFIED SPINAL OSTEOARTHRITIS COMPLICATION STATUS: ICD-10-CM

## 2024-05-01 PROCEDURE — 97110 THERAPEUTIC EXERCISES: CPT | Mod: CQ

## 2024-05-01 PROCEDURE — 97112 NEUROMUSCULAR REEDUCATION: CPT | Mod: CQ

## 2024-05-01 NOTE — PROGRESS NOTES
"[facility]  Physical Therapy Treatment Note     Name: Jefferson Boogie  Clinic Number: [id number]    Therapy Diagnosis:   Encounter Diagnoses   Name Primary?    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    Patient reports tolerating previous visit well.  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[employer]  Leisure: Go to the gym, listen to music, read     Pt goals: "reduce pain"  Objective      Lumbar  Isometric Testing on Med X equipment: Testing administered by PT    Test Initial Baseline Midpoint Final   Date 4/12/24     ROM 0-39 deg     Max Peak Torque 169      Min Peak Torque 39      Flex/Ext Ratio 4:1     % variance  normative data 37     % change from initial test N/A visit 1           Outcomes:  Intake Score: 56%  Visit 6 Score:   Visit 10 Score:   Discharge Score:  Goal Score: 59%     Treatment     Jefferson received the treatments listed below:        Medical MedX Treatment as " "follows:  MedX testing performed day 2: Patient  received neuromuscular education to engage spinal musculature correctly for motor control and engagement of musculature for 10 minutes including the MedX exercise component and practice and standard testing. MedX dynamic exercise and baseline isometric test performed with instructions to guide the patient safely through the testing procedure. Patient instructed to perform isometric test correctly and safely while building to an optimal force with a pain-free effort. Patient also instructed that they should feel support/pressure from MedX restraints but no pain/discomfort, and encouraged to report any pain to therapist. Patient demonstrated appropriate understanding of information and tolerance of test.  Education regarding purpose of test, safety during test given, and reviewed possible more soreness and strategies.           5/1/2024     2:18 PM   [facility] - [facility]   Visit Number 4   VAS Pain Rating 5   Flexion in Lying 10   Lumbar Weight 48 lbs   Repetitions 12   Rating of Perceived Exertion 6          Jefferson participated in neuromuscular re-education activities to improve balance, coordination, proprioception, motor control and/or posture for 0 minutes. The following activities were included:  Bridges + pilates ring x15  PPT x15x5" sec.; seated with pillow, occasional tactile cueing   supine TA contractions, c/ PB x10x5" hold    Jefferson participated in therapeutic exercises to develop strength, endurance, ROM, flexibility, and posture for 40 minutes including:  UBE 5'  Prone lying x1'  - NP  SOC x15x3" hold  DKTC w/ PB, x10x5" hold  LTR, x15x3" hold    Peripheral muscle strengthening which included one set of 15-20 repetitions at a slow and controlled 10-13 second per rep pace focused on strengthening supporting musculature in order to improve body mechanics and functional mobility. Patient and therapist focused on proper form during treatment to ensure " optimal strengthening of each targeted muscle group.  Machines utilized included:Torso rotation, Leg Ext, Leg Curl, Chest Press, and Rowing  To be added next visit:Triceps, Biceps, Hip Abd, and Leg Press      Jefferson participated in dynamic functional therapeutic activities to improve functional performance and simulate household and community activities for 00  minutes. The following activities were included:      Jefferson received manual therapy techniques for 00  minutes. The following activities were included:      Pt given cold pack for 00 minutes to low back in Z lie position    Patient Education and Home Exercises     Home exercises include:  PPT   Prone lying   Bridges  Cardio program (V5): -  Lifting education (V11): -  Fridge Magnet Discharge handout (date given): -      Education provided:   - PT role and POC  - HEP  -energy conservation techniques     Written Home Exercises Provided: Patient instructed to cont prior HEP.  Exercises were reviewed and Jefferson was able to demonstrate them prior to the end of the session.  Jefferson demonstrated good  understanding of the education provided.     See EMR under Patient Instructions for exercises provided 4/17/2024.    Assessment   Jefferson presents to [facility] reporting same level of LBP but noted improvement following sessions and performing stretches at home. Due to late arrival unable to perform ARANDA or complete exercises. Plan to resume and progress exercises next visit and review HEP to ensure understanding of which exercises to perform consistently. Patient able to perform 12 reps on l/s medx machine with an RPE of 6/10 with two progressive decreases in weight, plan to decrease weight next visit. Max cueing required for pacing, increasing extension hold time and decreasing LE substitutions. Plan to progress per HB protocol.     Patient is making good progress towards established goals.  Pt will continue to benefit from skilled outpatient physical therapy to address  the deficits stated in the impairment chart, provide pt/family education and to maximize pt's level of independence in the home and community environment.     Anticipated Barriers for therapy: cognition   Pt's spiritual, cultural and educational needs considered and pt agreeable to plan of care and goals as stated below:     Goals:   Short term goals:  6 weeks or 10 visits   - Pt will demonstrate increased lumbar MedX ROM by at least 5 degrees from the initial ROM value with improvements noted in functional ROM and ability to perform ADLs. Appropriate and Ongoing  - Pt will demonstrate increased MedX average isometric strength value by 10% from initial test resulting in improved ability to perform bending, lifting, and carrying activities safely, confidently. Appropriate and Ongoing  - Pt will report a reduction in worst pain score by 1-2 points for improved tolerance for prolong standing and ADLs that require at least 3 minutes of standing. Appropriate and Ongoing  - Pt able to perform HEP correctly with minimal cueing or supervision from therapist to encourage independent management of symptoms. Appropriate and Ongoing     Long term goals: 10 weeks or 20 visits   - Pt will demonstrate increased lumbar MedX ROM by at least 10 degrees from initial ROM value, resulting in improved ability to perform functional forward bending while standing and sitting. Appropriate and Ongoing  - Pt will demonstrate increased MedX average isometric strength value by 20% from initial test resulting in improved ability to perform bending, lifting, and carrying activities safely and confidently. Appropriate and Ongoing  - Pt to demonstrate ability to independently control and reduce their pain through posture positioning and mechanical movements throughout a typical day. Appropriate and Ongoing  - Pt will demonstrate reduced pain and improved functional outcomes as reported on the FOTO by reaching an intake score of >/= 59% functional  ability in order to demonstrate subjective improvement in patient's condition. . Appropriate and Ongoing  - Pt will demonstrate independence with the HEP at discharge. Appropriate and Ongoing  - Patient able to perform community ambulation > 200 ft. With no greater than 2/10 low back pain and no breaks. Appropriate and Ongoing    Plan     Continue with established Plan of Care towards established PT goals.     Therapist: Jossy Marroquin, PTA  5/1/2024

## 2024-05-03 ENCOUNTER — CLINICAL SUPPORT (OUTPATIENT)
Dept: REHABILITATION | Facility: OTHER | Age: 89
End: 2024-05-03
Payer: MEDICARE

## 2024-05-03 DIAGNOSIS — M47.817 SPONDYLOSIS OF LUMBOSACRAL REGION, UNSPECIFIED SPINAL OSTEOARTHRITIS COMPLICATION STATUS: ICD-10-CM

## 2024-05-03 DIAGNOSIS — G58.8 CLUNEAL NEUROPATHY: Primary | ICD-10-CM

## 2024-05-03 PROCEDURE — 97110 THERAPEUTIC EXERCISES: CPT | Mod: CQ

## 2024-05-03 PROCEDURE — 97112 NEUROMUSCULAR REEDUCATION: CPT | Mod: CQ

## 2024-05-03 NOTE — PROGRESS NOTES
"[facility]  Physical Therapy Treatment Note     Name: Jefferson Boogie  Clinic Number: [id number]    Therapy Diagnosis:   Encounter Diagnoses   Name Primary?    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 Going to the gym everyday and rides bike 30 minutes, but hasn't been consistent with HEP yet.         Patient reports tolerating previous visit well.  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[employer]  Leisure: Go to the gym, listen to music, read     Pt goals: "reduce pain"  Objective      Lumbar  Isometric Testing on Med X equipment: Testing administered by PT    Test Initial Baseline Midpoint Final   Date 4/12/24     ROM 0-39 deg     Max Peak Torque 169      Min Peak Torque 39      Flex/Ext Ratio 4:1     % variance  normative data 37     % change from initial test N/A visit 1           Outcomes:  Intake Score: 56%  Visit 6 Score:   Visit 10 Score:   Discharge Score:  Goal Score: " "59%     Treatment     Jefferson received the treatments listed below:        Medical MedX Treatment as follows:  MedX testing performed day 2: Patient  received neuromuscular education to engage spinal musculature correctly for motor control and engagement of musculature for 10 minutes including the MedX exercise component and practice and standard testing. MedX dynamic exercise and baseline isometric test performed with instructions to guide the patient safely through the testing procedure. Patient instructed to perform isometric test correctly and safely while building to an optimal force with a pain-free effort. Patient also instructed that they should feel support/pressure from MedX restraints but no pain/discomfort, and encouraged to report any pain to therapist. Patient demonstrated appropriate understanding of information and tolerance of test.  Education regarding purpose of test, safety during test given, and reviewed possible more soreness and strategies.           5/3/2024    11:34 AM   [facility]   Lumbar Weight 39 lbs   Repetitions 15   Rating of Perceived Exertion 5   Ice - Z Lie (in min.) 5          Jefferson participated in neuromuscular re-education activities to improve balance, coordination, proprioception, motor control and/or posture for 10 minutes. The following activities were included:  Bridges + pilates ring x15  PPT x10 (max cueing, unable to perform this visit)  supine TA contractions w/ball x15x3" hold    Jefferson participated in therapeutic exercises to develop strength, endurance, ROM, flexibility, and posture for 35 minutes including:  UBE 5'  Prone lying x1'    +JAMEY 1 min  SOC x15x3" hold  DKTC w/ PB, x10x5" hold  LTR, x15x3" hold      +piriformis stretch x30" NV    Peripheral muscle strengthening which included one set of 15-20 repetitions at a slow and controlled 10-13 second per rep pace focused on strengthening supporting musculature in order to improve body mechanics and functional " mobility. Patient and therapist focused on proper form during treatment to ensure optimal strengthening of each targeted muscle group.  Machines utilized included:Torso rotation, Leg Ext, Leg Curl, Chest Press, and Rowing  To be added next visit:Triceps, Biceps, Hip Abd, and Leg Press      Jefferson participated in dynamic functional therapeutic activities to improve functional performance and simulate household and community activities for 00  minutes. The following activities were included:      Jefferson received manual therapy techniques for 00  minutes. The following activities were included:      Pt given cold pack for 5 minutes to low back in Z lie position  Patient Education and Home Exercises     Home exercises include:  PPT   Prone lying   Bridges  Cardio program (V5): - 5/3/24  Lifting education (V11): -  Fridge Magnet Discharge handout (date given): -      Education provided:   - PT role and POC  - HEP  -energy conservation techniques     Written Home Exercises Provided: Patient instructed to cont prior HEP.  Exercises were reviewed and Jefferson was able to demonstrate them prior to the end of the session.  Jefferson demonstrated good  understanding of the education provided.     See EMR under Patient Instructions for exercises provided 4/17/2024.    Assessment   Reviewed HEP with patient and encouraged patient to perform HEP more consistently to obtain optimal therapeutic gains from PT.  Max cueing required for PPT. Educated on discomfort being expected with new exercise program and encouraged cold pack at conclusion of tx as well as between session to address DOMs. Patient able to perform 15 reps on l/s medx machine with an RPE of 5/10. Adjusted weight and ROM on Medx ext machine which allowed for progression of reps and ability to maintain end range extension. Discussed benefits of cardio program per 5th visit protocol. Plan to progress per HB protocol.     Patient is making good progress towards established  goals.  Pt will continue to benefit from skilled outpatient physical therapy to address the deficits stated in the impairment chart, provide pt/family education and to maximize pt's level of independence in the home and community environment.     Anticipated Barriers for therapy: cognition   Pt's spiritual, cultural and educational needs considered and pt agreeable to plan of care and goals as stated below:     Goals:   Short term goals:  6 weeks or 10 visits   - Pt will demonstrate increased lumbar MedX ROM by at least 5 degrees from the initial ROM value with improvements noted in functional ROM and ability to perform ADLs. Appropriate and Ongoing  - Pt will demonstrate increased MedX average isometric strength value by 10% from initial test resulting in improved ability to perform bending, lifting, and carrying activities safely, confidently. Appropriate and Ongoing  - Pt will report a reduction in worst pain score by 1-2 points for improved tolerance for prolong standing and ADLs that require at least 3 minutes of standing. Appropriate and Ongoing  - Pt able to perform HEP correctly with minimal cueing or supervision from therapist to encourage independent management of symptoms. Appropriate and Ongoing     Long term goals: 10 weeks or 20 visits   - Pt will demonstrate increased lumbar MedX ROM by at least 10 degrees from initial ROM value, resulting in improved ability to perform functional forward bending while standing and sitting. Appropriate and Ongoing  - Pt will demonstrate increased MedX average isometric strength value by 20% from initial test resulting in improved ability to perform bending, lifting, and carrying activities safely and confidently. Appropriate and Ongoing  - Pt to demonstrate ability to independently control and reduce their pain through posture positioning and mechanical movements throughout a typical day. Appropriate and Ongoing  - Pt will demonstrate reduced pain and improved  functional outcomes as reported on the FOTO by reaching an intake score of >/= 59% functional ability in order to demonstrate subjective improvement in patient's condition. . Appropriate and Ongoing  - Pt will demonstrate independence with the HEP at discharge. Appropriate and Ongoing  - Patient able to perform community ambulation > 200 ft. With no greater than 2/10 low back pain and no breaks. Appropriate and Ongoing    Plan     Continue with established Plan of Care towards established PT goals.     Therapist: Jossy Marroquin, PTA  5/3/2024

## 2024-05-15 ENCOUNTER — CLINICAL SUPPORT (OUTPATIENT)
Dept: REHABILITATION | Facility: OTHER | Age: 89
End: 2024-05-15
Payer: MEDICARE

## 2024-05-15 DIAGNOSIS — M47.817 SPONDYLOSIS OF LUMBOSACRAL REGION, UNSPECIFIED SPINAL OSTEOARTHRITIS COMPLICATION STATUS: ICD-10-CM

## 2024-05-15 DIAGNOSIS — M16.0 PRIMARY OSTEOARTHRITIS OF BOTH HIPS: Primary | ICD-10-CM

## 2024-05-15 PROCEDURE — 97112 NEUROMUSCULAR REEDUCATION: CPT | Mod: CQ

## 2024-05-15 PROCEDURE — 97110 THERAPEUTIC EXERCISES: CPT | Mod: CQ

## 2024-05-15 NOTE — PROGRESS NOTES
"[facility]  Physical Therapy Treatment Note     Name: Jefferson Boogie  Clinic Number: [id number]    Therapy Diagnosis:   Encounter Diagnoses   Name Primary?    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 Also reports increased soreness post last tx session and would lkie to decrease the weights. Going to the gym everyday and rides bike 30 minutes, but hasn't been consistent with HEP yet.     Patient reports tolerating previous visit well.  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  Leisure: Go to the gym, listen to music, read     Pt goals: "reduce pain"  Objective      Lumbar  Isometric Testing on Med X equipment: Testing administered by PT    Test Initial Baseline Midpoint Final   Date 4/12/24     ROM 0-39 deg     Max Peak Torque 169      Min Peak Torque 39      Flex/Ext Ratio 4:1     % variance  normative data 37     % change from initial test N/A visit 1           Outcomes:  Intake Score: 56%  Visit 6 " "Score:   Visit 10 Score:   Discharge Score:  Goal Score: 59%     Treatment     Jefferson received the treatments listed below:        Medical MedX Treatment as follows:  MedX testing performed day 2: Patient  received neuromuscular education to engage spinal musculature correctly for motor control and engagement of musculature for 10 minutes including the MedX exercise component and practice and standard testing. MedX dynamic exercise and baseline isometric test performed with instructions to guide the patient safely through the testing procedure. Patient instructed to perform isometric test correctly and safely while building to an optimal force with a pain-free effort. Patient also instructed that they should feel support/pressure from MedX restraints but no pain/discomfort, and encouraged to report any pain to therapist. Patient demonstrated appropriate understanding of information and tolerance of test.  Education regarding purpose of test, safety during test given, and reviewed possible more soreness and strategies.              5/15/2024     3:17 PM   [facility]   Visit Number 6   VAS Pain Rating 7   Extension in Lying 1   Flexion in Lying 10   Lumbar Weight 39 lbs   Repetitions 17   Rating of Perceived Exertion 5   Ice - Z Lie (in min.) 5     Jefferson participated in neuromuscular re-education activities to improve balance, coordination, proprioception, motor control and/or posture for 15 minutes. The following activities were included:    Bridges + pilates ring x15 (min lift)  PPT x10 (max cueing, unable to perform this visit)  supine TA contractions w/ball x15x3" hold  +supine TA contractions w/ball + SLR x10     Jefferson participated in therapeutic exercises to develop strength, endurance, ROM, flexibility, and posture for 35 minutes including:  UBE 5'  Prone lying x1'    +JAMEY 1 min  SOC x15x3" hold  DKTC w/ PB, x10x5" hold  LTR, x15x3" hold  +piriformis stretch 3x20" R    Peripheral muscle strengthening " which included one set of 15-20 repetitions at a slow and controlled 10-13 second per rep pace focused on strengthening supporting musculature in order to improve body mechanics and functional mobility. Patient and therapist focused on proper form during treatment to ensure optimal strengthening of each targeted muscle group.  Machines utilized included:Torso rotation, Leg Ext, Leg Curl, Chest Press, and Rowing  Triceps, Biceps, Hip Abd, and Leg Press - Progress slowly due to lack of muscle endurance      Jefferson participated in dynamic functional therapeutic activities to improve functional performance and simulate household and community activities for 00  minutes. The following activities were included:      Jefferson received manual therapy techniques for 00  minutes. The following activities were included:      Pt given cold pack for 5 minutes to low back in Z lie position  Patient Education and Home Exercises     Home exercises include:  PPT   Prone lying   Bridges  Cardio program (V5): - 5/3/24  Lifting education (V11): -  Fridge Magnet Discharge handout (date given): -      Education provided:   - PT role and POC  - HEP  -energy conservation techniques     Written Home Exercises Provided: Patient instructed to cont prior HEP.  Exercises were reviewed and Jefferson was able to demonstrate them prior to the end of the session.  Jefferson demonstrated good  understanding of the education provided.     See EMR under Patient Instructions for exercises provided 4/17/2024.    Assessment   Jefferson presented with increased hip pain and dysfunctional gait. Encouraged patient to perform HEP more consistently and to ambulate with proper step length to decrease fall risk.  Educated on discomfort expected with new exercise program and encouraged cold pack at conclusion of tx as well as between session to address DOMs. Added TA brace with SLR, pt tolerated well.   Patient able to perform 17 reps on l/s medx machine with an RPE of 5/10.  Adjusted weight on knee flex and knee ext peripheral machines so pt could progress at more tolerable rate. Progress pt slowly on resisted peripheral machines to assist with muscle endurance.     Patient is making good progress towards established goals.  Pt will continue to benefit from skilled outpatient physical therapy to address the deficits stated in the impairment chart, provide pt/family education and to maximize pt's level of independence in the home and community environment.     Anticipated Barriers for therapy: cognition   Pt's spiritual, cultural and educational needs considered and pt agreeable to plan of care and goals as stated below:     Goals:   Short term goals:  6 weeks or 10 visits   - Pt will demonstrate increased lumbar MedX ROM by at least 5 degrees from the initial ROM value with improvements noted in functional ROM and ability to perform ADLs. Appropriate and Ongoing  - Pt will demonstrate increased MedX average isometric strength value by 10% from initial test resulting in improved ability to perform bending, lifting, and carrying activities safely, confidently. Appropriate and Ongoing  - Pt will report a reduction in worst pain score by 1-2 points for improved tolerance for prolong standing and ADLs that require at least 3 minutes of standing. Appropriate and Ongoing  - Pt able to perform HEP correctly with minimal cueing or supervision from therapist to encourage independent management of symptoms. Appropriate and Ongoing     Long term goals: 10 weeks or 20 visits   - Pt will demonstrate increased lumbar MedX ROM by at least 10 degrees from initial ROM value, resulting in improved ability to perform functional forward bending while standing and sitting. Appropriate and Ongoing  - Pt will demonstrate increased MedX average isometric strength value by 20% from initial test resulting in improved ability to perform bending, lifting, and carrying activities safely and confidently. Appropriate  and Ongoing  - Pt to demonstrate ability to independently control and reduce their pain through posture positioning and mechanical movements throughout a typical day. Appropriate and Ongoing  - Pt will demonstrate reduced pain and improved functional outcomes as reported on the FOTO by reaching an intake score of >/= 59% functional ability in order to demonstrate subjective improvement in patient's condition. . Appropriate and Ongoing  - Pt will demonstrate independence with the HEP at discharge. Appropriate and Ongoing  - Patient able to perform community ambulation > 200 ft. With no greater than 2/10 low back pain and no breaks. Appropriate and Ongoing    Plan     Continue with established Plan of Care towards established PT goals.     Therapist: Myriam Sinclair, PTA  5/15/2024

## 2024-05-17 ENCOUNTER — CLINICAL SUPPORT (OUTPATIENT)
Dept: REHABILITATION | Facility: OTHER | Age: 89
End: 2024-05-17
Payer: MEDICARE

## 2024-05-17 DIAGNOSIS — M16.0 PRIMARY OSTEOARTHRITIS OF BOTH HIPS: Primary | ICD-10-CM

## 2024-05-17 DIAGNOSIS — M47.817 SPONDYLOSIS OF LUMBOSACRAL REGION, UNSPECIFIED SPINAL OSTEOARTHRITIS COMPLICATION STATUS: ICD-10-CM

## 2024-05-17 PROCEDURE — 97110 THERAPEUTIC EXERCISES: CPT

## 2024-05-17 PROCEDURE — 97112 NEUROMUSCULAR REEDUCATION: CPT

## 2024-05-17 NOTE — PROGRESS NOTES
"[facility]  Physical Therapy Treatment Note     Name: Jefferson Boogie  Clinic Number: [id number]    Therapy Diagnosis:   Encounter Diagnoses   Name Primary?    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  Evaluation Date: 4/12/2024  Authorization Period Expiration: 2/27/2025  Plan of Care Expiration: 6/21/2024  Reassessment Due: 5/12/2024  Visit # / Visits authorized: 6/20  MedX testing visit 2    PTA Visit #: /5     Time In: 9:40  Time Out: 10:30 PM   Total Billable Time: 50 minutes   INSURANCE and OUTCOMES: Fee for Service with FOTO Outcomes 1/3    Precautions: standard and HTN, cancer     Pattern of pain determined: 1PEN  Subjective   Jefferson Boogie reports no significant change in the back, but he did swing some golf clubs yesterday and is feeling more sore than usual      Patient reports tolerating previous visit well.  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  Leisure: Go to the gym, listen to music, read     Pt goals: "reduce pain"  Objective      Lumbar  Isometric Testing on Med X equipment: Testing administered by PT    Test Initial Baseline Midpoint Final   Date 4/12/24     ROM 0-39 deg     Max Peak Torque 169      Min Peak Torque 39      Flex/Ext Ratio 4:1     % variance  normative data 37     % change from initial test N/A visit 1           Outcomes:  Intake Score: 56%  Visit 6 Score:   Visit 10 Score:   Discharge Score:  Goal Score: 59%     Treatment     Jefferson received the treatments listed below:        Medical MedX " "Treatment as follows:  MedX testing performed day 2: Patient  received neuromuscular education to engage spinal musculature correctly for motor control and engagement of musculature for 10 minutes including the MedX exercise component and practice and standard testing. MedX dynamic exercise and baseline isometric test performed with instructions to guide the patient safely through the testing procedure. Patient instructed to perform isometric test correctly and safely while building to an optimal force with a pain-free effort. Patient also instructed that they should feel support/pressure from MedX restraints but no pain/discomfort, and encouraged to report any pain to therapist. Patient demonstrated appropriate understanding of information and tolerance of test.  Education regarding purpose of test, safety during test given, and reviewed possible more soreness and strategies.              5/17/2024    10:15 AM   [facility]   Visit Number 7   VAS Pain Rating 7   Lumbar Stretches - Slouch Overcorrection 20   Lumbar Weight 39 lbs   Repetitions 20   Rating of Perceived Exertion 5   Ice - Z Lie (in min.) 5       Jefferson participated in neuromuscular re-education activities to improve balance, coordination, proprioception, motor control and/or posture for 15 minutes. The following activities were included:    Bridges + pilates ring x15 (min lift)  PPT x10 (max cueing, unable to perform this visit)  supine TA contractions w/ball x15x3" hold  supine TA contractions w/ball + SLR x10     Jefferson participated in therapeutic exercises to develop strength, endurance, ROM, flexibility, and posture for 35 minutes including:  UBE 5'  Prone lying x1'    JAMEY 1 min  +Open book stretch x10  SOC x15x3" hold  DKTC w/ PB, x10x5" hold  LTR, x10x3" hold  +piriformis stretch 3x20" R    Peripheral muscle strengthening which included one set of 15-20 repetitions at a slow and controlled 10-13 second per rep pace focused on strengthening " supporting musculature in order to improve body mechanics and functional mobility. Patient and therapist focused on proper form during treatment to ensure optimal strengthening of each targeted muscle group.  Machines utilized included:Torso rotation, Leg Ext, Leg Curl, Chest Press, and Rowing  Triceps, Biceps, Hip Abd, and Leg Press - Progress slowly due to lack of muscle endurance      Jefferson participated in dynamic functional therapeutic activities to improve functional performance and simulate household and community activities for 00  minutes. The following activities were included:      Jefferson received manual therapy techniques for 00  minutes. The following activities were included:      Pt given cold pack for 5 minutes to low back in Z lie position  Patient Education and Home Exercises     Home exercises include:  PPT   Prone lying   Bridges  Cardio program (V5): - 5/3/24  Lifting education (V11): -  Fridge Magnet Discharge handout (date given): -      Education provided:   - PT role and POC  - HEP  -energy conservation techniques     Written Home Exercises Provided: Patient instructed to cont prior HEP.  Exercises were reviewed and Jefferson was able to demonstrate them prior to the end of the session.  Jefferson demonstrated good  understanding of the education provided.     See EMR under Patient Instructions for exercises provided prior visit.    Assessment     Jefferson presented with complaints of back pain but no more than usual, he did swing golf clubs and hit some balls yesterday and it feeling some soreness from that. He is doing the stretches in the morning and encouraged him to also complete them in the evening when getting back in bed as well. Completed mobility exercises and he had the most difficulty with pelvic tilt requiring mac verbal and tactile cues. Opted for Slouch overcorrect in lieu of supine pelvic tilts this visit, but will continue to educate next visit. Patient able to perform 20 reps on l/s  medx machine with an RPE of 5/10. Progress pt slowly on resisted peripheral machines to assist with muscle endurance and requires cues for pacing on the peripheral macnines    Patient is making good progress towards established goals.  Pt will continue to benefit from skilled outpatient physical therapy to address the deficits stated in the impairment chart, provide pt/family education and to maximize pt's level of independence in the home and community environment.     Anticipated Barriers for therapy: cognition   Pt's spiritual, cultural and educational needs considered and pt agreeable to plan of care and goals as stated below:     Goals:   Short term goals:  6 weeks or 10 visits   - Pt will demonstrate increased lumbar MedX ROM by at least 5 degrees from the initial ROM value with improvements noted in functional ROM and ability to perform ADLs. Appropriate and Ongoing  - Pt will demonstrate increased MedX average isometric strength value by 10% from initial test resulting in improved ability to perform bending, lifting, and carrying activities safely, confidently. Appropriate and Ongoing  - Pt will report a reduction in worst pain score by 1-2 points for improved tolerance for prolong standing and ADLs that require at least 3 minutes of standing. Appropriate and Ongoing  - Pt able to perform HEP correctly with minimal cueing or supervision from therapist to encourage independent management of symptoms. Appropriate and Ongoing     Long term goals: 10 weeks or 20 visits   - Pt will demonstrate increased lumbar MedX ROM by at least 10 degrees from initial ROM value, resulting in improved ability to perform functional forward bending while standing and sitting. Appropriate and Ongoing  - Pt will demonstrate increased MedX average isometric strength value by 20% from initial test resulting in improved ability to perform bending, lifting, and carrying activities safely and confidently. Appropriate and Ongoing  - Pt  to demonstrate ability to independently control and reduce their pain through posture positioning and mechanical movements throughout a typical day. Appropriate and Ongoing  - Pt will demonstrate reduced pain and improved functional outcomes as reported on the FOTO by reaching an intake score of >/= 59% functional ability in order to demonstrate subjective improvement in patient's condition. . Appropriate and Ongoing  - Pt will demonstrate independence with the HEP at discharge. Appropriate and Ongoing  - Patient able to perform community ambulation > 200 ft. With no greater than 2/10 low back pain and no breaks. Appropriate and Ongoing    Plan     Continue with established Plan of Care towards established PT goals.     Therapist: Lino Tolliver, PT  5/17/2024

## 2024-05-22 ENCOUNTER — CLINICAL SUPPORT (OUTPATIENT)
Dept: REHABILITATION | Facility: OTHER | Age: 89
End: 2024-05-22
Payer: MEDICARE

## 2024-05-22 DIAGNOSIS — M47.817 SPONDYLOSIS OF LUMBOSACRAL REGION, UNSPECIFIED SPINAL OSTEOARTHRITIS COMPLICATION STATUS: ICD-10-CM

## 2024-05-22 DIAGNOSIS — M16.0 PRIMARY OSTEOARTHRITIS OF BOTH HIPS: Primary | ICD-10-CM

## 2024-05-22 PROCEDURE — 97110 THERAPEUTIC EXERCISES: CPT

## 2024-05-22 NOTE — PROGRESS NOTES
"[facility]  Physical Therapy Treatment Note     Name: Jefferson Boogie  Clinic Number: [id number]    Therapy Diagnosis:   Encounter Diagnoses   Name Primary?    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[place]  Leisure: Go to the gym, listen to music, read     Pt goals: "reduce pain"  Objective      Lumbar  Isometric Testing on Med X equipment: Testing administered by PT    Test Initial Baseline Midpoint Final   Date 4/12/24     ROM 0-39 deg     Max Peak Torque 169      Min Peak Torque 39      Flex/Ext Ratio 4:1     % variance  normative data 37     % change from initial test N/A visit 1           Outcomes:  Intake Score: 56%  Visit 6 Score:   Visit 10 Score:   Discharge Score:  Goal Score: 59%     Treatment     Jefferson received the treatments listed below:        Medical MedX Treatment as follows:  MedX testing " "performed day 2: Patient  received neuromuscular education to engage spinal musculature correctly for motor control and engagement of musculature for 10 minutes including the MedX exercise component and practice and standard testing. MedX dynamic exercise and baseline isometric test performed with instructions to guide the patient safely through the testing procedure. Patient instructed to perform isometric test correctly and safely while building to an optimal force with a pain-free effort. Patient also instructed that they should feel support/pressure from MedX restraints but no pain/discomfort, and encouraged to report any pain to therapist. Patient demonstrated appropriate understanding of information and tolerance of test.  Education regarding purpose of test, safety during test given, and reviewed possible more soreness and strategies.              5/22/2024    10:55 AM   [facility]   Visit Number 8   VAS Pain Rating 4   Extension in Standing 10   Lumbar Weight 42 lbs   Repetitions 15   Rating of Perceived Exertion 4   Ice - Z Lie (in min.) 5         Jefferson participated in neuromuscular re-education activities to improve balance, coordination, proprioception, motor control and/or posture for 15 minutes. The following activities were included:    Bridges + pilates ring x15 (min lift)  PPT x10 (max cueing, unable to perform this visit)  supine TA contractions w/ball x15x3" hold  supine TA contractions w/ball + SLR x10     Jefferson participated in therapeutic exercises to develop strength, endurance, ROM, flexibility, and posture for 35 minutes including:  UBE 5'  Prone lying x1'    JAMEY 1 min  Open book stretch x10  SOC x15x3" hold  DKTC w/ PB, x10x5" hold  LTR, x10x3" hold  piriformis stretch 2x20" R    Peripheral muscle strengthening which included one set of 15-20 repetitions at a slow and controlled 10-13 second per rep pace focused on strengthening supporting musculature in order to improve body " mechanics and functional mobility. Patient and therapist focused on proper form during treatment to ensure optimal strengthening of each targeted muscle group.  Machines utilized included:Torso rotation, Leg Ext, Leg Curl, Chest Press, and Rowing  Triceps, Biceps, Hip Abd, and Leg Press - Progress slowly due to lack of muscle endurance      Jefferson participated in dynamic functional therapeutic activities to improve functional performance and simulate household and community activities for 00  minutes. The following activities were included:      Jefferson received manual therapy techniques for 00  minutes. The following activities were included:      Pt given cold pack for 5 minutes to low back in Z lie position  Patient Education and Home Exercises     Home exercises include:  PPT   Prone lying   Bridges  Cardio program (V5): - 5/3/24  Lifting education (V11): -  Frie [employer]et Discharge handout (date given): -      Education provided:   - PT role and POC  - HEP  -energy conservation techniques     Written Home Exercises Provided: Patient instructed to cont prior HEP.  Exercises were reviewed and Jefferson was able to demonstrate them prior to the end of the session.  Jefferson demonstrated good  understanding of the education provided.     See EMR under Patient Instructions for exercises provided prior visit.    Assessment     Jefferson presented with complaints of back pain but less than reported at previous visit. He stated that he needs to leave early. Completed stretches with education on when best to do stretches and how often. He was able to complete and states feeling better after stretches and repetitive movements for the back. Patient able to perform 15 reps on l/s medx machine with an RPE of 4/10 and an increase in resistance by approx 5%. Progress pt slowly on resisted peripheral machines to assist with muscle endurance and requires cues for pacing on the peripheral macnines    Patient is making good progress towards  established goals.  Pt will continue to benefit from skilled outpatient physical therapy to address the deficits stated in the impairment chart, provide pt/family education and to maximize pt's level of independence in the home and community environment.     Anticipated Barriers for therapy: cognition   Pt's spiritual, cultural and educational needs considered and pt agreeable to plan of care and goals as stated below:     Goals:   Short term goals:  6 weeks or 10 visits   - Pt will demonstrate increased lumbar MedX ROM by at least 5 degrees from the initial ROM value with improvements noted in functional ROM and ability to perform ADLs. Appropriate and Ongoing  - Pt will demonstrate increased MedX average isometric strength value by 10% from initial test resulting in improved ability to perform bending, lifting, and carrying activities safely, confidently. Appropriate and Ongoing  - Pt will report a reduction in worst pain score by 1-2 points for improved tolerance for prolong standing and ADLs that require at least 3 minutes of standing. Appropriate and Ongoing  - Pt able to perform HEP correctly with minimal cueing or supervision from therapist to encourage independent management of symptoms. Appropriate and Ongoing     Long term goals: 10 weeks or 20 visits   - Pt will demonstrate increased lumbar MedX ROM by at least 10 degrees from initial ROM value, resulting in improved ability to perform functional forward bending while standing and sitting. Appropriate and Ongoing  - Pt will demonstrate increased MedX average isometric strength value by 20% from initial test resulting in improved ability to perform bending, lifting, and carrying activities safely and confidently. Appropriate and Ongoing  - Pt to demonstrate ability to independently control and reduce their pain through posture positioning and mechanical movements throughout a typical day. Appropriate and Ongoing  - Pt will demonstrate reduced pain and  improved functional outcomes as reported on the FOTO by reaching an intake score of >/= 59% functional ability in order to demonstrate subjective improvement in patient's condition. . Appropriate and Ongoing  - Pt will demonstrate independence with the HEP at discharge. Appropriate and Ongoing  - Patient able to perform community ambulation > 200 ft. With no greater than 2/10 low back pain and no breaks. Appropriate and Ongoing    Plan     Continue with established Plan of Care towards established PT goals.     Therapist: Lino Tolliver, PT  5/22/2024

## 2024-05-24 ENCOUNTER — CLINICAL SUPPORT (OUTPATIENT)
Dept: REHABILITATION | Facility: OTHER | Age: 89
End: 2024-05-24
Payer: MEDICARE

## 2024-05-24 DIAGNOSIS — M16.0 PRIMARY OSTEOARTHRITIS OF BOTH HIPS: Primary | ICD-10-CM

## 2024-05-24 DIAGNOSIS — M47.817 SPONDYLOSIS OF LUMBOSACRAL REGION, UNSPECIFIED SPINAL OSTEOARTHRITIS COMPLICATION STATUS: ICD-10-CM

## 2024-05-24 PROCEDURE — 97110 THERAPEUTIC EXERCISES: CPT

## 2024-05-24 NOTE — PROGRESS NOTES
"[facility]  Physical Therapy Treatment Note     Name: Jefferson Boogei  Clinic Number: [id number]    Therapy Diagnosis:   Encounter Diagnoses   Name Primary?    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 He plans on going to the gym later to get on the bike and lift weights      Patient reports tolerating previous visit well.  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[employer]  Leisure: Go to the gym, listen to music, read     Pt goals: "reduce pain"  Objective      Lumbar  Isometric Testing on Med X equipment: Testing administered by PT    Test Initial Baseline Midpoint Final   Date 4/12/24     ROM 0-39 deg     Max Peak Torque 169      Min Peak Torque 39      Flex/Ext Ratio 4:1     % variance  normative data 37     % change from initial test N/A visit 1           Outcomes:  Intake Score: 56%  Visit 6 Score:   Visit 10 Score:   Discharge Score:  Goal Score: 59%     Treatment     Jefferson received the treatments listed below:        Medical MedX Treatment " "as follows:  MedX testing performed day 2: Patient  received neuromuscular education to engage spinal musculature correctly for motor control and engagement of musculature for 10 minutes including the MedX exercise component and practice and standard testing. MedX dynamic exercise and baseline isometric test performed with instructions to guide the patient safely through the testing procedure. Patient instructed to perform isometric test correctly and safely while building to an optimal force with a pain-free effort. Patient also instructed that they should feel support/pressure from MedX restraints but no pain/discomfort, and encouraged to report any pain to therapist. Patient demonstrated appropriate understanding of information and tolerance of test.  Education regarding purpose of test, safety during test given, and reviewed possible more soreness and strategies.              5/24/2024    10:01 AM   [facility]   Visit Number 9   VAS Pain Rating 4   Time 5   Extension in Standing 10   Lumbar Weight 42 lbs   Repetitions 20   Rating of Perceived Exertion 3   Ice - Z Lie (in min.) 5           Jefferson participated in neuromuscular re-education activities to improve balance, coordination, proprioception, motor control and/or posture for 15 minutes. The following activities were included:    Bridges + pilates ring x15 (min lift)  PPT x10 (max cueing, unable to perform this visit)  supine TA contractions w/ball x15x3" hold  supine TA contractions w/ball + SLR x10     Jefferson participated in therapeutic exercises to develop strength, endurance, ROM, flexibility, and posture for 35 minutes including:    NuStep 5'  Prone lying x1'    JAMEY 1 min  +EIS with towel  Open book stretch x10    SOC x15x3" hold  DKTC w/ PB, x10x5" hold  LTR, x10x3" hold  piriformis stretch 2x20" R    Peripheral muscle strengthening which included one set of 15-20 repetitions at a slow and controlled 10-13 second per rep pace focused on " strengthening supporting musculature in order to improve body mechanics and functional mobility. Patient and therapist focused on proper form during treatment to ensure optimal strengthening of each targeted muscle group.  Machines utilized included:Torso rotation, Leg Ext, Leg Curl, Chest Press, and Rowing  Triceps, Biceps, Hip Abd, and Leg Press - Progress slowly due to lack of muscle endurance      Jefferson participated in dynamic functional therapeutic activities to improve functional performance and simulate household and community activities for 00  minutes. The following activities were included:      Jefferson received manual therapy techniques for 00  minutes. The following activities were included:      Pt given cold pack for 5 minutes to low back in Z lie position  Patient Education and Home Exercises     Home exercises include:  PPT   Prone lying   Bridges  Cardio program (V5): - 5/3/24  Lifting education (V11): -  Frie Magnet Discharge handout (date given): -      Education provided:   - PT role and POC  - HEP  -energy conservation techniques     Written Home Exercises Provided: Patient instructed to cont prior HEP.  Exercises were reviewed and Jefferson was able to demonstrate them prior to the end of the session.  Jefferson demonstrated good  understanding of the education provided.     See EMR under Patient Instructions for exercises provided prior visit.    Assessment     Jefferson presented with complaints of back pain but less than reported at previous visit. Completed stretches with education on when best to do stretches and how often. Patient able to perform 20 reps on l/s medx machine with an RPE of 4/10 at 42 ft lbs. Progress pt slowly on resisted peripheral machines to assist with muscle endurance and requires cues for pacing on the peripheral macnines    Patient is making good progress towards established goals.  Pt will continue to benefit from skilled outpatient physical therapy to address the deficits  stated in the impairment chart, provide pt/family education and to maximize pt's level of independence in the home and community environment.     Anticipated Barriers for therapy: cognition   Pt's spiritual, cultural and educational needs considered and pt agreeable to plan of care and goals as stated below:     Goals:   Short term goals:  6 weeks or 10 visits   - Pt will demonstrate increased lumbar MedX ROM by at least 5 degrees from the initial ROM value with improvements noted in functional ROM and ability to perform ADLs. Appropriate and Ongoing  - Pt will demonstrate increased MedX average isometric strength value by 10% from initial test resulting in improved ability to perform bending, lifting, and carrying activities safely, confidently. Appropriate and Ongoing  - Pt will report a reduction in worst pain score by 1-2 points for improved tolerance for prolong standing and ADLs that require at least 3 minutes of standing. Appropriate and Ongoing  - Pt able to perform HEP correctly with minimal cueing or supervision from therapist to encourage independent management of symptoms. Appropriate and Ongoing     Long term goals: 10 weeks or 20 visits   - Pt will demonstrate increased lumbar MedX ROM by at least 10 degrees from initial ROM value, resulting in improved ability to perform functional forward bending while standing and sitting. Appropriate and Ongoing  - Pt will demonstrate increased MedX average isometric strength value by 20% from initial test resulting in improved ability to perform bending, lifting, and carrying activities safely and confidently. Appropriate and Ongoing  - Pt to demonstrate ability to independently control and reduce their pain through posture positioning and mechanical movements throughout a typical day. Appropriate and Ongoing  - Pt will demonstrate reduced pain and improved functional outcomes as reported on the FOTO by reaching an intake score of >/= 59% functional ability in  order to demonstrate subjective improvement in patient's condition. . Appropriate and Ongoing  - Pt will demonstrate independence with the HEP at discharge. Appropriate and Ongoing  - Patient able to perform community ambulation > 200 ft. With no greater than 2/10 low back pain and no breaks. Appropriate and Ongoing    Plan     Continue with established Plan of Care towards established PT goals.     Therapist: Lino Tolliver, PT  5/24/2024

## 2024-05-29 ENCOUNTER — CLINICAL SUPPORT (OUTPATIENT)
Dept: REHABILITATION | Facility: OTHER | Age: 89
End: 2024-05-29
Payer: MEDICARE

## 2024-05-29 ENCOUNTER — PATIENT MESSAGE (OUTPATIENT)
Dept: PAIN MEDICINE | Facility: CLINIC | Age: 89
End: 2024-05-29
Payer: MEDICARE

## 2024-05-29 DIAGNOSIS — M47.817 SPONDYLOSIS OF LUMBOSACRAL REGION, UNSPECIFIED SPINAL OSTEOARTHRITIS COMPLICATION STATUS: ICD-10-CM

## 2024-05-29 DIAGNOSIS — M16.0 PRIMARY OSTEOARTHRITIS OF BOTH HIPS: Primary | ICD-10-CM

## 2024-05-29 PROCEDURE — 97110 THERAPEUTIC EXERCISES: CPT

## 2024-05-29 PROCEDURE — 97112 NEUROMUSCULAR REEDUCATION: CPT

## 2024-05-29 NOTE — PROGRESS NOTES
"[facility]  Physical Therapy Treatment Note     Name: Jefferson Boogie  Clinic Number: [id number]    Therapy Diagnosis:   Encounter Diagnoses   Name Primary?    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[employer]  Leisure: Go to the gym, listen to music, read     Pt goals: "reduce pain"  Objective      Lumbar  Isometric Testing on Med X equipment: Testing administered by PT    Test Initial Baseline Midpoint Final   Date 4/12/24 5/29/2024    ROM 0-39 deg 6-48    Max Peak Torque 169  167    Min Peak Torque 39  60    Flex/Ext Ratio 4:1 2.78:1    % variance  normative data 37 -33    % change from initial test N/A visit 1 -15          Outcomes:  Intake Score: 56%  Visit 6 Score:   Visit 10 Score:   Discharge Score:  Goal Score: 59%     Treatment     Jefferson received the treatments listed below:        Medical MedX Treatment as follows:  MedX " "testing performed day 2: Patient  received neuromuscular education to engage spinal musculature correctly for motor control and engagement of musculature for 10 minutes including the MedX exercise component and practice and standard testing. MedX dynamic exercise and baseline isometric test performed with instructions to guide the patient safely through the testing procedure. Patient instructed to perform isometric test correctly and safely while building to an optimal force with a pain-free effort. Patient also instructed that they should feel support/pressure from MedX restraints but no pain/discomfort, and encouraged to report any pain to therapist. Patient demonstrated appropriate understanding of information and tolerance of test.  Education regarding purpose of test, safety during test given, and reviewed possible more soreness and strategies.              5/29/2024    11:28 AM   [facility]   Visit Number 10   VAS Pain Rating 7   Time 3   Extension in Standing 10   Flexion in Lying 10   Lumbar Flexion 48   Lumbar Extension 6   Lumbar Peak Torque 167 ft. lbs.   Min Torque 60   Test Percent Below Normative Data 33 %   Ice - Z Lie (in min.) 5           Jefferson participated in neuromuscular re-education activities to improve balance, coordination, proprioception, motor control and/or posture for 5 minutes. The following activities were included:    Bridges + pilates ring x15 (min lift)  PPT x10 (max cueing, unable to perform this visit)  supine TA contractions w/ball x15x3" hold  supine TA contractions w/ball + SLR x10     Jefferson participated in therapeutic exercises to develop strength, endurance, ROM, flexibility, and posture for 35 minutes including:    NuStep 5'  Prone lying x1'    JAMEY 1 min  +EIS with towel  Open book stretch x10  +Hook lying hip abd BlkTB    SOC x15x3" hold  DKTC w/ PB, x10x5" hold  LTR, x10x3" hold  piriformis stretch 2x20" R    Peripheral muscle strengthening which included one set " of 15-20 repetitions at a slow and controlled 10-13 second per rep pace focused on strengthening supporting musculature in order to improve body mechanics and functional mobility. Patient and therapist focused on proper form during treatment to ensure optimal strengthening of each targeted muscle group.  Machines utilized included:Torso rotation, Leg Ext, Leg Curl, Chest Press, and Rowing  Triceps, Biceps, Hip Abd, and Leg Press - Progress slowly due to lack of muscle endurance      Jefferson participated in dynamic functional therapeutic activities to improve functional performance and simulate household and community activities for 00  minutes. The following activities were included:      Jefferson received manual therapy techniques for 00  minutes. The following activities were included:      Pt given cold pack for 5 minutes to low back in Z lie position  Patient Education and Home Exercises     Home exercises include:  PPT   Prone lying   Bridges  Cardio program (V5): - 5/3/24  Lifting education (V11): -  Fridge Magnet Discharge handout (date given): -      Education provided:   - PT role and POC  - HEP  -energy conservation techniques     Written Home Exercises Provided: Patient instructed to cont prior HEP.  Exercises were reviewed and Jefferson was able to demonstrate them prior to the end of the session.  Jefferson demonstrated good  understanding of the education provided.     See EMR under Patient Instructions for exercises provided prior visit.    Assessment     Patient has attended 10 visits at [facility] which included MD evaluation, PT evaluation with isometric testing, and physical therapy treatment including HEP instruction, education, aerobic work, dynamic strengthening on med ex equipment for the spine, and whole body strengthening on med ex equipment with increasing weight loads.  Patient  is demonstrating increased ability to reduce symptoms, improved posture, increased  ROM, and decreased strength on  med ex test by 15%  average. Discussed trying the eliminate the use of LE for the test and that is possible what lead to decrease in strength. Jefferson reports feeling better post therapy.     Patient is making good progress towards established goals.  Pt will continue to benefit from skilled outpatient physical therapy to address the deficits stated in the impairment chart, provide pt/family education and to maximize pt's level of independence in the home and community environment.     Anticipated Barriers for therapy: cognition   Pt's spiritual, cultural and educational needs considered and pt agreeable to plan of care and goals as stated below:     Goals:   Short term goals:  6 weeks or 10 visits   - Pt will demonstrate increased lumbar MedX ROM by at least 5 degrees from the initial ROM value with improvements noted in functional ROM and ability to perform ADLs. Partially Met  - Pt will demonstrate increased MedX average isometric strength value by 10% from initial test resulting in improved ability to perform bending, lifting, and carrying activities safely, confidently. Not Met  - Pt will report a reduction in worst pain score by 1-2 points for improved tolerance for prolong standing and ADLs that require at least 3 minutes of standing. Partially Met  - Pt able to perform HEP correctly with minimal cueing or supervision from therapist to encourage independent management of symptoms. Partially Met     Long term goals: 10 weeks or 20 visits   - Pt will demonstrate increased lumbar MedX ROM by at least 10 degrees from initial ROM value, resulting in improved ability to perform functional forward bending while standing and sitting. Appropriate and Ongoing  - Pt will demonstrate increased MedX average isometric strength value by 20% from initial test resulting in improved ability to perform bending, lifting, and carrying activities safely and confidently. Appropriate and Ongoing  - Pt to demonstrate ability to  independently control and reduce their pain through posture positioning and mechanical movements throughout a typical day. Appropriate and Ongoing  - Pt will demonstrate reduced pain and improved functional outcomes as reported on the FOTO by reaching an intake score of >/= 59% functional ability in order to demonstrate subjective improvement in patient's condition. . Appropriate and Ongoing  - Pt will demonstrate independence with the HEP at discharge. Appropriate and Ongoing  - Patient able to perform community ambulation > 200 ft. With no greater than 2/10 low back pain and no breaks. Appropriate and Ongoing    Plan     Continue with established Plan of Care towards established PT goals.     Therapist: Lino Tolliver, PT  5/29/2024

## 2024-06-25 ENCOUNTER — CLINICAL SUPPORT (OUTPATIENT)
Dept: REHABILITATION | Facility: OTHER | Age: 89
End: 2024-06-25
Payer: MEDICARE

## 2024-06-25 DIAGNOSIS — M47.817 SPONDYLOSIS OF LUMBOSACRAL REGION, UNSPECIFIED SPINAL OSTEOARTHRITIS COMPLICATION STATUS: ICD-10-CM

## 2024-06-25 DIAGNOSIS — M16.0 PRIMARY OSTEOARTHRITIS OF BOTH HIPS: Primary | ICD-10-CM

## 2024-06-25 PROCEDURE — 97110 THERAPEUTIC EXERCISES: CPT | Mod: CQ

## 2024-06-25 PROCEDURE — 97112 NEUROMUSCULAR REEDUCATION: CPT | Mod: CQ

## 2024-06-25 NOTE — PROGRESS NOTES
"[facility]  Physical Therapy Treatment Note     Name: Jefferson Boogie  Clinic Number: [id number]    Therapy Diagnosis:   Encounter Diagnoses   Name Primary?    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 He also reports, "Those machines (peripheral training) hurt me."     Patient reports tolerating previous visit well.  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[employer]  Leisure: Go to the gym, listen to music, read     Pt goals: "reduce pain"  Objective      Lumbar  Isometric Testing on Med X equipment: Testing administered by PT    Test Initial Baseline Midpoint Final   Date 4/12/24 5/29/2024    ROM 0-39 deg 6-48    Max Peak Torque 169  167    Min Peak Torque 39  60    Flex/Ext Ratio 4:1 2.78:1    % variance  normative data 37 -33    % change from initial test N/A visit 1 -15          Outcomes:  Intake Score: 56%  Visit 6 Score:   Visit 10 Score:   Discharge Score:  Goal Score: 59%     Treatment     Jefferson received " "the treatments listed below:        Medical MedX Treatment as follows:  MedX testing performed day 2: Patient  received neuromuscular education to engage spinal musculature correctly for motor control and engagement of musculature for 10 minutes including the MedX exercise component and practice and standard testing. MedX dynamic exercise and baseline isometric test performed with instructions to guide the patient safely through the testing procedure. Patient instructed to perform isometric test correctly and safely while building to an optimal force with a pain-free effort. Patient also instructed that they should feel support/pressure from MedX restraints but no pain/discomfort, and encouraged to report any pain to therapist. Patient demonstrated appropriate understanding of information and tolerance of test.  Education regarding purpose of test, safety during test given, and reviewed possible more soreness and strategies.              6/25/2024    10:11 AM   [facility]   Visit Number 11   VAS Pain Rating 5   Time 5   Flexion in Lying 10   Lumbar Weight 45 lbs   Repetitions 18   Rating of Perceived Exertion 5   Ice - Z Lie (in min.) 5               Jefferson participated in neuromuscular re-education activities to improve balance, coordination, proprioception, motor control and/or posture for 8 minutes. The following activities were included:    Bridges + pilates ring x15 (min lift)  PPT x10 (max cueing, unable to perform this visit)  supine TA contractions w/ball x15x3" hold  supine TA contractions w/ball + SLR x10-NP    Jefferson participated in therapeutic exercises to develop strength, endurance, ROM, flexibility, and posture for 35 minutes including:    NuStep 5'  Prone lying x1'-NP    JAMEY 1 min  +cat/cow 10x  +EIS with towel-NP  Open book stretch x10-NP  +Hook lying hip abd BlkTB-NP    SOC x15x3" hold-NP  DKTC w/ PB, x10x5" hold-NP  LTR, x10x3" hold  piriformis stretch 2x20" R    Peripheral muscle " strengthening which included one set of 15-20 repetitions at a slow and controlled 10-13 second per rep pace focused on strengthening supporting musculature in order to improve body mechanics and functional mobility. Patient and therapist focused on proper form during treatment to ensure optimal strengthening of each targeted muscle group.  Machines utilized included:Torso rotation, Leg Ext, Leg Curl, Chest Press, and Rowing  Triceps, Biceps, Hip Abd, and Leg Press - Progress slowly due to lack of muscle endurance      Jefferson participated in dynamic functional therapeutic activities to improve functional performance and simulate household and community activities for 00  minutes. The following activities were included:      Jefferson received manual therapy techniques for 00  minutes. The following activities were included:      Pt given cold pack for 5 minutes to low back in Z lie position  Patient Education and Home Exercises     Home exercises include:  PPT   Prone lying   Bridges  Cardio program (V5): - 5/3/24  Lifting education (V11): -  Fridge Magnet Discharge handout (date given): -      Education provided:   - PT role and POC  - HEP  -energy conservation techniques     Written Home Exercises Provided: Patient instructed to cont prior HEP.  Exercises were reviewed and Jefferson was able to demonstrate them prior to the end of the session.  Jefferson demonstrated good  understanding of the education provided.     See EMR under Patient Instructions for exercises provided prior visit.    Assessment     Patient presents to therapy with c/o continue lumbar symptoms, entering gym with wide CHERYL and standard cane. He was instructed in therex to increase lumbopelvis strength and mobility for pain reduction and improve functional movement. Weakness/difficulty persists achieving and maintaining PPT with max cues provided. He demonstrates limited lumboplevis mobility/rhythm doing introduced cat/cow with tactile cues provided. Lifting  training reviewed with demonstrations of proper techniques with hip hinges,squats, load transfers, and Golfer's lifts. Lumbar MedX max flexion adjusted 45* due to pain, completing 18 reps at 5/10 RPE  with resistance increased to 45 lbs/ft. Peripheral muscle strength resistance reduced due to reports of pain with all machines. Continue with program per pt tolerance.    Patient is making good progress towards established goals.  Pt will continue to benefit from skilled outpatient physical therapy to address the deficits stated in the impairment chart, provide pt/family education and to maximize pt's level of independence in the home and community environment.     Anticipated Barriers for therapy: cognition   Pt's spiritual, cultural and educational needs considered and pt agreeable to plan of care and goals as stated below:     Goals:   Short term goals:  6 weeks or 10 visits   - Pt will demonstrate increased lumbar MedX ROM by at least 5 degrees from the initial ROM value with improvements noted in functional ROM and ability to perform ADLs. Partially Met  - Pt will demonstrate increased MedX average isometric strength value by 10% from initial test resulting in improved ability to perform bending, lifting, and carrying activities safely, confidently. Not Met  - Pt will report a reduction in worst pain score by 1-2 points for improved tolerance for prolong standing and ADLs that require at least 3 minutes of standing. Partially Met  - Pt able to perform HEP correctly with minimal cueing or supervision from therapist to encourage independent management of symptoms. Partially Met     Long term goals: 10 weeks or 20 visits   - Pt will demonstrate increased lumbar MedX ROM by at least 10 degrees from initial ROM value, resulting in improved ability to perform functional forward bending while standing and sitting. Appropriate and Ongoing  - Pt will demonstrate increased MedX average isometric strength value by 20% from  initial test resulting in improved ability to perform bending, lifting, and carrying activities safely and confidently. Appropriate and Ongoing  - Pt to demonstrate ability to independently control and reduce their pain through posture positioning and mechanical movements throughout a typical day. Appropriate and Ongoing  - Pt will demonstrate reduced pain and improved functional outcomes as reported on the FOTO by reaching an intake score of >/= 59% functional ability in order to demonstrate subjective improvement in patient's condition. . Appropriate and Ongoing  - Pt will demonstrate independence with the HEP at discharge. Appropriate and Ongoing  - Patient able to perform community ambulation > 200 ft. With no greater than 2/10 low back pain and no breaks. Appropriate and Ongoing    Plan     Continue with established Plan of Care towards established PT goals.     Therapist: Keisha Hilliard, PTA  6/25/2024

## 2024-06-27 ENCOUNTER — OFFICE VISIT (OUTPATIENT)
Dept: PAIN MEDICINE | Facility: CLINIC | Age: 89
End: 2024-06-27
Attending: ANESTHESIOLOGY
Payer: MEDICARE

## 2024-06-27 VITALS
OXYGEN SATURATION: 98 % | DIASTOLIC BLOOD PRESSURE: 61 MMHG | BODY MASS INDEX: 26.73 KG/M2 | HEART RATE: 58 BPM | RESPIRATION RATE: 18 BRPM | TEMPERATURE: 98 F | SYSTOLIC BLOOD PRESSURE: 129 MMHG | WEIGHT: 202.63 LBS

## 2024-06-27 DIAGNOSIS — T84.9XXD FAILURE OF JOINT FUSION, SUBSEQUENT ENCOUNTER: Primary | ICD-10-CM

## 2024-06-27 DIAGNOSIS — M46.1 SACROILIITIS: ICD-10-CM

## 2024-06-27 DIAGNOSIS — M53.3 SACROILIAC DYSFUNCTION: ICD-10-CM

## 2024-06-27 PROCEDURE — 99214 OFFICE O/P EST MOD 30 MIN: CPT | Mod: PBBFAC | Performed by: ANESTHESIOLOGY

## 2024-06-27 PROCEDURE — 99214 OFFICE O/P EST MOD 30 MIN: CPT | Mod: S$PBB,GC,, | Performed by: ANESTHESIOLOGY

## 2024-06-27 PROCEDURE — 99999 PR PBB SHADOW E&M-EST. PATIENT-LVL IV: CPT | Mod: PBBFAC,,, | Performed by: ANESTHESIOLOGY

## 2024-06-27 RX ORDER — HYDROCODONE BITARTRATE AND ACETAMINOPHEN 10; 325 MG/1; MG/1
1 TABLET ORAL EVERY 8 HOURS PRN
Qty: 90 TABLET | Refills: 0 | Status: SHIPPED | OUTPATIENT
Start: 2024-06-27 | End: 2024-07-27

## 2024-06-27 NOTE — PROGRESS NOTES
Chronic patient Established Note (Follow up visit)      SUBJECTIVE:    Interval History 6/27/2024:  Patient returns to clinic for follow up. Patient is s/p Right SIJ and R Piriformis injections on 4/10/2024. Patient reports 100% relief from the procedure for 2 days. Pain has recurred and is same as previous description. Sha Ku (Carolynn) is also here today for programming of [place] SCS. Patient continues with current medications with some benefit and HEP. Current pain 6/10. Patient denies red flags including weakness, unexpected weight loss/gain, night sweats/fevers, saddle anesthesia, and symptoms of CAREN.    Interval History 3/14/2024:  Jefferson Boogie presents for follow up. He is here today to discuss further options for treatment after right cluneal nerve block on 2/28/2024. He was here for the same reason two weeks ago, and we decided to give the block a bit more time to work before investigating other options. He reports no significant changes in his symptoms. He does say that he can manage his symptoms satisfactorily using 1.5 of his oxycodone  every 3 hours when doing things like golf and walking. He said he may use the medication once or twice a month and denies significant side effects.     Interval History 2/29/24:  Jefferson Boogie presents to the clinic for follow-up. He is s/p right cluneal nerve block 2/28/24. Today, he says that he does not know if he has had any difference in his symptoms so far. We spoke briefly yesterday about additional options, and he is here today for further discussion.     Interval History 01/16/2024:  Jefferson Boogie presents to the clinic for a follow-up appointment for low back, right hip, and right leg pain. Since the last visit, Jefferson Boogie states the pain has been persistant. Currently his right hip and leg pain is his biggest problem. He continues to endorse sharp stabbing pain in his hip and leg. Pain is exacerbated by activity, standing, laying on  right side, lifting, bending. Pain is improved with rest, exercising, stretching, medications. Continues with Celebrex and Cymbalta. Current pain intensity is 6/10. Patient continues to endorse benefit from SCS. Patient denies red flags including weakness, unexpected weight loss/gain, night sweats/fevers, saddle anesthesia, and symptoms of CAREN.    Interval History 8/10/2023:  Patient reports that his pain post right SIJ and piriformis was persistent until it decreased 50% on 7/4/23, but then the pain returned after a few days. He reports that during that period he was able to run after a bus in [facility]. The pain is improved, but not as much as previous in his right buttock and calf. Patient reports that he has been working with the [employer], but not finding the correct programming as of yet.  No fevers, chills, unexplained weight loss.  Hx of bladder cancer in 1967. Remembers falling while skiing and landed on his buttocks 30 years ago, but no other pelvic trauma. Patient was traveling to [place] and recently came back to [place].  We discussed his recent pelvic CT in details especially for the accidental finding of irregular bone lesion. Explained to patient the need to investigate this further with our urology and oncology team.     Interval History 6/22/2023  Jefferson Boogie presents to the clinic for a follow-up appointment for chroniic LBP. Pt was last seen in clinic on 5/25 and scheduled for right SIJ and piriformis injection--performed 6/22. Pt reports 40% relief x 1-2 days. He continues to work with the rep with regards to programming the [place] SCS device. Pt reports that he is getting some decent relief from the device with current program, but feels like there is still some progress to be made. Continues to complain of pain of similar character and quality to that of prior eval. Localized in the right lowerlumbar spine/gluteal area as well as the right lateral leg. Since the last visit, Jefferson Boogie states  the pain has been improving. Current pain intensity is 5/10. He continues to exercise daily with stationary bike and weight training. Continues to take Cymbalta and Celebrex daily. He also takes Norco 10 very sparingly, maybe 1-2 per week. Overall the current regimen of SCS, medications and occasional injections have provided pt with enough relief for him to remain functional, and able to participate in his hobbies.      Interval history 5/25/23:  In the interim he has met with the [employer] reps for reprogramming. He has his good and bad days but feels that the pain is manageable. The reps with [employer] are present for the visit today. Today his pain is a 6.5/10. Still taking percocet and cymbalta (missed a few days), but these medications do not provide much relief. Still pain with walking and sitting, but the pain with laying down is better.      Interval History 5/4/23:  Patient seen today via virtual follow-up after implantation of his Green SCS in February. He reports no change in his pain since his last visit. Most of his pain is into his right hip with intermittent radiation into his RLE. He denies any new symptoms. No red flag symptoms. He is scheduled to meet with the [employer] reps from reprogramming next week. He remains optimistic. In discussing his current medications, he has run out of the hydrocodone he was previously taking and has only been taking his Cymbalta once per day rather two.      Interval History 3/30/2023:  Mr Boogie presents 5-6 weeks after implantation of his Green SCS system. He reports no significant change in his pains, which are primarily down the RLE.  No constitutional signs symptoms concerning for infection.  No new areas of pain or neurological changes since procedure. No voicing of s/s concerning for cauda equina syndrome. He does endorse improvement in sleep. He is worried that his implant was a failure because his pain has not changed.     Interval History 2/20/2023:    Keagan presents for follow up of [place] SCS replacement. He states doing well. No constitutional signs symptoms concerning for infection.  No new areas of pain or neurological changes since procedure. No voicing of s/s concerning for cauda equina syndrome.      Interval History 1/23/2023:  Still taking Celebrex, however hasn't noticed a difference in pain with this medication. Location, quality, and severity of pain are unchanged from prior visit and is described above. Here today to discuss removal of [place] SCS, and implant of [place] SCS. He states his stimulator has not been helpful for the past 2 years despite multiple reprogramming and adjustment.     Interval History 11/21/22:  Reports 24 hours of 100% relief for 48 hours, but he reports that now his pain is only approximally 10% better. His pain at rest is 3-4/10. Pain at its worst is 8-9/10. Pain is improved when leaning over a grocery cart. Pain only allows him to walk for 3-4mins.      Interval History 8/29/22:  Jefferson Boogie presents to the clinic for a follow-up appointment for back pain.  He had his second bilateral L3, L4, L5 MBB and reports 100% pain relief. He would like to proceed with RFA.     Interval History 5/26/22:  Jefferson Boogie presents to the clinic for a follow-up appointment for back pain. Since the last visit, Jefferson Boogie states the pain has been stable. Certain postures he can tolerate in the standing position such as leaning on a shopping cart or support to his posterior thighs. He now uses a walker when covering long distances. He continues to play golf despite it aggravating his pain. He receives some benefit from the SCS whereas other interventions have not helped. He remains interested in the [place] device.     Interval History 3/24/22:  Jefferson Boogie presents to the clinic for a follow-up appointment for back pain. Since the last visit, Jefferson Boogie states the pain has been worse than usual. Current pain  "intensity is 8/10. He continues to report benefit with Nevro SCS however he has not been able to get in touch with the representative in order to have his settings adjusted. He continues to take occasional norco 7.5 mg with benefit, particularly when he plays golf.     Interval History 2/10/22:  Pat presents today s/p caudal epidural steroid injections on 1/12/22 since then he has not had any relief. He states his pain has been unchanged and is a 6/10 on average with the worst being 8/10 and best is 4/10. He has been taking percocet 5mg when he golfs or plays with his granddaughter.      Interval History 12/30/21:  Jefferson Boogie presents to clinic for follow up appointment s/p Right SI joint and Right piriformis muscle injection under US guidance on 11/29/21. He did not obtain any noticeable relief with this procedure similar to all of his previous injections. His pain is unchanged and constant over the right buttock. He is taking percocet 5mg x 3 on days he is more active, such as playing golf. This helps some, but minimally. Denies any new symptoms or neurological changes. No numbness, tingling, weakness in his lower extremities. Denies bowel/bladder incontinence or saddle anesthesia.      Interval History 11/4/2021:  Jefferson Boogie presents to the clinic for a follow-up appointment for right sided back pain and SI joint pain. Mr. Boogie had a right SI joint injection on 10/13/2021. He did not note significant relief with the injection for any period of time. Pain is still constant over the right buttock and most noticeable when playing golf. He denies any new bowel/bladder incontinence, lower extremity numbness or weakness or saddle anesthesia.     Interval History 8/26/2021:  Jefferson Boogie presents to the clinic for a follow-up appointment for right sided hip pain with right-sided radiculopathy . Since the last visit, Jefferson Boogie states the pain has been "particularly tender today" 7/10. " Patient reports playing golf recently and experienced worsening symptoms the following day. Mr. Boogie is s/p right-side hip joint injection with minimal relief. Patient states he has had relief with previous interventions and is open to RFA.     Interval History 6/10/2021:  Jefferson Boogie presents to the clinic for a follow-up appointment. Since the last visit, Jeffreson Boogie states the pain has been stable. Current pain intensity is 3/10. States he is still having pain around SIJ that is affecting his ADL      Interval History 4/15/2021:  The patient returns to clinic today for follow up of back pain. He reports that the swelling above the SI fusion incision has resolved. He denies any fever, chills, or drainage from the incision. He does report benefit since the fusion. He continues to report benefit with Nevro SCS. He reports intermittent shoulder pain at this time. He did receive an injection with Sports Medicine with benefit. He denies any other health changes. His pain today is 2/10.     Interval History 3/25/21:  Mr. Boogie presents to clinic for follow up of bilateral shoulder pain. He is s/p BL subacromial injections on 2/8/21. He reports maximum 20% relief of pain in the Right shoulder. Today the Left shoulder pain is 1/10; Right shoulder pain is 5-7/10.      Interval History 3/22/2021:  The patient returns to clinic today for follow up of back pain. He is s/p right SI fusion on 3/15/2021. He reports some relief at this time. He does report soreness to the incision. He denies any fever, chills, or drainage from incision. He continues to report benefit with Nevro SCS. He denies any other health changes. His pain today is 4/10.     Interval History 1/11/2020:  Patient is here for follow-up of low back pain now s/p sacroiliac RFA on 12/9/20 which provided no benefit and with the new complaint of bilateral anterior shoulder pain R>L. His shoulder pain started about 2 months ago. He describes 9/10  sharp/stinging pain without radiation that is exacerbated with overhead activity and improved with rest. He started PT about one month ago. He takes oxycodone which provides some relief. He had a blind subacromial steroid injection in the right shoulder with Dr. Ramirez on 10/26/19 which provided some short term relief.     Interval History 11/5/2020:  Jefferson Boogie presents to the clinic for a follow-up appointment for low back pain. Since the last visit, Jefferson Boogie states the pain has been stable. Current pain intensity is 4/10. He had good relief from the SI joint injection that lasted for about a week. Pain has since returned. He also slipped and fell on his buttock a couple of weeks ago and had increased pain in the right buttock after that which is slowly improving. He continues to have variable benefit with the Nevro SCS for intermittent pain in the right leg. He is scheduled to meet with representatives today. He is taking celebrex daily and percocet as needed sparingly. He denies any adverse effects and any other health changes.     Interval History 10/5/2020:  The patient returns to clinic today for follow up of low back pain. He is s/p right SI joint injection on 9/9/2020. He reports 25% relief of his right sided low back and buttock pain. He did have good relief the first two days. He continues to report right sided low back and buttock pain. He denies any radicular leg pain. His pain is worse with prolonged standing and walking. He continues to report intermittent pain into his achilles from previous injury. He feels as though this has changed his gait. He continues to report some benefit with Nevro SCS device. He is in contact with representatives. He is currently taking Percocet as needed sparingly with some benefit. He denies any adverse effects. He denies any other health changes. His pain today is 4/10.      Interval History 9/2/2020:  The patient returns to clinic today for follow up of  back pain. He reports that his back pain has improved since last audio visit. He continues to report back pain with intermittent radiating pain into his right hip and calf. He has spoken with Bienvenido from [employer] via phone with some programming. He is meeting with Bienvenido today. He had some issues with charging but this has improved. He is currently taking Norco intermittently but this is only lasts 2-3 hours. He denies any adverse effects. He denies any other health changes. His pain today is 8/10.     Interval History 08/27/2020:  Pt was contacted over [facility] audio for a follow up appointment.  He is currently complaining of right hip and right calf with no associated numbness or weakness.  He continues to use his Nevro device.  He states it has been very frustrating. Inconsistent.  Took 20 mins to 1 hour to charge.  Couldn't get it to start this morning.  He states that there is no drainage at the battery site, no signs of infection or pain at the site.  Patient is not taking medications at this time.  He wants to speak about medication options because he would like to start playing golf again.     Interval History 8/17/2020:  The patient returns to clinic today for post op wound check. He continues to report benefit with Nevro device. He denies any fever, chills, or drainage. He continues to follow his activity restrictions. He does report a recent achilles tendon injury while swimming. He is seeing Orthopedics. He denies any other health changes. His pain today is 4/10.     Interval History 8/3/2020:  The patient returns to clinic today for post op. He is s/p Nevro battery change on 7/27/2020. He denies any fever, chills, or drainage from incision. He has not completed his antibiotics as he missed two days of the medication. He continues to follow his activity restrictions. He reports relief with the device. He is meeting with Bienvenido today for programming. He denies any other health changes. His pain today is 2/10      Interval History 7/22/2020:  Pt presents for audio follow up only s/p Right SI joint injection on 7/8/2020. Pt states he has near resolution of focal pain to buttock. He is pleased with result and pain relief. Pt has been in contact with [employer] and will be having battery swap in 5 days. He has difficulty whether stimulator is beneficial and has even had a holiday from using SCS.  He denies any newer areas pain, denies any neuro changes, meds area adequate to control pain without adverse side effects. Pain is 2/10 today.     Interval HPI 6/15/2020:  Jefferson Boogie presents to the clinic for a follow-up appointment for 3 week follow up right hip and right thigh pain. Since the last visit, Jefferson Boogie states the pain has been persistant. Current pain intensity is 5/10. Patient has been working with Bienvenido Varghese, to try and get better coverage of a localized pain that he still experiences in his right low back area. He does report improvement in symptoms of numbness/tingling. Today, worse pain is 1/10 in severity and localizes to the right SI joint. Pain is worse with extension of his back. It is worse with golfing. Pain relieved by Norco--he takes 1-2 tablets of 5-325 Norco on days that he plays golf. Pain is also improved with being still and not being active. Patient endorses a much more active lifestyle with Norco. Denies new weakness/numbness or bowel/bladder changes.     Previous injection history includes a series of 3 lumbar CHOCO at [facility].      Interval HPI 3/5/20:  Patient presents to the clinic for a follow-up appointment for low back pain. Since the last visit, he states his pain has been unchanged. Current pain intensity is 4/10. He continues to work with [employer] to adjust settings on his SCS. He has stopped using tramadol, and uses norco sparingly. He continues to work with a  for physical therapy.      Interval HPI 1/9/2020:  Patient returns for follow up s/p Nevro SCS.  He states he is unsure if it has helped his pain since he had it implanted. He last had an adjustment of his programming about 1 month ago. He does note that once he is done charging his SCS his pain is improved. Pain still worse with prolonged standing and activity such as golf. He still is doing home exercise program. He says that he is trying to do more since getting the SCS. He is still taking the Tramadol with limited pain relief. He takes Tramadol 50 mg twice daily only on days of activity which is once a week. No other health changes.      HPI 11/20/19  Jefferson Boogie returns to clinic today for follow up. He reports increased low back and leg pain over the last few days. He has been in contact with [email or web address] representatives for programming adjustments. He does report benefit with the device. He reports good days and bad days. His pain is worse with prolonged standing and activity. He continues to participate in a home exercise routine. He does take Tramadol sparingly but reports limited relief. He denies any other health changes. His pain today is 5/10.      Pain Medications:  Oxycodone-acetaminophen 10-325mg very rarely  Mobic  Cymbalta     Opioid Contract: not applicable      report:  Reviewed and consistent with medication use as prescribed. Has not filled oxycodone since June of last year, says he has plenty left.      Pain Procedures  9/9/2020 - Right SI joint injection  7/27/2020 - Nevro SCS battery change  7/8/2020 - Right SIJ injection >80% relief   8/26/19 - SCS implant  8/5/19 - SCS trial  7/8/2020 - Right SI joint injection  7/27/2020 - SCS battery revision  9/9/2020 - Right SI joint injection   12/9/2020 - Right L5-S1 RFA  3/15/2021 - Right SI fusion  7/21/2021 - Injection R Hip - minimal relief  10/13/2021 - Right SI joint injection  11/29/2021 - R SI joing and R piriformis muscle injection - no relief   1/12/2022 - Caudal CHOCO- no relief   8/24/22 - Diagnostic Bilateral L3, L4 and L5 Lumbar  Medial Branch Block under Fluoroscopy  09/21/22 - Right L3, L4, L5 RFA  10/12/22 -  Left L3, L4, L5 RFA  2/13/23 - [place] SCS implant  5/31/23 - Right SIJ and piriformis injection   2/28/23 -  Right cluneal nerve block    Physical Therapy/Home Exercise: no     Imaging:   EXAMINATION:  CT PELVIS WITHOUT CONTRAST     CLINICAL HISTORY:  History fo percutanous SI fusion.;  Chronic pain syndrome     TECHNIQUE:  CT of the pelvis, without intravenous contrast.     COMPARISON:  None     FINDINGS:  BONE: Diffuse osteopenia.  No fracture or osteonecrosis.  Few lytic and sclerotic lesions scattered throughout the pelvis, including a lesion in the right iliac bone with irregularity of the overlying cortex (2:103).     JOINT: Postoperative changes from right sacroiliac joint fusion.  The implant is in satisfactory position.  No osseous fusion across the joint space.     Degenerative changes both sacroiliac joints.  No erosions or ankylosis.  Degenerative changes also involve the lower lumbar spine, both hip joints, and pubic symphysis.  Chondrocalcinosis of the pubic symphysis.  No significant joint effusion.     SOFT TISSUE: Normal muscle bulk. Regional tendons are intact.  Calcific tendinopathy involving the proximal hamstring tendons bilaterally.  No bursal collection.     MISCELLANEOUS: Circumferential bladder wall thickening.  Prostatic calcifications.  Colonic diverticulosis.  Atherosclerosis.     Impression:     Postoperative changes in the right sacroiliac joint.  No osseous fusion.     Few lytic and sclerotic lesions scattered throughout the pelvis, concerning for metastases or myeloma.  No prior studies are available for comparison.     Bladder wall thickening, which could be secondary to outlet obstruction or cystitis.  Correlate with urinalysis.     Other findings as described.     This report was flagged in Epic as abnormal.    6/10/2021 - X ray Hips Bilateral: No radiographic evidence of acute osseous abnormality  of the pelvis and hips and without radiographic evidence of osteonecrosis of the femoral heads.     9/18/21 MRI L-spine:  FINDINGS:  Lumbar sagittal alignment is slightly is straightened.  There is slight convex right curvature lumbar spine.  There is degenerative disc disease with intervertebral disc height loss and endplate degenerative change at all levels with scattered disc desiccation allowing for degenerative change the lumbar vertebral body heights and contours are within normal is without evidence for acute fracture or subluxation.  There is heterogeneous T1/T2 signal focus within the right aspect of the S1 vertebra most compatible with hemangioma this measures 2.0 cm.     The distal spinal cord and conus is normal in signal and contour tip of the conus approximates the T12/L1 level.  Please note there is artifact from indwelling spinal stimulator device with leads partially visualized casting artifact entering the dorsal epidural space at the T12 level and extending cranially.     There is abnormal clumping configuration of the filum terminalis a from T12 through L5 with slight thickening of scattered nerve roots most compatible with arachnoiditis.     No aneurysmal dilatation of the visualized abdominal aorta.     T12/L1 through L1/L2: No significant disc bulge, central canal or neural foraminal stenosis.     L2/L3: Posterior disc osteophyte with facet joint arthropathy without significant central canal stenosis with mild bilateral neural foraminal stenosis.     L3/L4:Bulging disc with ligamentum flavum hypertrophy without significant central canal stenosis with mild bilateral neural foraminal stenosis.     L4/L5:Posterior disc osteophyte with ligamentum flavum hypertrophy and facet joint arthropathy without significant central canal stenosis and mild bilateral neural foraminal stenosis.     L5/S1: Posterior disc osteophyte with facet joint arthropathy without significant central canal stenosis with  moderate right and mild left neural foraminal stenosis.     This report was flagged in Epic as abnormal.     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  Please note there is spinal stimulator device with leads partially visualized and distorting the study by artifacts.     There is abnormal thickening of the filum configuration most compatible with arachnoiditis.     Please see above for additional details.     Thoracic spine MRI:  FINDINGS: Thoracic vertebral body height and alignment are maintained with a few small scattered Schmorl's nodes involving the endplates of several mid to lower thoracic vertebra. The T3-4 vertebra are partially fused. There is some degree of desiccation of all of the thoracic discs with multilevel disc space narrowing, especially at the T7-T8, T6-T7 and T5-T6 levels. There is no abnormal prevertebral soft tissue thickening. The somewhat heterogeneous appearance to the signal from the thoracic vertebra is presumably secondary to an admixture of red and yellow marrow.    T1-T2 level: There is no bony foraminal narrowing or bony central canal stenosis and the posterior disc margin is unremarkable.    T2-T3 level: There is no bony foraminal narrowing or bony central canal stenosis and the posterior disc margin is unremarkable.    T3-T4 level: There is no bony foraminal narrowing or bony central canal stenosis and the posterior disc margin is unremarkable.    T4-5 level: There is no bony foraminal narrowing or bony central canal stenosis and the posterior disc margin is unremarkable.    T5-T6 level: There is no bony foraminal narrowing or bony central canal stenosis and the posterior disc margin is unremarkable.    T6-T7 level: There is no bony foraminal narrowing or bony central canal stenosis and the posterior disc margin is unremarkable.    T7-T8 level: There is no bony foraminal narrowing or bony central canal stenosis and the posterior disc  margin is unremarkable.    T8-T9 level: There is no bony foraminal narrowing or bony central canal stenosis and the posterior disc margin is unremarkable.    T9-T10 level: There is no bony foraminal narrowing or bony central canal stenosis and the posterior disc margin is unremarkable.    T10-T11 level: There is no bony foraminal narrowing or bony central canal stenosis and the posterior disc margin is unremarkable.    T11-T12 level: There is no bony foraminal narrowing or bony central canal stenosis and the posterior disc margin is unremarkable.    T12-L1 level: The tip the conus medullaris extends down to level of the superior endplate of L1. 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 There is no bony foraminal narrowing or bony central canal stenosis at this level.    On the axial images, the immediate paravertebral soft tissues are unremarkable. A small cyst is seen to involve the upper pole the left kidney.    Following contrast administration, there is no abnormal enhancement of any bony or soft tissue elements of the thoracic spine. There is no abnormal enhancement of the subserosal surface of the thoracic spinal cord. Some foci of mild signal intensity in the CSF dorsal to the spinal cord of some of the sagittal sequences presumably is secondary to CSF pulsation artifact.    On the sagittal  image, there is cervical spondylosis and kyphosis with narrowing of all of the disc spaces in the cervical spine.     EXAMINATION:  CT PELVIS WITHOUT CONTRAST     CLINICAL HISTORY:  History fo percutanous SI fusion.;  Chronic pain syndrome     TECHNIQUE:  CT of the pelvis, without intravenous contrast.     COMPARISON:  None     FINDINGS:  BONE: Diffuse osteopenia.  No fracture or osteonecrosis.  Few lytic and sclerotic lesions scattered throughout the pelvis, including a lesion in the right iliac bone with irregularity of the overlying cortex (2:103).     JOINT:  Postoperative changes from right sacroiliac joint fusion.  The implant is in satisfactory position.  No osseous fusion across the joint space.     Degenerative changes both sacroiliac joints.  No erosions or ankylosis.  Degenerative changes also involve the lower lumbar spine, both hip joints, and pubic symphysis.  Chondrocalcinosis of the pubic symphysis.  No significant joint effusion.     SOFT TISSUE: Normal muscle bulk. Regional tendons are intact.  Calcific tendinopathy involving the proximal hamstring tendons bilaterally.  No bursal collection.     MISCELLANEOUS: Circumferential bladder wall thickening.  Prostatic calcifications.  Colonic diverticulosis.  Atherosclerosis.     Impression:     Postoperative changes in the right sacroiliac joint.  No osseous fusion.     Few lytic and sclerotic lesions scattered throughout the pelvis, concerning for metastases or myeloma.  No prior studies are available for comparison.     Bladder wall thickening, which could be secondary to outlet obstruction or cystitis.  Correlate with urinalysis.     Other findings as described.     This report was flagged in Epic as abnormal.        Electronically signed by: Koby Cote  Date:                                            07/10/2023  Time:                                           11:54    Allergies: Review of patient's allergies indicates:  No Known Allergies    Current Medications:   Current Outpatient Medications   Medication Sig Dispense Refill    ascorbic acid, vitamin C, (VITAMIN C) 1000 MG tablet Take 1,000 mg by mouth.      aspirin (ECOTRIN) 81 MG EC tablet Take 81 mg by mouth once daily.      celecoxib (CELEBREX) 200 MG capsule       DULoxetine (CYMBALTA) 30 MG capsule TAKE 1 CAPSULE(30 MG) BY MOUTH TWICE DAILY 60 capsule 2    ergocalciferol (DRISDOL) 200 mcg/mL (8,000 unit/mL) Drop Take by mouth.      ergocalciferol, vitamin D2, (VITAMIN D ORAL) Take by mouth every 30 days.      finasteride (PROSCAR) 5 mg tablet Take 1  tablet by mouth once daily.      irbesartan (AVAPRO) 75 MG tablet 150 mg once daily.       levothyroxine (SYNTHROID) 100 MCG tablet Take 100 mcg by mouth.      meloxicam (MOBIC) 15 MG tablet Take 15 mg by mouth.      mirabegron (MYRBETRIQ ORAL) Take by mouth.      simvastatin (ZOCOR) 20 MG tablet Take 20 mg by mouth every evening.      tadalafil (CIALIS) 20 MG Tab       temazepam (RESTORIL) 30 mg capsule TK 1 C PO QD HS      HYDROcodone-acetaminophen (NORCO)  mg per tablet Take 1 tablet by mouth every 8 (eight) hours as needed for Pain. 90 tablet 0     No current facility-administered medications for this visit.     Facility-Administered Medications Ordered in Other Visits   Medication Dose Route Frequency Provider Last Rate Last Admin    balanced salt irrigation intra-ocular solution 1 drop  1 drop Right Eye On Call Procedure Bell Cedeno MD        sodium chloride 0.9% flush 2 mL  2 mL Intravenous PRN Bell Cedeno MD           REVIEW OF SYSTEMS:  GENERAL:  No weight loss, malaise or fevers.  HEENT:  Negative for frequent or significant headaches.  NECK:  Negative for lumps, goiter, pain and significant neck swelling.  RESPIRATORY:  Negative for cough, wheezing or shortness of breath.  CARDIOVASCULAR:  Negative for chest pain, leg swelling or palpitations.  GI:  Negative for abdominal discomfort, blood in stools or black stools or change in bowel habits.  MUSCULOSKELETAL:  See HPI.  SKIN:  Negative for lesions, rash, and itching.  PSYCH:  + for sleep disturbance, mood disorder and recent psychosocial stressors.  HEMATOLOGY/LYMPHOLOGY:  Negative for prolonged bleeding, bruising easily or swollen nodes.  NEURO:   No history of headaches, syncope, paralysis, seizures or tremors.  All other reviewed and negative other than HPI.    Past Medical History:  Past Medical History:   Diagnosis Date    Bronchitis     Cancer     stage I bladder cancer 50 yrs ago    Hypercholesteremia     Hypertension      Sacroiliitis 07/08/2020    Thyroid disease        Past Surgical History:  Past Surgical History:   Procedure Laterality Date    BLADDER SURGERY      CATARACT EXTRACTION      CATARACT EXTRACTION W/  INTRAOCULAR LENS IMPLANT Right 3/9/2022    Procedure: EXTRACTION, CATARACT, WITH IOL INSERTION;  Surgeon: Bell Cedeno MD;  [facility];  Service: Ophthalmology;  Laterality: Right;    COLONOSCOPY      EPIDURAL STEROID INJECTION N/A 1/12/2022    Procedure: INJECTION, STEROID, EPIDURAL CAUDAL With Catheter needs consent;  Surgeon: Samuel Jimenez MD;  Location: [facility];  Service: Pain Management;  Laterality: N/A;    EYE SURGERY      cataracts     FUSION OF SACROILIAC JOINT Right 3/15/2021    Procedure: FUSION, RIGHT SACROILIAC JOINT FUSION;  Surgeon: Samuel Jimenez MD;  Location: [facility];  Service: Pain Management;  Laterality: Right;  C-ARM, PAINTEQ REP     HERNIA REPAIR      INJECTION OF ANESTHETIC AGENT AROUND NERVE Bilateral 6/1/2022    Procedure: BLOCK, NERVE MEDIAL BRANCH L3,4,5 BILATERAL 1st;  Surgeon: Samuel Jimenez MD;  Location: [facility];  Service: Pain Management;  Laterality: Bilateral;    INJECTION OF ANESTHETIC AGENT AROUND NERVE Bilateral 8/24/2022    Procedure: Block, Nerve Kenny L3, L4, & L5;  Surgeon: Samuel Jimenez MD;  Location: [facility];  Service: Pain Management;  Laterality: Bilateral;    INJECTION OF ANESTHETIC AGENT AROUND NERVE Right 2/28/2024    Procedure: BLOCK, NERVE RIGHT CLUNEAL;  Surgeon: Samuel Jimenez MD;  Location: [facility];  Service: Pain Management;  Laterality: Right;  [phone]    INJECTION OF JOINT Right 7/8/2020    Procedure: INJECTION, JOINT, SACROILIAC (SI);  Surgeon: Samuel Jimenez MD;  Location: [facility];  Service: Pain Management;  Laterality: Right;    INJECTION OF JOINT Right 9/9/2020    Procedure: INJECTION, JOINT, SACROILIAC (SI);  Surgeon: Samuel Jimenez MD;  Location: [facility];  Service: Pain  Management;  Laterality: Right;    INJECTION OF JOINT Right 7/21/2021    Procedure: INJECTION, JOINT, HIP RIGHT;  Surgeon: Samuel Jimenez MD;  Location: [facility]IN [facility];  Service: Pain Management;  Laterality: Right;  CONSENT NEEDED    INJECTION OF JOINT Right 10/13/2021    Procedure: INJECTION, JOINT, SACROILIAC (SI)  NEED CONSENT pt. requesting sedation;  Surgeon: Samuel Jimenez MD;  Location: [facility];  Service: Pain Management;  Laterality: Right;    INJECTION OF JOINT Right 4/10/2024    Procedure: INJECTION, JOINT RIGHT SI AND RIGHT PIRIFORMIS;  Surgeon: Samuel Jimenez MD;  Location: [facility];  Service: Pain Management;  Laterality: Right;  [phone]    INJECTION, SACROILIAC JOINT Right 5/31/2023    Procedure: INJECTION,SACROILIAC JOINT, RIGHT SI AND RIGHT PIRIFORMIS;  Surgeon: Samuel Jimenez MD;  Location: [facility];  Service: Pain Management;  Laterality: Right;    KNEE SURGERY      RADIOFREQUENCY ABLATION Right 12/9/2020    Procedure: RADIOFREQUENCY ABLATION, L5-S1-S2 LATERAL BRANCH COOLED;  Surgeon: Samuel Jimenez MD;  Location: [facility];  Service: Pain Management;  Laterality: Right;    RADIOFREQUENCY ABLATION Right 9/21/2022    Procedure: RADIOFREQUENCY ABLATION, RIGHT L3-L4-L5 PER DR JIMENEZ;  Surgeon: Samuel Jimenez MD;  Location: [facility];  Service: Pain Management;  Laterality: Right;    RADIOFREQUENCY ABLATION Left 10/12/2022    Procedure: RADIOFREQUENCY ABLATION, LEFT L3-L4-L5 TWO OF TWO;  Surgeon: Samuel Jimenez MD;  Location: [facility];  Service: Pain Management;  Laterality: Left;    REPLACEMENT OF NERVE STIMULATOR BATTERY N/A 7/27/2020    Procedure: Replacement, SPINAL CORD STIMULATOR BATTERY CHANGE TO NEVRO OMNION BATTERY;  Surgeon: Samuel Jimenez MD;  Location: [facility] OR;  Service: Pain Management;  Laterality: N/A;  C-ARM, NEVRO REP    REPLACEMENT OF SPINAL CORD STIMULATOR  2/13/2023    Procedure: REPLACEMENT, SPINAL CORD  STIMULATOR;  Surgeon: Samuel Jimenez MD;  Location: [facility] OR;  Service: Pain Management;;    REVISION PROCEDURE INVOLVING SPINAL CORD NEUROSTIMULATOR N/A 2023    Procedure: SPINAL CORD STIMULATOR EXPLANT AND REIMPLANT SALUDA REP;  Surgeon: Samuel Jimenez MD;  Location: [facility] OR;  Service: Pain Management;  Laterality: N/A;    TRIAL OF SPINAL CORD NERVE STIMULATOR N/A 2019    Procedure: Trial, Neurostimulator, SPINAL CORD STIMULATOR TRIAL;  Surgeon: Samuel Jimenez MD;  Location: [facility] CATH LAB;  Service: Pain Management;  Laterality: N/A;  C-ARM, NEVRO REP     Family History:  No family history on file.    Social History:  Social History     Socioeconomic History    Marital status:    Tobacco Use    Smoking status: Former     Current packs/day: 0.00     Types: Cigarettes     Quit date:      Years since quittin.5    Smokeless tobacco: Never    Tobacco comments:     stopped 40 years ago   Substance and Sexual Activity    Alcohol use: Yes     Alcohol/week: 1.0 standard drink of alcohol     Types: 1 Shots of liquor per week     Comment: nightly -scotch    Drug use: Never    Sexual activity: Yes     Partners: Female     Social Determinants of Health     Financial Resource Strain: Low Risk  (2022)    Received from [facility] [employer], [facility]    Overall Financial Resource Strain (CARDIA)     Difficulty of Paying Living Expenses: Not hard at all   Food Insecurity: No Food Insecurity (2022)    Received from [facility], [facility]    Hunger Vital Sign     Worried About Running Out of Food in the Last Year: Never true     Ran Out of Food in the Last Year: Never true   Transportation Needs: No Transportation Needs (2022)    Received from [facility] [employer], [facility]    PRAPARE - Transportation     Lack of Transportation (Medical): No     Lack of Transportation (Non-Medical): No   Physical Activity: Unknown (2023)    [facility]    Exercise Vital Sign      Days of Exercise per Week: 6 days   Stress: No Stress Concern Present (11/30/2023)    Received from [facility], [facility]    [employer] [place] [facility] [employer] - Occupational Stress Questionnaire     Feeling of Stress : Only a little   Housing Stability: Unknown (11/30/2023)    Received from [facility], [facility]    Housing Stability Vital Sign     Unable to Pay for Housing in the Last Year: No       OBJECTIVE:    /61   Pulse (!) 58   Temp 97.8 °F (36.6 °C)   Resp 18   Wt 91.9 kg (202 lb 9.6 oz)   SpO2 98%   BMI 26.73 kg/m²     PHYSICAL EXAM:     GENERAL: Alert and oriented x 3, no acute distress  PSYCH:  Mood and affect appropriate.  SKIN: Skin color, texture, turgor normal, no rashes or lesions.  HEENT:  Normocephalic, atraumatic. Cranial nerves grossly intact.  PULM: Non-labored breathing, symmetric chest rise.  BACK: Mildly reduced ROM w/ pain on flexion and extension. Positive tenderness over Right SIJ with positive thigh and sacral thrust test, Positive FABERE, Ganselin and Yeoman's test on the Right side. Negative FADIR  EXTREMITIES: No deformities, edema, or skin discoloration.   MUSCULOSKELETAL: Bilateral upper and lower extremity strength is normal and symmetric. No atrophy is noted.  NEURO: Bilateral upper and lower extremity coordination and muscle stretch reflexes are physiologic and symmetric. No clonus. Absent Johnson. No tremor.    GAIT: Antalgic    ASSESSMENT: 89 y.o. year old male with chronic low back pain, consistent with:     1. Failure of joint fusion, subsequent encounter  Procedure Request Order for Pain Management    HYDROcodone-acetaminophen (NORCO)  mg per tablet      2. Sacroiliac dysfunction  Procedure Request Order for Pain Management    HYDROcodone-acetaminophen (NORCO)  mg per tablet      3. Sacroiliitis  Procedure Request Order for Pain Management    HYDROcodone-acetaminophen (NORCO)  mg per tablet            PLAN:     - Previous records  and imaging reviewed  - I have stressed the importance of physical activity and a home exercise plan to help with pain and improve health.  - Continue HEP  - Refill prescription for oxycodone-acetaminophen 10-325mg q8 PRN, dispense #90. He uses this very sparingly.   - Patient can continue with medications for now since they are providing benefits, using them appropriately, and without side effects.  - Schedule for Right SIJ Fusion with Frances SiLO Allograft (Target Posterior aspect).   - Sha Ku (Carolynn) is also here today for programming of [facility]. Patient endorses benefit with new programming during our visit.  - RTC after procedure  - Counseled patient regarding the importance of activity modification, constant sleeping habits, and physical therapy.    The above plan and management options were discussed at length with patient. Patient is in agreement with the above and verbalized understanding.    Scott Brizuela M.D.  PGY-5  Interventional Pain Management Fellow  [facility]  Pager: [phone]   I have personally reviewed the history and exam of this patient and agree with the resident/fellow/NPs note as stated above.    Samuel Jimenez MD    06/27/2024

## 2024-07-08 NOTE — PROGRESS NOTES
Subjective     Patient ID: Jefferson Boogie is a 89 y.o. male.    Chief Complaint: Results    HPI    Returns for follow up   Desires to leave alone further imaging and focus more on pain management    He was originally referred for imaging that raised concerns of lytic and sclerotic bone lesions  He had this imaging work up for pain > 5 years in duration  He sees the pain clinic and reports some improvement     Work up as below:     Tumor markers:  PSA = 0.17 ng/ml  CEA= 3.8 ng/ml     Myeloma peripheral blood assesment:                         We discussed above and he declined bmbx with negative PET and after discussion with Pathology     Imaging:  - 8/21/2023 PET scan:  FINDINGS:  Quality of the study: Adequate.  In the head and neck, there are no hypermetabolic lesions worrisome for malignancy. There are no hypermetabolic mucosal lesions, and there are no pathologically enlarged or hypermetabolic lymph nodes.  In the chest, there are no hypermetabolic lesions worrisome for malignancy.  There are no pathologically enlarged or hypermetabolic lymph nodes.  Stable 0.6 cm right middle lobe nodule, below the size threshold for PET.  In the abdomen and pelvis, there is physiologic tracer distribution within the abdominal organs and excretion into the genitourinary system.  Mildly enlarged hypermetabolic preaortic lymph node just inferior to the renal vein measuring 1.4 cm (series 3, image 144) with maximum SUV of 6.5.  In the bones, there are no hypermetabolic lesions worrisome for malignancy.  No significant increased radiotracer uptake within the previously described right iliac bone lesion.  Similar appearance of several scattered small mixed lucent and sclerotic foci throughout the pelvis without significant radiotracer uptake.  Additional CT findings: Spinal cord stimulator device and leads noted.  Surgical changes of the right sacroiliac joint.  Calcific atherosclerosis of the aorta and coronary arteries.  Mild  ground-glass density within the dependent bilateral lower lobes favored to represent atelectasis.  Small hepatic cyst.  Left renal cyst.  Colonic diverticulosis.  Impression:  1. Mildly enlarged hypermetabolic preaortic lymph node within the upper abdomen suspicious for metastatic disease or lymphoproliferative process.  2. No hypermetabolic bone lesion.  Stable CT appearance of previously described pelvic bone lesions without significant radiotracer uptake.  3. Stable subcentimeter right middle lobe pulmonary nodule, below the size threshold for PET.     History:  - Initially he was referred after abnormal CT results- Referring MD: Dr. Jimenez - pain clinic      - Reports the following leading to scans:  pain x 4-5 years  2 different stimulators  Switched from explant to implant recently with improved control  States he actually had a few days of complete resolution of pain recently while he was in [facility]  Describes pain as located in his low back/buttock and radiating down right calf  He is able to remain active- plays golf  No other pain     No weight loss     Oncology History:  1967- Bladder cancer - treated at the [facility] in [facility]  Stage I- treated surgically  Cystoscopy surveillance following     Pain led to below imaging  - 8/14/2023 CT Abd/Pelvis:  FINDINGS:  Chest, no significant pleural or pericardial fluid.  7 mm ground-glass opacity right middle lobe series 2, image 8.  Remainder of the lungs are clear.  The abdomen, liver is mildly enlarged 18.3 cm.  Hypodensity hepatic dome image 33 too small to characterize.  Statistically a cyst.  Portal vein is patent.  No biliary dilatation.  Gallbladder mildly distended though otherwise unremarkable.  Fatty replacement of the pancreas.  No mass or pancreatic ductal dilatation.  Spleen upper limits for normal in size.  No adrenal mass.  2.4 cm hypodensity apex of the left kidney consistent with a cyst.  Additional hypodensities too small to characterize. No  solid enhancing masses.  No hydronephrosis.  Ureters are fairly well visualized to the bladder.  No obstructive uropathy.  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  Separate origins of the splenic and hepatic arteries.  SMA is patent.  Likewise renal arteries are patent.  14 mm preaortic node series 2, image 59.  Additional scattered periaortic nodes noted.  In the pelvis, no convincing pelvic adenopathy.  Nonenlarged nodes are present.  Calcifications in nonenlarged prostate.  No significant bladder wall thickening.  Evaluation of the bowel demonstrates colonic diverticula.  No focal wall thickening or pericolonic soft tissue stranding.  Multiple fluid-filled loops of small bowel.  No focal dilatation.  Appendix is normal.  No convincing mesenteric adenopathy.  Presumed epidural stimulator.  Postop change right SI joint.  No fusion.  Degenerative changes noted.  Slightly expansile lesion right ilium image 105 appears similar.  Sclerotic focus anterior aspect of the left ilium also similar.  Probable bone island left acetabulum.  Additional lucent lesions in the pelvis better demonstrated on prior pelvic CT.  No convincing new lesions seen.  Impression:  Slightly expansile lesion right ilium as well as scattered lucent and sclerotic foci of the pelvis all appear similar.  If further evaluation is desired MRI or bone scan may be helpful.  Comparison with prior studies would be most beneficial.  Mildly enlarged preaortic node.  Additional nonenlarged para-aortic nodes noted.  7 mm ground-glass opacity right middle lobe.  For a ground glass nodule 6 mm or larger, Fleischner Society 2017 guidelines recommend follow up with non-contrast chest CT at 6-12 months after discovery. If this nodule persists at that time, additional follow up with non-contrast chest CT is recommended every 2 years until 5 years of stability have been documented.  Hypodensities in the liver and kidneys as  above.  Additional findings above  This report was flagged in Epic as abnormal.     - 7/10/2023 CT Pelvis:  FINDINGS:  BONE: Diffuse osteopenia.  No fracture or osteonecrosis.  Few lytic and sclerotic lesions scattered throughout the pelvis, including a lesion in the right iliac bone with irregularity of the overlying cortex (2:103).  JOINT: Postoperative changes from right sacroiliac joint fusion.  The implant is in satisfactory position.  No osseous fusion across the joint space.  Degenerative changes both sacroiliac joints.  No erosions or ankylosis.  Degenerative changes also involve the lower lumbar spine, both hip joints, and pubic symphysis.  Chondrocalcinosis of the pubic symphysis.  No significant joint effusion.  SOFT TISSUE: Normal muscle bulk. Regional tendons are intact.  Calcific tendinopathy involving the proximal hamstring tendons bilaterally.  No bursal collection.  MISCELLANEOUS: Circumferential bladder wall thickening.  Prostatic calcifications.  Colonic diverticulosis.  Atherosclerosis.  Impression:  Postoperative changes in the right sacroiliac joint.  No osseous fusion.  Few lytic and sclerotic lesions scattered throughout the pelvis, concerning for metastases or myeloma.  No prior studies are available for comparison.  Bladder wall thickening, which could be secondary to outlet obstruction or cystitis.  Correlate with urinalysis.  Other findings as described.     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    Spinal  cord stimulator status 2021    Vertebrogenic pain 2022              Resolved Ambulatory Problems     Diagnosis Date Noted    Pain of right sacroiliac joint 06/15/2020    CRPS (complex regional pain syndrome type I) 2020    Failed spinal cord stimulator 2020              Past Medical History:   Diagnosis Date    Bronchitis      Cancer      Hypercholesteremia      Hypertension      Thyroid disease        FH:  Mother  of pancreatic cancer at age 60  Father lived until 89 yo  No other cancers     SH:  Lives with wife  2 healthy kids  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   HENT:  Positive for hearing loss. Negative for nasal congestion, postnasal drip, rhinorrhea, sore throat and trouble swallowing.    Eyes:  Negative for visual disturbance.   Respiratory:  Negative for cough and shortness of breath.    Cardiovascular:  Negative for chest pain, palpitations and leg swelling.   Gastrointestinal:  Negative for abdominal distention, abdominal pain, change in bowel habit, constipation, diarrhea, nausea, vomiting and reflux.   Genitourinary:  Negative for decreased urine volume, difficulty urinating, dysuria, frequency and urgency.   Musculoskeletal:  Positive for arthralgias and back pain. Negative for gait problem, leg pain and myalgias.        Uses cane for steadiness but not when golfing   Neurological:  Negative for dizziness, weakness, light-headedness, numbness and headaches.   Psychiatric/Behavioral:  Negative for dysphoric mood. The patient is not nervous/anxious.           Objective     Physical Exam  Vitals and nursing note reviewed.   Constitutional:       General: He is not in acute distress.     Appearance: Normal appearance. He is normal weight. He is not ill-appearing.   HENT:      Head:  Normocephalic and atraumatic.   Eyes:      General: No scleral icterus.     Extraocular Movements: Extraocular movements intact.      Conjunctiva/sclera: Conjunctivae normal.      Pupils: Pupils are equal, round, and reactive to light.   Cardiovascular:      Rate and Rhythm: Normal rate and regular rhythm.      Heart sounds: Normal heart sounds. No murmur heard.     No friction rub. No gallop.   Pulmonary:      Effort: Pulmonary effort is normal. No respiratory distress.      Breath sounds: Normal breath sounds. No wheezing, rhonchi or rales.   Abdominal:      General: Abdomen is flat. Bowel sounds are normal. There is no distension.      Palpations: Abdomen is soft. There is no mass.      Tenderness: There is no guarding or rebound.   Musculoskeletal:         General: No swelling, tenderness or deformity. Normal range of motion.      Cervical back: Normal range of motion and neck supple. No rigidity.      Right lower leg: No edema.      Left lower leg: No edema.   Lymphadenopathy:      Cervical: No cervical adenopathy.   Skin:     General: Skin is warm and dry.      Coloration: Skin is not jaundiced or pale.      Findings: No bruising or rash.   Neurological:      General: No focal deficit present.      Mental Status: He is alert and oriented to person, place, and time.      Sensory: No sensory deficit.      Motor: No weakness.      Coordination: Coordination normal.      Gait: Gait normal.   Psychiatric:         Mood and Affect: Mood normal.         Behavior: Behavior normal.         Thought Content: Thought content normal.         Judgment: Judgment normal.   Labs- reviewed  Mild anemia Hgb 13.4 and normal hematocrit  Normal Cr  Normal SPEP and B2 microglobulin    Next appt: 08/07/2024 at 10:00 AM in Pre-Admission Testing (PRE-ADMIT, [facility])    0 Result Notes      Component 3 d ago   Pathologist Interpretation SPE REVIEWED   Comment:  Electronically reviewed and signed by:  Jennifer Dasilva,  MD  Signed on 07/10/24 at 12:00  Normal total protein.  Decreased gamma globulins.    No discrete paraprotein bands identified.             Assessment and Plan     1. Malignant neoplasm of urinary bladder, unspecified site    2. Abnormal computed tomography scan    Historic bladder cancer and DAISHA    Abnormal prior imaging  Patient desires no further imaging  BmBx declined and parameters appropriate to avoid for now    Route Chart for Scheduling    Med Onc Chart Routing      Follow up with physician 6 months.   Follow up with RAMAN    Infusion scheduling note    Injection scheduling note    Labs    Imaging    Pharmacy appointment    Other referrals

## 2024-07-09 ENCOUNTER — LAB VISIT (OUTPATIENT)
Dept: LAB | Facility: HOSPITAL | Age: 89
End: 2024-07-09
Attending: INTERNAL MEDICINE
Payer: MEDICARE

## 2024-07-09 ENCOUNTER — OFFICE VISIT (OUTPATIENT)
Dept: HEMATOLOGY/ONCOLOGY | Facility: CLINIC | Age: 89
End: 2024-07-09
Payer: MEDICARE

## 2024-07-09 VITALS
RESPIRATION RATE: 17 BRPM | BODY MASS INDEX: 26.86 KG/M2 | DIASTOLIC BLOOD PRESSURE: 62 MMHG | SYSTOLIC BLOOD PRESSURE: 136 MMHG | HEART RATE: 57 BPM | TEMPERATURE: 97 F | OXYGEN SATURATION: 96 % | HEIGHT: 73 IN | WEIGHT: 202.69 LBS

## 2024-07-09 DIAGNOSIS — R93.89 ABNORMAL COMPUTED TOMOGRAPHY SCAN: ICD-10-CM

## 2024-07-09 DIAGNOSIS — C67.9 MALIGNANT NEOPLASM OF URINARY BLADDER, UNSPECIFIED SITE: Primary | ICD-10-CM

## 2024-07-09 LAB
ALBUMIN SERPL BCP-MCNC: 3.9 G/DL (ref 3.5–5.2)
ALP SERPL-CCNC: 63 U/L (ref 55–135)
ALT SERPL W/O P-5'-P-CCNC: 37 U/L (ref 10–44)
ANION GAP SERPL CALC-SCNC: 6 MMOL/L (ref 8–16)
AST SERPL-CCNC: 30 U/L (ref 10–40)
B2 MICROGLOB SERPL-MCNC: 2.5 UG/ML (ref 0–2.5)
BASOPHILS # BLD AUTO: 0.03 K/UL (ref 0–0.2)
BASOPHILS NFR BLD: 0.4 % (ref 0–1.9)
BILIRUB SERPL-MCNC: 0.7 MG/DL (ref 0.1–1)
BUN SERPL-MCNC: 13 MG/DL (ref 8–23)
CALCIUM SERPL-MCNC: 9.3 MG/DL (ref 8.7–10.5)
CHLORIDE SERPL-SCNC: 108 MMOL/L (ref 95–110)
CO2 SERPL-SCNC: 27 MMOL/L (ref 23–29)
CREAT SERPL-MCNC: 0.9 MG/DL (ref 0.5–1.4)
DIFFERENTIAL METHOD BLD: ABNORMAL
EOSINOPHIL # BLD AUTO: 0.1 K/UL (ref 0–0.5)
EOSINOPHIL NFR BLD: 1.2 % (ref 0–8)
ERYTHROCYTE [DISTWIDTH] IN BLOOD BY AUTOMATED COUNT: 13.2 % (ref 11.5–14.5)
EST. GFR  (NO RACE VARIABLE): >60 ML/MIN/1.73 M^2
GLUCOSE SERPL-MCNC: 91 MG/DL (ref 70–110)
HCT VFR BLD AUTO: 41.2 % (ref 40–54)
HGB BLD-MCNC: 13.4 G/DL (ref 14–18)
IMM GRANULOCYTES # BLD AUTO: 0.05 K/UL (ref 0–0.04)
IMM GRANULOCYTES NFR BLD AUTO: 0.7 % (ref 0–0.5)
LYMPHOCYTES # BLD AUTO: 2 K/UL (ref 1–4.8)
LYMPHOCYTES NFR BLD: 25.5 % (ref 18–48)
MCH RBC QN AUTO: 30.7 PG (ref 27–31)
MCHC RBC AUTO-ENTMCNC: 32.5 G/DL (ref 32–36)
MCV RBC AUTO: 95 FL (ref 82–98)
MONOCYTES # BLD AUTO: 0.6 K/UL (ref 0.3–1)
MONOCYTES NFR BLD: 7.3 % (ref 4–15)
NEUTROPHILS # BLD AUTO: 5 K/UL (ref 1.8–7.7)
NEUTROPHILS NFR BLD: 64.9 % (ref 38–73)
NRBC BLD-RTO: 0 /100 WBC
PLATELET # BLD AUTO: 174 K/UL (ref 150–450)
PMV BLD AUTO: 11.4 FL (ref 9.2–12.9)
POTASSIUM SERPL-SCNC: 4.5 MMOL/L (ref 3.5–5.1)
PROT SERPL-MCNC: 6.2 G/DL (ref 6–8.4)
RBC # BLD AUTO: 4.36 M/UL (ref 4.6–6.2)
SODIUM SERPL-SCNC: 141 MMOL/L (ref 136–145)
WBC # BLD AUTO: 7.69 K/UL (ref 3.9–12.7)

## 2024-07-09 PROCEDURE — 99999 PR PBB SHADOW E&M-EST. PATIENT-LVL IV: CPT | Mod: PBBFAC,,, | Performed by: INTERNAL MEDICINE

## 2024-07-09 PROCEDURE — 99214 OFFICE O/P EST MOD 30 MIN: CPT | Mod: PBBFAC | Performed by: INTERNAL MEDICINE

## 2024-07-09 PROCEDURE — 36415 COLL VENOUS BLD VENIPUNCTURE: CPT | Performed by: INTERNAL MEDICINE

## 2024-07-09 PROCEDURE — 84165 PROTEIN E-PHORESIS SERUM: CPT | Mod: 26,,, | Performed by: PATHOLOGY

## 2024-07-09 PROCEDURE — 99214 OFFICE O/P EST MOD 30 MIN: CPT | Mod: S$PBB,,, | Performed by: INTERNAL MEDICINE

## 2024-07-09 PROCEDURE — 85025 COMPLETE CBC W/AUTO DIFF WBC: CPT | Performed by: INTERNAL MEDICINE

## 2024-07-09 PROCEDURE — 80053 COMPREHEN METABOLIC PANEL: CPT | Performed by: INTERNAL MEDICINE

## 2024-07-09 PROCEDURE — 82232 ASSAY OF BETA-2 PROTEIN: CPT | Performed by: INTERNAL MEDICINE

## 2024-07-09 PROCEDURE — 84165 PROTEIN E-PHORESIS SERUM: CPT | Performed by: INTERNAL MEDICINE

## 2024-07-10 LAB
ALBUMIN SERPL ELPH-MCNC: 4.08 G/DL (ref 3.35–5.55)
ALPHA1 GLOB SERPL ELPH-MCNC: 0.26 G/DL (ref 0.17–0.41)
ALPHA2 GLOB SERPL ELPH-MCNC: 0.52 G/DL (ref 0.43–0.99)
B-GLOBULIN SERPL ELPH-MCNC: 0.72 G/DL (ref 0.5–1.1)
GAMMA GLOB SERPL ELPH-MCNC: 0.52 G/DL (ref 0.67–1.58)
PATHOLOGIST INTERPRETATION SPE: NORMAL
PROT SERPL-MCNC: 6.1 G/DL (ref 6–8.4)

## 2024-07-12 PROBLEM — C67.9 MALIGNANT NEOPLASM OF URINARY BLADDER, UNSPECIFIED SITE: Status: ACTIVE | Noted: 2024-07-12

## 2024-08-07 ENCOUNTER — ANESTHESIA EVENT (OUTPATIENT)
Dept: SURGERY | Facility: OTHER | Age: 89
End: 2024-08-07
Payer: MEDICARE

## 2024-08-07 ENCOUNTER — HOSPITAL ENCOUNTER (OUTPATIENT)
Dept: PREADMISSION TESTING | Facility: OTHER | Age: 89
Discharge: HOME | End: 2024-08-07
Attending: ANESTHESIOLOGY
Payer: MEDICARE

## 2024-08-07 VITALS
SYSTOLIC BLOOD PRESSURE: 153 MMHG | HEIGHT: 73 IN | BODY MASS INDEX: 26.77 KG/M2 | HEART RATE: 59 BPM | TEMPERATURE: 97 F | OXYGEN SATURATION: 96 % | RESPIRATION RATE: 18 BRPM | DIASTOLIC BLOOD PRESSURE: 67 MMHG | WEIGHT: 202 LBS

## 2024-08-07 RX ORDER — PROMETHAZINE HYDROCHLORIDE AND DEXTROMETHORPHAN HYDROBROMIDE 6.25; 15 MG/5ML; MG/5ML
SYRUP ORAL
COMMUNITY
Start: 2024-05-28

## 2024-08-07 RX ORDER — SODIUM CHLORIDE, SODIUM LACTATE, POTASSIUM CHLORIDE, CALCIUM CHLORIDE 600; 310; 30; 20 MG/100ML; MG/100ML; MG/100ML; MG/100ML
INJECTION, SOLUTION INTRAVENOUS CONTINUOUS
OUTPATIENT
Start: 2024-08-07

## 2024-08-07 RX ORDER — AMLODIPINE BESYLATE 5 MG/1
TABLET ORAL
COMMUNITY
Start: 2024-02-12

## 2024-08-07 RX ORDER — CODEINE PHOSPHATE AND GUAIFENESIN 10; 100 MG/5ML; MG/5ML
SOLUTION ORAL
COMMUNITY
Start: 2024-01-03

## 2024-08-07 RX ORDER — ALPRAZOLAM 2 MG/1
TABLET ORAL
COMMUNITY
Start: 2023-12-01

## 2024-08-07 NOTE — ANESTHESIA PREPROCEDURE EVALUATION
08/07/2024  Jefferson Boogie is a 89 y.o., male.      Pre-op Assessment    I have reviewed the Patient Summary Reports.     I have reviewed the Nursing Notes. I have reviewed the NPO Status.   I have reviewed the Medications.     Review of Systems  Anesthesia Hx:  No problems with previous Anesthesia             Denies Family Hx of Anesthesia complications.    Denies Personal Hx of Anesthesia complications.                    Social:  Former Smoker, Social Alcohol Use       Hematology/Oncology:  Hematology Normal                  Hematology Comments: monoclonal gammopathy? W/u      --  Cancer in past history:                 Oncology Comments: Bladder CA     EENT/Dental:  EENT/Dental Normal           Cardiovascular:     Hypertension Valvular problems/Murmurs, AI          hyperlipidemia    2/2024 ECHO care everywhere:  1. Estimated left ventricular ejection fraction is 60 to 65%.  2. Moderate to severe aortic regurgitation.  3. Left atrium is moderately dilated.  4. Normal left ventricular systolic function.  5. Right ventricular systolic function is normal.                            Pulmonary:  Pulmonary Normal                       Renal/:    BPH Bladder CA             Hepatic/GI:  Hepatic/GI Normal                 Musculoskeletal:  Arthritis               Neurological:    Denies CVA.    Denies Seizures.    arachnoiditis following [facility]O       Chronic Pain Syndrome   Peripheral Neuropathy                          Endocrine:   Hypothyroidism          Psych:  Psychiatric Normal                  Physical Exam  General: Well nourished and Cooperative    Airway:  Mallampati: II   Mouth Opening: Normal  TM Distance: Normal  Neck ROM: Normal ROM    Dental:  Caps / Implants      Anesthesia Plan  Type of Anesthesia, risks & benefits discussed:    Anesthesia Type: Gen ETT  Intra-op Monitoring Plan: Standard ASA  Monitors  Post Op Pain Control Plan: multimodal analgesia  Induction:  IV  Airway Plan: Video, Post-Induction  Informed Consent: Informed consent signed with the Patient and all parties understand the risks and agree with anesthesia plan.  All questions answered.   ASA Score: 3  Anesthesia Plan Notes: Recent Labs in [facility]  Had same procedure 2021 under [facility]  Will bring remote for neurostim to turn off for surgery      Ready For Surgery From Anesthesia Perspective.     .

## 2024-08-08 ENCOUNTER — PATIENT MESSAGE (OUTPATIENT)
Dept: PAIN MEDICINE | Facility: CLINIC | Age: 89
End: 2024-08-08
Payer: MEDICARE

## 2024-08-12 ENCOUNTER — HOSPITAL ENCOUNTER (OUTPATIENT)
Facility: OTHER | Age: 89
Discharge: HOME | End: 2024-08-12
Attending: ANESTHESIOLOGY | Admitting: ANESTHESIOLOGY
Payer: MEDICARE

## 2024-08-12 ENCOUNTER — ANESTHESIA (OUTPATIENT)
Dept: SURGERY | Facility: OTHER | Age: 89
End: 2024-08-12
Payer: MEDICARE

## 2024-08-12 DIAGNOSIS — G89.29 CHRONIC PAIN: ICD-10-CM

## 2024-08-12 DIAGNOSIS — M46.1 SACROILIITIS: ICD-10-CM

## 2024-08-12 DIAGNOSIS — M53.3 SACROILIAC JOINT DYSFUNCTION: Primary | ICD-10-CM

## 2024-08-12 DIAGNOSIS — G89.4 CHRONIC PAIN SYNDROME: ICD-10-CM

## 2024-08-12 PROCEDURE — 71000015 HC POSTOP RECOV 1ST HR: Performed by: ANESTHESIOLOGY

## 2024-08-12 PROCEDURE — 25000003 PHARM REV CODE 250: Performed by: ANESTHESIOLOGY

## 2024-08-12 PROCEDURE — 36000710: Performed by: ANESTHESIOLOGY

## 2024-08-12 PROCEDURE — 37000008 HC ANESTHESIA 1ST 15 MINUTES: Performed by: ANESTHESIOLOGY

## 2024-08-12 PROCEDURE — 63600175 PHARM REV CODE 636 W HCPCS: Performed by: ANESTHESIOLOGY

## 2024-08-12 PROCEDURE — 71000033 HC RECOVERY, INTIAL HOUR: Performed by: ANESTHESIOLOGY

## 2024-08-12 PROCEDURE — 37000009 HC ANESTHESIA EA ADD 15 MINS: Performed by: ANESTHESIOLOGY

## 2024-08-12 PROCEDURE — 36000711: Performed by: ANESTHESIOLOGY

## 2024-08-12 PROCEDURE — 71000016 HC POSTOP RECOV ADDL HR: Performed by: ANESTHESIOLOGY

## 2024-08-12 PROCEDURE — 27279 ARTHRD SI JT PLMT TARTCLR DV: CPT | Mod: RT,,, | Performed by: ANESTHESIOLOGY

## 2024-08-12 PROCEDURE — C1713 ANCHOR/SCREW BN/BN,TIS/BN: HCPCS | Performed by: ANESTHESIOLOGY

## 2024-08-12 PROCEDURE — D9220A PRA ANESTHESIA: Mod: ANES,,, | Performed by: ANESTHESIOLOGY

## 2024-08-12 PROCEDURE — 63600175 PHARM REV CODE 636 W HCPCS: Performed by: STUDENT IN AN ORGANIZED HEALTH CARE EDUCATION/TRAINING PROGRAM

## 2024-08-12 PROCEDURE — D9220A PRA ANESTHESIA: Mod: CRNA,,, | Performed by: STUDENT IN AN ORGANIZED HEALTH CARE EDUCATION/TRAINING PROGRAM

## 2024-08-12 PROCEDURE — 71000039 HC RECOVERY, EACH ADD'L HOUR: Performed by: ANESTHESIOLOGY

## 2024-08-12 PROCEDURE — 25000003 PHARM REV CODE 250: Performed by: STUDENT IN AN ORGANIZED HEALTH CARE EDUCATION/TRAINING PROGRAM

## 2024-08-12 RX ORDER — ONDANSETRON HYDROCHLORIDE 2 MG/ML
INJECTION, SOLUTION INTRAVENOUS
Status: DISCONTINUED | OUTPATIENT
Start: 2024-08-12 | End: 2024-08-12

## 2024-08-12 RX ORDER — CYCLOBENZAPRINE HCL 5 MG
10 TABLET ORAL ONCE
Status: COMPLETED | OUTPATIENT
Start: 2024-08-12 | End: 2024-08-12

## 2024-08-12 RX ORDER — ONDANSETRON HYDROCHLORIDE 2 MG/ML
4 INJECTION, SOLUTION INTRAVENOUS DAILY PRN
Status: DISCONTINUED | OUTPATIENT
Start: 2024-08-12 | End: 2024-08-12 | Stop reason: HOSPADM

## 2024-08-12 RX ORDER — VANCOMYCIN HYDROCHLORIDE 1 G/20ML
INJECTION, POWDER, LYOPHILIZED, FOR SOLUTION INTRAVENOUS
Status: DISCONTINUED | OUTPATIENT
Start: 2024-08-12 | End: 2024-08-12 | Stop reason: HOSPADM

## 2024-08-12 RX ORDER — SODIUM CHLORIDE, SODIUM LACTATE, POTASSIUM CHLORIDE, CALCIUM CHLORIDE 600; 310; 30; 20 MG/100ML; MG/100ML; MG/100ML; MG/100ML
INJECTION, SOLUTION INTRAVENOUS CONTINUOUS
Status: DISCONTINUED | OUTPATIENT
Start: 2024-08-12 | End: 2024-08-12 | Stop reason: HOSPADM

## 2024-08-12 RX ORDER — LIDOCAINE HYDROCHLORIDE 10 MG/ML
INJECTION, SOLUTION EPIDURAL; INFILTRATION; INTRACAUDAL; PERINEURAL
Status: DISCONTINUED | OUTPATIENT
Start: 2024-08-12 | End: 2024-08-12 | Stop reason: HOSPADM

## 2024-08-12 RX ORDER — MEPERIDINE HYDROCHLORIDE 25 MG/ML
12.5 INJECTION INTRAMUSCULAR; INTRAVENOUS; SUBCUTANEOUS ONCE AS NEEDED
Status: DISCONTINUED | OUTPATIENT
Start: 2024-08-12 | End: 2024-08-12 | Stop reason: HOSPADM

## 2024-08-12 RX ORDER — CEFAZOLIN SODIUM 1 G/3ML
2 INJECTION, POWDER, FOR SOLUTION INTRAMUSCULAR; INTRAVENOUS
Status: DISCONTINUED | OUTPATIENT
Start: 2024-08-12 | End: 2024-08-12 | Stop reason: HOSPADM

## 2024-08-12 RX ORDER — SODIUM CHLORIDE 9 MG/ML
INJECTION, SOLUTION INTRAVENOUS CONTINUOUS
Status: DISCONTINUED | OUTPATIENT
Start: 2024-08-12 | End: 2024-08-12 | Stop reason: HOSPADM

## 2024-08-12 RX ORDER — BUPIVACAINE HYDROCHLORIDE AND EPINEPHRINE 2.5; 5 MG/ML; UG/ML
INJECTION, SOLUTION EPIDURAL; INFILTRATION; INTRACAUDAL; PERINEURAL
Status: DISCONTINUED | OUTPATIENT
Start: 2024-08-12 | End: 2024-08-12 | Stop reason: HOSPADM

## 2024-08-12 RX ORDER — CEFAZOLIN 2 G/1
INJECTION, POWDER, FOR SOLUTION INTRAMUSCULAR; INTRAVENOUS
Status: DISCONTINUED | OUTPATIENT
Start: 2024-08-12 | End: 2024-08-12

## 2024-08-12 RX ORDER — PROPOFOL 10 MG/ML
VIAL (ML) INTRAVENOUS
Status: DISCONTINUED | OUTPATIENT
Start: 2024-08-12 | End: 2024-08-12

## 2024-08-12 RX ORDER — DEXAMETHASONE SODIUM PHOSPHATE 4 MG/ML
INJECTION, SOLUTION INTRA-ARTICULAR; INTRALESIONAL; INTRAMUSCULAR; INTRAVENOUS; SOFT TISSUE
Status: DISCONTINUED | OUTPATIENT
Start: 2024-08-12 | End: 2024-08-12

## 2024-08-12 RX ORDER — GLUCAGON 1 MG
1 KIT INJECTION
Status: DISCONTINUED | OUTPATIENT
Start: 2024-08-12 | End: 2024-08-12 | Stop reason: HOSPADM

## 2024-08-12 RX ORDER — HYDROMORPHONE HYDROCHLORIDE 2 MG/ML
0.4 INJECTION, SOLUTION INTRAMUSCULAR; INTRAVENOUS; SUBCUTANEOUS EVERY 5 MIN PRN
Status: DISCONTINUED | OUTPATIENT
Start: 2024-08-12 | End: 2024-08-12 | Stop reason: HOSPADM

## 2024-08-12 RX ORDER — LIDOCAINE HYDROCHLORIDE 20 MG/ML
INJECTION INTRAVENOUS
Status: DISCONTINUED | OUTPATIENT
Start: 2024-08-12 | End: 2024-08-12

## 2024-08-12 RX ORDER — OXYCODONE HYDROCHLORIDE 5 MG/1
5 TABLET ORAL
Status: DISCONTINUED | OUTPATIENT
Start: 2024-08-12 | End: 2024-08-12 | Stop reason: HOSPADM

## 2024-08-12 RX ORDER — ROCURONIUM BROMIDE 10 MG/ML
INJECTION, SOLUTION INTRAVENOUS
Status: DISCONTINUED | OUTPATIENT
Start: 2024-08-12 | End: 2024-08-12

## 2024-08-12 RX ORDER — ASPIRIN 81 MG/1
1 TABLET ORAL DAILY
COMMUNITY

## 2024-08-12 RX ORDER — FENTANYL CITRATE 50 UG/ML
INJECTION, SOLUTION INTRAMUSCULAR; INTRAVENOUS
Status: DISCONTINUED | OUTPATIENT
Start: 2024-08-12 | End: 2024-08-12

## 2024-08-12 RX ORDER — CEPHALEXIN 500 MG/1
500 CAPSULE ORAL 4 TIMES DAILY
Qty: 28 CAPSULE | Refills: 0 | Status: SHIPPED | OUTPATIENT
Start: 2024-08-12 | End: 2024-08-19

## 2024-08-12 RX ORDER — EPHEDRINE SULFATE 50 MG/ML
INJECTION, SOLUTION INTRAVENOUS
Status: DISCONTINUED | OUTPATIENT
Start: 2024-08-12 | End: 2024-08-12

## 2024-08-12 RX ORDER — SODIUM CHLORIDE 0.9 % (FLUSH) 0.9 %
3 SYRINGE (ML) INJECTION
Status: DISCONTINUED | OUTPATIENT
Start: 2024-08-12 | End: 2024-08-12 | Stop reason: HOSPADM

## 2024-08-12 RX ADMIN — HYDROMORPHONE HYDROCHLORIDE 0.4 MG: 2 INJECTION INTRAMUSCULAR; INTRAVENOUS; SUBCUTANEOUS at 10:08

## 2024-08-12 RX ADMIN — EPHEDRINE SULFATE 10 MG: 50 INJECTION INTRAVENOUS at 07:08

## 2024-08-12 RX ADMIN — ROCURONIUM BROMIDE 40 MG: 10 INJECTION INTRAVENOUS at 07:08

## 2024-08-12 RX ADMIN — SUGAMMADEX 200 MG: 100 INJECTION, SOLUTION INTRAVENOUS at 09:08

## 2024-08-12 RX ADMIN — PROPOFOL 160 MG: 10 INJECTION, EMULSION INTRAVENOUS at 07:08

## 2024-08-12 RX ADMIN — DEXAMETHASONE SODIUM PHOSPHATE 8 MG: 4 INJECTION, SOLUTION INTRAMUSCULAR; INTRAVENOUS at 07:08

## 2024-08-12 RX ADMIN — HYDROMORPHONE HYDROCHLORIDE 0.4 MG: 2 INJECTION INTRAMUSCULAR; INTRAVENOUS; SUBCUTANEOUS at 09:08

## 2024-08-12 RX ADMIN — GLYCOPYRROLATE 0.2 MG: 0.2 INJECTION, SOLUTION INTRAMUSCULAR; INTRAVITREAL at 07:08

## 2024-08-12 RX ADMIN — CYCLOBENZAPRINE HYDROCHLORIDE 10 MG: 5 TABLET, FILM COATED ORAL at 09:08

## 2024-08-12 RX ADMIN — FENTANYL CITRATE 50 MCG: 50 INJECTION, SOLUTION INTRAMUSCULAR; INTRAVENOUS at 08:08

## 2024-08-12 RX ADMIN — SODIUM CHLORIDE, SODIUM LACTATE, POTASSIUM CHLORIDE, AND CALCIUM CHLORIDE: .6; .31; .03; .02 INJECTION, SOLUTION INTRAVENOUS at 06:08

## 2024-08-12 RX ADMIN — ONDANSETRON HYDROCHLORIDE 4 MG: 2 INJECTION INTRAMUSCULAR; INTRAVENOUS at 08:08

## 2024-08-12 RX ADMIN — CEFAZOLIN 2 G: 2 INJECTION, POWDER, FOR SOLUTION INTRAMUSCULAR; INTRAVENOUS at 07:08

## 2024-08-12 RX ADMIN — FENTANYL CITRATE 50 MCG: 50 INJECTION, SOLUTION INTRAMUSCULAR; INTRAVENOUS at 07:08

## 2024-08-12 RX ADMIN — ROCURONIUM BROMIDE 20 MG: 10 INJECTION INTRAVENOUS at 07:08

## 2024-08-12 RX ADMIN — OXYCODONE HYDROCHLORIDE 5 MG: 5 TABLET ORAL at 09:08

## 2024-08-12 RX ADMIN — FENTANYL CITRATE 100 MCG: 50 INJECTION, SOLUTION INTRAMUSCULAR; INTRAVENOUS at 07:08

## 2024-08-12 RX ADMIN — ROCURONIUM BROMIDE 10 MG: 10 INJECTION INTRAVENOUS at 07:08

## 2024-08-12 RX ADMIN — LIDOCAINE HYDROCHLORIDE 80 MG: 20 INJECTION, SOLUTION INTRAVENOUS at 07:08

## 2024-08-12 RX ADMIN — SODIUM CHLORIDE, SODIUM LACTATE, POTASSIUM CHLORIDE, AND CALCIUM CHLORIDE: .6; .31; .03; .02 INJECTION, SOLUTION INTRAVENOUS at 08:08

## 2024-08-12 RX ADMIN — EPHEDRINE SULFATE 5 MG: 50 INJECTION INTRAVENOUS at 08:08

## 2024-08-12 RX ADMIN — ROCURONIUM BROMIDE 10 MG: 10 INJECTION INTRAVENOUS at 08:08

## 2024-08-12 NOTE — ANESTHESIA PROCEDURE NOTES
Intubation    Date/Time: 8/12/2024 7:13 AM    Performed by: Kirby Harris CRNA  Authorized by: Harshad Flores MD    Intubation:     Induction:  Intravenous    Intubated:  Postinduction    Mask Ventilation:  Easy mask    Attempts:  1    Attempted By:  CRNA    Method of Intubation:  Video laryngoscopy    Blade:  Quezada 3    Laryngeal View Grade: Grade I - full view of cords      Difficult Airway Encountered?: No      Complications:  None    Airway Device:  Oral endotracheal tube    Airway Device Size:  7.5    Style/Cuff Inflation:  Cuffed (inflated to minimal occlusive pressure)    Tube secured:  22    Secured at:  The lips    Placement Verified By:  Capnometry    Complicating Factors:  None    Findings Post-Intubation:  BS equal bilateral and atraumatic/condition of teeth unchanged

## 2024-08-12 NOTE — OP NOTE
Patient Name: Jefferson Boogie  MRN: [id number]    INFORMED CONSENT: The procedure, risks, benefits and options were discussed with patient. There are no contraindications to the procedure. The patient expressed understanding and agreed to proceed. The personnel performing the procedure was discussed. I verify that I personally obtained Jefferson's consent prior to the start of the procedure and the signed consent can be found on the patient's chart.      Procedure Date: 8/12/2024  Surgeons and Role:     * Samuel Jimenez MD - Primary      Pre Procedure diagnosis: Sacroiliac dysfunction [M53.3]  Sacroiliitis [M46.1]  1. Sacroiliac joint dysfunction    2. Sacroiliitis    3. Chronic pain syndrome    4. Chronic pain      Post-Procedure diagnosis: SAME    Anesthesia General     PROCEDURE: Percutaneous Posterior right  Sacroiliac Joint Fusion using SILO TFX transfixing device     SURGEON:DR. Samuel Jimenez MD    ASST: fellow PHYSICIAN, MD      PREOPERATIVE ANTIBIOTICS: 2 g ancef IV given 30 minutes prior to incision, see anesthesia record for time.    OPERATIVE PROCEDURE: The patient was brought to the operating room suite and general anesthesia with endotracheal intubation was performed. The patient was positioned prone on the operation table. The surgical site was prepped and draped with chlorhexidine. The image  intensifier (C-arm) was brought into position and the RIGHT SIJ was brought in view . The superior pole of the sacroiliac joint was identified by contralateral cephalo-oblique fluoroscopy. A 20G spinal needle was placed into the SI joint to confirm appropriate access, after which incision was made along the trajectory of the spinal needle.  The Guide Pin Needle was slowly advanced through the sacroiliac joint capsule under fluoroscopic guidance entering the joint just below previous allograft. The needle position was confirmed using oblique, AP and lateral fluoroscopic imaging. The Guide chamber was utilized  to assess the size of the incision with the guidepin in the middle.  1.5cm posterior midline incision along the guidepin lateral to the S2 foramen in the oblique trajectory toward the RIGHT SI joint. Incision was carried down through the subcutaneous along with blunt dissection down to the SI joint.  SI Joint finder was then placed within the incision to access the SI joint. Once the SI Joint finder was in place, additional Oblique, Lateral, and A/P views were obtained to ensure adequate placement of joint finder. At this point, the guide chamber was placed overlying the joint finder and was directed into the joint space.  The joint finder and guide pin were carefully removed and Oblique, Lateral, and A/P views were obtained to ensure adequate placement of the guide chamber.  The sacroiliac joint was prepared for a structural allograft via reaming device into the the sacroiliac joint under live fluoroscopy in lateral position. Once joint was prepared, the orthobiologics-packed allograft implant was placed into the joint. Once again this was confirmed using oblique, AP and lateral fluoroscopic imaging.  At this point, the sacrum/ileum guide pin chamber was placed within the guide chamber.  A sacral screw was then driven into the sacrum via the implant and an iliac screw was driven into the ileum via the implant.  Once the screws were released, the screw guide pin chamber was removed without difficulty.   This was followed by removal of the guide chamber without difficulty.  Additional Oblique, Lateral, and A/P views were obtained to ensure adequate placement of the allograft implant and placement of ileum/sacral screws.  The site was then irrigated with 500 cc of normal saline and vancomycin powder was placed within the wound. The wound was closed in 2 layers (2-0 non-dyed vicryl for deep and subcutaneous layer followed by staples that was followed by bacitracin ointment and aquacel dressing. No specimens  collected.     Patient was extubated and taken to recovery in a stable condition with no change in neurological exam       Blood Loss: 20 cc  Specimen: none    Samuel Jimenez MD

## 2024-08-12 NOTE — H&P
HPI  Patient presenting for Procedure(s) (LRB):  FUSION, SACROILIAC JOINT (N/A)     Patient on Anti-coagulation No    No health changes since previous encounter    Past Medical History:   Diagnosis Date    Bronchitis     Cancer     stage I bladder cancer 50 yrs ago    Hypercholesteremia     Hypertension     Sacroiliitis 07/08/2020    Thyroid disease      Past Surgical History:   Procedure Laterality Date    BLADDER SURGERY      CATARACT EXTRACTION      CATARACT EXTRACTION W/  INTRAOCULAR LENS IMPLANT Right 3/9/2022    Procedure: EXTRACTION, CATARACT, WITH IOL INSERTION;  Surgeon: Bell Cedeno MD;  Location: [facility] OR;  Service: Ophthalmology;  Laterality: Right;    COLONOSCOPY      EPIDURAL STEROID INJECTION N/A 1/12/2022    Procedure: INJECTION, STEROID, EPIDURAL CAUDAL With Catheter needs consent;  Surgeon: Samuel Jimenez MD;  Location: [facility] PAIN MGT;  Service: Pain Management;  Laterality: N/A;    EYE SURGERY      cataracts     FUSION OF SACROILIAC JOINT Right 3/15/2021    Procedure: FUSION, RIGHT SACROILIAC JOINT FUSION;  Surgeon: Samuel Jimenez MD;  Location: [facility] OR;  Service: Pain Management;  Laterality: Right;  C-ARM, PAINTEQ REP     HERNIA REPAIR      INJECTION OF ANESTHETIC AGENT AROUND NERVE Bilateral 6/1/2022    Procedure: BLOCK, NERVE MEDIAL BRANCH L3,4,5 BILATERAL 1st;  Surgeon: Samuel Jimenez MD;  Location: [facility] PAIN MGT;  Service: Pain Management;  Laterality: Bilateral;    INJECTION OF ANESTHETIC AGENT AROUND NERVE Bilateral 8/24/2022    Procedure: Block, Nerve Kenny L3, L4, & L5;  Surgeon: Samuel Jimenez MD;  Location: [facility] PAIN MGT;  Service: Pain Management;  Laterality: Bilateral;    INJECTION OF ANESTHETIC AGENT AROUND NERVE Right 2/28/2024    Procedure: BLOCK, NERVE RIGHT CLUNEAL;  Surgeon: Samuel Jimenez MD;  Location: [facility] PAIN MGT;  Service: Pain Management;  Laterality: Right;  [phone]    INJECTION OF JOINT Right 7/8/2020    Procedure: INJECTION, JOINT,  SACROILIAC (SI);  Surgeon: Samuel Jimenez MD;  Location: [facility] PAIN MGT;  Service: Pain Management;  Laterality: Right;    INJECTION OF JOINT Right 9/9/2020    Procedure: INJECTION, JOINT, SACROILIAC (SI);  Surgeon: Samuel Jimenez MD;  Location: [facility] PAIN MGT;  Service: Pain Management;  Laterality: Right;    INJECTION OF JOINT Right 7/21/2021    Procedure: INJECTION, JOINT, HIP RIGHT;  Surgeon: Samuel Jimenez MD;  Location: [facility] PAIN MGT;  Service: Pain Management;  Laterality: Right;  CONSENT NEEDED    INJECTION OF JOINT Right 10/13/2021    Procedure: INJECTION, JOINT, SACROILIAC (SI)  NEED CONSENT pt. requesting sedation;  Surgeon: Samuel Jimenez MD;  Location: [facility] PAIN MGT;  Service: Pain Management;  Laterality: Right;    INJECTION OF JOINT Right 4/10/2024    Procedure: INJECTION, JOINT RIGHT SI AND RIGHT PIRIFORMIS;  Surgeon: Samuel Jimenez MD;  Location: [facility] PAIN MGT;  Service: Pain Management;  Laterality: Right;  [phone]    INJECTION, SACROILIAC JOINT Right 5/31/2023    Procedure: INJECTION,SACROILIAC JOINT, RIGHT SI AND RIGHT PIRIFORMIS;  Surgeon: Samuel Jimenez MD;  Location: [facility] PAIN MGT;  Service: Pain Management;  Laterality: Right;    KNEE SURGERY      RADIOFREQUENCY ABLATION Right 12/9/2020    Procedure: RADIOFREQUENCY ABLATION, L5-S1-S2 LATERAL BRANCH COOLED;  Surgeon: Samuel Jimenez MD;  Location: [facility] PAIN MGT;  Service: Pain Management;  Laterality: Right;    RADIOFREQUENCY ABLATION Right 9/21/2022    Procedure: RADIOFREQUENCY ABLATION, RIGHT L3-L4-L5 PER DR JIMENEZ;  Surgeon: Samuel Jimenez MD;  Location: [facility] PAIN MGT;  Service: Pain Management;  Laterality: Right;    RADIOFREQUENCY ABLATION Left 10/12/2022    Procedure: RADIOFREQUENCY ABLATION, LEFT L3-L4-L5 TWO OF TWO;  Surgeon: Samuel Jimenez MD;  Location: [facility] PAIN MGT;  Service: Pain Management;  Laterality: Left;    REPLACEMENT OF NERVE STIMULATOR BATTERY N/A 7/27/2020    Procedure:  Replacement, SPINAL CORD STIMULATOR BATTERY CHANGE TO NEVRO OMNION BATTERY;  Surgeon: Samuel Jimenez MD;  Location: [facility] OR;  Service: Pain Management;  Laterality: N/A;  C-ARM, NEVRO REP    REPLACEMENT OF SPINAL CORD STIMULATOR  2/13/2023    Procedure: REPLACEMENT, SPINAL CORD STIMULATOR;  Surgeon: Samuel Jimenez MD;  Location: [facility] OR;  Service: Pain Management;;    REVISION PROCEDURE INVOLVING SPINAL CORD NEUROSTIMULATOR N/A 2/13/2023    Procedure: SPINAL CORD STIMULATOR EXPLANT AND REIMPLANT SALUDA REP;  Surgeon: Samuel Jimenez MD;  Location: [facility] OR;  Service: Pain Management;  Laterality: N/A;    TRIAL OF SPINAL CORD NERVE STIMULATOR N/A 8/5/2019    Procedure: Trial, Neurostimulator, SPINAL CORD STIMULATOR TRIAL;  Surgeon: Samuel Jimenez MD;  Location: [facility] CATH LAB;  Service: Pain Management;  Laterality: N/A;  C-ARM, NEVRO REP     Review of patient's allergies indicates:  No Known Allergies   Current Facility-Administered Medications   Medication    0.9%  NaCl infusion    ceFAZolin injection 2 g    lactated ringers infusion     Facility-Administered Medications Ordered in Other Encounters   Medication    balanced salt irrigation intra-ocular solution 1 drop    lactated ringers infusion    sodium chloride 0.9% flush 2 mL       PMHx, PSHx, Allergies, Medications reviewed in epic    ROS negative except pain complaints in HPI    OBJECTIVE:    BP (!) 168/71 (BP Location: Left arm, Patient Position: Lying)   Pulse (!) 55   Temp 97.6 °F (36.4 °C) (Oral)   Resp 18   SpO2 99%     PHYSICAL EXAMINATION:    GENERAL: Well appearing, in no acute distress, alert and oriented x3.  PSYCH:  Mood and affect appropriate.  SKIN: Skin color, texture, turgor normal, no rashes or lesions which will impact the procedure.  CV: RRR with palpation of the radial artery.  PULM: No evidence of respiratory difficulty, symmetric chest rise. Clear to auscultation.  NEURO: Cranial nerves grossly intact.    Plan:    Proceed  with procedure as planned Procedure(s) (LRB):  FUSION, SACROILIAC JOINT (N/A)    Keyshawn Swenson  08/12/2024

## 2024-08-12 NOTE — OP NOTE
Patient Name: Jefferson Boogie  MRN: [id number]    INFORMED CONSENT: The procedure, risks, benefits and options were discussed with patient. There are no contraindications to the procedure. The patient expressed understanding and agreed to proceed. The personnel performing the procedure was discussed. I verify that I personally obtained Jefferson's consent prior to the start of the procedure and the signed consent can be found on the patient's chart.      Procedure Date: 8/12/2024  Surgeons and Role:     * Aparna Jimenez MD - Primary      Pre Procedure diagnosis: Sacroiliac dysfunction [M53.3]  Sacroiliitis [M46.1]  1. Sacroiliitis    2. Chronic pain syndrome    3. Chronic pain      Post-Procedure diagnosis: SAME    Sedation: General Anesthesia     PROCEDURE: Percutaneous Posterior Right Sacroiliac Joint Fusion Using Frances SiLO Allograft (Target Posterior aspect).     SURGEON:DR. APARNA JIMENEZ MD   ASST: AMELIA LANG MD PGY-5 FELLOW      PREOPERATIVE ANTIBIOTICS: 2 g IV given 30 minutes prior to incision, see anesthesia record for time.    OPERATIVE PROCEDURE: The patient was brought to the operating room suite and general anesthesia with endotracheal intubation was performed. The patient was positioned prone on the operation table. The surgical site was prepped and draped with chlorhexidine. The image intensifier (C-arm) was brought into position and the right SIJ was brought in view . The superior pole of the sacroiliac joint was identified by contralateral cephalo-oblique fluoroscopy. A guide pin was slowly advanced through the sacroiliac joint capsule under fluoroscopic guidance. The guide pin was confirmed using oblique, AP and lateral fluoroscopic imaging. a 1.5cm posterior midline incision over the S2 foramen lateral to SIJ. Performed soft tissue dissection down and through blunt finger dissection was able to identify the posterior superior Iliac spine. After passing a guidewire the same through the  SIJ. I prepared the sacroiliac joint for a structural allograft implant by dilating, broaching and rasping the sacroiliac joint. Once joint was prepared, I inserted the structural allograft implant into the joint and packed orthobiologics behind the implant to increase opportunity for bone fusion.Once again this was confirmed using oblique, AP and lateral fluoroscopic imaging.  I concluded surgery by performing standard suture closing technique using 2-0 vicryl followed by staples.    Blood loss: 20 cc     A sterile dressing was applied. No specimens collected.  Patient was taken to recovery in a stable condition with no change in neurological exam     COMPLICATIONS: There were no complications.      Blood Loss: Nill  Specimen: none    Keyshawn Swenson MD

## 2024-08-12 NOTE — TRANSFER OF CARE
Anesthesia Transfer of Care Note    Patient: Jefferson Boogie    Procedure(s) Performed: Procedure(s) (LRB):  FUSION, SACROILIAC JOINT (Right)    Patient location: PACU    Anesthesia Type: general    Transport from OR: Transported from OR on 6-10 L/min O2 by face mask with adequate spontaneous ventilation    Post pain: adequate analgesia    Post assessment: no apparent anesthetic complications and tolerated procedure well    Post vital signs: stable    Level of consciousness: awake and responds to stimulation    Nausea/Vomiting: no nausea/vomiting    Complications: none    Transfer of care protocol was followed      Last vitals: Visit Vitals  BP (!) 196/88 (BP Location: Right arm, Patient Position: Lying)   Pulse 73   Temp 36.4 °C (97.6 °F) (Temporal)   Resp 18   SpO2 98%

## 2024-08-12 NOTE — DISCHARGE SUMMARY
Discharge Note  Short Stay      SUMMARY     Admit Date: 8/12/2024    Attending Physician: Samuel Jimenez MD    Discharge Physician: Samuel Jimenez MD      Discharge Date: 8/12/2024 9:05 AM    Procedure(s) (LRB):  FUSION, SACROILIAC JOINT (Right)    Final Diagnosis: Sacroiliac dysfunction [M53.3]  Sacroiliitis [M46.1]    Disposition: Home or self care    Patient Instructions:   Current Discharge Medication List        START taking these medications    Details   cephALEXin (KEFLEX) 500 MG capsule Take 1 capsule (500 mg total) by mouth 4 (four) times daily. for 7 days  Qty: 28 capsule, Refills: 0           CONTINUE these medications which have NOT CHANGED    Details   ALPRAZolam (XANAX) 2 MG Tab       amLODIPine (NORVASC) 5 MG tablet       ascorbic acid, vitamin C, (VITAMIN C) 1000 MG tablet Take 1,000 mg by mouth.      ergocalciferol (DRISDOL) 200 mcg/mL (8,000 unit/mL) Drop Take by mouth.      ergocalciferol, vitamin D2, (VITAMIN D ORAL) Take by mouth every 30 days.      finasteride (PROSCAR) 5 mg tablet Take 1 tablet by mouth once daily.      guaiFENesin-codeine 100-10 mg/5 ml (TUSSI-ORGANIDIN NR)  mg/5 mL syrup       irbesartan (AVAPRO) 75 MG tablet 150 mg once daily.       levothyroxine (SYNTHROID) 100 MCG tablet Take 100 mcg by mouth.      meloxicam (MOBIC) 15 MG tablet Take 15 mg by mouth.      mirabegron (MYRBETRIQ ORAL) Take by mouth.      simvastatin (ZOCOR) 20 MG tablet Take 20 mg by mouth every evening.      tadalafil (CIALIS) 20 MG Tab       temazepam (RESTORIL) 30 mg capsule TK 1 C PO QD HS      aspirin (ECOTRIN) 81 MG EC tablet Take 1 tablet by mouth once daily.      celecoxib (CELEBREX) 200 MG capsule       DULoxetine (CYMBALTA) 30 MG capsule TAKE 1 CAPSULE(30 MG) BY MOUTH TWICE DAILY  Qty: 60 capsule, Refills: 2      promethazine-dextromethorphan (PROMETHAZINE-DM) 6.25-15 mg/5 mL Syrp              Discharge Diagnosis: Sacroiliac dysfunction [M53.3]  Sacroiliitis [M46.1]  Condition on  Discharge: Stable with no complications to procedure   Diet on Discharge: Same as before.  Activity: as per instruction sheet.  Discharge to: Home with a responsible adult.  Follow up: 2-4 weeks       Please call my office or pager at [phone] if experienced any weakness or loss of sensation, fever > 101.5, pain uncontrolled with oral medications, persistent nausea/vomiting/or diarrhea, redness or drainage from the incisions, or any other worrisome concerns. If physician on call was not reached or could not communicate with our office for any reason please go to the nearest emergency department     Keyshawn Swenson MD

## 2024-08-12 NOTE — DISCHARGE INSTRUCTIONS
Wound care:  1 - in case of sutures/staples leave underlying strips in place until follow up.  2 - No soaking bath or swimming until after follow up. Sponge bath to front is ok   3 -Take your pain medication as needed.   4 -Take over the counter stool softener while taking pain medication to prevent constipation.   5 -If no bowel movement in 2 days take miralax twice a day.  6 -Ok for light activity (light housework, walking, stair climbing) no strenuous exercise until after follow up.   7 -No lifting greater than 20 pounds or 1 gallon of milk until after follow up.  8 - Please call my office if experienced any weakness or loss of sensation, fever > 101.5, pain uncontrolled with oral medications, persistent nausea/vomiting/or diarrhea, redness or drainage from the incisions, or any other worrisome concerns. If physician on call was not reached or could not communicate with our office for any reason please go to the nearest emergency department   9 -Please keep abdominal binder (if ordered) on during the day time and activity, but remove before bedtime or at rest

## 2024-08-12 NOTE — ANESTHESIA POSTPROCEDURE EVALUATION
Anesthesia Post Evaluation    Patient: Jefferson Boogie    Procedure(s) Performed: Procedure(s) (LRB):  FUSION, SACROILIAC JOINT (Right)    Final Anesthesia Type: general      Patient location during evaluation: PACU  Patient participation: Yes- Able to Participate  Level of consciousness: awake and alert  Post-procedure vital signs: reviewed and stable  Pain management: adequate  Airway patency: patent    PONV status at discharge: No PONV  Anesthetic complications: no      Cardiovascular status: blood pressure returned to baseline  Respiratory status: unassisted  Hydration status: euvolemic  Follow-up not needed.              Vitals Value Taken Time   /78 08/12/24 0925   Temp 36.4 °C (97.6 °F) 08/12/24 0910   Pulse 61 08/12/24 0945   Resp 17 08/12/24 0938   SpO2 97 % 08/12/24 0945   Vitals shown include unfiled device data.      No case tracking events are documented in the log.      Pain/Kamron Score: Pain Rating Prior to Med Admin: 7 (8/12/2024  9:38 AM)  Kamron Score: 9 (8/12/2024  9:40 AM)

## 2024-08-12 NOTE — DISCHARGE SUMMARY
Discharge Note  Short Stay      SUMMARY     Admit Date: 8/12/2024    Attending Physician: Samuel Jimenez    Discharge Physician: Samuel Jimenez      Discharge Date: 8/12/2024 9:14 AM    Procedure(s) (LRB):  FUSION, SACROILIAC JOINT (Right)    Final Diagnosis: Sacroiliac dysfunction [M53.3]  Sacroiliitis [M46.1]    Disposition: Home or self care    Patient Instructions:   Current Discharge Medication List        START taking these medications    Details   cephALEXin (KEFLEX) 500 MG capsule Take 1 capsule (500 mg total) by mouth 4 (four) times daily. for 7 days  Qty: 28 capsule, Refills: 0           CONTINUE these medications which have NOT CHANGED    Details   ALPRAZolam (XANAX) 2 MG Tab       amLODIPine (NORVASC) 5 MG tablet       ascorbic acid, vitamin C, (VITAMIN C) 1000 MG tablet Take 1,000 mg by mouth.      ergocalciferol (DRISDOL) 200 mcg/mL (8,000 unit/mL) Drop Take by mouth.      ergocalciferol, vitamin D2, (VITAMIN D ORAL) Take by mouth every 30 days.      finasteride (PROSCAR) 5 mg tablet Take 1 tablet by mouth once daily.      guaiFENesin-codeine 100-10 mg/5 ml (TUSSI-ORGANIDIN NR)  mg/5 mL syrup       irbesartan (AVAPRO) 75 MG tablet 150 mg once daily.       levothyroxine (SYNTHROID) 100 MCG tablet Take 100 mcg by mouth.      meloxicam (MOBIC) 15 MG tablet Take 15 mg by mouth.      mirabegron (MYRBETRIQ ORAL) Take by mouth.      simvastatin (ZOCOR) 20 MG tablet Take 20 mg by mouth every evening.      tadalafil (CIALIS) 20 MG Tab       temazepam (RESTORIL) 30 mg capsule TK 1 C PO QD HS      aspirin (ECOTRIN) 81 MG EC tablet Take 1 tablet by mouth once daily.      celecoxib (CELEBREX) 200 MG capsule       DULoxetine (CYMBALTA) 30 MG capsule TAKE 1 CAPSULE(30 MG) BY MOUTH TWICE DAILY  Qty: 60 capsule, Refills: 2      promethazine-dextromethorphan (PROMETHAZINE-DM) 6.25-15 mg/5 mL Syrp                  Discharge Diagnosis: Sacroiliac dysfunction [M53.3]  Sacroiliitis [M46.1]  Condition on Discharge:  Stable with no complications to procedure   Diet on Discharge: Same as before.  Activity: as per instruction sheet.  Discharge to: Home with a responsible adult.  Follow up: 2-4 weeks    Wound care:  1 - in case of sutures/staples leave underlying strips in place until follow up.  2 - No soaking bath or swimming until after follow up. Sponge bath to front is ok   3 -Take your pain medication as needed.   4 -Take over the counter stool softener while taking pain medication to prevent constipation.   5 -If no bowel movement in 2 days take miralax twice a day.  6 -Ok for light activity (light housework, walking, stair climbing) no strenuous exercise until after follow up.   7 -No lifting greater than 20 pounds or 1 gallon of milk until after follow up.  8 - Please call my office if experienced any weakness or loss of sensation, fever > 101.5, pain uncontrolled with oral medications, persistent nausea/vomiting/or diarrhea, redness or drainage from the incisions, or any other worrisome concerns. If physician on call was not reached or could not communicate with our office for any reason please go to the nearest emergency department   9 -Please keep abdominal binder (if ordered) on during the day time and activity, but remove before bedtime or at rest        Please call my office or on call number at [phone] or pager at [phone] if experienced any weakness or loss of sensation, fever > 101.5, pain uncontrolled with oral medications, persistent nausea/vomiting/or diarrhea, redness or drainage from the incisions, or any other worrisome concerns. If physician on call was not reached or could not communicate with our office for any reason please go to the nearest emergency department        Samuel Jimenez

## 2024-08-13 ENCOUNTER — TELEPHONE (OUTPATIENT)
Dept: PAIN MEDICINE | Facility: CLINIC | Age: 89
End: 2024-08-13
Payer: MEDICARE

## 2024-08-13 ENCOUNTER — PATIENT MESSAGE (OUTPATIENT)
Dept: PAIN MEDICINE | Facility: CLINIC | Age: 89
End: 2024-08-13
Payer: MEDICARE

## 2024-08-13 VITALS
DIASTOLIC BLOOD PRESSURE: 71 MMHG | TEMPERATURE: 98 F | OXYGEN SATURATION: 95 % | SYSTOLIC BLOOD PRESSURE: 155 MMHG | RESPIRATION RATE: 16 BRPM | HEART RATE: 65 BPM

## 2024-08-13 RX ORDER — HYDROMORPHONE HYDROCHLORIDE 2 MG/1
2 TABLET ORAL EVERY 12 HOURS PRN
Qty: 14 TABLET | Refills: 0 | Status: SHIPPED | OUTPATIENT
Start: 2024-08-13 | End: 2024-08-20

## 2024-08-13 NOTE — TELEPHONE ENCOUNTER
----- Message from Stephen Howell sent at 8/13/2024 11:00 AM CDT -----  Contact: Juwan  Type:  Patient Call          Who Called: Patient         Does the patient know what this is regarding?: Requesting a call back ;pt said that he have post op questions ; please advise           Would the patient rather a call back or a response via [employer]?call           Best Call Back Number: [phone]             Additional Information:

## 2024-08-14 ENCOUNTER — PATIENT MESSAGE (OUTPATIENT)
Dept: PAIN MEDICINE | Facility: CLINIC | Age: 89
End: 2024-08-14
Payer: MEDICARE

## 2024-08-15 ENCOUNTER — OFFICE VISIT (OUTPATIENT)
Dept: PAIN MEDICINE | Facility: CLINIC | Age: 89
End: 2024-08-15
Payer: MEDICARE

## 2024-08-15 ENCOUNTER — PATIENT MESSAGE (OUTPATIENT)
Dept: PAIN MEDICINE | Facility: CLINIC | Age: 89
End: 2024-08-15

## 2024-08-15 ENCOUNTER — HOSPITAL ENCOUNTER (OUTPATIENT)
Dept: RADIOLOGY | Facility: OTHER | Age: 89
Discharge: HOME | End: 2024-08-15
Payer: MEDICARE

## 2024-08-15 VITALS
RESPIRATION RATE: 12 BRPM | OXYGEN SATURATION: 100 % | HEIGHT: 73 IN | WEIGHT: 200.63 LBS | HEART RATE: 73 BPM | SYSTOLIC BLOOD PRESSURE: 117 MMHG | BODY MASS INDEX: 26.59 KG/M2 | DIASTOLIC BLOOD PRESSURE: 56 MMHG

## 2024-08-15 DIAGNOSIS — Z98.1 S/P FUSION OF SACROILIAC JOINT: ICD-10-CM

## 2024-08-15 DIAGNOSIS — Z48.89 ENCOUNTER FOR POSTOPERATIVE WOUND CHECK: ICD-10-CM

## 2024-08-15 DIAGNOSIS — Z98.1 S/P FUSION OF SACROILIAC JOINT: Primary | ICD-10-CM

## 2024-08-15 PROCEDURE — 99999 PR PBB SHADOW E&M-EST. PATIENT-LVL V: CPT | Mod: PBBFAC,,,

## 2024-08-15 PROCEDURE — 99215 OFFICE O/P EST HI 40 MIN: CPT | Mod: PBBFAC,25

## 2024-08-15 PROCEDURE — 72220 X-RAY EXAM SACRUM TAILBONE: CPT | Mod: TC,FY

## 2024-08-15 PROCEDURE — 73521 X-RAY EXAM HIPS BI 2 VIEWS: CPT | Mod: TC,FY

## 2024-08-15 PROCEDURE — 73521 X-RAY EXAM HIPS BI 2 VIEWS: CPT | Mod: 26,,, | Performed by: RADIOLOGY

## 2024-08-15 PROCEDURE — 99024 POSTOP FOLLOW-UP VISIT: CPT | Mod: POP,,,

## 2024-08-15 PROCEDURE — 72220 X-RAY EXAM SACRUM TAILBONE: CPT | Mod: 26,,, | Performed by: RADIOLOGY

## 2024-08-15 NOTE — PROGRESS NOTES
Chronic patient Established Note (Follow up visit)      SUBJECTIVE:    Interval History 8/15/2024:  Jefferson Boogie returns to clinic for follow-up after Right SI joint fusion using SILO TFX. He reports significant pain. He continues Hydromorphone 2 mg. He took 1 pill today . He has not taken any Norco today. He is present with his wife. She is wondering when they can start doing things like going to dinner or if he needs to stay at home while he recovers. He denies fever, drainage, redness or swelling from procedure. Site. He denies recent falls or trauma. He denies new onset fever/night sweats, urinary incontinence, bowel incontinence, significant weight changes, significant motor weakness or changes, or loss of sensations. His pain today is 9/10.     Interval History 6/27/2024:  Patient returns to clinic for follow up. Patient is s/p Right SIJ and R Piriformis injections on 4/10/2024. Patient reports 100% relief from the procedure for 2 days. Pain has recurred and is same as previous description. Sha Karimi) is also here today for programming of [place] SCS. Patient continues with current medications with some benefit and HEP. Current pain 6/10. Patient denies red flags including weakness, unexpected weight loss/gain, night sweats/fevers, saddle anesthesia, and symptoms of CAREN.    Interval History 3/14/2024:  Jefferson Boogie presents for follow up. He is here today to discuss further options for treatment after right cluneal nerve block on 2/28/2024. He was here for the same reason two weeks ago, and we decided to give the block a bit more time to work before investigating other options. He reports no significant changes in his symptoms. He does say that he can manage his symptoms satisfactorily using 1.5 of his oxycodone  every 3 hours when doing things like golf and walking. He said he may use the medication once or twice a month and denies significant side effects.     Interval History  2/29/24:  Jefferson Boogie presents to the clinic for follow-up. He is s/p right cluneal nerve block 2/28/24. Today, he says that he does not know if he has had any difference in his symptoms so far. We spoke briefly yesterday about additional options, and he is here today for further discussion.     Interval History 01/16/2024:  Jefferson Boogie presents to the clinic for a follow-up appointment for low back, right hip, and right leg pain. Since the last visit, Jefferson Boogie states the pain has been persistant. Currently his right hip and leg pain is his biggest problem. He continues to endorse sharp stabbing pain in his hip and leg. Pain is exacerbated by activity, standing, laying on right side, lifting, bending. Pain is improved with rest, exercising, stretching, medications. Continues with Celebrex and Cymbalta. Current pain intensity is 6/10. Patient continues to endorse benefit from SCS. Patient denies red flags including weakness, unexpected weight loss/gain, night sweats/fevers, saddle anesthesia, and symptoms of CAREN.    Interval History 8/10/2023:  Patient reports that his pain post right SIJ and piriformis was persistent until it decreased 50% on 7/4/23, but then the pain returned after a few days. He reports that during that period he was able to run after a bus in [facility]. The pain is improved, but not as much as previous in his right buttock and calf. Patient reports that he has been working with the [employer], but not finding the correct programming as of yet.  No fevers, chills, unexplained weight loss.  Hx of bladder cancer in 1967. Remembers falling while skiing and landed on his buttocks 30 years ago, but no other pelvic trauma. Patient was traveling to [place] and recently came back to [place].  We discussed his recent pelvic CT in details especially for the accidental finding of irregular bone lesion. Explained to patient the need to investigate this further with our urology and oncology team.      Interval History 6/22/2023  Jefferson Boogie presents to the clinic for a follow-up appointment for chroniic LBP. Pt was last seen in clinic on 5/25 and scheduled for right SIJ and piriformis injection--performed 6/22. Pt reports 40% relief x 1-2 days. He continues to work with the rep with regards to programming the Krypton SCS device. Pt reports that he is getting some decent relief from the device with current program, but feels like there is still some progress to be made. Continues to complain of pain of similar character and quality to that of prior eval. Localized in the right lowerlumbar spine/gluteal area as well as the right lateral leg. Since the last visit, Jefferson Boogie states the pain has been improving. Current pain intensity is 5/10. He continues to exercise daily with stationary bike and weight training. Continues to take Cymbalta and Celebrex daily. He also takes Norco 10 very sparingly, maybe 1-2 per week. Overall the current regimen of SCS, medications and occasional injections have provided pt with enough relief for him to remain functional, and able to participate in his hobbies.      Interval history 5/25/23:  In the interim he has met with the [employer] reps for reprogramming. He has his good and bad days but feels that the pain is manageable. The reps with [employer] are present for the visit today. Today his pain is a 6.5/10. Still taking percocet and cymbalta (missed a few days), but these medications do not provide much relief. Still pain with walking and sitting, but the pain with laying down is better.      Interval History 5/4/23:  Patient seen today via virtual follow-up after implantation of his Krypton SCS in February. He reports no change in his pain since his last visit. Most of his pain is into his right hip with intermittent radiation into his RLE. He denies any new symptoms. No red flag symptoms. He is scheduled to meet with the [employer] reps from reprogramming next week. He  remains optimistic. In discussing his current medications, he has run out of the hydrocodone he was previously taking and has only been taking his Cymbalta once per day rather two.      Interval History 3/30/2023:  Mr Boogie presents 5-6 weeks after implantation of his Andrew SCS system. He reports no significant change in his pains, which are primarily down the RLE.  No constitutional signs symptoms concerning for infection.  No new areas of pain or neurological changes since procedure. No voicing of s/s concerning for cauda equina syndrome. He does endorse improvement in sleep. He is worried that his implant was a failure because his pain has not changed.     Interval History 2/20/2023:  Mr Boogie presents for follow up of Andrew SCS replacement. He states doing well. No constitutional signs symptoms concerning for infection.  No new areas of pain or neurological changes since procedure. No voicing of s/s concerning for cauda equina syndrome.      Interval History 1/23/2023:  Still taking Celebrex, however hasn't noticed a difference in pain with this medication. Location, quality, and severity of pain are unchanged from prior visit and is described above. Here today to discuss removal of Nervo SCS, and implant of Andrew SCS. He states his stimulator has not been helpful for the past 2 years despite multiple reprogramming and adjustment.     Interval History 11/21/22:  Reports 24 hours of 100% relief for 48 hours, but he reports that now his pain is only approximally 10% better. His pain at rest is 3-4/10. Pain at its worst is 8-9/10. Pain is improved when leaning over a grocery cart. Pain only allows him to walk for 3-4mins.      Interval History 8/29/22:  Jefferson Boogie presents to the clinic for a follow-up appointment for back pain.  He had his second bilateral L3, L4, L5 MBB and reports 100% pain relief. He would like to proceed with RFA.     Interval History 5/26/22:  Jefferson Boogie presents to  the clinic for a follow-up appointment for back pain. Since the last visit, Jefferson Boogie states the pain has been stable. Certain postures he can tolerate in the standing position such as leaning on a shopping cart or support to his posterior thighs. He now uses a walker when covering long distances. He continues to play golf despite it aggravating his pain. He receives some benefit from the SCS whereas other interventions have not helped. He remains interested in the [employer] device.     Interval History 3/24/22:  Jefferson Boogie presents to the clinic for a follow-up appointment for back pain. Since the last visit, Jefferson Boogie states the pain has been worse than usual. Current pain intensity is 8/10. He continues to report benefit with Nevro SCS however he has not been able to get in touch with the representative in order to have his settings adjusted. He continues to take occasional norco 7.5 mg with benefit, particularly when he plays golf.     Interval History 2/10/22:  Patent presents today s/p caudal epidural steroid injections on 1/12/22 since then he has not had any relief. He states his pain has been unchanged and is a 6/10 on average with the worst being 8/10 and best is 4/10. He has been taking percocet 5mg when he golfs or plays with his granddaughter.      Interval History 12/30/21:  Jefferson Boogie presents to clinic for follow up appointment s/p Right SI joint and Right piriformis muscle injection under US guidance on 11/29/21. He did not obtain any noticeable relief with this procedure similar to all of his previous injections. His pain is unchanged and constant over the right buttock. He is taking percocet 5mg x 3 on days he is more active, such as playing golf. This helps some, but minimally. Denies any new symptoms or neurological changes. No numbness, tingling, weakness in his lower extremities. Denies bowel/bladder incontinence or saddle anesthesia.      Interval History  "11/4/2021:  Jefferson Boogie presents to the clinic for a follow-up appointment for right sided back pain and SI joint pain. Mr. Boogie had a right SI joint injection on 10/13/2021. He did not note significant relief with the injection for any period of time. Pain is still constant over the right buttock and most noticeable when playing golf. He denies any new bowel/bladder incontinence, lower extremity numbness or weakness or saddle anesthesia.     Interval History 8/26/2021:  Jefferson Boogie presents to the clinic for a follow-up appointment for right sided hip pain with right-sided radiculopathy . Since the last visit, Jefferson Boogie states the pain has been "particularly tender today" 7/10. Patient reports playing golf recently and experienced worsening symptoms the following day. Mr. Boogie is s/p right-side hip joint injection with minimal relief. Patient states he has had relief with previous interventions and is open to RFA.     Interval History 6/10/2021:  Jefferson Boogie presents to the clinic for a follow-up appointment. Since the last visit, Jefferson Boogie states the pain has been stable. Current pain intensity is 3/10. States he is still having pain around SIJ that is affecting his ADL      Interval History 4/15/2021:  The patient returns to clinic today for follow up of back pain. He reports that the swelling above the SI fusion incision has resolved. He denies any fever, chills, or drainage from the incision. He does report benefit since the fusion. He continues to report benefit with Nevro SCS. He reports intermittent shoulder pain at this time. He did receive an injection with Sports Medicine with benefit. He denies any other health changes. His pain today is 2/10.     Interval History 3/25/21:  Mr. Boogie presents to clinic for follow up of bilateral shoulder pain. He is s/p BL subacromial injections on 2/8/21. He reports maximum 20% relief of pain in the Right shoulder. Today " the Left shoulder pain is 1/10; Right shoulder pain is 5-7/10.      Interval History 3/22/2021:  The patient returns to clinic today for follow up of back pain. He is s/p right SI fusion on 3/15/2021. He reports some relief at this time. He does report soreness to the incision. He denies any fever, chills, or drainage from incision. He continues to report benefit with Nevro SCS. He denies any other health changes. His pain today is 4/10.     Interval History 1/11/2020:  Patient is here for follow-up of low back pain now s/p sacroiliac RFA on 12/9/20 which provided no benefit and with the new complaint of bilateral anterior shoulder pain R>L. His shoulder pain started about 2 months ago. He describes 9/10 sharp/stinging pain without radiation that is exacerbated with overhead activity and improved with rest. He started PT about one month ago. He takes oxycodone which provides some relief. He had a blind subacromial steroid injection in the right shoulder with Dr. Ramirez on 10/26/19 which provided some short term relief.     Interval History 11/5/2020:  Jefferson Boogie presents to the clinic for a follow-up appointment for low back pain. Since the last visit, Jefferson Boogie states the pain has been stable. Current pain intensity is 4/10. He had good relief from the SI joint injection that lasted for about a week. Pain has since returned. He also slipped and fell on his buttock a couple of weeks ago and had increased pain in the right buttock after that which is slowly improving. He continues to have variable benefit with the Nevro SCS for intermittent pain in the right leg. He is scheduled to meet with representatives today. He is taking celebrex daily and percocet as needed sparingly. He denies any adverse effects and any other health changes.     Interval History 10/5/2020:  The patient returns to clinic today for follow up of low back pain. He is s/p right SI joint injection on 9/9/2020. He reports 25% relief  of his right sided low back and buttock pain. He did have good relief the first two days. He continues to report right sided low back and buttock pain. He denies any radicular leg pain. His pain is worse with prolonged standing and walking. He continues to report intermittent pain into his achilles from previous injury. He feels as though this has changed his gait. He continues to report some benefit with Nevro SCS device. He is in contact with representatives. He is currently taking Percocet as needed sparingly with some benefit. He denies any adverse effects. He denies any other health changes. His pain today is 4/10.      Interval History 9/2/2020:  The patient returns to clinic today for follow up of back pain. He reports that his back pain has improved since last audio visit. He continues to report back pain with intermittent radiating pain into his right hip and calf. He has spoken with Bienvenido from [employer] via phone with some programming. He is meeting with Bienvenido today. He had some issues with charging but this has improved. He is currently taking Norco intermittently but this is only lasts 2-3 hours. He denies any adverse effects. He denies any other health changes. His pain today is 8/10.     Interval History 08/27/2020:  Pt was contacted over virtual audio for a follow up appointment.  He is currently complaining of right hip and right calf with no associated numbness or weakness.  He continues to use his Nevro device.  He states it has been very frustrating. Inconsistent.  Took 20 mins to 1 hour to charge.  Couldn't get it to start this morning.  He states that there is no drainage at the battery site, no signs of infection or pain at the site.  Patient is not taking medications at this time.  He wants to speak about medication options because he would like to start playing golf again.     Interval History 8/17/2020:  The patient returns to clinic today for post op wound check. He continues to report benefit  with Nevro device. He denies any fever, chills, or drainage. He continues to follow his activity restrictions. He does report a recent achilles tendon injury while swimming. He is seeing Orthopedics. He denies any other health changes. His pain today is 4/10.     Interval History 8/3/2020:  The patient returns to clinic today for post op. He is s/p Nevro battery change on 7/27/2020. He denies any fever, chills, or drainage from incision. He has not completed his antibiotics as he missed two days of the medication. He continues to follow his activity restrictions. He reports relief with the device. He is meeting with Bien[employer] today for programming. He denies any other health changes. His pain today is 2/10     Interval History 7/22/2020:  Pt presents for audio follow up only s/p Right SI joint injection on 7/8/2020. Pt states he has near resolution of focal pain to buttock. He is pleased with result and pain relief. Pt has been in contact with [employer] and will be having battery swap in 5 days. He has difficulty whether stimulator is beneficial and has even had a holiday from using SCS.  He denies any newer areas pain, denies any neuro changes, meds area adequate to control pain without adverse side effects. Pain is 2/10 today.     Interval HPI 6/15/2020:  Jefferson Boogie presents to the clinic for a follow-up appointment for 3 week follow up right hip and right thigh pain. Since the last visit, Jefferson HANSON Keagan states the pain has been persistant. Current pain intensity is 5/10. Patient has been working with Bienvenido Varghese, to try and get better coverage of a localized pain that he still experiences in his right low back area. He does report improvement in symptoms of numbness/tingling. Today, worse pain is 1/10 in severity and localizes to the right SI joint. Pain is worse with extension of his back. It is worse with golfing. Pain relieved by Norco--he takes 1-2 tablets of 5-325 Norco on days that he plays golf. Pain  is also improved with being still and not being active. Patient endorses a much more active lifestyle with Norco. Denies new weakness/numbness or bowel/bladder changes.     Previous injection history includes a series of 3 lumbar CHOCO at [facility].      Interval HPI 3/5/20:  Patient presents to the clinic for a follow-up appointment for low back pain. Since the last visit, he states his pain has been unchanged. Current pain intensity is 4/10. He continues to work with [employer] to adjust settings on his SCS. He has stopped using tramadol, and uses norco sparingly. He continues to work with a  for physical therapy.      Interval HPI 1/9/2020:  Patient returns for follow up s/p Nevro SCS. He states he is unsure if it has helped his pain since he had it implanted. He last had an adjustment of his programming about 1 month ago. He does note that once he is done charging his SCS his pain is improved. Pain still worse with prolonged standing and activity such as golf. He still is doing home exercise program. He says that he is trying to do more since getting the SCS. He is still taking the Tramadol with limited pain relief. He takes Tramadol 50 mg twice daily only on days of activity which is once a week. No other health changes.      HPI 11/20/19  Jefferson Boogie returns to clinic today for follow up. He reports increased low back and leg pain over the last few days. He has been in contact with [employer] representatives for programming adjustments. He does report benefit with the device. He reports good days and bad days. His pain is worse with prolonged standing and activity. He continues to participate in a home exercise routine. He does take Tramadol sparingly but reports limited relief. He denies any other health changes. His pain today is 5/10.      Pain Medications:  Oxycodone-acetaminophen 10-325mg very rarely  Mobic  Cymbalta     Opioid Contract: not applicable      report:  Reviewed and  consistent with medication use as prescribed. Has not filled oxycodone since June of last year, says he has plenty left.      Pain Procedures  9/9/2020 - Right SI joint injection  7/27/2020 - Nevro SCS battery change  7/8/2020 - Right SIJ injection >80% relief   8/26/19 - SCS implant  8/5/19 - SCS trial  7/8/2020 - Right SI joint injection  7/27/2020 - SCS battery revision  9/9/2020 - Right SI joint injection   12/9/2020 - Right L5-S1 RFA  3/15/2021 - Right SI fusion  7/21/2021 - Injection R Hip - minimal relief  10/13/2021 - Right SI joint injection  11/29/2021 - R SI joing and R piriformis muscle injection - no relief   1/12/2022 - Caudal CHOCO- no relief   8/24/22 - Diagnostic Bilateral L3, L4 and L5 Lumbar Medial Branch Block under Fluoroscopy  09/21/22 - Right L3, L4, L5 RFA  10/12/22 -  Left L3, L4, L5 RFA  2/13/23 - Dickens SCS implant  5/31/23 - Right SIJ and piriformis injection   2/28/23 -  Right cluneal nerve block    Physical Therapy/Home Exercise: no     Imaging:   EXAMINATION:  CT PELVIS WITHOUT CONTRAST     CLINICAL HISTORY:  History fo percutanous SI fusion.;  Chronic pain syndrome     TECHNIQUE:  CT of the pelvis, without intravenous contrast.     COMPARISON:  None     FINDINGS:  BONE: Diffuse osteopenia.  No fracture or osteonecrosis.  Few lytic and sclerotic lesions scattered throughout the pelvis, including a lesion in the right iliac bone with irregularity of the overlying cortex (2:103).     JOINT: Postoperative changes from right sacroiliac joint fusion.  The implant is in satisfactory position.  No osseous fusion across the joint space.     Degenerative changes both sacroiliac joints.  No erosions or ankylosis.  Degenerative changes also involve the lower lumbar spine, both hip joints, and pubic symphysis.  Chondrocalcinosis of the pubic symphysis.  No significant joint effusion.     SOFT TISSUE: Normal muscle bulk. Regional tendons are intact.  Calcific tendinopathy involving the proximal  hamstring tendons bilaterally.  No bursal collection.     MISCELLANEOUS: Circumferential bladder wall thickening.  Prostatic calcifications.  Colonic diverticulosis.  Atherosclerosis.     Impression:     Postoperative changes in the right sacroiliac joint.  No osseous fusion.     Few lytic and sclerotic lesions scattered throughout the pelvis, concerning for metastases or myeloma.  No prior studies are available for comparison.     Bladder wall thickening, which could be secondary to outlet obstruction or cystitis.  Correlate with urinalysis.     Other findings as described.     This report was flagged in Epic as abnormal.    6/10/2021 - X ray Hips Bilateral: No radiographic evidence of acute osseous abnormality of the pelvis and hips and without radiographic evidence of osteonecrosis of the femoral heads.     9/18/21 MRI L-spine:  FINDINGS:  Lumbar sagittal alignment is slightly is straightened.  There is slight convex right curvature lumbar spine.  There is degenerative disc disease with intervertebral disc height loss and endplate degenerative change at all levels with scattered disc desiccation allowing for degenerative change the lumbar vertebral body heights and contours are within normal is without evidence for acute fracture or subluxation.  There is heterogeneous T1/T2 signal focus within the right aspect of the S1 vertebra most compatible with hemangioma this measures 2.0 cm.     The distal spinal cord and conus is normal in signal and contour tip of the conus approximates the T12/L1 level.  Please note there is artifact from indwelling spinal stimulator device with leads partially visualized casting artifact entering the dorsal epidural space at the T12 level and extending cranially.     There is abnormal clumping configuration of the filum terminalis a from T12 through L5 with slight thickening of scattered nerve roots most compatible with arachnoiditis.     No aneurysmal dilatation of the visualized  abdominal aorta.     T12/L1 through L1/L2: No significant disc bulge, central canal or neural foraminal stenosis.     L2/L3: Posterior disc osteophyte with facet joint arthropathy without significant central canal stenosis with mild bilateral neural foraminal stenosis.     L3/L4:Bulging disc with ligamentum flavum hypertrophy without significant central canal stenosis with mild bilateral neural foraminal stenosis.     L4/L5:Posterior disc osteophyte with ligamentum flavum hypertrophy and facet joint arthropathy without significant central canal stenosis and mild bilateral neural foraminal stenosis.     L5/S1: Posterior disc osteophyte with facet joint arthropathy without significant central canal stenosis with moderate right and mild left neural foraminal stenosis.     This report was flagged in Epic as abnormal.     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  Please note there is spinal stimulator device with leads partially visualized and distorting the study by artifacts.     There is abnormal thickening of the filum configuration most compatible with arachnoiditis.     Please see above for additional details.     Thoracic spine MRI:  FINDINGS: Thoracic vertebral body height and alignment are maintained with a few small scattered Schmorl's nodes involving the endplates of several mid to lower thoracic vertebra. The T3-4 vertebra are partially fused. There is some degree of desiccation of all of the thoracic discs with multilevel disc space narrowing, especially at the T7-T8, T6-T7 and T5-T6 levels. There is no abnormal prevertebral soft tissue thickening. The somewhat heterogeneous appearance to the signal from the thoracic vertebra is presumably secondary to an admixture of red and yellow marrow.    T1-T2 level: There is no bony foraminal narrowing or bony central canal stenosis and the posterior disc margin is unremarkable.    T2-T3 level: There is no bony foraminal  narrowing or bony central canal stenosis and the posterior disc margin is unremarkable.    T3-T4 level: There is no bony foraminal narrowing or bony central canal stenosis and the posterior disc margin is unremarkable.    T4-5 level: There is no bony foraminal narrowing or bony central canal stenosis and the posterior disc margin is unremarkable.    T5-T6 level: There is no bony foraminal narrowing or bony central canal stenosis and the posterior disc margin is unremarkable.    T6-T7 level: There is no bony foraminal narrowing or bony central canal stenosis and the posterior disc margin is unremarkable.    T7-T8 level: There is no bony foraminal narrowing or bony central canal stenosis and the posterior disc margin is unremarkable.    T8-T9 level: There is no bony foraminal narrowing or bony central canal stenosis and the posterior disc margin is unremarkable.    T9-T10 level: There is no bony foraminal narrowing or bony central canal stenosis and the posterior disc margin is unremarkable.    T10-T11 level: There is no bony foraminal narrowing or bony central canal stenosis and the posterior disc margin is unremarkable.    T11-T12 level: There is no bony foraminal narrowing or bony central canal stenosis and the posterior disc margin is unremarkable.    T12-L1 level: The tip the conus medullaris extends down to level of the superior endplate of L1. 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 There is no bony foraminal narrowing or bony central canal stenosis at this level.    On the axial images, the immediate paravertebral soft tissues are unremarkable. A small cyst is seen to involve the upper pole the left kidney.    Following contrast administration, there is no abnormal enhancement of any bony or soft tissue elements of the thoracic spine. There is no abnormal enhancement of the subserosal surface of the thoracic spinal cord. Some foci of mild signal intensity in  the CSF dorsal to the spinal cord of some of the sagittal sequences presumably is secondary to CSF pulsation artifact.    On the sagittal  image, there is cervical spondylosis and kyphosis with narrowing of all of the disc spaces in the cervical spine.     EXAMINATION:  CT PELVIS WITHOUT CONTRAST     CLINICAL HISTORY:  History fo percutanous SI fusion.;  Chronic pain syndrome     TECHNIQUE:  CT of the pelvis, without intravenous contrast.     COMPARISON:  None     FINDINGS:  BONE: Diffuse osteopenia.  No fracture or osteonecrosis.  Few lytic and sclerotic lesions scattered throughout the pelvis, including a lesion in the right iliac bone with irregularity of the overlying cortex (2:103).     JOINT: Postoperative changes from right sacroiliac joint fusion.  The implant is in satisfactory position.  No osseous fusion across the joint space.     Degenerative changes both sacroiliac joints.  No erosions or ankylosis.  Degenerative changes also involve the lower lumbar spine, both hip joints, and pubic symphysis.  Chondrocalcinosis of the pubic symphysis.  No significant joint effusion.     SOFT TISSUE: Normal muscle bulk. Regional tendons are intact.  Calcific tendinopathy involving the proximal hamstring tendons bilaterally.  No bursal collection.     MISCELLANEOUS: Circumferential bladder wall thickening.  Prostatic calcifications.  Colonic diverticulosis.  Atherosclerosis.     Impression:     Postoperative changes in the right sacroiliac joint.  No osseous fusion.     Few lytic and sclerotic lesions scattered throughout the pelvis, concerning for metastases or myeloma.  No prior studies are available for comparison.     Bladder wall thickening, which could be secondary to outlet obstruction or cystitis.  Correlate with urinalysis.     Other findings as described.     This report was flagged in Epic as abnormal.        Electronically signed by: Koby Cote  Date:                                             07/10/2023  Time:                                           11:54    Allergies: Review of patient's allergies indicates:  No Known Allergies    Current Medications:   Current Outpatient Medications   Medication Sig Dispense Refill    ALPRAZolam (XANAX) 2 MG Tab       amLODIPine (NORVASC) 5 MG tablet       ascorbic acid, vitamin C, (VITAMIN C) 1000 MG tablet Take 1,000 mg by mouth.      aspirin (ECOTRIN) 81 MG EC tablet Take 1 tablet by mouth once daily.      celecoxib (CELEBREX) 200 MG capsule       cephALEXin (KEFLEX) 500 MG capsule Take 1 capsule (500 mg total) by mouth 4 (four) times daily. for 7 days 28 capsule 0    DULoxetine (CYMBALTA) 30 MG capsule TAKE 1 CAPSULE(30 MG) BY MOUTH TWICE DAILY 60 capsule 2    ergocalciferol (DRISDOL) 200 mcg/mL (8,000 unit/mL) Drop Take by mouth.      ergocalciferol, vitamin D2, (VITAMIN D ORAL) Take by mouth every 30 days.      finasteride (PROSCAR) 5 mg tablet Take 1 tablet by mouth once daily.      guaiFENesin-codeine 100-10 mg/5 ml (TUSSI-ORGANIDIN NR)  mg/5 mL syrup       HYDROmorphone (DILAUDID) 2 MG tablet Take 1 tablet (2 mg total) by mouth every 12 (twelve) hours as needed for Pain. 14 tablet 0    irbesartan (AVAPRO) 75 MG tablet 150 mg once daily.       levothyroxine (SYNTHROID) 100 MCG tablet Take 100 mcg by mouth.      meloxicam (MOBIC) 15 MG tablet Take 15 mg by mouth.      mirabegron (MYRBETRIQ ORAL) Take by mouth.      promethazine-dextromethorphan (PROMETHAZINE-DM) 6.25-15 mg/5 mL Syrp       simvastatin (ZOCOR) 20 MG tablet Take 20 mg by mouth every evening.      tadalafil (CIALIS) 20 MG Tab       temazepam (RESTORIL) 30 mg capsule TK 1 C PO QD HS       No current facility-administered medications for this visit.     Facility-Administered Medications Ordered in Other Visits   Medication Dose Route Frequency Provider Last Rate Last Admin    balanced salt irrigation intra-ocular solution 1 drop  1 drop Right Eye On Call Procedure Bell Cedeno MD         sodium chloride 0.9% flush 2 mL  2 mL Intravenous PRN Bell Cedeno MD           REVIEW OF SYSTEMS:  GENERAL:  No weight loss, malaise or fevers.  HEENT:  Negative for frequent or significant headaches.  NECK:  Negative for lumps, goiter, pain and significant neck swelling.  RESPIRATORY:  Negative for cough, wheezing or shortness of breath.  CARDIOVASCULAR:  Negative for chest pain, leg swelling or palpitations.  GI:  Negative for abdominal discomfort, blood in stools or black stools or change in bowel habits.  MUSCULOSKELETAL:  See HPI.  SKIN:  Negative for lesions, rash, and itching.  PSYCH:  + for sleep disturbance, mood disorder and recent psychosocial stressors.  HEMATOLOGY/LYMPHOLOGY:  Negative for prolonged bleeding, bruising easily or swollen nodes.  NEURO:   No history of headaches, syncope, paralysis, seizures or tremors.  All other reviewed and negative other than HPI.    Past Medical History:  Past Medical History:   Diagnosis Date    Bronchitis     Cancer     stage I bladder cancer 50 yrs ago    Hypercholesteremia     Hypertension     Sacroiliitis 07/08/2020    Thyroid disease        Past Surgical History:  Past Surgical History:   Procedure Laterality Date    BLADDER SURGERY      CATARACT EXTRACTION      CATARACT EXTRACTION W/  INTRAOCULAR LENS IMPLANT Right 3/9/2022    Procedure: EXTRACTION, CATARACT, WITH IOL INSERTION;  Surgeon: Bell Cedeno MD;  Location: [facility] OR;  Service: Ophthalmology;  Laterality: Right;    COLONOSCOPY      EPIDURAL STEROID INJECTION N/A 1/12/2022    Procedure: INJECTION, STEROID, EPIDURAL CAUDAL With Catheter needs consent;  Surgeon: Samuel Jimenez MD;  Location: [facility];  Service: Pain Management;  Laterality: N/A;    EYE SURGERY      cataracts     FUSION OF SACROILIAC JOINT Right 3/15/2021    Procedure: FUSION, RIGHT SACROILIAC JOINT FUSION;  Surgeon: Samuel Jimenez MD;  Location: [facility] OR;  Service: Pain Management;  Laterality: Right;  C-ARM, PAINTEQ  REP     FUSION OF SACROILIAC JOINT Right 8/12/2024    Procedure: FUSION, SACROILIAC JOINT;  Surgeon: Samuel Jimenez MD;  Location: [facility] OR;  Service: Pain Management;  Laterality: Right;    HERNIA REPAIR      INJECTION OF ANESTHETIC AGENT AROUND NERVE Bilateral 6/1/2022    Procedure: BLOCK, NERVE MEDIAL BRANCH L3,4,5 BILATERAL 1st;  Surgeon: Samuel Jimenez MD;  Location: [facility] PAIN MGT;  Service: Pain Management;  Laterality: Bilateral;    INJECTION OF ANESTHETIC AGENT AROUND NERVE Bilateral 8/24/2022    Procedure: Block, Nerve Kenny L3, L4, & L5;  Surgeon: Samuel Jimenez MD;  Location: [facility] PAIN MGT;  Service: Pain Management;  Laterality: Bilateral;    INJECTION OF ANESTHETIC AGENT AROUND NERVE Right 2/28/2024    Procedure: BLOCK, NERVE RIGHT CLUNEAL;  Surgeon: Samuel Jimenez MD;  Location: [facility] PAIN MGT;  Service: Pain Management;  Laterality: Right;  [phone]    INJECTION OF JOINT Right 7/8/2020    Procedure: INJECTION, JOINT, SACROILIAC (SI);  Surgeon: Samuel Jimenez MD;  Location: [facility] PAIN MGT;  Service: Pain Management;  Laterality: Right;    INJECTION OF JOINT Right 9/9/2020    Procedure: INJECTION, JOINT, SACROILIAC (SI);  Surgeon: Samuel Jimenez MD;  Location: [facility] PAIN MGT;  Service: Pain Management;  Laterality: Right;    INJECTION OF JOINT Right 7/21/2021    Procedure: INJECTION, JOINT, HIP RIGHT;  Surgeon: Samuel Jimenez MD;  Location: [facility] PAIN MGT;  Service: Pain Management;  Laterality: Right;  CONSENT NEEDED    INJECTION OF JOINT Right 10/13/2021    Procedure: INJECTION, JOINT, SACROILIAC (SI)  NEED CONSENT pt. requesting sedation;  Surgeon: Samuel Jimenez MD;  Location: [facility] PAIN MGT;  Service: Pain Management;  Laterality: Right;    INJECTION OF JOINT Right 4/10/2024    Procedure: INJECTION, JOINT RIGHT SI AND RIGHT PIRIFORMIS;  Surgeon: Samuel Jimenez MD;  Location: [facility] PAIN MGT;  Service: Pain Management;  Laterality: Right;  [phone]    INJECTION,  SACROILIAC JOINT Right 5/31/2023    Procedure: INJECTION,SACROILIAC JOINT, RIGHT SI AND RIGHT PIRIFORMIS;  Surgeon: Samuel Jimenez MD;  Location: [facility] PAIN MGT;  Service: Pain Management;  Laterality: Right;    KNEE SURGERY      RADIOFREQUENCY ABLATION Right 12/9/2020    Procedure: RADIOFREQUENCY ABLATION, L5-S1-S2 LATERAL BRANCH COOLED;  Surgeon: Samuel Jimenez MD;  Location: [facility] PAIN MGT;  Service: Pain Management;  Laterality: Right;    RADIOFREQUENCY ABLATION Right 9/21/2022    Procedure: RADIOFREQUENCY ABLATION, RIGHT L3-L4-L5 PER DR JIMENEZ;  Surgeon: Samuel Jimenez MD;  Location: [facility] PAIN MGT;  Service: Pain Management;  Laterality: Right;    RADIOFREQUENCY ABLATION Left 10/12/2022    Procedure: RADIOFREQUENCY ABLATION, LEFT L3-L4-L5 TWO OF TWO;  Surgeon: Samuel Jimenez MD;  Location: [facility] PAIN MGT;  Service: Pain Management;  Laterality: Left;    REPLACEMENT OF NERVE STIMULATOR BATTERY N/A 7/27/2020    Procedure: Replacement, SPINAL CORD STIMULATOR BATTERY CHANGE TO NEVRO OMNION BATTERY;  Surgeon: Samuel Jimeenz MD;  Location: [facility] OR;  Service: Pain Management;  Laterality: N/A;  C-ARM, NEVRO REP    REPLACEMENT OF SPINAL CORD STIMULATOR  2/13/2023    Procedure: REPLACEMENT, SPINAL CORD STIMULATOR;  Surgeon: Samuel Jimenez MD;  Location: [facility] OR;  Service: Pain Management;;    REVISION PROCEDURE INVOLVING SPINAL CORD NEUROSTIMULATOR N/A 2/13/2023    Procedure: SPINAL CORD STIMULATOR EXPLANT AND REIMPLANT [place] REP;  Surgeon: Samuel Jimenez MD;  Location: [facility] OR;  Service: Pain Management;  Laterality: N/A;    TRIAL OF SPINAL CORD NERVE STIMULATOR N/A 8/5/2019    Procedure: Trial, Neurostimulator, SPINAL CORD STIMULATOR TRIAL;  Surgeon: Samuel Jimenez MD;  Location: [facility] CATH LAB;  Service: Pain Management;  Laterality: N/A;  C-ARM, NEVRO REP     Family History:  No family history on file.    Social History:  Social History     Socioeconomic History    Marital status:  "   Tobacco Use    Smoking status: Former     Current packs/day: 0.00     Types: Cigarettes     Quit date:      Years since quittin.6    Smokeless tobacco: Never    Tobacco comments:     stopped 40 years ago   Substance and Sexual Activity    Alcohol use: Yes     Alcohol/week: 1.0 standard drink of alcohol     Types: 1 Shots of liquor per week     Comment: nightly -scotch    Drug use: Never    Sexual activity: Yes     Partners: Female     Social Determinants of Health     Financial Resource Strain: Low Risk  (2022)    Received from [facility], [facility]    Overall Financial Resource Strain (CARDIA)     Difficulty of Paying Living Expenses: Not hard at all   Food Insecurity: Unknown (2024)    Received from [facility]    Hunger Vital Sign     Worried About Running Out of Food in the Last Year: Never true   Transportation Needs: No Transportation Needs (2022)    Received from [facility], [facility]    PRAPARE - Transportation     Lack of Transportation (Medical): No     Lack of Transportation (Non-Medical): No   Physical Activity: Unknown (2024)    Received from [facility]    Exercise Vital Sign     Days of Exercise per Week: 6 days   Stress: No Stress Concern Present (2024)    Received from [facility]    [employer] [place] [employer] - Occupational Stress Questionnaire     Feeling of Stress : Only a little   Housing Stability: Unknown (2023)    Received from [facility], [facility]    Housing Stability Vital Sign     Unable to Pay for Housing in the Last Year: No       OBJECTIVE:    BP (!) 117/56 (BP Location: Right arm, Patient Position: Sitting, BP Method: Medium (Automatic))   Pulse 73   Resp 12   Ht 6' 1" (1.854 m)   Wt 91 kg (200 lb 9.9 oz)   SpO2 100%   BMI 26.47 kg/m²     PHYSICAL EXAMINATION:  General appearance: Well appearing, in no acute distress, alert and appropriately communicative.  Psych:  Mood and affect appropriate.  Skin: Skin " color, texture, turgor normal, no rashes or lesions, in both upper and lower body. SIJ fusion site staples in place and intact. Site without drainage, redness or swelling. Mild bruising around incision and staples. Area cleaned with alcohol, Bacitracin and clean bandage applied.   Head/face:  Atraumatic, normocephalic.  Cor: regular rate  Pulm: non-labored breathing      Prior PHYSICAL EXAM:     GENERAL: Alert and oriented x 3, no acute distress  PSYCH:  Mood and affect appropriate.  SKIN: Skin color, texture, turgor normal, no rashes or lesions.  HEENT:  Normocephalic, atraumatic. Cranial nerves grossly intact.  PULM: Non-labored breathing, symmetric chest rise.  BACK: Mildly reduced ROM w/ pain on flexion and extension. Positive tenderness over Right SIJ with positive thigh and sacral thrust test, Positive FABERE, Ganselin and Yeoman's test on the Right side. Negative FADIR  EXTREMITIES: No deformities, edema, or skin discoloration.   MUSCULOSKELETAL: Bilateral upper and lower extremity strength is normal and symmetric. No atrophy is noted.  NEURO: Bilateral upper and lower extremity coordination and muscle stretch reflexes are physiologic and symmetric. No clonus. Absent Johnson. No tremor.    GAIT: Antalgic    ASSESSMENT: 89 y.o. year old male with chronic low back pain, consistent with:     1. S/P fusion of sacroiliac joint  CT Pelvis Without Contrast    X-Ray Hips Bilateral 2 View Incl AP Pelvis    X-Ray Sacrum And Coccyx      2. Pelvic and perineal pain  CT Pelvis Without Contrast      3. Encounter for postoperative wound check            PLAN:     - Previous records and imaging reviewed  - I have stressed the importance of physical activity and a home exercise plan to help with pain and improve health.  - He can alternate Norco 10/325 TID PRN and Hydromorphone 2 mg BID PRN.   - He is s/p Right SIJ Fusion with Frances SiLO Allograft (Target Posterior aspect).   - Xray bilateral hips, sacrum and coccyx and CT  pelvis w/o contrast to r/o fracture or acute processes.  - RTC Monday 8/19/2024 for post-op and staple removal.   - Counseled patient regarding the importance of activity modification, constant sleeping habits, and physical therapy.    The above plan and management options were discussed at length with patient. Patient is in agreement with the above and verbalized understanding.      Kathi Berrios NP  08/15/2024

## 2024-08-19 ENCOUNTER — TELEPHONE (OUTPATIENT)
Dept: PAIN MEDICINE | Facility: CLINIC | Age: 89
End: 2024-08-19
Payer: MEDICARE

## 2024-08-19 ENCOUNTER — OFFICE VISIT (OUTPATIENT)
Dept: PAIN MEDICINE | Facility: CLINIC | Age: 89
End: 2024-08-19
Payer: MEDICARE

## 2024-08-19 VITALS
RESPIRATION RATE: 12 BRPM | SYSTOLIC BLOOD PRESSURE: 148 MMHG | BODY MASS INDEX: 26 KG/M2 | OXYGEN SATURATION: 100 % | WEIGHT: 196.19 LBS | DIASTOLIC BLOOD PRESSURE: 66 MMHG | HEIGHT: 73 IN | HEART RATE: 65 BPM

## 2024-08-19 DIAGNOSIS — Z98.1 S/P FUSION OF SACROILIAC JOINT: Primary | ICD-10-CM

## 2024-08-19 DIAGNOSIS — Z48.89 ENCOUNTER FOR POSTOPERATIVE WOUND CHECK: ICD-10-CM

## 2024-08-19 DIAGNOSIS — G89.4 CHRONIC PAIN SYNDROME: ICD-10-CM

## 2024-08-19 PROCEDURE — 99215 OFFICE O/P EST HI 40 MIN: CPT | Mod: PBBFAC

## 2024-08-19 PROCEDURE — 99999 PR PBB SHADOW E&M-EST. PATIENT-LVL V: CPT | Mod: PBBFAC,,,

## 2024-08-19 PROCEDURE — 99024 POSTOP FOLLOW-UP VISIT: CPT | Mod: POP,,,

## 2024-08-19 RX ORDER — HYDROCODONE BITARTRATE AND ACETAMINOPHEN 10; 325 MG/1; MG/1
1 TABLET ORAL EVERY 8 HOURS PRN
Qty: 90 TABLET | Refills: 0 | Status: SHIPPED | OUTPATIENT
Start: 2024-08-19

## 2024-08-19 RX ORDER — HYDROMORPHONE HYDROCHLORIDE 2 MG/1
2 TABLET ORAL EVERY 12 HOURS PRN
Qty: 14 TABLET | Refills: 0 | Status: SHIPPED | OUTPATIENT
Start: 2024-08-20 | End: 2024-08-27

## 2024-08-19 NOTE — TELEPHONE ENCOUNTER
----- Message from Adeola Rico sent at 8/19/2024  2:51 PM CDT -----  Name of Who is Calling: Anthony evie Sanchezrodger is calling on behalf of AMY NERI [[id number]]          What is the request in detail: Anthony would like to know if pt will be taking both pain medications: HYDROmorphone (DILAUDID) 2 MG tablet and HYDROcodone-acetaminophen (NORCO)  mg per tablet. Please assist.           Can the clinic reply by [facility]: No          What Number [facility] Call Back [facility] [phone]

## 2024-08-19 NOTE — PROGRESS NOTES
Chronic patient Established Note (Follow up visit)      SUBJECTIVE:    Interval History 8/19/2024:  Jefferson Boogie returns to clinic for follow-up after Right SIJ fusion. He continues to report significant amounts of pain. He has been taking Hydromophone 2 mg daily and Norco 4 times per day without relief. He continues to utilize a rolling walker to help him ambulate. He denies fever, drainage, redness, swelling or any other signs of infection. He denies recent falls or trauma. He denies new onset fever/night sweats, urinary incontinence, bowel incontinence, significant weight changes, significant motor weakness or changes, or loss of sensations. His pain today is 7/10.     Interval History 8/15/2024:  Jefferson Boogie returns to clinic for follow-up after Right SI joint fusion using SILO TFX. He reports significant pain. He continues Hydromorphone 2 mg. He took 1 pill today . He has not taken any Norco today. He is present with his wife. She is wondering when they can start doing things like going to dinner or if he needs to stay at home while he recovers. He denies fever, drainage, redness or swelling from procedure. Site. He denies recent falls or trauma. He denies new onset fever/night sweats, urinary incontinence, bowel incontinence, significant weight changes, significant motor weakness or changes, or loss of sensations. His pain today is 9/10.     Interval History 6/27/2024:  Patient returns to clinic for follow up. Patient is s/p Right SIJ and R Piriformis injections on 4/10/2024. Patient reports 100% relief from the procedure for 2 days. Pain has recurred and is same as previous description. Sha Karimi) is also here today for programming of [place] SCS. Patient continues with current medications with some benefit and HEP. Current pain 6/10. Patient denies red flags including weakness, unexpected weight loss/gain, night sweats/fevers, saddle anesthesia, and symptoms of CAREN.    Interval History  3/14/2024:  Jefferson Boogie presents for follow up. He is here today to discuss further options for treatment after right cluneal nerve block on 2/28/2024. He was here for the same reason two weeks ago, and we decided to give the block a bit more time to work before investigating other options. He reports no significant changes in his symptoms. He does say that he can manage his symptoms satisfactorily using 1.5 of his oxycodone  every 3 hours when doing things like golf and walking. He said he may use the medication once or twice a month and denies significant side effects.     Interval History 2/29/24:  Jefferson Boogie presents to the clinic for follow-up. He is s/p right cluneal nerve block 2/28/24. Today, he says that he does not know if he has had any difference in his symptoms so far. We spoke briefly yesterday about additional options, and he is here today for further discussion.     Interval History 01/16/2024:  Jefferson Boogie presents to the clinic for a follow-up appointment for low back, right hip, and right leg pain. Since the last visit, Jefferson Boogie states the pain has been persistant. Currently his right hip and leg pain is his biggest problem. He continues to endorse sharp stabbing pain in his hip and leg. Pain is exacerbated by activity, standing, laying on right side, lifting, bending. Pain is improved with rest, exercising, stretching, medications. Continues with Celebrex and Cymbalta. Current pain intensity is 6/10. Patient continues to endorse benefit from SCS. Patient denies red flags including weakness, unexpected weight loss/gain, night sweats/fevers, saddle anesthesia, and symptoms of CAREN.    Interval History 8/10/2023:  Patient reports that his pain post right SIJ and piriformis was persistent until it decreased 50% on 7/4/23, but then the pain returned after a few days. He reports that during that period he was able to run after a bus in [facility]. The pain is improved, but not  as much as previous in his right buttock and calf. Patient reports that he has been working with the [employer] Rep, but not finding the correct programming as of yet.  No fevers, chills, unexplained weight loss.  Hx of bladder cancer in 1967. Remembers falling while skiing and landed on his buttocks 30 years ago, but no other pelvic trauma. Patient was traveling to [place] and recently came back to [place].  We discussed his recent pelvic CT in details especially for the accidental finding of irregular bone lesion. Explained to patient the need to investigate this further with our urology and oncology team.     Interval History 6/22/2023  Jefferson Boogie presents to the clinic for a follow-up appointment for chroniic LBP. Pt was last seen in clinic on 5/25 and scheduled for right SIJ and piriformis injection--performed 6/22. Pt reports 40% relief x 1-2 days. He continues to work with the rep with regards to programming the [employer] SCS device. Pt reports that he is getting some decent relief from the device with current program, but feels like there is still some progress to be made. Continues to complain of pain of similar character and quality to that of prior eval. Localized in the right lowerlumbar spine/gluteal area as well as the right lateral leg. Since the last visit, Jefferson Boogie states the pain has been improving. Current pain intensity is 5/10. He continues to exercise daily with stationary bike and weight training. Continues to take Cymbalta and Celebrex daily. He also takes Norco 10 very sparingly, maybe 1-2 per week. Overall the current regimen of SCS, medications and occasional injections have provided pt with enough relief for him to remain functional, and able to participate in his hobbies.      Interval history 5/25/23:  In the interim he has met with the [employer] reps for reprogramming. He has his good and bad days but feels that the pain is manageable. The reps with [employer] are present for the visit today.  Today his pain is a 6.5/10. Still taking percocet and cymbalta (missed a few days), but these medications do not provide much relief. Still pain with walking and sitting, but the pain with laying down is better.      Interval History 5/4/23:  Patient seen today via virtual follow-up after implantation of his [place] SCS in February. He reports no change in his pain since his last visit. Most of his pain is into his right hip with intermittent radiation into his RLE. He denies any new symptoms. No red flag symptoms. He is scheduled to meet with the [employer] reps from reprogramming next week. He remains optimistic. In discussing his current medications, he has run out of the hydrocodone he was previously taking and has only been taking his Cymbalta once per day rather two.      Interval History 3/30/2023:  Mr Boogie presents 5-6 weeks after implantation of his [place] SCS system. He reports no significant change in his pains, which are primarily down the RLE.  No constitutional signs symptoms concerning for infection.  No new areas of pain or neurological changes since procedure. No voicing of s/s concerning for cauda equina syndrome. He does endorse improvement in sleep. He is worried that his implant was a failure because his pain has not changed.     Interval History 2/20/2023:  Mr Boogie presents for follow up of [place] SCS replacement. He states doing well. No constitutional signs symptoms concerning for infection.  No new areas of pain or neurological changes since procedure. No voicing of s/s concerning for cauda equina syndrome.      Interval History 1/23/2023:  Still taking Celebrex, however hasn't noticed a difference in pain with this medication. Location, quality, and severity of pain are unchanged from prior visit and is described above. Here today to discuss removal of [place] SCS, and implant of [place] SCS. He states his stimulator has not been helpful for the past 2 years despite multiple reprogramming  and adjustment.     Interval History 11/21/22:  Reports 24 hours of 100% relief for 48 hours, but he reports that now his pain is only approximally 10% better. His pain at rest is 3-4/10. Pain at its worst is 8-9/10. Pain is improved when leaning over a grocery cart. Pain only allows him to walk for 3-4mins.      Interval History 8/29/22:  Jefferson Boogie presents to the clinic for a follow-up appointment for back pain.  He had his second bilateral L3, L4, L5 MBB and reports 100% pain relief. He would like to proceed with RFA.     Interval History 5/26/22:  Jefferson Boogie presents to the clinic for a follow-up appointment for back pain. Since the last visit, Jefferson Boogie states the pain has been stable. Certain postures he can tolerate in the standing position such as leaning on a shopping cart or support to his posterior thighs. He now uses a walker when covering long distances. He continues to play golf despite it aggravating his pain. He receives some benefit from the SCS whereas other interventions have not helped. He remains interested in the [place] device.     Interval History 3/24/22:  Jefferson Boogie presents to the clinic for a follow-up appointment for back pain. Since the last visit, Jefferson Boogie states the pain has been worse than usual. Current pain intensity is 8/10. He continues to report benefit with [employer] SCS however he has not been able to get in touch with the representative in order to have his settings adjusted. He continues to take occasional norco 7.5 mg with benefit, particularly when he plays golf.     Interval History 2/10/22:  Patent presents today s/p caudal epidural steroid injections on 1/12/22 since then he has not had any relief. He states his pain has been unchanged and is a 6/10 on average with the worst being 8/10 and best is 4/10. He has been taking percocet 5mg when he golfs or plays with his granddaughter.      Interval History 12/30/21:  Jefferson Boogie  "presents to clinic for follow up appointment s/p Right SI joint and Right piriformis muscle injection under US guidance on 11/29/21. He did not obtain any noticeable relief with this procedure similar to all of his previous injections. His pain is unchanged and constant over the right buttock. He is taking percocet 5mg x 3 on days he is more active, such as playing golf. This helps some, but minimally. Denies any new symptoms or neurological changes. No numbness, tingling, weakness in his lower extremities. Denies bowel/bladder incontinence or saddle anesthesia.      Interval History 11/4/2021:  Jefferson Boogie presents to the clinic for a follow-up appointment for right sided back pain and SI joint pain. Mr. Boogie had a right SI joint injection on 10/13/2021. He did not note significant relief with the injection for any period of time. Pain is still constant over the right buttock and most noticeable when playing golf. He denies any new bowel/bladder incontinence, lower extremity numbness or weakness or saddle anesthesia.     Interval History 8/26/2021:  Jefferson Boogie presents to the clinic for a follow-up appointment for right sided hip pain with right-sided radiculopathy . Since the last visit, Jefferson Boogie states the pain has been "particularly tender today" 7/10. Patient reports playing golf recently and experienced worsening symptoms the following day. Mr. Boogie is s/p right-side hip joint injection with minimal relief. Patient states he has had relief with previous interventions and is open to RFA.     Interval History 6/10/2021:  Jefferson Boogie presents to the clinic for a follow-up appointment. Since the last visit, Jefferson Boogie states the pain has been stable. Current pain intensity is 3/10. States he is still having pain around SIJ that is affecting his ADL      Interval History 4/15/2021:  The patient returns to clinic today for follow up of back pain. He reports that the " swelling above the SI fusion incision has resolved. He denies any fever, chills, or drainage from the incision. He does report benefit since the fusion. He continues to report benefit with [employer] SCS. He reports intermittent shoulder pain at this time. He did receive an injection with Sports Medicine with benefit. He denies any other health changes. His pain today is 2/10.     Interval History 3/25/21:  Mr. Boogie presents to clinic for follow up of bilateral shoulder pain. He is s/p BL subacromial injections on 2/8/21. He reports maximum 20% relief of pain in the Right shoulder. Today the Left shoulder pain is 1/10; Right shoulder pain is 5-7/10.      Interval History 3/22/2021:  The patient returns to clinic today for follow up of back pain. He is s/p right SI fusion on 3/15/2021. He reports some relief at this time. He does report soreness to the incision. He denies any fever, chills, or drainage from incision. He continues to report benefit with [employer] SCS. He denies any other health changes. His pain today is 4/10.     Interval History 1/11/2020:  Patient is here for follow-up of low back pain now s/p sacroiliac RFA on 12/9/20 which provided no benefit and with the new complaint of bilateral anterior shoulder pain R>L. His shoulder pain started about 2 months ago. He describes 9/10 sharp/stinging pain without radiation that is exacerbated with overhead activity and improved with rest. He started PT about one month ago. He takes oxycodone which provides some relief. He had a blind subacromial steroid injection in the right shoulder with Dr. Ramirez on 10/26/19 which provided some short term relief.     Interval History 11/5/2020:  Jefferson Boogie presents to the clinic for a follow-up appointment for low back pain. Since the last visit, Jefferson Boogie states the pain has been stable. Current pain intensity is 4/10. He had good relief from the SI joint injection that lasted for about a week. Pain has since  returned. He also slipped and fell on his buttock a couple of weeks ago and had increased pain in the right buttock after that which is slowly improving. He continues to have variable benefit with the [employer] SCS for intermittent pain in the right leg. He is scheduled to meet with representatives today. He is taking celebrex daily and percocet as needed sparingly. He denies any adverse effects and any other health changes.     Interval History 10/5/2020:  The patient returns to clinic today for follow up of low back pain. He is s/p right SI joint injection on 9/9/2020. He reports 25% relief of his right sided low back and buttock pain. He did have good relief the first two days. He continues to report right sided low back and buttock pain. He denies any radicular leg pain. His pain is worse with prolonged standing and walking. He continues to report intermittent pain into his achilles from previous injury. He feels as though this has changed his gait. He continues to report some benefit with Nevro SCS device. He is in contact with representatives. He is currently taking Percocet as needed sparingly with some benefit. He denies any adverse effects. He denies any other health changes. His pain today is 4/10.      Interval History 9/2/2020:  The patient returns to clinic today for follow up of back pain. He reports that his back pain has improved since last audio visit. He continues to report back pain with intermittent radiating pain into his right hip and calf. He has spoken with Bienvenido from [employer] via phone with some programming. He is meeting with Bienvenido today. He had some issues with charging but this has improved. He is currently taking Norco intermittently but this is only lasts 2-3 hours. He denies any adverse effects. He denies any other health changes. His pain today is 8/10.     Interval History 08/27/2020:  Pt was contacted over [employer] audio for a follow up appointment.  He is currently complaining of right hip  and right calf with no associated numbness or weakness.  He continues to use his Nevro device.  He states it has been very frustrating. Inconsistent.  Took 20 mins to 1 hour to charge.  Couldn't get it to start this morning.  He states that there is no drainage at the battery site, no signs of infection or pain at the site.  Patient is not taking medications at this time.  He wants to speak about medication options because he would like to start playing golf again.     Interval History 8/17/2020:  The patient returns to clinic today for post op wound check. He continues to report benefit with Nevro device. He denies any fever, chills, or drainage. He continues to follow his activity restrictions. He does report a recent achilles tendon injury while swimming. He is seeing Orthopedics. He denies any other health changes. His pain today is 4/10.     Interval History 8/3/2020:  The patient returns to clinic today for post op. He is s/p Nevro battery change on 7/27/2020. He denies any fever, chills, or drainage from incision. He has not completed his antibiotics as he missed two days of the medication. He continues to follow his activity restrictions. He reports relief with the device. He is meeting with Bienvenido javier for programming. He denies any other health changes. His pain today is 2/10     Interval History 7/22/2020:  Pt presents for audio follow up only s/p Right SI joint injection on 7/8/2020. Pt states he has near resolution of focal pain to buttock. He is pleased with result and pain relief. Pt has been in contact with [employer] and will be having battery swap in 5 days. He has difficulty whether stimulator is beneficial and has even had a holiday from using SCS.  He denies any newer areas pain, denies any neuro changes, meds area adequate to control pain without adverse side effects. Pain is 2/10 today.     Interval HPI 6/15/2020:  Jefferson Boogie presents to the clinic for a follow-up appointment for 3 week  follow up right hip and right thigh pain. Since the last visit, Jefferson Boogie states the pain has been persistant. Current pain intensity is 5/10. Patient has been working with Bienvenido Varghese, to try and get better coverage of a localized pain that he still experiences in his right low back area. He does report improvement in symptoms of numbness/tingling. Today, worse pain is 1/10 in severity and localizes to the right SI joint. Pain is worse with extension of his back. It is worse with golfing. Pain relieved by Norco--he takes 1-2 tablets of 5-325 Norco on days that he plays golf. Pain is also improved with being still and not being active. Patient endorses a much more active lifestyle with Norco. Denies new weakness/numbness or bowel/bladder changes.     Previous injection history includes a series of 3 lumbar CHOCO at [facility].      Interval HPI 3/5/20:  Patient presents to the clinic for a follow-up appointment for low back pain. Since the last visit, he states his pain has been unchanged. Current pain intensity is 4/10. He continues to work with Leonila to adjust settings on his SCS. He has stopped using tramadol, and uses norco sparingly. He continues to work with a  for physical therapy.      Interval HPI 1/9/2020:  Patient returns for follow up s/p [place] SCS. He states he is unsure if it has helped his pain since he had it implanted. He last had an adjustment of his programming about 1 month ago. He does note that once he is done charging his SCS his pain is improved. Pain still worse with prolonged standing and activity such as golf. He still is doing home exercise program. He says that he is trying to do more since getting the SCS. He is still taking the Tramadol with limited pain relief. He takes Tramadol 50 mg twice daily only on days of activity which is once a week. No other health changes.      HPI 11/20/19  Jefferson Boogie returns to clinic today for follow up. He reports  increased low back and leg pain over the last few days. He has been in contact with [employer] representatives for programming adjustments. He does report benefit with the device. He reports good days and bad days. His pain is worse with prolonged standing and activity. He continues to participate in a home exercise routine. He does take Tramadol sparingly but reports limited relief. He denies any other health changes. His pain today is 5/10.      Pain Medications:  Oxycodone-acetaminophen 10-325mg very rarely  Mobic  Cymbalta     Opioid Contract: not applicable      report:  Reviewed and consistent with medication use as prescribed. Has not filled oxycodone since June of last year, says he has plenty left.      Pain Procedures  9/9/2020 - Right SI joint injection  7/27/2020 - [place] SCS battery change  7/8/2020 - Right SIJ injection >80% relief   8/26/19 - SCS implant  8/5/19 - SCS trial  7/8/2020 - Right SI joint injection  7/27/2020 - SCS battery revision  9/9/2020 - Right SI joint injection   12/9/2020 - Right L5-S1 RFA  3/15/2021 - Right SI fusion  7/21/2021 - Injection R Hip - minimal relief  10/13/2021 - Right SI joint injection  11/29/2021 - R SI joing and R piriformis muscle injection - no relief   1/12/2022 - Caudal CHOCO- no relief   8/24/22 - Diagnostic Bilateral L3, L4 and L5 Lumbar Medial Branch Block under Fluoroscopy  09/21/22 - Right L3, L4, L5 RFA  10/12/22 -  Left L3, L4, L5 RFA  2/13/23 - [place] SCS implant  5/31/23 - Right SIJ and piriformis injection   2/28/24 -  Right cluneal nerve block  4/10/24 - Right SIJ and piriformis injection  8/12/24 - Right SIJ fusion    Physical Therapy/Home Exercise: no     Imaging:     XR SACRUM AND COCCYX     CLINICAL HISTORY:  Arthrodesis status     FINDINGS:  Sacrum coccyx three views.     There is hardware status post right SI joint arthrodesis.  No complication seen.  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        Electronically signed by:Aki Amaya MD  Date:                                             08/16/2024  Time:                                           08:19    XR HIPS BILATERAL 2 VIEW INCL AP PELVIS     CLINICAL HISTORY:  Arthrodesis status     FINDINGS:  Hips bilateral two views to include pelvis.     Right: No fracture dislocation bone destruction seen.     Left: No fracture dislocation bone destruction seen.        Electronically signed by:Aki Amaya MD  Date:                                            08/16/2024  Time:                                           08:17      EXAMINATION:  CT PELVIS WITHOUT CONTRAST     CLINICAL HISTORY:  History fo percutanous SI fusion.;  Chronic pain syndrome     TECHNIQUE:  CT of the pelvis, without intravenous contrast.     COMPARISON:  None     FINDINGS:  BONE: Diffuse osteopenia.  No fracture or osteonecrosis.  Few lytic and sclerotic lesions scattered throughout the pelvis, including a lesion in the right iliac bone with irregularity of the overlying cortex (2:103).     JOINT: Postoperative changes from right sacroiliac joint fusion.  The implant is in satisfactory position.  No osseous fusion across the joint space.     Degenerative changes both sacroiliac joints.  No erosions or ankylosis.  Degenerative changes also involve the lower lumbar spine, both hip joints, and pubic symphysis.  Chondrocalcinosis of the pubic symphysis.  No significant joint effusion.     SOFT TISSUE: Normal muscle bulk. Regional tendons are intact.  Calcific tendinopathy involving the proximal hamstring tendons bilaterally.  No bursal collection.     MISCELLANEOUS: Circumferential bladder wall thickening.  Prostatic calcifications.  Colonic diverticulosis.  Atherosclerosis.     Impression:     Postoperative changes in the right sacroiliac joint.  No osseous fusion.     Few lytic and sclerotic lesions scattered throughout the pelvis, concerning for metastases or myeloma.  No prior studies are available for comparison.     Bladder wall thickening, which could be  secondary to outlet obstruction or cystitis.  Correlate with urinalysis.     Other findings as described.     This report was flagged in Epic as abnormal.    6/10/2021 - X ray Hips Bilateral: No radiographic evidence of acute osseous abnormality of the pelvis and hips and without radiographic evidence of osteonecrosis of the femoral heads.     9/18/21 MRI L-spine:  FINDINGS:  Lumbar sagittal alignment is slightly is straightened.  There is slight convex right curvature lumbar spine.  There is degenerative disc disease with intervertebral disc height loss and endplate degenerative change at all levels with scattered disc desiccation allowing for degenerative change the lumbar vertebral body heights and contours are within normal is without evidence for acute fracture or subluxation.  There is heterogeneous T1/T2 signal focus within the right aspect of the S1 vertebra most compatible with hemangioma this measures 2.0 cm.     The distal spinal cord and conus is normal in signal and contour tip of the conus approximates the T12/L1 level.  Please note there is artifact from indwelling spinal stimulator device with leads partially visualized casting artifact entering the dorsal epidural space at the T12 level and extending cranially.     There is abnormal clumping configuration of the filum terminalis a from T12 through L5 with slight thickening of scattered nerve roots most compatible with arachnoiditis.     No aneurysmal dilatation of the visualized abdominal aorta.     T12/L1 through L1/L2: No significant disc bulge, central canal or neural foraminal stenosis.     L2/L3: Posterior disc osteophyte with facet joint arthropathy without significant central canal stenosis with mild bilateral neural foraminal stenosis.     L3/L4:Bulging disc with ligamentum flavum hypertrophy without significant central canal stenosis with mild bilateral neural foraminal stenosis.     L4/L5:Posterior disc osteophyte with ligamentum flavum  hypertrophy and facet joint arthropathy without significant central canal stenosis and mild bilateral neural foraminal stenosis.     L5/S1: Posterior disc osteophyte with facet joint arthropathy without significant central canal stenosis with moderate right and mild left neural foraminal stenosis.     This report was flagged in Epic as abnormal.     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  Please note there is spinal stimulator device with leads partially visualized and distorting the study by artifacts.     There is abnormal thickening of the filum configuration most compatible with arachnoiditis.     Please see above for additional details.     Thoracic spine MRI:  FINDINGS: Thoracic vertebral body height and alignment are maintained with a few small scattered Schmorl's nodes involving the endplates of several mid to lower thoracic vertebra. The T3-4 vertebra are partially fused. There is some degree of desiccation of all of the thoracic discs with multilevel disc space narrowing, especially at the T7-T8, T6-T7 and T5-T6 levels. There is no abnormal prevertebral soft tissue thickening. The somewhat heterogeneous appearance to the signal from the thoracic vertebra is presumably secondary to an admixture of red and yellow marrow.    T1-T2 level: There is no bony foraminal narrowing or bony central canal stenosis and the posterior disc margin is unremarkable.    T2-T3 level: There is no bony foraminal narrowing or bony central canal stenosis and the posterior disc margin is unremarkable.    T3-T4 level: There is no bony foraminal narrowing or bony central canal stenosis and the posterior disc margin is unremarkable.    T4-5 level: There is no bony foraminal narrowing or bony central canal stenosis and the posterior disc margin is unremarkable.    T5-T6 level: There is no bony foraminal narrowing or bony central canal stenosis and the posterior disc margin is  unremarkable.    T6-T7 level: There is no bony foraminal narrowing or bony central canal stenosis and the posterior disc margin is unremarkable.    T7-T8 level: There is no bony foraminal narrowing or bony central canal stenosis and the posterior disc margin is unremarkable.    T8-T9 level: There is no bony foraminal narrowing or bony central canal stenosis and the posterior disc margin is unremarkable.    T9-T10 level: There is no bony foraminal narrowing or bony central canal stenosis and the posterior disc margin is unremarkable.    T10-T11 level: There is no bony foraminal narrowing or bony central canal stenosis and the posterior disc margin is unremarkable.    T11-T12 level: There is no bony foraminal narrowing or bony central canal stenosis and the posterior disc margin is unremarkable.    T12-L1 level: The tip the conus medullaris extends down to level of the superior endplate of L1. 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 There is no bony foraminal narrowing or bony central canal stenosis at this level.    On the axial images, the immediate paravertebral soft tissues are unremarkable. A small cyst is seen to involve the upper pole the left kidney.    Following contrast administration, there is no abnormal enhancement of any bony or soft tissue elements of the thoracic spine. There is no abnormal enhancement of the subserosal surface of the thoracic spinal cord. Some foci of mild signal intensity in the CSF dorsal to the spinal cord of some of the sagittal sequences presumably is secondary to CSF pulsation artifact.    On the sagittal  image, there is cervical spondylosis and kyphosis with narrowing of all of the disc spaces in the cervical spine.     EXAMINATION:  CT PELVIS WITHOUT CONTRAST     CLINICAL HISTORY:  History fo percutanous SI fusion.;  Chronic pain syndrome     TECHNIQUE:  CT of the pelvis, without intravenous contrast.      COMPARISON:  None     FINDINGS:  BONE: Diffuse osteopenia.  No fracture or osteonecrosis.  Few lytic and sclerotic lesions scattered throughout the pelvis, including a lesion in the right iliac bone with irregularity of the overlying cortex (2:103).     JOINT: Postoperative changes from right sacroiliac joint fusion.  The implant is in satisfactory position.  No osseous fusion across the joint space.     Degenerative changes both sacroiliac joints.  No erosions or ankylosis.  Degenerative changes also involve the lower lumbar spine, both hip joints, and pubic symphysis.  Chondrocalcinosis of the pubic symphysis.  No significant joint effusion.     SOFT TISSUE: Normal muscle bulk. Regional tendons are intact.  Calcific tendinopathy involving the proximal hamstring tendons bilaterally.  No bursal collection.     MISCELLANEOUS: Circumferential bladder wall thickening.  Prostatic calcifications.  Colonic diverticulosis.  Atherosclerosis.     Impression:     Postoperative changes in the right sacroiliac joint.  No osseous fusion.     Few lytic and sclerotic lesions scattered throughout the pelvis, concerning for metastases or myeloma.  No prior studies are available for comparison.     Bladder wall thickening, which could be secondary to outlet obstruction or cystitis.  Correlate with urinalysis.     Other findings as described.     This report was flagged in Epic as abnormal.        Electronically signed by: Koby Cote  Date:                                            07/10/2023  Time:                                           11:54    Allergies: Review of patient's allergies indicates:  No Known Allergies    Current Medications:   Current Outpatient Medications   Medication Sig Dispense Refill    ALPRAZolam (XANAX) 2 MG Tab       amLODIPine (NORVASC) 5 MG tablet       ascorbic acid, vitamin C, (VITAMIN C) 1000 MG tablet Take 1,000 mg by mouth.      aspirin (ECOTRIN) 81 MG EC tablet Take 1 tablet by mouth once daily.       celecoxib (CELEBREX) 200 MG capsule       cephALEXin (KEFLEX) 500 MG capsule Take 1 capsule (500 mg total) by mouth 4 (four) times daily. for 7 days 28 capsule 0    DULoxetine (CYMBALTA) 30 MG capsule TAKE 1 CAPSULE(30 MG) BY MOUTH TWICE DAILY 60 capsule 2    ergocalciferol (DRISDOL) 200 mcg/mL (8,000 unit/mL) Drop Take by mouth.      ergocalciferol, vitamin D2, (VITAMIN D ORAL) Take by mouth every 30 days.      finasteride (PROSCAR) 5 mg tablet Take 1 tablet by mouth once daily.      guaiFENesin-codeine 100-10 mg/5 ml (TUSSI-ORGANIDIN NR)  mg/5 mL syrup       HYDROmorphone (DILAUDID) 2 MG tablet Take 1 tablet (2 mg total) by mouth every 12 (twelve) hours as needed for Pain. 14 tablet 0    irbesartan (AVAPRO) 75 MG tablet 150 mg once daily.       levothyroxine (SYNTHROID) 100 MCG tablet Take 100 mcg by mouth.      meloxicam (MOBIC) 15 MG tablet Take 15 mg by mouth.      mirabegron (MYRBETRIQ ORAL) Take by mouth.      promethazine-dextromethorphan (PROMETHAZINE-DM) 6.25-15 mg/5 mL Syrp       simvastatin (ZOCOR) 20 MG tablet Take 20 mg by mouth every evening.      tadalafil (CIALIS) 20 MG Tab       temazepam (RESTORIL) 30 mg capsule TK 1 C PO QD HS       No current facility-administered medications for this visit.     Facility-Administered Medications Ordered in Other Visits   Medication Dose Route Frequency Provider Last Rate Last Admin    balanced salt irrigation intra-ocular solution 1 drop  1 drop Right Eye On Call Procedure Bell Cedeno MD        sodium chloride 0.9% flush 2 mL  2 mL Intravenous PRN Bell Cedeno MD           REVIEW OF SYSTEMS:  GENERAL:  No weight loss, malaise or fevers.  HEENT:  Negative for frequent or significant headaches.  NECK:  Negative for lumps, goiter, pain and significant neck swelling.  RESPIRATORY:  Negative for cough, wheezing or shortness of breath.  CARDIOVASCULAR:  Negative for chest pain, leg swelling or palpitations.  GI:  Negative for abdominal  discomfort, blood in stools or black stools or change in bowel habits.  MUSCULOSKELETAL:  See HPI.  SKIN:  Negative for lesions, rash, and itching.  PSYCH:  + for sleep disturbance, mood disorder and recent psychosocial stressors.  HEMATOLOGY/LYMPHOLOGY:  Negative for prolonged bleeding, bruising easily or swollen nodes.  NEURO:   No history of headaches, syncope, paralysis, seizures or tremors.  All other reviewed and negative other than HPI.    Past Medical History:  Past Medical History:   Diagnosis Date    Bronchitis     Cancer     stage I bladder cancer 50 yrs ago    Hypercholesteremia     Hypertension     Sacroiliitis 07/08/2020    Thyroid disease        Past Surgical History:  Past Surgical History:   Procedure Laterality Date    BLADDER SURGERY      CATARACT EXTRACTION      CATARACT EXTRACTION W/  INTRAOCULAR LENS IMPLANT Right 3/9/2022    Procedure: EXTRACTION, CATARACT, WITH IOL INSERTION;  Surgeon: Bell Cedeno MD;  Location: [facility];  Service: Ophthalmology;  Laterality: Right;    COLONOSCOPY      EPIDURAL STEROID INJECTION N/A 1/12/2022    Procedure: INJECTION, STEROID, EPIDURAL CAUDAL With Catheter needs consent;  Surgeon: Samuel Jimenez MD;  Location: [facility] PAIN MGT;  Service: Pain Management;  Laterality: N/A;    EYE SURGERY      cataracts     FUSION OF SACROILIAC JOINT Right 3/15/2021    Procedure: FUSION, RIGHT SACROILIAC JOINT FUSION;  Surgeon: Samuel Jimenez MD;  Location: [facility];  Service: Pain Management;  Laterality: Right;  C-ARM, PAINTEQ REP     FUSION OF SACROILIAC JOINT Right 8/12/2024    Procedure: FUSION, SACROILIAC JOINT;  Surgeon: Samuel Jimenez MD;  Location: [facility];  Service: Pain Management;  Laterality: Right;    HERNIA REPAIR      INJECTION OF ANESTHETIC AGENT AROUND NERVE Bilateral 6/1/2022    Procedure: BLOCK, NERVE MEDIAL BRANCH L3,4,5 BILATERAL 1st;  Surgeon: Samuel Jimenez MD;  Location: [facility] PAIN MGT;  Service: Pain Management;  Laterality: Bilateral;     INJECTION OF ANESTHETIC AGENT AROUND NERVE Bilateral 8/24/2022    Procedure: Block, Nerve Kenny L3, L4, & L5;  Surgeon: Samuel Jimenez MD;  Location: [facility] PAIN MGT;  Service: Pain Management;  Laterality: Bilateral;    INJECTION OF ANESTHETIC AGENT AROUND NERVE Right 2/28/2024    Procedure: BLOCK, NERVE RIGHT CLUNEAL;  Surgeon: Samuel Jimenez MD;  Location: [facility] PAIN MGT;  Service: Pain Management;  Laterality: Right;  [phone]    INJECTION OF JOINT Right 7/8/2020    Procedure: INJECTION, JOINT, SACROILIAC (SI);  Surgeon: Samuel Jimenez MD;  Location: [facility] PAIN MGT;  Service: Pain Management;  Laterality: Right;    INJECTION OF JOINT Right 9/9/2020    Procedure: INJECTION, JOINT, SACROILIAC (SI);  Surgeon: Samuel Jimenez MD;  Location: [facility] PAIN MGT;  Service: Pain Management;  Laterality: Right;    INJECTION OF JOINT Right 7/21/2021    Procedure: INJECTION, JOINT, HIP RIGHT;  Surgeon: Samuel Jimenez MD;  Location: [facility] PAIN MGT;  Service: Pain Management;  Laterality: Right;  CONSENT NEEDED    INJECTION OF JOINT Right 10/13/2021    Procedure: INJECTION, JOINT, SACROILIAC (SI)  NEED CONSENT pt. requesting sedation;  Surgeon: Samuel Jimenez MD;  Location: [facility] PAIN MGT;  Service: Pain Management;  Laterality: Right;    INJECTION OF JOINT Right 4/10/2024    Procedure: INJECTION, JOINT RIGHT SI AND RIGHT PIRIFORMIS;  Surgeon: Samuel Jimenez MD;  Location: [facility] PAIN MGT;  Service: Pain Management;  Laterality: Right;  [phone]    INJECTION, SACROILIAC JOINT Right 5/31/2023    Procedure: INJECTION,SACROILIAC JOINT, RIGHT SI AND RIGHT PIRIFORMIS;  Surgeon: Samuel Jimenez MD;  Location: [facility] PAIN MGT;  Service: Pain Management;  Laterality: Right;    KNEE SURGERY      RADIOFREQUENCY ABLATION Right 12/9/2020    Procedure: RADIOFREQUENCY ABLATION, L5-S1-S2 LATERAL BRANCH COOLED;  Surgeon: Samuel Jimenez MD;  Location: [facility] PAIN MGT;  Service: Pain Management;  Laterality: Right;     RADIOFREQUENCY ABLATION Right 2022    Procedure: RADIOFREQUENCY ABLATION, RIGHT L3-L4-L5 PER DR JIMENEZ;  Surgeon: Samuel Jimenez MD;  [facility];  Service: Pain Management;  Laterality: Right;    RADIOFREQUENCY ABLATION Left 10/12/2022    Procedure: RADIOFREQUENCY ABLATION, LEFT L3-L4-L5 TWO OF TWO;  Surgeon: Samuel Jimenez MD;  [facility]: [facility];  Service: Pain Management;  Laterality: Left;    REPLACEMENT OF NERVE STIMULATOR BATTERY N/A 2020    Procedure: Replacement, SPINAL CORD STIMULATOR BATTERY CHANGE TO NEVRO OMNION BATTERY;  Surgeon: Samuel Jimenez MD;  Location: [facility] [place] OR;  Service: Pain Management;  Laterality: N/A;  C-ARM, NEVRO REP    REPLACEMENT OF SPINAL CORD STIMULATOR  2023    Procedure: REPLACEMENT, SPINAL CORD STIMULATOR;  Surgeon: Samuel Jimenez MD;  [facility]: [facility] [place] [facility];  Service: Pain Management;;    REVISION PROCEDURE INVOLVING SPINAL CORD NEUROSTIMULATOR N/A 2023    Procedure: SPINAL CORD STIMULATOR EXPLANT AND REIMPLANT SALUDA REP;  Surgeon: Samuel Jimenez MD;  Location: [facility] [place] OR;  Service: Pain Management;  Laterality: N/A;    TRIAL OF SPINAL CORD NERVE STIMULATOR N/A 2019    Procedure: Trial, Neurostimulator, SPINAL CORD STIMULATOR TRIAL;  Surgeon: Samuel Jimenez MD;  Location: [facility] C[facility] LAB;  Service: Pain Management;  Laterality: N/A;  C-ARM, NEVRO REP     Family History:  No family history on file.    Social History:  Social History     Socioeconomic History    Marital status:    Tobacco Use    Smoking status: Former     Current packs/day: 0.00     Types: Cigarettes     Quit date:      Years since quittin.6    Smokeless tobacco: Never    Tobacco comments:     stopped 40 years ago   Substance and Sexual Activity    Alcohol use: Yes     Alcohol/week: 1.0 standard drink of alcohol     Types: 1 Shots of liquor per week     Comment: nightly -scotch    Drug use: Never    Sexual activity: Yes     Partners: Female  "    Social Determinants of Health     Financial Resource Strain: Low Risk  (12/31/2022)    Received from [facility], [facility]    Overall Financial Resource Strain (CARDIA)     Difficulty of Paying Living Expenses: Not hard at all   Food Insecurity: Unknown (8/4/2024)    Received from [facility]    Hunger Vital Sign     Worried About Running Out of Food in the Last Year: Never true   Transportation Needs: No Transportation Needs (12/31/2022)    Received from [facility], [facility]    PRAPARE - Transportation     Lack of Transportation (Medical): No     Lack of Transportation (Non-Medical): No   Physical Activity: Unknown (8/4/2024)    Received from [facility]    Exercise Vital Sign     Days of Exercise per Week: 6 days   Stress: No Stress Concern Present (8/4/2024)    Received from [facility]    [employer] [place] [employer] - Occupational Stress Questionnaire     Feeling of Stress : Only a little   Housing Stability: Unknown (11/30/2023)    Received from [facility], [facility]    Housing Stability Vital Sign     Unable to Pay for Housing in the Last Year: No       OBJECTIVE:    BP (!) 148/66 (BP Location: Right arm, Patient Position: Sitting, BP Method: Small (Automatic))   Pulse 65   Resp 12   Ht 6' 1" (1.854 m)   Wt 89 kg (196 lb 3.4 oz)   SpO2 100%   BMI 25.89 kg/m²     PHYSICAL EXAMINATION:  General appearance: Well appearing, in no acute distress, alert and appropriately communicative.  Psych:  Mood and affect appropriate.  Skin: Skin color, texture, turgor normal, no rashes or lesions, in both upper and lower body. SIJ fusion site staples in place and intact. Site without drainage, redness or swelling. Significant bruising/hematoma to right lower back and flank. Area cleaned with alcohol, Bacitracin and clean bandage applied.   Head/face:  Atraumatic, normocephalic.  Cor: regular rate  Pulm: non-labored breathing          Prior PHYSICAL EXAM:     GENERAL: Alert and oriented x 3, no " acute distress  PSYCH:  Mood and affect appropriate.  SKIN: Skin color, texture, turgor normal, no rashes or lesions.  HEENT:  Normocephalic, atraumatic. Cranial nerves grossly intact.  PULM: Non-labored breathing, symmetric chest rise.  BACK: Mildly reduced ROM w/ pain on flexion and extension. Positive tenderness over Right SIJ with positive thigh and sacral thrust test, Positive FABERE, Ganselin and Yeoman's test on the Right side. Negative FADIR  EXTREMITIES: No deformities, edema, or skin discoloration.   MUSCULOSKELETAL: Bilateral upper and lower extremity strength is normal and symmetric. No atrophy is noted.  NEURO: Bilateral upper and lower extremity coordination and muscle stretch reflexes are physiologic and symmetric. No clonus. Absent Johnson. No tremor.    GAIT: Antalgic    ASSESSMENT: 89 y.o. year old male with chronic low back pain, consistent with:     1. S/P fusion of sacroiliac joint        2. Encounter for postoperative wound check            PLAN:     - Previous records and imaging reviewed  - I have stressed the importance of physical activity and a home exercise plan to help with pain and improve health.  - He can alternate Norco 10/325 TID PRN and Hydromorphone 2 mg BID PRN.   - Ok to continue Hydromorphone on a weekly basis for severe pain.   - He is s/p Right SIJ Fusion with Frances SiLO Allograft (Target Posterior aspect).   - CT Pelvis scheduled for 8/22/2024.   - RTC 1 week for wound check.  - He should be able to get back to some light activity like going out to dinner, in 7-10 days.   - Hematoma should resolve in the next 4-6 weeks.   - He can restart Healthy Back Program once hematoma has resolved.   - Counseled patient regarding the importance of activity modification, constant sleeping habits, and physical therapy.  - Dr. Jimenez was consulted  and evaluated the patient at today's visit.       The above plan and management options were discussed at length with patient. Patient is in  agreement with the above and verbalized understanding.      Kathi Berrios NP  08/19/2024

## 2024-08-21 ENCOUNTER — HOSPITAL ENCOUNTER (OUTPATIENT)
Dept: RADIOLOGY | Facility: OTHER | Age: 89
Discharge: HOME | End: 2024-08-21
Payer: MEDICARE

## 2024-08-21 DIAGNOSIS — Z98.1 S/P FUSION OF SACROILIAC JOINT: ICD-10-CM

## 2024-08-21 PROCEDURE — 72192 CT PELVIS W/O DYE: CPT | Mod: TC

## 2024-08-21 PROCEDURE — 72192 CT PELVIS W/O DYE: CPT | Mod: 26,,, | Performed by: RADIOLOGY

## 2024-08-22 ENCOUNTER — PATIENT MESSAGE (OUTPATIENT)
Dept: PAIN MEDICINE | Facility: CLINIC | Age: 89
End: 2024-08-22
Payer: MEDICARE

## 2024-08-25 ENCOUNTER — PATIENT MESSAGE (OUTPATIENT)
Dept: PAIN MEDICINE | Facility: CLINIC | Age: 89
End: 2024-08-25
Payer: MEDICARE

## 2024-08-26 ENCOUNTER — OFFICE VISIT (OUTPATIENT)
Dept: PAIN MEDICINE | Facility: CLINIC | Age: 89
End: 2024-08-26
Payer: MEDICARE

## 2024-08-26 ENCOUNTER — TELEPHONE (OUTPATIENT)
Dept: PAIN MEDICINE | Facility: CLINIC | Age: 89
End: 2024-08-26
Payer: MEDICARE

## 2024-08-26 ENCOUNTER — PATIENT MESSAGE (OUTPATIENT)
Dept: PAIN MEDICINE | Facility: OTHER | Age: 89
End: 2024-08-26
Payer: MEDICARE

## 2024-08-26 ENCOUNTER — PATIENT MESSAGE (OUTPATIENT)
Dept: PAIN MEDICINE | Facility: CLINIC | Age: 89
End: 2024-08-26

## 2024-08-26 VITALS
SYSTOLIC BLOOD PRESSURE: 125 MMHG | OXYGEN SATURATION: 100 % | HEIGHT: 73 IN | DIASTOLIC BLOOD PRESSURE: 64 MMHG | RESPIRATION RATE: 12 BRPM | WEIGHT: 196.19 LBS | BODY MASS INDEX: 26 KG/M2

## 2024-08-26 DIAGNOSIS — M54.17 LUMBOSACRAL RADICULOPATHY: Primary | ICD-10-CM

## 2024-08-26 DIAGNOSIS — Z98.1 S/P FUSION OF SACROILIAC JOINT: ICD-10-CM

## 2024-08-26 DIAGNOSIS — M54.16 LUMBAR RADICULOPATHY: Primary | ICD-10-CM

## 2024-08-26 PROCEDURE — 99214 OFFICE O/P EST MOD 30 MIN: CPT | Mod: PBBFAC

## 2024-08-26 PROCEDURE — 99999 PR PBB SHADOW E&M-EST. PATIENT-LVL IV: CPT | Mod: PBBFAC,,,

## 2024-08-26 RX ORDER — GABAPENTIN 300 MG/1
300 CAPSULE ORAL NIGHTLY
Qty: 30 CAPSULE | Refills: 0 | Status: SHIPPED | OUTPATIENT
Start: 2024-08-26

## 2024-08-26 NOTE — TELEPHONE ENCOUNTER
----- Message from [place] Charla sent at 8/26/2024  9:39 AM CDT -----  Regarding: call back  Type: Patient Call Back    Who called: pt     What is the request in detail: calling to let staff know he will come in for 10:30 to see provider this morning     Can the clinic reply by [facility]?no    Would the patient rather a call back or a response via [facility]? call    Best call back number: [phone]     Additional Information:

## 2024-08-26 NOTE — H&P (VIEW-ONLY)
Chronic patient Established Note (Follow up visit)      SUBJECTIVE:    Interval History 8/26/2024:  Jefferson Boogie returns to clinic for follow-up after right SIJ fusion. He recently had CT pelvis performed showing new, narrowing of right S1 foramina. He continues to report tingling to the right foot. He denies weakness. He continues Norco 10/325 TID PRN and Hydromorphone 2 mg BID PRN with mild relief. He denies any perceived side effects. He denies recent falls or trauma. He denies new onset fever/night sweats, urinary incontinence, bowel incontinence, significant weight changes, significant motor weakness or changes, or loss of sensations. His pain today is 7/10.     Interval History 8/19/2024:  Jefferson Boogie returns to clinic for follow-up after Right SIJ fusion. He continues to report significant amounts of pain. He has been taking Hydromophone 2 mg daily and Norco 4 times per day without relief. He continues to utilize a rolling walker to help him ambulate. He denies fever, drainage, redness, swelling or any other signs of infection. He denies recent falls or trauma. He denies new onset fever/night sweats, urinary incontinence, bowel incontinence, significant weight changes, significant motor weakness or changes, or loss of sensations. His pain today is 7/10.     Interval History 8/15/2024:  Jefferson Boogie returns to clinic for follow-up after Right SI joint fusion using SILO TFX. He reports significant pain. He continues Hydromorphone 2 mg. He took 1 pill today . He has not taken any Norco today. He is present with his wife. She is wondering when they can start doing things like going to dinner or if he needs to stay at home while he recovers. He denies fever, drainage, redness or swelling from procedure. Site. He denies recent falls or trauma. He denies new onset fever/night sweats, urinary incontinence, bowel incontinence, significant weight changes, significant motor weakness or changes, or loss  of sensations. His pain today is 9/10.     Interval History 6/27/2024:  Patient returns to clinic for follow up. Patient is s/p Right SIJ and R Piriformis injections on 4/10/2024. Patient reports 100% relief from the procedure for 2 days. Pain has recurred and is same as previous description. Sha Ku ([facility]) is also here today for programming of [place] SCS. Patient continues with current medications with some benefit and HEP. Current pain 6/10. Patient denies red flags including weakness, unexpected weight loss/gain, night sweats/fevers, saddle anesthesia, and symptoms of CAREN.    Interval History 3/14/2024:  Jefferson Boogie presents for follow up. He is here today to discuss further options for treatment after right cluneal nerve block on 2/28/2024. He was here for the same reason two weeks ago, and we decided to give the block a bit more time to work before investigating other options. He reports no significant changes in his symptoms. He does say that he can manage his symptoms satisfactorily using 1.5 of his oxycodone  every 3 hours when doing things like golf and walking. He said he may use the medication once or twice a month and denies significant side effects.     Interval History 2/29/24:  Jefferson Boogie presents to the clinic for follow-up. He is s/p right cluneal nerve block 2/28/24. Today, he says that he does not know if he has had any difference in his symptoms so far. We spoke briefly yesterday about additional options, and he is here today for further discussion.     Interval History 01/16/2024:  Jefferson Boogie presents to the clinic for a follow-up appointment for low back, right hip, and right leg pain. Since the last visit, Jefferson Boogie states the pain has been persistant. Currently his right hip and leg pain is his biggest problem. He continues to endorse sharp stabbing pain in his hip and leg. Pain is exacerbated by activity, standing, laying on right side, lifting, bending.  Pain is improved with rest, exercising, stretching, medications. Continues with Celebrex and Cymbalta. Current pain intensity is 6/10. Patient continues to endorse benefit from SCS. Patient denies red flags including weakness, unexpected weight loss/gain, night sweats/fevers, saddle anesthesia, and symptoms of CAREN.    Interval History 8/10/2023:  Patient reports that his pain post right SIJ and piriformis was persistent until it decreased 50% on 7/4/23, but then the pain returned after a few days. He reports that during that period he was able to run after a bus in [facility]. The pain is improved, but not as much as previous in his right buttock and calf. Patient reports that he has been working with the [employer], but not finding the correct programming as of yet.  No fevers, chills, unexplained weight loss.  Hx of bladder cancer in 1967. Remembers falling while skiing and landed on his buttocks 30 years ago, but no other pelvic trauma. Patient was traveling to [place] and recently came back to [place].  We discussed his recent pelvic CT in details especially for the accidental finding of irregular bone lesion. Explained to patient the need to investigate this further with our urology and oncology team.     Interval History 6/22/2023  Jefferson Boogie presents to the clinic for a follow-up appointment for chroniic LBP. Pt was last seen in clinic on 5/25 and scheduled for right SIJ and piriformis injection--performed 6/22. Pt reports 40% relief x 1-2 days. He continues to work with the rep with regards to programming the [place] SCS device. Pt reports that he is getting some decent relief from the device with current program, but feels like there is still some progress to be made. Continues to complain of pain of similar character and quality to that of prior eval. Localized in the right lowerlumbar spine/gluteal area as well as the right lateral leg. Since the last visit, Jefferson Boogie states the pain has been improving.  Current pain intensity is 5/10. He continues to exercise daily with stationary bike and weight training. Continues to take Cymbalta and Celebrex daily. He also takes Norco 10 very sparingly, maybe 1-2 per week. Overall the current regimen of SCS, medications and occasional injections have provided pt with enough relief for him to remain functional, and able to participate in his hobbies.      Interval history 5/25/23:  In the interim he has met with the [place] reps for reprogramming. He has his good and bad days but feels that the pain is manageable. The reps with [employer] are present for the visit today. Today his pain is a 6.5/10. Still taking percocet and cymbalta (missed a few days), but these medications do not provide much relief. Still pain with walking and sitting, but the pain with laying down is better.      Interval History 5/4/23:  Patient seen today via virtual follow-up after implantation of his [place] SCS in February. He reports no change in his pain since his last visit. Most of his pain is into his right hip with intermittent radiation into his RLE. He denies any new symptoms. No red flag symptoms. He is scheduled to meet with the [employer] reps from reprogramming next week. He remains optimistic. In discussing his current medications, he has run out of the hydrocodone he was previously taking and has only been taking his Cymbalta once per day rather two.      Interval History 3/30/2023:  Mr Boogie presents 5-6 weeks after implantation of his [place] SCS system. He reports no significant change in his pains, which are primarily down the RLE.  No constitutional signs symptoms concerning for infection.  No new areas of pain or neurological changes since procedure. No voicing of s/s concerning for cauda equina syndrome. He does endorse improvement in sleep. He is worried that his implant was a failure because his pain has not changed.     Interval History 2/20/2023:  Mr Boogie presents for follow up of  [place] SCS replacement. He states doing well. No constitutional signs symptoms concerning for infection.  No new areas of pain or neurological changes since procedure. No voicing of s/s concerning for cauda equina syndrome.      Interval History 1/23/2023:  Still taking Celebrex, however hasn't noticed a difference in pain with this medication. Location, quality, and severity of pain are unchanged from prior visit and is described above. Here today to discuss removal of [place] SCS, and implant of [place] SCS. He states his stimulator has not been helpful for the past 2 years despite multiple reprogramming and adjustment.     Interval History 11/21/22:  Reports 24 hours of 100% relief for 48 hours, but he reports that now his pain is only approximally 10% better. His pain at rest is 3-4/10. Pain at its worst is 8-9/10. Pain is improved when leaning over a grocery cart. Pain only allows him to walk for 3-4mins.      Interval History 8/29/22:  Jefferson Boogie presents to the clinic for a follow-up appointment for back pain.  He had his second bilateral L3, L4, L5 MBB and reports 100% pain relief. He would like to proceed with RFA.     Interval History 5/26/22:  Jefferson Boogie presents to the clinic for a follow-up appointment for back pain. Since the last visit, Jefferson Boogie states the pain has been stable. Certain postures he can tolerate in the standing position such as leaning on a shopping cart or support to his posterior thighs. He now uses a walker when covering long distances. He continues to play golf despite it aggravating his pain. He receives some benefit from the SCS whereas other interventions have not helped. He remains interested in the [place] device.     Interval History 3/24/22:  Jefferson Boogie presents to the clinic for a follow-up appointment for back pain. Since the last visit, Jefferson Boogie states the pain has been worse than usual. Current pain intensity is 8/10. He continues to  "report benefit with [place] SCS however he has not been able to get in touch with the representative in order to have his settings adjusted. He continues to take occasional norco 7.5 mg with benefit, particularly when he plays golf.     Interval History 2/10/22:  Pat presents today s/p caudal epidural steroid injections on 1/12/22 since then he has not had any relief. He states his pain has been unchanged and is a 6/10 on average with the worst being 8/10 and best is 4/10. He has been taking percocet 5mg when he golfs or plays with his granddaughter.      Interval History 12/30/21:  Jefferson Boogie presents to clinic for follow up appointment s/p Right SI joint and Right piriformis muscle injection under US guidance on 11/29/21. He did not obtain any noticeable relief with this procedure similar to all of his previous injections. His pain is unchanged and constant over the right buttock. He is taking percocet 5mg x 3 on days he is more active, such as playing golf. This helps some, but minimally. Denies any new symptoms or neurological changes. No numbness, tingling, weakness in his lower extremities. Denies bowel/bladder incontinence or saddle anesthesia.      Interval History 11/4/2021:  Jefferson Boogie presents to the clinic for a follow-up appointment for right sided back pain and SI joint pain. Mr. Boogie had a right SI joint injection on 10/13/2021. He did not note significant relief with the injection for any period of time. Pain is still constant over the right buttock and most noticeable when playing golf. He denies any new bowel/bladder incontinence, lower extremity numbness or weakness or saddle anesthesia.     Interval History 8/26/2021:  Jefferson Boogie presents to the clinic for a follow-up appointment for right sided hip pain with right-sided radiculopathy . Since the last visit, Jefferson Boogie states the pain has been "particularly tender today" 7/10. Patient reports playing golf " recently and experienced worsening symptoms the following day. Mr. Boogie is s/p right-side hip joint injection with minimal relief. Patient states he has had relief with previous interventions and is open to RFA.     Interval History 6/10/2021:  Jefferson Boogie presents to the clinic for a follow-up appointment. Since the last visit, Jefferson Boogie states the pain has been stable. Current pain intensity is 3/10. States he is still having pain around SIJ that is affecting his ADL      Interval History 4/15/2021:  The patient returns to clinic today for follow up of back pain. He reports that the swelling above the SI fusion incision has resolved. He denies any fever, chills, or drainage from the incision. He does report benefit since the fusion. He continues to report benefit with Nevro SCS. He reports intermittent shoulder pain at this time. He did receive an injection with Sports Medicine with benefit. He denies any other health changes. His pain today is 2/10.     Interval History 3/25/21:  Mr. Boogie presents to clinic for follow up of bilateral shoulder pain. He is s/p BL subacromial injections on 2/8/21. He reports maximum 20% relief of pain in the Right shoulder. Today the Left shoulder pain is 1/10; Right shoulder pain is 5-7/10.      Interval History 3/22/2021:  The patient returns to clinic today for follow up of back pain. He is s/p right SI fusion on 3/15/2021. He reports some relief at this time. He does report soreness to the incision. He denies any fever, chills, or drainage from incision. He continues to report benefit with Nevro SCS. He denies any other health changes. His pain today is 4/10.     Interval History 1/11/2020:  Patient is here for follow-up of low back pain now s/p sacroiliac RFA on 12/9/20 which provided no benefit and with the new complaint of bilateral anterior shoulder pain R>L. His shoulder pain started about 2 months ago. He describes 9/10 sharp/stinging pain without  radiation that is exacerbated with overhead activity and improved with rest. He started PT about one month ago. He takes oxycodone which provides some relief. He had a blind subacromial steroid injection in the right shoulder with Dr. Ramirez on 10/26/19 which provided some short term relief.     Interval History 11/5/2020:  Jefferson Boogie presents to the clinic for a follow-up appointment for low back pain. Since the last visit, Jefferson Boogie states the pain has been stable. Current pain intensity is 4/10. He had good relief from the SI joint injection that lasted for about a week. Pain has since returned. He also slipped and fell on his buttock a couple of weeks ago and had increased pain in the right buttock after that which is slowly improving. He continues to have variable benefit with the [place] SCS for intermittent pain in the right leg. He is scheduled to meet with representatives today. He is taking celebrex daily and percocet as needed sparingly. He denies any adverse effects and any other health changes.     Interval History 10/5/2020:  The patient returns to clinic today for follow up of low back pain. He is s/p right SI joint injection on 9/9/2020. He reports 25% relief of his right sided low back and buttock pain. He did have good relief the first two days. He continues to report right sided low back and buttock pain. He denies any radicular leg pain. His pain is worse with prolonged standing and walking. He continues to report intermittent pain into his achilles from previous injury. He feels as though this has changed his gait. He continues to report some benefit with Nevro SCS device. He is in contact with representatives. He is currently taking Percocet as needed sparingly with some benefit. He denies any adverse effects. He denies any other health changes. His pain today is 4/10.      Interval History 9/2/2020:  The patient returns to clinic today for follow up of back pain. He reports that  his back pain has improved since last audio visit. He continues to report back pain with intermittent radiating pain into his right hip and calf. He has spoken with Bienvenido from [employer] via phone with some programming. He is meeting with Bienvenido today. He had some issues with charging but this has improved. He is currently taking Norco intermittently but this is only lasts 2-3 hours. He denies any adverse effects. He denies any other health changes. His pain today is 8/10.     Interval History 08/27/2020:  Pt was contacted over [employer] audio for a follow up appointment.  He is currently complaining of right hip and right calf with no associated numbness or weakness.  He continues to use his Nevro device.  He states it has been very frustrating. Inconsistent.  Took 20 mins to 1 hour to charge.  Couldn't get it to start this morning.  He states that there is no drainage at the battery site, no signs of infection or pain at the site.  Patient is not taking medications at this time.  He wants to speak about medication options because he would like to start playing golf again.     Interval History 8/17/2020:  The patient returns to clinic today for post op wound check. He continues to report benefit with Nevro device. He denies any fever, chills, or drainage. He continues to follow his activity restrictions. He does report a recent achilles tendon injury while swimming. He is seeing Orthopedics. He denies any other health changes. His pain today is 4/10.     Interval History 8/3/2020:  The patient returns to clinic today for post op. He is s/p Nevro battery change on 7/27/2020. He denies any fever, chills, or drainage from incision. He has not completed his antibiotics as he missed two days of the medication. He continues to follow his activity restrictions. He reports relief with the device. He is meeting with Bien[employer] today for programming. He denies any other health changes. His pain today is 2/10     Interval History  7/22/2020:  Pt presents for audio follow up only s/p Right SI joint injection on 7/8/2020. Pt states he has near resolution of focal pain to buttock. He is pleased with result and pain relief. Pt has been in contact with [employer] and will be having battery swap in 5 days. He has difficulty whether stimulator is beneficial and has even had a holiday from using SCS.  He denies any newer areas pain, denies any neuro changes, meds area adequate to control pain without adverse side effects. Pain is 2/10 today.     Interval HPI 6/15/2020:  Jefferson Boogie presents to the clinic for a follow-up appointment for 3 week follow up right hip and right thigh pain. Since the last visit, Jefferson Boogie states the pain has been persistant. Current pain intensity is 5/10. Patient has been working with Bienvenido Varghese, to try and get better coverage of a localized pain that he still experiences in his right low back area. He does report improvement in symptoms of numbness/tingling. Today, worse pain is 1/10 in severity and localizes to the right SI joint. Pain is worse with extension of his back. It is worse with golfing. Pain relieved by Norco--he takes 1-2 tablets of 5-325 Norco on days that he plays golf. Pain is also improved with being still and not being active. Patient endorses a much more active lifestyle with Norco. Denies new weakness/numbness or bowel/bladder changes.     Previous injection history includes a series of 3 lumbar CHOCO at [facility].      Interval HPI 3/5/20:  Patient presents to the clinic for a follow-up appointment for low back pain. Since the last visit, he states his pain has been unchanged. Current pain intensity is 4/10. He continues to work with [employer] to adjust settings on his SCS. He has stopped using tramadol, and uses norco sparingly. He continues to work with a  for physical therapy.      Interval HPI 1/9/2020:  Patient returns for follow up s/p [place] SCS. He states he is  unsure if it has helped his pain since he had it implanted. He last had an adjustment of his programming about 1 month ago. He does note that once he is done charging his SCS his pain is improved. Pain still worse with prolonged standing and activity such as golf. He still is doing home exercise program. He says that he is trying to do more since getting the SCS. He is still taking the Tramadol with limited pain relief. He takes Tramadol 50 mg twice daily only on days of activity which is once a week. No other health changes.      HPI 11/20/19  Jefferson Boogie returns to clinic today for follow up. He reports increased low back and leg pain over the last few days. He has been in contact with [employer] representatives for programming adjustments. He does report benefit with the device. He reports good days and bad days. His pain is worse with prolonged standing and activity. He continues to participate in a home exercise routine. He does take Tramadol sparingly but reports limited relief. He denies any other health changes. His pain today is 5/10.      Pain Medications:  Oxycodone-acetaminophen 10-325mg very rarely  Mobic  Cymbalta     Opioid Contract: not applicable      report:  Reviewed and consistent with medication use as prescribed. Has not filled oxycodone since June of last year, says he has plenty left.      Pain Procedures  9/9/2020 - Right SI joint injection  7/27/2020 - [place]ro SCS battery change  7/8/2020 - Right SIJ injection >80% relief   8/26/19 - SCS implant  8/5/19 - SCS trial  7/8/2020 - Right SI joint injection  7/27/2020 - SCS battery revision  9/9/2020 - Right SI joint injection   12/9/2020 - Right L5-S1 RFA  3/15/2021 - Right SI fusion  7/21/2021 - Injection R Hip - minimal relief  10/13/2021 - Right SI joint injection  11/29/2021 - R SI joing and R piriformis muscle injection - no relief   1/12/2022 - Caudal CHOCO- no relief   8/24/22 - Diagnostic Bilateral L3, L4 and L5 Lumbar Medial Branch  Block under Fluoroscopy  09/21/22 - Right L3, L4, L5 RFA  10/12/22 -  Left L3, L4, L5 RFA  2/13/23 - [place] SCS implant  5/31/23 - Right SIJ and piriformis injection   2/28/24 -  Right cluneal nerve block  4/10/24 - Right SIJ and piriformis injection  8/12/24 - Right SIJ fusion    Physical Therapy/Home Exercise: no     Imaging:     CT PELVIS WITHOUT CONTRAST     CLINICAL HISTORY:  Pelvis pain, stress fracture suspected, neg xray;  Arthrodesis status     TECHNIQUE:  1.25 mm axial images of the pelvis obtained, coronal and sagittal images reformatted.     COMPARISON:  None     FINDINGS:  Percutaneous Posterior right  Sacroiliac Joint Fusion using SILO TFX transfixing device 08/12/2024.     New, moderate narrowing of the right S1 foramina by osseous density extending to the foramina, could cause symptoms referable to a right S1 neuropathy, to correlate clinically.     The bilateral sacroiliac joints appear within normal limits.     The bilateral hip joints appear normal.  No advanced degenerative change.     Mild osseous hypertrophy at the pubic symphysis.     The intrapelvic content demonstrate significant diverticulosis coli.  Moderate stool retention.  Mild bladder wall thickening possibly due to nondistention.  The prostate is not enlarged.  The abdominal wall and inguinal regions appear normal.     Degenerative disc disease, disc space narrowing and vacuum disc phenomenon L4-5 L5-S1, unchanged.     Impression:     New, moderate-severe narrowing of the right S1 foramina, by osseous density extending in the right foramina, could impinge on the exiting right S1 nerve root, to correlate with patient's symptoms.     Significant sigmoid diverticulosis.     This report was flagged in Epic as abnormal.        Electronically signed by:Ammy Nance MD  Date:                                            08/21/2024  Time:                                           16:45    XR SACRUM AND COCCYX     CLINICAL  HISTORY:  Arthrodesis status     FINDINGS:  Sacrum coccyx three views.     There is hardware status post right SI joint arthrodesis.  No complication seen.  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        Electronically signed by:Aki Amaya MD  Date:                                            08/16/2024  Time:                                           08:19    XR HIPS BILATERAL 2 VIEW INCL AP PELVIS     CLINICAL HISTORY:  Arthrodesis status     FINDINGS:  Hips bilateral two views to include pelvis.     Right: No fracture dislocation bone destruction seen.     Left: No fracture dislocation bone destruction seen.        Electronically signed by:Aki Amaya MD  Date:                                            08/16/2024  Time:                                           08:17      EXAMINATION:  CT PELVIS WITHOUT CONTRAST     CLINICAL HISTORY:  History fo percutanous SI fusion.;  Chronic pain syndrome     TECHNIQUE:  CT of the pelvis, without intravenous contrast.     COMPARISON:  None     FINDINGS:  BONE: Diffuse osteopenia.  No fracture or osteonecrosis.  Few lytic and sclerotic lesions scattered throughout the pelvis, including a lesion in the right iliac bone with irregularity of the overlying cortex (2:103).     JOINT: Postoperative changes from right sacroiliac joint fusion.  The implant is in satisfactory position.  No osseous fusion across the joint space.     Degenerative changes both sacroiliac joints.  No erosions or ankylosis.  Degenerative changes also involve the lower lumbar spine, both hip joints, and pubic symphysis.  Chondrocalcinosis of the pubic symphysis.  No significant joint effusion.     SOFT TISSUE: Normal muscle bulk. Regional tendons are intact.  Calcific tendinopathy involving the proximal hamstring tendons bilaterally.  No bursal collection.     MISCELLANEOUS: Circumferential bladder wall thickening.  Prostatic calcifications.  Colonic diverticulosis.  Atherosclerosis.     Impression:     Postoperative  changes in the right sacroiliac joint.  No osseous fusion.     Few lytic and sclerotic lesions scattered throughout the pelvis, concerning for metastases or myeloma.  No prior studies are available for comparison.     Bladder wall thickening, which could be secondary to outlet obstruction or cystitis.  Correlate with urinalysis.     Other findings as described.     This report was flagged in Epic as abnormal.    6/10/2021 - X ray Hips Bilateral: No radiographic evidence of acute osseous abnormality of the pelvis and hips and without radiographic evidence of osteonecrosis of the femoral heads.     9/18/21 MRI L-spine:  FINDINGS:  Lumbar sagittal alignment is slightly is straightened.  There is slight convex right curvature lumbar spine.  There is degenerative disc disease with intervertebral disc height loss and endplate degenerative change at all levels with scattered disc desiccation allowing for degenerative change the lumbar vertebral body heights and contours are within normal is without evidence for acute fracture or subluxation.  There is heterogeneous T1/T2 signal focus within the right aspect of the S1 vertebra most compatible with hemangioma this measures 2.0 cm.     The distal spinal cord and conus is normal in signal and contour tip of the conus approximates the T12/L1 level.  Please note there is artifact from indwelling spinal stimulator device with leads partially visualized casting artifact entering the dorsal epidural space at the T12 level and extending cranially.     There is abnormal clumping configuration of the filum terminalis a from T12 through L5 with slight thickening of scattered nerve roots most compatible with arachnoiditis.     No aneurysmal dilatation of the visualized abdominal aorta.     T12/L1 through L1/L2: No significant disc bulge, central canal or neural foraminal stenosis.     L2/L3: Posterior disc osteophyte with facet joint arthropathy without significant central canal  stenosis with mild bilateral neural foraminal stenosis.     L3/L4:Bulging disc with ligamentum flavum hypertrophy without significant central canal stenosis with mild bilateral neural foraminal stenosis.     L4/L5:Posterior disc osteophyte with ligamentum flavum hypertrophy and facet joint arthropathy without significant central canal stenosis and mild bilateral neural foraminal stenosis.     L5/S1: Posterior disc osteophyte with facet joint arthropathy without significant central canal stenosis with moderate right and mild left neural foraminal stenosis.     This report was flagged in Epic as abnormal.     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  Please note there is spinal stimulator device with leads partially visualized and distorting the study by artifacts.     There is abnormal thickening of the filum configuration most compatible with arachnoiditis.     Please see above for additional details.     Thoracic spine MRI:  FINDINGS: Thoracic vertebral body height and alignment are maintained with a few small scattered Schmorl's nodes involving the endplates of several mid to lower thoracic vertebra. The T3-4 vertebra are partially fused. There is some degree of desiccation of all of the thoracic discs with multilevel disc space narrowing, especially at the T7-T8, T6-T7 and T5-T6 levels. There is no abnormal prevertebral soft tissue thickening. The somewhat heterogeneous appearance to the signal from the thoracic vertebra is presumably secondary to an admixture of red and yellow marrow.    T1-T2 level: There is no bony foraminal narrowing or bony central canal stenosis and the posterior disc margin is unremarkable.    T2-T3 level: There is no bony foraminal narrowing or bony central canal stenosis and the posterior disc margin is unremarkable.    T3-T4 level: There is no bony foraminal narrowing or bony central canal stenosis and the posterior disc margin is  unremarkable.    T4-5 level: There is no bony foraminal narrowing or bony central canal stenosis and the posterior disc margin is unremarkable.    T5-T6 level: There is no bony foraminal narrowing or bony central canal stenosis and the posterior disc margin is unremarkable.    T6-T7 level: There is no bony foraminal narrowing or bony central canal stenosis and the posterior disc margin is unremarkable.    T7-T8 level: There is no bony foraminal narrowing or bony central canal stenosis and the posterior disc margin is unremarkable.    T8-T9 level: There is no bony foraminal narrowing or bony central canal stenosis and the posterior disc margin is unremarkable.    T9-T10 level: There is no bony foraminal narrowing or bony central canal stenosis and the posterior disc margin is unremarkable.    T10-T11 level: There is no bony foraminal narrowing or bony central canal stenosis and the posterior disc margin is unremarkable.    T11-T12 level: There is no bony foraminal narrowing or bony central canal stenosis and the posterior disc margin is unremarkable.    T12-L1 level: The tip the conus medullaris extends down to level of the superior endplate of L1. 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 There is no bony foraminal narrowing or bony central canal stenosis at this level.    On the axial images, the immediate paravertebral soft tissues are unremarkable. A small cyst is seen to involve the upper pole the left kidney.    Following contrast administration, there is no abnormal enhancement of any bony or soft tissue elements of the thoracic spine. There is no abnormal enhancement of the subserosal surface of the thoracic spinal cord. Some foci of mild signal intensity in the CSF dorsal to the spinal cord of some of the sagittal sequences presumably is secondary to CSF pulsation artifact.    On the sagittal  image, there is cervical spondylosis and kyphosis with  narrowing of all of the disc spaces in the cervical spine.       Allergies: Review of patient's allergies indicates:  No Known Allergies    Current Medications:   Current Outpatient Medications   Medication Sig Dispense Refill    ALPRAZolam (XANAX) 2 MG Tab       amLODIPine (NORVASC) 5 MG tablet       ascorbic acid, vitamin C, (VITAMIN C) 1000 MG tablet Take 1,000 mg by mouth.      aspirin (ECOTRIN) 81 MG EC tablet Take 1 tablet by mouth once daily.      celecoxib (CELEBREX) 200 MG capsule       DULoxetine (CYMBALTA) 30 MG capsule TAKE 1 CAPSULE(30 MG) BY MOUTH TWICE DAILY 60 capsule 2    ergocalciferol (DRISDOL) 200 mcg/mL (8,000 unit/mL) Drop Take by mouth.      ergocalciferol, vitamin D2, (VITAMIN D ORAL) Take by mouth every 30 days.      finasteride (PROSCAR) 5 mg tablet Take 1 tablet by mouth once daily.      gabapentin (NEURONTIN) 300 MG capsule Take 1 capsule (300 mg total) by mouth every evening. 30 capsule 0    HYDROcodone-acetaminophen (NORCO)  mg per tablet Take 1 tablet by mouth every 8 (eight) hours as needed for Pain (for severe pain). 90 tablet 0    HYDROmorphone (DILAUDID) 2 MG tablet Take 1 tablet (2 mg total) by mouth every 12 (twelve) hours as needed for Pain. 14 tablet 0    irbesartan (AVAPRO) 75 MG tablet 150 mg once daily.       levothyroxine (SYNTHROID) 100 MCG tablet Take 100 mcg by mouth.      meloxicam (MOBIC) 15 MG tablet Take 15 mg by mouth.      mirabegron (MYRBETRIQ ORAL) Take by mouth.      promethazine-dextromethorphan (PROMETHAZINE-DM) 6.25-15 mg/5 mL Syrp       simvastatin (ZOCOR) 20 MG tablet Take 20 mg by mouth every evening.      tadalafil (CIALIS) 20 MG Tab       temazepam (RESTORIL) 30 mg capsule TK 1 C PO QD HS       No current facility-administered medications for this visit.     Facility-Administered Medications Ordered in Other Visits   Medication Dose Route Frequency Provider Last Rate Last Admin    balanced salt irrigation intra-ocular solution 1 drop  1 drop Right  [facility] Procedure Bell Cedeno MD        sodium chloride 0.9% flush 2 mL  2 mL Intravenous PRN Bell Cedeno MD           REVIEW OF SYSTEMS:  GENERAL:  No weight loss, malaise or fevers.  HEENT:  Negative for frequent or significant headaches.  NECK:  Negative for lumps, goiter, pain and significant neck swelling.  RESPIRATORY:  Negative for cough, wheezing or shortness of breath.  CARDIOVASCULAR:  Negative for chest pain, leg swelling or palpitations.  GI:  Negative for abdominal discomfort, blood in stools or black stools or change in bowel habits.  MUSCULOSKELETAL:  See HPI.  SKIN:  Negative for lesions, rash, and itching.  PSYCH:  + for sleep disturbance, mood disorder and recent psychosocial stressors.  HEMATOLOGY/LYMPHOLOGY:  Negative for prolonged bleeding, bruising easily or swollen nodes.  NEURO:   No history of headaches, syncope, paralysis, seizures or tremors.  All other reviewed and negative other than HPI.    Past Medical History:  Past Medical History:   Diagnosis Date    Bronchitis     Cancer     stage I bladder cancer 50 yrs ago    Hypercholesteremia     Hypertension     Sacroiliitis 07/08/2020    Thyroid disease        Past Surgical History:  Past Surgical History:   Procedure Laterality Date    BLADDER SURGERY      CATARACT EXTRACTION      CATARACT EXTRACTION W/  INTRAOCULAR LENS IMPLANT Right 3/9/2022    Procedure: EXTRACTION, CATARACT, WITH IOL INSERTION;  Surgeon: Bell Cedeno MD;  Location: [facility];  Service: Ophthalmology;  Laterality: Right;    COLONOSCOPY      EPIDURAL STEROID INJECTION N/A 1/12/2022    Procedure: INJECTION, STEROID, EPIDURAL CAUDAL With Catheter needs consent;  Surgeon: Samuel Jimenez MD;  Location: [facility] PAIN T;  Service: Pain Management;  Laterality: N/A;    EYE SURGERY      cataracts     FUSION OF SACROILIAC JOINT Right 3/15/2021    Procedure: FUSION, RIGHT SACROILIAC JOINT FUSION;  Surgeon: Samuel Jimenez MD;  Location: [facility] OR;  Service:  Pain Management;  Laterality: Right;  C-ARM, PAINTEQ REP     FUSION OF SACROILIAC JOINT Right 8/12/2024    Procedure: FUSION, SACROILIAC JOINT;  Surgeon: Samuel Jimenez MD;  Location: [facility] OR;  Service: Pain Management;  Laterality: Right;    HERNIA REPAIR      INJECTION OF ANESTHETIC AGENT AROUND NERVE Bilateral 6/1/2022    Procedure: BLOCK, NERVE MEDIAL BRANCH L3,4,5 BILATERAL 1st;  Surgeon: Samuel Jimenez MD;  Location: [facility] PAIN MGT;  Service: Pain Management;  Laterality: Bilateral;    INJECTION OF ANESTHETIC AGENT AROUND NERVE Bilateral 8/24/2022    Procedure: Block, Nerve Kenny L3, L4, & L5;  Surgeon: Samuel Jimenez MD;  Location: [facility] PAIN MGT;  Service: Pain Management;  Laterality: Bilateral;    INJECTION OF ANESTHETIC AGENT AROUND NERVE Right 2/28/2024    Procedure: BLOCK, NERVE RIGHT CLUNEAL;  Surgeon: Samuel Jimenez MD;  Location: [facility] PAIN MGT;  Service: Pain Management;  Laterality: Right;  [phone]    INJECTION OF JOINT Right 7/8/2020    Procedure: INJECTION, JOINT, SACROILIAC (SI);  Surgeon: Samuel Jimenez MD;  Location: [facility] PAIN MGT;  Service: Pain Management;  Laterality: Right;    INJECTION OF JOINT Right 9/9/2020    Procedure: INJECTION, JOINT, SACROILIAC (SI);  Surgeon: Samuel Jimenez MD;  Location: [facility] PAIN MGT;  Service: Pain Management;  Laterality: Right;    INJECTION OF JOINT Right 7/21/2021    Procedure: INJECTION, JOINT, HIP RIGHT;  Surgeon: Samuel Jimenez MD;  Location: [facility] PAIN MGT;  Service: Pain Management;  Laterality: Right;  CONSENT NEEDED    INJECTION OF JOINT Right 10/13/2021    Procedure: INJECTION, JOINT, SACROILIAC (SI)  NEED CONSENT pt. requesting sedation;  Surgeon: Samuel Jimenez MD;  Location: [facility] PAIN MGT;  Service: Pain Management;  Laterality: Right;    INJECTION OF JOINT Right 4/10/2024    Procedure: INJECTION, JOINT RIGHT SI AND RIGHT PIRIFORMIS;  Surgeon: Samuel Jimenez MD;  Location: [facility] PAIN MGT;  Service: Pain  Management;  Laterality: Right;  [phone]    INJECTION, SACROILIAC JOINT Right 5/31/2023    Procedure: INJECTION,SACROILIAC JOINT, RIGHT SI AND RIGHT PIRIFORMIS;  Surgeon: Samuel Jimenez MD;  Location: [facility] PAIN MGT;  Service: Pain Management;  Laterality: Right;    KNEE SURGERY      RADIOFREQUENCY ABLATION Right 12/9/2020    Procedure: RADIOFREQUENCY ABLATION, L5-S1-S2 LATERAL BRANCH COOLED;  Surgeon: Samuel Jimenez MD;  Location: [facility] PAIN MGT;  Service: Pain Management;  Laterality: Right;    RADIOFREQUENCY ABLATION Right 9/21/2022    Procedure: RADIOFREQUENCY ABLATION, RIGHT L3-L4-L5 PER DR JIMENEZ;  Surgeon: Samuel Jimenez MD;  Location: [facility] PAIN MGT;  Service: Pain Management;  Laterality: Right;    RADIOFREQUENCY ABLATION Left 10/12/2022    Procedure: RADIOFREQUENCY ABLATION, LEFT L3-L4-L5 TWO OF TWO;  Surgeon: Samuel Jimenez MD;  Location: [facility] PAIN MGT;  Service: Pain Management;  Laterality: Left;    REPLACEMENT OF NERVE STIMULATOR BATTERY N/A 7/27/2020    Procedure: Replacement, SPINAL CORD STIMULATOR BATTERY CHANGE TO NEVRO OMNION BATTERY;  Surgeon: Samuel Jimenez MD;  Location: [facility] OR;  Service: Pain Management;  Laterality: N/A;  C-ARM, NEVRO REP    REPLACEMENT OF SPINAL CORD STIMULATOR  2/13/2023    Procedure: REPLACEMENT, SPINAL CORD STIMULATOR;  Surgeon: Samuel Jimenez MD;  Location: [facility] OR;  Service: Pain Management;;    REVISION PROCEDURE INVOLVING SPINAL CORD NEUROSTIMULATOR N/A 2/13/2023    Procedure: SPINAL CORD STIMULATOR EXPLANT AND REIMPLANT SALUDA REP;  Surgeon: Samuel Jimenez MD;  Location: [facility] OR;  Service: Pain Management;  Laterality: N/A;    TRIAL OF SPINAL CORD NERVE STIMULATOR N/A 8/5/2019    Procedure: Trial, Neurostimulator, SPINAL CORD STIMULATOR TRIAL;  Surgeon: Samuel Jimenez MD;  Location: [facility] CATH LAB;  Service: Pain Management;  Laterality: N/A;  C-ARM, NEVRO REP     Family History:  No family history on file.    Social  "History:  Social History     Socioeconomic History    Marital status:    Tobacco Use    Smoking status: Former     Current packs/day: 0.00     Types: Cigarettes     Quit date: 1980     Years since quittin.6    Smokeless tobacco: Never    Tobacco comments:     stopped 40 years ago   Substance and Sexual Activity    Alcohol use: Yes     Alcohol/week: 1.0 standard drink of alcohol     Types: 1 Shots of liquor per week     Comment: nightly -scotch    Drug use: Never    Sexual activity: Yes     Partners: Female     Social Determinants of Health     Financial Resource Strain: Low Risk  (2022)    Received from [facility] [employer], [facility]    Overall Financial Resource Strain (CARDIA)     Difficulty of Paying Living Expenses: Not hard at all   Food Insecurity: Unknown (2024)    Received from [facility]    Hunger Vital Sign     Worried About Running Out of Food in the Last Year: Never true   Transportation Needs: No Transportation Needs (2022)    [facility]    PRAPARE - Transportation     Lack of Transportation (Medical): No     Lack of Transportation (Non-Medical): No   Physical Activity: Unknown (2024)    Received from [facility]    Exercise Vital Sign     Days of Exercise per Week: 6 days   Stress: No Stress Concern Present (2024)    Received from [facility]    [employer] - Occupational Stress Questionnaire     Feeling of Stress : Only a little   Housing Stability: Unknown (2023)    [facility]    Housing Stability Vital Sign     Unable to Pay for Housing in the Last Year: No       OBJECTIVE:    /64   Resp 12   Ht 6' 1" (1.854 m)   Wt 89 kg (196 lb 3.4 oz)   SpO2 100%   BMI 25.89 kg/m²     PHYSICAL EXAM:     GENERAL: Alert and oriented x 3, no acute distress  PSYCH:  Mood and affect appropriate.  SKIN: Skin color, texture, turgor normal, no rashes or lesions.SIJ fusion site staples in " place and intact. Site without drainage, redness or swelling. Mild bruising/hematoma to left lower back.         HEENT:  Normocephalic, atraumatic. Cranial nerves grossly intact.  PULM: Non-labored breathing, symmetric chest rise.  BACK: Mildly reduced ROM w/ pain on flexion and extension.   EXTREMITIES: No deformities, edema, or skin discoloration.   MUSCULOSKELETAL: Bilateral upper and lower extremity strength is normal and symmetric. No atrophy is noted.  NEURO: Strength 5/5 to bilateral lower extremities. Bilateral upper and lower extremity coordination and muscle stretch reflexes are physiologic and symmetric. No clonus. Absent Johnson. No tremor.    GAIT: Antalgic    ASSESSMENT: 89 y.o. year old male with chronic low back pain, consistent with:     1. Lumbosacral radiculopathy  Procedure Order to Pain Management    gabapentin (NEURONTIN) 300 MG capsule      2. S/P fusion of sacroiliac joint            PLAN:     - Previous records and imaging reviewed  - I have stressed the importance of physical activity and a home exercise plan to help with pain and improve health.  - He can alternate Norco 10/325 TID PRN and Hydromorphone 2 mg BID PRN.   - Ok to continue Hydromorphone on a weekly basis for severe pain.   - He is s/p Right SIJ Fusion with Frances SiLO Allograft (Target Posterior aspect).   - Procedure order placed for right S1 TFESI medically urgent given right foot tingling and CT results re: narrowing of S1 foramina.   - Start Gabapentin 300 mg QHS. Uptitrate weekly until he is at TID.   - Hematoma should resolve in the next 5-6 weeks.   - He can restart Healthy Back Program once hematoma has resolved.   - We will place orders for balance/gait training at follow-up  - Counseled patient regarding the importance of activity modification, constant sleeping habits, and physical therapy.  - Consider updating L-spine MRI if no improvement in right foot pain after CHOCO.  - Dr. Jimenez was consulted and evaluated  the patient at today's visit.     - Consider consult with Dr Fajardo.    The above plan and management options were discussed at length with patient. Patient is in agreement with the above and verbalized understanding.      Kathi Berrios NP  08/26/2024

## 2024-08-26 NOTE — PROGRESS NOTES
Chronic patient Established Note (Follow up visit)      SUBJECTIVE:    Interval History 8/26/2024:  Jefferson Bogoie returns to clinic for follow-up after right SIJ fusion. He recently had CT pelvis performed showing new, narrowing of right S1 foramina. He continues to report tingling to the right foot. He denies weakness. He continues Norco 10/325 TID PRN and Hydromorphone 2 mg BID PRN with mild relief. He denies any perceived side effects. He denies recent falls or trauma. He denies new onset fever/night sweats, urinary incontinence, bowel incontinence, significant weight changes, significant motor weakness or changes, or loss of sensations. His pain today is 7/10.     Interval History 8/19/2024:  Jefferson Boogie returns to clinic for follow-up after Right SIJ fusion. He continues to report significant amounts of pain. He has been taking Hydromophone 2 mg daily and Norco 4 times per day without relief. He continues to utilize a rolling walker to help him ambulate. He denies fever, drainage, redness, swelling or any other signs of infection. He denies recent falls or trauma. He denies new onset fever/night sweats, urinary incontinence, bowel incontinence, significant weight changes, significant motor weakness or changes, or loss of sensations. His pain today is 7/10.     Interval History 8/15/2024:  Jefferson Boogie returns to clinic for follow-up after Right SI joint fusion using SILO TFX. He reports significant pain. He continues Hydromorphone 2 mg. He took 1 pill today . He has not taken any Norco today. He is present with his wife. She is wondering when they can start doing things like going to dinner or if he needs to stay at home while he recovers. He denies fever, drainage, redness or swelling from procedure. Site. He denies recent falls or trauma. He denies new onset fever/night sweats, urinary incontinence, bowel incontinence, significant weight changes, significant motor weakness or changes, or loss  of sensations. His pain today is 9/10.     Interval History 6/27/2024:  Patient returns to clinic for follow up. Patient is s/p Right SIJ and R Piriformis injections on 4/10/2024. Patient reports 100% relief from the procedure for 2 days. Pain has recurred and is same as previous description. Sha Ku ([facility]) is also here today for programming of [place] SCS. Patient continues with current medications with some benefit and HEP. Current pain 6/10. Patient denies red flags including weakness, unexpected weight loss/gain, night sweats/fevers, saddle anesthesia, and symptoms of CAREN.    Interval History 3/14/2024:  Jefferson Boogie presents for follow up. He is here today to discuss further options for treatment after right cluneal nerve block on 2/28/2024. He was here for the same reason two weeks ago, and we decided to give the block a bit more time to work before investigating other options. He reports no significant changes in his symptoms. He does say that he can manage his symptoms satisfactorily using 1.5 of his oxycodone  every 3 hours when doing things like golf and walking. He said he may use the medication once or twice a month and denies significant side effects.     Interval History 2/29/24:  Jefferson Boogie presents to the clinic for follow-up. He is s/p right cluneal nerve block 2/28/24. Today, he says that he does not know if he has had any difference in his symptoms so far. We spoke briefly yesterday about additional options, and he is here today for further discussion.     Interval History 01/16/2024:  Jefferson Boogie presents to the clinic for a follow-up appointment for low back, right hip, and right leg pain. Since the last visit, Jefferson Boogie states the pain has been persistant. Currently his right hip and leg pain is his biggest problem. He continues to endorse sharp stabbing pain in his hip and leg. Pain is exacerbated by activity, standing, laying on right side, lifting, bending.  Pain is improved with rest, exercising, stretching, medications. Continues with Celebrex and Cymbalta. Current pain intensity is 6/10. Patient continues to endorse benefit from SCS. Patient denies red flags including weakness, unexpected weight loss/gain, night sweats/fevers, saddle anesthesia, and symptoms of CAREN.    Interval History 8/10/2023:  Patient reports that his pain post right SIJ and piriformis was persistent until it decreased 50% on 7/4/23, but then the pain returned after a few days. He reports that during that period he was able to run after a bus in [place]. The pain is improved, but not as much as previous in his right buttock and calf. Patient reports that he has been working with the [employer], but not finding the correct programming as of yet.  No fevers, chills, unexplained weight loss.  Hx of bladder cancer in 1967. Remembers falling while skiing and landed on his buttocks 30 years ago, but no other pelvic trauma. Patient was traveling to [place] and recently came back to [place].  We discussed his recent pelvic CT in details especially for the accidental finding of irregular bone lesion. Explained to patient the need to investigate this further with our urology and oncology team.     Interval History 6/22/2023  Jefferson Boogie presents to the clinic for a follow-up appointment for chroniic LBP. Pt was last seen in clinic on 5/25 and scheduled for right SIJ and piriformis injection--performed 6/22. Pt reports 40% relief x 1-2 days. He continues to work with the rep with regards to programming the [place] SCS device. Pt reports that he is getting some decent relief from the device with current program, but feels like there is still some progress to be made. Continues to complain of pain of similar character and quality to that of prior eval. Localized in the right lowerlumbar spine/gluteal area as well as the right lateral leg. Since the last visit, Jefferson Boogie states the pain has been improving.  Current pain intensity is 5/10. He continues to exercise daily with stationary bike and weight training. Continues to take Cymbalta and Celebrex daily. He also takes Norco 10 very sparingly, maybe 1-2 per week. Overall the current regimen of SCS, medications and occasional injections have provided pt with enough relief for him to remain functional, and able to participate in his hobbies.      Interval history 5/25/23:  In the interim he has met with the [employer] reps for reprogramming. He has his good and bad days but feels that the pain is manageable. The reps with [employer] are present for the visit today. Today his pain is a 6.5/10. Still taking percocet and cymbalta (missed a few days), but these medications do not provide much relief. Still pain with walking and sitting, but the pain with laying down is better.      Interval History 5/4/23:  Patient seen today via virtual follow-up after implantation of his [place] SCS in February. He reports no change in his pain since his last visit. Most of his pain is into his right hip with intermittent radiation into his RLE. He denies any new symptoms. No red flag symptoms. He is scheduled to meet with the [employer] reps from reprogramming next week. He remains optimistic. In discussing his current medications, he has run out of the hydrocodone he was previously taking and has only been taking his Cymbalta once per day rather two.      Interval History 3/30/2023:  Mr Boogie presents 5-6 weeks after implantation of his [place] SCS system. He reports no significant change in his pains, which are primarily down the RLE.  No constitutional signs symptoms concerning for infection.  No new areas of pain or neurological changes since procedure. No voicing of s/s concerning for cauda equina syndrome. He does endorse improvement in sleep. He is worried that his implant was a failure because his pain has not changed.     Interval History 2/20/2023:  Mr Boogie presents for follow up of  [place] SCS replacement. He states doing well. No constitutional signs symptoms concerning for infection.  No new areas of pain or neurological changes since procedure. No voicing of s/s concerning for cauda equina syndrome.      Interval History 1/23/2023:  Still taking Celebrex, however hasn't noticed a difference in pain with this medication. Location, quality, and severity of pain are unchanged from prior visit and is described above. Here today to discuss removal of [place] SCS, and implant of [place] SCS. He states his stimulator has not been helpful for the past 2 years despite multiple reprogramming and adjustment.     Interval History 11/21/22:  Reports 24 hours of 100% relief for 48 hours, but he reports that now his pain is only approximally 10% better. His pain at rest is 3-4/10. Pain at its worst is 8-9/10. Pain is improved when leaning over a grocery cart. Pain only allows him to walk for 3-4mins.      Interval History 8/29/22:  Jefferson Boogie presents to the clinic for a follow-up appointment for back pain.  He had his second bilateral L3, L4, L5 MBB and reports 100% pain relief. He would like to proceed with RFA.     Interval History 5/26/22:  Jefferson Boogie presents to the clinic for a follow-up appointment for back pain. Since the last visit, Jefferson Boogie states the pain has been stable. Certain postures he can tolerate in the standing position such as leaning on a shopping cart or support to his posterior thighs. He now uses a walker when covering long distances. He continues to play golf despite it aggravating his pain. He receives some benefit from the SCS whereas other interventions have not helped. He remains interested in the [place] device.     Interval History 3/24/22:  Jefferson Boogie presents to the clinic for a follow-up appointment for back pain. Since the last visit, Jefferson Boogie states the pain has been worse than usual. Current pain intensity is 8/10. He continues to  "report benefit with Nev[place] SCS however he has not been able to get in touch with the representative in order to have his settings adjusted. He continues to take occasional norco 7.5 mg with benefit, particularly when he plays golf.     Interval History 2/10/22:  Pat presents today s/p caudal epidural steroid injections on 1/12/22 since then he has not had any relief. He states his pain has been unchanged and is a 6/10 on average with the worst being 8/10 and best is 4/10. He has been taking percocet 5mg when he golfs or plays with his granddaughter.      Interval History 12/30/21:  Jefferson Boogie presents to clinic for follow up appointment s/p Right SI joint and Right piriformis muscle injection under US guidance on 11/29/21. He did not obtain any noticeable relief with this procedure similar to all of his previous injections. His pain is unchanged and constant over the right buttock. He is taking percocet 5mg x 3 on days he is more active, such as playing golf. This helps some, but minimally. Denies any new symptoms or neurological changes. No numbness, tingling, weakness in his lower extremities. Denies bowel/bladder incontinence or saddle anesthesia.      Interval History 11/4/2021:  Jefferson Boogie presents to the clinic for a follow-up appointment for right sided back pain and SI joint pain. Mr. Boogie had a right SI joint injection on 10/13/2021. He did not note significant relief with the injection for any period of time. Pain is still constant over the right buttock and most noticeable when playing golf. He denies any new bowel/bladder incontinence, lower extremity numbness or weakness or saddle anesthesia.     Interval History 8/26/2021:  Jefferson Boogie presents to the clinic for a follow-up appointment for right sided hip pain with right-sided radiculopathy . Since the last visit, Jefferson Boogie states the pain has been "particularly tender today" 7/10. Patient reports playing golf " recently and experienced worsening symptoms the following day. Mr. Boogie is s/p right-side hip joint injection with minimal relief. Patient states he has had relief with previous interventions and is open to RFA.     Interval History 6/10/2021:  Jefferson Boogie presents to the clinic for a follow-up appointment. Since the last visit, Jefferson Boogie states the pain has been stable. Current pain intensity is 3/10. States he is still having pain around SIJ that is affecting his ADL      Interval History 4/15/2021:  The patient returns to clinic today for follow up of back pain. He reports that the swelling above the SI fusion incision has resolved. He denies any fever, chills, or drainage from the incision. He does report benefit since the fusion. He continues to report benefit with Nevro SCS. He reports intermittent shoulder pain at this time. He did receive an injection with Sports Medicine with benefit. He denies any other health changes. His pain today is 2/10.     Interval History 3/25/21:  Mr. Boogie presents to clinic for follow up of bilateral shoulder pain. He is s/p BL subacromial injections on 2/8/21. He reports maximum 20% relief of pain in the Right shoulder. Today the Left shoulder pain is 1/10; Right shoulder pain is 5-7/10.      Interval History 3/22/2021:  The patient returns to clinic today for follow up of back pain. He is s/p right SI fusion on 3/15/2021. He reports some relief at this time. He does report soreness to the incision. He denies any fever, chills, or drainage from incision. He continues to report benefit with Nevro SCS. He denies any other health changes. His pain today is 4/10.     Interval History 1/11/2020:  Patient is here for follow-up of low back pain now s/p sacroiliac RFA on 12/9/20 which provided no benefit and with the new complaint of bilateral anterior shoulder pain R>L. His shoulder pain started about 2 months ago. He describes 9/10 sharp/stinging pain without  radiation that is exacerbated with overhead activity and improved with rest. He started PT about one month ago. He takes oxycodone which provides some relief. He had a blind subacromial steroid injection in the right shoulder with Dr. Ramirez on 10/26/19 which provided some short term relief.     Interval History 11/5/2020:  Jefferson Boogie presents to the clinic for a follow-up appointment for low back pain. Since the last visit, Jefferson Boogie states the pain has been stable. Current pain intensity is 4/10. He had good relief from the SI joint injection that lasted for about a week. Pain has since returned. He also slipped and fell on his buttock a couple of weeks ago and had increased pain in the right buttock after that which is slowly improving. He continues to have variable benefit with the Nevro SCS for intermittent pain in the right leg. He is scheduled to meet with representatives today. He is taking celebrex daily and percocet as needed sparingly. He denies any adverse effects and any other health changes.     Interval History 10/5/2020:  The patient returns to clinic today for follow up of low back pain. He is s/p right SI joint injection on 9/9/2020. He reports 25% relief of his right sided low back and buttock pain. He did have good relief the first two days. He continues to report right sided low back and buttock pain. He denies any radicular leg pain. His pain is worse with prolonged standing and walking. He continues to report intermittent pain into his achilles from previous injury. He feels as though this has changed his gait. He continues to report some benefit with Nevro SCS device. He is in contact with representatives. He is currently taking Percocet as needed sparingly with some benefit. He denies any adverse effects. He denies any other health changes. His pain today is 4/10.      Interval History 9/2/2020:  The patient returns to clinic today for follow up of back pain. He reports that  his back pain has improved since last audio visit. He continues to report back pain with intermittent radiating pain into his right hip and calf. He has spoken with Bienvenido from [employer] via phone with some programming. He is meeting with Bienvenido today. He had some issues with charging but this has improved. He is currently taking Norco intermittently but this is only lasts 2-3 hours. He denies any adverse effects. He denies any other health changes. His pain today is 8/10.     Interval History 08/27/2020:  Pt was contacted over [employer] audio for a follow up appointment.  He is currently complaining of right hip and right calf with no associated numbness or weakness.  He continues to use his Nevro device.  He states it has been very frustrating. Inconsistent.  Took 20 mins to 1 hour to charge.  Couldn't get it to start this morning.  He states that there is no drainage at the battery site, no signs of infection or pain at the site.  Patient is not taking medications at this time.  He wants to speak about medication options because he would like to start playing golf again.     Interval History 8/17/2020:  The patient returns to clinic today for post op wound check. He continues to report benefit with Nevro device. He denies any fever, chills, or drainage. He continues to follow his activity restrictions. He does report a recent achilles tendon injury while swimming. He is seeing Orthopedics. He denies any other health changes. His pain today is 4/10.     Interval History 8/3/2020:  The patient returns to clinic today for post op. He is s/p Nevro battery change on 7/27/2020. He denies any fever, chills, or drainage from incision. He has not completed his antibiotics as he missed two days of the medication. He continues to follow his activity restrictions. He reports relief with the device. He is meeting with Bienvenido today for programming. He denies any other health changes. His pain today is 2/10     Interval History  7/22/2020:  Pt presents for audio follow up only s/p Right SI joint injection on 7/8/2020. Pt states he has near resolution of focal pain to buttock. He is pleased with result and pain relief. Pt has been in contact with [employer] and will be having battery swap in 5 days. He has difficulty whether stimulator is beneficial and has even had a holiday from using SCS.  He denies any newer areas pain, denies any neuro changes, meds area adequate to control pain without adverse side effects. Pain is 2/10 today.     Interval HPI 6/15/2020:  Jefferson Boogie presents to the clinic for a follow-up appointment for 3 week follow up right hip and right thigh pain. Since the last visit, Jefferson Boogie states the pain has been persistant. Current pain intensity is 5/10. Patient has been working with Bienvenido Varghese, to try and get better coverage of a localized pain that he still experiences in his right low back area. He does report improvement in symptoms of numbness/tingling. Today, worse pain is 1/10 in severity and localizes to the right SI joint. Pain is worse with extension of his back. It is worse with golfing. Pain relieved by Norco--he takes 1-2 tablets of 5-325 Norco on days that he plays golf. Pain is also improved with being still and not being active. Patient endorses a much more active lifestyle with Norco. Denies new weakness/numbness or bowel/bladder changes.     Previous injection history includes a series of 3 lumbar CHOCO at [facility].      Interval HPI 3/5/20:  Patient presents to the clinic for a follow-up appointment for low back pain. Since the last visit, he states his pain has been unchanged. Current pain intensity is 4/10. He continues to work with [employer] to adjust settings on his SCS. He has stopped using tramadol, and uses norco sparingly. He continues to work with a  for physical therapy.      Interval HPI 1/9/2020:  Patient returns for follow up s/p [place] SCS. He states he is  unsure if it has helped his pain since he had it implanted. He last had an adjustment of his programming about 1 month ago. He does note that once he is done charging his SCS his pain is improved. Pain still worse with prolonged standing and activity such as golf. He still is doing home exercise program. He says that he is trying to do more since getting the SCS. He is still taking the Tramadol with limited pain relief. He takes Tramadol 50 mg twice daily only on days of activity which is once a week. No other health changes.      HPI 11/20/19  Jefferson Boogie returns to clinic today for follow up. He reports increased low back and leg pain over the last few days. He has been in contact with [employer] representatives for programming adjustments. He does report benefit with the device. He reports good days and bad days. His pain is worse with prolonged standing and activity. He continues to participate in a home exercise routine. He does take Tramadol sparingly but reports limited relief. He denies any other health changes. His pain today is 5/10.      Pain Medications:  Oxycodone-acetaminophen 10-325mg very rarely  Mobic  Cymbalta     Opioid Contract: not applicable      report:  Reviewed and consistent with medication use as prescribed. Has not filled oxycodone since June of last year, says he has plenty left.      Pain Procedures  9/9/2020 - Right SI joint injection  7/27/2020 - Nevro SCS battery change  7/8/2020 - Right SIJ injection >80% relief   8/26/19 - SCS implant  8/5/19 - SCS trial  7/8/2020 - Right SI joint injection  7/27/2020 - SCS battery revision  9/9/2020 - Right SI joint injection   12/9/2020 - Right L5-S1 RFA  3/15/2021 - Right SI fusion  7/21/2021 - Injection R Hip - minimal relief  10/13/2021 - Right SI joint injection  11/29/2021 - R SI joing and R piriformis muscle injection - no relief   1/12/2022 - Caudal CHOCO- no relief   8/24/22 - Diagnostic Bilateral L3, L4 and L5 Lumbar Medial Branch  Block under Fluoroscopy  09/21/22 - Right L3, L4, L5 RFA  10/12/22 -  Left L3, L4, L5 RFA  2/13/23 - [place] SCS implant  5/31/23 - Right SIJ and piriformis injection   2/28/24 -  Right cluneal nerve block  4/10/24 - Right SIJ and piriformis injection  8/12/24 - Right SIJ fusion    Physical Therapy/Home Exercise: no     Imaging:     CT PELVIS WITHOUT CONTRAST     CLINICAL HISTORY:  Pelvis pain, stress fracture suspected, neg xray;  Arthrodesis status     TECHNIQUE:  1.25 mm axial images of the pelvis obtained, coronal and sagittal images reformatted.     COMPARISON:  None     FINDINGS:  Percutaneous Posterior right  Sacroiliac Joint Fusion using SILO TFX transfixing device 08/12/2024.     New, moderate narrowing of the right S1 foramina by osseous density extending to the foramina, could cause symptoms referable to a right S1 neuropathy, to correlate clinically.     The bilateral sacroiliac joints appear within normal limits.     The bilateral hip joints appear normal.  No advanced degenerative change.     Mild osseous hypertrophy at the pubic symphysis.     The intrapelvic content demonstrate significant diverticulosis coli.  Moderate stool retention.  Mild bladder wall thickening possibly due to nondistention.  The prostate is not enlarged.  The abdominal wall and inguinal regions appear normal.     Degenerative disc disease, disc space narrowing and vacuum disc phenomenon L4-5 L5-S1, unchanged.     Impression:     New, moderate-severe narrowing of the right S1 foramina, by osseous density extending in the right foramina, could impinge on the exiting right S1 nerve root, to correlate with patient's symptoms.     Significant sigmoid diverticulosis.     This report was flagged in Epic as abnormal.        Electronically signed by:Ammy Nance MD  Date:                                            08/21/2024  Time:                                           16:45    XR SACRUM AND COCCYX     CLINICAL  HISTORY:  Arthrodesis status     FINDINGS:  Sacrum coccyx three views.     There is hardware status post right SI joint arthrodesis.  No complication seen.  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        Electronically signed by:Aki Amaya MD  Date:                                            08/16/2024  Time:                                           08:19    XR HIPS BILATERAL 2 VIEW INCL AP PELVIS     CLINICAL HISTORY:  Arthrodesis status     FINDINGS:  Hips bilateral two views to include pelvis.     Right: No fracture dislocation bone destruction seen.     Left: No fracture dislocation bone destruction seen.        Electronically signed by:Aki Amaya MD  Date:                                            08/16/2024  Time:                                           08:17      EXAMINATION:  CT PELVIS WITHOUT CONTRAST     CLINICAL HISTORY:  History fo percutanous SI fusion.;  Chronic pain syndrome     TECHNIQUE:  CT of the pelvis, without intravenous contrast.     COMPARISON:  None     FINDINGS:  BONE: Diffuse osteopenia.  No fracture or osteonecrosis.  Few lytic and sclerotic lesions scattered throughout the pelvis, including a lesion in the right iliac bone with irregularity of the overlying cortex (2:103).     JOINT: Postoperative changes from right sacroiliac joint fusion.  The implant is in satisfactory position.  No osseous fusion across the joint space.     Degenerative changes both sacroiliac joints.  No erosions or ankylosis.  Degenerative changes also involve the lower lumbar spine, both hip joints, and pubic symphysis.  Chondrocalcinosis of the pubic symphysis.  No significant joint effusion.     SOFT TISSUE: Normal muscle bulk. Regional tendons are intact.  Calcific tendinopathy involving the proximal hamstring tendons bilaterally.  No bursal collection.     MISCELLANEOUS: Circumferential bladder wall thickening.  Prostatic calcifications.  Colonic diverticulosis.  Atherosclerosis.     Impression:     Postoperative  changes in the right sacroiliac joint.  No osseous fusion.     Few lytic and sclerotic lesions scattered throughout the pelvis, concerning for metastases or myeloma.  No prior studies are available for comparison.     Bladder wall thickening, which could be secondary to outlet obstruction or cystitis.  Correlate with urinalysis.     Other findings as described.     This report was flagged in Epic as abnormal.    6/10/2021 - X ray Hips Bilateral: No radiographic evidence of acute osseous abnormality of the pelvis and hips and without radiographic evidence of osteonecrosis of the femoral heads.     9/18/21 MRI L-spine:  FINDINGS:  Lumbar sagittal alignment is slightly is straightened.  There is slight convex right curvature lumbar spine.  There is degenerative disc disease with intervertebral disc height loss and endplate degenerative change at all levels with scattered disc desiccation allowing for degenerative change the lumbar vertebral body heights and contours are within normal is without evidence for acute fracture or subluxation.  There is heterogeneous T1/T2 signal focus within the right aspect of the S1 vertebra most compatible with hemangioma this measures 2.0 cm.     The distal spinal cord and conus is normal in signal and contour tip of the conus approximates the T12/L1 level.  Please note there is artifact from indwelling spinal stimulator device with leads partially visualized casting artifact entering the dorsal epidural space at the T12 level and extending cranially.     There is abnormal clumping configuration of the filum terminalis a from T12 through L5 with slight thickening of scattered nerve roots most compatible with arachnoiditis.     No aneurysmal dilatation of the visualized abdominal aorta.     T12/L1 through L1/L2: No significant disc bulge, central canal or neural foraminal stenosis.     L2/L3: Posterior disc osteophyte with facet joint arthropathy without significant central canal  stenosis with mild bilateral neural foraminal stenosis.     L3/L4:Bulging disc with ligamentum flavum hypertrophy without significant central canal stenosis with mild bilateral neural foraminal stenosis.     L4/L5:Posterior disc osteophyte with ligamentum flavum hypertrophy and facet joint arthropathy without significant central canal stenosis and mild bilateral neural foraminal stenosis.     L5/S1: Posterior disc osteophyte with facet joint arthropathy without significant central canal stenosis with moderate right and mild left neural foraminal stenosis.     This report was flagged in Epic as abnormal.     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  Please note there is spinal stimulator device with leads partially visualized and distorting the study by artifacts.     There is abnormal thickening of the filum configuration most compatible with arachnoiditis.     Please see above for additional details.     Thoracic spine MRI:  FINDINGS: Thoracic vertebral body height and alignment are maintained with a few small scattered Schmorl's nodes involving the endplates of several mid to lower thoracic vertebra. The T3-4 vertebra are partially fused. There is some degree of desiccation of all of the thoracic discs with multilevel disc space narrowing, especially at the T7-T8, T6-T7 and T5-T6 levels. There is no abnormal prevertebral soft tissue thickening. The somewhat heterogeneous appearance to the signal from the thoracic vertebra is presumably secondary to an admixture of red and yellow marrow.    T1-T2 level: There is no bony foraminal narrowing or bony central canal stenosis and the posterior disc margin is unremarkable.    T2-T3 level: There is no bony foraminal narrowing or bony central canal stenosis and the posterior disc margin is unremarkable.    T3-T4 level: There is no bony foraminal narrowing or bony central canal stenosis and the posterior disc margin is  unremarkable.    T4-5 level: There is no bony foraminal narrowing or bony central canal stenosis and the posterior disc margin is unremarkable.    T5-T6 level: There is no bony foraminal narrowing or bony central canal stenosis and the posterior disc margin is unremarkable.    T6-T7 level: There is no bony foraminal narrowing or bony central canal stenosis and the posterior disc margin is unremarkable.    T7-T8 level: There is no bony foraminal narrowing or bony central canal stenosis and the posterior disc margin is unremarkable.    T8-T9 level: There is no bony foraminal narrowing or bony central canal stenosis and the posterior disc margin is unremarkable.    T9-T10 level: There is no bony foraminal narrowing or bony central canal stenosis and the posterior disc margin is unremarkable.    T10-T11 level: There is no bony foraminal narrowing or bony central canal stenosis and the posterior disc margin is unremarkable.    T11-T12 level: There is no bony foraminal narrowing or bony central canal stenosis and the posterior disc margin is unremarkable.    T12-L1 level: The tip the conus medullaris extends down to level of the superior endplate of L1. 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 There is no bony foraminal narrowing or bony central canal stenosis at this level.    On the axial images, the immediate paravertebral soft tissues are unremarkable. A small cyst is seen to involve the upper pole the left kidney.    Following contrast administration, there is no abnormal enhancement of any bony or soft tissue elements of the thoracic spine. There is no abnormal enhancement of the subserosal surface of the thoracic spinal cord. Some foci of mild signal intensity in the CSF dorsal to the spinal cord of some of the sagittal sequences presumably is secondary to CSF pulsation artifact.    On the sagittal  image, there is cervical spondylosis and kyphosis with  narrowing of all of the disc spaces in the cervical spine.       Allergies: Review of patient's allergies indicates:  No Known Allergies    Current Medications:   Current Outpatient Medications   Medication Sig Dispense Refill    ALPRAZolam (XANAX) 2 MG Tab       amLODIPine (NORVASC) 5 MG tablet       ascorbic acid, vitamin C, (VITAMIN C) 1000 MG tablet Take 1,000 mg by mouth.      aspirin (ECOTRIN) 81 MG EC tablet Take 1 tablet by mouth once daily.      celecoxib (CELEBREX) 200 MG capsule       DULoxetine (CYMBALTA) 30 MG capsule TAKE 1 CAPSULE(30 MG) BY MOUTH TWICE DAILY 60 capsule 2    ergocalciferol (DRISDOL) 200 mcg/mL (8,000 unit/mL) Drop Take by mouth.      ergocalciferol, vitamin D2, (VITAMIN D ORAL) Take by mouth every 30 days.      finasteride (PROSCAR) 5 mg tablet Take 1 tablet by mouth once daily.      gabapentin (NEURONTIN) 300 MG capsule Take 1 capsule (300 mg total) by mouth every evening. 30 capsule 0    HYDROcodone-acetaminophen (NORCO)  mg per tablet Take 1 tablet by mouth every 8 (eight) hours as needed for Pain (for severe pain). 90 tablet 0    HYDROmorphone (DILAUDID) 2 MG tablet Take 1 tablet (2 mg total) by mouth every 12 (twelve) hours as needed for Pain. 14 tablet 0    irbesartan (AVAPRO) 75 MG tablet 150 mg once daily.       levothyroxine (SYNTHROID) 100 MCG tablet Take 100 mcg by mouth.      meloxicam (MOBIC) 15 MG tablet Take 15 mg by mouth.      mirabegron (MYRBETRIQ ORAL) Take by mouth.      promethazine-dextromethorphan (PROMETHAZINE-DM) 6.25-15 mg/5 mL Syrp       simvastatin (ZOCOR) 20 MG tablet Take 20 mg by mouth every evening.      tadalafil (CIALIS) 20 MG Tab       temazepam (RESTORIL) 30 mg capsule TK 1 C PO QD HS       No current facility-administered medications for this visit.     Facility-Administered Medications Ordered in Other Visits   Medication Dose Route Frequency Provider Last Rate Last Admin    balanced salt irrigation intra-ocular solution 1 drop  1 drop Right  [facility] Procedure Bell Cedeno MD        sodium chloride 0.9% flush 2 mL  2 mL Intravenous PRN Bell Cedeno MD           REVIEW OF SYSTEMS:  GENERAL:  No weight loss, malaise or fevers.  HEENT:  Negative for frequent or significant headaches.  NECK:  Negative for lumps, goiter, pain and significant neck swelling.  RESPIRATORY:  Negative for cough, wheezing or shortness of breath.  CARDIOVASCULAR:  Negative for chest pain, leg swelling or palpitations.  GI:  Negative for abdominal discomfort, blood in stools or black stools or change in bowel habits.  MUSCULOSKELETAL:  See HPI.  SKIN:  Negative for lesions, rash, and itching.  PSYCH:  + for sleep disturbance, mood disorder and recent psychosocial stressors.  HEMATOLOGY/LYMPHOLOGY:  Negative for prolonged bleeding, bruising easily or swollen nodes.  NEURO:   No history of headaches, syncope, paralysis, seizures or tremors.  All other reviewed and negative other than HPI.    Past Medical History:  Past Medical History:   Diagnosis Date    Bronchitis     Cancer     stage I bladder cancer 50 yrs ago    Hypercholesteremia     Hypertension     Sacroiliitis 07/08/2020    Thyroid disease        Past Surgical History:  Past Surgical History:   Procedure Laterality Date    BLADDER SURGERY      CATARACT EXTRACTION      CATARACT EXTRACTION W/  INTRAOCULAR LENS IMPLANT Right 3/9/2022    Procedure: EXTRACTION, CATARACT, WITH IOL INSERTION;  Surgeon: Bell Cedeno MD;  Location: [facility];  Service: Ophthalmology;  Laterality: Right;    COLONOSCOPY      EPIDURAL STEROID INJECTION N/A 1/12/2022    Procedure: INJECTION, STEROID, EPIDURAL CAUDAL With Catheter needs consent;  Surgeon: Samuel Jimenez MD;  Location: [facility] PAIN T;  Service: Pain Management;  Laterality: N/A;    EYE SURGERY      cataracts     FUSION OF SACROILIAC JOINT Right 3/15/2021    Procedure: FUSION, RIGHT SACROILIAC JOINT FUSION;  Surgeon: Samuel Jimenez MD;  Location: [facility] OR;  Service:  Pain Management;  Laterality: Right;  C-ARM, PAINTEQ REP     FUSION OF SACROILIAC JOINT Right 8/12/2024    Procedure: FUSION, SACROILIAC JOINT;  Surgeon: Samuel Jimenez MD;  Location: [facility] [place] OR;  Service: Pain Management;  Laterality: Right;    HERNIA REPAIR      INJECTION OF ANESTHETIC AGENT AROUND NERVE Bilateral 6/1/2022    Procedure: BLOCK, NERVE MEDIAL BRANCH L3,4,5 BILATERAL 1st;  Surgeon: Samuel Jimenez MD;  Location: [facility], [place] PAIN MGT;  Service: Pain Management;  Laterality: Bilateral;    INJECTION OF ANESTHETIC AGENT AROUND NERVE Bilateral 8/24/2022    Procedure: Block, Nerve Kenny L3, L4, & L5;  Surgeon: Samuel Jimenez MD;  Location: [facility], [place] PAIN MGT;  Service: Pain Management;  Laterality: Bilateral;    INJECTION OF ANESTHETIC AGENT AROUND NERVE Right 2/28/2024    Procedure: BLOCK, NERVE RIGHT CLUNEAL;  Surgeon: Samuel Jimenez MD;  Location: [facility], [place] PAIN MGT;  Service: Pain Management;  Laterality: Right;  [phone]    INJECTION OF JOINT Right 7/8/2020    Procedure: INJECTION, JOINT, SACROILIAC (SI);  Surgeon: Samuel Jimenez MD;  Location: [facility], [place] PAIN MGT;  Service: Pain Management;  Laterality: Right;    INJECTION OF JOINT Right 9/9/2020    Procedure: INJECTION, JOINT, SACROILIAC (SI);  Surgeon: Samuel Jimenez MD;  Location: [facility], [place] PAIN MGT;  Service: Pain Management;  Laterality: Right;    INJECTION OF JOINT Right 7/21/2021    Procedure: INJECTION, JOINT, HIP RIGHT;  Surgeon: Samuel Jimenez MD;  Location: [facility], [place] PAIN MGT;  Service: Pain Management;  Laterality: Right;  CONSENT NEEDED    INJECTION OF JOINT Right 10/13/2021    Procedure: INJECTION, JOINT, SACROILIAC (SI)  NEED CONSENT pt. requesting sedation;  Surgeon: Samuel Jimenez MD;  Location: [facility], [place] PAIN MGT;  Service: Pain Management;  Laterality: Right;    INJECTION OF JOINT Right 4/10/2024    Procedure: INJECTION, JOINT RIGHT SI AND RIGHT PIRIFORMIS;  Surgeon: Samuel Jimenez MD;  Location: [facility], [place] PAIN MGT;  Service: Pain  Management;  Laterality: Right;  [phone]    INJECTION, SACROILIAC JOINT Right 5/31/2023    Procedure: INJECTION,SACROILIAC JOINT, RIGHT SI AND RIGHT PIRIFORMIS;  Surgeon: Samuel Jimenez MD;  Location: [facility] PAIN MGT;  Service: Pain Management;  Laterality: Right;    KNEE SURGERY      RADIOFREQUENCY ABLATION Right 12/9/2020    Procedure: RADIOFREQUENCY ABLATION, L5-S1-S2 LATERAL BRANCH COOLED;  Surgeon: Samuel Jimenez MD;  Location: [facility] PAIN MGT;  Service: Pain Management;  Laterality: Right;    RADIOFREQUENCY ABLATION Right 9/21/2022    Procedure: RADIOFREQUENCY ABLATION, RIGHT L3-L4-L5 PER DR JIMENEZ;  Surgeon: Samuel Jimenez MD;  Location: [facility] PAIN MGT;  Service: Pain Management;  Laterality: Right;    RADIOFREQUENCY ABLATION Left 10/12/2022    Procedure: RADIOFREQUENCY ABLATION, LEFT L3-L4-L5 TWO OF TWO;  Surgeon: Samuel Jimenez MD;  Location: [facility] PAIN MGT;  Service: Pain Management;  Laterality: Left;    REPLACEMENT OF NERVE STIMULATOR BATTERY N/A 7/27/2020    Procedure: Replacement, SPINAL CORD STIMULATOR BATTERY CHANGE TO NEVRO OMNION BATTERY;  Surgeon: Samuel Jimenez MD;  Location: [facility] OR;  Service: Pain Management;  Laterality: N/A;  C-ARM, NEVRO REP    REPLACEMENT OF SPINAL CORD STIMULATOR  2/13/2023    Procedure: REPLACEMENT, SPINAL CORD STIMULATOR;  Surgeon: Samuel Jimenez MD;  Location: [facility] OR;  Service: Pain Management;;    REVISION PROCEDURE INVOLVING SPINAL CORD NEUROSTIMULATOR N/A 2/13/2023    Procedure: SPINAL CORD STIMULATOR EXPLANT AND REIMPLANT SALUDA REP;  Surgeon: Samuel Jimenez MD;  Location: [facility] OR;  Service: Pain Management;  Laterality: N/A;    TRIAL OF SPINAL CORD NERVE STIMULATOR N/A 8/5/2019    Procedure: Trial, Neurostimulator, SPINAL CORD STIMULATOR TRIAL;  Surgeon: Samuel Jimenez MD;  Location: [facility] CATH LAB;  Service: Pain Management;  Laterality: N/A;  C-ARM, NEVRO REP     Family History:  No family history on file.    Social  "History:  Social History     Socioeconomic History    Marital status:    Tobacco Use    Smoking status: Former     Current packs/day: 0.00     Types: Cigarettes     Quit date: 1980     Years since quittin.6    Smokeless tobacco: Never    Tobacco comments:     stopped 40 years ago   Substance and Sexual Activity    Alcohol use: Yes     Alcohol/week: 1.0 standard drink of alcohol     Types: 1 Shots of liquor per week     Comment: nightly -scotch    Drug use: Never    Sexual activity: Yes     Partners: Female     Social Determinants of Health     Financial Resource Strain: Low Risk  (2022)    Received from [facility], [facility]    Overall Financial Resource Strain (CARDIA)     Difficulty of Paying Living Expenses: Not hard at all   Food Insecurity: Unknown (2024)    Received from [facility]    Hunger Vital Sign     Worried About Running Out of Food in the Last Year: Never true   Transportation Needs: No Transportation Needs (2022)    Received from [facility], [facility]    PRAPARE - Transportation     Lack of Transportation (Medical): No     Lack of Transportation (Non-Medical): No   Physical Activity: Unknown (2024)    Received from [facility]    Exercise Vital Sign     Days of Exercise per Week: 6 days   Stress: No Stress Concern Present (2024)    Received from [facility]    [employer] - Occupational Stress Questionnaire     Feeling of Stress : Only a little   Housing Stability: Unknown (2023)    Received from [facility], [facility]    Housing Stability Vital Sign     Unable to Pay for Housing in the Last Year: No       OBJECTIVE:    /64   Resp 12   Ht 6' 1" (1.854 m)   Wt 89 kg (196 lb 3.4 oz)   SpO2 100%   BMI 25.89 kg/m²     PHYSICAL EXAM:     GENERAL: Alert and oriented x 3, no acute distress  PSYCH:  Mood and affect appropriate.  SKIN: Skin color, texture, turgor normal, no rashes or lesions.SIJ fusion site staples in " place and intact. Site without drainage, redness or swelling. Mild bruising/hematoma to left lower back.         HEENT:  Normocephalic, atraumatic. Cranial nerves grossly intact.  PULM: Non-labored breathing, symmetric chest rise.  BACK: Mildly reduced ROM w/ pain on flexion and extension.   EXTREMITIES: No deformities, edema, or skin discoloration.   MUSCULOSKELETAL: Bilateral upper and lower extremity strength is normal and symmetric. No atrophy is noted.  NEURO: Strength 5/5 to bilateral lower extremities. Bilateral upper and lower extremity coordination and muscle stretch reflexes are physiologic and symmetric. No clonus. Absent Johnson. No tremor.    GAIT: Antalgic    ASSESSMENT: 89 y.o. year old male with chronic low back pain, consistent with:     1. Lumbosacral radiculopathy  Procedure Order to Pain Management    gabapentin (NEURONTIN) 300 MG capsule      2. S/P fusion of sacroiliac joint            PLAN:     - Previous records and imaging reviewed  - I have stressed the importance of physical activity and a home exercise plan to help with pain and improve health.  - He can alternate Norco 10/325 TID PRN and Hydromorphone 2 mg BID PRN.   - Ok to continue Hydromorphone on a weekly basis for severe pain.   - He is s/p Right SIJ Fusion with Frances SiLO Allograft (Target Posterior aspect).   - Procedure order placed for right S1 TFESI medically urgent given right foot tingling and CT results re: narrowing of S1 foramina.   - Start Gabapentin 300 mg QHS. Uptitrate weekly until he is at TID.   - Hematoma should resolve in the next 5-6 weeks.   - He can restart Healthy Back Program once hematoma has resolved.   - We will place orders for balance/gait training at follow-up  - Counseled patient regarding the importance of activity modification, constant sleeping habits, and physical therapy.  - Consider updating L-spine MRI if no improvement in right foot pain after CHOCO.  - Dr. Jimenez was consulted and evaluated  the patient at today's visit.     - Consider consult with Dr Fajardo.    The above plan and management options were discussed at length with patient. Patient is in agreement with the above and verbalized understanding.      Kathi Berrios NP  08/26/2024

## 2024-08-26 NOTE — TELEPHONE ENCOUNTER
----- Message from [place] Charla sent at 8/26/2024  9:39 AM CDT -----  Regarding: call back  Type: Patient Call Back    Who called: pt     What is the request in detail: calling to let staff know he will come in for 10:30 to see provider this morning     Can the clinic reply by [facility]?no    Would the patient rather a call back or a response via [facility]? call    Best call back number: [phone]     Additional Information:

## 2024-08-28 ENCOUNTER — HOSPITAL ENCOUNTER (OUTPATIENT)
Facility: OTHER | Age: 89
Discharge: HOME | End: 2024-08-28
Attending: ANESTHESIOLOGY | Admitting: ANESTHESIOLOGY
Payer: MEDICARE

## 2024-08-28 VITALS
DIASTOLIC BLOOD PRESSURE: 87 MMHG | BODY MASS INDEX: 25.18 KG/M2 | HEART RATE: 60 BPM | HEIGHT: 73 IN | RESPIRATION RATE: 18 BRPM | OXYGEN SATURATION: 99 % | TEMPERATURE: 98 F | WEIGHT: 190 LBS | SYSTOLIC BLOOD PRESSURE: 198 MMHG

## 2024-08-28 DIAGNOSIS — G89.29 CHRONIC PAIN: ICD-10-CM

## 2024-08-28 DIAGNOSIS — M54.16 LUMBAR RADICULOPATHY: Primary | ICD-10-CM

## 2024-08-28 PROCEDURE — 64483 NJX AA&/STRD TFRM EPI L/S 1: CPT | Mod: RT | Performed by: ANESTHESIOLOGY

## 2024-08-28 PROCEDURE — 63600175 PHARM REV CODE 636 W HCPCS: Performed by: ANESTHESIOLOGY

## 2024-08-28 PROCEDURE — 25500020 PHARM REV CODE 255: Performed by: ANESTHESIOLOGY

## 2024-08-28 PROCEDURE — 25000003 PHARM REV CODE 250: Performed by: ANESTHESIOLOGY

## 2024-08-28 PROCEDURE — 64483 NJX AA&/STRD TFRM EPI L/S 1: CPT | Mod: RT,,, | Performed by: ANESTHESIOLOGY

## 2024-08-28 PROCEDURE — 99152 MOD SED SAME PHYS/QHP 5/>YRS: CPT | Mod: ,,, | Performed by: ANESTHESIOLOGY

## 2024-08-28 RX ORDER — SODIUM CHLORIDE 9 MG/ML
INJECTION, SOLUTION INTRAVENOUS CONTINUOUS
Status: DISCONTINUED | OUTPATIENT
Start: 2024-08-28 | End: 2024-08-28 | Stop reason: HOSPADM

## 2024-08-28 RX ORDER — MIDAZOLAM HYDROCHLORIDE 1 MG/ML
INJECTION INTRAMUSCULAR; INTRAVENOUS
Status: DISCONTINUED | OUTPATIENT
Start: 2024-08-28 | End: 2024-08-28 | Stop reason: HOSPADM

## 2024-08-28 RX ORDER — DEXAMETHASONE SODIUM PHOSPHATE 10 MG/ML
INJECTION INTRAMUSCULAR; INTRAVENOUS
Status: DISCONTINUED | OUTPATIENT
Start: 2024-08-28 | End: 2024-08-28 | Stop reason: HOSPADM

## 2024-08-28 RX ORDER — FENTANYL CITRATE 50 UG/ML
INJECTION, SOLUTION INTRAMUSCULAR; INTRAVENOUS
Status: DISCONTINUED | OUTPATIENT
Start: 2024-08-28 | End: 2024-08-28 | Stop reason: HOSPADM

## 2024-08-28 RX ORDER — LIDOCAINE HYDROCHLORIDE 20 MG/ML
INJECTION, SOLUTION INFILTRATION; PERINEURAL
Status: DISCONTINUED | OUTPATIENT
Start: 2024-08-28 | End: 2024-08-28 | Stop reason: HOSPADM

## 2024-08-28 RX ORDER — LIDOCAINE HYDROCHLORIDE 10 MG/ML
INJECTION, SOLUTION EPIDURAL; INFILTRATION; INTRACAUDAL; PERINEURAL
Status: DISCONTINUED | OUTPATIENT
Start: 2024-08-28 | End: 2024-08-28 | Stop reason: HOSPADM

## 2024-08-28 NOTE — DISCHARGE INSTRUCTIONS

## 2024-08-28 NOTE — DISCHARGE SUMMARY
Discharge Note  Short Stay      SUMMARY     Admit Date: 8/28/2024    Attending Physician: Samuel Jimenez MD    Discharge Physician: Samuel Jimenez MD      Discharge Date: 8/28/2024 9:21 AM    Procedure(s) (LRB):  LUMBAR TANSFORAMINAL RIGHT S1 MEDICALLY URGENT *ASPIRIN OTC* (Right)    Final Diagnosis: Lumbar radiculopathy [M54.16]    Disposition: Home or self care    Patient Instructions:   Current Discharge Medication List        CONTINUE these medications which have NOT CHANGED    Details   ALPRAZolam (XANAX) 2 MG Tab       amLODIPine (NORVASC) 5 MG tablet       ascorbic acid, vitamin C, (VITAMIN C) 1000 MG tablet Take 1,000 mg by mouth.      aspirin (ECOTRIN) 81 MG EC tablet Take 1 tablet by mouth once daily.      celecoxib (CELEBREX) 200 MG capsule       DULoxetine (CYMBALTA) 30 MG capsule TAKE 1 CAPSULE(30 MG) BY MOUTH TWICE DAILY  Qty: 60 capsule, Refills: 2      ergocalciferol (DRISDOL) 200 mcg/mL (8,000 unit/mL) Drop Take by mouth.      ergocalciferol, vitamin D2, (VITAMIN D ORAL) Take by mouth every 30 days.      finasteride (PROSCAR) 5 mg tablet Take 1 tablet by mouth once daily.      gabapentin (NEURONTIN) 300 MG capsule Take 1 capsule (300 mg total) by mouth every evening.  Qty: 30 capsule, Refills: 0    Associated Diagnoses: Lumbosacral radiculopathy      HYDROcodone-acetaminophen (NORCO)  mg per tablet Take 1 tablet by mouth every 8 (eight) hours as needed for Pain (for severe pain).  Qty: 90 tablet, Refills: 0    Comments: Quantity prescribed more than 7 day supply? Yes, quantity medically necessary  Associated Diagnoses: Chronic pain syndrome      irbesartan (AVAPRO) 75 MG tablet 150 mg once daily.       levothyroxine (SYNTHROID) 100 MCG tablet Take 100 mcg by mouth.      meloxicam (MOBIC) 15 MG tablet Take 15 mg by mouth.      mirabegron (MYRBETRIQ ORAL) Take by mouth.      promethazine-dextromethorphan (PROMETHAZINE-DM) 6.25-15 mg/5 mL Syrp       simvastatin (ZOCOR) 20 MG tablet Take  20 mg by mouth every evening.      tadalafil (CIALIS) 20 MG Tab       temazepam (RESTORIL) 30 mg capsule TK 1 C PO QD HS           STOP taking these medications       HYDROmorphone (DILAUDID) 2 MG tablet Comments:   Reason for Stopping:                   Discharge Diagnosis: Lumbar radiculopathy [M54.16]  Condition on Discharge: Stable with no complications to procedure   Diet on Discharge: Same as before.  Activity: as per instruction sheet.  Discharge to: Home with a responsible adult.  Follow up: 2-4 weeks       Please call my office or pager at [phone] if experienced any weakness or loss of sensation, fever > 101.5, pain uncontrolled with oral medications, persistent nausea/vomiting/or diarrhea, redness or drainage from the incisions, or any other worrisome concerns. If physician on call was not reached or could not communicate with our office for any reason please go to the nearest emergency department     Gregory Contreras MD

## 2024-08-28 NOTE — OP NOTE
Lumbar Transforaminal Epidural Steroid Injection under Fluoroscopic Guidance    The procedure, risks, benefits, and options were discussed with the patient. There are no contraindications to the procedure. The patent expressed understanding and agreed to the procedure. Informed written consent was obtained prior to the start of the procedure and can be found in the patient's chart.    PATIENT NAME: Jefferson Boogie   MRN: [id number]     DATE OF PROCEDURE: 08/28/2024    PROCEDURE:  Right  S1 Lumbar Transforaminal Epidural Steroid Injection under Fluoroscopic Guidance    PRE-OP DIAGNOSIS: Lumbar radiculopathy [M54.16] Lumbar radiculopathy [M54.16]    POST-OP DIAGNOSIS: Same    PHYSICIAN: Samuel Jimenez MD    ASSISTANTS: none    MEDICATIONS INJECTED: Preservative-free Decadron 10mg with 5cc of Lidocaine 1% MPF     LOCAL ANESTHETIC INJECTED: Xylocaine 2%     SEDATION: Versed 2mg and Fentanyl 50mcg                                                                                                                                                                                     Conscious sedation ordered by M.D. Patient re-evaluation prior to administration of conscious sedation. No changes noted in patient's status from initial evaluation. The patient's vital signs were monitored by RN and patient remained hemodynamically stable throughout the procedure.    Event Time In   Sedation Start 0923   Sedation End 0933       ESTIMATED BLOOD LOSS: None    COMPLICATIONS: None    TECHNIQUE: Time-out was performed to identify the patient and procedure to be performed. With the patient laying in a prone position, the surgical area was prepped and draped in the usual sterile fashion using ChloraPrep and a fenestrated drape.The levels were determined under fluoroscopy guidance. Skin anesthesia was achieved by injecting Lidocaine 2% over the injection sites. The transforaminal spaces were then approached with a 25 gauge, 3.5 inch  spinal quinke needle that was introduced under fluoroscopic guidance in the AP and Lateral views. Once the needle tip was in the area of the transforaminal space, and there was no blood, CSF or paraesthesias, contrast dye Omnipaque (300mg/mL) was injected to confirm placement and there was no vascular runoff. Fluoroscopic imaging in the AP and lateral views revealed a clear outline of the spinal nerve with proximal spread of agent through the neural foramen into the epidural space. 3 mL of the medication mixture listed above was injected slowly at each site. Displacement of the radio opaque contrast after injection of the medication confirmed that the medication went into the area of the transforaminal spaces. The needles were removed and bleeding was nil. A sterile dressing was applied. No specimens collected. The patient tolerated the procedure well.     The patient was monitored after the procedure in the recovery area. They were given post-procedure and discharge instructions to follow at home. The patient was discharged in a stable condition.    I reviewed and edited the fellow's note. I conducted my own interview and physical examination. I agree with the findings. I was present and supervising all critical portions of the procedure.    Samuel Jimenez MD

## 2024-09-02 ENCOUNTER — PATIENT MESSAGE (OUTPATIENT)
Dept: PAIN MEDICINE | Facility: CLINIC | Age: 89
End: 2024-09-02
Payer: MEDICARE

## 2024-09-03 DIAGNOSIS — G89.4 CHRONIC PAIN SYNDROME: ICD-10-CM

## 2024-09-03 DIAGNOSIS — M46.1 SACROILIITIS: Primary | ICD-10-CM

## 2024-09-03 DIAGNOSIS — M47.819 SPONDYLOSIS WITHOUT MYELOPATHY: ICD-10-CM

## 2024-09-03 RX ORDER — DULOXETIN HYDROCHLORIDE 30 MG/1
30 CAPSULE, DELAYED RELEASE ORAL 2 TIMES DAILY
Qty: 60 CAPSULE | Refills: 2 | Status: SHIPPED | OUTPATIENT
Start: 2024-09-03

## 2024-09-17 RX ORDER — GABAPENTIN 600 MG/1
600 TABLET ORAL 3 TIMES DAILY
Qty: 90 TABLET | Refills: 2 | Status: SHIPPED | OUTPATIENT
Start: 2024-09-17 | End: 2024-12-16

## 2024-09-18 DIAGNOSIS — G89.4 CHRONIC PAIN SYNDROME: ICD-10-CM

## 2024-09-18 RX ORDER — HYDROCODONE BITARTRATE AND ACETAMINOPHEN 10; 325 MG/1; MG/1
1 TABLET ORAL EVERY 6 HOURS PRN
Qty: 120 TABLET | Refills: 0 | Status: SHIPPED | OUTPATIENT
Start: 2024-09-18 | End: 2024-10-18

## 2024-09-23 ENCOUNTER — OFFICE VISIT (OUTPATIENT)
Dept: SPINE | Facility: CLINIC | Age: 89
End: 2024-09-23
Attending: ANESTHESIOLOGY
Payer: MEDICARE

## 2024-09-23 VITALS
RESPIRATION RATE: 18 BRPM | DIASTOLIC BLOOD PRESSURE: 73 MMHG | WEIGHT: 190 LBS | BODY MASS INDEX: 25.18 KG/M2 | HEART RATE: 60 BPM | HEIGHT: 73 IN | SYSTOLIC BLOOD PRESSURE: 169 MMHG

## 2024-09-23 DIAGNOSIS — M47.816 LUMBAR SPONDYLOSIS: ICD-10-CM

## 2024-09-23 DIAGNOSIS — G89.4 CHRONIC PAIN SYNDROME: ICD-10-CM

## 2024-09-23 DIAGNOSIS — G03.9 ARACHNOIDITIS: Primary | ICD-10-CM

## 2024-09-23 PROCEDURE — 99999 PR PBB SHADOW E&M-EST. PATIENT-LVL IV: CPT | Mod: PBBFAC,,, | Performed by: ANESTHESIOLOGY

## 2024-09-23 PROCEDURE — 99214 OFFICE O/P EST MOD 30 MIN: CPT | Mod: PBBFAC | Performed by: ANESTHESIOLOGY

## 2024-09-23 NOTE — PROGRESS NOTES
Chronic Pain Established Patient           PCP: Brian Sorto MD      CHIEF COMPLAINT: low back pain         INTERVAL HISTORY (9/23/2024):   Patient presents today s/p R S1 TFESI. This provided him some relief for a few days. His pain is focused to R buttock and is mainly axial in nature, rarely it will radiate down the leg to the bottom of the foot. He describes it as aching and throbbing. Exacerbated by standing, bending. He uses a cane and walker. Mitigated by resting, icing, medications. He rates the pain as 6/10, worst pain is 9/10, best pain is 4/10. Denies night fevers, unexpected weight loss, motor weakness, loss of sensation. He endorses episodes of urinary and bowel incontinence - present for five to ten years.     He continues to take Norco 10/325 TID PRN and Hydromorphone 2mg BID PRN. Patient now takes Gabapentin 300mg TID and cymbalta. Patient needs refills for his medications.       Interval History 8/26/2024:  Jefferson Boogie returns to clinic for follow-up after right SIJ fusion. He recently had CT pelvis performed showing new, narrowing of right S1 foramina. He continues to report tingling to the right foot. He denies weakness. He continues Norco 10/325 TID PRN and Hydromorphone 2 mg BID PRN with mild relief. He denies any perceived side effects. He denies recent falls or trauma. He denies new onset fever/night sweats, urinary incontinence, bowel incontinence, significant weight changes, significant motor weakness or changes, or loss of sensations. His pain today is 7/10.      Interval History 8/19/2024:  Jefferson Boogie returns to clinic for follow-up after Right SIJ fusion. He continues to report significant amounts of pain. He has been taking Hydromophone 2 mg daily and Norco 4 times per day without relief. He continues to utilize a rolling walker to help him ambulate. He denies fever,  drainage, redness, swelling or any other signs of infection. He denies recent falls or trauma. He denies new onset fever/night sweats, urinary incontinence, bowel incontinence, significant weight changes, significant motor weakness or changes, or loss of sensations. His pain today is 7/10.      Interval History 8/15/2024:  Jefferson Boogie returns to clinic for follow-up after Right SI joint fusion using SILO TFX. He reports significant pain. He continues Hydromorphone 2 mg. He took 1 pill today . He has not taken any Norco today. He is present with his wife. She is wondering when they can start doing things like going to dinner or if he needs to stay at home while he recovers. He denies fever, drainage, redness or swelling from procedure. Site. He denies recent falls or trauma. He denies new onset fever/night sweats, urinary incontinence, bowel incontinence, significant weight changes, significant motor weakness or changes, or loss of sensations. His pain today is 9/10.      Interval History 6/27/2024:  Patient returns to clinic for follow up. Patient is s/p Right SIJ and R Piriformis injections on 4/10/2024. Patient reports 100% relief from the procedure for 2 days. Pain has recurred and is same as previous description. Sha Ku ([facility]) is also here today for programming of [place] SCS. Patient continues with current medications with some benefit and HEP. Current pain 6/10. Patient denies red flags including weakness, unexpected weight loss/gain, night sweats/fevers, saddle anesthesia, and symptoms of CAREN.     Interval History 3/14/2024:  Jefferson Boogie presents for follow up. He is here today to discuss further options for treatment after right cluneal nerve block on 2/28/2024. He was here for the same reason two weeks ago, and we decided to give the block a bit more time to work before investigating other options. He reports no significant changes in his symptoms. He does say that he can manage his symptoms  satisfactorily using 1.5 of his oxycodone  every 3 hours when doing things like golf and walking. He said he may use the medication once or twice a month and denies significant side effects.      Interval History 2/29/24:  Jefferson Boogie presents to the clinic for follow-up. He is s/p right cluneal nerve block 2/28/24. Today, he says that he does not know if he has had any difference in his symptoms so far. We spoke briefly yesterday about additional options, and he is here today for further discussion.      Interval History 01/16/2024:  Jefferson Boogie presents to the clinic for a follow-up appointment for low back, right hip, and right leg pain. Since the last visit, Jefferson Boogie states the pain has been persistant. Currently his right hip and leg pain is his biggest problem. He continues to endorse sharp stabbing pain in his hip and leg. Pain is exacerbated by activity, standing, laying on right side, lifting, bending. Pain is improved with rest, exercising, stretching, medications. Continues with Celebrex and Cymbalta. Current pain intensity is 6/10. Patient continues to endorse benefit from SCS. Patient denies red flags including weakness, unexpected weight loss/gain, night sweats/fevers, saddle anesthesia, and symptoms of CAREN.     Interval History 8/10/2023:  Patient reports that his pain post right SIJ and piriformis was persistent until it decreased 50% on 7/4/23, but then the pain returned after a few days. He reports that during that period he was able to run after a bus in [facility]. The pain is improved, but not as much as previous in his right buttock and calf. Patient reports that he has been working with the [facility], but not finding the correct programming as of yet.  No fevers, chills, unexplained weight loss.  Hx of bladder cancer in 1967. Remembers falling while skiing and landed on his buttocks 30 years ago, but no other pelvic trauma. Patient was traveling to [facility] and recently came  back to [place].  We discussed his recent pelvic CT in details especially for the accidental finding of irregular bone lesion. Explained to patient the need to investigate this further with our urology and oncology team.     Interval History 6/22/2023  Jefferson Boogie presents to the clinic for a follow-up appointment for chroniic LBP. Pt was last seen in clinic on 5/25 and scheduled for right SIJ and piriformis injection--performed 6/22. Pt reports 40% relief x 1-2 days. He continues to work with the rep with regards to programming the [employer] SCS device. Pt reports that he is getting some decent relief from the device with current program, but feels like there is still some progress to be made. Continues to complain of pain of similar character and quality to that of prior eval. Localized in the right lowerlumbar spine/gluteal area as well as the right lateral leg. Since the last visit, Jefferson Boogie states the pain has been improving. Current pain intensity is 5/10. He continues to exercise daily with stationary bike and weight training. Continues to take Cymbalta and Celebrex daily. He also takes Norco 10 very sparingly, maybe 1-2 per week. Overall the current regimen of SCS, medications and occasional injections have provided pt with enough relief for him to remain functional, and able to participate in his hobbies.      Interval history 5/25/23:  In the interim he has met with the [employer] reps for reprogramming. He has his good and bad days but feels that the pain is manageable. The reps with [employer] are present for the visit today. Today his pain is a 6.5/10. Still taking percocet and cymbalta (missed a few days), but these medications do not provide much relief. Still pain with walking and sitting, but the pain with laying down is better.      Interval History 5/4/23:  Patient seen today via virtual follow-up after implantation of his [place] SCS in February. He reports no change in his pain since his last  visit. Most of his pain is into his right hip with intermittent radiation into his RLE. He denies any new symptoms. No red flag symptoms. He is scheduled to meet with the [employer] reps from reprogramming next week. He remains optimistic. In discussing his current medications, he has run out of the hydrocodone he was previously taking and has only been taking his Cymbalta once per day rather two.      Interval History 3/30/2023:  Mr Boogie presents 5-6 weeks after implantation of his [place] SCS system. He reports no significant change in his pains, which are primarily down the RLE.  No constitutional signs symptoms concerning for infection.  No new areas of pain or neurological changes since procedure. No voicing of s/s concerning for cauda equina syndrome. He does endorse improvement in sleep. He is worried that his implant was a failure because his pain has not changed.     Interval History 2/20/2023:  Mr Boogie presents for follow up of [place] SCS replacement. He states doing well. No constitutional signs symptoms concerning for infection.  No new areas of pain or neurological changes since procedure. No voicing of s/s concerning for cauda equina syndrome.      Interval History 1/23/2023:  Still taking Celebrex, however hasn't noticed a difference in pain with this medication. Location, quality, and severity of pain are unchanged from prior visit and is described above. Here today to discuss removal of [employer] SCS, and implant of [place] SCS. He states his stimulator has not been helpful for the past 2 years despite multiple reprogramming and adjustment.     Interval History 11/21/22:  Reports 24 hours of 100% relief for 48 hours, but he reports that now his pain is only approximally 10% better. His pain at rest is 3-4/10. Pain at its worst is 8-9/10. Pain is improved when leaning over a grocery cart. Pain only allows him to walk for 3-4mins.      Interval History 8/29/22:  Jefferson Boogie presents to the  clinic for a follow-up appointment for back pain.  He had his second bilateral L3, L4, L5 MBB and reports 100% pain relief. He would like to proceed with RFA.     Interval History 5/26/22:  Jefferson Boogie presents to the clinic for a follow-up appointment for back pain. Since the last visit, Jefferson Boogie states the pain has been stable. Certain postures he can tolerate in the standing position such as leaning on a shopping cart or support to his posterior thighs. He now uses a walker when covering long distances. He continues to play golf despite it aggravating his pain. He receives some benefit from the SCS whereas other interventions have not helped. He remains interested in the [employer] device.     Interval History 3/24/22:  Jefferson Boogie presents to the clinic for a follow-up appointment for back pain. Since the last visit, Jefferson Boogie states the pain has been worse than usual. Current pain intensity is 8/10. He continues to report benefit with [employer] SCS however he has not been able to get in touch with the representative in order to have his settings adjusted. He continues to take occasional norco 7.5 mg with benefit, particularly when he plays golf.     Interval History 2/10/22:  Patent presents today s/p caudal epidural steroid injections on 1/12/22 since then he has not had any relief. He states his pain has been unchanged and is a 6/10 on average with the worst being 8/10 and best is 4/10. He has been taking percocet 5mg when he golfs or plays with his granddaughter.      Interval History 12/30/21:  Jefferson Boogie presents to clinic for follow up appointment s/p Right SI joint and Right piriformis muscle injection under US guidance on 11/29/21. He did not obtain any noticeable relief with this procedure similar to all of his previous injections. His pain is unchanged and constant over the right buttock. He is taking percocet 5mg x 3 on days he is more active, such as playing golf. This  "helps some, but minimally. Denies any new symptoms or neurological changes. No numbness, tingling, weakness in his lower extremities. Denies bowel/bladder incontinence or saddle anesthesia.      Interval History 11/4/2021:  Jefferson Boogie presents to the clinic for a follow-up appointment for right sided back pain and SI joint pain. Mr. Boogie had a right SI joint injection on 10/13/2021. He did not note significant relief with the injection for any period of time. Pain is still constant over the right buttock and most noticeable when playing golf. He denies any new bowel/bladder incontinence, lower extremity numbness or weakness or saddle anesthesia.     Interval History 8/26/2021:  Jefferson Boogie presents to the clinic for a follow-up appointment for right sided hip pain with right-sided radiculopathy . Since the last visit, Jefferson Boogie states the pain has been "particularly tender today" 7/10. Patient reports playing golf recently and experienced worsening symptoms the following day. Mr. Boogie is s/p right-side hip joint injection with minimal relief. Patient states he has had relief with previous interventions and is open to RFA.     Interval History 6/10/2021:  Jefferson Boogie presents to the clinic for a follow-up appointment. Since the last visit, Jefferson Boogie states the pain has been stable. Current pain intensity is 3/10. States he is still having pain around SIJ that is affecting his ADL      Interval History 4/15/2021:  The patient returns to clinic today for follow up of back pain. He reports that the swelling above the SI fusion incision has resolved. He denies any fever, chills, or drainage from the incision. He does report benefit since the fusion. He continues to report benefit with Nevro SCS. He reports intermittent shoulder pain at this time. He did receive an injection with Sports Medicine with benefit. He denies any other health changes. His pain today is 2/10.   "   Interval History 3/25/21:  Mr. Boogie presents to clinic for follow up of bilateral shoulder pain. He is s/p BL subacromial injections on 2/8/21. He reports maximum 20% relief of pain in the Right shoulder. Today the Left shoulder pain is 1/10; Right shoulder pain is 5-7/10.      Interval History 3/22/2021:  The patient returns to clinic today for follow up of back pain. He is s/p right SI fusion on 3/15/2021. He reports some relief at this time. He does report soreness to the incision. He denies any fever, chills, or drainage from incision. He continues to report benefit with Nevro SCS. He denies any other health changes. His pain today is 4/10.     Interval History 1/11/2020:  Patient is here for follow-up of low back pain now s/p sacroiliac RFA on 12/9/20 which provided no benefit and with the new complaint of bilateral anterior shoulder pain R>L. His shoulder pain started about 2 months ago. He describes 9/10 sharp/stinging pain without radiation that is exacerbated with overhead activity and improved with rest. He started PT about one month ago. He takes oxycodone which provides some relief. He had a blind subacromial steroid injection in the right shoulder with Dr. Ramirez on 10/26/19 which provided some short term relief.     Interval History 11/5/2020:  Jefferson Boogie presents to the clinic for a follow-up appointment for low back pain. Since the last visit, Jefferson Boogie states the pain has been stable. Current pain intensity is 4/10. He had good relief from the SI joint injection that lasted for about a week. Pain has since returned. He also slipped and fell on his buttock a couple of weeks ago and had increased pain in the right buttock after that which is slowly improving. He continues to have variable benefit with the [employer]ro SCS for intermittent pain in the right leg. He is scheduled to meet with representatives today. He is taking celebrex daily and percocet as needed sparingly. He denies any  adverse effects and any other health changes.     Interval History 10/5/2020:  The patient returns to clinic today for follow up of low back pain. He is s/p right SI joint injection on 9/9/2020. He reports 25% relief of his right sided low back and buttock pain. He did have good relief the first two days. He continues to report right sided low back and buttock pain. He denies any radicular leg pain. His pain is worse with prolonged standing and walking. He continues to report intermittent pain into his achilles from previous injury. He feels as though this has changed his gait. He continues to report some benefit with Nevro SCS device. He is in contact with representatives. He is currently taking Percocet as needed sparingly with some benefit. He denies any adverse effects. He denies any other health changes. His pain today is 4/10.      Interval History 9/2/2020:  The patient returns to clinic today for follow up of back pain. He reports that his back pain has improved since last audio visit. He continues to report back pain with intermittent radiating pain into his right hip and calf. He has spoken with Bienvenido from [employer] via phone with some programming. He is meeting with Bienvenido today. He had some issues with charging but this has improved. He is currently taking Norco intermittently but this is only lasts 2-3 hours. He denies any adverse effects. He denies any other health changes. His pain today is 8/10.     Interval History 08/27/2020:  Pt was contacted over [employer] for a follow up appointment.  He is currently complaining of right hip and right calf with no associated numbness or weakness.  He continues to use his Nevro device.  He states it has been very frustrating. Inconsistent.  Took 20 mins to 1 hour to charge.  Couldn't get it to start this morning.  He states that there is no drainage at the battery site, no signs of infection or pain at the site.  Patient is not taking medications at this time.  He  wants to speak about medication options because he would like to start playing golf again.     Interval History 8/17/2020:  The patient returns to clinic today for post op wound check. He continues to report benefit with Nevro device. He denies any fever, chills, or drainage. He continues to follow his activity restrictions. He does report a recent achilles tendon injury while swimming. He is seeing Orthopedics. He denies any other health changes. His pain today is 4/10.     Interval History 8/3/2020:  The patient returns to clinic today for post op. He is s/p Nevro battery change on 7/27/2020. He denies any fever, chills, or drainage from incision. He has not completed his antibiotics as he missed two days of the medication. He continues to follow his activity restrictions. He reports relief with the device. He is meeting with Bien[facility] today for programming. He denies any other health changes. His pain today is 2/10     Interval History 7/22/2020:  Pt presents for audio follow up only s/p Right SI joint injection on 7/8/2020. Pt states he has near resolution of focal pain to buttock. He is pleased with result and pain relief. Pt has been in contact with [employer] and will be having battery swap in 5 days. He has difficulty whether stimulator is beneficial and has even had a holiday from using SCS.  He denies any newer areas pain, denies any neuro changes, meds area adequate to control pain without adverse side effects. Pain is 2/10 today.     Interval HPI 6/15/2020:  Jefferson Boogie presents to the clinic for a follow-up appointment for 3 week follow up right hip and right thigh pain. Since the last visit, Jefferson Boogie states the pain has been persistant. Current pain intensity is 5/10. Patient has been working with Bienvenido Varghese, to try and get better coverage of a localized pain that he still experiences in his right low back area. He does report improvement in symptoms of numbness/tingling. Today, worse pain is  1/10 in severity and localizes to the right SI joint. Pain is worse with extension of his back. It is worse with golfing. Pain relieved by Norco--he takes 1-2 tablets of 5-325 Norco on days that he plays golf. Pain is also improved with being still and not being active. Patient endorses a much more active lifestyle with Norco. Denies new weakness/numbness or bowel/bladder changes.     Previous injection history includes a series of 3 lumbar CHOCO at [facility].      Interval HPI 3/5/20:  Patient presents to the clinic for a follow-up appointment for low back pain. Since the last visit, he states his pain has been unchanged. Current pain intensity is 4/10. He continues to work with [employer] to adjust settings on his SCS. He has stopped using tramadol, and uses norco sparingly. He continues to work with a  for physical therapy.      Interval HPI 1/9/2020:  Patient returns for follow up s/p Nevro SCS. He states he is unsure if it has helped his pain since he had it implanted. He last had an adjustment of his programming about 1 month ago. He does note that once he is done charging his SCS his pain is improved. Pain still worse with prolonged standing and activity such as golf. He still is doing home exercise program. He says that he is trying to do more since getting the SCS. He is still taking the Tramadol with limited pain relief. He takes Tramadol 50 mg twice daily only on days of activity which is once a week. No other health changes.      HPI 11/20/19  Jefferson Boogie returns to clinic today for follow up. He reports increased low back and leg pain over the last few days. He has been in contact with [employer] representatives for programming adjustments. He does report benefit with the device. He reports good days and bad days. His pain is worse with prolonged standing and activity. He continues to participate in a home exercise routine. He does take Tramadol sparingly but reports limited relief. He  denies any other health changes. His pain today is 5/10.      Pain Medications:  Oxycodone-acetaminophen 10-325mg very rarely  Mobic  Cymbalta     Opioid Contract: not applicable      report:  Reviewed and consistent with medication use as prescribed. Has not filled oxycodone since June of last year, says he has plenty left.      Pain Procedures  9/9/2020 - Right SI joint injection  7/27/2020 - Nevro SCS battery change  7/8/2020 - Right SIJ injection >80% relief   8/26/19 - SCS implant  8/5/19 - SCS trial  7/8/2020 - Right SI joint injection  7/27/2020 - SCS battery revision  9/9/2020 - Right SI joint injection   12/9/2020 - Right L5-S1 RFA  3/15/2021 - Right SI fusion  7/21/2021 - Injection R Hip - minimal relief  10/13/2021 - Right SI joint injection  11/29/2021 - R SI joing and R piriformis muscle injection - no relief   1/12/2022 - Caudal CHOCO- no relief   8/24/22 - Diagnostic Bilateral L3, L4 and L5 Lumbar Medial Branch Block under Fluoroscopy  09/21/22 - Right L3, L4, L5 RFA  10/12/22 -  Left L3, L4, L5 RFA  2/13/23 - [place] SCS implant  5/31/23 - Right SIJ and piriformis injection   2/28/24 -  Right cluneal nerve block  4/10/24 - Right SIJ and piriformis injection  8/12/24 - Right SIJ fusion  8/28/24 - Right S1 TFESI      Physical Therapy/Home Exercise: no     Imaging:      CT PELVIS WITHOUT CONTRAST     CLINICAL HISTORY:  Pelvis pain, stress fracture suspected, neg xray;  Arthrodesis status     TECHNIQUE:  1.25 mm axial images of the pelvis obtained, coronal and sagittal images reformatted.     COMPARISON:  None     FINDINGS:  Percutaneous Posterior right  Sacroiliac Joint Fusion using SILO TFX transfixing device 08/12/2024.     New, moderate narrowing of the right S1 foramina by osseous density extending to the foramina, could cause symptoms referable to a right S1 neuropathy, to correlate clinically.     The bilateral sacroiliac joints appear within normal limits.     The bilateral hip joints appear  normal.  No advanced degenerative change.     Mild osseous hypertrophy at the pubic symphysis.     The intrapelvic content demonstrate significant diverticulosis coli.  Moderate stool retention.  Mild bladder wall thickening possibly due to nondistention.  The prostate is not enlarged.  The abdominal wall and inguinal regions appear normal.     Degenerative disc disease, disc space narrowing and vacuum disc phenomenon L4-5 L5-S1, unchanged.     Impression:     New, moderate-severe narrowing of the right S1 foramina, by osseous density extending in the right foramina, could impinge on the exiting right S1 nerve root, to correlate with patient's symptoms.     Significant sigmoid diverticulosis.     This report was flagged in Epic as abnormal.        Electronically signed by:Ammy Nance MD  Date:                                            08/21/2024  Time:                                           16:45     XR SACRUM AND COCCYX     CLINICAL HISTORY:  Arthrodesis status     FINDINGS:  Sacrum coccyx three views.     There is hardware status post right SI joint arthrodesis.  No complication seen.  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        Electronically signed by:Aki Amaya MD  Date:                                            08/16/2024  Time:                                           08:19     XR HIPS BILATERAL 2 VIEW INCL AP PELVIS     CLINICAL HISTORY:  Arthrodesis status     FINDINGS:  Hips bilateral two views to include pelvis.     Right: No fracture dislocation bone destruction seen.     Left: No fracture dislocation bone destruction seen.        Electronically signed by:Aki Amaya MD  Date:                                            08/16/2024  Time:                                           08:17        EXAMINATION:  CT PELVIS WITHOUT CONTRAST     CLINICAL HISTORY:  History fo percutanous SI fusion.;  Chronic pain syndrome     TECHNIQUE:  CT of the pelvis, without intravenous contrast.     COMPARISON:  None      FINDINGS:  BONE: Diffuse osteopenia.  No fracture or osteonecrosis.  Few lytic and sclerotic lesions scattered throughout the pelvis, including a lesion in the right iliac bone with irregularity of the overlying cortex (2:103).     JOINT: Postoperative changes from right sacroiliac joint fusion.  The implant is in satisfactory position.  No osseous fusion across the joint space.     Degenerative changes both sacroiliac joints.  No erosions or ankylosis.  Degenerative changes also involve the lower lumbar spine, both hip joints, and pubic symphysis.  Chondrocalcinosis of the pubic symphysis.  No significant joint effusion.     SOFT TISSUE: Normal muscle bulk. Regional tendons are intact.  Calcific tendinopathy involving the proximal hamstring tendons bilaterally.  No bursal collection.     MISCELLANEOUS: Circumferential bladder wall thickening.  Prostatic calcifications.  Colonic diverticulosis.  Atherosclerosis.     Impression:     Postoperative changes in the right sacroiliac joint.  No osseous fusion.     Few lytic and sclerotic lesions scattered throughout the pelvis, concerning for metastases or myeloma.  No prior studies are available for comparison.     Bladder wall thickening, which could be secondary to outlet obstruction or cystitis.  Correlate with urinalysis.     Other findings as described.     This report was flagged in Epic as abnormal.     6/10/2021 - X ray Hips Bilateral: No radiographic evidence of acute osseous abnormality of the pelvis and hips and without radiographic evidence of osteonecrosis of the femoral heads.     9/18/21 MRI L-spine:  FINDINGS:  Lumbar sagittal alignment is slightly is straightened.  There is slight convex right curvature lumbar spine.  There is degenerative disc disease with intervertebral disc height loss and endplate degenerative change at all levels with scattered disc desiccation allowing for degenerative change the lumbar vertebral body heights and contours are  within normal is without evidence for acute fracture or subluxation.  There is heterogeneous T1/T2 signal focus within the right aspect of the S1 vertebra most compatible with hemangioma this measures 2.0 cm.     The distal spinal cord and conus is normal in signal and contour tip of the conus approximates the T12/L1 level.  Please note there is artifact from indwelling spinal stimulator device with leads partially visualized casting artifact entering the dorsal epidural space at the T12 level and extending cranially.     There is abnormal clumping configuration of the filum terminalis a from T12 through L5 with slight thickening of scattered nerve roots most compatible with arachnoiditis.     No aneurysmal dilatation of the visualized abdominal aorta.     T12/L1 through L1/L2: No significant disc bulge, central canal or neural foraminal stenosis.     L2/L3: Posterior disc osteophyte with facet joint arthropathy without significant central canal stenosis with mild bilateral neural foraminal stenosis.     L3/L4:Bulging disc with ligamentum flavum hypertrophy without significant central canal stenosis with mild bilateral neural foraminal stenosis.     L4/L5:Posterior disc osteophyte with ligamentum flavum hypertrophy and facet joint arthropathy without significant central canal stenosis and mild bilateral neural foraminal stenosis.     L5/S1: Posterior disc osteophyte with facet joint arthropathy without significant central canal stenosis with moderate right and mild left neural foraminal stenosis.     This report was flagged in Epic as abnormal.     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  Please note there is spinal stimulator device with leads partially visualized and distorting the study by artifacts.     There is abnormal thickening of the filum configuration most compatible with arachnoiditis.     Please see above for additional details.     Thoracic spine  MRI:  FINDINGS: Thoracic vertebral body height and alignment are maintained with a few small scattered Schmorl's nodes involving the endplates of several mid to lower thoracic vertebra. The T3-4 vertebra are partially fused. There is some degree of desiccation of all of the thoracic discs with multilevel disc space narrowing, especially at the T7-T8, T6-T7 and T5-T6 levels. There is no abnormal prevertebral soft tissue thickening. The somewhat heterogeneous appearance to the signal from the thoracic vertebra is presumably secondary to an admixture of red and yellow marrow.    T1-T2 level: There is no bony foraminal narrowing or bony central canal stenosis and the posterior disc margin is unremarkable.    T2-T3 level: There is no bony foraminal narrowing or bony central canal stenosis and the posterior disc margin is unremarkable.    T3-T4 level: There is no bony foraminal narrowing or bony central canal stenosis and the posterior disc margin is unremarkable.    T4-5 level: There is no bony foraminal narrowing or bony central canal stenosis and the posterior disc margin is unremarkable.    T5-T6 level: There is no bony foraminal narrowing or bony central canal stenosis and the posterior disc margin is unremarkable.    T6-T7 level: There is no bony foraminal narrowing or bony central canal stenosis and the posterior disc margin is unremarkable.    T7-T8 level: There is no bony foraminal narrowing or bony central canal stenosis and the posterior disc margin is unremarkable.    T8-T9 level: There is no bony foraminal narrowing or bony central canal stenosis and the posterior disc margin is unremarkable.    T9-T10 level: There is no bony foraminal narrowing or bony central canal stenosis and the posterior disc margin is unremarkable.    T10-T11 level: There is no bony foraminal narrowing or bony central canal stenosis and the posterior disc margin is unremarkable.    T11-T12 level: There is no bony foraminal narrowing or  bony central canal stenosis and the posterior disc margin is unremarkable.    T12-L1 level: The tip the conus medullaris extends down to level of the superior endplate of L1. 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 There is no bony foraminal narrowing or bony central canal stenosis at this level.    On the axial images, the immediate paravertebral soft tissues are unremarkable. A small cyst is seen to involve the upper pole the left kidney.    Following contrast administration, there is no abnormal enhancement of any bony or soft tissue elements of the thoracic spine. There is no abnormal enhancement of the subserosal surface of the thoracic spinal cord. Some foci of mild signal intensity in the CSF dorsal to the spinal cord of some of the sagittal sequences presumably is secondary to CSF pulsation artifact.    On the sagittal  image, there is cervical spondylosis and kyphosis with narrowing of all of the disc spaces in the cervical spine.           9/23/2024     1:55 PM   Last 3 PDI Scores   Pain Disability Index (PDI) 35           Past Medical History:   Diagnosis Date    Bronchitis     Cancer     stage I bladder cancer 50 yrs ago    Hypercholesteremia     Hypertension     Sacroiliitis 07/08/2020    Thyroid disease      Past Surgical History:   Procedure Laterality Date    BLADDER SURGERY      CATARACT EXTRACTION      CATARACT EXTRACTION W/  INTRAOCULAR LENS IMPLANT Right 3/9/2022    Procedure: EXTRACTION, CATARACT, WITH IOL INSERTION;  Surgeon: Bell Cedeno MD;  Location: [facility] OR;  Service: Ophthalmology;  Laterality: Right;    COLONOSCOPY      EPIDURAL STEROID INJECTION N/A 1/12/2022    Procedure: INJECTION, STEROID, EPIDURAL CAUDAL With Catheter needs consent;  Surgeon: Samuel Jimenez MD;  Location: [facility] PAIN MGT;  Service: Pain Management;  Laterality: N/A;    EYE SURGERY      cataracts     FUSION OF SACROILIAC JOINT Right 3/15/2021    Procedure:  FUSION, RIGHT SACROILIAC JOINT FUSION;  Surgeon: Samuel Jimenez MD;  Location: [facility] OR;  Service: Pain Management;  Laterality: Right;  C-ARM, PAINTEQ REP     FUSION OF SACROILIAC JOINT Right 8/12/2024    Procedure: FUSION, SACROILIAC JOINT;  Surgeon: Samuel Jimenez MD;  Location: [facility] OR;  Service: Pain Management;  Laterality: Right;    HERNIA REPAIR      INJECTION OF ANESTHETIC AGENT AROUND NERVE Bilateral 6/1/2022    Procedure: BLOCK, NERVE MEDIAL BRANCH L3,4,5 BILATERAL 1st;  Surgeon: Samuel Jimenez MD;  Location: [facility] PAIN MGT;  Service: Pain Management;  Laterality: Bilateral;    INJECTION OF ANESTHETIC AGENT AROUND NERVE Bilateral 8/24/2022    Procedure: Block, Nerve Kenny L3, L4, & L5;  Surgeon: Samuel Jimenez MD;  Location: [facility] PAIN MGT;  Service: Pain Management;  Laterality: Bilateral;    INJECTION OF ANESTHETIC AGENT AROUND NERVE Right 2/28/2024    Procedure: BLOCK, NERVE RIGHT CLUNEAL;  Surgeon: Samuel Jimenez MD;  Location: [facility] PAIN MGT;  Service: Pain Management;  Laterality: Right;  [phone]    INJECTION OF JOINT Right 7/8/2020    Procedure: INJECTION, JOINT, SACROILIAC (SI);  Surgeon: Samuel Jimenez MD;  Location: [facility] PAIN MGT;  Service: Pain Management;  Laterality: Right;    INJECTION OF JOINT Right 9/9/2020    Procedure: INJECTION, JOINT, SACROILIAC (SI);  Surgeon: Samuel Jimenez MD;  Location: [facility] PAIN MGT;  Service: Pain Management;  Laterality: Right;    INJECTION OF JOINT Right 7/21/2021    Procedure: INJECTION, JOINT, HIP RIGHT;  Surgeon: Samuel Jimenez MD;  Location: [facility] PAIN MGT;  Service: Pain Management;  Laterality: Right;  CONSENT NEEDED    INJECTION OF JOINT Right 10/13/2021    Procedure: INJECTION, JOINT, SACROILIAC (SI)  NEED CONSENT pt. requesting sedation;  Surgeon: Samuel Jimenez MD;  Location: [facility] PAIN MGT;  Service: Pain Management;  Laterality: Right;    INJECTION OF JOINT Right 4/10/2024    Procedure: INJECTION, JOINT RIGHT SI  AND RIGHT PIRIFORMIS;  Surgeon: Samuel Jimenez MD;  Location: [facility] PAIN MGT;  Service: Pain Management;  Laterality: Right;  [phone]    INJECTION, SACROILIAC JOINT Right 5/31/2023    Procedure: INJECTION,SACROILIAC JOINT, RIGHT SI AND RIGHT PIRIFORMIS;  Surgeon: Samuel Jimenez MD;  Location: [facility] PAIN MGT;  Service: Pain Management;  Laterality: Right;    KNEE SURGERY      RADIOFREQUENCY ABLATION Right 12/9/2020    Procedure: RADIOFREQUENCY ABLATION, L5-S1-S2 LATERAL BRANCH COOLED;  Surgeon: Samuel Jimenez MD;  Location: [facility] PAIN MGT;  Service: Pain Management;  Laterality: Right;    RADIOFREQUENCY ABLATION Right 9/21/2022    Procedure: RADIOFREQUENCY ABLATION, RIGHT L3-L4-L5 PER DR JIMENEZ;  Surgeon: Samuel Jimenez MD;  Location: [facility] PAIN MGT;  Service: Pain Management;  Laterality: Right;    RADIOFREQUENCY ABLATION Left 10/12/2022    Procedure: RADIOFREQUENCY ABLATION, LEFT L3-L4-L5 TWO OF TWO;  Surgeon: Samuel Jimenez MD;  Location: [facility] PAIN MGT;  Service: Pain Management;  Laterality: Left;    REPLACEMENT OF NERVE STIMULATOR BATTERY N/A 7/27/2020    Procedure: Replacement, SPINAL CORD STIMULATOR BATTERY CHANGE TO NEVRO OMNION BATTERY;  Surgeon: Samuel Jimenez MD;  Location: [facility] OR;  Service: Pain Management;  Laterality: N/A;  C-ARM, NEVRO REP    REPLACEMENT OF SPINAL CORD STIMULATOR  2/13/2023    Procedure: REPLACEMENT, SPINAL CORD STIMULATOR;  Surgeon: Samuel Jimenez MD;  Location: [facility] OR;  Service: Pain Management;;    REVISION PROCEDURE INVOLVING SPINAL CORD NEUROSTIMULATOR N/A 2/13/2023    Procedure: SPINAL CORD STIMULATOR EXPLANT AND REIMPLANT SALUDA REP;  Surgeon: Samuel Jimenez MD;  Location: [facility] OR;  Service: Pain Management;  Laterality: N/A;    TRANSFORAMINAL EPIDURAL INJECTION OF STEROID Right 8/28/2024    Procedure: LUMBAR TANSFORAMINAL RIGHT S1 MEDICALLY URGENT *ASPIRIN OTC*;  Surgeon: Samuel Jimenez MD;  Location: [facility] PAIN MGT;  Service: Pain  Management;  Laterality: Right;  STAT  [phone]  *SCHEDULE ON  PER MG    TRIAL OF SPINAL CORD NERVE STIMULATOR N/A 2019    Procedure: Trial, Neurostimulator, SPINAL CORD STIMULATOR TRIAL;  Surgeon: Samuel Jimenez MD;  Location: [facility] CATH LAB;  Service: Pain Management;  Laterality: N/A;  C-ARM, NEVRO REP     Social History     Socioeconomic History    Marital status:    Tobacco Use    Smoking status: Former     Current packs/day: 0.00     Types: Cigarettes     Quit date:      Years since quittin.7    Smokeless tobacco: Never    Tobacco comments:     stopped 40 years ago   Substance and Sexual Activity    Alcohol use: Yes     Alcohol/week: 1.0 standard drink of alcohol     Types: 1 Shots of liquor per week     Comment: nightly -scotch    Drug use: Never    Sexual activity: Yes     Partners: Female     Social Determinants of Health     Financial Resource Strain: Low Risk  (2022)    [place] [facility]    Overall Financial Resource Strain (CARDIA)     Difficulty of Paying Living Expenses: Not hard at all   Food Insecurity: Unknown (2024)    [place] [facility]    Hunger Vital Sign     Worried About Running Out of Food in the Last Year: Never true   Transportation Needs: No Transportation Needs (2022)    [place] [facility]    PRAPARE - Transportation     Lack of Transportation (Medical): No     Lack of Transportation (Non-Medical): No   Physical Activity: Unknown (2024)    Received from [facility]    Exercise Vital Sign     Days of Exercise per Week: 6 days   Stress: No Stress Concern Present (2024)    Received from [facility]    [employer] [place] [employer] - Occupational Stress Questionnaire     Feeling of Stress : Only a little   Housing Stability: Unknown (2023)    [place] [facility]    Housing Stability Vital Sign     Unable to Pay for Housing in the Last Year: No     No  family history on file.    Review of patient's allergies indicates:  No Known Allergies    Current Outpatient Medications   Medication Sig    ALPRAZolam (XANAX) 2 MG Tab     amLODIPine (NORVASC) 5 MG tablet     ascorbic acid, vitamin C, (VITAMIN C) 1000 MG tablet Take 1,000 mg by mouth.    DULoxetine (CYMBALTA) 30 MG capsule Take 1 capsule (30 mg total) by mouth 2 (two) times daily.    ergocalciferol, vitamin D2, (VITAMIN D ORAL) Take by mouth every 30 days.    finasteride (PROSCAR) 5 mg tablet Take 1 tablet by mouth once daily.    gabapentin (NEURONTIN) 600 MG tablet Take 1 tablet (600 mg total) by mouth 3 (three) times daily.    HYDROcodone-acetaminophen (NORCO)  mg per tablet Take 1 tablet by mouth every 6 (six) hours as needed for Pain (for severe pain).    irbesartan (AVAPRO) 75 MG tablet 150 mg once daily.     levothyroxine (SYNTHROID) 100 MCG tablet Take 100 mcg by mouth.    mirabegron (MYRBETRIQ ORAL) Take by mouth.    promethazine-dextromethorphan (PROMETHAZINE-DM) 6.25-15 mg/5 mL Syrp     simvastatin (ZOCOR) 20 MG tablet Take 20 mg by mouth every evening.    tadalafil (CIALIS) 20 MG Tab     temazepam (RESTORIL) 30 mg capsule TK 1 C PO QD HS    aspirin (ECOTRIN) 81 MG EC tablet Take 1 tablet by mouth once daily. (Patient not taking: Reported on 9/23/2024)    celecoxib (CELEBREX) 200 MG capsule  (Patient not taking: Reported on 9/23/2024)    ergocalciferol (DRISDOL) 200 mcg/mL (8,000 unit/mL) Drop Take by mouth.    meloxicam (MOBIC) 15 MG tablet Take 15 mg by mouth.     No current facility-administered medications for this visit.     Facility-Administered Medications Ordered in Other Visits   Medication    balanced salt irrigation intra-ocular solution 1 drop    sodium chloride 0.9% flush 2 mL           ROS:  GENERAL: No fever. No chills. No fatigue. Denies weight loss. Denies weight gain.  HEENT: Denies headaches. Denies vision change. Denies eye pain. Denies double vision. Denies ear pain.   CV:  "Denies chest pain.   PULM: Denies of shortness of breath.  GI: Denies constipation. No diarrhea. No abdominal pain. Denies nausea. Denies vomiting. No blood in stool.  HEME: Denies bleeding problems.  : Denies urgency. No painful urination. No blood in urine.  MS: Denies joint stiffness. Denies joint swelling.  Denies back pain.  SKIN: Denies rash.   NEURO: Denies seizures. No weakness.  PSYCH:  Denies difficulty sleeping. No anxiety. Denies depression. No suicidal thoughts.       VITALS:   Vitals:    09/23/24 1343   BP: (!) 169/73   Pulse: 60   Resp: 18   Weight: 86.2 kg (190 lb)   Height: 6' 1" (1.854 m)   PainSc:   5   PainLoc: Back         PHYSICAL EXAM:   GENERAL: Well appearing, in no acute distress, alert and oriented x3.  PSYCH:  Mood and affect appropriate.  SKIN: Skin color, texture, turgor normal, no rashes or lesions.  HEENT:  Normocephalic, atraumatic. Cranial nerves grossly intact.  NECK:   no pain to palpation over the cervical paraspinous muscles.   no pain to palpation over facets. *  No pain with neck flexion, extension, or lateral flexion.   Spurling test negative   PULM: No evidence of respiratory difficulty, symmetric chest rise.  GI:  Non-distended  BACK: Mildly reduced ROM w/ pain on flexion and extension.   EXTREMITIES: No deformities, edema, or skin discoloration.   MUSCULOSKELETAL: Bilateral upper and lower extremity strength is normal and symmetric. No atrophy is noted.  NEURO: Strength 5/5 to bilateral lower extremities. Bilateral upper and lower extremity coordination and muscle stretch reflexes are physiologic and symmetric. No clonus. Absent Johnson. No tremor.    GAIT: Antalgic      ASSESSMENT: 89 y.o. year old with low back pain, consistent with:    1. Arachnoiditis        2. Lumbar spondylosis        3. Chronic pain syndrome            PLAN:    Patient Education:  Discussed the importance of physical therapy, activity modification, and a home exercise plan to help with pain and " improve health.  Patient starting physical therapy. Spent ample time discussing the importance of physical therapy for the patient's pain and overall functional improvement. He expressed his understanding.   Therapy referral:  PT starting tomorrow 9/24/24  Medications:   Patient can continue with pain medications for now per their provider since they are providing benefits, using them appropriately, and without side effects.  Prescribed Norco 10 TID PRN, Gabapentin TID, and Cymbalta.   Schedule pain intervention for: No intervention at this time.    Future consideration: If patient does not improve with his physical therapy, may consider repeating SI joint injection and possible referral to Dr. Thompson for surgical intervention.   Counseled patient: regarding the importance of activity modification, constant sleeping habits, and physical therapy.  Return to clinic: as needed           Elio Smith   I have personally reviewed the history and exam of this patient and agree with the resident/fellow/NPs note as stated above.    Samuel Jimenez MD    09/23/2024

## 2024-09-24 ENCOUNTER — CLINICAL SUPPORT (OUTPATIENT)
Dept: REHABILITATION | Facility: OTHER | Age: 89
End: 2024-09-24
Attending: ANESTHESIOLOGY
Payer: MEDICARE

## 2024-09-24 DIAGNOSIS — M47.819 SPONDYLOSIS WITHOUT MYELOPATHY: ICD-10-CM

## 2024-09-24 DIAGNOSIS — G89.4 CHRONIC PAIN SYNDROME: ICD-10-CM

## 2024-09-24 DIAGNOSIS — M46.1 SACROILIITIS: ICD-10-CM

## 2024-09-24 DIAGNOSIS — R26.2 DIFFICULTY IN WALKING: Primary | ICD-10-CM

## 2024-09-24 PROCEDURE — 97161 PT EVAL LOW COMPLEX 20 MIN: CPT

## 2024-09-24 PROCEDURE — 97110 THERAPEUTIC EXERCISES: CPT

## 2024-09-24 PROCEDURE — 97530 THERAPEUTIC ACTIVITIES: CPT

## 2024-09-24 NOTE — PROGRESS NOTES
"  [facility]  Physical Therapy Initial Evaluation  Date: 9/24/2024   Name: Jefferson Boogie  Clinic Number: [id number]    Therapy Diagnosis:   Encounter Diagnoses   Name Primary?    Sacroiliitis     Chronic pain syndrome     Spondylosis without myelopathy     Difficulty in walking Yes     Physician: Samuel Jimenez MD    Physician Orders: PT Eval and Treat   Medical Diagnosis from Referral:   M46.1 (ICD-10-CM) - Sacroiliitis   G89.4 (ICD-10-CM) - Chronic pain syndrome   M47.819 (ICD-10-CM) - Spondylosis without myelopathy   Evaluation Date: 9/24/2024  Authorization Period Expiration: ***  Plan of Care Expiration: ***  Visit # / Visits authorized: ***/ ***   Progress Note Due: ***  FOTO: 1/ 1    Precautions: {IP WOUND PRECAUTIONS OHS:83426}    Time In: ***  Time Out: ***  Total Appointment Time (timed & untimed codes): *** minutes    Subjective   Date of onset: ***  History of current condition - Jefferson reports: SI joint fusion 7 weeks ago. Patient reports that he does not have any restrictions so far. Patient reports he feels worse than before surgery. Patient reports that he can manage with FWW, but the pain has been worse since then. Patient reports that the pain is constant now and used to be able to walk a ocuple of hundred yards. Patient reports using a cane and walker at home. Patient reports that he has stairs to get into his house. Patient reports he has to go sideways or pull himself up.     ELHAM:  Any Bowel and Bladder movement issues:   Any falls:   Any dizziness:  Any Headache:   Any injection in lower back: steroid or epidural:   Pain radiates:   Pain constant or intermitting:    Pain:  Current {0-10:71036::"0"}/10, worst {0-10:89677::"0"}/10, best {0-10:74863::"0"}/10   Location: {RIGHT LEFT BILATERAL:10086} {LOCATION ON BODY:93236}  Description: {Pain Description:64407}  Aggravating Factors: {Causes; Pain:25626}  Easing Factors: {Pain (activities that " relieve):95442}    Prior Therapy: ***  Social History: *** {LIVES WITH:66875}  Occupation: ***  Prior Level of Function: ***  Current Level of Function: ***    Pts goals: ***    Imaging: {Mri/ctscan/bone scan:29933}: ***    Medical History:   Past Medical History:   Diagnosis Date    Bronchitis     Cancer     stage I bladder cancer 50 yrs ago    Hypercholesteremia     Hypertension     Sacroiliitis 07/08/2020    Thyroid disease      Surgical History:   Jefferson Boogie  has a past surgical history that includes Knee surgery; Bladder surgery; Hernia repair; Trial of spinal cord nerve stimulator (N/A, 8/5/2019); Injection of joint (Right, 7/8/2020); Replacement of nerve stimulator battery (N/A, 7/27/2020); Injection of joint (Right, 9/9/2020); Radiofrequency ablation (Right, 12/9/2020); Colonoscopy; Eye surgery; Fusion of sacroiliac joint (Right, 3/15/2021); Injection of joint (Right, 7/21/2021); Cataract extraction; Injection of joint (Right, 10/13/2021); Epidural steroid injection (N/A, 1/12/2022); Cataract extraction w/  intraocular lens implant (Right, 3/9/2022); Injection of anesthetic agent around nerve (Bilateral, 6/1/2022); Injection of anesthetic agent around nerve (Bilateral, 8/24/2022); Radiofrequency ablation (Right, 9/21/2022); Radiofrequency ablation (Left, 10/12/2022); Revision procedure involving spinal cord neurostimulator (N/A, 2/13/2023); Replacement of spinal cord stimulator (2/13/2023); injection, sacroiliac joint (Right, 5/31/2023); Injection of anesthetic agent around nerve (Right, 2/28/2024); Injection of joint (Right, 4/10/2024); Fusion of sacroiliac joint (Right, 8/12/2024); and Transforaminal epidural injection of steroid (Right, 8/28/2024).    Medications:   Jefferson has a current medication list which includes the following prescription(s): alprazolam, amlodipine, ascorbic acid (vitamin c), aspirin, celecoxib, duloxetine, ergocalciferol, ergocalciferol (vitamin d2), finasteride, gabapentin,  hydrocodone-acetaminophen, irbesartan, levothyroxine, meloxicam, mirabegron, promethazine-dextromethorphan, simvastatin, tadalafil, and temazepam, and the following Facility-Administered Medications: balanced salt irrigation and sodium chloride 0.9%.    Allergies:   Review of patient's allergies indicates:  No Known Allergies     Objective       Observation: ***    Posture Alignment: {AMB PT VESTIBULAR POSTURE ALIGNMENT: 35658};{AMB PT VESTIBULAR POSTURE ALIGNMENT: 59995};{AMB PT VESTIBULAR POSTURE ALIGNMENT: 46801}    Sensation: {AMB PT KNEE SENSATION:80931}    DTR:: {AMB PT KNEE SENSATION:89360}    GAIT DEVIATIONS: Jefferson displays {AMB PT VESTIBULAR GAIT DEVIATIONS:18513};{AMB PT VESTIBULAR GAIT DEVIATIONS:29227};{AMB PT VESTIBULAR GAIT DEVIATIONS:62232};{AMB PT VESTIBULAR GAIT DEVIATIONS:83456}    Lumbar Range of Motion:    %   Flexion ***     Extension ***     Left Side Bending ***   Right Side Bending ***   Left rotation   ***   Right Rotation   ***    *= pain    Passive hip ROM in degrees:     Left Right   IR *** ***   ER *** ***     Lower Extremity Strength    Right LE  Left LE    Hip flexion: {AMB PT VESTIBULAR STRENGTH:06407} Hip flexion: {AMB PT VESTIBULAR STRENGTH:66237}   Knee extension: {AMB PT VESTIBULAR STRENGTH:58156} Knee extension: {AMB PT VESTIBULAR STRENGTH:87895}   Knee flexion: {AMB PT VESTIBULAR STRENGTH:02663} Knee flexion: {AMB PT VESTIBULAR STRENGTH:83095}   Hip IR: {AMB PT VESTIBULAR STRENGTH:90299} Hip IR: {AMB PT VESTIBULAR STRENGTH:36912}   Hip ER: {AMB PT VESTIBULAR STRENGTH:39867} Hip ER: {AMB PT VESTIBULAR STRENGTH:97935}   Hip extension:  {AMB PT VESTIBULAR STRENGTH:76411} Hip extension: {AMB PT VESTIBULAR STRENGTH:43617}   Hip abduction: {AMB PT VESTIBULAR STRENGTH:72037} Hip abduction: {AMB PT VESTIBULAR STRENGTH:42954}   Hip adduction: {AMB PT VESTIBULAR STRENGTH:60162} Hip adduction {AMB PT VESTIBULAR STRENGTH:93622}   Ankle dorsiflexion: {AMB PT VESTIBULAR STRENGTH:21576} Ankle  dorsiflexion: {AMB PT VESTIBULAR STRENGTH:61670}   Ankle plantarflexion: {AMB PT VESTIBULAR STRENGTH:39908} Ankle plantarflexion: {AMB PT VESTIBULAR STRENGTH:67990}     Special Tests:  -Quadrant testing: {POSITIVE/NEGATIVE:31313}  -WICHO: {POSITIVE/NEGATIVE:47058}  -Scour: {POSITIVE/NEGATIVE:18789}  -Repeated Flexion: {POSITIVE/NEGATIVE:52010}  -Repeated Ext:{POSITIVE/NEGATIVE:65807}  -Piriformis Test: {POSITIVE/NEGATIVE:61031}  -Prone Instability Test: {POSITIVE/NEGATIVE:82804}  -Bridge Test: {POSITIVE/NEGATIVE:07140}  -Sustained extension: {POSITIVE/NEGATIVE:80535}  -Heel walking: {POSITIVE/NEGATIVE:73027}  -Toe walking: {POSITIVE/NEGATIVE:09880}  -Hip extension movement pattern: {POSITIVE/NEGATIVE:43303}  -Long sitting test:{POSITIVE/NEGATIVE:55002}  -Trendelenburg test :{POSITIVE/NEGATIVE:27791}      SI provocation test:  Distraction test: {POSITIVE/NEGATIVE:85718}  Compression test: {POSITIVE/NEGATIVE:56914}  Thigh thrust test: {POSITIVE/NEGATIVE:28740}  Gaenslens Right side tests: {POSITIVE/NEGATIVE:36834}  Gaenslens Left side test: {POSITIVE/NEGATIVE:01654}  Sacral thrust test: {POSITIVE/NEGATIVE:46752}  3 out of all 6 provocation have sensitivity of .94 and specificity .78 for SIJ pathology     -Gillet's: ***      Neuro Dynamic Testing:    Sciatic nerve:      SLR: {POSITIVE/NEGATIVE:02932}    Tibial bias: {POSITIVE/NEGATIVE:99816}    Common Peroneal bias: {POSITIVE/NEGATIVE:63500}   Femoral Nerve:    Femoral nerve test: {POSITIVE/NEGATIVE:42409}   Neural Tension:     Slump test: {POSITIVE/NEGATIVE:07785}    Joint Mobility: ***,    PA's ***,    Transverse glides ***,  *** lumbar, *** sacral    Palpation: ***    Flexibility: ***   Ely's test: R = *** degrees ; L = *** degrees   Popliteal Angle: R = *** degrees ; L = *** degrees   Lorne's test: R = *** ; L = ***   Jose L test: R = *** ; L = ***    PT Evaluation Completed? {YES:67795}  Discussed Plan of Care with patient: {YES:64189}        CMS  "Impairment/Limitation/Restriction for FOTO *** Survey    Therapist reviewed FOTO scores for Jefferson Boogie on 9/24/2024.   FOTO documents entered into [employer] - see Media section.    Limitation Score: ***%           TREATMENT   Treatment Time In: ***  Treatment Time Out: ***  Total Treatment time separate from Evaluation: *** minutes    Jefferson received therapeutic exercises to develop {AMB PT PROGRESS OBJECTIVE:77532} for *** minutes including:  ***    Jefferson received the following manual therapy techniques: {AMB PT PROGRESS MANUAL THERAPY:38080} were applied to the: *** for *** minutes, including:  ***    Jefferson participated in neuromuscular re-education activities to improve: {AMB PT PROGRESS NEURO RE-ED:02399} for *** minutes. The following activities were included:  ***    Jefferson participated in dynamic functional therapeutic activities to improve functional performance for ***  minutes, including:  ***    Jefferson participated in gait training to improve functional mobility and safety for ***  minutes, including:  ***      Jefferson received *** hot or cold pack for *** minutes to ***.      Home Exercises and Patient Education Provided    Education provided:   - ***    Written Home Exercises Provided: {Blank single:65428::"yes","Patient instructed to cont prior HEP"}.  Exercises were reviewed and Jefferson was able to demonstrate them prior to the end of the session.  Jefferson demonstrated {Desc; good/fair/poor:29408} understanding of the education provided.     See EMR under {Blank single:00517::"Media","Patient Instructions"} for exercises provided {Blank single:33543::"9/24/2024","prior visit"}.    Assessment   Jefferson is a 89 y.o. male referred to outpatient Physical Therapy with a medical diagnosis of   M46.1 (ICD-10-CM) - Sacroiliitis   G89.4 (ICD-10-CM) - Chronic pain syndrome   M47.819 (ICD-10-CM) - Spondylosis without myelopathy   . Pt presents with ***    Pt prognosis is {REHAB PROGNOSIS OHS:67355}.   Pt will benefit " from skilled outpatient Physical Therapy to address the deficits stated above and in the chart below, provide pt/family education, and to maximize pt's level of independence.     Plan of care discussed with patient: {YES:37608}  Pt's spiritual, cultural and educational needs considered and patient is agreeable to the plan of care and goals as stated below:     Anticipated Barriers for therapy: ***    Medical Necessity is demonstrated by the following  History  Co-morbidities and personal factors that may impact the plan of care Co-morbidities:   {Co-morbidities:04800}    Personal Factors:   {Personal Factors:49183}     low   Examination  Body Structures and Functions, activity limitations and participation restrictions that may impact the plan of care Body Regions:   {Body Regions:32550}    Body Systems:    {Body Systems:95500}    Participation Restrictions:   ***    Activity limitations:   Learning and applying knowledge  {Learning and applying knowledge:66290}    General Tasks and Commands  {Gen tasks and commands:71371}    Communication  {Communication:53781}    Mobility  {Mobility:04992}    Self care  {Self Care:31139}    Domestic Life  {Domestic Life:01658}    Interactions/Relationships  {Interactions/Relationships:37968}    Life Areas  {Life Areas:56664}    Community and Social Life  {Community/Social Life:63658}         Complexity: low  See FOTO outcome assessment   Clinical Presentation {Clinical Presentation :14227} low   Decision Making/ Complexity Score: low     GOALS: Short Term Goals:  4 weeks  1. Report decreased in pain at worse less than  <   / =  ***  /10  to increase tolerance for functional activities.On going  2. Pt to increase B popliteal angle by greater than > or = 5 degrees in order to improve flexibility and posture. On going  3. Increased *** MMT 1/2  to increase tolerance for ADL and work activities.On going  4. Pt to increase B Ely's Test by greater than  > or = 5degrees in order to  "improve flexibility and posture. On going  5. Pt to tolerate HEP to improve ROM and independence with ADL's.On going  6. Pt to improve range of motion by 25% to allow for improved functional mobility to allow for improvement in IADLs. On going    Long Term Goals: 8 weeks  1. Report decreased in pain at worse less than  <   / =  ***  /10  to increase tolerance for functional mobility.  On going  2 .Pt to increase B popliteal angle by greater than > or = 10 degrees in order to improve flexibility and posture. On going  3. Increased *** MMT 1 grade to increase tolerance for ADL and work activities.On going  4. Pt will report at ***% or less limitation on FOTO Lumbar spine survey  to demonstrate decrease in disability and improvement in back pain.On going  5. Pt to be Independent with HEP to improve ROM and independence with ADL's. On going  6. Pt to increase B Ely's Test by greater than > or = 10 degrees in order to improve flexibility and posture. On going  7. Pt to demonstrate negative Bridge Test in order to show improved core strength for lumbar stabilization. On going  8. Pt to improve range of motion by 75% to allow for improved functional mobility to allow for improvement in IADLs. On going      Plan   Plan of care Certification: 9/24/2024 to ***.    Outpatient Physical Therapy {NUMBERS 1-5:61241} times weekly for {0-10:90452::"0"} weeks to include the following interventions: {TX PLAN:35725}. Galo Armando, PT      I CERTIFY THE NEED FOR THESE SERVICES FURNISHED UNDER THIS PLAN OF TREATMENT AND WHILE UNDER MY CARE   Physician's comments:     Physician's Signature: ___________________________________________________     "

## 2024-09-26 PROBLEM — M47.817 SPONDYLOSIS OF LUMBOSACRAL REGION: Status: RESOLVED | Noted: 2024-02-01 | Resolved: 2024-09-26

## 2024-09-26 PROBLEM — R26.2 DIFFICULTY IN WALKING: Status: ACTIVE | Noted: 2024-09-26

## 2024-09-26 PROBLEM — M16.9 DEGENERATIVE JOINT DISEASE (DJD) OF HIP: Status: RESOLVED | Noted: 2021-07-21 | Resolved: 2024-09-26

## 2024-09-27 ENCOUNTER — CLINICAL SUPPORT (OUTPATIENT)
Dept: REHABILITATION | Facility: OTHER | Age: 89
End: 2024-09-27
Payer: MEDICARE

## 2024-09-27 DIAGNOSIS — R26.2 DIFFICULTY IN WALKING: Primary | ICD-10-CM

## 2024-09-27 PROCEDURE — 97110 THERAPEUTIC EXERCISES: CPT

## 2024-09-27 PROCEDURE — 97530 THERAPEUTIC ACTIVITIES: CPT

## 2024-09-27 NOTE — PROGRESS NOTES
Physical Therapy Daily Treatment Note     Name: Jefferson Boogie  Clinic Number: [id number]    Therapy Diagnosis:   Encounter Diagnosis   Name Primary?    Difficulty in walking Yes     Physician: Samuel Jimenez MD    Visit Date: 2024    Physician Orders: PT Eval and Treat   Medical Diagnosis from Referral:   M46.1 (ICD-10-CM) - Sacroiliitis   G89.4 (ICD-10-CM) - Chronic pain syndrome   M47.819 (ICD-10-CM) - Spondylosis without myelopathy   Evaluation Date: 2024  Authorization Period Expiration: 26  Plan of Care Expiration: 12/3/24  Visit # / Visits authorized:  (+eval)   Progress Note Due: 10/24/24  FOTO: 1/3     Precautions: SI joint fusion on 24 (see below); fall risk; spinal cord stimulator          Time In: 1:25 pm  Time Out: 2:25 pm  Total Appointment Time (timed & untimed codes): 60 minutes    Subjective     Pt reports: that overall he is feeling a little better and feels a little looser.  he was compliant with home exercise program given last session.   Response to previous treatment:good  Functional change: improvement in pain/adverse symptoms    Pain: 6/10  Location: right SI joint      Objective     Lumbar Range of Motion:     %   Flexion 75      Extension 25      Left Side Bending 25   Right Side Bending 25   Left rotation    50   Right Rotation    50    *= pain    24  TU seconds FWW  30 second sit<>: 9.5 reps with both hands on arm chair    Treatment     Jefferson received the following manual therapy techniques for 0 minutes:   Na today    Jefferson received therapeutic exercises for 40 minutes including:  Nustep x 9 min for joint nutrition  LTR x 2 minutes 3 second holds  Bridge 2 x 10 3 second holds- DC doing for HEP  Sidelying open books  Supine TA x 10 reps with manual and verbal cueing   Supine TA + BKGO G TB x 15 reps 3 second holds each side  Seated trunk flexion with ball x 3 directions for 3 minutes  Seated march + TA x 10 reps 3 second holds  Seated adduction  squeeze with ball x 2 minutes 5 second holds    Jefferson participated in dynamic functional therapeutic activities to improve functional performance for 15  minutes, including:  Gait training with FWW with proper height  TUG  30 second sit<>stand  HEP review    Jefferson participated in neuromuscular re-education activities to improve: Balance and Proprioception for 0 minutes. The following activities were included:  NA today    CP to lumbar spine/SI joint x 5 minutes in sitting.    Home Exercises and Education Provided     Education provided:   - See above  - Progress towards goals     Written Home Exercises Provided: Patient instructed to cont prior HEP.  Exercises were reviewed and Jefferson was able to demonstrate them prior to the end of the session.  Jefferson demonstrated good  understanding of the HEP provided.   .   See EMR under Patient Instructions for exercises provided prior visit.      Assessment   Patient demonstrated good tolerance to more lumbar mobility and strengthening work. Improvement in gait noted since last session with less pain with Wbing.    Jefferson is progressing well towards his goals and there are no updates to goals at this time. Pt prognosis is Good.     Pt will continue to benefit from skilled outpatient physical therapy to address the deficits listed in the problem list on initial evaluation provide pt/family education and to maximize pt's level of independence in the home and community environment.     Anticipated barriers to physical therapy: chronicity and severity of symptoms    Pt's spiritual, cultural and educational needs considered and pt agreeable to plan of care and goals.    Goals:  Short Term Goals:  4-5 weeks  1. Report decreased in pain at worse less than  <   / =  5  /10  to increase tolerance for functional activities.On going  2. Increased B hip/LE MMT 1/2  to increase tolerance for ADL and work activities.On going  3. Pt to tolerate HEP to improve ROM and independence with ADL's.On  going  4. Pt to improve range of motion by 50% to allow for improved functional mobility to allow for improvement in IADLs. On going     Long Term Goals: 8-10 weeks  1. Report decreased in pain at worse less than  <   / =  3  /10  to increase tolerance for functional mobility.  On going  2. Increased B hip/LE MMT 1 grade to increase tolerance for ADL and work activities.On going  3. Pt will report a 51 or greater score on FOTO Lumbar spine survey  to demonstrate decrease in disability and improvement in back pain.On going  4. Pt to be Independent with HEP to improve ROM and independence with ADL's. On going  5. Pt to demonstrate negative Bridge Test in order to show improved core strength for lumbar stabilization. On going  6. Pt to improve range of motion by 75% to allow for improved functional mobility to allow for improvement in IADLs. On going  7. Patient will improve TUG score to less than 11 seconds with or without AD safely. On going.      Plan   Plan of care Certification: 9/24/2024 to 12/3/24.      Yesenia Armando, PT

## 2024-09-27 NOTE — PLAN OF CARE
[facility]  Physical Therapy Initial Evaluation  Date: 9/24/2024   Name: Jefferson Boogie  Clinic Number: [id number]    Therapy Diagnosis:   Encounter Diagnoses   Name Primary?    Sacroiliitis     Chronic pain syndrome     Spondylosis without myelopathy     Difficulty in walking Yes     Physician: Samuel Jimenez MD    Physician Orders: PT Eval and Treat   Medical Diagnosis from Referral:   M46.1 (ICD-10-CM) - Sacroiliitis   G89.4 (ICD-10-CM) - Chronic pain syndrome   M47.819 (ICD-10-CM) - Spondylosis without myelopathy   Evaluation Date: 9/24/2024  Authorization Period Expiration: 9/3/25  Plan of Care Expiration: 12/3/24  Visit # / Visits authorized: 1/ 1   Progress Note Due: 10/24/24  FOTO: 1/3    Precautions: SI joint fusion on 8/12/24 (see below); fall risk; spinal cord stimulator        Time In: 10:00 am  Time Out: 10:50 am  Total Appointment Time (timed & untimed codes): 50 minutes    Subjective   Date of onset: chronic, but recent surgery on 8/12/24  History of current condition - Jefferson reports he underwent an SI joint fusion 7 weeks ago and does not have any restrictions so far. Patient reports he feels worse than before surgery- he tried getting into PT for the last 3 weeks. Patient reports that he can manage with FWW, but is not able to use just the cane due to increase in symptoms. Patient reports that the pain is constant now and is not able to walk nearly as far. Patient reports using both a cane and walker at home. Patient reports that he has stairs to get into his house and he has to go sideways or pull himself up.     ELHAM: SI joint fusion  Any Bowel and Bladder movement issues: chronic incontinence issues  Any falls: none recent  Any dizziness: none   Any Headache: none  Any injection in lower back: steroid or epidural: yes  Pain radiates: yes to LE  Pain constant or intermitting: currently constant    Pain:  Current 6/10, worst 10/10, best 3/10   Location: bilateral  back/SI with radicular symptoms   Description: Aching, Throbbing, Grabbing, and Sharp  Aggravating Factors: Sitting, Standing, Bending, Walking, Night Time, and Getting out of bed/chair  Easing Factors: rest and sidelying    Prior Therapy: yes recently in [facility] program  Social History:  lives with their family  Occupation: [employer]  Prior Level of Function: IND  Current Level of Function: MI with increase in symptoms and need for AD    Pts goals: decrease pain and be able to walk further    Imaging: XRAY SI joint  FINDINGS:  Sacrum coccyx three views.     There is hardware status post right SI joint arthrodesis.  No complication seen.  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    Medical History:   Past Medical History:   Diagnosis Date    Bronchitis     Cancer     stage I bladder cancer 50 yrs ago    Hypercholesteremia     Hypertension     Sacroiliitis 07/08/2020    Thyroid disease      Surgical History:   Jefferson Boogie  has a past surgical history that includes Knee surgery; Bladder surgery; Hernia repair; Trial of spinal cord nerve stimulator (N/A, 8/5/2019); Injection of joint (Right, 7/8/2020); Replacement of nerve stimulator battery (N/A, 7/27/2020); Injection of joint (Right, 9/9/2020); Radiofrequency ablation (Right, 12/9/2020); Colonoscopy; Eye surgery; Fusion of sacroiliac joint (Right, 3/15/2021); Injection of joint (Right, 7/21/2021); Cataract extraction; Injection of joint (Right, 10/13/2021); Epidural steroid injection (N/A, 1/12/2022); Cataract extraction w/  intraocular lens implant (Right, 3/9/2022); Injection of anesthetic agent around nerve (Bilateral, 6/1/2022); Injection of anesthetic agent around nerve (Bilateral, 8/24/2022); Radiofrequency ablation (Right, 9/21/2022); Radiofrequency ablation (Left, 10/12/2022); Revision procedure involving spinal cord neurostimulator (N/A, 2/13/2023); Replacement of spinal cord stimulator (2/13/2023); injection, sacroiliac joint (Right, 5/31/2023); Injection of  anesthetic agent around nerve (Right, 2/28/2024); Injection of joint (Right, 4/10/2024); Fusion of sacroiliac joint (Right, 8/12/2024); and Transforaminal epidural injection of steroid (Right, 8/28/2024).    Medications:   Jefferson has a current medication list which includes the following prescription(s): alprazolam, amlodipine, ascorbic acid (vitamin c), aspirin, celecoxib, duloxetine, ergocalciferol, ergocalciferol (vitamin d2), finasteride, gabapentin, hydrocodone-acetaminophen, irbesartan, levothyroxine, meloxicam, mirabegron, promethazine-dextromethorphan, simvastatin, tadalafil, and temazepam, and the following Facility-Administered Medications: balanced salt irrigation and sodium chloride 0.9%.    Allergies:   Review of patient's allergies indicates:  No Known Allergies     Objective     Observation: appears to be in a lot of pain and needed transport chair to get to clinic    Posture Alignment: slouched posture;increased kyphosis;decreased lordosis    Sensation: DNT    DTR: DNT    GAIT DEVIATIONS: Jefferson displays very antalgic gait, difficulty with weight shift, need for FWW for balance and unloading joints, and decreased cornel/step length    Lumbar Range of Motion:    %   Flexion 50     Extension 25     Left Side Bending 25   Right Side Bending 25   Left rotation   50   Right Rotation   50    *= pain    Passive hip ROM in percent:     Left Right   IR 50 50   ER 50 50     Lower Extremity Strength    Right LE  Left LE    Hip flexion: 3+/5 Hip flexion: 3+/5   Knee extension: 4/5 Knee extension: 4/5   Knee flexion: 4/5 Knee flexion: 4/5   Hip IR: 3/5 Hip IR: 3/5   Hip ER: 3/5 Hip ER: 3/5   Hip extension:  3-/5 Hip extension: 3-/5   Hip abduction: 3-/5 Hip abduction: 3-/5   Hip adduction: 3/5 Hip adduction 3/5   Ankle dorsiflexion: 4/5 Ankle dorsiflexion: 4/5   Ankle plantarflexion: 4/5 Ankle plantarflexion: 4/5     Neuro Dynamic Testing:    Sciatic nerve:      SLR: positive   Neural Tension:     Slump test:  positive    Joint Mobility: very hypomobile to lumbar spine and B hips    Palpation: TTP around incision and to lumbar spine and paraspinals    Flexibility: impaired to B hamstrings   Jose L test: R = pos ; L = pos    PT Evaluation Completed? Yes  Discussed Plan of Care with patient: Yes    CMS Impairment/Limitation/Restriction for FOTO Lumbar Spine Survey    Therapist reviewed FOTO scores for Jefferson Boogie on 9/24/2024.   FOTO documents entered into EPIC - see Media section.    Limitation Score: 40    Goal Score: 51     TREATMENT   Treatment Time In: 10:25 am  Treatment Time Out: 10:50 am  Total Treatment time separate from Evaluation: 25 minutes    Next visit: HEP review (LTR, bridge, sit<>stands), gentle scar mobility work, sidelying open books, sidelying clam shells, supine TA, supine TA + marches, seated trunk FL with ball, nustep    Jefferson received therapeutic exercises to develop ROM and flexibility for 15 minutes including:  LTR x 2 minutes  Supine TA with focus on breathing  Log roll technique  Bridge  Sit<>stand with UE support    Jefferson participated in dynamic functional therapeutic activities to improve functional performance for 10  minutes, including:  HEP review  Rehab progression/potential    Home Exercises and Patient Education Provided    Education provided:   - See above    Written Home Exercises Provided: yes.  Exercises were reviewed and Jefferson was able to demonstrate them prior to the end of the session.  Jefferson demonstrated good  understanding of the education provided.     See EMR under Patient Instructions for exercises provided 9/24/2024.    Assessment   Jefferson is a 89 y.o. male referred to outpatient Physical Therapy with a medical diagnosis of M46.1- sacroiliitis, G89.4- chronic pain syndrome, and M47.819- spondylosis without myelopathy. Pt presents with very antalgic gait and decreased ability to weight shift, increased pain/adverse symptoms, decreased strength/impaired motor control,  decreased ROM, impairment in balance, and decreased tolerance to activity. Patient educated on continuing ambulation with FWW and to bring one to next appt to help with tolerance to gait and for patient safety.    Pt prognosis is Good.   Pt will benefit from skilled outpatient Physical Therapy to address the deficits stated above and in the chart below, provide pt/family education, and to maximize pt's level of independence.     Plan of care discussed with patient: Yes  Pt's spiritual, cultural and educational needs considered and patient is agreeable to the plan of care and goals as stated below:     Anticipated Barriers for therapy: chronicity and severity of symptoms    Medical Necessity is demonstrated by the following  History  Co-morbidities and personal factors that may impact the plan of care Co-morbidities:   history of cancer and HTN    Personal Factors:   no deficits     low   Examination  Body Structures and Functions, activity limitations and participation restrictions that may impact the plan of care Body Regions:   back  lower extremities    Body Systems:    ROM  strength  balance  gait  transfers  transitions  motor control  motor learning    Participation Restrictions:   Work, ADLs, iADLs, and wanted activities    Activity limitations:   Learning and applying knowledge  no deficits    General Tasks and Commands  no deficits    Communication  no deficits    Mobility  lifting and carrying objects  walking  driving (bike, car, motorcycle)    Self care  looking after one's health    Domestic Life  shopping  cooking  doing house work (cleaning house, washing dishes, laundry)  assisting others    Interactions/Relationships  no deficits    Life Areas  no deficits    Community and Social Life  community life  recreation and leisure         Complexity: moderate  See FOTO outcome assessment   Clinical Presentation stable and uncomplicated low   Decision Making/ Complexity Score: low     GOALS: Short Term  Goals:  4-5 weeks  1. Report decreased in pain at worse less than  <   / =  5  /10  to increase tolerance for functional activities.On going  2. Increased B hip/LE MMT 1/2  to increase tolerance for ADL and work activities.On going  3. Pt to tolerate HEP to improve ROM and independence with ADL's.On going  4. Pt to improve range of motion by 50% to allow for improved functional mobility to allow for improvement in IADLs. On going    Long Term Goals: 8-10 weeks  1. Report decreased in pain at worse less than  <   / =  3  /10  to increase tolerance for functional mobility.  On going  2. Increased B hip/LE MMT 1 grade to increase tolerance for ADL and work activities.On going  3. Pt will report a 51 or greater score on FOTO Lumbar spine survey  to demonstrate decrease in disability and improvement in back pain.On going  4. Pt to be Independent with HEP to improve ROM and independence with ADL's. On going  5. Pt to demonstrate negative Bridge Test in order to show improved core strength for lumbar stabilization. On going  6. Pt to improve range of motion by 75% to allow for improved functional mobility to allow for improvement in IADLs. On going      Plan   Plan of care Certification: 9/24/2024 to 12/3/24.    Outpatient Physical Therapy 1-2 times weekly for 10 weeks to include the following interventions: Electrical Stimulation TENS/IFC/NMES, Gait Training, Manual Therapy, Moist Heat/ Ice, Neuromuscular Re-ed, Self Care, Therapeutic Activities, and Therapeutic Exercise. Galo Armando, PT      I CERTIFY THE NEED FOR THESE SERVICES FURNISHED UNDER THIS PLAN OF TREATMENT AND WHILE UNDER MY CARE   Physician's comments:     Physician's Signature: ___________________________________________________

## 2024-09-28 RX ORDER — GABAPENTIN 600 MG/1
600 TABLET ORAL 3 TIMES DAILY
Qty: 90 TABLET | Refills: 2 | Status: SHIPPED | OUTPATIENT
Start: 2024-09-28 | End: 2024-12-27

## 2024-09-29 DIAGNOSIS — G89.4 CHRONIC PAIN SYNDROME: Primary | ICD-10-CM

## 2024-09-30 RX ORDER — DULOXETIN HYDROCHLORIDE 30 MG/1
30 CAPSULE, DELAYED RELEASE ORAL 2 TIMES DAILY
Qty: 60 CAPSULE | Refills: 2 | Status: SHIPPED | OUTPATIENT
Start: 2024-09-30

## 2024-10-01 NOTE — PROGRESS NOTES
Physical Therapy Daily Treatment Note     Name: Jefferson Boogie  Clinic Number: [id number]    Therapy Diagnosis:   Encounter Diagnosis   Name Primary?    Difficulty in walking Yes       Physician: Samuel Jimenez MD    Visit Date: 10/2/2024    Physician Orders: PT Eval and Treat   Medical Diagnosis from Referral:   M46.1 (ICD-10-CM) - Sacroiliitis   G89.4 (ICD-10-CM) - Chronic pain syndrome   M47.819 (ICD-10-CM) - Spondylosis without myelopathy   Evaluation Date: 2024  Authorization Period Expiration: 26  Plan of Care Expiration: 12/3/24  Visit # / Visits authorized:  (+eval)   Progress Note Due: 10/24/24  FOTO: 1/3     Precautions: SI joint fusion on 24 (see below); fall risk; spinal cord stimulator          Time In: 1000 AM   Time Out: 1055 AM   Total Appointment Time (timed & untimed codes): 30 minutes (1:1)    Subjective     Pt reports: He is very anxious for this to work. His doctor told him to use a cane more so he brought it though he needs wheel chair to get inside clinic. He has not been doing his HEP at home due to the pain.   he was compliant with home exercise program given last session.   Response to previous treatment:good  Functional change: improvement in pain/adverse symptoms    Pain: 7/10  Location: right SI joint      Objective     Lumbar Range of Motion:     %   Flexion 75      Extension 25      Left Side Bending 25   Right Side Bending 25   Left rotation    50   Right Rotation    50    *= pain    24  TU seconds FWW  30 second sit<>: 9.5 reps with both hands on arm chair    Treatment     Jefferson received the following manual therapy techniques for 0 minutes:   Na today    Jefferson received therapeutic exercises for 40 minutes including:  Nustep x 9 min for joint nutrition  LTR x 2 minutes 3 second holds  Bridge 2 x 10 3 second holds-  SL clams no resistance 2 x 10 ea   Sidelying open books x 10 ea  Supine TA x 10 reps with manual and verbal cueing   Supine TA + BKGO G  TB x 15 reps 3 second holds each side  Seated trunk flexion with ball x 3 directions for 3 minutes  Seated march + TA x 10 reps 3 second holds  Seated adduction squeeze with ball x 2 minutes 5 second holds    Jefferson participated in dynamic functional therapeutic activities to improve functional performance for 15  minutes, including:  Gait training with FWW with proper height  Gait training with quad cane     Jefferson participated in neuromuscular re-education activities to improve: Balance and Proprioception for 0 minutes. The following activities were included:  NA today    CP to lumbar spine/SI joint x 5 minutes in sitting.    Home Exercises and Education Provided     Education provided:   - See above  - Progress towards goals     Written Home Exercises Provided: Patient instructed to cont prior HEP.  Exercises were reviewed and Jefferson was able to demonstrate them prior to the end of the session.  Jefferson demonstrated good  understanding of the HEP provided.   .   See EMR under Patient Instructions for exercises provided prior visit.      Assessment   Jefferson presents with high levels of pain. Pt was ambulating with quad cane with pt very unstable requiring WC to get to clinic. Encouraged pt to use walker instead for safety. Pt demos R side LE weakness. Gait training with both quad cane and FWW to improve gait quality and for proper usage. Pt reports non compliance with HEP due to pain. Discussed the importance of HEP to maximize therapeutic outcomes.     Jefferson is progressing well towards his goals and there are no updates to goals at this time. Pt prognosis is Good.     Pt will continue to benefit from skilled outpatient physical therapy to address the deficits listed in the problem list on initial evaluation provide pt/family education and to maximize pt's level of independence in the home and community environment.     Anticipated barriers to physical therapy: chronicity and severity of symptoms    Pt's spiritual,  cultural and educational needs considered and pt agreeable to plan of care and goals.    Goals:  Short Term Goals:  4-5 weeks  1. Report decreased in pain at worse less than  <   / =  5  /10  to increase tolerance for functional activities.On going  2. Increased B hip/LE MMT 1/2  to increase tolerance for ADL and work activities.On going  3. Pt to tolerate HEP to improve ROM and independence with ADL's.On going  4. Pt to improve range of motion by 50% to allow for improved functional mobility to allow for improvement in IADLs. On going     Long Term Goals: 8-10 weeks  1. Report decreased in pain at worse less than  <   / =  3  /10  to increase tolerance for functional mobility.  On going  2. Increased B hip/LE MMT 1 grade to increase tolerance for ADL and work activities.On going  3. Pt will report a 51 or greater score on FOTO Lumbar spine survey  to demonstrate decrease in disability and improvement in back pain.On going  4. Pt to be Independent with HEP to improve ROM and independence with ADL's. On going  5. Pt to demonstrate negative Bridge Test in order to show improved core strength for lumbar stabilization. On going  6. Pt to improve range of motion by 75% to allow for improved functional mobility to allow for improvement in IADLs. On going  7. Patient will improve TUG score to less than 11 seconds with or without AD safely. On going.      Plan   Plan of care Certification: 9/24/2024 to 12/3/24.      Zaheer Vasquez PTA

## 2024-10-02 ENCOUNTER — CLINICAL SUPPORT (OUTPATIENT)
Dept: REHABILITATION | Facility: OTHER | Age: 89
End: 2024-10-02
Payer: MEDICARE

## 2024-10-02 DIAGNOSIS — R26.2 DIFFICULTY IN WALKING: Primary | ICD-10-CM

## 2024-10-02 PROCEDURE — 97110 THERAPEUTIC EXERCISES: CPT | Mod: CQ

## 2024-10-02 PROCEDURE — 97530 THERAPEUTIC ACTIVITIES: CPT | Mod: CQ

## 2024-10-04 ENCOUNTER — CLINICAL SUPPORT (OUTPATIENT)
Dept: REHABILITATION | Facility: OTHER | Age: 89
End: 2024-10-04
Payer: MEDICARE

## 2024-10-04 DIAGNOSIS — R26.2 DIFFICULTY IN WALKING: Primary | ICD-10-CM

## 2024-10-04 PROCEDURE — 97110 THERAPEUTIC EXERCISES: CPT

## 2024-10-04 NOTE — PROGRESS NOTES
Physical Therapy Daily Treatment Note     Name: Jefferson Boogie  Clinic Number: [id number]    Therapy Diagnosis:   Encounter Diagnosis   Name Primary?    Difficulty in walking Yes     Physician: Samuel Jimenez MD    Visit Date: 10/4/2024    Physician Orders: PT Eval and Treat   Medical Diagnosis from Referral:   M46.1 (ICD-10-CM) - Sacroiliitis   G89.4 (ICD-10-CM) - Chronic pain syndrome   M47.819 (ICD-10-CM) - Spondylosis without myelopathy   Evaluation Date: 2024  Authorization Period Expiration: 26  Plan of Care Expiration: 12/3/24  Visit # / Visits authorized: 3/20 (+eval)   Progress Note Due: 10/24/24  FOTO: 1/3     Precautions: SI joint fusion on 24 (see below); fall risk; spinal cord stimulator          Time In: 1:30 pm   Time Out: 2:30 pm   Total Appointment Time (timed & untimed codes): 30 minutes (1:1)    Subjective     Pt reports: that overall he is feeling better, but still having a lot of pain and difficulty with walking only with cane.  he was compliant with home exercise program given last session.   Response to previous treatment:good  Functional change: improvement in pain/adverse symptoms    Pain: 5/10  Location: right SI joint      Objective     Lumbar Range of Motion:     %   Flexion 75      Extension 25      Left Side Bending 25   Right Side Bending 25   Left rotation    50   Right Rotation    50    *= pain    24  TU seconds FWW  30 second sit<>: 9.5 reps with both hands on arm chair    Treatment     Jefferson received the following manual therapy techniques for 5 minutes:   STM to R lumbar paraspinals/scar mobility    Jefferson received therapeutic exercises for 40 minutes including:  Nustep x 10 min for joint nutrition  LTR x 2 minutes 3 second holds  Standing weight shifts to R side x 15 reps  Sit<>stands with left foot slightly in front with foam pad x 20 reps  Bridge 2 x 10 3 second holds- NP  SL clams Y TB 2 x 10 ea 3 second holds  Sidelying open books x 15 each  side  Standing trunk weight shift x 15 reps to the R side  Supine TA x 10 reps with manual and erbal cueing- NP   Supine TA + BKGO G TB x 15 reps 3 second holds each side- NP  Seated trunk flexion with ball x 3 directions for 3 minutes  Seated march + TA x 10 reps 3 second holds- NP  Seated adduction squeeze with ball x 2 minutes 5 second holds- NP    Jefferson participated in dynamic functional therapeutic activities to improve functional performance for 10 minutes, including:  Lateral weight shifting x 10 reps each side with FWW  Forward weight shifting x 10 reps each side with FWW  Gait training with straight cane     Jefferson participated in neuromuscular re-education activities to improve: Balance and Proprioception for 0 minutes. The following activities were included:  NA today    CP to lumbar spine/SI joint x 5 minutes in sitting.    Home Exercises and Education Provided     Education provided:   - See above  - Progress towards goals     Written Home Exercises Provided: Patient instructed to cont prior HEP.  Exercises were reviewed and Jefferson was able to demonstrate them prior to the end of the session.  Jefferson demonstrated good  understanding of the HEP provided.   .   See EMR under Patient Instructions for exercises provided prior visit.      Assessment   Patient demonstrated good tolerance to more strengthening and weight shifting this session. Still needing FWW for longer distance walking due to increase in pain level and decreased tolerance to weight shifting to R side.    Jefferson is progressing well towards his goals and there are no updates to goals at this time. Pt prognosis is Good.     Pt will continue to benefit from skilled outpatient physical therapy to address the deficits listed in the problem list on initial evaluation provide pt/family education and to maximize pt's level of independence in the home and community environment.     Anticipated barriers to physical therapy: chronicity and severity of  symptoms    Pt's spiritual, cultural and educational needs considered and pt agreeable to plan of care and goals.    Goals:  Short Term Goals:  4-5 weeks  1. Report decreased in pain at worse less than  <   / =  5  /10  to increase tolerance for functional activities.On going  2. Increased B hip/LE MMT 1/2  to increase tolerance for ADL and work activities.On going  3. Pt to tolerate HEP to improve ROM and independence with ADL's.On going  4. Pt to improve range of motion by 50% to allow for improved functional mobility to allow for improvement in IADLs. On going     Long Term Goals: 8-10 weeks  1. Report decreased in pain at worse less than  <   / =  3  /10  to increase tolerance for functional mobility.  On going  2. Increased B hip/LE MMT 1 grade to increase tolerance for ADL and work activities.On going  3. Pt will report a 51 or greater score on FOTO Lumbar spine survey  to demonstrate decrease in disability and improvement in back pain.On going  4. Pt to be Independent with HEP to improve ROM and independence with ADL's. On going  5. Pt to demonstrate negative Bridge Test in order to show improved core strength for lumbar stabilization. On going  6. Pt to improve range of motion by 75% to allow for improved functional mobility to allow for improvement in IADLs. On going  7. Patient will improve TUG score to less than 11 seconds with or without AD safely. On going.      Plan   Plan of care Certification: 9/24/2024 to 12/3/24.      Yesenia Armando, PT

## 2024-10-08 NOTE — PROGRESS NOTES
Physical Therapy Daily Treatment Note     Name: Jefferson Boogie  Clinic Number: [id number]    Therapy Diagnosis:   No diagnosis found.    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 He notices his pain fluctuates as last night it was a 7/10 but currently it's 2-3/10. He performs his home exercises everyday once a day. He walks with RWW in the community but SPC at home and occasionally feels steady. He would like to feel well enough to go on a cruise in 4 months.   he was compliant with home exercise program given last session.   Response to previous treatment:good  Functional change: improvement in pain/adverse symptoms    Pain: 2-3/10  Location: right SI joint      Objective     Lumbar Range of Motion:     %   Flexion 75      Extension 25      Left Side Bending 25   Right Side Bending 25   Left rotation    50   Right Rotation    50    *= pain    24  TU seconds FWW  30 second sit<>: 9.5 reps with both hands on arm chair    Treatment     Jefferson received the following manual therapy techniques for 5 minutes:   STM to R lumbar paraspinals/scar mobility    Jefferson received therapeutic exercises for 40 minutes including:  Nustep x 10 min for joint nutrition  LTR x 2 minutes 3 second  holds  Standing weight shifts to R side x 15 reps  Sit<>stands with left foot slightly in front with foam pad x 20 reps  Bridge 2 x 10 3 second holds  SL clams Y TB 2 x 10 ea 3 second holds  Sidelying open books x 15 each side  Standing trunk weight shift x 15 reps to the R side  Supine TA x 10 reps with manual and erbal cueing- NP   Supine TA + BKGO G TB x 15 reps 3 second holds each side- NP  Seated trunk flexion with ball x 3 directions for 3 minutes  Seated march + TA x 10 reps 3 second holds  Seated adduction squeeze with ball x 2 minutes 5 second holds    Jefferson participated in dynamic functional therapeutic activities to improve functional performance for 10 minutes, including:  Lateral weight shifting x 10 reps each side with FWW  Forward weight shifting x 10 reps each side with FWW  Gait training with straight cane     Jefferson participated in neuromuscular re-education activities to improve: Balance and Proprioception for 0 minutes. The following activities were included:  NA today    CP to lumbar spine/SI joint x 5 minutes in sitting.    Home Exercises and Education Provided     Education provided:   - See above  - Progress towards goals     Written Home Exercises Provided: Patient instructed to cont prior HEP.  Exercises were reviewed and Jefferson was able to demonstrate them prior to the end of the session.  Jefferson demonstrated good  understanding of the HEP provided.   .   See EMR under Patient Instructions for exercises provided prior visit.      Assessment     Patient presented to therapy with minimal pain symptoms; however, slightly discouraged at progression in therapy. Issued/reviewed updated HEP with patient understanding. Noted BLE resting tremors at end of each set. Patient demonstrated good tolerance to strengthening and weight shifting this session. Noted slight unsteadiness, decreased cornel, decreased step length and decreased lateral weight shift during stance phases with gait training with FWW.  Will continue to monitor and progress exercises as tolerated by patient with appropriate challnege.       Jefferson is progressing well towards his goals and there are no updates to goals at this time. Pt prognosis is Good.     Pt will continue to benefit from skilled outpatient physical therapy to address the deficits listed in the problem list on initial evaluation provide pt/family education and to maximize pt's level of independence in the home and community environment.     Anticipated barriers to physical therapy: chronicity and severity of symptoms    Pt's spiritual, cultural and educational needs considered and pt agreeable to plan of care and goals.    Goals:  Short Term Goals:  4-5 weeks  1. Report decreased in pain at worse less than  <   / =  5  /10  to increase tolerance for functional activities.On going  2. Increased B hip/LE MMT 1/2  to increase tolerance for ADL and work activities.On going  3. Pt to tolerate HEP to improve ROM and independence with ADL's.On going  4. Pt to improve range of motion by 50% to allow for improved functional mobility to allow for improvement in IADLs. On going     Long Term Goals: 8-10 weeks  1. Report decreased in pain at worse less than  <   / =  3  /10  to increase tolerance for functional mobility.  On going  2. Increased B hip/LE MMT 1 grade to increase tolerance for ADL and work activities.On going  3. Pt will report a 51 or greater score on FOTO Lumbar spine survey  to demonstrate decrease in disability and improvement in back pain.On going  4. Pt to be Independent with HEP to improve ROM and independence with ADL's. On going  5. Pt to demonstrate negative Bridge Test in order to show improved core strength for lumbar stabilization. On going  6. Pt to improve range of motion by 75% to allow for improved functional mobility to allow for improvement in IADLs. On going  7. Patient will improve TUG score to less than 11 seconds with or without AD safely. On going.       Plan   Plan of care Certification: 9/24/2024 to 12/3/24.      Debbie Grier, PT

## 2024-10-09 ENCOUNTER — CLINICAL SUPPORT (OUTPATIENT)
Dept: REHABILITATION | Facility: OTHER | Age: 89
End: 2024-10-09
Payer: MEDICARE

## 2024-10-09 DIAGNOSIS — M54.16 LUMBAR RADICULOPATHY: ICD-10-CM

## 2024-10-09 DIAGNOSIS — G89.4 CHRONIC PAIN SYNDROME: Primary | ICD-10-CM

## 2024-10-09 DIAGNOSIS — R26.2 DIFFICULTY IN WALKING: Primary | ICD-10-CM

## 2024-10-09 PROCEDURE — 97110 THERAPEUTIC EXERCISES: CPT

## 2024-10-09 PROCEDURE — 97530 THERAPEUTIC ACTIVITIES: CPT

## 2024-10-10 RX ORDER — GABAPENTIN 600 MG/1
TABLET ORAL
Refills: 0 | OUTPATIENT
Start: 2024-10-10

## 2024-10-11 ENCOUNTER — CLINICAL SUPPORT (OUTPATIENT)
Dept: REHABILITATION | Facility: OTHER | Age: 89
End: 2024-10-11
Payer: MEDICARE

## 2024-10-11 DIAGNOSIS — R26.2 DIFFICULTY IN WALKING: Primary | ICD-10-CM

## 2024-10-11 PROCEDURE — 97110 THERAPEUTIC EXERCISES: CPT

## 2024-10-11 NOTE — PROGRESS NOTES
Physical Therapy Daily Treatment Note     Name: Jefferson Boogie  Clinic Number: [id number]    Therapy Diagnosis:   Encounter Diagnosis   Name Primary?    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 HE wants to make sure he is doing the right exercises at home.  he was compliant with home exercise program given last session.   Response to previous treatment:good  Functional change: improvement in pain/adverse symptoms    Pain: 2-3/10  Location: right SI joint      Objective     Lumbar Range of Motion:     %   Flexion 75      Extension 25      Left Side Bending 25   Right Side Bending 25   Left rotation    50   Right Rotation    50    *= pain    10/11/24  TU seconds with cane SBA  30 second sit<>: 10 reps with both hands on arm chair    Treatment     Jefferson received the following manual therapy techniques for 5 minutes:   STM to R lumbar paraspinals/scar mobility    Jefferson received therapeutic exercises for 45 minutes including:  LE bike x 5 min for joint nutrition (feet kept falling out)  Standing UBE 90 seconds F/90 second B (needed seated rest break)  LTR x 2 minutes 3 second holds  Standing forward and lateral weight shifts x 15 reps each way  Sit<>stands with left foot slightly in front  with foam pad x 20 reps  Bridge 2 x 10 3 second holds  SL clams Y TB 2 x 10 ea 3 second holds  Sidelying open books x 15 each side  Supine TA x 10 reps with manual and erbal cueing- NP   Supine TA + BKFO G TB x 15 reps 3 second holds each side- NP  Seated trunk flexion with ball x 3 directions for 3 minutes  Seated march + TA x 10 reps 3 second holds  Seated adduction squeeze with ball x 2 minutes 5 second holds    Jefferson participated in dynamic functional therapeutic activities to improve functional performance for 5 minutes, including:  Gait training with straight cane     Jefferson participated in neuromuscular re-education activities to improve: Balance and Proprioception for 0 minutes. The following activities were included:  NA today    CP to lumbar spine/SI joint x 5 minutes in Z lie.    Home Exercises and Education Provided     Education provided:   - See above  - Progress towards goals     Written Home Exercises Provided: Patient instructed to cont prior HEP.  Exercises were reviewed and Jefferson was able to demonstrate them prior to the end of the session.  Jefferson demonstrated good  understanding of the HEP provided.   .   See EMR under Patient Instructions for exercises provided prior visit.      Assessment   Patient demonstrated better tolerance to more weight shifting in standing. He is still having a lot of difficulty using his cane due to decreased ability to weight shift fully onto the R LE.    Jefferson is progressing well towards his goals and there are no updates to goals at this time. Pt prognosis is Good.     Pt will continue to benefit from skilled outpatient physical therapy to address the deficits listed in the problem list on initial evaluation provide pt/family education and to maximize pt's level of independence in the home and community environment.     Anticipated barriers to physical therapy: chronicity and severity of symptoms    Pt's spiritual, cultural and educational needs considered and pt  agreeable to plan of care and goals.    Goals:  Short Term Goals:  4-5 weeks  1. Report decreased in pain at worse less than  <   / =  5  /10  to increase tolerance for functional activities.On going  2. Increased B hip/LE MMT 1/2  to increase tolerance for ADL and work activities.On going  3. Pt to tolerate HEP to improve ROM and independence with ADL's.On going  4. Pt to improve range of motion by 50% to allow for improved functional mobility to allow for improvement in IADLs. On going     Long Term Goals: 8-10 weeks  1. Report decreased in pain at worse less than  <   / =  3  /10  to increase tolerance for functional mobility.  On going  2. Increased B hip/LE MMT 1 grade to increase tolerance for ADL and work activities.On going  3. Pt will report a 51 or greater score on FOTO Lumbar spine survey  to demonstrate decrease in disability and improvement in back pain.On going  4. Pt to be Independent with HEP to improve ROM and independence with ADL's. On going  5. Pt to demonstrate negative Bridge Test in order to show improved core strength for lumbar stabilization. On going  6. Pt to improve range of motion by 75% to allow for improved functional mobility to allow for improvement in IADLs. On going  7. Patient will improve TUG score to less than 11 seconds with or without AD safely. On going.      Plan   Plan of care Certification: 9/24/2024 to 12/3/24.      Yesenia Armando, PT

## 2024-10-16 ENCOUNTER — CLINICAL SUPPORT (OUTPATIENT)
Dept: REHABILITATION | Facility: OTHER | Age: 89
End: 2024-10-16
Payer: MEDICARE

## 2024-10-16 DIAGNOSIS — R26.2 DIFFICULTY IN WALKING: Primary | ICD-10-CM

## 2024-10-16 PROCEDURE — 97110 THERAPEUTIC EXERCISES: CPT | Mod: CQ

## 2024-10-16 NOTE — PROGRESS NOTES
Physical Therapy Daily Treatment Note     Name: Jefferson Boogie  Clinic Number: [id number]    Therapy Diagnosis:   Encounter Diagnosis   Name Primary?    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 He is using his cane at home. He does feel good when he leaves therapy  he was compliant with home exercise program given last session.   Response to previous treatment:good  Functional change: improvement in pain/adverse symptoms    Pain: 6/10  Location: right SI joint      Objective     Lumbar Range of Motion:     %   Flexion 75      Extension 25      Left Side Bending 25   Right Side Bending 25   Left rotation    50   Right Rotation    50    *= pain    10/11/24  TU seconds with cane SBA  30 second sit<>: 10 reps with both hands on arm chair    Treatment     Jefferson received the following manual therapy techniques for 0 minutes:   STM to R lumbar paraspinals/scar mobility    Jefferson received therapeutic exercises for 50 minutes including:    Standing UBE 2.5 minutes F/2.5 minutes B (needed seated rest break)  LTR x 2 minutes 3 second holds  Sit<>stands with left foot slightly in front with foam pad x 20 reps  Bridge 2 x 10 3 second holds  +standing hip abd x 10, x10 with YTB  +standing hip ext YTB 2 x 10   +2 in  step up (R)  x 10   Sidelying open books x 15 each side  Seated trunk flexion with ball x 3 directions for 3 minutes  Seated march + TA x 10 reps 3 second holds  Seated adduction squeeze with ball x 2 minutes 5 second holds    Jefferson participated in dynamic functional therapeutic activities to improve functional performance for 0 minutes, including:  Gait training with straight cane     Jefferson participated in neuromuscular re-education activities to improve: Balance and Proprioception for 0 minutes. The following activities were included:  NA today    CP to lumbar spine/SI joint x 5 minutes in Z lie.    Home Exercises and Education Provided     Education provided:   - See above  - Progress towards goals     Written Home Exercises Provided: Patient instructed to cont prior HEP.  Exercises were reviewed and Jefferson was able to demonstrate them prior to the end of the session.  Jefferson demonstrated good  understanding of the HEP provided.   .   See EMR under Patient Instructions for exercises provided prior visit.      Assessment   Jefferson tolerated session fairly well. Pt continues to ambulate with decreased WB and R LE. Progressed hip and LE strengthening in WB which pt tolerated well. Pt demos weakness in LE bilateral with R>L.     Jefferson is progressing well towards his goals and there are no updates to goals at this time. Pt prognosis is Good.     Pt will continue to benefit from skilled outpatient physical therapy to address the deficits listed in the problem list on initial evaluation provide pt/family education and to maximize pt's level of independence in the home and community environment.     Anticipated barriers to physical therapy: chronicity and severity of symptoms    Pt's spiritual, cultural and educational needs considered and pt agreeable to plan of care and goals.    Goals:  Short Term Goals:  4-5 weeks  1. Report decreased in pain at worse less than  <   / =  5  /10  to increase tolerance for functional  activities.On going  2. Increased B hip/LE MMT 1/2  to increase tolerance for ADL and work activities.On going  3. Pt to tolerate HEP to improve ROM and independence with ADL's.On going  4. Pt to improve range of motion by 50% to allow for improved functional mobility to allow for improvement in IADLs. On going     Long Term Goals: 8-10 weeks  1. Report decreased in pain at worse less than  <   / =  3  /10  to increase tolerance for functional mobility.  On going  2. Increased B hip/LE MMT 1 grade to increase tolerance for ADL and work activities.On going  3. Pt will report a 51 or greater score on FOTO Lumbar spine survey  to demonstrate decrease in disability and improvement in back pain.On going  4. Pt to be Independent with HEP to improve ROM and independence with ADL's. On going  5. Pt to demonstrate negative Bridge Test in order to show improved core strength for lumbar stabilization. On going  6. Pt to improve range of motion by 75% to allow for improved functional mobility to allow for improvement in IADLs. On going  7. Patient will improve TUG score to less than 11 seconds with or without AD safely. On going.      Plan   Plan of care Certification: 9/24/2024 to 12/3/24.      Zaheer Vasquez, PTA

## 2024-10-18 ENCOUNTER — CLINICAL SUPPORT (OUTPATIENT)
Dept: REHABILITATION | Facility: OTHER | Age: 89
End: 2024-10-18
Payer: MEDICARE

## 2024-10-18 DIAGNOSIS — R26.2 DIFFICULTY IN WALKING: Primary | ICD-10-CM

## 2024-10-18 PROCEDURE — 97110 THERAPEUTIC EXERCISES: CPT

## 2024-10-18 NOTE — PROGRESS NOTES
Physical Therapy Daily Treatment Note     Name: Jefferson Boogie  Clinic Number: [id number]    Therapy Diagnosis:   Encounter Diagnosis   Name Primary?    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   he was compliant with home exercise program given last session.   Response to previous treatment:good  Functional change: ongoing    Pain: 6/10  Location: right SI joint      Objective     Lumbar Range of Motion:     %   Flexion 75      Extension 25      Left Side Bending 25   Right Side Bending 25   Left rotation    50   Right Rotation    50    *= pain    10/11/24  TU seconds with cane SBA  30 second sit<>: 10 reps with both hands on arm chair    Treatment     Jefferson received the following manual therapy techniques for 5 minutes:   STM to R lumbar paraspinals/scar mobility    Jefferson received therapeutic exercises for 50 minutes including:  LE bike x 5 min for joint nutrition  Standing UBE 2.5 minutes F/2.5 minutes B (needed seated rest break)- NP  LTR x 1 minutes 3 second holds   Sit<>stands with left foot slightly in front with foam pad 3 x 10 reps  Bridge 2 x 10 3 second holds  3 rounds  Standing glut med with YTB x 10 reps  2 in step up (R)  x 10 with B UE support  Sidelying open  books x 15 each side  Seated trunk flexion with ball x 3 directions for 3 minutes  Leg press 90# 3 x 10 reps  Standing weight shifts with yellow pole x 20 reps lateral each side  Seated march + TA x 10 reps 3 second holds  Seated adduction squeeze with ball x 2 minutes 5 second holds    Jefferson participated in dynamic functional therapeutic activities to improve functional performance for 0 minutes, including:  Gait training with straight cane     Jefferson participated in neuromuscular re-education activities to improve: Balance and Proprioception for 0 minutes. The following activities were included:  NA today    CP to lumbar spine/SI joint x 5 minutes in Z lie.    Home Exercises and Education Provided     Education provided:   - See above  - Progress towards goals     Written Home Exercises Provided: Patient instructed to cont prior HEP.  Exercises were reviewed and Jefferson was able to demonstrate them prior to the end of the session.  Jefferson demonstrated good  understanding of the HEP provided.   .   See EMR under Patient Instructions for exercises provided prior visit.      Assessment   Patient demonstrated good tolerance to more strengthening and standing work this session. Still needing a lot of rest breaks due to fatigue and pain, but overall improving.    Jefferson is progressing well towards his goals and there are no updates to goals at this time. Pt prognosis is Good.     Pt will continue to benefit from skilled outpatient physical therapy to address the deficits listed in the problem list on initial evaluation provide pt/family education and to maximize pt's level of independence in the home and community environment.     Anticipated barriers to physical therapy: chronicity and severity of symptoms    Pt's spiritual, cultural and educational needs considered and pt agreeable to plan of care and goals.    Goals:  Short Term Goals:  4-5 weeks  1. Report decreased in pain at worse less than  <   / =  5  /10  to  increase tolerance for functional activities.On going  2. Increased B hip/LE MMT 1/2  to increase tolerance for ADL and work activities.On going  3. Pt to tolerate HEP to improve ROM and independence with ADL's.On going  4. Pt to improve range of motion by 50% to allow for improved functional mobility to allow for improvement in IADLs. On going     Long Term Goals: 8-10 weeks  1. Report decreased in pain at worse less than  <   / =  3  /10  to increase tolerance for functional mobility.  On going  2. Increased B hip/LE MMT 1 grade to increase tolerance for ADL and work activities.On going  3. Pt will report a 51 or greater score on FOTO Lumbar spine survey  to demonstrate decrease in disability and improvement in back pain.On going  4. Pt to be Independent with HEP to improve ROM and independence with ADL's. On going  5. Pt to demonstrate negative Bridge Test in order to show improved core strength for lumbar stabilization. On going  6. Pt to improve range of motion by 75% to allow for improved functional mobility to allow for improvement in IADLs. On going  7. Patient will improve TUG score to less than 11 seconds with or without AD safely. On going.      Plan   Plan of care Certification: 9/24/2024 to 12/3/24.      Yesenia Armando, PT

## 2024-10-21 ENCOUNTER — CLINICAL SUPPORT (OUTPATIENT)
Dept: REHABILITATION | Facility: OTHER | Age: 89
End: 2024-10-21
Payer: MEDICARE

## 2024-10-21 DIAGNOSIS — R26.2 DIFFICULTY IN WALKING: Primary | ICD-10-CM

## 2024-10-21 PROCEDURE — 97112 NEUROMUSCULAR REEDUCATION: CPT | Mod: CQ

## 2024-10-21 NOTE — PROGRESS NOTES
Physical Therapy Daily Treatment Note     Name: Jefferson Boogie  Clinic Number: [id number]    Therapy Diagnosis:   Encounter Diagnosis   Name Primary?    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 He thinks he needs a knee replacement. He would like to have an easy day today.  He does think he is doing better. He was able to walk from the parking garage to here with his cane.   he was compliant with home exercise program given last session.   Response to previous treatment:good  Functional change: SPC     Pain: 6/10  Location: right SI joint      Objective     NONE    Treatment     Jefferson received the following manual therapy techniques for 0 minutes:   STM to R lumbar paraspinals/scar mobility    Jefferson received therapeutic exercises for 55 minutes including:  LE bike x 5 min for joint nutrition  Standing UBE 2.5 minutes F/2.5 minutes B (needed seated rest break)- NP  LTR x 1 minutes 3 second holds   Sit<>stands with left foot slightly in front with foam pad 3 x 10 reps  Bridge 2 x 10 3 second holds  Standing glut med/Ext  with YTB 2 x 10 reps  +Seated march YTB at knees 2 x 10   2 in step up (R)  x 10 with B UE support  Sidelying open books x 15 each side  Seated trunk flexion with ball x 3 directions for 3  minutes  Leg press 80# 2 x 10 reps  SL leg press (R) 40# 2 x 10   Standing weight shifts with yellow pole x 20 reps lateral each side      Jefferson participated in dynamic functional therapeutic activities to improve functional performance for 0 minutes, including:  Gait training with straight cane     Jefferson participated in neuromuscular re-education activities to improve: Balance and Proprioception for 0 minutes. The following activities were included:  NA today    CP to lumbar spine/SI joint x 5 minutes in Z lie.    Home Exercises and Education Provided     Education provided:   - See above  - Progress towards goals     Written Home Exercises Provided: Patient instructed to cont prior HEP.  Exercises were reviewed and Jefferson was able to demonstrate them prior to the end of the session.  Jefferson demonstrated good  understanding of the HEP provided.   .   See EMR under Patient Instructions for exercises provided prior visit.      Assessment   Jefferson presents to therapy with increased soreness. Some modification of session due to pts subjective reports. Continued emphasis on glut and LE strengthening with overall good tolerance.       Jefferson is progressing well towards his goals and there are no updates to goals at this time. Pt prognosis is Good.     Pt will continue to benefit from skilled outpatient physical therapy to address the deficits listed in the problem list on initial evaluation provide pt/family education and to maximize pt's level of independence in the home and community environment.     Anticipated barriers to physical therapy: chronicity and severity of symptoms    Pt's spiritual, cultural and educational needs considered and pt agreeable to plan of care and goals.    Goals:  Short Term Goals:  4-5 weeks  1. Report decreased in pain at worse less than  <   / =  5  /10  to increase tolerance for functional activities.On going  2. Increased B hip/LE MMT 1/2  to increase tolerance for ADL and work activities.On  going  3. Pt to tolerate HEP to improve ROM and independence with ADL's.On going  4. Pt to improve range of motion by 50% to allow for improved functional mobility to allow for improvement in IADLs. On going     Long Term Goals: 8-10 weeks  1. Report decreased in pain at worse less than  <   / =  3  /10  to increase tolerance for functional mobility.  On going  2. Increased B hip/LE MMT 1 grade to increase tolerance for ADL and work activities.On going  3. Pt will report a 51 or greater score on FOTO Lumbar spine survey  to demonstrate decrease in disability and improvement in back pain.On going  4. Pt to be Independent with HEP to improve ROM and independence with ADL's. On going  5. Pt to demonstrate negative Bridge Test in order to show improved core strength for lumbar stabilization. On going  6. Pt to improve range of motion by 75% to allow for improved functional mobility to allow for improvement in IADLs. On going  7. Patient will improve TUG score to less than 11 seconds with or without AD safely. On going.      Plan   Plan of care Certification: 9/24/2024 to 12/3/24.      Zaheer Vasquez, PTA   10/21/2024

## 2024-10-22 NOTE — PROGRESS NOTES
Physical Therapy Daily Treatment Note     Name: Jefferson Boogie  Clinic Number: [id number]    Therapy Diagnosis:   Encounter Diagnosis   Name Primary?    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 He would like to know what healing timeline is. He does not like to use the walker as he feels this is more unsteady with walker.   he was compliant with home exercise program given last session.   Response to previous treatment:good  Functional change: SPC     Pain: 7/10  Location: right SI joint      Objective     NONE    Treatment     Jefferson received the following manual therapy techniques for 0 minutes:   STM to R lumbar paraspinals/scar mobility    Jefferson received therapeutic exercises for 55 minutes including:      LTR x 1 minutes 3 second holds   Sit<>stands with left foot slightly in front with foam pad 3 x 5 reps  Bridge 2 x 10 3 second holds  SL clams YTB 2 x 10 ea  Standing glut med/Ext  with YTB 2 x 10 reps  Seated march YTB at knees 2 x 10   2 in step up (R)  x 10 with B UE support  Sidelying open books x 15 each side  Seated trunk flexion with ball x 3 directions for 3 minutes  Leg press 80# 2 x 10 reps  SL leg press (R) 40# 2 x 10   Standing weight shifts with yellow pole x 20 reps lateral each side      Jefferson  participated in dynamic functional therapeutic activities to improve functional performance for 0 minutes, including:  Gait training with straight cane         CP to lumbar spine/SI joint x 5 minutes in Z lie.    Home Exercises and Education Provided     Education provided:   - See above  - Progress towards goals     Written Home Exercises Provided: Patient instructed to cont prior HEP.  Exercises were reviewed and Jefferson was able to demonstrate them prior to the end of the session.  Jefferson demonstrated good  understanding of the HEP provided.   .   See EMR under Patient Instructions for exercises provided prior visit.      Assessment   Jefferson presents to therapy with SPC. Discussed the importance of using walker for safety and to improve gait mechanics as pt requests assist from parking garage into clinic due to pain and weakness. Modified treatment session due to pts report of inc pain. Discussed the importance of HEP compliance to progress recovery. Pt was provided printout of previous HEP's.       Jefferson is progressing well towards his goals and there are no updates to goals at this time. Pt prognosis is Good.     Pt will continue to benefit from skilled outpatient physical therapy to address the deficits listed in the problem list on initial evaluation provide pt/family education and to maximize pt's level of independence in the home and community environment.     Anticipated barriers to physical therapy: chronicity and severity of symptoms    Pt's spiritual, cultural and educational needs considered and pt agreeable to plan of care and goals.    Goals:  Short Term Goals:  4-5 weeks  1. Report decreased in pain at worse less than  <   / =  5  /10  to increase tolerance for functional activities.On going  2. Increased B hip/LE MMT 1/2  to increase tolerance for ADL and work activities.On going  3. Pt to tolerate HEP to improve ROM and independence with ADL's.On going  4. Pt to improve range of motion by 50% to allow  for improved functional mobility to allow for improvement in IADLs. On going     Long Term Goals: 8-10 weeks  1. Report decreased in pain at worse less than  <   / =  3  /10  to increase tolerance for functional mobility.  On going  2. Increased B hip/LE MMT 1 grade to increase tolerance for ADL and work activities.On going  3. Pt will report a 51 or greater score on FOTO Lumbar spine survey  to demonstrate decrease in disability and improvement in back pain.On going  4. Pt to be Independent with HEP to improve ROM and independence with ADL's. On going  5. Pt to demonstrate negative Bridge Test in order to show improved core strength for lumbar stabilization. On going  6. Pt to improve range of motion by 75% to allow for improved functional mobility to allow for improvement in IADLs. On going  7. Patient will improve TUG score to less than 11 seconds with or without AD safely. On going.      Plan   Plan of care Certification: 9/24/2024 to 12/3/24.      Zaheer Vasquez, PTA

## 2024-10-23 ENCOUNTER — CLINICAL SUPPORT (OUTPATIENT)
Dept: REHABILITATION | Facility: OTHER | Age: 89
End: 2024-10-23
Payer: MEDICARE

## 2024-10-23 DIAGNOSIS — R26.2 DIFFICULTY IN WALKING: Primary | ICD-10-CM

## 2024-10-23 PROCEDURE — 97110 THERAPEUTIC EXERCISES: CPT | Mod: CQ

## 2024-10-24 ENCOUNTER — PATIENT MESSAGE (OUTPATIENT)
Dept: SPINE | Facility: CLINIC | Age: 89
End: 2024-10-24
Payer: MEDICARE

## 2024-10-25 ENCOUNTER — CLINICAL SUPPORT (OUTPATIENT)
Dept: REHABILITATION | Facility: OTHER | Age: 89
End: 2024-10-25
Payer: MEDICARE

## 2024-10-25 DIAGNOSIS — R26.2 DIFFICULTY IN WALKING: Primary | ICD-10-CM

## 2024-10-25 PROCEDURE — 97110 THERAPEUTIC EXERCISES: CPT

## 2024-10-25 PROCEDURE — 97530 THERAPEUTIC ACTIVITIES: CPT

## 2024-10-28 ENCOUNTER — CLINICAL SUPPORT (OUTPATIENT)
Dept: REHABILITATION | Facility: OTHER | Age: 89
End: 2024-10-28
Payer: MEDICARE

## 2024-10-28 DIAGNOSIS — R26.2 DIFFICULTY IN WALKING: Primary | ICD-10-CM

## 2024-10-28 PROCEDURE — 97112 NEUROMUSCULAR REEDUCATION: CPT

## 2024-10-28 PROCEDURE — 97110 THERAPEUTIC EXERCISES: CPT

## 2024-10-28 PROCEDURE — 97530 THERAPEUTIC ACTIVITIES: CPT

## 2024-10-30 ENCOUNTER — CLINICAL SUPPORT (OUTPATIENT)
Dept: REHABILITATION | Facility: OTHER | Age: 89
End: 2024-10-30
Payer: MEDICARE

## 2024-10-30 DIAGNOSIS — R26.2 DIFFICULTY IN WALKING: Primary | ICD-10-CM

## 2024-10-30 PROCEDURE — 97530 THERAPEUTIC ACTIVITIES: CPT | Mod: CQ

## 2024-10-30 PROCEDURE — 97112 NEUROMUSCULAR REEDUCATION: CPT | Mod: CQ

## 2024-10-31 ENCOUNTER — TELEPHONE (OUTPATIENT)
Dept: PAIN MEDICINE | Facility: CLINIC | Age: 89
End: 2024-10-31
Payer: MEDICARE

## 2024-11-01 ENCOUNTER — CLINICAL SUPPORT (OUTPATIENT)
Dept: REHABILITATION | Facility: OTHER | Age: 89
End: 2024-11-01
Payer: MEDICARE

## 2024-11-01 DIAGNOSIS — R26.2 DIFFICULTY IN WALKING: Primary | ICD-10-CM

## 2024-11-01 PROCEDURE — 97530 THERAPEUTIC ACTIVITIES: CPT

## 2024-11-01 PROCEDURE — 97110 THERAPEUTIC EXERCISES: CPT

## 2024-11-06 ENCOUNTER — CLINICAL SUPPORT (OUTPATIENT)
Dept: REHABILITATION | Facility: OTHER | Age: 89
End: 2024-11-06
Payer: MEDICARE

## 2024-11-06 DIAGNOSIS — R26.2 DIFFICULTY IN WALKING: Primary | ICD-10-CM

## 2024-11-06 PROCEDURE — 97110 THERAPEUTIC EXERCISES: CPT | Mod: CQ

## 2024-11-06 PROCEDURE — 97530 THERAPEUTIC ACTIVITIES: CPT | Mod: CQ

## 2024-11-06 NOTE — PROGRESS NOTES
"  Physical TherapyTreatment Note     Name: Jefferson Boogie  Clinic Number: [id number]    Therapy Diagnosis:   Encounter Diagnosis   Name Primary?    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 He thinks it's from standing for 15 minutes at a party on Sunday. The pain is like a pins and and needles in his R calf. He feels better after therapy.   he was compliant with home exercise program given last session.   Response to previous treatment:good  Functional change: SPC     Pain: 6-7/10  Location: right SI joint      Objective       CMS Impairment/Limitation/Restriction for FOTO Lumbar Spine Survey     Therapist reviewed FOTO scores for Jefferson Boogie on 10/25/2024.   FOTO documents entered into [employer] - see Media section.     Limitation Score: 40     Goal Score: 51      Treatment     Jefferson received the following manual therapy techniques for 0 minutes:   STM to R lumbar paraspinals/scar mobility    Jefferson received therapeutic exercises for 27 minutes including:  Nustep for endurance and LE ROM/joint nutrition x 6 minutes  +standing lumbar extension x 10  +seated N glide x 10  +Slant board stretch 3 x 30"    LTR x 1 minutes 3 second holds   Standing lateral weight shifts with focus on " control x 10 reps  Bridge 2 x 10 3 second holds  2 rounds   Squats x 10 reps with Y TB   Seated ankle raises x 10 reps    Seated trunk flexion with ball x 3 directions for 3 minutes  Leg press 80# 3 x 10 reps-  SL leg press (R) 40# 2 x 10     Jefferson participated in dynamic functional therapeutic activities to improve functional performance for 27 minutes, including:  Step throughs with minimal UE support 2 x 10 on each side  Standing glut med/Ext  with YTB 2 x 10 reps  Sit<>stands with left foot slightly in front with foam pad 2 x 10 reps  Standing march 2# 2 x 10- NP  Stnding hamstring curl 2# 2x10- NP   4 in step up 2 x 10 ea with B UE support    Neuromuscular re-education activities to improve: Balance, Coordination, and Proprioception for 0 minutes. The following activities were included:  NA today    NBOS 2x1'  WBOS on airex 2x1'    CP to lumbar spine/SI joint x 5 minutes in Z lie.    Home Exercises and Education Provided     Education provided:   - See above  - Progress towards goals     Written Home Exercises Provided: Patient instructed to cont prior HEP.  Exercises were reviewed and Jefferson was able to demonstrate them prior to the end of the session.  Jefferson demonstrated good  understanding of the HEP provided.   .   See EMR under Patient Instructions for exercises provided prior visit.      Assessment   Patient presents with increased pain in bilateral LE. Added standing lumbar extension, nerve glides and standing gastroc stretch with reduced symptoms. Pt has weakness in bilateral gastroc with pt unable to lift heels in standing or sitting. Weakness in R LE and gluts with step ups and hip abduction. Pt reported improved symptoms following therapy.     Jefferson is progressing well towards his goals and there are no updates to goals at this time. Pt prognosis is Good.     Pt will continue to benefit from skilled outpatient physical therapy to address the deficits listed in the problem list on initial evaluation  provide pt/family education and to maximize pt's level of independence in the home and community environment.     Anticipated barriers to physical therapy: chronicity and severity of symptoms    Pt's spiritual, cultural and educational needs considered and pt agreeable to plan of care and goals.    Goals:  Short Term Goals:  4-5 weeks  1. Report decreased in pain at worse less than  <   / =  5  /10  to increase tolerance for functional activities.On going  2. Increased B hip/LE MMT 1/2  to increase tolerance for ADL and work activities.On going  3. Pt to tolerate HEP to improve ROM and independence with ADL's.On going  4. Pt to improve range of motion by 50% to allow for improved functional mobility to allow for improvement in IADLs. On going     Long Term Goals: 8-10 weeks  1. Report decreased in pain at worse less than  <   / =  3  /10  to increase tolerance for functional mobility.  On going  2. Increased B hip/LE MMT 1 grade to increase tolerance for ADL and work activities.On going  3. Pt will report a 51 or greater score on FOTO Lumbar spine survey  to demonstrate decrease in disability and improvement in back pain.On going  4. Pt to be Independent with HEP to improve ROM and independence with ADL's. On going  5. Pt to demonstrate negative Bridge Test in order to show improved core strength for lumbar stabilization. On going  6. Pt to improve range of motion by 75% to allow for improved functional mobility to allow for improvement in IADLs. On going  7. Patient will improve TUG score to less than 11 seconds with or without AD safely. On going.      Plan   Plan of care Certification: 9/24/2024 to 12/3/24.    Zaheer Vasquez, PTA   11/6/2024

## 2024-11-08 ENCOUNTER — CLINICAL SUPPORT (OUTPATIENT)
Dept: REHABILITATION | Facility: OTHER | Age: 89
End: 2024-11-08
Payer: MEDICARE

## 2024-11-08 DIAGNOSIS — R26.2 DIFFICULTY IN WALKING: Primary | ICD-10-CM

## 2024-11-08 PROCEDURE — 97110 THERAPEUTIC EXERCISES: CPT

## 2024-11-08 PROCEDURE — 97112 NEUROMUSCULAR REEDUCATION: CPT

## 2024-11-08 PROCEDURE — 97530 THERAPEUTIC ACTIVITIES: CPT

## 2024-11-08 NOTE — PROGRESS NOTES
"  Physical TherapyTreatment Note     Name: Jefferson Boogie  Clinic Number: [id number]    Therapy Diagnosis:   Encounter Diagnosis   Name Primary?    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 Some days he is fine, but today he is hurting more     he was compliant with home exercise program given last session.   Response to previous treatment:good  Functional change: SPC     Pain: 6/10  Location: right SI joint      Objective       CMS Impairment/Limitation/Restriction for FOTO Lumbar Spine Survey     Therapist reviewed FOTO scores for Jefferson Boogie on 10/25/2024.   FOTO documents entered into [employer] - see Media section.     Limitation Score: 40     Goal Score: 51      Treatment     Jefferson received the following manual therapy techniques for 0 minutes:   STM to R lumbar paraspinals/scar mobility    Jefferson received therapeutic exercises for 20 minutes including:    Nustep for endurance and LE ROM/joint nutrition x 6 minutes  standing lumbar extension x 10  seated N glide x 10  Slant board stretch 2 x 30"  LTR x 1 minutes 3 second holds   Bridge 2 x 10 3 second holds   Squats 2 x 10 reps with Y TB  Seated trunk " "flexion with ball x 3 directions for 3 minutes  Leg press 80# 3 x 10 reps-  SL leg press (R) 40# 2 x 10     Jefferson participated in dynamic functional therapeutic activities to improve functional performance for 25 minutes, including:  Step throughs with minimal UE support 2 x 10 on each side  Standing glut med/Ext  with YTB 2 x 10 reps  Sit<>stands with left foot slightly in front with foam pad 2 x 10 reps  Standing march 2# 2 x 10  Stnding hamstring curl 2# 2x10  6 in step up 2 x 10 ea with B UE support    Neuromuscular re-education activities to improve: Balance, Coordination, and Proprioception for 10 minutes. The following activities were included:  Standing lateral weight shifts with focus on control x 10 reps  NBOS on airex 3x30"  WBOS eyes closed on airex 3x30"  Heel raises 2x20  Standing on airex with head turns 3x30"    CP to lumbar spine/SI joint x 5 minutes in Z lie.    Home Exercises and Education Provided     Education provided:   - See above  - Progress towards goals     Written Home Exercises Provided: Patient instructed to cont prior HEP.  Exercises were reviewed and Jefferson was able to demonstrate them prior to the end of the session.  Jefferson demonstrated good  understanding of the HEP provided.   .   See EMR under Patient Instructions for exercises provided prior visit.      Assessment   Patient presents today with continued complaints of back and leg pain. Progressed dynamic core and lumbar stability and mobility exercises with good tolerance. Introduced balance training with frequent LOB requiring B UE support. Improved ability to maintain balance after practice. Continue to progress as tolerated.     Jefferson is progressing well towards his goals and there are no updates to goals at this time. Pt prognosis is Good.     Pt will continue to benefit from skilled outpatient physical therapy to address the deficits listed in the problem list on initial evaluation provide pt/family education and to maximize " pt's level of independence in the home and community environment.     Anticipated barriers to physical therapy: chronicity and severity of symptoms    Pt's spiritual, cultural and educational needs considered and pt agreeable to plan of care and goals.    Goals:  Short Term Goals:  4-5 weeks  1. Report decreased in pain at worse less than  <   / =  5  /10  to increase tolerance for functional activities.On going  2. Increased B hip/LE MMT 1/2  to increase tolerance for ADL and work activities.On going  3. Pt to tolerate HEP to improve ROM and independence with ADL's.On going  4. Pt to improve range of motion by 50% to allow for improved functional mobility to allow for improvement in IADLs. On going     Long Term Goals: 8-10 weeks  1. Report decreased in pain at worse less than  <   / =  3  /10  to increase tolerance for functional mobility.  On going  2. Increased B hip/LE MMT 1 grade to increase tolerance for ADL and work activities.On going  3. Pt will report a 51 or greater score on FOTO Lumbar spine survey  to demonstrate decrease in disability and improvement in back pain.On going  4. Pt to be Independent with HEP to improve ROM and independence with ADL's. On going  5. Pt to demonstrate negative Bridge Test in order to show improved core strength for lumbar stabilization. On going  6. Pt to improve range of motion by 75% to allow for improved functional mobility to allow for improvement in IADLs. On going  7. Patient will improve TUG score to less than 11 seconds with or without AD safely. On going.      Plan   Plan of care Certification: 9/24/2024 to 12/3/24.    Wyatt Yen, PT   11/8/2024

## 2024-11-11 ENCOUNTER — CLINICAL SUPPORT (OUTPATIENT)
Dept: REHABILITATION | Facility: OTHER | Age: 89
End: 2024-11-11
Payer: MEDICARE

## 2024-11-11 DIAGNOSIS — R26.2 DIFFICULTY IN WALKING: Primary | ICD-10-CM

## 2024-11-11 PROCEDURE — 97112 NEUROMUSCULAR REEDUCATION: CPT

## 2024-11-11 PROCEDURE — 97110 THERAPEUTIC EXERCISES: CPT

## 2024-11-11 PROCEDURE — 97530 THERAPEUTIC ACTIVITIES: CPT

## 2024-11-12 NOTE — PROGRESS NOTES
"  Physical TherapyTreatment Note     Name: Jefferson Boogie  Clinic Number: [id number]    Therapy Diagnosis:   Encounter Diagnosis   Name Primary?    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   Response to previous treatment:good  Functional change: SPC     Pain: 2/10  Location: right SI joint      Objective       CMS Impairment/Limitation/Restriction for FOTO Lumbar Spine Survey     Therapist reviewed FOTO scores for Jefferson Boogie on 10/25/2024.   FOTO documents entered into [employer] - see Media section.     Limitation Score: 40     Goal Score: 51      Treatment     Jefferson received the following manual therapy techniques for 0 minutes:   STM to R lumbar paraspinals/scar mobility    Jefferson received therapeutic exercises for 20 minutes including:    Nustep for endurance and LE ROM/joint nutrition x 6 minutes  standing lumbar extension x 10  seated N glide x 20  Slant board stretch 2 x 30"  LTR x 1 minutes 3 second holds   Bridge 2 x 10 3 second holds   Squats 2 x 10 reps with Y TB  Seated trunk flexion with ball x 3 directions for 3 minutes  Leg press 80# 3 x 10 reps-  SL leg press (R) " "40# 2 x 10     Jefferson participated in dynamic functional therapeutic activities to improve functional performance for 25 minutes, including:  Step throughs with minimal UE support 2 x 10 on each side  Standing glut med/Ext  with RTB 2 x 10 reps  Sit<>stands with foam pad 2 x 10 reps  Standing march 2# 2 x 10  Stnding hamstring curl 2# 2x10  6 in step up 2 x 10 ea with B UE support    Neuromuscular re-education activities to improve: Balance, Coordination, and Proprioception for 10 minutes. The following activities were included:  Standing lateral weight shifts with focus on control x 10 reps  NBOS on airex 3x30"  WBOS eyes closed on airex 3x30"  Heel raises 2x20  Standing on airex with head turns 3x30"    CP to lumbar spine/SI joint x 5 minutes in Z lie.    Home Exercises and Education Provided     Education provided:   - See above  - Progress towards goals     Written Home Exercises Provided: Patient instructed to cont prior HEP.  Exercises were reviewed and Jefferson was able to demonstrate them prior to the end of the session.  Jefferson demonstrated good  understanding of the HEP provided.   .   See EMR under Patient Instructions for exercises provided prior visit.      Assessment   Patient presents today with reports of decreased pain since last visit. Progressed functional strengthening as tolerated with improved gait without AD noted today and improved tolerance to standing activities with no LOB. Continues to have deficits in balance, strength, ROM, and tolerance to ADL's and functional mobility due to pain. Continue to progress as tolerated.     Jefferson is progressing well towards his goals and there are no updates to goals at this time. Pt prognosis is Good.     Pt will continue to benefit from skilled outpatient physical therapy to address the deficits listed in the problem list on initial evaluation provide pt/family education and to maximize pt's level of independence in the home and community environment. "     Anticipated barriers to physical therapy: chronicity and severity of symptoms    Pt's spiritual, cultural and educational needs considered and pt agreeable to plan of care and goals.    Goals:  Short Term Goals:  4-5 weeks  1. Report decreased in pain at worse less than  <   / =  5  /10  to increase tolerance for functional activities.On going  2. Increased B hip/LE MMT 1/2  to increase tolerance for ADL and work activities.On going  3. Pt to tolerate HEP to improve ROM and independence with ADL's.On going  4. Pt to improve range of motion by 50% to allow for improved functional mobility to allow for improvement in IADLs. On going     Long Term Goals: 8-10 weeks  1. Report decreased in pain at worse less than  <   / =  3  /10  to increase tolerance for functional mobility.  On going  2. Increased B hip/LE MMT 1 grade to increase tolerance for ADL and work activities.On going  3. Pt will report a 51 or greater score on FOTO Lumbar spine survey  to demonstrate decrease in disability and improvement in back pain.On going  4. Pt to be Independent with HEP to improve ROM and independence with ADL's. On going  5. Pt to demonstrate negative Bridge Test in order to show improved core strength for lumbar stabilization. On going  6. Pt to improve range of motion by 75% to allow for improved functional mobility to allow for improvement in IADLs. On going  7. Patient will improve TUG score to less than 11 seconds with or without AD safely. On going.      Plan   Plan of care Certification: 9/24/2024 to 12/3/24.    Wyatt Yen, PT   11/12/2024

## 2024-11-13 ENCOUNTER — CLINICAL SUPPORT (OUTPATIENT)
Dept: REHABILITATION | Facility: OTHER | Age: 89
End: 2024-11-13
Payer: MEDICARE

## 2024-11-13 DIAGNOSIS — R26.2 DIFFICULTY IN WALKING: Primary | ICD-10-CM

## 2024-11-13 PROCEDURE — 97530 THERAPEUTIC ACTIVITIES: CPT | Mod: CQ

## 2024-11-13 PROCEDURE — 97110 THERAPEUTIC EXERCISES: CPT | Mod: CQ

## 2024-11-13 NOTE — PROGRESS NOTES
"  Physical TherapyTreatment Note     Name: Jefferson Boogie  Clinic Number: [id number]    Therapy Diagnosis:   Encounter Diagnosis   Name Primary?    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 Same old pain and balance issues      he was compliant with home exercise program given last session.   Response to previous treatment:good  Functional change: SPC     Pain: 2/10  Location: right SI joint      Objective       CMS Impairment/Limitation/Restriction for FOTO Lumbar Spine Survey     Therapist reviewed FOTO scores for Jefferson Boogie on 10/25/2024.   FOTO documents entered into [employer] - see Media section.     Limitation Score: 40     Goal Score: 51      Treatment     Jefferson received the following manual therapy techniques for 0 minutes:   STM to R lumbar paraspinals/scar mobility    Jefferson received therapeutic exercises for 20 minutes including:    Nustep for endurance and LE ROM/joint nutrition x 6 minutes  standing lumbar extension x 10  seated N glide x 20  Slant board stretch 2 x 30"  LTR x 1 minutes 3 second holds   Bridge 2 x 10 3 second holds   Squats 2 x 10 reps with Y TB  Seated trunk flexion with ball x 3 directions for 3 minutes  Leg press 80# 3 x 10 reps-  SL leg press (R) 40# 2 x 10 " "    Jefferson participated in dynamic functional therapeutic activities to improve functional performance for 25 minutes, including:  Step throughs with minimal UE support 2 x 10 on each side  Standing glut med/Ext  with RTB 2 x 10 reps  Sit<>stands with foam pad 2 x 10 reps  Standing march 2# 2 x 10  Stnding hamstring curl 2# 2x10  +Stairs negotiation in stairwell x2  +airex/half foam obstacle x5 with SPC   6 in step up 2 x 10 ea with B UE support    Neuromuscular re-education activities to improve: Balance, Coordination, and Proprioception for 10 minutes. The following activities were included:  Standing lateral weight shifts with focus on control x 10 reps  NBOS on airex 3x30"  WBOS eyes closed on airex 3x30"  Heel raises 2x20  Standing on airex with head turns 3x30"    CP to lumbar spine/SI joint x 5 minutes in Z lie.    Home Exercises and Education Provided     Education provided:   - See above  - Progress towards goals     Written Home Exercises Provided: Patient instructed to cont prior HEP.  Exercises were reviewed and Jefferson was able to demonstrate them prior to the end of the session.  Jefferson demonstrated good  understanding of the HEP provided.   .   See EMR under Patient Instructions for exercises provided prior visit.      Assessment   Patient presents today with reports of same symptoms as last visit. Pt ambulates with RW, but uses SPC throughout session. Focus on functional balance, strengthening, and activities today. Cues with object/step negotiation with SPC     Jefferson is progressing well towards his goals and there are no updates to goals at this time. Pt prognosis is Good.     Pt will continue to benefit from skilled outpatient physical therapy to address the deficits listed in the problem list on initial evaluation provide pt/family education and to maximize pt's level of independence in the home and community environment.     Anticipated barriers to physical therapy: chronicity and severity of " symptoms    Pt's spiritual, cultural and educational needs considered and pt agreeable to plan of care and goals.    Goals:  Short Term Goals:  4-5 weeks  1. Report decreased in pain at worse less than  <   / =  5  /10  to increase tolerance for functional activities.On going  2. Increased B hip/LE MMT 1/2  to increase tolerance for ADL and work activities.On going  3. Pt to tolerate HEP to improve ROM and independence with ADL's.On going  4. Pt to improve range of motion by 50% to allow for improved functional mobility to allow for improvement in IADLs. On going     Long Term Goals: 8-10 weeks  1. Report decreased in pain at worse less than  <   / =  3  /10  to increase tolerance for functional mobility.  On going  2. Increased B hip/LE MMT 1 grade to increase tolerance for ADL and work activities.On going  3. Pt will report a 51 or greater score on FOTO Lumbar spine survey  to demonstrate decrease in disability and improvement in back pain.On going  4. Pt to be Independent with HEP to improve ROM and independence with ADL's. On going  5. Pt to demonstrate negative Bridge Test in order to show improved core strength for lumbar stabilization. On going  6. Pt to improve range of motion by 75% to allow for improved functional mobility to allow for improvement in IADLs. On going  7. Patient will improve TUG score to less than 11 seconds with or without AD safely. On going.      Plan   Plan of care Certification: 9/24/2024 to 12/3/24.    Reece Henriquez, PTA   11/13/2024

## 2024-11-22 ENCOUNTER — CLINICAL SUPPORT (OUTPATIENT)
Dept: REHABILITATION | Facility: OTHER | Age: 89
End: 2024-11-22
Payer: MEDICARE

## 2024-11-22 DIAGNOSIS — R26.2 DIFFICULTY IN WALKING: Primary | ICD-10-CM

## 2024-11-22 PROCEDURE — 97140 MANUAL THERAPY 1/> REGIONS: CPT

## 2024-11-22 PROCEDURE — 97530 THERAPEUTIC ACTIVITIES: CPT

## 2024-11-22 NOTE — PROGRESS NOTES
"  Physical TherapyTreatment Note     Name: Jefferson Boogie  Clinic Number: [id number]    Therapy Diagnosis:   Encounter Diagnosis   Name Primary?    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 Is having increase in pain today more than normal.  he was compliant with home exercise program given last session.   Response to previous treatment:good  Functional change: ongoing     Pain: 6/10  Location: right SI joint      Objective       CMS Impairment/Limitation/Restriction for FOTO Lumbar Spine Survey     Therapist reviewed FOTO scores for Jefferson Boogie on 10/25/2024.   FOTO documents entered into [employer] - see Media section.     Limitation Score: 40     Goal Score: 51      Treatment     Jefferson received the following manual therapy techniques for 10 minutes:   STM to R lumbar paraspinals/scar mobility  Prone knee FL stretch and hip ER/IR    Jefferson received therapeutic exercises for 35 minutes including:  Nustep for endurance and LE ROM/joint nutrition x 6 minutes  Prone hip EX 2 x 10 3 second holds  Supine hip FL stretch x 90 seconds each side  Prone lie x 2 minutes  Slant board stretch 2 x 30"- NP  LTR x 1 minutes 3 second " "holds   Standing hip FL Y TB 2 x 10 3 second holds  Seated trunk flexion with ball x 3 directions for 3 minutes  Leg press 80# 3 x 10 reps- NP  SL leg press (R) 40# 2 x 10- NP  Squats 2 x 10 reps with Y TB- NP  Heel raises 2x20- DC NV    Jefferson participated in dynamic functional therapeutic activities to improve functional performance for 10 minutes, including:  Standing glut med/Ext with RTB 2 x 10 reps 3 second holds  Sit<>stands with 2 foam pad 2 x 10 reps without UE support (yellow band around feet to promote hip ER)  Standing march 2# 2 x 10- NP  Stairs negotiation in stairwell x2- NP  airex/half foam obstacle x5 with SPC- NP   6 in step up 2 x 10 ea with B UE support= NP    Neuromuscular re-education activities to improve: Balance, Coordination, and Proprioception for 0 minutes. The following activities were included:  NA today    NBOS on airex 3x30"  WBOS eyes closed on airex 3x30"  Standing on airex with head turns 3x30"    CP to lumbar spine/SI joint x 5 minutes in Z lie.    Home Exercises and Education Provided     Education provided:   - See above  - Progress towards goals     Written Home Exercises Provided: Patient instructed to cont prior HEP.  Exercises were reviewed and Jefferson was able to demonstrate them prior to the end of the session.  Jefferson demonstrated good  understanding of the HEP provided.   .   See EMR under Patient Instructions for exercises provided prior visit.      Assessment   Patient still having a lot of issues with walking and decreased weight shift. Better LE engagement with sit<>stands this session with less UE support and less knee/hip IR/valgus with cueing.    Jefferson is progressing well towards his goals and there are no updates to goals at this time. Pt prognosis is Good.     Pt will continue to benefit from skilled outpatient physical therapy to address the deficits listed in the problem list on initial evaluation provide pt/family education and to maximize pt's level of " independence in the home and community environment.     Anticipated barriers to physical therapy: chronicity and severity of symptoms    Pt's spiritual, cultural and educational needs considered and pt agreeable to plan of care and goals.    Goals:  Short Term Goals:  4-5 weeks  1. Report decreased in pain at worse less than  <   / =  5  /10  to increase tolerance for functional activities.On going  2. Increased B hip/LE MMT 1/2  to increase tolerance for ADL and work activities.On going  3. Pt to tolerate HEP to improve ROM and independence with ADL's.On going  4. Pt to improve range of motion by 50% to allow for improved functional mobility to allow for improvement in IADLs. On going     Long Term Goals: 8-10 weeks  1. Report decreased in pain at worse less than  <   / =  3  /10  to increase tolerance for functional mobility.  On going  2. Increased B hip/LE MMT 1 grade to increase tolerance for ADL and work activities.On going  3. Pt will report a 51 or greater score on FOTO Lumbar spine survey  to demonstrate decrease in disability and improvement in back pain.On going  4. Pt to be Independent with HEP to improve ROM and independence with ADL's. On going  5. Pt to demonstrate negative Bridge Test in order to show improved core strength for lumbar stabilization. On going  6. Pt to improve range of motion by 75% to allow for improved functional mobility to allow for improvement in IADLs. On going  7. Patient will improve TUG score to less than 11 seconds with or without AD safely. On going.      Plan   Plan of care Certification: 9/24/2024 to 12/3/24.    Yesenia Armando, PT   11/22/2024

## 2024-11-25 ENCOUNTER — CLINICAL SUPPORT (OUTPATIENT)
Dept: REHABILITATION | Facility: OTHER | Age: 89
End: 2024-11-25
Payer: MEDICARE

## 2024-11-25 DIAGNOSIS — R26.2 DIFFICULTY IN WALKING: Primary | ICD-10-CM

## 2024-11-25 PROCEDURE — 97112 NEUROMUSCULAR REEDUCATION: CPT

## 2024-11-25 PROCEDURE — 97530 THERAPEUTIC ACTIVITIES: CPT

## 2024-11-25 PROCEDURE — 97110 THERAPEUTIC EXERCISES: CPT

## 2024-11-25 NOTE — PROGRESS NOTES
Physical Therapy Progress Note     Name: Jefferson Boogie  Clinic Number: [id number]    Therapy Diagnosis:   Encounter Diagnosis   Name Primary?    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     he was compliant with home exercise program given last session.   Response to previous treatment:good  Functional change: ongoing     Pain: 0/10  Location: right SI joint      Objective       GAIT DEVIATIONS: Jefferson displays antalgic gait, difficulty with L LE weight shift, need for FWW for balance and unloading joints, and decreased cornel/step length     Lumbar Range of Motion:     %   Flexion 50      Extension 25      Left Side Bending 25   Right Side Bending 25   Left rotation    50   Right Rotation    50    *= pain        5x sit to stand: 14 sec     TUG w/ FWW: 13 sec     Lower Extremity Strength     Right LE   Left LE     Hip flexion: 3+/5 Hip flexion: 3+/5   Knee extension: 4/5 Knee extension: 4/5   Knee flexion: 4/5 Knee flexion: 4/5   Hip IR: 3/5 Hip IR: 3/5   Hip ER: 3/5 Hip ER: 3/5   Hip extension:  3-/5 Hip extension: 3-/5   Hip abduction: 3-/5 Hip abduction: 3-/5   Hip adduction: 3/5 Hip adduction 3/5   Ankle dorsiflexion: 4/5 Ankle dorsiflexion: 4/5   Ankle plantarflexion:  "4/5 Ankle plantarflexion: 4/5         CMS Impairment/Limitation/Restriction for FOTO Lumbar Spine Survey     Therapist reviewed FOTO scores for Jefferson Boogie on 11/25/2024.   FOTO documents entered into [employer] - see Media section.           Treatment     Jefferson received the following manual therapy techniques for 0 minutes:   STM to R lumbar paraspinals/scar mobility  Prone knee FL stretch and hip ER/IR    Jefferson received therapeutic exercises for 30 minutes including:  Nustep for endurance and LE ROM/joint nutrition x 6 minutes  Prone hip EX 2 x 10 3 second holds  Supine hip FL stretch x 90 seconds each side  Prone lie x 2 minutes  Slant board stretch 2 x 30"- NP  LTR x 1 minutes 3 second holds   Standing hip FL Y TB 2 x 10 3 second holds  Seated trunk flexion with ball x 3 directions for 3 minutes  Leg press 80# 3 x 10 reps- NP  SL leg press (R) 40# 2 x 10- NP  Squats 2 x 10 reps with Y TB- NP  Heel raises 2x20- DC NV    Jefferson participated in dynamic functional therapeutic activities to improve functional performance for 10 minutes, including:    Objective measures and reassessment    Standing march 2# 2 x 10- NP  Stairs negotiation in stairwell x2- NP  airex/half foam obstacle x5 with SPC- NP   6 in step up 2 x 10 ea with B UE support= NP    Neuromuscular re-education activities to improve: Balance, Coordination, and Proprioception for 15 minutes. The following activities were included:  Standing glut med/Ext with RTB 2 x 10 reps 3 second holds  Sit<>stands with 2 foam pad 2 x 10 reps without UE support (yellow band around feet to promote hip ER)  NBOS on airex 3x30"  WBOS eyes closed on airex 3x30"  Standing on airex with head turns 3x30"    CP to lumbar spine/SI joint x 5 minutes in Z lie.    Home Exercises and Education Provided     Education provided:   - See above  - Progress towards goals     Written Home Exercises Provided: Patient instructed to cont prior HEP.  Exercises were reviewed and Jefferson was able " to demonstrate them prior to the end of the session.  Jefferson demonstrated good  understanding of the HEP provided.   .   See EMR under Patient Instructions for exercises provided prior visit.      Assessment   Patient presents today without complaints at this time, but continues to report pain after increased activity at home. Reassessment performed with pt continue to demo deficits in lumbar ROM, LE strength, poor balance, gait deviations, and decreased functional mobility tolerance. Will continue to benefit from skilled therapy to address described deficits, reduce pain, and reduce fall risk.     Jefferson is progressing well towards his goals and there are no updates to goals at this time. Pt prognosis is Good.     Pt will continue to benefit from skilled outpatient physical therapy to address the deficits listed in the problem list on initial evaluation provide pt/family education and to maximize pt's level of independence in the home and community environment.     Anticipated barriers to physical therapy: chronicity and severity of symptoms    Pt's spiritual, cultural and educational needs considered and pt agreeable to plan of care and goals.    Goals:  Short Term Goals:  4-5 weeks  1. Report decreased in pain at worse less than  <   / =  5  /10  to increase tolerance for functional activities.On going  2. Increased B hip/LE MMT 1/2  to increase tolerance for ADL and work activities.On going  3. Pt to tolerate HEP to improve ROM and independence with ADL's.On going  4. Pt to improve range of motion by 50% to allow for improved functional mobility to allow for improvement in IADLs. On going     Long Term Goals: 8-10 weeks  1. Report decreased in pain at worse less than  <   / =  3  /10  to increase tolerance for functional mobility.  On going  2. Increased B hip/LE MMT 1 grade to increase tolerance for ADL and work activities.On going  3. Pt will report a 51 or greater score on FOTO Lumbar spine survey  to  demonstrate decrease in disability and improvement in back pain.On going  4. Pt to be Independent with HEP to improve ROM and independence with ADL's. On going  5. Pt to demonstrate negative Bridge Test in order to show improved core strength for lumbar stabilization. On going  6. Pt to improve range of motion by 75% to allow for improved functional mobility to allow for improvement in IADLs. On going  7. Patient will improve TUG score to less than 11 seconds with or without AD safely. On going.      Plan   Plan of care Certification: 9/24/2024 to 12/3/24.    Wyatt Yen, PT   11/25/2024

## 2024-11-27 ENCOUNTER — CLINICAL SUPPORT (OUTPATIENT)
Dept: REHABILITATION | Facility: OTHER | Age: 89
End: 2024-11-27
Payer: MEDICARE

## 2024-11-27 DIAGNOSIS — R26.2 DIFFICULTY IN WALKING: Primary | ICD-10-CM

## 2024-11-27 PROCEDURE — 97112 NEUROMUSCULAR REEDUCATION: CPT

## 2024-11-27 NOTE — PROGRESS NOTES
Physical Therapy Treatment Note     Name: Jefferson Boogie  Clinic Number: [id number]    Therapy Diagnosis:   Encounter Diagnosis   Name Primary?    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     he was compliant with home exercise program given last session.   Response to previous treatment:good  Functional change: ongoing     Pain: 0/10  Location: right SI joint      Objective        CMS Impairment/Limitation/Restriction for FOTO Lumbar Spine Survey     Therapist reviewed FOTO scores for Jefferson Boogie on 11/25/2024.   FOTO documents entered into EP[employer] - see Media section.           Treatment     Jefferson received the following manual therapy techniques for 0 minutes:   STM to R lumbar paraspinals/scar mobility  Prone knee FL stretch and hip ER/IR    Jefferson received therapeutic exercises for 30 minutes including:    Nustep for endurance and LE ROM/joint nutrition x 6 minutes  Prone hip EX 2 x 10 3 second holds  Supine hip FL stretch x 90 seconds each side  Prone lie x 2 minutes  LTR x 1 minutes 3 second holds   Standing hip FL Y TB 2 x 10 3 second holds  Seated trunk flexion with ball x 3 directions for 3 minutes  Leg press 80# 3 x 10 reps  SL leg press (R) 40# 2 x  "10    Slant board stretch 2 x 30"- NP  Squats 2 x 10 reps with Y TB- NP      Jefferson participated in dynamic functional therapeutic activities to improve functional performance for 0 minutes, including:        Standing march 2# 2 x 10- NP  airex/half foam obstacle x5 with SPC- NP   6 in step up 2 x 10 ea with B UE support= NP  Stairs negotiation in stairwell x2- NP    Neuromuscular re-education activities to improve: Balance, Coordination, and Proprioception for 25 minutes. The following activities were included:    Standing glut med/Ext with RTB 2 x 10 reps 3 second holds  Sit<>stands with 2 foam pad 2 x 10 reps without UE support (yellow band around feet to promote hip ER)  NBOS on airex 3x30"  WBOS eyes closed on airex 3x30"  Standing on airex with head turns 3x30"  Standing Rows GTB 2x15x5"  Standing shoulder Ext GTB 2x15x5"    CP to lumbar spine/SI joint x 5 minutes in Z lie.    Home Exercises and Education Provided     Education provided:   - See above  - Progress towards goals     Written Home Exercises Provided: Patient instructed to cont prior HEP.  Exercises were reviewed and Jefferson was able to demonstrate them prior to the end of the session.  Jefferson demonstrated good  understanding of the HEP provided.   .   See EMR under Patient Instructions for exercises provided prior visit.      Assessment   Patient presents today with reports of leg fatigue, but no pain. Progressed balance, strengthening and mobility exercises as tolerated with no adverse effects. Continues to need frequent rest breaks between sets and exercises due to poor endurance. Continue to progress as able.     Jefferson is progressing well towards his goals and there are no updates to goals at this time. Pt prognosis is Good.     Pt will continue to benefit from skilled outpatient physical therapy to address the deficits listed in the problem list on initial evaluation provide pt/family education and to maximize pt's level of independence in the " home and community environment.     Anticipated barriers to physical therapy: chronicity and severity of symptoms    Pt's spiritual, cultural and educational needs considered and pt agreeable to plan of care and goals.    Goals:  Short Term Goals:  4-5 weeks  1. Report decreased in pain at worse less than  <   / =  5  /10  to increase tolerance for functional activities.On going  2. Increased B hip/LE MMT 1/2  to increase tolerance for ADL and work activities.On going  3. Pt to tolerate HEP to improve ROM and independence with ADL's.On going  4. Pt to improve range of motion by 50% to allow for improved functional mobility to allow for improvement in IADLs. On going     Long Term Goals: 8-10 weeks  1. Report decreased in pain at worse less than  <   / =  3  /10  to increase tolerance for functional mobility.  On going  2. Increased B hip/LE MMT 1 grade to increase tolerance for ADL and work activities.On going  3. Pt will report a 51 or greater score on FOTO Lumbar spine survey  to demonstrate decrease in disability and improvement in back pain.On going  4. Pt to be Independent with HEP to improve ROM and independence with ADL's. On going  5. Pt to demonstrate negative Bridge Test in order to show improved core strength for lumbar stabilization. On going  6. Pt to improve range of motion by 75% to allow for improved functional mobility to allow for improvement in IADLs. On going  7. Patient will improve TUG score to less than 11 seconds with or without AD safely. On going.      Plan   Plan of care Certification: 9/24/2024 to 12/3/24.    Wyatt Yen, PT   11/27/2024

## 2024-11-29 ENCOUNTER — OFFICE VISIT (OUTPATIENT)
Dept: PAIN MEDICINE | Facility: CLINIC | Age: 89
End: 2024-11-29
Attending: ANESTHESIOLOGY
Payer: MEDICARE

## 2024-11-29 VITALS
WEIGHT: 202.81 LBS | RESPIRATION RATE: 12 BRPM | BODY MASS INDEX: 26.88 KG/M2 | OXYGEN SATURATION: 100 % | HEART RATE: 58 BPM | HEIGHT: 73 IN | SYSTOLIC BLOOD PRESSURE: 142 MMHG | DIASTOLIC BLOOD PRESSURE: 60 MMHG

## 2024-11-29 DIAGNOSIS — M54.17 LUMBOSACRAL RADICULOPATHY: ICD-10-CM

## 2024-11-29 DIAGNOSIS — M53.3 SACROILIAC DYSFUNCTION: ICD-10-CM

## 2024-11-29 DIAGNOSIS — Z98.1 S/P FUSION OF SACROILIAC JOINT: Primary | ICD-10-CM

## 2024-11-29 PROCEDURE — 99214 OFFICE O/P EST MOD 30 MIN: CPT | Mod: S$PBB,GC,, | Performed by: ANESTHESIOLOGY

## 2024-11-29 PROCEDURE — 99214 OFFICE O/P EST MOD 30 MIN: CPT | Mod: PBBFAC | Performed by: ANESTHESIOLOGY

## 2024-11-29 PROCEDURE — 99999 PR PBB SHADOW E&M-EST. PATIENT-LVL IV: CPT | Mod: PBBFAC,,, | Performed by: ANESTHESIOLOGY

## 2024-11-29 NOTE — PROGRESS NOTES
Chronic Pain Established Patient       PCP: Brian Sorto MD      CHIEF COMPLAINT: low back pain    INTERVAL HISTORY:     Interval History 11/29/24:   Today the patient returns to clinic for follow up after right S1 TFESI which completely resolved the numbness and tingling in right foot following the Right SIJ fusion ad subsequent finding of narrowing in S1 foramina. He is currently in 3/10 pain, progressing well in the healthy back program however slightly discouraged at his progress with mobility. Currently taking gabapentin and cymbalta, has not used opioid pain medication in quite some time and only would use it if in severe pain but he stated . He denies new onset fever/night sweats, urinary incontinence, bowel incontinence, significant weight changes, significant motor weakness or changes, or loss of sensations.     Interval History 8/26/2024:  Jefferson Boogie returns to clinic for follow-up after right SIJ fusion. He recently had CT pelvis performed showing new, narrowing of right S1 foramina. He continues to report tingling to the right foot. He denies weakness. He continues Norco 10/325 TID PRN and Hydromorphone 2 mg BID PRN with mild relief. He denies any perceived side effects. He denies recent falls or trauma. He denies new onset fever/night sweats, urinary incontinence, bowel incontinence, significant weight changes, significant motor weakness or changes, or loss of sensations. His pain today is 7/10.      Interval History 8/19/2024:  Jefferson Boogie returns to clinic for follow-up after Right SIJ fusion. He continues to report significant amounts of pain. He has been taking Hydromophone 2 mg daily and Norco 4 times per day without relief. He continues to utilize a rolling walker to help him ambulate. He denies fever, drainage, redness, swelling or any other signs of infection. He denies recent falls  or trauma. He denies new onset fever/night sweats, urinary incontinence, bowel incontinence, significant weight changes, significant motor weakness or changes, or loss of sensations. His pain today is 7/10.      Interval History 8/15/2024:  Jefferson Boogie returns to clinic for follow-up after Right SI joint fusion using SILO TFX. He reports significant pain. He continues Hydromorphone 2 mg. He took 1 pill today . He has not taken any Norco today. He is present with his wife. She is wondering when they can start doing things like going to dinner or if he needs to stay at home while he recovers. He denies fever, drainage, redness or swelling from procedure. Site. He denies recent falls or trauma. He denies new onset fever/night sweats, urinary incontinence, bowel incontinence, significant weight changes, significant motor weakness or changes, or loss of sensations. His pain today is 9/10.      Interval History 6/27/2024:  Patient returns to clinic for follow up. Patient is s/p Right SIJ and R Piriformis injections on 4/10/2024. Patient reports 100% relief from the procedure for 2 days. Pain has recurred and is same as previous description. [employer] Rep (Carolynn) is also here today for programming of [place] SCS. Patient continues with current medications with some benefit and HEP. Current pain 6/10. Patient denies red flags including weakness, unexpected weight loss/gain, night sweats/fevers, saddle anesthesia, and symptoms of CAREN.     Interval History 3/14/2024:  Jefferson Boogie presents for follow up. He is here today to discuss further options for treatment after right cluneal nerve block on 2/28/2024. He was here for the same reason two weeks ago, and we decided to give the block a bit more time to work before investigating other options. He reports no significant changes in his symptoms. He does say that he can manage his symptoms satisfactorily using 1.5 of his oxycodone  every 3 hours when doing things  like golf and walking. He said he may use the medication once or twice a month and denies significant side effects.      Interval History 2/29/24:  Jefferson Boogie presents to the clinic for follow-up. He is s/p right cluneal nerve block 2/28/24. Today, he says that he does not know if he has had any difference in his symptoms so far. We spoke briefly yesterday about additional options, and he is here today for further discussion.      Interval History 01/16/2024:  Jefferson Boogie presents to the clinic for a follow-up appointment for low back, right hip, and right leg pain. Since the last visit, Jefferson Boogie states the pain has been persistant. Currently his right hip and leg pain is his biggest problem. He continues to endorse sharp stabbing pain in his hip and leg. Pain is exacerbated by activity, standing, laying on right side, lifting, bending. Pain is improved with rest, exercising, stretching, medications. Continues with Celebrex and Cymbalta. Current pain intensity is 6/10. Patient continues to endorse benefit from SCS. Patient denies red flags including weakness, unexpected weight loss/gain, night sweats/fevers, saddle anesthesia, and symptoms of CAREN.     Interval History 8/10/2023:  Patient reports that his pain post right SIJ and piriformis was persistent until it decreased 50% on 7/4/23, but then the pain returned after a few days. He reports that during that period he was able to run after a bus in [facility]. The pain is improved, but not as much as previous in his right buttock and calf. Patient reports that he has been working with the [employer], but not finding the correct programming as of yet.  No fevers, chills, unexplained weight loss.  Hx of bladder cancer in 1967. Remembers falling while skiing and landed on his buttocks 30 years ago, but no other pelvic trauma. Patient was traveling to [place] and recently came back to [place].  We discussed his recent pelvic CT in details especially for the  accidental finding of irregular bone lesion. Explained to patient the need to investigate this further with our urology and oncology team.     Interval History 6/22/2023  Jefferson Boogie presents to the clinic for a follow-up appointment for chroniic LBP. Pt was last seen in clinic on 5/25 and scheduled for right SIJ and piriformis injection--performed 6/22. Pt reports 40% relief x 1-2 days. He continues to work with the rep with regards to programming the [place] SCS device. Pt reports that he is getting some decent relief from the device with current program, but feels like there is still some progress to be made. Continues to complain of pain of similar character and quality to that of prior eval. Localized in the right lowerlumbar spine/gluteal area as well as the right lateral leg. Since the last visit, Jefferson Boogie states the pain has been improving. Current pain intensity is 5/10. He continues to exercise daily with stationary bike and weight training. Continues to take Cymbalta and Celebrex daily. He also takes Norco 10 very sparingly, maybe 1-2 per week. Overall the current regimen of SCS, medications and occasional injections have provided pt with enough relief for him to remain functional, and able to participate in his hobbies.      Interval history 5/25/23:  In the interim he has met with the [employer] reps for reprogramming. He has his good and bad days but feels that the pain is manageable. The reps with [employer] are present for the visit today. Today his pain is a 6.5/10. Still taking percocet and cymbalta (missed a few days), but these medications do not provide much relief. Still pain with walking and sitting, but the pain with laying down is better.      Interval History 5/4/23:  Patient seen today via virtual follow-up after implantation of his [place] SCS in February. He reports no change in his pain since his last visit. Most of his pain is into his right hip with intermittent radiation into  his RLE. He denies any new symptoms. No red flag symptoms. He is scheduled to meet with the [employer] reps from reprogramming next week. He remains optimistic. In discussing his current medications, he has run out of the hydrocodone he was previously taking and has only been taking his Cymbalta once per day rather two.      Interval History 3/30/2023:  Mr Boogie presents 5-6 weeks after implantation of his [place] SCS system. He reports no significant change in his pains, which are primarily down the RLE.  No constitutional signs symptoms concerning for infection.  No new areas of pain or neurological changes since procedure. No voicing of s/s concerning for cauda equina syndrome. He does endorse improvement in sleep. He is worried that his implant was a failure because his pain has not changed.     Interval History 2/20/2023:  Mr Boogie presents for follow up of [place] SCS replacement. He states doing well. No constitutional signs symptoms concerning for infection.  No new areas of pain or neurological changes since procedure. No voicing of s/s concerning for cauda equina syndrome.      Interval History 1/23/2023:  Still taking Celebrex, however hasn't noticed a difference in pain with this medication. Location, quality, and severity of pain are unchanged from prior visit and is described above. Here today to discuss removal of [place] SCS, and implant of [place] SCS. He states his stimulator has not been helpful for the past 2 years despite multiple reprogramming and adjustment.     Interval History 11/21/22:  Reports 24 hours of 100% relief for 48 hours, but he reports that now his pain is only approximally 10% better. His pain at rest is 3-4/10. Pain at its worst is 8-9/10. Pain is improved when leaning over a grocery cart. Pain only allows him to walk for 3-4mins.      Interval History 8/29/22:  Jefferson Boogie presents to the clinic for a follow-up appointment for back pain.  He had his second bilateral L3,  L4, L5 MBB and reports 100% pain relief. He would like to proceed with RFA.     Interval History 5/26/22:  Jefferson Boogie presents to the clinic for a follow-up appointment for back pain. Since the last visit, Jefferson Boogie states the pain has been stable. Certain postures he can tolerate in the standing position such as leaning on a shopping cart or support to his posterior thighs. He now uses a walker when covering long distances. He continues to play golf despite it aggravating his pain. He receives some benefit from the SCS whereas other interventions have not helped. He remains interested in the [place] device.     Interval History 3/24/22:  Jefferson Boogie presents to the clinic for a follow-up appointment for back pain. Since the last visit, Jefferson Boogie states the pain has been worse than usual. Current pain intensity is 8/10. He continues to report benefit with [employer] SCS however he has not been able to get in touch with the representative in order to have his settings adjusted. He continues to take occasional norco 7.5 mg with benefit, particularly when he plays golf.     Interval History 2/10/22:  Patent presents today s/p caudal epidural steroid injections on 1/12/22 since then he has not had any relief. He states his pain has been unchanged and is a 6/10 on average with the worst being 8/10 and best is 4/10. He has been taking percocet 5mg when he golfs or plays with his granddaughter.      Interval History 12/30/21:  Jefferson Boogie presents to clinic for follow up appointment s/p Right SI joint and Right piriformis muscle injection under US guidance on 11/29/21. He did not obtain any noticeable relief with this procedure similar to all of his previous injections. His pain is unchanged and constant over the right buttock. He is taking percocet 5mg x 3 on days he is more active, such as playing golf. This helps some, but minimally. Denies any new symptoms or neurological changes. No  "numbness, tingling, weakness in his lower extremities. Denies bowel/bladder incontinence or saddle anesthesia.      Interval History 11/4/2021:  Jefferson Boogie presents to the clinic for a follow-up appointment for right sided back pain and SI joint pain. Mr. Boogie had a right SI joint injection on 10/13/2021. He did not note significant relief with the injection for any period of time. Pain is still constant over the right buttock and most noticeable when playing golf. He denies any new bowel/bladder incontinence, lower extremity numbness or weakness or saddle anesthesia.     Interval History 8/26/2021:  Jefferson Boogie presents to the clinic for a follow-up appointment for right sided hip pain with right-sided radiculopathy . Since the last visit, Jefferson Boogie states the pain has been "particularly tender today" 7/10. Patient reports playing golf recently and experienced worsening symptoms the following day. Mr. Boogie is s/p right-side hip joint injection with minimal relief. Patient states he has had relief with previous interventions and is open to RFA.     Interval History 6/10/2021:  Jefferson Boogie presents to the clinic for a follow-up appointment. Since the last visit, Jefferson Boogie states the pain has been stable. Current pain intensity is 3/10. States he is still having pain around SIJ that is affecting his ADL      Interval History 4/15/2021:  The patient returns to clinic today for follow up of back pain. He reports that the swelling above the SI fusion incision has resolved. He denies any fever, chills, or drainage from the incision. He does report benefit since the fusion. He continues to report benefit with [employer] SCS. He reports intermittent shoulder pain at this time. He did receive an injection with Sports Medicine with benefit. He denies any other health changes. His pain today is 2/10.     Interval History 3/25/21:  Mr. Boogie presents to clinic for follow up of " bilateral shoulder pain. He is s/p BL subacromial injections on 2/8/21. He reports maximum 20% relief of pain in the Right shoulder. Today the Left shoulder pain is 1/10; Right shoulder pain is 5-7/10.      Interval History 3/22/2021:  The patient returns to clinic today for follow up of back pain. He is s/p right SI fusion on 3/15/2021. He reports some relief at this time. He does report soreness to the incision. He denies any fever, chills, or drainage from incision. He continues to report benefit with Nevro SCS. He denies any other health changes. His pain today is 4/10.     Interval History 1/11/2020:  Patient is here for follow-up of low back pain now s/p sacroiliac RFA on 12/9/20 which provided no benefit and with the new complaint of bilateral anterior shoulder pain R>L. His shoulder pain started about 2 months ago. He describes 9/10 sharp/stinging pain without radiation that is exacerbated with overhead activity and improved with rest. He started PT about one month ago. He takes oxycodone which provides some relief. He had a blind subacromial steroid injection in the right shoulder with Dr. Ramirez on 10/26/19 which provided some short term relief.     Interval History 11/5/2020:  Jefferson Boogie presents to the clinic for a follow-up appointment for low back pain. Since the last visit, Jefferson Boogie states the pain has been stable. Current pain intensity is 4/10. He had good relief from the SI joint injection that lasted for about a week. Pain has since returned. He also slipped and fell on his buttock a couple of weeks ago and had increased pain in the right buttock after that which is slowly improving. He continues to have variable benefit with the [employer] SCS for intermittent pain in the right leg. He is scheduled to meet with representatives today. He is taking celebrex daily and percocet as needed sparingly. He denies any adverse effects and any other health changes.     Interval History  10/5/2020:  The patient returns to clinic today for follow up of low back pain. He is s/p right SI joint injection on 9/9/2020. He reports 25% relief of his right sided low back and buttock pain. He did have good relief the first two days. He continues to report right sided low back and buttock pain. He denies any radicular leg pain. His pain is worse with prolonged standing and walking. He continues to report intermittent pain into his achilles from previous injury. He feels as though this has changed his gait. He continues to report some benefit with Nevro SCS device. He is in contact with representatives. He is currently taking Percocet as needed sparingly with some benefit. He denies any adverse effects. He denies any other health changes. His pain today is 4/10.      Interval History 9/2/2020:  The patient returns to clinic today for follow up of back pain. He reports that his back pain has improved since last audio visit. He continues to report back pain with intermittent radiating pain into his right hip and calf. He has spoken with Bienvenido from [employer] via phone with some programming. He is meeting with Bienvenido today. He had some issues with charging but this has improved. He is currently taking Norco intermittently but this is only lasts 2-3 hours. He denies any adverse effects. He denies any other health changes. His pain today is 8/10.     Interval History 08/27/2020:  Pt was contacted over [employer] audio for a follow up appointment.  He is currently complaining of right hip and right calf with no associated numbness or weakness.  He continues to use his Nevro device.  He states it has been very frustrating. Inconsistent.  Took 20 mins to 1 hour to charge.  Couldn't get it to start this morning.  He states that there is no drainage at the battery site, no signs of infection or pain at the site.  Patient is not taking medications at this time.  He wants to speak about medication options because he would like to  start playing golf again.     Interval History 8/17/2020:  The patient returns to clinic today for post op wound check. He continues to report benefit with Nevro device. He denies any fever, chills, or drainage. He continues to follow his activity restrictions. He does report a recent achilles tendon injury while swimming. He is seeing Orthopedics. He denies any other health changes. His pain today is 4/10.     Interval History 8/3/2020:  The patient returns to clinic today for post op. He is s/p Nevro battery change on 7/27/2020. He denies any fever, chills, or drainage from incision. He has not completed his antibiotics as he missed two days of the medication. He continues to follow his activity restrictions. He reports relief with the device. He is meeting with Bien[employer] today for programming. He denies any other health changes. His pain today is 2/10     Interval History 7/22/2020:  Pt presents for audio follow up only s/p Right SI joint injection on 7/8/2020. Pt states he has near resolution of focal pain to buttock. He is pleased with result and pain relief. Pt has been in contact with [employer] and will be having battery swap in 5 days. He has difficulty whether stimulator is beneficial and has even had a holiday from using SCS.  He denies any newer areas pain, denies any neuro changes, meds area adequate to control pain without adverse side effects. Pain is 2/10 today.     Interval HPI 6/15/2020:  Jefferson HANSON Keagan presents to the clinic for a follow-up appointment for 3 week follow up right hip and right thigh pain. Since the last visit, Jefferson Boogie states the pain has been persistant. Current pain intensity is 5/10. Patient has been working with Bienvenido Varghese, to try and get better coverage of a localized pain that he still experiences in his right low back area. He does report improvement in symptoms of numbness/tingling. Today, worse pain is 1/10 in severity and localizes to the right SI joint. Pain is  worse with extension of his back. It is worse with golfing. Pain relieved by Norco--he takes 1-2 tablets of 5-325 Norco on days that he plays golf. Pain is also improved with being still and not being active. Patient endorses a much more active lifestyle with Norco. Denies new weakness/numbness or bowel/bladder changes.     Previous injection history includes a series of 3 lumbar CHOCO at [facility].      Interval HPI 3/5/20:  Patient presents to the clinic for a follow-up appointment for low back pain. Since the last visit, he states his pain has been unchanged. Current pain intensity is 4/10. He continues to work with [employer] to adjust settings on his SCS. He has stopped using tramadol, and uses norco sparingly. He continues to work with a  for physical therapy.      Interval HPI 1/9/2020:  Patient returns for follow up s/p Nevro SCS. He states he is unsure if it has helped his pain since he had it implanted. He last had an adjustment of his programming about 1 month ago. He does note that once he is done charging his SCS his pain is improved. Pain still worse with prolonged standing and activity such as golf. He still is doing home exercise program. He says that he is trying to do more since getting the SCS. He is still taking the Tramadol with limited pain relief. He takes Tramadol 50 mg twice daily only on days of activity which is once a week. No other health changes.      HPI 11/20/19  Jefferson Boogie returns to clinic today for follow up. He reports increased low back and leg pain over the last few days. He has been in contact with [employer] representatives for programming adjustments. He does report benefit with the device. He reports good days and bad days. His pain is worse with prolonged standing and activity. He continues to participate in a home exercise routine. He does take Tramadol sparingly but reports limited relief. He denies any other health changes. His pain today is 5/10.       Pain Medications:  Oxycodone-acetaminophen 10-325mg very rarely  Cymbalta     Opioid Contract: not applicable      report:  Reviewed and consistent with medication use as prescribed. Has not filled oxycodone since June of last year, says he has plenty left.               11/29/2024     1:23 PM   Last 3 PDI Scores   Pain Disability Index (PDI) 42       Conservative therapy:  PT: no    Chiro:  HEP in the past 6 months: patient preforming daily HEP with limited benefit     Interventional Pain Procedures:   9/9/2020 - Right SI joint injection  7/27/2020 - Nevro SCS battery change  7/8/2020 - Right SIJ injection >80% relief   8/26/19 - SCS implant  8/5/19 - SCS trial  7/8/2020 - Right SI joint injection  7/27/2020 - SCS battery revision  9/9/2020 - Right SI joint injection   12/9/2020 - Right L5-S1 RFA  3/15/2021 - Right SI fusion  7/21/2021 - Injection R Hip - minimal relief  10/13/2021 - Right SI joint injection  11/29/2021 - R SI joing and R piriformis muscle injection - no relief   1/12/2022 - Caudal CHOCO- no relief   8/24/22 - Diagnostic Bilateral L3, L4 and L5 Lumbar Medial Branch Block under Fluoroscopy  09/21/22 - Right L3, L4, L5 RFA  10/12/22 -  Left L3, L4, L5 RFA  2/13/23 - [place] SCS implant  5/31/23 - Right SIJ and piriformis injection   2/28/24 -  Right cluneal nerve block  4/10/24 - Right SIJ and piriformis injection  8/12/24 - Right SIJ fusion  8/28/24 - right S1 TFESI 80% relief tingling in right foot       IMAGING:       CT PELVIS WITHOUT CONTRAST     CLINICAL HISTORY:  Pelvis pain, stress fracture suspected, neg xray;  Arthrodesis status     TECHNIQUE:  1.25 mm axial images of the pelvis obtained, coronal and sagittal images reformatted.     COMPARISON:  None     FINDINGS:  Percutaneous Posterior right  Sacroiliac Joint Fusion using SILO TFX transfixing device 08/12/2024.     New, moderate narrowing of the right S1 foramina by osseous density extending to the foramina, could cause symptoms  referable to a right S1 neuropathy, to correlate clinically.     The bilateral sacroiliac joints appear within normal limits.     The bilateral hip joints appear normal.  No advanced degenerative change.     Mild osseous hypertrophy at the pubic symphysis.     The intrapelvic content demonstrate significant diverticulosis coli.  Moderate stool retention.  Mild bladder wall thickening possibly due to nondistention.  The prostate is not enlarged.  The abdominal wall and inguinal regions appear normal.     Degenerative disc disease, disc space narrowing and vacuum disc phenomenon L4-5 L5-S1, unchanged.     Impression:     New, moderate-severe narrowing of the right S1 foramina, by osseous density extending in the right foramina, could impinge on the exiting right S1 nerve root, to correlate with patient's symptoms.     Significant sigmoid diverticulosis.     This report was flagged in Epic as abnormal.        Electronically signed by:Ammy Nance MD  Date:                                            08/21/2024  Time:                                           16:45     XR SACRUM AND COCCYX     CLINICAL HISTORY:  Arthrodesis status     FINDINGS:  Sacrum coccyx three views.     There is hardware status post right SI joint arthrodesis.  No complication seen.  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        Electronically signed by:Aki Amaya MD  Date:                                            08/16/2024  Time:                                           08:19     XR HIPS BILATERAL 2 VIEW INCL AP PELVIS     CLINICAL HISTORY:  Arthrodesis status     FINDINGS:  Hips bilateral two views to include pelvis.     Right: No fracture dislocation bone destruction seen.     Left: No fracture dislocation bone destruction seen.        Electronically signed by:Aki Amaya MD  Date:                                            08/16/2024  Time:                                           08:17        EXAMINATION:  CT PELVIS WITHOUT CONTRAST      CLINICAL HISTORY:  History fo percutanous SI fusion.;  Chronic pain syndrome     TECHNIQUE:  CT of the pelvis, without intravenous contrast.     COMPARISON:  None     FINDINGS:  BONE: Diffuse osteopenia.  No fracture or osteonecrosis.  Few lytic and sclerotic lesions scattered throughout the pelvis, including a lesion in the right iliac bone with irregularity of the overlying cortex (2:103).     JOINT: Postoperative changes from right sacroiliac joint fusion.  The implant is in satisfactory position.  No osseous fusion across the joint space.     Degenerative changes both sacroiliac joints.  No erosions or ankylosis.  Degenerative changes also involve the lower lumbar spine, both hip joints, and pubic symphysis.  Chondrocalcinosis of the pubic symphysis.  No significant joint effusion.     SOFT TISSUE: Normal muscle bulk. Regional tendons are intact.  Calcific tendinopathy involving the proximal hamstring tendons bilaterally.  No bursal collection.     MISCELLANEOUS: Circumferential bladder wall thickening.  Prostatic calcifications.  Colonic diverticulosis.  Atherosclerosis.     Impression:     Postoperative changes in the right sacroiliac joint.  No osseous fusion.     Few lytic and sclerotic lesions scattered throughout the pelvis, concerning for metastases or myeloma.  No prior studies are available for comparison.     Bladder wall thickening, which could be secondary to outlet obstruction or cystitis.  Correlate with urinalysis.     Other findings as described.     This report was flagged in Epic as abnormal.     6/10/2021 - X ray Hips Bilateral: No radiographic evidence of acute osseous abnormality of the pelvis and hips and without radiographic evidence of osteonecrosis of the femoral heads.     9/18/21 MRI L-spine:  FINDINGS:  Lumbar sagittal alignment is slightly is straightened.  There is slight convex right curvature lumbar spine.  There is degenerative disc disease with intervertebral disc height loss  and endplate degenerative change at all levels with scattered disc desiccation allowing for degenerative change the lumbar vertebral body heights and contours are within normal is without evidence for acute fracture or subluxation.  There is heterogeneous T1/T2 signal focus within the right aspect of the S1 vertebra most compatible with hemangioma this measures 2.0 cm.     The distal spinal cord and conus is normal in signal and contour tip of the conus approximates the T12/L1 level.  Please note there is artifact from indwelling spinal stimulator device with leads partially visualized casting artifact entering the dorsal epidural space at the T12 level and extending cranially.     There is abnormal clumping configuration of the filum terminalis a from T12 through L5 with slight thickening of scattered nerve roots most compatible with arachnoiditis.     No aneurysmal dilatation of the visualized abdominal aorta.     T12/L1 through L1/L2: No significant disc bulge, central canal or neural foraminal stenosis.     L2/L3: Posterior disc osteophyte with facet joint arthropathy without significant central canal stenosis with mild bilateral neural foraminal stenosis.     L3/L4:Bulging disc with ligamentum flavum hypertrophy without significant central canal stenosis with mild bilateral neural foraminal stenosis.     L4/L5:Posterior disc osteophyte with ligamentum flavum hypertrophy and facet joint arthropathy without significant central canal stenosis and mild bilateral neural foraminal stenosis.     L5/S1: Posterior disc osteophyte with facet joint arthropathy without significant central canal stenosis with moderate right and mild left neural foraminal stenosis.     This report was flagged in Epic as abnormal.     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  Please note there is spinal stimulator device with leads partially visualized and distorting the study by artifacts.      There is abnormal thickening of the filum configuration most compatible with arachnoiditis.     Please see above for additional details.     Thoracic spine MRI:  FINDINGS: Thoracic vertebral body height and alignment are maintained with a few small scattered Schmorl's nodes involving the endplates of several mid to lower thoracic vertebra. The T3-4 vertebra are partially fused. There is some degree of desiccation of all of the thoracic discs with multilevel disc space narrowing, especially at the T7-T8, T6-T7 and T5-T6 levels. There is no abnormal prevertebral soft tissue thickening. The somewhat heterogeneous appearance to the signal from the thoracic vertebra is presumably secondary to an admixture of red and yellow marrow.    T1-T2 level: There is no bony foraminal narrowing or bony central canal stenosis and the posterior disc margin is unremarkable.    T2-T3 level: There is no bony foraminal narrowing or bony central canal stenosis and the posterior disc margin is unremarkable.    T3-T4 level: There is no bony foraminal narrowing or bony central canal stenosis and the posterior disc margin is unremarkable.    T4-5 level: There is no bony foraminal narrowing or bony central canal stenosis and the posterior disc margin is unremarkable.    T5-T6 level: There is no bony foraminal narrowing or bony central canal stenosis and the posterior disc margin is unremarkable.    T6-T7 level: There is no bony foraminal narrowing or bony central canal stenosis and the posterior disc margin is unremarkable.    T7-T8 level: There is no bony foraminal narrowing or bony central canal stenosis and the posterior disc margin is unremarkable.    T8-T9 level: There is no bony foraminal narrowing or bony central canal stenosis and the posterior disc margin is unremarkable.    T9-T10 level: There is no bony foraminal narrowing or bony central canal stenosis and the posterior disc margin is unremarkable.    T10-T11 level: There is no  bony foraminal narrowing or bony central canal stenosis and the posterior disc margin is unremarkable.    T11-T12 level: There is no bony foraminal narrowing or bony central canal stenosis and the posterior disc margin is unremarkable.    T12-L1 level: The tip the conus medullaris extends down to level of the superior endplate of L1. 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 There is no bony foraminal narrowing or bony central canal stenosis at this level.    On the axial images, the immediate paravertebral soft tissues are unremarkable. A small cyst is seen to involve the upper pole the left kidney.    Following contrast administration, there is no abnormal enhancement of any bony or soft tissue elements of the thoracic spine. There is no abnormal enhancement of the subserosal surface of the thoracic spinal cord. Some foci of mild signal intensity in the CSF dorsal to the spinal cord of some of the sagittal sequences presumably is secondary to CSF pulsation artifact.    On the sagittal  image, there is cervical spondylosis and kyphosis with narrowing of all of the disc spaces in the cervical spine.      Past Medical History:   Diagnosis Date    Bronchitis     Cancer     stage I bladder cancer 50 yrs ago    Hypercholesteremia     Hypertension     Sacroiliitis 07/08/2020    Thyroid disease      Past Surgical History:   Procedure Laterality Date    BLADDER SURGERY      CATARACT EXTRACTION      CATARACT EXTRACTION W/  INTRAOCULAR LENS IMPLANT Right 3/9/2022    Procedure: EXTRACTION, CATARACT, WITH IOL INSERTION;  Surgeon: Bell Cedeno MD;  Location: [facility] OR;  Service: Ophthalmology;  Laterality: Right;    COLONOSCOPY      EPIDURAL STEROID INJECTION N/A 1/12/2022    Procedure: INJECTION, STEROID, EPIDURAL CAUDAL With Catheter needs consent;  Surgeon: Samuel Jimenez MD;  Location: [facility] PAIN MGT;  Service: Pain Management;  Laterality: N/A;    EYE SURGERY       cataracts     FUSION OF SACROILIAC JOINT Right 3/15/2021    Procedure: FUSION, RIGHT SACROILIAC JOINT FUSION;  Surgeon: Samuel Jimenez MD;  Location: [facility], [place], operated by [facility] OR;  Service: Pain Management;  Laterality: Right;  C-ARM, PAINTEQ REP     FUSION OF SACROILIAC JOINT Right 8/12/2024    Procedure: FUSION, SACROILIAC JOINT;  Surgeon: Samuel Jimenez MD;  Location: [facility], [place], operated by [facility] OR;  Service: Pain Management;  Laterality: Right;    HERNIA REPAIR      INJECTION OF ANESTHETIC AGENT AROUND NERVE Bilateral 6/1/2022    Procedure: BLOCK, NERVE MEDIAL BRANCH L3,4,5 BILATERAL 1st;  Surgeon: Samuel Jimenez MD;  Location: [facility], [place], operated by [facility] PAIN MGT;  Service: Pain Management;  Laterality: Bilateral;    INJECTION OF ANESTHETIC AGENT AROUND NERVE Bilateral 8/24/2022    Procedure: Block, Nerve Kenny L3, L4, & L5;  Surgeon: Samuel Jimenez MD;  Location: [facility], [place], operated by [facility] PAIN MGT;  Service: Pain Management;  Laterality: Bilateral;    INJECTION OF ANESTHETIC AGENT AROUND NERVE Right 2/28/2024    Procedure: BLOCK, NERVE RIGHT CLUNEAL;  Surgeon: Samuel Jimenez MD;  Location: [facility], [place], operated by [facility] PAIN MGT;  Service: Pain Management;  Laterality: Right;  [phone]    INJECTION OF JOINT Right 7/8/2020    Procedure: INJECTION, JOINT, SACROILIAC (SI);  Surgeon: Samuel Jimenez MD;  Location: [facility], [place], operated by [facility] PAIN MGT;  Service: Pain Management;  Laterality: Right;    INJECTION OF JOINT Right 9/9/2020    Procedure: INJECTION, JOINT, SACROILIAC (SI);  Surgeon: Samuel Jimenez MD;  Location: [facility], [place], operated by [facility] PAIN MGT;  Service: Pain Management;  Laterality: Right;    INJECTION OF JOINT Right 7/21/2021    Procedure: INJECTION, JOINT, HIP RIGHT;  Surgeon: Samuel Jimenez MD;  Location: [facility], [place], operated by [facility] PAIN MGT;  Service: Pain Management;  Laterality: Right;  CONSENT NEEDED    INJECTION OF JOINT Right 10/13/2021    Procedure: INJECTION, JOINT, SACROILIAC (SI)  NEED CONSENT pt. requesting sedation;  Surgeon: Samuel Jimenez MD;  Location: [facility], [place], operated by [facility] PAIN MGT;  Service: Pain Management;  Laterality: Right;     INJECTION OF JOINT Right 4/10/2024    Procedure: INJECTION, JOINT RIGHT SI AND RIGHT PIRIFORMIS;  Surgeon: Samuel Jimenez MD;  Location: [facility] PAIN MGT;  Service: Pain Management;  Laterality: Right;  [phone]    INJECTION, SACROILIAC JOINT Right 5/31/2023    Procedure: INJECTION,SACROILIAC JOINT, RIGHT SI AND RIGHT PIRIFORMIS;  Surgeon: Samuel Jimenez MD;  Location: [facility] PAIN MGT;  Service: Pain Management;  Laterality: Right;    KNEE SURGERY      RADIOFREQUENCY ABLATION Right 12/9/2020    Procedure: RADIOFREQUENCY ABLATION, L5-S1-S2 LATERAL BRANCH COOLED;  Surgeon: Samuel Jimenez MD;  Location: [facility] PAIN MGT;  Service: Pain Management;  Laterality: Right;    RADIOFREQUENCY ABLATION Right 9/21/2022    Procedure: RADIOFREQUENCY ABLATION, RIGHT L3-L4-L5 PER DR JIMENEZ;  Surgeon: Samuel Jimenez MD;  Location: [facility] PAIN [facility]GT;  Service: Pain Management;  Laterality: Right;    RADIOFREQUENCY ABLATION Left 10/12/2022    Procedure: RADIOFREQUENCY ABLATION, LEFT L3-L4-L5 TWO OF TWO;  Surgeon: Samuel Jimenez MD;  Location: [facility] PAIN [facility]GT;  Service: Pain Management;  Laterality: Left;    REPLACEMENT OF NERVE STIMULATOR BATTERY N/A 7/27/2020    Procedure: Replacement, SPINAL CORD STIMULATOR BATTERY CHANGE TO NEVRO OMNION BATTERY;  Surgeon: Samuel Jimenez MD;  Location: [facility] OR;  Service: Pain Management;  Laterality: N/A;  C-ARM, NEVRO REP    REPLACEMENT OF SPINAL CORD STIMULATOR  2/13/2023    Procedure: REPLACEMENT, SPINAL CORD STIMULATOR;  Surgeon: Samuel Jimenez MD;  Location: [facility] OR;  Service: Pain Management;;    REVISION PROCEDURE INVOLVING SPINAL CORD NEUROSTIMULATOR N/A 2/13/2023    Procedure: SPINAL CORD STIMULATOR EXPLANT AND REIMPLANT SALUDA REP;  Surgeon: Samuel Jimenez MD;  Location: [facility] OR;  Service: Pain Management;  Laterality: N/A;    TRANSFORAMINAL EPIDURAL INJECTION OF STEROID Right 8/28/2024    Procedure: LUMBAR TANSFORAMINAL RIGHT S1 MEDICALLY URGENT *ASPIRIN OTC*;   Surgeon: Samuel Jimenez MD;  Location: [facility] PAIN MGT;  Service: Pain Management;  Laterality: Right;  STAT  [phone]  *SCHEDULE ON  PER MG    TRIAL OF SPINAL CORD NERVE STIMULATOR N/A 2019    Procedure: Trial, Neurostimulator, SPINAL CORD STIMULATOR TRIAL;  Surgeon: Samuel Jimenez MD;  Location: [facility] CATH LAB;  Service: Pain Management;  Laterality: N/A;  C-ARM, NEVRO REP     Social History     Socioeconomic History    Marital status:    Tobacco Use    Smoking status: Former     Current packs/day: 0.00     Types: Cigarettes     Quit date:      Years since quittin.9    Smokeless tobacco: Never    Tobacco comments:     stopped 40 years ago   Substance and Sexual Activity    Alcohol use: Yes     Alcohol/week: 1.0 standard drink of alcohol     Types: 1 Shots of liquor per week     Comment: nightly -scotch    Drug use: Never    Sexual activity: Yes     Partners: Female     Social Drivers of Health     Financial Resource Strain: Low Risk  (2022)    Received from [facility] [employer], [facility]    Overall Financial Resource Strain (CARDIA)     Difficulty of Paying Living Expenses: Not hard at all   Food Insecurity: Unknown (2024)    Received from [facility]    Hunger Vital Sign     Worried About Running Out of Food in the Last Year: Never true   Transportation Needs: No Transportation Needs (2022)    [facility]    PRAPARE - Transportation     Lack of Transportation (Medical): No     Lack of Transportation (Non-Medical): No   Physical Activity: Unknown (2024)    Received from [facility]    Exercise Vital Sign     Days of Exercise per Week: 6 days   Stress: No Stress Concern Present (2024)    Received from [facility]    [employer] - Occupational Stress Questionnaire     Feeling of Stress : Only a little   Housing Stability: High Risk (2024)    Received from [facility]    Housing Stability Vital Sign      Unable to Pay for Housing in the Last Year: No     Unstable Housing in the Last Year: Yes     No family history on file.    Review of patient's allergies indicates:  No Known Allergies    Current Outpatient Medications   Medication Sig    ALPRAZolam (XANAX) 2 MG Tab     amLODIPine (NORVASC) 5 MG tablet     ascorbic acid, vitamin C, (VITAMIN C) 1000 MG tablet Take 1,000 mg by mouth.    DULoxetine (CYMBALTA) 30 MG capsule Take 1 capsule (30 mg total) by mouth 2 (two) times daily.    ergocalciferol, vitamin D2, (VITAMIN D ORAL) Take by mouth every 30 days.    finasteride (PROSCAR) 5 mg tablet Take 1 tablet by mouth once daily.    gabapentin (NEURONTIN) 600 MG tablet Take 1 tablet (600 mg total) by mouth 3 (three) times daily.    irbesartan (AVAPRO) 75 MG tablet 150 mg once daily.     levothyroxine (SYNTHROID) 100 MCG tablet Take 100 mcg by mouth.    promethazine-dextromethorphan (PROMETHAZINE-DM) 6.25-15 mg/5 mL Syrp     simvastatin (ZOCOR) 20 MG tablet Take 20 mg by mouth every evening.    tadalafil (CIALIS) 20 MG Tab     temazepam (RESTORIL) 30 mg capsule TK 1 C PO QD HS    aspirin (ECOTRIN) 81 MG EC tablet Take 1 tablet by mouth once daily. (Patient not taking: Reported on 11/29/2024)    celecoxib (CELEBREX) 200 MG capsule  (Patient not taking: Reported on 9/23/2024)    ergocalciferol (DRISDOL) 200 mcg/mL (8,000 unit/mL) Drop Take by mouth.    meloxicam (MOBIC) 15 MG tablet Take 15 mg by mouth. (Patient not taking: Reported on 11/29/2024)    mirabegron (MYRBETRIQ ORAL) Take by mouth.     No current facility-administered medications for this visit.     Facility-Administered Medications Ordered in Other Visits   Medication    balanced salt irrigation intra-ocular solution 1 drop    sodium chloride 0.9% flush 2 mL     ROS:  GENERAL: No fever. No chills. No fatigue. Denies weight loss. Denies weight gain.  HEENT: Denies headaches. Denies vision change. Denies eye pain. Denies double vision. Denies ear pain.   CV: Denies  "chest pain.   PULM: Denies of shortness of breath.  GI: Denies constipation. No diarrhea. No abdominal pain. Denies nausea. Denies vomiting. No blood in stool.  HEME: Denies bleeding problems.  : Denies urgency. No painful urination. No blood in urine.  MS: Denies joint stiffness. Denies joint swelling.  See HPI  SKIN: Denies rash.   NEURO: Denies seizures. No weakness.  PSYCH:  Denies difficulty sleeping. No anxiety. Denies depression. No suicidal thoughts.       VITALS:   Vitals:    11/29/24 1322 11/29/24 1323   BP: (!) 142/60    Pulse: (!) 58    Resp: 12    SpO2: 100%    Weight: 92 kg (202 lb 13.2 oz)    Height: 6' 1" (1.854 m)    PainSc:   3   3   PainLoc: Back          PHYSICAL EXAM:   GENERAL: Well appearing, in no acute distress, alert and oriented x3.  PSYCH:  Mood and affect appropriate.  SKIN: Skin color, texture, turgor normal, no rashes or lesions.  HEENT:  Normocephalic, atraumatic. Cranial nerves grossly intact.  PULM: Non-labored breathing, symmetric chest rise.  BACK: Mildly reduced ROM w/ pain on flexion and extension. Positive axial loading test bilateral. Positive tenderness over rt SIJ with negative thigh and sacral thrust test, negative FABERE,Ganselin and Yeoman's test on the both side.negative FADIR  EXTREMITIES: No deformities, edema, or skin discoloration.   MUSCULOSKELETAL: Bilateral upper and lower extremity strength is normal and symmetric. No atrophy is noted.  NEURO: Strength 5/5 to bilateral lower extremities. Bilateral upper and lower extremity coordination and muscle stretch reflexes are physiologic and symmetric.      GAIT: Antalgic      ASSESSMENT: 89 y.o. year old with low back pain, consistent with:    1. S/P fusion of sacroiliac joint        2. Sacroiliac dysfunction        3. Lumbosacral radiculopathy              PLAN:    Patient Education:  Discussed the importance of physical therapy, activity modification, and a home exercise plan to help with pain and improve " health.  Therapy referral:    Medications:   Patient can continue with pain medications for now per their provider since they are providing benefits, using them appropriately, and without side effects.  Future consideration: repeat TFESI if needed  Counseled patient: regarding the importance of activity modification, constant sleeping habits, and physical therapy.  Return to clinic: 8-12 weeks           Samuel Jimenez MD    11/29/2024

## 2024-12-02 ENCOUNTER — CLINICAL SUPPORT (OUTPATIENT)
Dept: REHABILITATION | Facility: OTHER | Age: 89
End: 2024-12-02
Payer: MEDICARE

## 2024-12-02 DIAGNOSIS — R26.2 DIFFICULTY IN WALKING: Primary | ICD-10-CM

## 2024-12-02 PROCEDURE — 97112 NEUROMUSCULAR REEDUCATION: CPT

## 2024-12-02 PROCEDURE — 97110 THERAPEUTIC EXERCISES: CPT

## 2024-12-02 NOTE — PROGRESS NOTES
Physical Therapy Treatment Note     Name: Jefferson Boogie  Clinic Number: [id number]    Therapy Diagnosis:   Encounter Diagnosis   Name Primary?    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 No new complaints.  he was compliant with home exercise program given last session.   Response to previous treatment:good  Functional change: ongoing     Pain: 3/10  Location: right SI joint      Objective      CMS Impairment/Limitation/Restriction for FOTO Lumbar Spine Survey     Therapist reviewed FOTO scores for Jefferson Boogie on 11/25/2024.   FOTO documents entered into [employer] - see Media section.           Treatment     Jefferson received the following manual therapy techniques for 5 minutes:   STM to R lumbar paraspinals/scar mobility  Lumbar sideglides grade 2/3    Jefferson received therapeutic exercises for 40 minutes including:  Nustep for endurance and LE ROM/joint nutrition x 8 minutes  Prone hip EX 2 x 10 3 second holds  Supine hip FL stretch x 90 seconds each side  Prone lie x 2 minutes  LTR x 1 minutes 3 second holds   Standing hip FL Y TB 2 x 10 3 second holds  Leg press 110# 3 x 10 reps- increase to 120#[place]   SL leg press 64# 3 x 10  Standing weight shift into wall towards R side x 10 reps  Seated  "weight shift towards right side x 15 reps    Squats 2 x 10 reps with Y TB- NP    Jefferson participated in dynamic functional therapeutic activities to improve functional performance for 0 minutes, including:  NA today    airex/half foam obstacle x5 with SPC- NP   6 in step up 2 x 10 ea with B UE support= NP    Neuromuscular re-education activities to improve: Balance, Coordination, and Proprioception for 10 minutes. The following activities were included:  Standing glut med with RTB 2 x 10 reps 3 second holds  Sit<>stands with 2 foam pad 2 x 10 reps without UE support (yellow band around feet to promote hip ER)    NBOS on airex 3x30"- NP  WBOS eyes closed on airex 3x30"- NP  Standing on airex with head turns 3x30"- NP  Standing Rows GTB 2x15x5"- NP  Standing shoulder Ext GTB 2x15x5"- NP    CP to lumbar spine/SI joint x 5 minutes in Z lie.    Home Exercises and Education Provided     Education provided:   - See above  - Progress towards goals     Written Home Exercises Provided: Patient instructed to cont prior HEP.  Exercises were reviewed and Jefferson was able to demonstrate them prior to the end of the session.  Jefferson demonstrated good  understanding of the HEP provided.   .   See EMR under Patient Instructions for exercises provided prior visit.      Assessment   Patient demonstrated good tolerance to strengthening and more Wbing work. Still having significant weight shift towards left side, more prominent in standing and walking- improves with manual and verbal cueing.     Jefferson is progressing well towards his goals and goals were updated on 11/25/24. Pt prognosis is Good.     Pt will continue to benefit from skilled outpatient physical therapy to address the deficits listed in the problem list on initial evaluation provide pt/family education and to maximize pt's level of independence in the home and community environment.     Anticipated barriers to physical therapy: chronicity and severity of symptoms    Pt's " spiritual, cultural and educational needs considered and pt agreeable to plan of care and goals.    Goals:  Short Term Goals:  4-5 weeks  1. Report decreased in pain at worse less than  <   / =  5  /10  to increase tolerance for functional activities.On going  2. Increased B hip/LE MMT 1/2  to increase tolerance for ADL and work activities.On going  3. Pt to tolerate HEP to improve ROM and independence with ADL's.On going  4. Pt to improve range of motion by 50% to allow for improved functional mobility to allow for improvement in IADLs. On going     Long Term Goals: 8-10 weeks  1. Report decreased in pain at worse less than  <   / =  3  /10  to increase tolerance for functional mobility.  On going  2. Increased B hip/LE MMT 1 grade to increase tolerance for ADL and work activities.On going  3. Pt will report a 51 or greater score on FOTO Lumbar spine survey  to demonstrate decrease in disability and improvement in back pain.On going  4. Pt to be Independent with HEP to improve ROM and independence with ADL's. On going  5. Pt to demonstrate negative Bridge Test in order to show improved core strength for lumbar stabilization. On going  6. Pt to improve range of motion by 75% to allow for improved functional mobility to allow for improvement in IADLs. On going  7. Patient will improve TUG score to less than 11 seconds with or without AD safely. On going.      Plan   Plan of care Certification: 9/24/2024 to 12/3/24.    Yesenia Armando, PT   12/2/2024

## 2024-12-04 ENCOUNTER — CLINICAL SUPPORT (OUTPATIENT)
Dept: REHABILITATION | Facility: OTHER | Age: 89
End: 2024-12-04
Payer: MEDICARE

## 2024-12-04 DIAGNOSIS — R26.2 DIFFICULTY IN WALKING: Primary | ICD-10-CM

## 2024-12-04 PROCEDURE — 97110 THERAPEUTIC EXERCISES: CPT

## 2024-12-04 PROCEDURE — 97140 MANUAL THERAPY 1/> REGIONS: CPT

## 2024-12-04 PROCEDURE — 97112 NEUROMUSCULAR REEDUCATION: CPT

## 2024-12-04 NOTE — PROGRESS NOTES
Physical Therapy Treatment Note     Name: Jefferson Boogie  Clinic Number: [id number]    Therapy Diagnosis:   Encounter Diagnosis   Name Primary?    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 Also reports his balance is not as good today.  he was compliant with home exercise program given last session.   Response to previous treatment:good  Functional change: ongoing     Pain: 5/10  Location: right SI joint      Objective      CMS Impairment/Limitation/Restriction for FOTO Lumbar Spine Survey     Therapist reviewed FOTO scores for Jefferson Boogie on 11/25/2024.   FOTO documents entered into [employer] - see Media section.           Treatment     Jefferson received the following manual therapy techniques for 10 minutes:   STM to R lumbar paraspinals/scar mobility  Lumbar sideglides grade 2/3  STM to R piriformis    Jefferson received therapeutic exercises for 35 minutes including:  Nustep for endurance and LE ROM/joint nutrition x 6 minutes  Prone hip EX 2 x 8 3 second holds  Sidelying clam 5 reps 10 second holds R side only   Supine hip FL stretch x 90 seconds each side  Prone lie x 2 minutes  LTR x 1 minutes 3 second holds   Standing hip FL Y TB 2 x 10 3 second holds- NP  Leg press 120# 3 x  "10 reps  SL leg press 80# 2 x 10  Standing weight shift into wall towards L side x 10 reps    Seated weight shift towards right side x 15 reps- NP  Squats 2 x 10 reps with Y TB- NP    Jefferson participated in dynamic functional therapeutic activities to improve functional performance for 0 minutes, including:  NA today    airex/half foam obstacle x5 with SPC- NP   6 in step up 2 x 10 ea with B UE support= NP    Neuromuscular re-education activities to improve: Balance, Coordination, and Proprioception for 10 minutes. The following activities were included:  2 rounds  Standing glut med with RTB  x 10 reps 3 second holds  Sit<>stands with 2 foam pad  x 10 reps without UE support (red band around ankles to promote hip ER)    NBOS on airex 3x30"- NP  WBOS eyes closed on airex 3x30"- NP  Standing on airex with head turns 3x30"- NP  Standing Rows GTB 2x15x5"- NP  Standing shoulder Ext GTB 2x15x5"- NP    CP to lumbar spine/SI joint x 5 minutes in Z lie.    Home Exercises and Education Provided     Education provided:   - See above  - Progress towards goals     Written Home Exercises Provided: Patient instructed to cont prior HEP.  Exercises were reviewed and Jefferson was able to demonstrate them prior to the end of the session.  Jefferson demonstrated good  understanding of the HEP provided.   .   See EMR under Patient Instructions for exercises provided prior visit.      Assessment   Patient was having a little more pain this session, so focused on a couple of gentle mobility/core control to help decrease symptoms at the beginning. Good tolerance towards normal strengthening and improvement in gait/adverse symptoms by the end of the session.    Jefferson is progressing well towards his goals and goals were updated on 11/25/24. Pt prognosis is Good.     Pt will continue to benefit from skilled outpatient physical therapy to address the deficits listed in the problem list on initial evaluation provide pt/family education and to maximize " pt's level of independence in the home and community environment.     Anticipated barriers to physical therapy: chronicity and severity of symptoms    Pt's spiritual, cultural and educational needs considered and pt agreeable to plan of care and goals.    Goals:  Short Term Goals:  4-5 weeks  1. Report decreased in pain at worse less than  <   / =  5  /10  to increase tolerance for functional activities.On going  2. Increased B hip/LE MMT 1/2  to increase tolerance for ADL and work activities.On going  3. Pt to tolerate HEP to improve ROM and independence with ADL's.On going  4. Pt to improve range of motion by 50% to allow for improved functional mobility to allow for improvement in IADLs. On going     Long Term Goals: 8-10 weeks  1. Report decreased in pain at worse less than  <   / =  3  /10  to increase tolerance for functional mobility.  On going  2. Increased B hip/LE MMT 1 grade to increase tolerance for ADL and work activities.On going  3. Pt will report a 51 or greater score on FOTO Lumbar spine survey  to demonstrate decrease in disability and improvement in back pain.On going  4. Pt to be Independent with HEP to improve ROM and independence with ADL's. On going  5. Pt to demonstrate negative Bridge Test in order to show improved core strength for lumbar stabilization. On going  6. Pt to improve range of motion by 75% to allow for improved functional mobility to allow for improvement in IADLs. On going  7. Patient will improve TUG score to less than 11 seconds with or without AD safely. On going.      Plan   Plan of care Certification: 9/24/2024 to 12/3/24.    Yesenia Armando, PT   12/4/2024

## 2024-12-05 NOTE — PROGRESS NOTES
Physical Therapy Treatment Note     Name: Jefferson Boogie  Clinic Number: [id number]    Therapy Diagnosis:   Encounter Diagnosis   Name Primary?    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 People said he was walking better.  he was compliant with home exercise program given last session.   Response to previous treatment:good  Functional change: ongoing     Pain: 5/10  Location: right SI joint      Objective     Updated at progress report unless specified     Treatment     Jefferson received the following manual therapy techniques for 10 minutes:   STM to R lumbar paraspinals/scar mobility  Lumbar sideglides grade 2/3  STM to R piriformis    Jefferson received therapeutic exercises for 30 minutes including:  Nustep for endurance and LE ROM/joint nutrition x 6 minutes  Prone hip EX 2 x 10 3 second holds  Supine hip FL stretch x 90 seconds each side  Prone lie x 2 minutes  LTR x 1 minutes 3 second holds   Standing hip FL Y TB 2 x 10 3 second holds  Leg press 120# 3 x 10 reps  SL leg press 80# 2 x 10  Standing weight shift into wall towards L side x 10 reps    Squats 2 x 10 reps with Y TB- NP    Jefferson participated in dynamic functional therapeutic activities to  "improve functional performance for 5 minutes, includin in step up 2 x 10 ea with B UE support    Neuromuscular re-education activities to improve: Balance, Coordination, and Proprioception for 10 minutes. The following activities were included:  2 rounds  Standing glut med with RTB  x 10 reps 3 second holds  Sit<>stands with 2 foam pad  x 10 reps without UE support (red band around ankles to promote hip ER)    NBOS on airex 3x30"- NP  WBOS eyes closed on airex 3x30"- NP  Standing on airex with head turns 3x30"- NP    CP to lumbar spine/SI joint x 5 minutes in Z lie.    Home Exercises and Education Provided     Education provided:   - See above  - Progress towards goals     Written Home Exercises Provided: Patient instructed to cont prior HEP.  Exercises were reviewed and Jefferson was able to demonstrate them prior to the end of the session.  Jefferson demonstrated good  understanding of the HEP provided.   .   See EMR under Patient Instructions for exercises provided prior visit.      Assessment   Patient demonstrated good tolerance to more strengthening and Wbing work. Overall improvement in tolerance to activities with less increase in adverse symptoms.    Jefferson is progressing well towards his goals and goals were updated on 24. Pt prognosis is Good.     Pt will continue to benefit from skilled outpatient physical therapy to address the deficits listed in the problem list on initial evaluation provide pt/family education and to maximize pt's level of independence in the home and community environment.     Anticipated barriers to physical therapy: chronicity and severity of symptoms    Pt's spiritual, cultural and educational needs considered and pt agreeable to plan of care and goals.    Goals:  Short Term Goals:  4-5 weeks  1. Report decreased in pain at worse less than  <   / =  5  /10  to increase tolerance for functional activities.On going  2. Increased B hip/LE MMT 1/2  to increase tolerance for ADL and " work activities.On going  3. Pt to tolerate HEP to improve ROM and independence with ADL's.On going  4. Pt to improve range of motion by 50% to allow for improved functional mobility to allow for improvement in IADLs. MET.     Long Term Goals: 8-10 weeks  1. Report decreased in pain at worse less than  <   / =  3  /10  to increase tolerance for functional mobility.  On going  2. Increased B hip/LE MMT 1 grade to increase tolerance for ADL and work activities.On going  3. Pt will report a 51 or greater score on FOTO Lumbar spine survey  to demonstrate decrease in disability and improvement in back pain.On going  4. Pt to be Independent with HEP to improve ROM and independence with ADL's. On going  5. Pt to demonstrate negative Bridge Test in order to show improved core strength for lumbar stabilization. On going  6. Pt to improve range of motion by 75% to allow for improved functional mobility to allow for improvement in IADLs. On going  7. Patient will improve TUG score to less than 11 seconds with or without AD safely. On going.      Plan   Plan of care Certification: 9/24/2024 to 12/3/24- updated to 1/29/25.    Continue POC    Yesenia Armando, PT   12/6/2024

## 2024-12-06 ENCOUNTER — CLINICAL SUPPORT (OUTPATIENT)
Dept: REHABILITATION | Facility: OTHER | Age: 89
End: 2024-12-06
Payer: MEDICARE

## 2024-12-06 DIAGNOSIS — R26.2 DIFFICULTY IN WALKING: Primary | ICD-10-CM

## 2024-12-06 PROCEDURE — 97110 THERAPEUTIC EXERCISES: CPT

## 2024-12-06 PROCEDURE — 97140 MANUAL THERAPY 1/> REGIONS: CPT

## 2024-12-09 ENCOUNTER — CLINICAL SUPPORT (OUTPATIENT)
Dept: REHABILITATION | Facility: OTHER | Age: 89
End: 2024-12-09
Payer: MEDICARE

## 2024-12-09 DIAGNOSIS — R26.2 DIFFICULTY IN WALKING: Primary | ICD-10-CM

## 2024-12-09 PROCEDURE — 97110 THERAPEUTIC EXERCISES: CPT

## 2024-12-09 PROCEDURE — 97140 MANUAL THERAPY 1/> REGIONS: CPT

## 2024-12-09 NOTE — PROGRESS NOTES
Physical Therapy Treatment Note     Name: Jefferson Boogie  Clinic Number: [id number]    Therapy Diagnosis:   Encounter Diagnosis   Name Primary?    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  he was compliant with home exercise program given last session.   Response to previous treatment:good  Functional change: ongoing     Pain: 5/10  Location: right SI joint      Objective   Updated at progress report unless specified     Treatment     Jefferson received the following manual therapy techniques for 10 minutes:   STM to R lumbar paraspinals/scar mobility  Prone unilateral P/As  STM to R piriformis    Jefferson received therapeutic exercises for 30 minutes including:  Nustep for endurance and LE ROM/joint nutrition x 6 minutes  Prone hip EX 2 x 10 3 second holds  Supine hip FL stretch x 90 seconds each side  Prone lie x 1 minutes  LTR x 1 minutes 3 second holds   Standing hip FL Y TB 2 x 10 3 second holds  Leg press 120# 3 x 10 reps  SL leg press 80# 2 x 10  Standing weight shift into wall towards L side x 10 reps    Squats 2 x 10 reps with Y TB- NP    Jefferson participated in dynamic functional therapeutic activities to improve functional  "performance for 5 minutes, includin in step up 2 x 10 ea with B UE support    Neuromuscular re-education activities to improve: Balance, Coordination, and Proprioception for 10 minutes. The following activities were included:  2 rounds  Standing glut med with RTB  x 10 reps 3 second holds  Sit<>stands with 2 foam pad  x 10 reps without UE support (red band around ankles to promote hip ER)    NBOS on airex 3x30"- NP  WBOS eyes closed on airex 3x30"- NP    CP to lumbar spine/SI joint x 5 minutes in Z lie.    Home Exercises and Education Provided     Education provided:   - See above  - Progress towards goals     Written Home Exercises Provided: Patient instructed to cont prior HEP.  Exercises were reviewed and Jefferson was able to demonstrate them prior to the end of the session.  Jefferson demonstrated good  understanding of the HEP provided.   .   See EMR under Patient Instructions for exercises provided prior visit.      Assessment   Patient demonstrated good tolerance to strengthening and standing work this session. Needs continued cueing to perform exercises slow and controlled.     Jefferson is progressing well towards his goals and goals were updated on 24. Pt prognosis is Good.     Pt will continue to benefit from skilled outpatient physical therapy to address the deficits listed in the problem list on initial evaluation provide pt/family education and to maximize pt's level of independence in the home and community environment.     Anticipated barriers to physical therapy: chronicity and severity of symptoms    Pt's spiritual, cultural and educational needs considered and pt agreeable to plan of care and goals.    Goals:  Short Term Goals:  4-5 weeks  1. Report decreased in pain at worse less than  <   / =  5  /10  to increase tolerance for functional activities.On going  2. Increased B hip/LE MMT /2  to increase tolerance for ADL and work activities.On going  3. Pt to tolerate HEP to improve ROM and " independence with ADL's.On going  4. Pt to improve range of motion by 50% to allow for improved functional mobility to allow for improvement in IADLs. MET.     Long Term Goals: 8-10 weeks  1. Report decreased in pain at worse less than  <   / =  3  /10  to increase tolerance for functional mobility.  On going  2. Increased B hip/LE MMT 1 grade to increase tolerance for ADL and work activities.On going  3. Pt will report a 51 or greater score on FOTO Lumbar spine survey  to demonstrate decrease in disability and improvement in back pain.On going  4. Pt to be Independent with HEP to improve ROM and independence with ADL's. On going  5. Pt to demonstrate negative Bridge Test in order to show improved core strength for lumbar stabilization. On going  6. Pt to improve range of motion by 75% to allow for improved functional mobility to allow for improvement in IADLs. On going  7. Patient will improve TUG score to less than 11 seconds with or without AD safely. On going.      Plan   Plan of care Certification: 9/24/2024 to 12/3/24- updated to 1/29/25.    Continue POC     Yesenia Armando, PT   12/9/2024

## 2024-12-11 DIAGNOSIS — G89.4 CHRONIC PAIN SYNDROME: Primary | ICD-10-CM

## 2024-12-12 RX ORDER — GABAPENTIN 600 MG/1
600 TABLET ORAL 3 TIMES DAILY
Qty: 90 TABLET | Refills: 3 | Status: SHIPPED | OUTPATIENT
Start: 2024-12-12

## 2024-12-13 ENCOUNTER — CLINICAL SUPPORT (OUTPATIENT)
Dept: REHABILITATION | Facility: OTHER | Age: 89
End: 2024-12-13
Payer: MEDICARE

## 2024-12-13 DIAGNOSIS — R26.2 DIFFICULTY IN WALKING: Primary | ICD-10-CM

## 2024-12-13 PROCEDURE — 97112 NEUROMUSCULAR REEDUCATION: CPT

## 2024-12-13 PROCEDURE — 97140 MANUAL THERAPY 1/> REGIONS: CPT

## 2024-12-13 PROCEDURE — 97110 THERAPEUTIC EXERCISES: CPT

## 2024-12-13 NOTE — PROGRESS NOTES
Physical Therapy Treatment Note     Name: Jefferson Boogie  Clinic Number: [id number]    Therapy Diagnosis:   Encounter Diagnosis   Name Primary?    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 But still has his good and bad days.  he was compliant with home exercise program given last session.   Response to previous treatment:good  Functional change: ongoing    Pain: 3/10  Location: right SI joint      Objective   Updated at progress report unless specified     Treatment     Jefferson received the following manual therapy techniques for 5 minutes:   STM to R lumbar paraspinals/scar mobility  Prone unilateral P/As  Prone hip ER/IR    Jefferson received therapeutic exercises for 35 minutes including:  Nustep for endurance and LE ROM/joint nutrition x 6 minutes  Prone hip EX 2 x 10 3 second holds  Supine hip FL stretch x 90 seconds each side  Prone lie x 1 minutes  Supine SLR + ball 2 x 10  LTR x 1 minutes 3 second holds- NP  Standing hip FL Y TB 2 x 10 3 second holds  Leg press 130# 2 x 15 reps  SL leg press 80# 2 x 15  Standing weight shift into wall towards L side x 10 reps- NP continue NV    Jefferson participated in dynamic functional therapeutic activities to improve functional  performance for 5 minutes, includin in step up 2 x 10 ea with B UE support    Neuromuscular re-education activities to improve: Balance, Coordination, and Proprioception for 10 minutes. The following activities were included:  2 rounds  Resisted forward stepping with yellow pole x 1 lap  Sit<>stands with 2 foam pad x 10 reps without UE support (red band around ankles to promote hip ER)    CP to lumbar spine/SI joint x 5 minutes in Z lie.    Home Exercises and Education Provided     Education provided:   - See above  - Progress towards goals     Written Home Exercises Provided: Patient instructed to cont prior HEP.  Exercises were reviewed and Jefferson was able to demonstrate them prior to the end of the session.  Jefferson demonstrated good  understanding of the HEP provided.   .   See EMR under Patient Instructions for exercises provided prior visit.      Assessment   Patient demonstrated good tolerance to strengthening and motor control this session. Improvement noted in quality of sit<>stands.     Jefferson is progressing well towards his goals and goals were updated on 24. Pt prognosis is Good.     Pt will continue to benefit from skilled outpatient physical therapy to address the deficits listed in the problem list on initial evaluation provide pt/family education and to maximize pt's level of independence in the home and community environment.     Anticipated barriers to physical therapy: chronicity and severity of symptoms    Pt's spiritual, cultural and educational needs considered and pt agreeable to plan of care and goals.    Goals:  Short Term Goals:  4-5 weeks  1. Report decreased in pain at worse less than  <   / =  5  /10  to increase tolerance for functional activities.On going  2. Increased B hip/LE MMT 1/2  to increase tolerance for ADL and work activities.On going  3. Pt to tolerate HEP to improve ROM and independence with ADL's.On going  4. Pt to improve range of motion by 50% to allow for improved  functional mobility to allow for improvement in IADLs. MET.     Long Term Goals: 8-10 weeks  1. Report decreased in pain at worse less than  <   / =  3  /10  to increase tolerance for functional mobility.  On going  2. Increased B hip/LE MMT 1 grade to increase tolerance for ADL and work activities.On going  3. Pt will report a 51 or greater score on FOTO Lumbar spine survey  to demonstrate decrease in disability and improvement in back pain.On going  4. Pt to be Independent with HEP to improve ROM and independence with ADL's. On going  5. Pt to demonstrate negative Bridge Test in order to show improved core strength for lumbar stabilization. On going  6. Pt to improve range of motion by 75% to allow for improved functional mobility to allow for improvement in IADLs. On going  7. Patient will improve TUG score to less than 11 seconds with or without AD safely. On going.      Plan   Plan of care Certification: 9/24/2024 to 12/3/24- updated to 1/29/25.    Continue POC     Marito-Sanna Armando, PT   12/16/2024

## 2024-12-13 NOTE — PROGRESS NOTES
Physical Therapy Treatment Note     Name: Jefferson Boogie  Clinic Number: [id number]    Therapy Diagnosis:   Encounter Diagnosis   Name Primary?    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 Less pain with walking and other tasks.  he was compliant with home exercise program given last session.   Response to previous treatment:good  Functional change: ongoing    Pain: 2/10  Location: right SI joint      Objective   Updated at progress report unless specified     Treatment     Jefferson received the following manual therapy techniques for 10 minutes:   STM to R lumbar paraspinals/scar mobility  Prone unilateral P/As  Prone hip ER/IR    Jefferson received therapeutic exercises for 35 minutes including:  Nustep for endurance and LE ROM/joint nutrition x 5 minutes  Prone hip EX 2 x 10 3 second holds  Supine hip FL stretch x 90 seconds each side  Prone lie x 1 minutes  LTR x 1 minutes 3 second holds- NP  Standing hip FL Y TB 2 x 10 3 second holds  Leg press 130# 2 x 15 reps  SL leg press 80# 2 x 15  Standing weight shift into wall towards L side x 10 reps- NP continue NV    Jefferson participated in dynamic functional therapeutic activities to improve functional performance for 0 minutes,  includin in step up 2 x 10 ea with B UE support- NP continue NV    Neuromuscular re-education activities to improve: Balance, Coordination, and Proprioception for 10 minutes. The following activities were included:  2 rounds  Resisted forward stepping with yellow pole x 1 lap  Sit<>stands with 2 foam pad x 10 reps without UE support (red band around ankles to promote hip ER)    CP to lumbar spine/SI joint x 5 minutes in Z lie.    Home Exercises and Education Provided     Education provided:   - See above  - Progress towards goals     Written Home Exercises Provided: Patient instructed to cont prior HEP.  Exercises were reviewed and Jefferson was able to demonstrate them prior to the end of the session.  Jefferson demonstrated good  understanding of the HEP provided.   .   See EMR under Patient Instructions for exercises provided prior visit.      Assessment   Patient demonstrated good tolerance to strengthening and more Wbing work this session. Improvement in pain/adverse symptoms by the end of the session.    Jefferson is progressing well towards his goals and goals were updated on 24. Pt prognosis is Good.     Pt will continue to benefit from skilled outpatient physical therapy to address the deficits listed in the problem list on initial evaluation provide pt/family education and to maximize pt's level of independence in the home and community environment.     Anticipated barriers to physical therapy: chronicity and severity of symptoms    Pt's spiritual, cultural and educational needs considered and pt agreeable to plan of care and goals.    Goals:  Short Term Goals:  4-5 weeks  1. Report decreased in pain at worse less than  <   / =  5  /10  to increase tolerance for functional activities.On going  2. Increased B hip/LE MMT 1/2  to increase tolerance for ADL and work activities.On going  3. Pt to tolerate HEP to improve ROM and independence with ADL's.On going  4. Pt to improve range of motion by 50% to allow for  improved functional mobility to allow for improvement in IADLs. MET.     Long Term Goals: 8-10 weeks  1. Report decreased in pain at worse less than  <   / =  3  /10  to increase tolerance for functional mobility.  On going  2. Increased B hip/LE MMT 1 grade to increase tolerance for ADL and work activities.On going  3. Pt will report a 51 or greater score on FOTO Lumbar spine survey  to demonstrate decrease in disability and improvement in back pain.On going  4. Pt to be Independent with HEP to improve ROM and independence with ADL's. On going  5. Pt to demonstrate negative Bridge Test in order to show improved core strength for lumbar stabilization. On going  6. Pt to improve range of motion by 75% to allow for improved functional mobility to allow for improvement in IADLs. On going  7. Patient will improve TUG score to less than 11 seconds with or without AD safely. On going.      Plan   Plan of care Certification: 9/24/2024 to 12/3/24- updated to 1/29/25.    Continue POC     Marito-Sanna Armando, PT   12/13/2024

## 2024-12-16 ENCOUNTER — CLINICAL SUPPORT (OUTPATIENT)
Dept: REHABILITATION | Facility: OTHER | Age: 89
End: 2024-12-16
Payer: MEDICARE

## 2024-12-16 DIAGNOSIS — R26.2 DIFFICULTY IN WALKING: Primary | ICD-10-CM

## 2024-12-16 PROCEDURE — 97110 THERAPEUTIC EXERCISES: CPT

## 2024-12-16 PROCEDURE — 97112 NEUROMUSCULAR REEDUCATION: CPT

## 2024-12-18 ENCOUNTER — CLINICAL SUPPORT (OUTPATIENT)
Dept: REHABILITATION | Facility: OTHER | Age: 89
End: 2024-12-18
Payer: MEDICARE

## 2024-12-18 DIAGNOSIS — R26.2 DIFFICULTY IN WALKING: Primary | ICD-10-CM

## 2024-12-18 PROCEDURE — 97112 NEUROMUSCULAR REEDUCATION: CPT | Mod: CQ

## 2024-12-18 PROCEDURE — 97110 THERAPEUTIC EXERCISES: CPT | Mod: CQ

## 2024-12-18 NOTE — PROGRESS NOTES
Physical Therapy Treatment Note     Name: Jefferson Boogie  Clinic Number: [id number]    Therapy Diagnosis:   Encounter Diagnosis   Name Primary?    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 Minimal pain  he was compliant with home exercise program given last session.   Response to previous treatment:good  Functional change: ongoing    Pain: 3/10  Location: right SI joint      Objective   Updated at progress report unless specified     Treatment     Jefferson received the following manual therapy techniques for 5 minutes:   STM to R lumbar paraspinals/scar mobility  Prone unilateral P/As  Prone hip ER/IR    Jefferson received therapeutic exercises for 35 minutes including:  Nustep for endurance and LE ROM/joint nutrition x 6 minutes  Prone hip EX 2 x 10 3 second holds  Supine hip FL stretch x 90 seconds each side  Prone lie x 1 minutes  Supine SLR + ball 2 x 10  LTR x 1 minutes 3 second holds- NP  Standing hip FL Y TB 2 x 10 3 second holds  Leg press 130# 2 x 15 reps  SL leg press 80# 2 x 15  Standing weight shift into wall towards L side x 10 reps- NP continue NV    Jefferson participated in dynamic functional therapeutic activities to improve functional performance for 5 minutes, includin in step up 2 x  10 ea with B UE support    Neuromuscular re-education activities to improve: Balance, Coordination, and Proprioception for 10 minutes. The following activities were included:  2 rounds  Resisted forward stepping with yellow pole x 1 lap  Sit<>stands with 2 foam pad x 10 reps without UE support (red band around ankles to promote hip ER)    +obstacle course - cone taps, step over half foam, step up and over airex x5  CP to lumbar spine/SI joint x 5 minutes in Z lie.    Home Exercises and Education Provided     Education provided:   - See above  - Progress towards goals     Written Home Exercises Provided: Patient instructed to cont prior HEP.  Exercises were reviewed and Jefferson was able to demonstrate them prior to the end of the session.  Jefferson demonstrated good  understanding of the HEP provided.   .   See EMR under Patient Instructions for exercises provided prior visit.      Assessment   Patient demonstrated good tolerance to strengthening and motor control this session. Improvement noted in quality of sit<>stands. Improved control/ negotiation of obstacle with AD placement and foot clearance      Jefferson is progressing well towards his goals and goals were updated on 12/4/24. Pt prognosis is Good.     Pt will continue to benefit from skilled outpatient physical therapy to address the deficits listed in the problem list on initial evaluation provide pt/family education and to maximize pt's level of independence in the home and community environment.     Anticipated barriers to physical therapy: chronicity and severity of symptoms    Pt's spiritual, cultural and educational needs considered and pt agreeable to plan of care and goals.    Goals:  Short Term Goals:  4-5 weeks  1. Report decreased in pain at worse less than  <   / =  5  /10  to increase tolerance for functional activities.On going  2. Increased B hip/LE MMT 1/2  to increase tolerance for ADL and work activities.On going  3. Pt to tolerate HEP to improve  ROM and independence with ADL's.On going  4. Pt to improve range of motion by 50% to allow for improved functional mobility to allow for improvement in IADLs. MET.     Long Term Goals: 8-10 weeks  1. Report decreased in pain at worse less than  <   / =  3  /10  to increase tolerance for functional mobility.  On going  2. Increased B hip/LE MMT 1 grade to increase tolerance for ADL and work activities.On going  3. Pt will report a 51 or greater score on FOTO Lumbar spine survey  to demonstrate decrease in disability and improvement in back pain.On going  4. Pt to be Independent with HEP to improve ROM and independence with ADL's. On going  5. Pt to demonstrate negative Bridge Test in order to show improved core strength for lumbar stabilization. On going  6. Pt to improve range of motion by 75% to allow for improved functional mobility to allow for improvement in IADLs. On going  7. Patient will improve TUG score to less than 11 seconds with or without AD safely. On going.      Plan   Plan of care Certification: 9/24/2024 to 12/3/24- updated to 1/29/25.    Continue POC     Reece Henriquez, PTA   12/18/2024

## 2024-12-19 NOTE — PROGRESS NOTES
Physical Therapy Treatment Note     Name: Jefferson Boogie  Clinic Number: [id number]    Therapy Diagnosis:   Encounter Diagnosis   Name Primary?    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  he was compliant with home exercise program given last session.   Response to previous treatment:good  Functional change: ongoing    Pain: 3/10  Location: right SI joint      Objective   Updated at progress report unless specified     Treatment     Jefferson received the following manual therapy techniques for 5 minutes:   STM to R lumbar paraspinals/scar mobility  Prone unilateral P/As  Prone hip ER/IR    Jefferson received therapeutic exercises for 35 minutes including:  Nustep for endurance and LE ROM/joint nutrition x 6 minutes  Prone hip EX 2 x 10 3 second holds  Supine hip FL stretch x 90 seconds each side  Prone lie x 1 minutes  Supine SLR + ball 2 x 10  LTR x 1 minutes 3 second holds- NP  Standing hip FL Y TB 2 x 10 3 second holds  Leg press 130# 2 x 15 reps  SL leg press 80# 2 x 15  Lumbar EX machine     Jefferson participated in dynamic functional therapeutic activities to improve functional performance for 0 minutes, including:  NA today    4 in step up 2 x 10 ea with B UE  support    Neuromuscular re-education activities to improve: Balance, Coordination, and Proprioception for 15 minutes. The following activities were included:  Lumbar EX machine 46# x 20 reps (ROM 0-65)- increase to 54# and add pad next session  2 rounds  Resisted forward stepping with yellow pole x 1 lap  Sit<>stands with 2 foam pad x 10 reps without UE support (yellow band around ankles to promote hip ER)    NP  obstacle course - cone taps, step over half foam, step up and over airex x5    CP to lumbar spine/SI joint x 5 minutes in Z lie.    Home Exercises and Education Provided     Education provided:   - See above  - Progress towards goals     Written Home Exercises Provided: Patient instructed to cont prior HEP.  Exercises were reviewed and Jefferson was able to demonstrate them prior to the end of the session.  Jefferson demonstrated good  understanding of the HEP provided.   .   See EMR under Patient Instructions for exercises provided prior visit.      Assessment   Patient demonstrated good tolerance to strengthening and more Wbing work this session. Improvement in tolerance to walking/Wbing this session.    Jefferson is progressing well towards his goals and goals were updated on 12/4/24. Pt prognosis is Good.     Pt will continue to benefit from skilled outpatient physical therapy to address the deficits listed in the problem list on initial evaluation provide pt/family education and to maximize pt's level of independence in the home and community environment.     Anticipated barriers to physical therapy: chronicity and severity of symptoms    Pt's spiritual, cultural and educational needs considered and pt agreeable to plan of care and goals.    Goals:  Short Term Goals:  4-5 weeks  1. Report decreased in pain at worse less than  <   / =  5  /10  to increase tolerance for functional activities.On going  2. Increased B hip/LE MMT 1/2  to increase tolerance for ADL and work activities.On going  3. Pt to tolerate HEP to  improve ROM and independence with ADL's.On going  4. Pt to improve range of motion by 50% to allow for improved functional mobility to allow for improvement in IADLs. MET.     Long Term Goals: 8-10 weeks  1. Report decreased in pain at worse less than  <   / =  3  /10  to increase tolerance for functional mobility.  On going  2. Increased B hip/LE MMT 1 grade to increase tolerance for ADL and work activities.On going  3. Pt will report a 51 or greater score on FOTO Lumbar spine survey  to demonstrate decrease in disability and improvement in back pain.On going  4. Pt to be Independent with HEP to improve ROM and independence with ADL's. On going  5. Pt to demonstrate negative Bridge Test in order to show improved core strength for lumbar stabilization. On going  6. Pt to improve range of motion by 75% to allow for improved functional mobility to allow for improvement in IADLs. On going  7. Patient will improve TUG score to less than 11 seconds with or without AD safely. On going.      Plan   Plan of care Certification: 9/24/2024 to 12/3/24- updated to 1/29/25.    Continue POC     Marito-Sanna Armando, PT   12/23/2024

## 2024-12-23 ENCOUNTER — CLINICAL SUPPORT (OUTPATIENT)
Dept: REHABILITATION | Facility: OTHER | Age: 89
End: 2024-12-23
Payer: MEDICARE

## 2024-12-23 DIAGNOSIS — R26.2 DIFFICULTY IN WALKING: Primary | ICD-10-CM

## 2024-12-23 PROCEDURE — 97112 NEUROMUSCULAR REEDUCATION: CPT

## 2024-12-23 PROCEDURE — 97110 THERAPEUTIC EXERCISES: CPT

## 2024-12-27 ENCOUNTER — CLINICAL SUPPORT (OUTPATIENT)
Dept: REHABILITATION | Facility: OTHER | Age: 89
End: 2024-12-27
Payer: MEDICARE

## 2024-12-27 DIAGNOSIS — R26.2 DIFFICULTY IN WALKING: Primary | ICD-10-CM

## 2024-12-27 PROCEDURE — 97112 NEUROMUSCULAR REEDUCATION: CPT

## 2024-12-27 PROCEDURE — 97110 THERAPEUTIC EXERCISES: CPT

## 2024-12-27 NOTE — PROGRESS NOTES
Physical Therapy Treatment Note     Name: Jefferson Boogie  Clinic Number: [id number]    Therapy Diagnosis:   Encounter Diagnosis   Name Primary?    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 Thinks he may have overdone it at the gym.  he was compliant with home exercise program given last session.   Response to previous treatment:good  Functional change: ongoing    Pain: 5/10  Location: left hamstring    Objective   Updated at progress report unless specified     Treatment     Jefferson received the following manual therapy techniques for 0 minutes:   NA today    STM to R lumbar paraspinals/scar mobility  Prone unilateral P/As  Prone hip ER/IR    Jefferson received therapeutic exercises for 40 minutes including:  Nustep for endurance and LE ROM/joint nutrition x 6 minutes  Prone hip EX 2 x 10 3 second holds  Supine hip FL stretch x 90 seconds each side  Prone lie x 1 minutes  Supine SLR + ball 2 x 10  LTR x 1 minutes 3 second holds- NP  Standing hip FL Y TB 2 x 10 3 second holds  Leg press 130# 2 x 15 reps  SL leg press 80# 2 x 15    Jefferson participated in dynamic functional therapeutic activities to improve functional performance for 0 minutes,  including:  NA today    4 in step up 2 x 10 ea with B UE support    Neuromuscular re-education activities to improve: Balance, Coordination, and Proprioception for 15 minutes. The following activities were included:  Lumbar EX machine 54# x 15 reps (ROM 0-65)with seat pad   2 rounds  Resisted side stepping with yellow pole x 1 lap  Sit<>stands with 2 foam pad x 10 reps without UE support (yellow band around ankles to promote hip ER)    NP  obstacle course - cone taps, step over half foam, step up and over airex x5    CP to lumbar spine/SI joint x 5 minutes in Z lie.    Home Exercises and Education Provided     Education provided:   - See above  - Progress towards goals     Written Home Exercises Provided: Patient instructed to cont prior HEP.  Exercises were reviewed and Jefferson was able to demonstrate them prior to the end of the session.  Jefferson demonstrated good  understanding of the HEP provided.   .   See EMR under Patient Instructions for exercises provided prior visit.      Assessment   Patient demonstrated good tolerance to strengthening and motor control work. Still having increase in symptoms, but overall tolerance to activity improving.    Jefferson is progressing well towards his goals and goals were updated on 12/4/24. Pt prognosis is Good.     Pt will continue to benefit from skilled outpatient physical therapy to address the deficits listed in the problem list on initial evaluation provide pt/family education and to maximize pt's level of independence in the home and community environment.     Anticipated barriers to physical therapy: chronicity and severity of symptoms    Pt's spiritual, cultural and educational needs considered and pt agreeable to plan of care and goals.    Goals:  Short Term Goals:  4-5 weeks  1. Report decreased in pain at worse less than  <   / =  5  /10  to increase tolerance for functional activities.On going  2. Increased B hip/LE MMT 1/2  to increase tolerance for ADL and work  activities.On going  3. Pt to tolerate HEP to improve ROM and independence with ADL's.On going  4. Pt to improve range of motion by 50% to allow for improved functional mobility to allow for improvement in IADLs. MET.     Long Term Goals: 8-10 weeks  1. Report decreased in pain at worse less than  <   / =  3  /10  to increase tolerance for functional mobility.  On going  2. Increased B hip/LE MMT 1 grade to increase tolerance for ADL and work activities.On going  3. Pt will report a 51 or greater score on FOTO Lumbar spine survey  to demonstrate decrease in disability and improvement in back pain.On going  4. Pt to be Independent with HEP to improve ROM and independence with ADL's. On going  5. Pt to demonstrate negative Bridge Test in order to show improved core strength for lumbar stabilization. On going  6. Pt to improve range of motion by 75% to allow for improved functional mobility to allow for improvement in IADLs. On going  7. Patient will improve TUG score to less than 11 seconds with or without AD safely. On going.      Plan   Plan of care Certification: 9/24/2024 to 12/3/24- updated to 1/29/25.    Continue POC     Marito-Sanna Armando, PT   12/27/2024

## 2024-12-29 NOTE — PROGRESS NOTES
Physical Therapy Reassessment/Treatment Note     Name: Jefferson Boogie  Clinic Number: [id number]    Therapy Diagnosis:   Encounter Diagnosis   Name Primary?    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 He is not sure if he is overdoing it at the gym or not.  he was compliant with home exercise program given last session.   Response to previous treatment:good  Functional change:  improvement in quality of gait/symptoms    Pain: 4/10  Location: B LE    Objective   Updated at progress report on 12/30/24    GAIT DEVIATIONS: Jefferson displays antalgic gait, difficulty with L LE weight shift, need for SPC for balance and unloading joints, and decreased cornel/step length     Lumbar Range of Motion:     %   Flexion 75      Extension 50      Left Side Bending 50   Right Side Bending 50   Left rotation    50   Right Rotation    50    *= pain    12/30/24   5x sit to stand no UE support: 18 seconds  5x sit to stand one UE support: 14 sec  5x sit to stand 2 hands: 15 sec   TUG w/ SPC: 12 sec     Lower Extremity Strength     Right LE   Left LE     Hip flexion: 4/5 Hip flexion: 4/5   Knee extension: 4+/5 Knee extension: 4+/5   Knee flexion: 4/5 Knee flexion: 4/5   Hip IR: 3+/5 Hip IR:  3+/5   Hip ER: 3+/5 Hip ER: 3+/5   Hip extension:  4/5 Hip extension: 4/5   Hip abduction: 3+/5 Hip abduction: 3+/5   Hip adduction: 3/5 Hip adduction 3/5   Ankle dorsiflexion: 4/5 Ankle dorsiflexion: 4/5   Ankle plantarflexion: 4/5 Ankle plantarflexion: 4/5      Treatment     Jefferson received the following manual therapy techniques for 0 minutes:   NA today    STM to R lumbar paraspinals/scar mobility  Prone unilateral P/As  Prone hip ER/IR    Jefferson received therapeutic exercises for 35 minutes including:  Standing UBE 2 min F/2 min B for joint nutrition/standing endurance  Prone hip EX 2 x 10 3 second holds- NP  Supine hip FL stretch x 90 seconds each side  Prone lie x 1 minutes- NP  Supine SLR + ball 2 x 10  Torso rotation machine x 10 reps for ROM and x 10 reps 20# on each side- increase weight NV  LTR x 1 minutes 3 second holds- NP  Standing hip FL Y TB 2 x 10 3 second holds  Leg press 140# 2 x 15 reps- increase to 160-180 NV  SL leg press 90# 2 x 15    Jefferson participated in dynamic functional therapeutic activities to improve functional performance for 15 minutes, including:  Reassessment  4 in step up 2 x 10 ea with B UE support    Neuromuscular re-education activities to improve: Balance, Coordination, and Proprioception for 5 minutes. The following activities were included:  Lumbar EX machine 54# x 20 reps (ROM 0-65)with seat pad- increase to 60# NV    NP  2 rounds  Resisted side stepping with yellow pole x 1 lap  Sit<>stands with 2 foam pad x 10 reps without UE support (yellow band around ankles to promote hip ER)    NP  obstacle course - cone taps, step over half foam, step up and over airex x5    CP to lumbar spine/SI joint x 5 minutes in Z lie.    Home Exercises and Education Provided     Education provided:   - See above  - Progress towards goals     Written Home Exercises Provided: Patient instructed to cont prior HEP.  Exercises were reviewed and Jefferson was able to demonstrate them prior to the end of  the session.  Jefferson demonstrated good  understanding of the HEP provided.   .   See EMR under Patient Instructions for exercises provided prior visit.      Assessment   Patient has demonstrated significant improvement in functional tests including TUG and 5 time sit<>stand, improvement in hip and lumbar ROM, decreased pain/adverse symptoms, improvement in strength/motor control, and improvement in tolerance to activities. Patient is now able to walk with a SPC instead of a FWW as well as improvement in overall quality of gait. Patient is still having significant deficits in gait with needs for SPC- still off balance with more dynamic work, increased pain/adverse symptoms, significant LE weakness continued, impairment in balance, and decreased tolerance to activities.     Jefferson is progressing well towards his goals and goals were updated on 12/30/24 Pt prognosis is Good.     Pt will continue to benefit from skilled outpatient physical therapy to address the deficits listed in the problem list on initial evaluation provide pt/family education and to maximize pt's level of independence in the home and community environment.     Anticipated barriers to physical therapy: chronicity and severity of symptoms    Pt's spiritual, cultural and educational needs considered and pt agreeable to plan of care and goals.    Goals:  Short Term Goals:  4-5 weeks  1. Report decreased in pain at worse less than  <   / =  5  /10  to increase tolerance for functional activities.On going  2. Increased B hip/LE MMT 1/2  to increase tolerance for ADL and work activities.On going  3. Pt to tolerate HEP to improve ROM and independence with ADL's.On going  4. Pt to improve range of motion by 50% to allow for improved functional mobility to allow for improvement in IADLs. MET.     Long Term Goals: 8-10 weeks  1. Report decreased in pain at worse less than  <   / =  3  /10  to increase tolerance for functional mobility.  On going  2. Increased B  hip/LE MMT 1 grade to increase tolerance for ADL and work activities.On going  3. Pt will report a 51 or greater score on FOTO Lumbar spine survey  to demonstrate decrease in disability and improvement in back pain.On going  4. Pt to be Independent with HEP to improve ROM and independence with ADL's. On going  5. Pt to demonstrate negative Bridge Test in order to show improved core strength for lumbar stabilization. On going  6. Pt to improve range of motion by 75% to allow for improved functional mobility to allow for improvement in IADLs. On going  7. Patient will improve TUG score to less than 11 seconds with or without AD safely. On going.      Plan   Plan of care Certification: 9/24/2024 to 12/3/24- updated to 1/29/25.    Continue POC     Marito-Sanna Armando, PT   12/30/2024

## 2024-12-30 ENCOUNTER — CLINICAL SUPPORT (OUTPATIENT)
Dept: REHABILITATION | Facility: OTHER | Age: 89
End: 2024-12-30
Payer: MEDICARE

## 2024-12-30 DIAGNOSIS — R26.2 DIFFICULTY IN WALKING: Primary | ICD-10-CM

## 2024-12-30 PROCEDURE — 97530 THERAPEUTIC ACTIVITIES: CPT

## 2024-12-30 PROCEDURE — 97110 THERAPEUTIC EXERCISES: CPT

## 2024-12-31 NOTE — TELEPHONE ENCOUNTER
Hospitalist Progress Note    NAME:   Corby Chun   : 1933   MRN: [id number]     Date/Time: 2024 2:15 PM  Patient PCP: Ryan Nguyen MD    Estimated discharge date: 24  Barriers: Placement      Assessment / Plan:    Inability to ambulate  PT/OT consult  Palliative care consult for goals of care discussion        Dementia with behavioral disturbance:  Hallucination   as per daughter hallucination is already improving  Continue paroxetine  Continue olanzapine        Hypertension  CHF:  CAD  Continue aspirin, statin  Continue entresto        Hypothyroidism:  Continue levothyroxine        BPH:  Continue finasteride and tamsulosin        Mechanical AVR  Afib  Continue warfarin             Medical Decision Making:   I personally reviewed labs:INR  I personally reviewed imaging:  I personally reviewed EKG:  Toxic drug monitoring: will monitor INR and hb while patient is on warfarin  Discussed case with:         Code Status: full  DVT Prophylaxis:warfarin   GI Prophylaxis:    Subjective:     Chief Complaint / Reason for Physician Visit  NO overnight event.      Objective:     VITALS:   Last 24hrs VS reviewed since prior progress note. Most recent are:  Patient Vitals for the past 24 hrs:   BP Temp Temp src Pulse Resp SpO2   24 1107 109/63 98.6 °F (37 °C) Axillary 75 18 95 %   24 0706 131/77 98.1 °F (36.7 °C) Oral 79 20 97 %   24 0300 112/61 98.8 °F (37.1 °C) Oral 72 22 96 %   24 2328 114/73 97.3 °F (36.3 °C) Oral (!) 104 22 98 %   24 1947 127/80 98.4 °F (36.9 °C) Oral 80 20 97 %   24 1458 108/71 99 °F (37.2 °C) Oral 91 20 95 %         Intake/Output Summary (Last 24 hours) at 2024 1415  Last data filed at 2024 0915  Gross per 24 hour   Intake --   Output 400 ml   Net -400 ml        I had a face to face encounter and independently examined this patient on 2024, as outlined below:  PHYSICAL EXAM:  General: Alert, cooperative, hard of  Spoke to patient, confirmed PET and office follow up

## 2025-01-02 NOTE — PROGRESS NOTES
Physical Therapy Treatment Note     Name: Jefferson Boogie  Clinic Number: [id number]    Therapy Diagnosis:   Encounter Diagnosis   Name Primary?    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   he was compliant with home exercise program given last session.   Response to previous treatment:good  Functional change:  improvement in quality of gait/symptoms    Pain: 3/10  Location: B LE    Objective   Updated at progress report unless specified     Treatment     Jefferson received the following manual therapy techniques for 5 minutes:   STM to R lumbar paraspinals/scar mobility  Prone unilateral P/As  Prone hip ER/IR    Jefferson received therapeutic exercises for 45 minutes including:  Nustep x 6 min Standing UBE 2 min F/2 min B for joint nutrition  Prone hip EX 2 x 10 3 second holds  Supine hip FL stretch x 90 seconds each side  Prone lie x 1 minutes- NP  Supine SLR + ball 2 x 10  Torso rotation machine x 10 reps for ROM and x 10 reps 24# on each side- increase reps NV  LTR x 1 minutes 3 second holds- NP  Standing hip FL Y TB 2 x 10 3 second holds  Leg press 160# 2 x 15 reps- increase to 180 NV  SL leg press 90# 2 x 15  Lumbar EX machine 60# x 20 reps (ROM  0-65)with seat pad    Jefferson participated in dynamic functional therapeutic activities to improve functional performance for 5 minutes, includin in step up 2 x 10 ea with 1 UE support    Neuromuscular re-education activities to improve: Balance, Coordination, and Proprioception for 0 minutes. The following activities were included:  NA today    NP  2 rounds  Resisted side stepping with yellow pole x 1 lap  Sit<>stands with 2 foam pad x 10 reps without UE support (yellow band around ankles to promote hip ER)    CP to lumbar spine/SI joint x 5 minutes in Z lie.    Home Exercises and Education Provided     Education provided:   - See above  - Progress towards goals     Written Home Exercises Provided: Patient instructed to cont prior HEP.  Exercises were reviewed and Jefferson was able to demonstrate them prior to the end of the session.  Jefferson demonstrated good  understanding of the HEP provided.   .   See EMR under Patient Instructions for exercises provided prior visit.      Assessment   Patient demonstrated good tolerance to strengthening and ROM work this session. Improvement in symptoms and quality of gait by the end of the session.    Jefferson is progressing well towards his goals and goals were updated on 24 Pt prognosis is Good.     Pt will continue to benefit from skilled outpatient physical therapy to address the deficits listed in the problem list on initial evaluation provide pt/family education and to maximize pt's level of independence in the home and community environment.     Anticipated barriers to physical therapy: chronicity and severity of symptoms    Pt's spiritual, cultural and educational needs considered and pt agreeable to plan of care and goals.    Goals:  Short Term Goals:  4-5 weeks  1. Report decreased in pain at worse less than  <   / =  5  /10  to increase tolerance for functional activities.On going  2. Increased B hip/LE MMT 1/2  to increase tolerance for ADL and work activities.On  going  3. Pt to tolerate HEP to improve ROM and independence with ADL's.On going  4. Pt to improve range of motion by 50% to allow for improved functional mobility to allow for improvement in IADLs. MET.     Long Term Goals: 8-10 weeks  1. Report decreased in pain at worse less than  <   / =  3  /10  to increase tolerance for functional mobility.  On going  2. Increased B hip/LE MMT 1 grade to increase tolerance for ADL and work activities.On going  3. Pt will report a 51 or greater score on FOTO Lumbar spine survey  to demonstrate decrease in disability and improvement in back pain.On going  4. Pt to be Independent with HEP to improve ROM and independence with ADL's. On going  5. Pt to demonstrate negative Bridge Test in order to show improved core strength for lumbar stabilization. On going  6. Pt to improve range of motion by 75% to allow for improved functional mobility to allow for improvement in IADLs. On going  7. Patient will improve TUG score to less than 11 seconds with or without AD safely. On going.      Plan   Plan of care Certification: 9/24/2024 to 12/3/24- updated to 1/29/25.    Continue POC     Marito-Sanna Armando, PT   1/3/2025

## 2025-01-03 ENCOUNTER — CLINICAL SUPPORT (OUTPATIENT)
Dept: REHABILITATION | Facility: OTHER | Age: 89
End: 2025-01-03
Payer: MEDICARE

## 2025-01-03 DIAGNOSIS — R26.2 DIFFICULTY IN WALKING: Primary | ICD-10-CM

## 2025-01-03 PROCEDURE — 97110 THERAPEUTIC EXERCISES: CPT

## 2025-01-03 NOTE — PROGRESS NOTES
Physical Therapy Treatment Note     Name: Jefferson Boogie  Clinic Number: [id number]    Therapy Diagnosis:   Encounter Diagnosis   Name Primary?    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  he was compliant with home exercise program given last session.   Response to previous treatment:good  Functional change:  improvement in quality of gait/symptoms    Pain: 3/10  Location: B LE    Objective   Updated at progress report unless specified     Treatment     Jefferson received the following manual therapy techniques for 5 minutes:   STM to R lumbar paraspinals/scar mobility  Prone unilateral P/As  Prone hip ER/IR    Jefferson received therapeutic exercises for 40 minutes including:  Nustep x 6 min Standing UBE 2 min F/2 min B for joint nutrition  Prone hip EX 2 x 10 3 second holds  Supine hip FL stretch x 90 seconds each side  Prone lie x 1 minutes- NP  Supine SLR + ball 2 x 12  Torso rotation machine x 10 reps for ROM and x 15 reps 24# on each side  LTR x 1 minutes 3 second holds- NP  Standing hip FL Y TB 2 x 10 3 second holds- NP  Leg press 180# 2 x 15 reps  SL leg press 90# 2 x 15  Lumbar EX machine  60# x 20 reps (ROM 0-65)with seat pad    Jefferson participated in dynamic functional therapeutic activities to improve functional performance for 0 minutes, including:  NA today    4 in step up 2 x 10 ea with 1 UE support    Neuromuscular re-education activities to improve: Balance, Coordination, and Proprioception for 10 minutes. The following activities were included:  2 rounds  Resisted side stepping with yellow pole x 1 lap  Sit<>stands with 2 foam pad x 10 reps without UE support (yellow band around ankles to promote hip ER)    CP to lumbar spine/SI joint x 5 minutes in Z lie.    Home Exercises and Education Provided     Education provided:   - See above  - Progress towards goals     Written Home Exercises Provided: Patient instructed to cont prior HEP.  Exercises were reviewed and Jefferson was able to demonstrate them prior to the end of the session.  Jefferson demonstrated good  understanding of the HEP provided.   .   See EMR under Patient Instructions for exercises provided prior visit.      Assessment   Patient demonstrated better tolerance to strengthening and ROM work this session. Improvement in quality of sit<>Stands, but still cueing needed to use less momentum.    Jefferson is progressing well towards his goals and goals were updated on 12/30/24 Pt prognosis is Good.     Pt will continue to benefit from skilled outpatient physical therapy to address the deficits listed in the problem list on initial evaluation provide pt/family education and to maximize pt's level of independence in the home and community environment.     Anticipated barriers to physical therapy: chronicity and severity of symptoms    Pt's spiritual, cultural and educational needs considered and pt agreeable to plan of care and goals.    Goals:  Short Term Goals:  4-5 weeks  1. Report decreased in pain at worse less than  <   / =  5  /10  to increase tolerance for functional activities.On going  2. Increased B hip/LE MMT 1/2  to increase tolerance  for ADL and work activities.On going  3. Pt to tolerate HEP to improve ROM and independence with ADL's.On going  4. Pt to improve range of motion by 50% to allow for improved functional mobility to allow for improvement in IADLs. MET.     Long Term Goals: 8-10 weeks  1. Report decreased in pain at worse less than  <   / =  3  /10  to increase tolerance for functional mobility.  On going  2. Increased B hip/LE MMT 1 grade to increase tolerance for ADL and work activities.On going  3. Pt will report a 51 or greater score on FOTO Lumbar spine survey  to demonstrate decrease in disability and improvement in back pain.On going  4. Pt to be Independent with HEP to improve ROM and independence with ADL's. On going  5. Pt to demonstrate negative Bridge Test in order to show improved core strength for lumbar stabilization. On going  6. Pt to improve range of motion by 75% to allow for improved functional mobility to allow for improvement in IADLs. On going  7. Patient will improve TUG score to less than 11 seconds with or without AD safely. On going.      Plan   Plan of care Certification: 9/24/2024 to 12/3/24- updated to 1/29/25.    Continue POC     Yesenia Armando, PT   1/6/2025

## 2025-01-06 ENCOUNTER — CLINICAL SUPPORT (OUTPATIENT)
Dept: REHABILITATION | Facility: OTHER | Age: 89
End: 2025-01-06
Payer: MEDICARE

## 2025-01-06 DIAGNOSIS — R26.2 DIFFICULTY IN WALKING: Primary | ICD-10-CM

## 2025-01-06 PROCEDURE — 97112 NEUROMUSCULAR REEDUCATION: CPT

## 2025-01-06 PROCEDURE — 97110 THERAPEUTIC EXERCISES: CPT

## 2025-01-06 NOTE — PROGRESS NOTES
Physical Therapy Treatment Note     Name: Jefferson Boogie  Clinic Number: [id number]    Therapy Diagnosis:   Encounter Diagnosis   Name Primary?    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 He did fall this morning reaching for something and hurt his elbow, but feels okay. He was able to get himself up.  he was compliant with home exercise program given last session.   Response to previous treatment:good  Functional change:  improvement in quality of gait/symptoms    Pain: 2/10  Location: lower back    Objective   Updated at progress report unless specified     Treatment     Jefferson received the following manual therapy techniques for 0 minutes:   NA today    STM to R lumbar paraspinals/scar mobility  Prone unilateral P/As  Prone hip ER/IR    Jefferson received therapeutic exercises for 45 minutes including:  Nustep x 6 min Standing UBE 2 min F/2 min B for joint nutrition  Prone hip EX 2 x 10 5 second holds  Prone lie x 1 minute  Supine hip FL stretch x 90 seconds each side  Supine TA + SLR + ball 2 x 12  Torso rotation machine x 10 reps for ROM and x 20 reps 24# on each side  TRX band squats NV  LTR x 1 minutes 3 second holds- NP  Standing  hip FL Y TB 2 x 10 3 second holds  Leg press 190# 2 x 10 reps  SL leg press 90# 2 x 10  Lumbar EX machine 64# x 20 reps (ROM 0-65) with seat pad    Jefferson participated in dynamic functional therapeutic activities to improve functional performance for 10 minutes, includin in step up 2 x 8 ea with 1 UE support  4 in side step up 2 x 8 ea with 2 UE support    Neuromuscular re-education activities to improve: Balance, Coordination, and Proprioception for 0 minutes. The following activities were included:  NA today    2 rounds  Resisted side stepping with yellow pole x 1 lap  Sit<>stands with 2 foam pad x 10 reps without UE support (yellow band around ankles to promote hip ER)    CP to lumbar spine/SI joint x 5 minutes in Z lie.    Home Exercises and Education Provided     Education provided:   - See above  - Progress towards goals     Written Home Exercises Provided: Patient instructed to cont prior HEP.  Exercises were reviewed and Jefferson was able to demonstrate them prior to the end of the session.  Jefferson demonstrated good  understanding of the HEP provided.   .   See EMR under Patient Instructions for exercises provided prior visit.      Assessment   Patient demonstrated good tolerance to strengthening and more SL work this session. Improvement in tolerance to LE strengthening and more lumbar resistance.    Jefferson is progressing well towards his goals and goals were updated on 24. Pt prognosis is Good.     Pt will continue to benefit from skilled outpatient physical therapy to address the deficits listed in the problem list on initial evaluation provide pt/family education and to maximize pt's level of independence in the home and community environment.     Anticipated barriers to physical therapy: chronicity and severity of symptoms    Pt's spiritual, cultural and educational needs considered and pt agreeable to plan of care and goals.    Goals:  Short Term Goals:  4-5 weeks  1. Report decreased in pain at  worse less than  <   / =  5  /10  to increase tolerance for functional activities.On going  2. Increased B hip/LE MMT 1/2  to increase tolerance for ADL and work activities.On going  3. Pt to tolerate HEP to improve ROM and independence with ADL's.On going  4. Pt to improve range of motion by 50% to allow for improved functional mobility to allow for improvement in IADLs. MET.     Long Term Goals: 8-10 weeks  1. Report decreased in pain at worse less than  <   / =  3  /10  to increase tolerance for functional mobility.  On going  2. Increased B hip/LE MMT 1 grade to increase tolerance for ADL and work activities.On going  3. Pt will report a 51 or greater score on FOTO Lumbar spine survey  to demonstrate decrease in disability and improvement in back pain.On going  4. Pt to be Independent with HEP to improve ROM and independence with ADL's. On going  5. Pt to demonstrate negative Bridge Test in order to show improved core strength for lumbar stabilization. On going  6. Pt to improve range of motion by 75% to allow for improved functional mobility to allow for improvement in IADLs. On going  7. Patient will improve TUG score to less than 11 seconds with or without AD safely. On going.      Plan   Plan of care Certification: 9/24/2024 to 12/3/24- updated to 1/29/25.    Continue POC     Yesenia Armando, PT   1/8/2025

## 2025-01-08 ENCOUNTER — CLINICAL SUPPORT (OUTPATIENT)
Dept: REHABILITATION | Facility: OTHER | Age: 89
End: 2025-01-08
Payer: MEDICARE

## 2025-01-08 DIAGNOSIS — R26.2 DIFFICULTY IN WALKING: Primary | ICD-10-CM

## 2025-01-08 PROCEDURE — 97530 THERAPEUTIC ACTIVITIES: CPT

## 2025-01-08 PROCEDURE — 97110 THERAPEUTIC EXERCISES: CPT

## 2025-01-08 NOTE — PROGRESS NOTES
Physical Therapy Treatment Note     Name: Jefferson Boogie  Clinic Number: [id number]    Therapy Diagnosis:   Encounter Diagnosis   Name Primary?    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 Less increase in symptoms.  he was compliant with home exercise program given last session.   Response to previous treatment:good  Functional change:  improvement in quality of gait/symptoms    Pain: 2/10  Location: lower back    Objective   Updated at progress report unless specified     Treatment     Jefferson received the following manual therapy techniques for 5 minutes:   STM to R lumbar paraspinals/scar mobility  Prone unilateral P/As  Prone knee FL stretch    Jefferson received therapeutic exercises for 35 minutes including:  Nustep x 6 min Standing UBE 2 min F/2 min B for joint nutrition  Prone hip EX 2 x 10 5 second holds  Prone lie x 1 minute  Supine hip FL stretch x 90 seconds each side  Supine TA + SLR + ball x 15 reps each side  Torso rotation machine x 10 reps for ROM and x 20 reps 24# on each side- NP  TRX band squats NV  Standing hip FL Y TB 2 x 10 3 second holds- NP  Standing foot tap to bottom of chair 3 x 5 reps each side 1 UE support (working on being able to bring  foot up higher)  Standing weight shift to R side x 20 reps  Leg press 190# 2 x 10 reps- DC doing at gym  SL leg press 90# 2 x 10- DC doing ay gym  Lumbar EX machine 64# x 20 reps (ROM 0-65) with seat pad- NP    Jefferson participated in dynamic functional therapeutic activities to improve functional performance for 15 minutes, includin in step up 2 x 8 ea with 1 UE support  4 in side step up 2 x 8 ea with 2 UE support  Gait training with cane taller and in opposite hand x 2 laps each way  Ankle righting reactions facing away from wall x 2 minutes    Neuromuscular re-education activities to improve: Balance, Coordination, and Proprioception for 0 minutes. The following activities were included:  NA today    2 rounds  Resisted side stepping with yellow pole x 1 lap  Sit<>stands with 2 foam pad x 10 reps without UE support (yellow band around ankles to promote hip ER)    CP to lumbar spine/SI joint x 5 minutes in Z lie.    Home Exercises and Education Provided     Education provided:   - See above  - Progress towards goals     Written Home Exercises Provided: Patient instructed to cont prior HEP.  Exercises were reviewed and Jefferson was able to demonstrate them prior to the end of the session.  Jefferson demonstrated good  understanding of the HEP provided.   .   See EMR under Patient Instructions for exercises provided prior visit.      Assessment   Patient demonstrated better tolerance to strengthening and Wbing work this session. Still having a lot of weight shift to the L Side, so worked on that this session with good tolerance.    Jefferson is progressing well towards his goals and goals were updated on 24. Pt prognosis is Good.     Pt will continue to benefit from skilled outpatient physical therapy to address the deficits listed in the problem list on initial evaluation provide pt/family education and to maximize pt's level of independence in the home and community environment.     Anticipated barriers to physical  therapy: chronicity and severity of symptoms    Pt's spiritual, cultural and educational needs considered and pt agreeable to plan of care and goals.    Goals:  Short Term Goals:  4-5 weeks  1. Report decreased in pain at worse less than  <   / =  5  /10  to increase tolerance for functional activities.On going  2. Increased B hip/LE MMT 1/2  to increase tolerance for ADL and work activities.On going  3. Pt to tolerate HEP to improve ROM and independence with ADL's.On going  4. Pt to improve range of motion by 50% to allow for improved functional mobility to allow for improvement in IADLs. MET.     Long Term Goals: 8-10 weeks  1. Report decreased in pain at worse less than  <   / =  3  /10  to increase tolerance for functional mobility.  On going  2. Increased B hip/LE MMT 1 grade to increase tolerance for ADL and work activities.On going  3. Pt will report a 51 or greater score on FOTO Lumbar spine survey  to demonstrate decrease in disability and improvement in back pain.On going  4. Pt to be Independent with HEP to improve ROM and independence with ADL's. On going  5. Pt to demonstrate negative Bridge Test in order to show improved core strength for lumbar stabilization. On going  6. Pt to improve range of motion by 75% to allow for improved functional mobility to allow for improvement in IADLs. On going  7. Patient will improve TUG score to less than 11 seconds with or without AD safely. On going.      Plan   Plan of care Certification: 9/24/2024 to 12/3/24- updated to 1/29/25.    Continue POC     Marito-Sanna Armando, PT   1/10/2025

## 2025-01-10 ENCOUNTER — CLINICAL SUPPORT (OUTPATIENT)
Dept: REHABILITATION | Facility: OTHER | Age: 89
End: 2025-01-10
Payer: MEDICARE

## 2025-01-10 DIAGNOSIS — R26.2 DIFFICULTY IN WALKING: Primary | ICD-10-CM

## 2025-01-10 PROCEDURE — 97530 THERAPEUTIC ACTIVITIES: CPT

## 2025-01-10 PROCEDURE — 97110 THERAPEUTIC EXERCISES: CPT

## 2025-01-13 ENCOUNTER — CLINICAL SUPPORT (OUTPATIENT)
Dept: REHABILITATION | Facility: OTHER | Age: 89
End: 2025-01-13
Payer: MEDICARE

## 2025-01-13 DIAGNOSIS — M16.0 PRIMARY OSTEOARTHRITIS OF BOTH HIPS: ICD-10-CM

## 2025-01-13 DIAGNOSIS — R26.2 DIFFICULTY IN WALKING: Primary | ICD-10-CM

## 2025-01-13 DIAGNOSIS — M47.817 SPONDYLOSIS OF LUMBOSACRAL REGION, UNSPECIFIED SPINAL OSTEOARTHRITIS COMPLICATION STATUS: ICD-10-CM

## 2025-01-13 DIAGNOSIS — G89.4 CHRONIC PAIN SYNDROME: ICD-10-CM

## 2025-01-13 PROCEDURE — 97110 THERAPEUTIC EXERCISES: CPT | Mod: CQ

## 2025-01-13 PROCEDURE — 97530 THERAPEUTIC ACTIVITIES: CPT | Mod: CQ

## 2025-01-13 NOTE — PROGRESS NOTES
Physical Therapy Treatment Note     Name: Jeffreson Boogie  Clinic Number: [id number]    Therapy Diagnosis:   No diagnosis found.      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   he was compliant with home exercise program given last session.   Response to previous treatment:good  Functional change:  improvement in quality of gait/symptoms    Pain: 2/10  Location: lower back    Objective   Updated at progress report unless specified     Treatment     Jefferson received the following manual therapy techniques for 5 minutes:   STM to R lumbar paraspinals/scar mobility  Prone unilateral P/As  Prone knee FL stretch    Jefferson received therapeutic exercises for 33 minutes including:  Nustep x 6 min Standing UBE 2 min F/2 min B for joint nutrition Lvl  2  Prone hip EX 2 x 10 5 second holds  Prone lie x 1 minute  Prone  hip FL stretch x 90 seconds each side  Supine TA + SLR + ball x 15 reps each side  Torso rotation machine x 10 reps for ROM and x 20 reps 24# on each side- NP  STS from chair with 2 foam pads x 10   Standing foot tap to bottom of chair 3 x 5 reps each side 1 UE support (working on being able to bring foot up higher)  Standing weight shift to R side x 20 reps  Leg press 190# 2 x 10  no edema, no murmurs, regular rate and rhythm reps- DC doing at gym  SL leg press 90# 2 x 10- DC doing ay gym  Lumbar EX machine 64# x 20 reps (ROM 0-65) with seat pad-    Jefferson participated in dynamic functional therapeutic activities to improve functional performance for 15 minutes, includin in step up 2 x 8 ea with 1 UE support  4 in side step up 2 x 8 ea with 2 UE support  +Resisted walkouts R MB x 10 ea direction  Gait training with cane taller and in opposite hand x 2 laps each way  Ankle righting reactions facing away from wall x 2 minutes    Neuromuscular re-education activities to improve: Balance, Coordination, and Proprioception for 0 minutes. The following activities were included:  NA today    2 rounds  Resisted side stepping with yellow pole x 1 lap  Sit<>stands with 2 foam pad x 10 reps without UE support (yellow band around ankles to promote hip ER)    CP to lumbar spine/SI joint x 5 minutes in Z lie.    Home Exercises and Education Provided     Education provided:   - See above  - Progress towards goals     Written Home Exercises Provided: Patient instructed to cont prior HEP.  Exercises were reviewed and Jefferson was able to demonstrate them prior to the end of the session.  Jefferson demonstrated good  understanding of the HEP provided.   .   See EMR under Patient Instructions for exercises provided prior visit.      Assessment   Pt presents without c/o pain. Progressed dynamic stability to improve community ambulation and overall functional strengthening with pt requiring SBA to CGA for safety. Will continue to progress as tolerated.     Jefferson is progressing well towards his goals and goals were updated on 24. Pt prognosis is Good.     Pt will continue to benefit from skilled outpatient physical therapy to address the deficits listed in the problem list on initial evaluation provide pt/family education and to maximize pt's level of independence in the home and community environment.     Anticipated barriers to physical therapy:  chronicity and severity of symptoms    Pt's spiritual, cultural and educational needs considered and pt agreeable to plan of care and goals.    Goals:  Short Term Goals:  4-5 weeks  1. Report decreased in pain at worse less than  <   / =  5  /10  to increase tolerance for functional activities.On going  2. Increased B hip/LE MMT 1/2  to increase tolerance for ADL and work activities.On going  3. Pt to tolerate HEP to improve ROM and independence with ADL's.On going  4. Pt to improve range of motion by 50% to allow for improved functional mobility to allow for improvement in IADLs. MET.     Long Term Goals: 8-10 weeks  1. Report decreased in pain at worse less than  <   / =  3  /10  to increase tolerance for functional mobility.  On going  2. Increased B hip/LE MMT 1 grade to increase tolerance for ADL and work activities.On going  3. Pt will report a 51 or greater score on FOTO Lumbar spine survey  to demonstrate decrease in disability and improvement in back pain.On going  4. Pt to be Independent with HEP to improve ROM and independence with ADL's. On going  5. Pt to demonstrate negative Bridge Test in order to show improved core strength for lumbar stabilization. On going  6. Pt to improve range of motion by 75% to allow for improved functional mobility to allow for improvement in IADLs. On going  7. Patient will improve TUG score to less than 11 seconds with or without AD safely. On going.      Plan   Plan of care Certification: 9/24/2024 to 12/3/24- updated to 1/29/25.    Continue POC     Zaheer Vasquez, PTA   1/13/2025

## 2025-01-16 NOTE — PROGRESS NOTES
Physical Therapy Treatment Note     Name: Jefferson Boogie  Clinic Number: [id number]    Therapy Diagnosis:   Encounter Diagnosis   Name Primary?    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 He also feels like he is walking better.  he was compliant with home exercise program given last session.   Response to previous treatment:good  Functional change:  improvement in quality of gait/symptoms    Pain: 2/10  Location: lower back    Objective   Updated at progress report unless specified     Treatment     Jefferosn received the following manual therapy techniques for 0 minutes:   Na today    STM to R lumbar paraspinals/scar mobility  Prone unilateral P/As  Prone knee FL stretch    Jefferson received therapeutic exercises for 30 minutes including:  Nustep x 6 min Standing UBE 2 min F/2 min B for joint nutrition Lvl  2  Prone hip EX 2 x 10 5 second holds  Prone lie x 1 minute  Prone  hip FL stretch x 90 seconds each side with strap  Supine TA + SLR + ball x 15 reps each side  Torso rotation machine x 10 reps for ROM and x 20 reps 24# on each side  Standing weight shift to R side x 20 reps  Leg press 190# 2 x 10 reps- DC doing at gym  SL leg  press 90# 2 x 10- DC doing ay gym  Lumbar EX machine 64# x 20 reps (ROM 0-65) with seat pad; 30# x 5 reps for warm up    Jefferson participated in dynamic functional therapeutic activities to improve functional performance for 10 minutes, includin in step up 2 x 8 ea with 1 UE support  4 in side step up 2 x 8 ea with 2 UE support- NP  Resisted walkouts R MB x 10 ea direction and resisted forward stepping R TB x 10 each foot  Ankle righting reactions facing away from wall x 2 minutes- NP    Neuromuscular re-education activities to improve: Balance, Coordination, and Proprioception for 15 minutes. The following activities   2 rounds  Standing foot tap to bottom of chair x 10 reps each side 1 UE support (working on being able to bring foot up higher)  Sit<>stands with 1 foam pad x 10 reps without UE support    CP to lumbar spine/SI joint x 5 minutes in Z lie.    Home Exercises and Education Provided     Education provided:   - See above  - Progress towards goals     Written Home Exercises Provided: Patient instructed to cont prior HEP.  Exercises were reviewed and Jefferson was able to demonstrate them prior to the end of the session.  Jefferson demonstrated good  understanding of the HEP provided.   .   See EMR under Patient Instructions for exercises provided prior visit.      Assessment   Patient demonstrated better tolerance to higher stepping as well as resisted sidestepping. His righting reactions are improving as well as SL stability.    Jefferson is progressing well towards his goals and goals were updated on 24. Pt prognosis is Good.     Pt will continue to benefit from skilled outpatient physical therapy to address the deficits listed in the problem list on initial evaluation provide pt/family education and to maximize pt's level of independence in the home and community environment.     Anticipated barriers to physical therapy: chronicity and severity of symptoms    Pt's spiritual, cultural and educational needs  considered and pt agreeable to plan of care and goals.    Goals:  Short Term Goals:  4-5 weeks  1. Report decreased in pain at worse less than  <   / =  5  /10  to increase tolerance for functional activities.On going  2. Increased B hip/LE MMT 1/2  to increase tolerance for ADL and work activities.On going  3. Pt to tolerate HEP to improve ROM and independence with ADL's.On going  4. Pt to improve range of motion by 50% to allow for improved functional mobility to allow for improvement in IADLs. MET.     Long Term Goals: 8-10 weeks  1. Report decreased in pain at worse less than  <   / =  3  /10  to increase tolerance for functional mobility.  On going  2. Increased B hip/LE MMT 1 grade to increase tolerance for ADL and work activities.On going  3. Pt will report a 51 or greater score on FOTO Lumbar spine survey  to demonstrate decrease in disability and improvement in back pain.On going  4. Pt to be Independent with HEP to improve ROM and independence with ADL's. On going  5. Pt to demonstrate negative Bridge Test in order to show improved core strength for lumbar stabilization. On going  6. Pt to improve range of motion by 75% to allow for improved functional mobility to allow for improvement in IADLs. On going  7. Patient will improve TUG score to less than 11 seconds with or without AD safely. On going.      Plan   Plan of care Certification: 9/24/2024 to 12/3/24- updated to 1/29/25.    Continue POC     Marito-Sanna Armando, PT   1/17/2025

## 2025-01-17 ENCOUNTER — CLINICAL SUPPORT (OUTPATIENT)
Dept: REHABILITATION | Facility: OTHER | Age: 89
End: 2025-01-17
Payer: MEDICARE

## 2025-01-17 DIAGNOSIS — R26.2 DIFFICULTY IN WALKING: Primary | ICD-10-CM

## 2025-01-17 PROCEDURE — 97530 THERAPEUTIC ACTIVITIES: CPT

## 2025-01-17 PROCEDURE — 97110 THERAPEUTIC EXERCISES: CPT

## 2025-01-24 ENCOUNTER — CLINICAL SUPPORT (OUTPATIENT)
Dept: REHABILITATION | Facility: OTHER | Age: 89
End: 2025-01-24
Payer: MEDICARE

## 2025-01-24 DIAGNOSIS — G89.4 CHRONIC PAIN SYNDROME: ICD-10-CM

## 2025-01-24 DIAGNOSIS — R26.2 DIFFICULTY IN WALKING: Primary | ICD-10-CM

## 2025-01-24 PROCEDURE — 97140 MANUAL THERAPY 1/> REGIONS: CPT

## 2025-01-24 PROCEDURE — 97110 THERAPEUTIC EXERCISES: CPT

## 2025-01-24 PROCEDURE — 97530 THERAPEUTIC ACTIVITIES: CPT

## 2025-01-24 NOTE — PLAN OF CARE
Outpatient Therapy Updated Plan of Care     Visit Date: 1/24/2025    Name: Jefferson Boogie  Clinic Number: [id number]    Therapy Diagnosis:   Encounter Diagnoses   Name Primary?    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   Response to previous treatment:good  Functional change:  improvement in quality of gait/symptoms     Pain: 3/10  Location: lower back    Objective     Update: Updated at progress report on 1/24/25     GAIT DEVIATIONS: Jefferson displays antalgic gait, difficulty with L LE weight shift, need for SPC for balance and unloading joints, and decreased cornel/step length     Lumbar Range of Motion:     %   Flexion 90      Extension 75      Left Side Bending 60   Right Side Bending 60   Left rotation    75   Right Rotation    50    *= pain     1/24/25  5x sit to stand no UE support: 13 seconds  5x sit to stand one UE support: 13 seconds  5x sit to stand 2 hands: 11 seconds  TUG w/ SPC: 12 seconds  TUG w/o SPC: 20 seconds  SBA for safety     Lower Extremity Strength     Right LE   Left LE     Hip flexion: 4/5 Hip flexion: 4/5   Knee extension: 4+/5 Knee extension: 4+/5   Knee flexion: 4+/5 Knee flexion: 4+/5   Hip IR: 4-/5 Hip IR:  4-/5   Hip ER: 4-/5 Hip ER: 4-/5   Hip extension:  4/5 Hip extension: 4/5   Hip abduction: 4-/5 Hip abduction: 4-/5   Hip adduction: 3+/5 Hip adduction 3+/5   Ankle dorsiflexion: 4/5 Ankle dorsiflexion: 4/5   Ankle plantarflexion: 4/5 Ankle plantarflexion: 4/5     Assessment     Update:  Patient has demonstrated significant improvement in pain/adverse symptoms, improvement in lumbar and hip ROM, improvement in joint mobility, improvement in quality of gait, improvement in core/LE strength, and improvement in tolerance to activity. He has has improved TUG score as well as 5 time sit<>stand score with less UE support and improvement in stability noted as well as decreased pain/adverse symptoms while performing. Patient is still having significant difficulty with walking while having to negotiate obstacles and inability to correct balance without UE support, increased pain/adverse symptom with higher level tasks, decreased ROM, and decreased full functional strength. Patient needs continued skilled therapy in order to return to PLOF, improve quality of life, and to decrease risk for falls.     Jefferson is progressing well towards his goals and goals were updated on 1/24/25. Pt prognosis is Good.     Goals Short Term Goals:  4-5 weeks  1. Report decreased in pain at worse less than  <   / =  5  /10  to increase tolerance for functional activities. MET  2. Increased B hip/LE MMT 1/2  to increase tolerance for ADL and work activities.MET  3. Pt to tolerate HEP to improve ROM and independence with ADL's. MET  4. Pt to improve range of motion by 50% to allow for improved functional mobility to allow for improvement in IADLs. MET.     Long Term Goals: 8-10 weeks  1. Report decreased in pain at worse less than  <   / =  3  /10  to increase tolerance for functional mobility.  On going  2. Increased B hip/LE MMT 1 grade to increase tolerance for ADL and work activities.On going  3. Pt will report a 51 or greater score on FOTO  "Lumbar spine survey  to demonstrate decrease in disability and improvement in back pain.On going  4. Pt to be Independent with HEP to improve ROM and independence with ADL's. On going  5. Pt to demonstrate negative Bridge Test in order to show improved core strength for lumbar stabilization. On going  6. Pt to improve range of motion by 75% to allow for improved functional mobility to allow for improvement in IADLs. On going  7. Patient will improve TUG score to less than 11 seconds with or without AD safely. On going.    Reasons for Recertification of Therapy:   Pt will continue to benefit from skilled outpatient physical therapy to address the deficits listed in the problem list on initial evaluation provide pt/family education and to maximize pt's level of independence in the home and community environment.     Plan     Updated Certification Period: 1/24/2025 to 4/4/25  Recommended Treatment Plan: 1-2 times per week for 8-10 weeks  Other Recommendations: NA    Outpatient physical therapy to include the following:   - Patient education  - Therapeutic exercise  - Manual therapy  - Performance testing   - Neuromuscular Re-education  - Therapeutic activity   - Modalities  - Dry Needling    Pt may be seen by PTA as part of the rehabilitation team.     Therapist: Yesenia Armando, PT  1/24/2025    "I certify the need for these services furnished under this plan of treatment and while under my care."    ____________________________________  Physician/Referring Practitioner    _______________  Date of Signature    Yesenia Armando, PT  1/24/2025      I CERTIFY THE NEED FOR THESE SERVICES FURNISHED UNDER THIS PLAN OF TREATMENT AND WHILE UNDER MY CARE    Physician's comments:        Physician's Signature: ___________________________________________________     "

## 2025-01-25 NOTE — PROGRESS NOTES
Physical Therapy Treatment Note     Name: Jefferson Boogie  Clinic Number: [id number]    Therapy Diagnosis:   Encounter Diagnoses   Name Primary?    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  he was compliant with home exercise program given last session.   Response to previous treatment:good  Functional change:  improvement in quality of gait/symptoms    Pain: 3/10  Location: lower back    Objective   Updated at progress report unless specified    Treatment     Jefferson received the following manual therapy techniques for 10 minutes:   STM to R lumbar paraspinals/scar mobility  Prone unilateral P/As  Prone knee FL and hip ER/IR    Jefferson received therapeutic exercises for 40 minutes including:  Nustep x 6 min Standing UBE 2 min F/2 min B for joint nutrition Lvl  2  Prone hip EX 2 x 10 5 second holds  Prone lie x 1 minute  Supine hip FL stretch x 3 rounds on both sides of: 30 seconds stretch with 2# weight and then  8 SLR  Torso rotation machine x 10 reps for ROM and x 20 reps 26# on each side  Standing weight shift to R side x 20 reps  Lumbar EX machine 66# x 20 reps (ROM 0-65) with seat  pad; 30# x 5 reps for warm up    NP  Leg press 190# 2 x 10 reps- DC doing at gym  SL leg press 90# 2 x 10- DC doing ay gym    Jefferson participated in dynamic functional therapeutic activities to improve functional performance for 5 minutes, including:  Ankle righting reactions facing away from wall x 2 minutes    NP  4 in step up 2 x 8 ea with 1 UE support  4 in side step up 2 x 8 ea with 2 UE support  Resisted walkouts R MB x 10 ea direction and resisted forward stepping R TB x 10 each foot    Neuromuscular re-education activities to improve: Balance, Coordination, and Proprioception for 0 minutes. The following activities  NA today     2 rounds  Standing foot tap to bottom of chair x 10 reps each side 1 UE support (working on being able to bring foot up higher)  Sit<>stands with 1 foam pad x 10 reps without UE support    CP to lumbar spine/SI joint x 5 minutes in Z lie.    Home Exercises and Education Provided     Education provided:   - See above  - Progress towards goals     Written Home Exercises Provided:   Exercises were reviewed and Jefferson was able to demonstrate them prior to the end of the session.  Jefferson demonstrated good  understanding of the HEP provided.   .   See EMR under Patient Instructions for exercises provided prior visit.      Assessment   Patient had a cold this session and not quite as much energy. Increase in difficulty with hip extension noted today with less height and more lumbar rotation needed to assist.    Jefferson is progressing well towards his goals and goals were updated on 1/24/25. Pt prognosis is Good.     Pt will continue to benefit from skilled outpatient physical therapy to address the deficits listed in the problem list on initial evaluation provide pt/family education and to maximize pt's level of independence in the home and community environment.     Anticipated barriers to physical therapy: chronicity and severity of symptoms    Pt's spiritual, cultural and educational needs  considered and pt agreeable to plan of care and goals.    Goals:  Short Term Goals:  4-5 weeks  1. Report decreased in pain at worse less than  <   / =  5  /10  to increase tolerance for functional activities. MET  2. Increased B hip/LE MMT 1/2  to increase tolerance for ADL and work activities.MET  3. Pt to tolerate HEP to improve ROM and independence with ADL's. MET  4. Pt to improve range of motion by 50% to allow for improved functional mobility to allow for improvement in IADLs. MET.     Long Term Goals: 8-10 weeks  1. Report decreased in pain at worse less than  <   / =  3  /10  to increase tolerance for functional mobility.  On going  2. Increased B hip/LE MMT 1 grade to increase tolerance for ADL and work activities.On going  3. Pt will report a 51 or greater score on FOTO Lumbar spine survey  to demonstrate decrease in disability and improvement in back pain.On going  4. Pt to be Independent with HEP to improve ROM and independence with ADL's. On going  5. Pt to demonstrate negative Bridge Test in order to show improved core strength for lumbar stabilization. On going  6. Pt to improve range of motion by 75% to allow for improved functional mobility to allow for improvement in IADLs. On going  7. Patient will improve TUG score to less than 11 seconds with or without AD safely. On going.      Plan   Plan of care Certification: 12/3/24 to 1/29/25- updated to 4/4/25    Continue POC    Marito-Sanna Armando, PT   1/27/2025

## 2025-01-27 ENCOUNTER — CLINICAL SUPPORT (OUTPATIENT)
Dept: REHABILITATION | Facility: OTHER | Age: 89
End: 2025-01-27
Payer: MEDICARE

## 2025-01-27 DIAGNOSIS — R26.2 DIFFICULTY IN WALKING: Primary | ICD-10-CM

## 2025-01-27 DIAGNOSIS — G89.4 CHRONIC PAIN SYNDROME: ICD-10-CM

## 2025-01-27 PROCEDURE — 97110 THERAPEUTIC EXERCISES: CPT

## 2025-01-27 PROCEDURE — 97140 MANUAL THERAPY 1/> REGIONS: CPT

## 2025-01-28 NOTE — PROGRESS NOTES
Physical Therapy Treatment Note     Name: Jefferson Boogie  Clinic Number: [id number]    Therapy Diagnosis:   No diagnosis found.      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   Response to previous treatment:good  Functional change:  improvement in quality of gait/symptoms    Pain: 3/10  Location: lower back    Objective   Updated at progress report unless specified    Treatment     Jefferson received the following manual therapy techniques for 10 minutes:   STM to R lumbar paraspinals/scar mobility  Prone unilateral P/As  Prone knee FL and hip ER/IR    Jefferson received therapeutic exercises for 40 minutes including:  Nustep x 6 min Standing UBE 2 min F/2 min B for joint nutrition Lvl  2  Prone hip EX 2 x 10 5 second holds  Prone lie x 1 minute  Supine hip FL stretch x 3 rounds on both sides of: 30 seconds stretch with 2# weight and then  8 SLR  Torso rotation machine x 10 reps for ROM and x 20 reps 26# on each side  Standing weight shift to R side x 20 reps  Lumbar EX machine 66# x 20 reps (ROM 0-65) with seat pad; 30# x 5 reps for warm up    NP  Leg press 190# 2 x 10 reps- DC doing at gym  SL leg press 90# 2 x 10- DC doing ay gym    Jefferson participated in dynamic  functional therapeutic activities to improve functional performance for 5 minutes, including:  Ankle righting reactions facing away from wall x 2 minutes    NP  4 in step up 2 x 8 ea with 1 UE support  4 in side step up 2 x 8 ea with 2 UE support  Resisted walkouts R MB x 10 ea direction and resisted forward stepping R TB x 10 each foot    Neuromuscular re-education activities to improve: Balance, Coordination, and Proprioception for 0 minutes. The following activities  NA today     2 rounds  Standing foot tap to bottom of chair x 10 reps each side 1 UE support (working on being able to bring foot up higher)  Sit<>stands with 1 foam pad x 10 reps without UE support    CP to lumbar spine/SI joint x 5 minutes in Z lie.    Home Exercises and Education Provided     Education provided:   - See above  - Progress towards goals     Written Home Exercises Provided:   Exercises were reviewed and Jefferson was able to demonstrate them prior to the end of the session.  Jefferson demonstrated good  understanding of the HEP provided.   .   See EMR under Patient Instructions for exercises provided prior visit.      Assessment       Jefferson is progressing well towards his goals and goals were updated on 1/24/25. Pt prognosis is Good.     Pt will continue to benefit from skilled outpatient physical therapy to address the deficits listed in the problem list on initial evaluation provide pt/family education and to maximize pt's level of independence in the home and community environment.     Anticipated barriers to physical therapy: chronicity and severity of symptoms    Pt's spiritual, cultural and educational needs considered and pt agreeable to plan of care and goals.    Goals:  Short Term Goals:  4-5 weeks  1. Report decreased in pain at worse less than  <   / =  5  /10  to increase tolerance for functional activities. MET  2. Increased B hip/LE MMT 1/2  to increase tolerance for ADL and work activities.MET  3. Pt to tolerate HEP to improve  ROM and independence with ADL's. MET  4. Pt to improve range of motion by 50% to allow for improved functional mobility to allow for improvement in IADLs. MET.     Long Term Goals: 8-10 weeks  1. Report decreased in pain at worse less than  <   / =  3  /10  to increase tolerance for functional mobility.  On going  2. Increased B hip/LE MMT 1 grade to increase tolerance for ADL and work activities.On going  3. Pt will report a 51 or greater score on FOTO Lumbar spine survey  to demonstrate decrease in disability and improvement in back pain.On going  4. Pt to be Independent with HEP to improve ROM and independence with ADL's. On going  5. Pt to demonstrate negative Bridge Test in order to show improved core strength for lumbar stabilization. On going  6. Pt to improve range of motion by 75% to allow for improved functional mobility to allow for improvement in IADLs. On going  7. Patient will improve TUG score to less than 11 seconds with or without AD safely. On going.      Plan   Plan of care Certification: 12/3/24 to 1/29/25- updated to 4/4/25    Continue TRINH Armando, PT   1/28/2025

## 2025-01-29 ENCOUNTER — CLINICAL SUPPORT (OUTPATIENT)
Dept: REHABILITATION | Facility: OTHER | Age: 89
End: 2025-01-29
Payer: MEDICARE

## 2025-01-29 DIAGNOSIS — R26.2 DIFFICULTY IN WALKING: Primary | ICD-10-CM

## 2025-01-29 PROCEDURE — 97110 THERAPEUTIC EXERCISES: CPT

## 2025-01-29 PROCEDURE — 97530 THERAPEUTIC ACTIVITIES: CPT

## 2025-01-29 NOTE — PROGRESS NOTES
Outpatient Rehab    Physical Therapy Visit    Patient Name: Jefferson Boogie  MRN: [id number]  YOB: 1935  Today's Date: 1/29/2025    Therapy Diagnosis: No diagnosis found.  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   Evaluation Date: 9/24/2024  Authorization Period Expiration: 12/31/25  Plan of Care Expiration: 12/3/24 to 1/29/25- updated to 4/4/25  Visit # / Visits authorized: 8/20 (last referral 30/30)  Progress Note Due: 2/24/24   FOTO: 4/4             Precautions: SI joint fusion on 8/12/24; fall risk; spinal cord stimulator     Time In: 1:35 pm  Time Out: 2:35 pm  Total Billable: 30 minutes 1:1  Total Appointment Time (timed & untimed codes): 60 minutes         Subjective             Past Medical History/Physical Systems Review:   Jefferson Boogie  has a past medical history of Bronchitis, Cancer, Hypercholesteremia, Hypertension, Sacroiliitis, and Thyroid disease.    Jefferson Boogie  has a past surgical history that includes Knee surgery; Bladder surgery; Hernia repair; Trial of spinal cord nerve stimulator (N/A, 8/5/2019); Injection of joint (Right, 7/8/2020); Replacement of nerve stimulator battery (N/A, 7/27/2020); Injection of joint (Right, 9/9/2020); Radiofrequency ablation (Right, 12/9/2020); Colonoscopy; Eye surgery; Fusion of sacroiliac joint (Right, 3/15/2021); Injection of joint (Right, 7/21/2021); Cataract extraction; Injection of joint (Right, 10/13/2021); Epidural steroid injection (N/A, 1/12/2022); Cataract extraction w/  intraocular lens implant (Right, 3/9/2022); Injection of anesthetic agent around nerve (Bilateral, 6/1/2022); Injection of anesthetic agent around nerve (Bilateral, 8/24/2022); Radiofrequency ablation (Right, 9/21/2022); Radiofrequency ablation (Left, 10/12/2022);  Revision procedure involving spinal cord neurostimulator (N/A, 2/13/2023); Replacement of spinal cord stimulator (2/13/2023); injection, sacroiliac joint (Right, 5/31/2023); Injection of anesthetic agent around nerve (Right, 2/28/2024); Injection of joint (Right, 4/10/2024); Fusion of sacroiliac joint (Right, 8/12/2024); and Transforaminal epidural injection of steroid (Right, 8/28/2024).    Jefferson has a current medication list which includes the following prescription(s): alprazolam, amlodipine, ascorbic acid (vitamin c), aspirin, celecoxib, duloxetine, ergocalciferol, ergocalciferol (vitamin d2), finasteride, gabapentin, irbesartan, levothyroxine, meloxicam, mirabegron, promethazine-dextromethorphan, simvastatin, tadalafil, and temazepam, and the following Facility-Administered Medications: balanced salt irrigation and sodium chloride 0.9%.    Review of patient's allergies indicates:  No Known Allergies     Objective            Treatment:  {PT Treatments:32654}    Patient's spiritual, cultural, and educational needs considered and patient agreeable to plan of care and goals.     Assessment & Plan   Assessment:             Plan:      Goals:

## 2025-01-30 NOTE — PROGRESS NOTES
Physical Therapy Treatment Note     Name: Jefferson Boogie  Clinic Number: [id number]    Therapy Diagnosis:   Encounter Diagnosis   Name Primary?    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  he was compliant with home exercise program given last session.   Response to previous treatment:good  Functional change:  improvement in quality of gait/symptoms    Pain: 3/10  Location: lower back    Objective   Updated at progress report unless specified    Treatment     Jefferson received the following manual therapy techniques for 0 minutes:   STM to R lumbar paraspinals/scar mobility  Prone unilateral P/As  Prone knee FL and hip ER/IR    Jefferson received therapeutic exercises for 45 minutes including:  Nustep x 6 min Standing UBE 2 min F/2 min B for joint nutrition Lvl  2  Prone hip EX 2 x 10 5 second holds  Prone lie x 1 minute  Supine hip FL stretch x 3 rounds on both sides of: 30 seconds stretch with 2# weight and then  8 SLR  Torso rotation machine x 10 reps for ROM and x 20 reps 26# on each side  Standing weight shift to R side x 20 reps  Lumbar EX machine 66# x 20 reps (ROM 0-65) with seat pad; 30# x 5 reps for warm  up    NP  Leg press 190# 2 x 10 reps- DC doing at gym  SL leg press 90# 2 x 10- DC doing ay gym    Jefferson participated in dynamic functional therapeutic activities to improve functional performance for 10 minutes, including:  Ankle righting reactions facing away from wall x 2 minutes- NP  4 in step up 2 x 10 ea with 1 UE support  4 in side step up 2 x 10 ea with 2 UE support  Resisted walkouts R MB x 10 ea direction and resisted forward stepping R TB x 10 each foot- NP    Neuromuscular re-education activities to improve: Balance, Coordination, and Proprioception for 0 minutes. The following activities  NA today     2 rounds  Standing foot tap to bottom of chair x 10 reps each side 1 UE support (working on being able to bring foot up higher)  Sit<>stands with 1 foam pad x 10 reps without UE support    CP to lumbar spine/SI joint x 5 minutes in Z lie.    Home Exercises and Education Provided     Education provided:   - See above  - Progress towards goals     Written Home Exercises Provided:   Exercises were reviewed and Jefferson was able to demonstrate them prior to the end of the session.  Jefferson demonstrated good  understanding of the HEP provided.   .   See EMR under Patient Instructions for exercises provided prior visit.      Assessment   Patient demonstrated better tolerance to more strengthening and Wbing work this session. Patient still having more difficulty with hip EX this session, but able to perform with greater height this session than last session.    Jefferson is progressing well towards his goals and goals were updated on 1/24/25. Pt prognosis is Good.     Pt will continue to benefit from skilled outpatient physical therapy to address the deficits listed in the problem list on initial evaluation provide pt/family education and to maximize pt's level of independence in the home and community environment.     Anticipated barriers to physical therapy: chronicity and severity of symptoms    Pt's spiritual,  cultural and educational needs considered and pt agreeable to plan of care and goals.    Goals:  Short Term Goals:  4-5 weeks  1. Report decreased in pain at worse less than  <   / =  5  /10  to increase tolerance for functional activities. MET  2. Increased B hip/LE MMT 1/2  to increase tolerance for ADL and work activities.MET  3. Pt to tolerate HEP to improve ROM and independence with ADL's. MET  4. Pt to improve range of motion by 50% to allow for improved functional mobility to allow for improvement in IADLs. MET.     Long Term Goals: 8-10 weeks  1. Report decreased in pain at worse less than  <   / =  3  /10  to increase tolerance for functional mobility.  On going  2. Increased B hip/LE MMT 1 grade to increase tolerance for ADL and work activities.On going  3. Pt will report a 51 or greater score on FOTO Lumbar spine survey  to demonstrate decrease in disability and improvement in back pain.On going  4. Pt to be Independent with HEP to improve ROM and independence with ADL's. On going  5. Pt to demonstrate negative Bridge Test in order to show improved core strength for lumbar stabilization. On going  6. Pt to improve range of motion by 75% to allow for improved functional mobility to allow for improvement in IADLs. On going  7. Patient will improve TUG score to less than 11 seconds with or without AD safely. On going.      Plan   Plan of care Certification: 12/3/24 to 1/29/25- updated to 4/4/25    Continue POC    Marito-Sanna Armando, PT   1/30/2025

## 2025-02-04 NOTE — PROGRESS NOTES
Physical Therapy Treatment Note     Name: Jefferson Boogie  Clinic Number: [id number]    Therapy Diagnosis:   Encounter Diagnosis   Name Primary?    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 Not too many flare ups the last couple of days.  he was compliant with home exercise program given last session.   Response to previous treatment:good  Functional change:  improvement in quality of gait/symptoms    Pain: 3/10  Location: lower back    Objective   Updated at progress report unless specified    Treatment     Jefferson received the following manual therapy techniques for 10 minutes:   STM to R lumbar paraspinals/scar mobility  Prone unilateral P/As  Prone knee FL and hip ER/IR    Jefferson received therapeutic exercises for 40 minutes including:  Nustep x 6 min Standing UBE 2 min F/2 min B for joint nutrition Lvl  2  Prone hip EX 2 x 10 5 second holds  Prone on elbows x 1 minute  Supine hip FL stretch x 3 rounds on both sides of: 30 seconds stretch with 2# weight and then 10 SLR  Torso rotation machine x 10 reps for ROM and x 20 reps 30# on each side  Standing weight shift to R side x 20 reps- NP  Lumbar EX machine 70# x 20 reps (ROM 0-65) with seat pad; 30# x 5 reps  for warm up    NP  Leg press 190# 2 x 10 reps- DC doing at gym  SL leg press 90# 2 x 10- DC doing ay gym    Jefferson participated in dynamic functional therapeutic activities to improve functional performance for 5 minutes, including:  Ankle righting reactions facing away from wall x 2 minutes- NP  4 in step up 2 x 10 ea with 1 UE support  4 in side step up 2 x 10 ea with 2 UE support  Resisted walkouts R MB x 10 ea direction and resisted forward stepping R TB x 10 each foot- NP    Neuromuscular re-education activities to improve: Balance, Coordination, and Proprioception for 0 minutes. The following activities  NA today     2 rounds  Standing foot tap to bottom of chair x 10 reps each side 1 UE support (working on being able to bring foot up higher)  Sit<>stands with 1 foam pad x 10 reps without UE support    CP to lumbar spine/SI joint x 5 minutes in Z lie.    Home Exercises and Education Provided     Education provided:   - See above  - Progress towards goals     Written Home Exercises Provided:   Exercises were reviewed and Jefferson was able to demonstrate them prior to the end of the session.  Jefferson demonstrated good  understanding of the HEP provided.   .   See EMR under Patient Instructions for exercises provided prior visit.      Assessment   Patient demonstrated good tolerance to strengthening and hip/lumbar mobility work this session. Improvement in symptoms by the end of the session.     Jefferson is progressing well towards his goals and goals were updated on 1/24/25. Pt prognosis is Good.     Pt will continue to benefit from skilled outpatient physical therapy to address the deficits listed in the problem list on initial evaluation provide pt/family education and to maximize pt's level of independence in the home and community environment.     Anticipated barriers to physical therapy: chronicity and severity of symptoms    Pt's spiritual, cultural and educational needs considered and pt agreeable to plan of care  and goals.    Goals:  Short Term Goals:  4-5 weeks  1. Report decreased in pain at worse less than  <   / =  5  /10  to increase tolerance for functional activities. MET  2. Increased B hip/LE MMT 1/2  to increase tolerance for ADL and work activities.MET  3. Pt to tolerate HEP to improve ROM and independence with ADL's. MET  4. Pt to improve range of motion by 50% to allow for improved functional mobility to allow for improvement in IADLs. MET.     Long Term Goals: 8-10 weeks  1. Report decreased in pain at worse less than  <   / =  3  /10  to increase tolerance for functional mobility.  On going  2. Increased B hip/LE MMT 1 grade to increase tolerance for ADL and work activities.On going  3. Pt will report a 51 or greater score on FOTO Lumbar spine survey  to demonstrate decrease in disability and improvement in back pain.On going  4. Pt to be Independent with HEP to improve ROM and independence with ADL's. On going  5. Pt to demonstrate negative Bridge Test in order to show improved core strength for lumbar stabilization. On going  6. Pt to improve range of motion by 75% to allow for improved functional mobility to allow for improvement in IADLs. On going  7. Patient will improve TUG score to less than 11 seconds with or without AD safely. On going.      Plan   Plan of care Certification: 12/3/24 to 1/29/25- updated to 4/4/25    Continue TRINH Devi-Sanna Armando, PT   2/5/2025

## 2025-02-05 ENCOUNTER — CLINICAL SUPPORT (OUTPATIENT)
Dept: REHABILITATION | Facility: OTHER | Age: 89
End: 2025-02-05
Payer: MEDICARE

## 2025-02-05 DIAGNOSIS — R26.2 DIFFICULTY IN WALKING: Primary | ICD-10-CM

## 2025-02-05 PROCEDURE — 97110 THERAPEUTIC EXERCISES: CPT

## 2025-02-05 PROCEDURE — 97140 MANUAL THERAPY 1/> REGIONS: CPT

## 2025-02-06 ENCOUNTER — TELEPHONE (OUTPATIENT)
Dept: HEMATOLOGY/ONCOLOGY | Facility: CLINIC | Age: 89
End: 2025-02-06
Payer: MEDICARE

## 2025-02-06 NOTE — PROGRESS NOTES
Physical Therapy Treatment Note     Name: Jefferson Boogie  Clinic Number: [id number]    Therapy Diagnosis:   Encounter Diagnosis   Name Primary?    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 He thinks he might have overdone it at the gym yesterday.  he was compliant with home exercise program given last session.   Response to previous treatment:good  Functional change:  ongoing    Pain: 3/10  Location: lower back    Objective   Updated at progress report unless specified    Treatment     Jefferson received the following manual therapy techniques for 0 minutes:   NA today    STM to R lumbar paraspinals/scar mobility  Prone unilateral P/As  Prone knee FL and hip ER/IR    Jefferson received therapeutic exercises for 45 minutes including:  Nustep x 6 min Standing UBE 2 min F/2 min B for joint nutrition Lvl  2  Prone hip EX x 10 5 second holds  Prone on elbows x 1 minute  Quadruped + knee crunch 2 x 6 reps each side  Supine hip FL stretch x 3 rounds on both sides of: 30 seconds stretch with 2# weight and then 10 SLR  Torso rotation machine x 10 reps for ROM and x 20 reps 30# on each side  Standing weight shift to R side x 20 reps  Lumbar EX machine 70# x 20 reps  (ROM 0-65) with seat pad; 36# x 5 reps for warm up    Jefferson participated in dynamic functional therapeutic activities to improve functional performance for 10 minutes, including:  Ankle righting reactions facing away from wall x 20 reps  4 in step up 2 x 10 ea with 1 UE support- NP  4 in side step up x 10 ea with 2 UE support 5 second hold at top  Resisted walkouts R MB x 10 ea direction and resisted forward stepping R TB x 10 each foot- NP    Neuromuscular re-education activities to improve: Balance, Coordination, and Proprioception for 0 minutes. The following activities  NA today    2 rounds  Standing foot tap to bottom of chair x 10 reps each side 1 UE support (working on being able to bring foot up higher)  Sit<>stands with 1 foam pad x 10 reps without UE support    CP to lumbar spine/SI joint x 5 minutes in Z lie.    Home Exercises and Education Provided     Education provided:   - See above  - Progress towards goals     Written Home Exercises Provided:   Exercises were reviewed and Jefferson was able to demonstrate them prior to the end of the session.  Jefferson demonstrated good  understanding of the HEP provided.   .   See EMR under Patient Instructions for exercises provided prior visit.      Assessment   Patient demonstrated good tolerance to strengthening and more resistance work this session. Improvement in quality of gait by the end of the session.    Jefferson is progressing well towards his goals and goals were updated on 1/24/25. Pt prognosis is Good.     Pt will continue to benefit from skilled outpatient physical therapy to address the deficits listed in the problem list on initial evaluation provide pt/family education and to maximize pt's level of independence in the home and community environment.     Anticipated barriers to physical therapy: chronicity and severity of symptoms    Pt's spiritual, cultural and educational needs considered and pt agreeable to plan of care and goals.    Goals:  Short Term  Goals:  4-5 weeks  1. Report decreased in pain at worse less than  <   / =  5  /10  to increase tolerance for functional activities. MET  2. Increased B hip/LE MMT 1/2  to increase tolerance for ADL and work activities.MET  3. Pt to tolerate HEP to improve ROM and independence with ADL's. MET  4. Pt to improve range of motion by 50% to allow for improved functional mobility to allow for improvement in IADLs. MET.     Long Term Goals: 8-10 weeks  1. Report decreased in pain at worse less than  <   / =  3  /10  to increase tolerance for functional mobility.  On going  2. Increased B hip/LE MMT 1 grade to increase tolerance for ADL and work activities.On going  3. Pt will report a 51 or greater score on FOTO Lumbar spine survey  to demonstrate decrease in disability and improvement in back pain.On going  4. Pt to be Independent with HEP to improve ROM and independence with ADL's. On going  5. Pt to demonstrate negative Bridge Test in order to show improved core strength for lumbar stabilization. On going  6. Pt to improve range of motion by 75% to allow for improved functional mobility to allow for improvement in IADLs. On going  7. Patient will improve TUG score to less than 11 seconds with or without AD safely. On going.      Plan   Plan of care Certification: 12/3/24 to 1/29/25- updated to 4/4/25    Continue TRINH Armando, PT   2/7/2025

## 2025-02-07 ENCOUNTER — CLINICAL SUPPORT (OUTPATIENT)
Dept: REHABILITATION | Facility: OTHER | Age: 89
End: 2025-02-07
Payer: MEDICARE

## 2025-02-07 ENCOUNTER — TELEPHONE (OUTPATIENT)
Dept: HEMATOLOGY/ONCOLOGY | Facility: CLINIC | Age: 89
End: 2025-02-07
Payer: MEDICARE

## 2025-02-07 DIAGNOSIS — R26.2 DIFFICULTY IN WALKING: Primary | ICD-10-CM

## 2025-02-07 PROCEDURE — 97110 THERAPEUTIC EXERCISES: CPT | Mod: KX

## 2025-02-07 PROCEDURE — 97530 THERAPEUTIC ACTIVITIES: CPT | Mod: KX

## 2025-02-07 NOTE — TELEPHONE ENCOUNTER
I called the patient to let him know hat I got his message and I was able to get his appt changed to a virtual. No answer I left it in a voicemail.

## 2025-02-10 ENCOUNTER — CLINICAL SUPPORT (OUTPATIENT)
Dept: REHABILITATION | Facility: OTHER | Age: 89
End: 2025-02-10
Payer: MEDICARE

## 2025-02-10 DIAGNOSIS — R26.2 DIFFICULTY IN WALKING: Primary | ICD-10-CM

## 2025-02-10 PROCEDURE — 97110 THERAPEUTIC EXERCISES: CPT | Mod: KX

## 2025-02-10 PROCEDURE — 97140 MANUAL THERAPY 1/> REGIONS: CPT | Mod: KX

## 2025-02-10 NOTE — PROGRESS NOTES
Subjective     Patient ID: Jefferson Boogie is a 89 y.o. male.    Chief Complaint: No chief complaint on file.    HPI    The patient location is: home  The chief complaint leading to consultation is: follow up  Visit type: audiovisual  Face to Face time with patient: 10 minutes  12 minutes of total time spent on the encounter, which includes face to face time and non-face to face time preparing to see the patient (eg, review of tests), Obtaining and/or reviewing separately obtained history, Documenting clinical information in the electronic or other health record, Independently interpreting results (not separately reported) and communicating results to the patient/family/caregiver, or Care coordination (not separately reported).   Each patient to whom he or she provides medical services by telemedicine is:  (1) informed of the relationship between the physician and patient and the respective role of any other health care provider with respect to management of the patient; and (2) notified that he or she may decline to receive medical services by telemedicine and may withdraw from such care at any time.    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  In the head and neck, there are no hypermetabolic lesions worrisome for  malignancy. There are no hypermetabolic mucosal lesions, and there are no pathologically enlarged or hypermetabolic lymph nodes.  In the chest, there are no hypermetabolic lesions worrisome for malignancy.  There are no pathologically enlarged or hypermetabolic lymph nodes.  Stable 0.6 cm right middle lobe nodule, below the size threshold for PET.  In the abdomen and pelvis, there is physiologic tracer distribution within the abdominal organs and excretion into the genitourinary system.  Mildly enlarged hypermetabolic preaortic lymph node just inferior to the renal vein measuring 1.4 cm (series 3, image 144) with maximum SUV of 6.5.  In the bones, there are no hypermetabolic lesions worrisome for malignancy.  No significant increased radiotracer uptake within the previously described right iliac bone lesion.  Similar appearance of several scattered small mixed lucent and sclerotic foci throughout the pelvis without significant radiotracer uptake.  Additional CT findings: Spinal cord stimulator device and leads noted.  Surgical changes of the right sacroiliac joint.  Calcific atherosclerosis of the aorta and coronary arteries.  Mild ground-glass density within the dependent bilateral lower lobes favored to represent atelectasis.  Small hepatic cyst.  Left renal cyst.  Colonic diverticulosis.  Impression:  1. Mildly enlarged hypermetabolic preaortic lymph node within the upper abdomen suspicious for metastatic disease or lymphoproliferative process.  2. No hypermetabolic bone lesion.  Stable CT appearance of previously described pelvic bone lesions without significant radiotracer uptake.  3. Stable subcentimeter right middle lobe pulmonary nodule, below the size threshold for PET.     History:  - Initially he was referred after abnormal CT results- Referring MD: Dr. Jimenez - pain clinic      - Reports the following leading to scans:  pain x 4-5 years  2 different stimulators  Switched from explant to implant  recently with improved control  States he actually had a few days of complete resolution of pain recently while he was in [facility]  Describes pain as located in his low back/buttock and radiating down right calf  He is able to remain active- plays golf  No other pain     No weight loss     Oncology History:  1967- Bladder cancer - treated at the [facility] in [facility]  Stage I- treated surgically  Cystoscopy surveillance following     Pain led to below imaging  - 8/14/2023 CT Abd/Pelvis:  FINDINGS:  Chest, no significant pleural or pericardial fluid.  7 mm ground-glass opacity right middle lobe series 2, image 8.  Remainder of the lungs are clear.  The abdomen, liver is mildly enlarged 18.3 cm.  Hypodensity hepatic dome image 33 too small to characterize.  Statistically a cyst.  Portal vein is patent.  No biliary dilatation.  Gallbladder mildly distended though otherwise unremarkable.  Fatty replacement of the pancreas.  No mass or pancreatic ductal dilatation.  Spleen upper limits for normal in size.  No adrenal mass.  2.4 cm hypodensity apex of the left kidney consistent with a cyst.  Additional hypodensities too small to characterize. No solid enhancing masses.  No hydronephrosis.  Ureters are fairly well visualized to the bladder.  No obstructive uropathy.  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  Separate origins of the splenic and hepatic arteries.  SMA is patent.  Likewise renal arteries are patent.  14 mm preaortic node series 2, image 59.  Additional scattered periaortic nodes noted.  In the pelvis, no convincing pelvic adenopathy.  Nonenlarged nodes are present.  Calcifications in nonenlarged prostate.  No significant bladder wall thickening.  Evaluation of the bowel demonstrates colonic diverticula.  No focal wall thickening or pericolonic soft tissue stranding.  Multiple fluid-filled loops of small bowel.  No focal dilatation.  Appendix is normal.  No convincing mesenteric  adenopathy.  Presumed epidural stimulator.  Postop change right SI joint.  No fusion.  Degenerative changes noted.  Slightly expansile lesion right ilium image 105 appears similar.  Sclerotic focus anterior aspect of the left ilium also similar.  Probable bone island left acetabulum.  Additional lucent lesions in the pelvis better demonstrated on prior pelvic CT.  No convincing new lesions seen.  Impression:  Slightly expansile lesion right ilium as well as scattered lucent and sclerotic foci of the pelvis all appear similar.  If further evaluation is desired MRI or bone scan may be helpful.  Comparison with prior studies would be most beneficial.  Mildly enlarged preaortic node.  Additional nonenlarged para-aortic nodes noted.  7 mm ground-glass opacity right middle lobe.  For a ground glass nodule 6 mm or larger, Fleischner Society 2017 guidelines recommend follow up with non-contrast chest CT at 6-12 months after discovery. If this nodule persists at that time, additional follow up with non-contrast chest CT is recommended every 2 years until 5 years of stability have been documented.  Hypodensities in the liver and kidneys as above.  Additional findings above  This report was flagged in Epic as abnormal.     - 7/10/2023 CT Pelvis:  FINDINGS:  BONE: Diffuse osteopenia.  No fracture or osteonecrosis.  Few lytic and sclerotic lesions scattered throughout the pelvis, including a lesion in the right iliac bone with irregularity of the overlying cortex (2:103).  JOINT: Postoperative changes from right sacroiliac joint fusion.  The implant is in satisfactory position.  No osseous fusion across the joint space.  Degenerative changes both sacroiliac joints.  No erosions or ankylosis.  Degenerative changes also involve the lower lumbar spine, both hip joints, and pubic symphysis.  Chondrocalcinosis of the pubic symphysis.  No significant joint effusion.  SOFT TISSUE: Normal muscle bulk. Regional tendons are intact.   Calcific tendinopathy involving the proximal hamstring tendons bilaterally.  No bursal collection.  MISCELLANEOUS: Circumferential bladder wall thickening.  Prostatic calcifications.  Colonic diverticulosis.  Atherosclerosis.  Impression:  Postoperative changes in the right sacroiliac joint.  No osseous fusion.  Few lytic and sclerotic lesions scattered throughout the pelvis, concerning for metastases or myeloma.  No prior studies are available for comparison.  Bladder wall thickening, which could be secondary to outlet obstruction or cystitis.  Correlate with urinalysis.  Other findings as described.     PMH:  No other surgeries than bladder and pain management  Cataract surgery  Osteopenia-  stopped medication due to dental issues (Prolia)- taking Calcium and Vit D             Active Ambulatory Problems     Diagnosis Date Noted    Chronic pain syndrome 2019    Arachnoiditis 2019    Radiculopathy of thoracolumbar region 2019    Opioid contract exists 2020    Sacroiliitis 2020    Chronic pain 2020    Shoulder pain 2021    Degenerative joint disease (DJD) of hip 2021    Spinal cord stimulator status 2021    Vertebrogenic pain 2022              Resolved Ambulatory Problems     Diagnosis Date Noted    Pain of right sacroiliac joint 06/15/2020    CRPS (complex regional pain syndrome type I) 2020    Failed spinal cord stimulator 2020              Past Medical History:   Diagnosis Date    Bronchitis      Cancer      Hypercholesteremia      Hypertension      Thyroid disease        FH:  Mother  of pancreatic cancer at age 60  Father lived until 87 yo  No other cancers     SH:  Lives with wife  2 healthy kids  Retired [employer]  Distant smoking history  + EtOH    Review of Systems   Constitutional:  Negative for activity change, appetite change, chills, fatigue, fever and unexpected weight change.   HENT:  Positive for  hearing loss. Negative for nasal congestion, postnasal drip, rhinorrhea, sore throat and trouble swallowing.    Eyes:  Negative for visual disturbance.   Respiratory:  Negative for cough and shortness of breath.    Cardiovascular:  Negative for chest pain, palpitations and leg swelling.   Gastrointestinal:  Negative for abdominal distention, abdominal pain, change in bowel habit, constipation, diarrhea, nausea, vomiting and reflux.   Genitourinary:  Negative for decreased urine volume, difficulty urinating, dysuria, frequency and urgency.   Musculoskeletal:  Positive for arthralgias and back pain. Negative for gait problem, leg pain and myalgias.        Uses cane for steadiness but not when golfing   Neurological:  Negative for dizziness, weakness, light-headedness, numbness and headaches.   Psychiatric/Behavioral:  Negative for dysphoric mood. The patient is not nervous/anxious.           Objective     Physical Exam  Virtual visit       Assessment and Plan     1. Abnormal computed tomography scan    2. History of neoplasm of bladder      Historic bladder cancer and DAISHA  Abnormal prior imaging  Patient desires no further imaging  Labs appropriate  BmBx declined and cbc parameters normal now    Route Chart for Scheduling    Med Onc Chart Routing      Follow up with physician 6 months.   Follow up with RAMAN    Infusion scheduling note    Injection scheduling note    Labs    Imaging    Pharmacy appointment    Other referrals

## 2025-02-10 NOTE — PROGRESS NOTES
Physical Therapy Treatment Note     Name: Jefferson Boogie  Clinic Number: [id number]    Therapy Diagnosis:   Encounter Diagnosis   Name Primary?    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  he was compliant with home exercise program given last session.   Response to previous treatment:good  Functional change:  ongoing    Pain: 2/10  Location: lower back    Objective   Updated at progress report unless specified    Treatment     Jefferson received the following manual therapy techniques for 10 minutes:   STM to R lumbar paraspinals/scar mobility  Prone unilateral P/As  Prone knee FL and hip ER/IR    Jefferson received therapeutic exercises for 25 minutes including:  Nustep x 6 min Standing UBE 2 min F/2 min B for joint nutrition Lvl  2  Prone hip EX x 10 5 second holds- NP  Prone on elbows x 2 minute  Quadruped + knee crunch 2 x 10 reps each side  Supine hip FL stretch x 3 rounds on both sides of: 30 seconds stretch with 2# weight and then 10 SLR  Standing weight shift to R side x 15 reps    Jefferson participated in dynamic functional therapeutic activities to improve functional performance for 10 minutes, including:  Ankle righting  reactions facing away from wall x 10 reps  4 in step up 2 x 10 ea with 1 UE support  4 in side step up x 10 ea with 2 UE support 5 second hold at top  Resisted forward stepping R TB x 10 each foot- NP    Neuromuscular re-education activities to improve: Balance, Coordination, and Proprioception for 10 minutes. The following activities  3 rounds  Resisted sidestepping Y TB with yellow pole  Sit<>stands with 1 foam pad x 5 reps without UE support    CP to lumbar spine/SI joint x 5 minutes in prone.    Home Exercises and Education Provided     Education provided:   - See above  - Progress towards goals     Written Home Exercises Provided:   Exercises were reviewed and Jefferson was able to demonstrate them prior to the end of the session.  Jefferson demonstrated good  understanding of the HEP provided.   .   See EMR under Patient Instructions for exercises provided prior visit.      Assessment   Patient demonstrated good tolerance to strengthening and more Wbing activity this session. Improvement in quality of gait by the end of the session.    Jefferson is progressing well towards his goals and goals were updated on 1/24/25. Pt prognosis is Good.     Pt will continue to benefit from skilled outpatient physical therapy to address the deficits listed in the problem list on initial evaluation provide pt/family education and to maximize pt's level of independence in the home and community environment.     Anticipated barriers to physical therapy: chronicity and severity of symptoms    Pt's spiritual, cultural and educational needs considered and pt agreeable to plan of care and goals.    Goals:  Short Term Goals:  4-5 weeks  1. Report decreased in pain at worse less than  <   / =  5  /10  to increase tolerance for functional activities. MET  2. Increased B hip/LE MMT 1/2  to increase tolerance for ADL and work activities.MET  3. Pt to tolerate HEP to improve ROM and independence with ADL's. MET  4. Pt to improve range of motion by  50% to allow for improved functional mobility to allow for improvement in IADLs. MET.     Long Term Goals: 8-10 weeks  1. Report decreased in pain at worse less than  <   / =  3  /10  to increase tolerance for functional mobility.  On going  2. Increased B hip/LE MMT 1 grade to increase tolerance for ADL and work activities.On going  3. Pt will report a 51 or greater score on FOTO Lumbar spine survey  to demonstrate decrease in disability and improvement in back pain.On going  4. Pt to be Independent with HEP to improve ROM and independence with ADL's. On going  5. Pt to demonstrate negative Bridge Test in order to show improved core strength for lumbar stabilization. On going  6. Pt to improve range of motion by 75% to allow for improved functional mobility to allow for improvement in IADLs. On going  7. Patient will improve TUG score to less than 11 seconds with or without AD safely. On going.      Plan   Plan of care Certification: 12/3/24 to 1/29/25- updated to 4/4/25    Continue TRINH Devi-Sanna Armando, PT   2/10/2025

## 2025-02-11 ENCOUNTER — OFFICE VISIT (OUTPATIENT)
Dept: HEMATOLOGY/ONCOLOGY | Facility: CLINIC | Age: 89
End: 2025-02-11
Payer: MEDICARE

## 2025-02-11 DIAGNOSIS — R93.89 ABNORMAL COMPUTED TOMOGRAPHY SCAN: Primary | ICD-10-CM

## 2025-02-11 DIAGNOSIS — Z86.03 HISTORY OF NEOPLASM OF BLADDER: ICD-10-CM

## 2025-02-12 ENCOUNTER — CLINICAL SUPPORT (OUTPATIENT)
Dept: REHABILITATION | Facility: OTHER | Age: 89
End: 2025-02-12
Payer: MEDICARE

## 2025-02-12 DIAGNOSIS — R26.2 DIFFICULTY IN WALKING: Primary | ICD-10-CM

## 2025-02-12 PROCEDURE — 97110 THERAPEUTIC EXERCISES: CPT | Mod: KX

## 2025-02-12 PROCEDURE — 97112 NEUROMUSCULAR REEDUCATION: CPT | Mod: KX

## 2025-02-12 PROCEDURE — 97530 THERAPEUTIC ACTIVITIES: CPT | Mod: KX

## 2025-02-12 NOTE — PROGRESS NOTES
Physical Therapy Treatment Note     Name: Jefferson Boogie  Clinic Number: [id number]    Therapy Diagnosis:   Encounter Diagnosis   Name Primary?    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 Not too sore from last session.  he was compliant with home exercise program given last session.   Response to previous treatment:good  Functional change:  ongoing    Pain: 2/10  Location: lower back    Objective   Updated at progress report unless specified    Treatment     Jefferson received the following manual therapy techniques for 0 minutes:   NA today    STM to R lumbar paraspinals/scar mobility  Prone unilateral P/As  Prone knee FL and hip ER/IR    Jefferson received therapeutic exercises for 30 minutes including:  Nustep x 6 min Standing UBE 2 min F/2 min B for joint nutrition Lvl  2  Prone hip EX x 10 5 second holds  Prone on elbows x 2 minute  Quadruped + knee crunch 2 x 10 reps each side  Supine hip FL stretch x 3 rounds on both sides of: 30 seconds stretch with 2# weight and then 10 SLR  Standing weight shift to R side x 15 reps    Jefferson participated in dynamic functional therapeutic activities to improve functional performance for 15 minutes, including:  Ankle  righting reactions facing away from wall x 10 reps  4 in step up 2 x 10 ea with 1 UE support 3 second at top  4 in side step up 2 x 8 ea with 1 UE support 3 second hold at top  Resisted forward stepping R TB x 10 each foot- NP    Neuromuscular re-education activities to improve: Balance, Coordination, and Proprioception for 10 minutes. The following activities  3 rounds  Resisted sidestepping Y TB with yellow pole  Sit<>stands with 1 foam pad x 10 reps without UE support    CP to lumbar spine/SI joint x 5 minutes in prone.    Home Exercises and Education Provided     Education provided:   - See above  - Progress towards goals     Written Home Exercises Provided:   Exercises were reviewed and Jefferson was able to demonstrate them prior to the end of the session.  Jefferson demonstrated good  understanding of the HEP provided.   .   See EMR under Patient Instructions for exercises provided prior visit.      Assessment   Patient demonstrated good tolerance to strengthening work this session. Improvement in weight shifting in standing by the end of the session.    Jefferson is progressing well towards his goals and goals were updated on 1/24/25. Pt prognosis is Good.     Pt will continue to benefit from skilled outpatient physical therapy to address the deficits listed in the problem list on initial evaluation provide pt/family education and to maximize pt's level of independence in the home and community environment.     Anticipated barriers to physical therapy: chronicity and severity of symptoms    Pt's spiritual, cultural and educational needs considered and pt agreeable to plan of care and goals.    Goals:  Short Term Goals:  4-5 weeks  1. Report decreased in pain at worse less than  <   / =  5  /10  to increase tolerance for functional activities. MET  2. Increased B hip/LE MMT 1/2  to increase tolerance for ADL and work activities.MET  3. Pt to tolerate HEP to improve ROM and independence with ADL's. MET  4. Pt to improve  range of motion by 50% to allow for improved functional mobility to allow for improvement in IADLs. MET.     Long Term Goals: 8-10 weeks  1. Report decreased in pain at worse less than  <   / =  3  /10  to increase tolerance for functional mobility.  On going  2. Increased B hip/LE MMT 1 grade to increase tolerance for ADL and work activities.On going  3. Pt will report a 51 or greater score on FOTO Lumbar spine survey  to demonstrate decrease in disability and improvement in back pain.On going  4. Pt to be Independent with HEP to improve ROM and independence with ADL's. On going  5. Pt to demonstrate negative Bridge Test in order to show improved core strength for lumbar stabilization. On going  6. Pt to improve range of motion by 75% to allow for improved functional mobility to allow for improvement in IADLs. On going  7. Patient will improve TUG score to less than 11 seconds with or without AD safely. On going.      Plan   Plan of care Certification: 12/3/24 to 1/29/25- updated to 4/4/25    Continue TRINH Devi-Sanna Armando, PT   2/12/2025

## 2025-02-18 NOTE — PROGRESS NOTES
Physical Therapy Treatment Note     Name: Jefferson Boogie  Clinic Number: [id number]    Therapy Diagnosis:   Encounter Diagnosis   Name Primary?    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  he was compliant with home exercise program given last session.   Response to previous treatment:good  Functional change:  ongoing    Pain: 2/10  Location: lower back    Objective   Updated at progress report unless specified    Treatment     Jefferson received the following manual therapy techniques for 10 minutes:   STM to R lumbar paraspinals/scar mobility  Prone unilateral P/As  Prone knee FL and hip ER/IR    Jefferson received therapeutic exercises for 25 minutes including:  Nustep x 6 min Standing UBE 2 min F/2 min B for joint nutrition Lvl  2  Prone hip EX x 10 5 second holds  Prone on elbows x 2 minute  Quadruped + knee crunch 2 x 10 reps each side  Supine hip FL stretch x 3 rounds on both sides of: 30 seconds stretch with 2# weight and then 10 SLR  Standing weight shift to R side x 15 reps- NP    Jefferson participated in dynamic functional therapeutic activities to improve functional performance for 10 minutes, including:  Ankle righting  reactions facing away from wall x 10 reps- NP  4 in step up 2 x 10 ea with 1 UE support 3 second at top  4 in side step up 2 x 8 ea with 1 UE support 3 second hold at top  Resisted forward stepping R TB x 10 each foot- NP    Neuromuscular re-education activities to improve: Balance, Coordination, and Proprioception for 10 minutes. The following activities  3 rounds  Resisted sidestepping Y TB with yellow pole  Sit<>stands with 1 foam pad x 10 reps without UE support    CP to lumbar spine/SI joint x 5 minutes in prone.    Home Exercises and Education Provided     Education provided:   - See above  - Progress towards goals     Written Home Exercises Provided:   Exercises were reviewed and Jefferson was able to demonstrate them prior to the end of the session.  Jefferson demonstrated good  understanding of the HEP provided.   .   See EMR under Patient Instructions for exercises provided prior visit.      Assessment   Patient demonstrated good tolerance to strengthening and mobility work this session. Improvement in quality of gait and pain/adverse symptoms by the end of the session.    Jefferson is progressing well towards his goals and goals were updated on 1/24/25. Pt prognosis is Good.     Pt will continue to benefit from skilled outpatient physical therapy to address the deficits listed in the problem list on initial evaluation provide pt/family education and to maximize pt's level of independence in the home and community environment.     Anticipated barriers to physical therapy: chronicity and severity of symptoms    Pt's spiritual, cultural and educational needs considered and pt agreeable to plan of care and goals.    Goals:  Short Term Goals:  4-5 weeks  1. Report decreased in pain at worse less than  <   / =  5  /10  to increase tolerance for functional activities. MET  2. Increased B hip/LE MMT 1/2  to increase tolerance for ADL and work activities.MET  3. Pt to tolerate HEP to improve ROM and independence with ADL's.  MET  4. Pt to improve range of motion by 50% to allow for improved functional mobility to allow for improvement in IADLs. MET.     Long Term Goals: 8-10 weeks  1. Report decreased in pain at worse less than  <   / =  3  /10  to increase tolerance for functional mobility.  On going  2. Increased B hip/LE MMT 1 grade to increase tolerance for ADL and work activities.On going  3. Pt will report a 51 or greater score on FOTO Lumbar spine survey  to demonstrate decrease in disability and improvement in back pain.On going  4. Pt to be Independent with HEP to improve ROM and independence with ADL's. On going  5. Pt to demonstrate negative Bridge Test in order to show improved core strength for lumbar stabilization. On going  6. Pt to improve range of motion by 75% to allow for improved functional mobility to allow for improvement in IADLs. On going  7. Patient will improve TUG score to less than 11 seconds with or without AD safely. On going.      Plan   Plan of care Certification: 12/3/24 to 1/29/25- updated to 4/4/25    Continue TRINH Armando, PT   2/20/2025

## 2025-02-19 ENCOUNTER — CLINICAL SUPPORT (OUTPATIENT)
Dept: REHABILITATION | Facility: OTHER | Age: 89
End: 2025-02-19
Payer: MEDICARE

## 2025-02-19 DIAGNOSIS — R26.2 DIFFICULTY IN WALKING: Primary | ICD-10-CM

## 2025-02-19 PROCEDURE — 97530 THERAPEUTIC ACTIVITIES: CPT | Mod: KX

## 2025-02-19 PROCEDURE — 97140 MANUAL THERAPY 1/> REGIONS: CPT | Mod: KX

## 2025-02-19 PROCEDURE — 97110 THERAPEUTIC EXERCISES: CPT | Mod: KX

## 2025-02-20 ENCOUNTER — OFFICE VISIT (OUTPATIENT)
Dept: PAIN MEDICINE | Facility: CLINIC | Age: 89
End: 2025-02-20
Attending: ANESTHESIOLOGY
Payer: MEDICARE

## 2025-02-20 VITALS
OXYGEN SATURATION: 100 % | BODY MASS INDEX: 27.47 KG/M2 | WEIGHT: 207.25 LBS | HEART RATE: 56 BPM | TEMPERATURE: 98 F | SYSTOLIC BLOOD PRESSURE: 151 MMHG | HEIGHT: 73 IN | RESPIRATION RATE: 18 BRPM | DIASTOLIC BLOOD PRESSURE: 64 MMHG

## 2025-02-20 DIAGNOSIS — M54.17 LUMBOSACRAL RADICULOPATHY: ICD-10-CM

## 2025-02-20 DIAGNOSIS — Z98.1 S/P FUSION OF SACROILIAC JOINT: Primary | ICD-10-CM

## 2025-02-20 DIAGNOSIS — M53.3 SACROILIAC DYSFUNCTION: ICD-10-CM

## 2025-02-20 DIAGNOSIS — G89.4 CHRONIC PAIN SYNDROME: ICD-10-CM

## 2025-02-20 PROCEDURE — 99214 OFFICE O/P EST MOD 30 MIN: CPT | Mod: S$PBB,GC,, | Performed by: ANESTHESIOLOGY

## 2025-02-20 PROCEDURE — 99999 PR PBB SHADOW E&M-EST. PATIENT-LVL V: CPT | Mod: PBBFAC,,, | Performed by: ANESTHESIOLOGY

## 2025-02-20 PROCEDURE — 99215 OFFICE O/P EST HI 40 MIN: CPT | Mod: PBBFAC | Performed by: ANESTHESIOLOGY

## 2025-02-20 NOTE — PROGRESS NOTES
Chronic patient Established Note (Follow up visit)        SUBJECTIVE:  Interval History 2/20/24:   Jefferson Boogie presents to the clinic for a follow-up appointment for chronic pain complaints. Since the last visit, Jefferson Boogie states the pain has been improving. LCV 11/24 at which time pt was doing very well after S1 TFESI and healhy back program. In the interim, pt has continued [facility] healthy back program. Today, pt reports functional improvements, but it is slow. Overall, pt is very happy with his progress.     Pain Disability Index Review:      2/20/2025    11:53 AM 11/29/2024     1:23 PM 9/23/2024     1:55 PM   Last 3 PDI Scores   Pain Disability Index (PDI) 14 42 35        Interval History 11/29/24:   Today the patient returns to clinic for follow up after right S1 TFESI which completely resolved the numbness and tingling in right foot following the Right SIJ fusion ad subsequent finding of narrowing in S1 foramina. He is currently in 3/10 pain, progressing well in the healthy back program however slightly discouraged at his progress with mobility. Currently taking gabapentin and cymbalta, has not used opioid pain medication in quite some time and only would use it if in severe pain but he stated . He denies new onset fever/night sweats, urinary incontinence, bowel incontinence, significant weight changes, significant motor weakness or changes, or loss of sensations.      Interval History 8/26/2024:  Jefferson Boogie returns to clinic for follow-up after right SIJ fusion. He recently had CT pelvis performed showing new, narrowing of right S1 foramina. He continues to report tingling to the right foot. He denies weakness. He continues Norco 10/325 TID PRN and Hydromorphone 2 mg BID PRN with mild relief. He denies any perceived side effects. He denies recent falls or trauma. He denies new onset fever/night sweats, urinary incontinence, bowel incontinence,  significant weight changes, significant motor weakness or changes, or loss of sensations. His pain today is 7/10.      Interval History 8/19/2024:  Jefferson Boogie returns to clinic for follow-up after Right SIJ fusion. He continues to report significant amounts of pain. He has been taking Hydromophone 2 mg daily and Norco 4 times per day without relief. He continues to utilize a rolling walker to help him ambulate. He denies fever, drainage, redness, swelling or any other signs of infection. He denies recent falls or trauma. He denies new onset fever/night sweats, urinary incontinence, bowel incontinence, significant weight changes, significant motor weakness or changes, or loss of sensations. His pain today is 7/10.      Interval History 8/15/2024:  Jefferson Boogie returns to clinic for follow-up after Right SI joint fusion using SILO TFX. He reports significant pain. He continues Hydromorphone 2 mg. He took 1 pill today . He has not taken any Norco today. He is present with his wife. She is wondering when they can start doing things like going to dinner or if he needs to stay at home while he recovers. He denies fever, drainage, redness or swelling from procedure. Site. He denies recent falls or trauma. He denies new onset fever/night sweats, urinary incontinence, bowel incontinence, significant weight changes, significant motor weakness or changes, or loss of sensations. His pain today is 9/10.      Interval History 6/27/2024:  Patient returns to clinic for follow up. Patient is s/p Right SIJ and R Piriformis injections on 4/10/2024. Patient reports 100% relief from the procedure for 2 days. Pain has recurred and is same as previous description. Alamedajeff Ku (Carolynn) is also here today for programming of [place] SCS. Patient continues with current medications with some benefit and HEP. Current pain 6/10. Patient denies red flags including weakness, unexpected weight loss/gain, night sweats/fevers, saddle  anesthesia, and symptoms of CAREN.     Interval History 3/14/2024:  Jefferson Boogie presents for follow up. He is here today to discuss further options for treatment after right cluneal nerve block on 2/28/2024. He was here for the same reason two weeks ago, and we decided to give the block a bit more time to work before investigating other options. He reports no significant changes in his symptoms. He does say that he can manage his symptoms satisfactorily using 1.5 of his oxycodone  every 3 hours when doing things like golf and walking. He said he may use the medication once or twice a month and denies significant side effects.      Interval History 2/29/24:  Jefferson Boogie presents to the clinic for follow-up. He is s/p right cluneal nerve block 2/28/24. Today, he says that he does not know if he has had any difference in his symptoms so far. We spoke briefly yesterday about additional options, and he is here today for further discussion.      Interval History 01/16/2024:  Jefferson Boogie presents to the clinic for a follow-up appointment for low back, right hip, and right leg pain. Since the last visit, Jefferson Boogie states the pain has been persistant. Currently his right hip and leg pain is his biggest problem. He continues to endorse sharp stabbing pain in his hip and leg. Pain is exacerbated by activity, standing, laying on right side, lifting, bending. Pain is improved with rest, exercising, stretching, medications. Continues with Celebrex and Cymbalta. Current pain intensity is 6/10. Patient continues to endorse benefit from SCS. Patient denies red flags including weakness, unexpected weight loss/gain, night sweats/fevers, saddle anesthesia, and symptoms of CAREN.     Interval History 8/10/2023:  Patient reports that his pain post right SIJ and piriformis was persistent until it decreased 50% on 7/4/23, but then the pain returned after a few days. He reports that during that period he was able  to run after a bus in [facility]. The pain is improved, but not as much as previous in his right buttock and calf. Patient reports that he has been working with the [employer] Rep, but not finding the correct programming as of yet.  No fevers, chills, unexplained weight loss.  Hx of bladder cancer in 1967. Remembers falling while skiing and landed on his buttocks 30 years ago, but no other pelvic trauma. Patient was traveling to [place] and recently came back to [place].  We discussed his recent pelvic CT in details especially for the accidental finding of irregular bone lesion. Explained to patient the need to investigate this further with our urology and oncology team.     Interval History 6/22/2023  Jefferson Boogie presents to the clinic for a follow-up appointment for chroniic LBP. Pt was last seen in clinic on 5/25 and scheduled for right SIJ and piriformis injection--performed 6/22. Pt reports 40% relief x 1-2 days. He continues to work with the rep with regards to programming the [employer] SCS device. Pt reports that he is getting some decent relief from the device with current program, but feels like there is still some progress to be made. Continues to complain of pain of similar character and quality to that of prior eval. Localized in the right lowerlumbar spine/gluteal area as well as the right lateral leg. Since the last visit, Jefferson Boogie states the pain has been improving. Current pain intensity is 5/10. He continues to exercise daily with stationary bike and weight training. Continues to take Cymbalta and Celebrex daily. He also takes Norco 10 very sparingly, maybe 1-2 per week. Overall the current regimen of SCS, medications and occasional injections have provided pt with enough relief for him to remain functional, and able to participate in his hobbies.      Interval history 5/25/23:  In the interim he has met with the [employer] reps for reprogramming. He has his good and bad days but feels that the pain is  manageable. The reps with [employer] are present for the visit today. Today his pain is a 6.5/10. Still taking percocet and cymbalta (missed a few days), but these medications do not provide much relief. Still pain with walking and sitting, but the pain with laying down is better.      Interval History 5/4/23:  Patient seen today via virtual follow-up after implantation of his [place] SCS in February. He reports no change in his pain since his last visit. Most of his pain is into his right hip with intermittent radiation into his RLE. He denies any new symptoms. No red flag symptoms. He is scheduled to meet with the [employer] reps from reprogramming next week. He remains optimistic. In discussing his current medications, he has run out of the hydrocodone he was previously taking and has only been taking his Cymbalta once per day rather two.      Interval History 3/30/2023:  Mr Boogie presents 5-6 weeks after implantation of his [place] SCS system. He reports no significant change in his pains, which are primarily down the RLE.  No constitutional signs symptoms concerning for infection.  No new areas of pain or neurological changes since procedure. No voicing of s/s concerning for cauda equina syndrome. He does endorse improvement in sleep. He is worried that his implant was a failure because his pain has not changed.     Interval History 2/20/2023:  Mr Boogie presents for follow up of [place] SCS replacement. He states doing well. No constitutional signs symptoms concerning for infection.  No new areas of pain or neurological changes since procedure. No voicing of s/s concerning for cauda equina syndrome.      Interval History 1/23/2023:  Still taking Celebrex, however hasn't noticed a difference in pain with this medication. Location, quality, and severity of pain are unchanged from prior visit and is described above. Here today to discuss removal of [place] SCS, and implant of [place] SCS. He states his stimulator has  not been helpful for the past 2 years despite multiple reprogramming and adjustment.     Interval History 11/21/22:  Reports 24 hours of 100% relief for 48 hours, but he reports that now his pain is only approximally 10% better. His pain at rest is 3-4/10. Pain at its worst is 8-9/10. Pain is improved when leaning over a grocery cart. Pain only allows him to walk for 3-4mins.      Interval History 8/29/22:  Jefferson Boogie presents to the clinic for a follow-up appointment for back pain.  He had his second bilateral L3, L4, L5 MBB and reports 100% pain relief. He would like to proceed with RFA.     Interval History 5/26/22:  Jefferson Boogie presents to the clinic for a follow-up appointment for back pain. Since the last visit, Jefferson Boogie states the pain has been stable. Certain postures he can tolerate in the standing position such as leaning on a shopping cart or support to his posterior thighs. He now uses a walker when covering long distances. He continues to play golf despite it aggravating his pain. He receives some benefit from the SCS whereas other interventions have not helped. He remains interested in the [place] device.     Interval History 3/24/22:  Jefferson Boogie presents to the clinic for a follow-up appointment for back pain. Since the last visit, Jefferson Boogie states the pain has been worse than usual. Current pain intensity is 8/10. He continues to report benefit with [employer] SCS however he has not been able to get in touch with the representative in order to have his settings adjusted. He continues to take occasional norco 7.5 mg with benefit, particularly when he plays golf.     Interval History 2/10/22:  Patent presents today s/p caudal epidural steroid injections on 1/12/22 since then he has not had any relief. He states his pain has been unchanged and is a 6/10 on average with the worst being 8/10 and best is 4/10. He has been taking percocet 5mg when he golfs or plays with his  "granddaughter.      Interval History 12/30/21:  Jefferson Boogie presents to clinic for follow up appointment s/p Right SI joint and Right piriformis muscle injection under US guidance on 11/29/21. He did not obtain any noticeable relief with this procedure similar to all of his previous injections. His pain is unchanged and constant over the right buttock. He is taking percocet 5mg x 3 on days he is more active, such as playing golf. This helps some, but minimally. Denies any new symptoms or neurological changes. No numbness, tingling, weakness in his lower extremities. Denies bowel/bladder incontinence or saddle anesthesia.      Interval History 11/4/2021:  Jefferson Boogie presents to the clinic for a follow-up appointment for right sided back pain and SI joint pain. Mr. Boogie had a right SI joint injection on 10/13/2021. He did not note significant relief with the injection for any period of time. Pain is still constant over the right buttock and most noticeable when playing golf. He denies any new bowel/bladder incontinence, lower extremity numbness or weakness or saddle anesthesia.     Interval History 8/26/2021:  Jefferson Boogie presents to the clinic for a follow-up appointment for right sided hip pain with right-sided radiculopathy . Since the last visit, Jefferson Boogie states the pain has been "particularly tender today" 7/10. Patient reports playing golf recently and experienced worsening symptoms the following day. Mr. Boogie is s/p right-side hip joint injection with minimal relief. Patient states he has had relief with previous interventions and is open to RFA.     Interval History 6/10/2021:  Jefferson Boogie presents to the clinic for a follow-up appointment. Since the last visit, Jefferson Boogie states the pain has been stable. Current pain intensity is 3/10. States he is still having pain around SIJ that is affecting his ADL      Interval History 4/15/2021:  The patient returns to " clinic today for follow up of back pain. He reports that the swelling above the SI fusion incision has resolved. He denies any fever, chills, or drainage from the incision. He does report benefit since the fusion. He continues to report benefit with [employer] SCS. He reports intermittent shoulder pain at this time. He did receive an injection with Sports Medicine with benefit. He denies any other health changes. His pain today is 2/10.     Interval History 3/25/21:  Mr. Boogie presents to clinic for follow up of bilateral shoulder pain. He is s/p BL subacromial injections on 2/8/21. He reports maximum 20% relief of pain in the Right shoulder. Today the Left shoulder pain is 1/10; Right shoulder pain is 5-7/10.      Interval History 3/22/2021:  The patient returns to clinic today for follow up of back pain. He is s/p right SI fusion on 3/15/2021. He reports some relief at this time. He does report soreness to the incision. He denies any fever, chills, or drainage from incision. He continues to report benefit with Nevro SCS. He denies any other health changes. His pain today is 4/10.     Interval History 1/11/2020:  Patient is here for follow-up of low back pain now s/p sacroiliac RFA on 12/9/20 which provided no benefit and with the new complaint of bilateral anterior shoulder pain R>L. His shoulder pain started about 2 months ago. He describes 9/10 sharp/stinging pain without radiation that is exacerbated with overhead activity and improved with rest. He started PT about one month ago. He takes oxycodone which provides some relief. He had a blind subacromial steroid injection in the right shoulder with Dr. Ramirez on 10/26/19 which provided some short term relief.     Interval History 11/5/2020:  Jefferson Boogie presents to the clinic for a follow-up appointment for low back pain. Since the last visit, Jefferson Boogie states the pain has been stable. Current pain intensity is 4/10. He had good relief from the SI  joint injection that lasted for about a week. Pain has since returned. He also slipped and fell on his buttock a couple of weeks ago and had increased pain in the right buttock after that which is slowly improving. He continues to have variable benefit with the [employer] SCS for intermittent pain in the right leg. He is scheduled to meet with representatives today. He is taking celebrex daily and percocet as needed sparingly. He denies any adverse effects and any other health changes.     Interval History 10/5/2020:  The patient returns to clinic today for follow up of low back pain. He is s/p right SI joint injection on 9/9/2020. He reports 25% relief of his right sided low back and buttock pain. He did have good relief the first two days. He continues to report right sided low back and buttock pain. He denies any radicular leg pain. His pain is worse with prolonged standing and walking. He continues to report intermittent pain into his achilles from previous injury. He feels as though this has changed his gait. He continues to report some benefit with Nevro SCS device. He is in contact with representatives. He is currently taking Percocet as needed sparingly with some benefit. He denies any adverse effects. He denies any other health changes. His pain today is 4/10.      Interval History 9/2/2020:  The patient returns to clinic today for follow up of back pain. He reports that his back pain has improved since last audio visit. He continues to report back pain with intermittent radiating pain into his right hip and calf. He has spoken with Bienvenido from [employer] via phone with some programming. He is meeting with Bienvenido today. He had some issues with charging but this has improved. He is currently taking Norco intermittently but this is only lasts 2-3 hours. He denies any adverse effects. He denies any other health changes. His pain today is 8/10.     Interval History 08/27/2020:  Pt was contacted over virtual audio for a  follow up appointment.  He is currently complaining of right hip and right calf with no associated numbness or weakness.  He continues to use his Nevro device.  He states it has been very frustrating. Inconsistent.  Took 20 mins to 1 hour to charge.  Couldn't get it to start this morning.  He states that there is no drainage at the battery site, no signs of infection or pain at the site.  Patient is not taking medications at this time.  He wants to speak about medication options because he would like to start playing golf again.     Interval History 8/17/2020:  The patient returns to clinic today for post op wound check. He continues to report benefit with Nevro device. He denies any fever, chills, or drainage. He continues to follow his activity restrictions. He does report a recent achilles tendon injury while swimming. He is seeing Orthopedics. He denies any other health changes. His pain today is 4/10.     Interval History 8/3/2020:  The patient returns to clinic today for post op. He is s/p Nevro battery change on 7/27/2020. He denies any fever, chills, or drainage from incision. He has not completed his antibiotics as he missed two days of the medication. He continues to follow his activity restrictions. He reports relief with the device. He is meeting with Bienvenido today for programming. He denies any other health changes. His pain today is 2/10     Interval History 7/22/2020:  Pt presents for audio follow up only s/p Right SI joint injection on 7/8/2020. Pt states he has near resolution of focal pain to buttock. He is pleased with result and pain relief. Pt has been in contact with [employer] and will be having battery swap in 5 days. He has difficulty whether stimulator is beneficial and has even had a holiday from using SCS.  He denies any newer areas pain, denies any neuro changes, meds area adequate to control pain without adverse side effects. Pain is 2/10 today.     Interval HPI 6/15/2020:  Jefferson Boogie  presents to the clinic for a follow-up appointment for 3 week follow up right hip and right thigh pain. Since the last visit, Jefferson Boogei states the pain has been persistant. Current pain intensity is 5/10. Patient has been working with Bienvenido Varghese, to try and get better coverage of a localized pain that he still experiences in his right low back area. He does report improvement in symptoms of numbness/tingling. Today, worse pain is 1/10 in severity and localizes to the right SI joint. Pain is worse with extension of his back. It is worse with golfing. Pain relieved by Norco--he takes 1-2 tablets of 5-325 Norco on days that he plays golf. Pain is also improved with being still and not being active. Patient endorses a much more active lifestyle with Norco. Denies new weakness/numbness or bowel/bladder changes.     Previous injection history includes a series of 3 lumbar CHOCO at [facility].      Interval HPI 3/5/20:  Patient presents to the clinic for a follow-up appointment for low back pain. Since the last visit, he states his pain has been unchanged. Current pain intensity is 4/10. He continues to work with Leonila to adjust settings on his SCS. He has stopped using tramadol, and uses norco sparingly. He continues to work with a  for physical therapy.      Interval HPI 1/9/2020:  Patient returns for follow up s/p Nev[place] SCS. He states he is unsure if it has helped his pain since he had it implanted. He last had an adjustment of his programming about 1 month ago. He does note that once he is done charging his SCS his pain is improved. Pain still worse with prolonged standing and activity such as golf. He still is doing home exercise program. He says that he is trying to do more since getting the SCS. He is still taking the Tramadol with limited pain relief. He takes Tramadol 50 mg twice daily only on days of activity which is once a week. No other health changes.      HPI 11/20/19  Jefferson HANSON  Keagan returns to clinic today for follow up. He reports increased low back and leg pain over the last few days. He has been in contact with [employer] representatives for programming adjustments. He does report benefit with the device. He reports good days and bad days. His pain is worse with prolonged standing and activity. He continues to participate in a home exercise routine. He does take Tramadol sparingly but reports limited relief. He denies any other health changes. His pain today is 5/10.      Pain Medications:    Cymbalta  Gabapentin 600 mg TID         Opioid Contract: yes     report:  Reviewed and consistent with medication use as prescribed.    PT: no    Chiro:  HEP in the past 6 months: patient preforming daily HEP with limited benefit      Interventional Pain Procedures:   9/9/2020 - Right SI joint injection  7/27/2020 - Nevro SCS battery change  7/8/2020 - Right SIJ injection >80% relief   8/26/19 - SCS implant  8/5/19 - SCS trial  7/8/2020 - Right SI joint injection  7/27/2020 - SCS battery revision  9/9/2020 - Right SI joint injection   12/9/2020 - Right L5-S1 RFA  3/15/2021 - Right SI fusion  7/21/2021 - Injection R Hip - minimal relief  10/13/2021 - Right SI joint injection  11/29/2021 - R SI joing and R piriformis muscle injection - no relief   1/12/2022 - Caudal CHOCO- no relief   8/24/22 - Diagnostic Bilateral L3, L4 and L5 Lumbar Medial Branch Block under Fluoroscopy  09/21/22 - Right L3, L4, L5 RFA  10/12/22 -  Left L3, L4, L5 RFA  2/13/23 - [place] SCS implant  5/31/23 - Right SIJ and piriformis injection   2/28/24 -  Right cluneal nerve block  4/10/24 - Right SIJ and piriformis injection  8/12/24 - Right SIJ fusion  8/28/24 - right S1 TFESI 80% relief tingling in right foot        IMAGING:       CT PELVIS WITHOUT CONTRAST     CLINICAL HISTORY:  Pelvis pain, stress fracture suspected, neg xray;  Arthrodesis status     TECHNIQUE:  1.25 mm axial images of the pelvis obtained, coronal and  sagittal images reformatted.     COMPARISON:  None     FINDINGS:  Percutaneous Posterior right  Sacroiliac Joint Fusion using SILO TFX transfixing device 08/12/2024.     New, moderate narrowing of the right S1 foramina by osseous density extending to the foramina, could cause symptoms referable to a right S1 neuropathy, to correlate clinically.     The bilateral sacroiliac joints appear within normal limits.     The bilateral hip joints appear normal.  No advanced degenerative change.     Mild osseous hypertrophy at the pubic symphysis.     The intrapelvic content demonstrate significant diverticulosis coli.  Moderate stool retention.  Mild bladder wall thickening possibly due to nondistention.  The prostate is not enlarged.  The abdominal wall and inguinal regions appear normal.     Degenerative disc disease, disc space narrowing and vacuum disc phenomenon L4-5 L5-S1, unchanged.     Impression:     New, moderate-severe narrowing of the right S1 foramina, by osseous density extending in the right foramina, could impinge on the exiting right S1 nerve root, to correlate with patient's symptoms.     Significant sigmoid diverticulosis.     This report was flagged in Epic as abnormal.        Electronically signed by:Ammy Nance MD  Date:                                            08/21/2024  Time:                                           16:45     XR SACRUM AND COCCYX     CLINICAL HISTORY:  Arthrodesis status     FINDINGS:  Sacrum coccyx three views.     There is hardware status post right SI joint arthrodesis.  No complication seen.  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        Electronically signed by:Aki Amaya MD  Date:                                            08/16/2024  Time:                                           08:19     XR HIPS BILATERAL 2 VIEW INCL AP PELVIS     CLINICAL HISTORY:  Arthrodesis status     FINDINGS:  Hips bilateral two views to include pelvis.     Right: No fracture dislocation bone  destruction seen.     Left: No fracture dislocation bone destruction seen.        Electronically signed by:Aki Amaya MD  Date:                                            08/16/2024  Time:                                           08:17        EXAMINATION:  CT PELVIS WITHOUT CONTRAST     CLINICAL HISTORY:  History fo percutanous SI fusion.;  Chronic pain syndrome     TECHNIQUE:  CT of the pelvis, without intravenous contrast.     COMPARISON:  None     FINDINGS:  BONE: Diffuse osteopenia.  No fracture or osteonecrosis.  Few lytic and sclerotic lesions scattered throughout the pelvis, including a lesion in the right iliac bone with irregularity of the overlying cortex (2:103).     JOINT: Postoperative changes from right sacroiliac joint fusion.  The implant is in satisfactory position.  No osseous fusion across the joint space.     Degenerative changes both sacroiliac joints.  No erosions or ankylosis.  Degenerative changes also involve the lower lumbar spine, both hip joints, and pubic symphysis.  Chondrocalcinosis of the pubic symphysis.  No significant joint effusion.     SOFT TISSUE: Normal muscle bulk. Regional tendons are intact.  Calcific tendinopathy involving the proximal hamstring tendons bilaterally.  No bursal collection.     MISCELLANEOUS: Circumferential bladder wall thickening.  Prostatic calcifications.  Colonic diverticulosis.  Atherosclerosis.     Impression:     Postoperative changes in the right sacroiliac joint.  No osseous fusion.     Few lytic and sclerotic lesions scattered throughout the pelvis, concerning for metastases or myeloma.  No prior studies are available for comparison.     Bladder wall thickening, which could be secondary to outlet obstruction or cystitis.  Correlate with urinalysis.     Other findings as described.     This report was flagged in Epic as abnormal.     6/10/2021 - X ray Hips Bilateral: No radiographic evidence of acute osseous abnormality of the pelvis and hips  and without radiographic evidence of osteonecrosis of the femoral heads.     9/18/21 MRI L-spine:  FINDINGS:  Lumbar sagittal alignment is slightly is straightened.  There is slight convex right curvature lumbar spine.  There is degenerative disc disease with intervertebral disc height loss and endplate degenerative change at all levels with scattered disc desiccation allowing for degenerative change the lumbar vertebral body heights and contours are within normal is without evidence for acute fracture or subluxation.  There is heterogeneous T1/T2 signal focus within the right aspect of the S1 vertebra most compatible with hemangioma this measures 2.0 cm.     The distal spinal cord and conus is normal in signal and contour tip of the conus approximates the T12/L1 level.  Please note there is artifact from indwelling spinal stimulator device with leads partially visualized casting artifact entering the dorsal epidural space at the T12 level and extending cranially.     There is abnormal clumping configuration of the filum terminalis a from T12 through L5 with slight thickening of scattered nerve roots most compatible with arachnoiditis.     No aneurysmal dilatation of the visualized abdominal aorta.     T12/L1 through L1/L2: No significant disc bulge, central canal or neural foraminal stenosis.     L2/L3: Posterior disc osteophyte with facet joint arthropathy without significant central canal stenosis with mild bilateral neural foraminal stenosis.     L3/L4:Bulging disc with ligamentum flavum hypertrophy without significant central canal stenosis with mild bilateral neural foraminal stenosis.     L4/L5:Posterior disc osteophyte with ligamentum flavum hypertrophy and facet joint arthropathy without significant central canal stenosis and mild bilateral neural foraminal stenosis.     L5/S1: Posterior disc osteophyte with facet joint arthropathy without significant central canal stenosis with moderate right and mild left  neural foraminal stenosis.     This report was flagged in Epic as abnormal.     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  Please note there is spinal stimulator device with leads partially visualized and distorting the study by artifacts.     There is abnormal thickening of the filum configuration most compatible with arachnoiditis.     Please see above for additional details.     Thoracic spine MRI:  FINDINGS: Thoracic vertebral body height and alignment are maintained with a few small scattered Schmorl's nodes involving the endplates of several mid to lower thoracic vertebra. The T3-4 vertebra are partially fused. There is some degree of desiccation of all of the thoracic discs with multilevel disc space narrowing, especially at the T7-T8, T6-T7 and T5-T6 levels. There is no abnormal prevertebral soft tissue thickening. The somewhat heterogeneous appearance to the signal from the thoracic vertebra is presumably secondary to an admixture of red and yellow marrow.    T1-T2 level: There is no bony foraminal narrowing or bony central canal stenosis and the posterior disc margin is unremarkable.    T2-T3 level: There is no bony foraminal narrowing or bony central canal stenosis and the posterior disc margin is unremarkable.    T3-T4 level: There is no bony foraminal narrowing or bony central canal stenosis and the posterior disc margin is unremarkable.    T4-5 level: There is no bony foraminal narrowing or bony central canal stenosis and the posterior disc margin is unremarkable.    T5-T6 level: There is no bony foraminal narrowing or bony central canal stenosis and the posterior disc margin is unremarkable.    T6-T7 level: There is no bony foraminal narrowing or bony central canal stenosis and the posterior disc margin is unremarkable.    T7-T8 level: There is no bony foraminal narrowing or bony central canal stenosis and the posterior disc margin is unremarkable.    T8-T9  level: There is no bony foraminal narrowing or bony central canal stenosis and the posterior disc margin is unremarkable.    T9-T10 level: There is no bony foraminal narrowing or bony central canal stenosis and the posterior disc margin is unremarkable.    T10-T11 level: There is no bony foraminal narrowing or bony central canal stenosis and the posterior disc margin is unremarkable.    T11-T12 level: There is no bony foraminal narrowing or bony central canal stenosis and the posterior disc margin is unremarkable.    T12-L1 level: The tip the conus medullaris extends down to level of the superior endplate of L1. 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 There is no bony foraminal narrowing or bony central canal stenosis at this level.    On the axial images, the immediate paravertebral soft tissues are unremarkable. A small cyst is seen to involve the upper pole the left kidney.    Following contrast administration, there is no abnormal enhancement of any bony or soft tissue elements of the thoracic spine. There is no abnormal enhancement of the subserosal surface of the thoracic spinal cord. Some foci of mild signal intensity in the CSF dorsal to the spinal cord of some of the sagittal sequences presumably is secondary to CSF pulsation artifact.    On the sagittal  image, there is cervical spondylosis and kyphosis with narrowing of all of the disc spaces in the cervical spine.    Allergies: Review of patient's allergies indicates:  No Known Allergies    Current Medications: Current Medications[1]    REVIEW OF SYSTEMS:    GENERAL:  No weight loss, malaise or fevers.  HEENT:  Negative for frequent or significant headaches.  NECK:  Negative for lumps, goiter, pain and significant neck swelling.  RESPIRATORY:  Negative for cough, wheezing or shortness of breath.  CARDIOVASCULAR:  Negative for chest pain, leg swelling or palpitations.  GI:  Negative for abdominal  discomfort, blood in stools or black stools or change in bowel habits.  MUSCULOSKELETAL:  See HPI.  SKIN:  Negative for lesions, rash, and itching.  PSYCH:  Negative for sleep disturbance, mood disorder and recent psychosocial stressors.  HEMATOLOGY/LYMPHOLOGY:  Negative for prolonged bleeding, bruising easily or swollen nodes.  NEURO:   No history of headaches, syncope, paralysis, seizures or tremors.  All other reviewed and negative other than HPI.    Past Medical History:  Past Medical History:   Diagnosis Date    Bronchitis     Cancer     stage I bladder cancer 50 yrs ago    Hypercholesteremia     Hypertension     Sacroiliitis 07/08/2020    Thyroid disease        Past Surgical History:  Past Surgical History:   Procedure Laterality Date    BLADDER SURGERY      CATARACT EXTRACTION      CATARACT EXTRACTION W/  INTRAOCULAR LENS IMPLANT Right 3/9/2022    Procedure: EXTRACTION, CATARACT, WITH IOL INSERTION;  Surgeon: Bell Cedeno MD;  Location: [facility];  Service: Ophthalmology;  Laterality: Right;    COLONOSCOPY      EPIDURAL STEROID INJECTION N/A 1/12/2022    Procedure: INJECTION, STEROID, EPIDURAL CAUDAL With Catheter needs consent;  Surgeon: Samuel Jimenez MD;  Location: [facility] PAIN MGT;  Service: Pain Management;  Laterality: N/A;    EYE SURGERY      cataracts     FUSION OF SACROILIAC JOINT Right 3/15/2021    Procedure: FUSION, RIGHT SACROILIAC JOINT FUSION;  Surgeon: Samuel Jimenez MD;  Location: [facility];  Service: Pain Management;  Laterality: Right;  C-ARM, PAINTEQ REP     FUSION OF SACROILIAC JOINT Right 8/12/2024    Procedure: FUSION, SACROILIAC JOINT;  Surgeon: Samuel Jimenez MD;  Location: [facility] OR;  Service: Pain Management;  Laterality: Right;    HERNIA REPAIR      INJECTION OF ANESTHETIC AGENT AROUND NERVE Bilateral 6/1/2022    Procedure: BLOCK, NERVE MEDIAL BRANCH L3,4,5 BILATERAL 1st;  Surgeon: Samuel Jimenez MD;  Location: [facility] PAIN MGT;  Service: Pain Management;  Laterality:  Bilateral;    INJECTION OF ANESTHETIC AGENT AROUND NERVE Bilateral 8/24/2022    Procedure: Block, Nerve Kenny L3, L4, & L5;  Surgeon: Samuel Jimenez MD;  Location: [facility] PAIN MGT;  Service: Pain Management;  Laterality: Bilateral;    INJECTION OF ANESTHETIC AGENT AROUND NERVE Right 2/28/2024    Procedure: BLOCK, NERVE RIGHT CLUNEAL;  Surgeon: Samuel Jimenez MD;  Location: [facility] PAIN MGT;  Service: Pain Management;  Laterality: Right;  [phone]    INJECTION OF JOINT Right 7/8/2020    Procedure: INJECTION, JOINT, SACROILIAC (SI);  Surgeon: Samuel Jimenez MD;  Location: [facility] PAIN MGT;  Service: Pain Management;  Laterality: Right;    INJECTION OF JOINT Right 9/9/2020    Procedure: INJECTION, JOINT, SACROILIAC (SI);  Surgeon: Samuel Jimenez MD;  Location: [facility] PAIN MGT;  Service: Pain Management;  Laterality: Right;    INJECTION OF JOINT Right 7/21/2021    Procedure: INJECTION, JOINT, HIP RIGHT;  Surgeon: Samuel Jimenez MD;  Location: [facility] PAIN MGT;  Service: Pain Management;  Laterality: Right;  CONSENT NEEDED    INJECTION OF JOINT Right 10/13/2021    Procedure: INJECTION, JOINT, SACROILIAC (SI)  NEED CONSENT pt. requesting sedation;  Surgeon: Samuel Jimenez MD;  Location: [facility] PAIN MGT;  Service: Pain Management;  Laterality: Right;    INJECTION OF JOINT Right 4/10/2024    Procedure: INJECTION, JOINT RIGHT SI AND RIGHT PIRIFORMIS;  Surgeon: Samuel Jimenez MD;  Location: [facility] PAIN MGT;  Service: Pain Management;  Laterality: Right;  [phone]    INJECTION, SACROILIAC JOINT Right 5/31/2023    Procedure: INJECTION,SACROILIAC JOINT, RIGHT SI AND RIGHT PIRIFORMIS;  Surgeon: Samuel Jimenez MD;  Location: [facility] PAIN [facility]GT;  Service: Pain Management;  Laterality: Right;    KNEE SURGERY      RADIOFREQUENCY ABLATION Right 12/9/2020    Procedure: RADIOFREQUENCY ABLATION, L5-S1-S2 LATERAL BRANCH COOLED;  Surgeon: Samuel Jimenez MD;  Location: [facility] PAIN MGT;  Service: Pain Management;   Laterality: Right;    RADIOFREQUENCY ABLATION Right 9/21/2022    Procedure: RADIOFREQUENCY ABLATION, RIGHT L3-L4-L5 PER DR JIMENEZ;  Surgeon: Samuel Jimenez MD;  Location: [facility] PAIN MGT;  Service: Pain Management;  Laterality: Right;    RADIOFREQUENCY ABLATION Left 10/12/2022    Procedure: RADIOFREQUENCY ABLATION, LEFT L3-L4-L5 TWO OF TWO;  Surgeon: Samuel Jimenez MD;  Location: [facility] PAIN MGT;  Service: Pain Management;  Laterality: Left;    REPLACEMENT OF NERVE STIMULATOR BATTERY N/A 7/27/2020    Procedure: Replacement, SPINAL CORD STIMULATOR BATTERY CHANGE TO NEVRO OMNION BATTERY;  Surgeon: Samuel Jimenez MD;  Location: [facility] OR;  Service: Pain Management;  Laterality: N/A;  C-ARM, NEVRO REP    REPLACEMENT OF SPINAL CORD STIMULATOR  2/13/2023    Procedure: REPLACEMENT, SPINAL CORD STIMULATOR;  Surgeon: Samuel Jimenez MD;  Location: [facility] OR;  Service: Pain Management;;    REVISION PROCEDURE INVOLVING SPINAL CORD NEUROSTIMULATOR N/A 2/13/2023    Procedure: SPINAL CORD STIMULATOR EXPLANT AND REIMPLANT SALUDA REP;  Surgeon: Samuel Jimenez MD;  Location: [facility] OR;  Service: Pain Management;  Laterality: N/A;    TRANSFORAMINAL EPIDURAL INJECTION OF STEROID Right 8/28/2024    Procedure: LUMBAR TANSFORAMINAL RIGHT S1 MEDICALLY URGENT *ASPIRIN OTC*;  Surgeon: Samuel Jimenez MD;  Location: [facility] PAIN MGT;  Service: Pain Management;  Laterality: Right;  STAT  [phone]  *SCHEDULE ON 8/28 PER MG    TRIAL OF SPINAL CORD NERVE STIMULATOR N/A 8/5/2019    Procedure: Trial, Neurostimulator, SPINAL CORD STIMULATOR TRIAL;  Surgeon: Samuel Jimenez MD;  Location: [facility] CATH LAB;  Service: Pain Management;  Laterality: N/A;  C-ARM, NEVRO REP       Family History:  No family history on file.    Social History:  Social History     Socioeconomic History    Marital status:    Tobacco Use    Smoking status: Former     Current packs/day: 0.00     Types: Cigarettes     Quit date: 1980     Years since  "quittin.1    Smokeless tobacco: Never    Tobacco comments:     stopped 40 years ago   Substance and Sexual Activity    Alcohol use: Yes     Alcohol/week: 1.0 standard drink of alcohol     Types: 1 Shots of liquor per week     Comment: nightly -scotch    Drug use: Never    Sexual activity: Yes     Partners: Female     Social Drivers of Health     Financial Resource Strain: Low Risk  (2025)    Overall Financial Resource Strain (CARDIA)     Difficulty of Paying Living Expenses: Not hard at all   Food Insecurity: No Food Insecurity (2025)    Hunger Vital Sign     Worried About Running Out of Food in the Last Year: Never true     Ran Out of Food in the Last Year: Never true   Transportation Needs: No Transportation Needs (2025)    PRAPARE - Transportation     Lack of Transportation (Medical): No     Lack of Transportation (Non-Medical): No   Physical Activity: Sufficiently Active (2025)    Exercise Vital Sign     Days of Exercise per Week: 4 days     Minutes of Exercise per Session: 40 min   Stress: No Stress Concern Present (2025)    [employer] - Occupational Stress Questionnaire     Feeling of Stress : Not at all   Housing Stability: Low Risk  (2025)    Housing Stability Vital Sign     Unable to Pay for Housing in the Last Year: No     Homeless in the Last Year: No       OBJECTIVE:    BP (!) 151/64   Pulse (!) 56   Temp 98 °F (36.7 °C)   Resp 18   Ht 6' 1" (1.854 m)   Wt 94 kg (207 lb 3.7 oz)   SpO2 100%   BMI 27.34 kg/m²     PHYSICAL EXAMINATION:    General appearance: Well appearing, in no acute distress, alert and oriented x3.  Psych:  Mood and affect appropriate.  Skin: Skin color, texture, turgor normal, no rashes or lesions, in both upper and lower body.  Head/face:  Atraumatic, normocephalic. No palpable lymph nodes  Neck: No pain to palpation over the cervical paraspinous muscles. Spurling Negative. No pain with neck flexion, extension, or " lateral flexion. .  Cor: RRR  Pulm: CTA  GI: Abdomen soft and non-tender.  Back: Straight leg raising in the sitting and supine positions is negative to radicular pain. mild pain to palpation over the spine or costovertebral angles. Normal range of motion without pain reproduction.  Extremities: Peripheral joint ROM is full and pain free without obvious instability or laxity in all four extremities. No deformities, edema, or skin discoloration. Good capillary refill.  Musculoskeletal: Shoulder, hip,and knee provocative maneuvers are negative. Bilateral upper and lower extremity strength is normal and symmetric.  No atrophy or tone abnormalities are noted.  Neuro: Bilateral upper and lower extremity coordination and muscle stretch reflexes are physiologic and symmetric.  Plantar response are downgoing. No loss of sensation is noted.  Gait: Normal.    ASSESSMENT: 89 y.o. year old male with chronic low back pain, consistent with:     1. S/P fusion of sacroiliac joint        2. Sacroiliac dysfunction        3. Chronic pain syndrome        4. Lumbosacral radiculopathy              PLAN:       Patient Education:   Discussed the importance of physical therapy, activity modification, and a home exercise plan to help with pain and improve health.   Therapy:   Continue [facility]  Medications:   Patient can continue with pain medications for now per their provider since they are providing benefits, using them appropriately, and without side effects.   Can consider repeat TFESI if needed.  Pt continues to improve with regards to his SI fusion, he is continuing to endorse slow but steady functional improvements.   Pt continues to get benefit from SCS device.   RTC in 8-12 weeks.       The above plan and management options were discussed at length with patient. Patient is in agreement with the above and verbalized understanding.    I performed a history, review of systems, and physical exam with the patient. The  assessment and plan were discussed and agreed upon with Dr. Jimenez, the attending of record, before sharing with the patient..     aJce Soares M.D.  PGY-5  Pain Fellow   I have personally reviewed the history and exam of this patient and agree with the resident/fellow/NPs note as stated above.    Samuel Jimenez MD  2/20/25           [1]   Current Outpatient Medications   Medication Sig Dispense Refill    ALPRAZolam (XANAX) 2 MG Tab       amLODIPine (NORVASC) 5 MG tablet       ascorbic acid, vitamin C, (VITAMIN C) 1000 MG tablet Take 1,000 mg by mouth.      aspirin (ECOTRIN) 81 MG EC tablet Take 1 tablet by mouth once daily.      DULoxetine (CYMBALTA) 30 MG capsule Take 1 capsule (30 mg total) by mouth 2 (two) times daily. 60 capsule 2    ergocalciferol, vitamin D2, (VITAMIN D ORAL) Take by mouth every 30 days.      finasteride (PROSCAR) 5 mg tablet Take 1 tablet by mouth once daily.      gabapentin (NEURONTIN) 600 MG tablet TAKE 1 TABLET THREE TIMES A DAY 90 tablet 3    irbesartan (AVAPRO) 75 MG tablet 150 mg once daily.       levothyroxine (SYNTHROID) 100 MCG tablet Take 100 mcg by mouth.      meloxicam (MOBIC) 15 MG tablet Take 15 mg by mouth.      promethazine-dextromethorphan (PROMETHAZINE-DM) 6.25-15 mg/5 mL Syrp       simvastatin (ZOCOR) 20 MG tablet Take 20 mg by mouth every evening.      tadalafil (CIALIS) 20 MG Tab       temazepam (RESTORIL) 30 mg capsule TK 1 C PO QD HS      celecoxib (CELEBREX) 200 MG capsule  (Patient not taking: Reported on 9/23/2024)      ergocalciferol (DRISDOL) 200 mcg/mL (8,000 unit/mL) Drop Take by mouth.      mirabegron (MYRBETRIQ ORAL) Take by mouth.       No current facility-administered medications for this visit.     Facility-Administered Medications Ordered in Other Visits   Medication Dose Route Frequency Provider Last Rate Last Admin    balanced salt irrigation intra-ocular solution 1 drop  1 drop Right Eye On Call Procedure Bell Cedeno MD        sodium  chloride 0.9% flush 2 mL  2 mL Intravenous PRN Bell Cedeno MD

## 2025-02-20 NOTE — PROGRESS NOTES
Physical Therapy Treatment Note     Name: Jefferson Boogie  Clinic Number: [id number]    Therapy Diagnosis:   Encounter Diagnosis   Name Primary?    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 He had a good follow-up with the MD yesterday.  he was compliant with home exercise program given last session.   Response to previous treatment:good  Functional change:  ongoing    Pain: 2/10  Location: lower back    Objective   Updated at progress report unless specified    Treatment     Jefferson received the following manual therapy techniques for 0 minutes:   STM to R lumbar paraspinals/scar mobility  Prone unilateral P/As  Prone knee FL and hip ER/IR    Jefferson received therapeutic exercises for 10 minutes including:  Nustep x 6 min Standing UBE 2 min F/2 min B for joint nutrition Lvl  2  Prone hip EX x 10 5 second holds- NP  Prone on elbows x 2 minute- NP  Quadruped + knee crunch 2 x 10 reps each side- NP  Supine hip FL stretch x 3 rounds on both sides of: 30 seconds stretch with 2# weight and then 10 SLR  Standing weight shift to R side x 15 reps- NP    Jefferson participated in dynamic functional therapeutic activities to improve functional performance for 10 minutes,  including:  Ankle righting reactions facing away from wall x 10 reps- NP  4 in step up 2 x 10 ea with 1 UE support 3 second at top  4 in side step up 2 x 8 ea with 1 UE support 3 second hold at top  Resisted forward stepping R TB x 10 each foot- NP    Neuromuscular re-education activities to improve: Balance, Coordination, and Proprioception for 35 minutes. The following activities  3 rounds  Resisted sidestepping Y TB with yellow pole  Sit<>stands with 1 foam pad x 10 reps without UE support  In purple thick band   Stepping forward with 5 second holds, 10 times each side- 2 rounds  Standing TKE red thick band 2 x 10 5 second holds on each side    CP to lumbar spine/SI joint x 5 minutes in prone.    Home Exercises and Education Provided     Education provided:   - See above  - Progress towards goals     Written Home Exercises Provided:   Exercises were reviewed and Jefferson was able to demonstrate them prior to the end of the session.  Jefferson demonstrated good  understanding of the HEP provided.   .   See EMR under Patient Instructions for exercises provided prior visit.      Assessment   Patient demonstrated good tolerance to strengthening and balance work this session. Still having a lot of knee bend with walking, so added in to standing TKE to address this.    Jefferson is progressing well towards his goals and goals were updated on 1/24/25. Pt prognosis is Good.     Pt will continue to benefit from skilled outpatient physical therapy to address the deficits listed in the problem list on initial evaluation provide pt/family education and to maximize pt's level of independence in the home and community environment.     Anticipated barriers to physical therapy: chronicity and severity of symptoms    Pt's spiritual, cultural and educational needs considered and pt agreeable to plan of care and goals.    Goals:  Short Term Goals:  4-5 weeks  1. Report decreased in pain at worse less than  <   / =  5  /10  to increase  tolerance for functional activities. MET  2. Increased B hip/LE MMT 1/2  to increase tolerance for ADL and work activities.MET  3. Pt to tolerate HEP to improve ROM and independence with ADL's. MET  4. Pt to improve range of motion by 50% to allow for improved functional mobility to allow for improvement in IADLs. MET.     Long Term Goals: 8-10 weeks  1. Report decreased in pain at worse less than  <   / =  3  /10  to increase tolerance for functional mobility.  On going  2. Increased B hip/LE MMT 1 grade to increase tolerance for ADL and work activities.On going  3. Pt will report a 51 or greater score on FOTO Lumbar spine survey  to demonstrate decrease in disability and improvement in back pain.On going  4. Pt to be Independent with HEP to improve ROM and independence with ADL's. On going  5. Pt to demonstrate negative Bridge Test in order to show improved core strength for lumbar stabilization. On going  6. Pt to improve range of motion by 75% to allow for improved functional mobility to allow for improvement in IADLs. On going  7. Patient will improve TUG score to less than 11 seconds with or without AD safely. On going.      Plan   Plan of care Certification: 12/3/24 to 1/29/25- updated to 4/4/25    Continue POC    Marito-Sanna Armando, PT   2/21/2025

## 2025-02-21 ENCOUNTER — CLINICAL SUPPORT (OUTPATIENT)
Dept: REHABILITATION | Facility: OTHER | Age: 89
End: 2025-02-21
Payer: MEDICARE

## 2025-02-21 DIAGNOSIS — R26.2 DIFFICULTY IN WALKING: Primary | ICD-10-CM

## 2025-02-21 PROCEDURE — 97112 NEUROMUSCULAR REEDUCATION: CPT | Mod: KX

## 2025-02-26 ENCOUNTER — CLINICAL SUPPORT (OUTPATIENT)
Dept: REHABILITATION | Facility: OTHER | Age: 89
End: 2025-02-26
Payer: MEDICARE

## 2025-02-26 DIAGNOSIS — R26.2 DIFFICULTY IN WALKING: Primary | ICD-10-CM

## 2025-02-26 PROCEDURE — 97140 MANUAL THERAPY 1/> REGIONS: CPT | Mod: KX

## 2025-02-27 NOTE — PROGRESS NOTES
Physical Therapy Treatment Note     Name: Jefferson Boogie  Clinic Number: [id number]    Therapy Diagnosis:   No diagnosis found.      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 No new symptoms to report.  he was compliant with home exercise program given last session.   Response to previous treatment:good  Functional change:  ongoing    Pain: 2/10  Location: lower back    Objective   Updated at progress report unless specified    Treatment     Jefferson received the following manual therapy techniques for 15 minutes:   STM to R lumbar paraspinals/scar mobility  Prone unilateral P/As  Prone knee FL and hip ER/IR    Jefferson received therapeutic exercises for 10 minutes including:  Nustep x 6 min Standing UBE 2 min F/2 min B for joint nutrition Lvl  2  Prone hip EX x 10 5 second holds- NP  Prone on elbows x 2 minute- NP  Quadruped + knee crunch 2 x 10 reps each side- NP  Supine hip FL stretch x 3 rounds on both sides of: 30 seconds stretch with 2# weight and then 10 SLR  Standing weight shift to R side x 15 reps- NP    Jefferson participated in dynamic functional therapeutic activities to improve functional performance for 10 minutes, including:  Ankle righting reactions facing away from wall x 10 reps- NP  4 in  step up 2 x 10 ea with 1 UE support 3 second at top  4 in side step up 2 x 8 ea with 1 UE support 3 second hold at top  Resisted forward stepping R TB x 10 each foot- NP    Neuromuscular re-education activities to improve: Balance, Coordination, and Proprioception for 20 minutes. The following activities  3 rounds  Resisted sidestepping Y TB with yellow pole  Sit<>stands with 1 foam pad x 10 reps without UE support  NP  In purple thick band   Stepping forward with 5 second holds, 10 times each side- 2 rounds  Standing TKE red thick band 2 x 10 5 second holds on each side    CP to lumbar spine/SI joint x 5 minutes in prone.    Home Exercises and Education Provided     Education provided:   - See above  - Progress towards goals     Written Home Exercises Provided:   Exercises were reviewed and Jefferson was able to demonstrate them prior to the end of the session.  Jefferson demonstrated good  understanding of the HEP provided.   .   See EMR under Patient Instructions for exercises provided prior visit.      Assessment   Patient demonstrated good tolerance to strengthening and SL motor control work this session. Will perform reassessment next session and update tests and measures.    Jefferson is progressing well towards his goals and goals were updated on 1/24/25. Pt prognosis is Good.     Pt will continue to benefit from skilled outpatient physical therapy to address the deficits listed in the problem list on initial evaluation provide pt/family education and to maximize pt's level of independence in the home and community environment.     Anticipated barriers to physical therapy: chronicity and severity of symptoms    Pt's spiritual, cultural and educational needs considered and pt agreeable to plan of care and goals.    Goals:  Short Term Goals:  4-5 weeks  1. Report decreased in pain at worse less than  <   / =  5  /10  to increase tolerance for functional activities. MET  2. Increased B hip/LE MMT 1/2  to increase tolerance  for ADL and work activities.MET  3. Pt to tolerate HEP to improve ROM and independence with ADL's. MET  4. Pt to improve range of motion by 50% to allow for improved functional mobility to allow for improvement in IADLs. MET.     Long Term Goals: 8-10 weeks  1. Report decreased in pain at worse less than  <   / =  3  /10  to increase tolerance for functional mobility.  On going  2. Increased B hip/LE MMT 1 grade to increase tolerance for ADL and work activities.On going  3. Pt will report a 51 or greater score on FOTO Lumbar spine survey  to demonstrate decrease in disability and improvement in back pain.On going  4. Pt to be Independent with HEP to improve ROM and independence with ADL's. On going  5. Pt to demonstrate negative Bridge Test in order to show improved core strength for lumbar stabilization. On going  6. Pt to improve range of motion by 75% to allow for improved functional mobility to allow for improvement in IADLs. On going  7. Patient will improve TUG score to less than 11 seconds with or without AD safely. On going.      Plan   Plan of care Certification: 12/3/24 to 1/29/25- updated to 4/4/25    Continue POC    Yesenia Armando, PT   2/27/2025

## 2025-02-28 ENCOUNTER — CLINICAL SUPPORT (OUTPATIENT)
Dept: REHABILITATION | Facility: OTHER | Age: 89
End: 2025-02-28
Payer: MEDICARE

## 2025-02-28 DIAGNOSIS — R26.2 DIFFICULTY IN WALKING: Primary | ICD-10-CM

## 2025-02-28 PROCEDURE — 97110 THERAPEUTIC EXERCISES: CPT | Mod: KX

## 2025-02-28 PROCEDURE — 97530 THERAPEUTIC ACTIVITIES: CPT | Mod: KX

## 2025-02-28 PROCEDURE — 97112 NEUROMUSCULAR REEDUCATION: CPT | Mod: KX

## 2025-03-03 NOTE — PROGRESS NOTES
Physical Therapy Reassessment/Treatment Note     Name: Jefferson Boogie  Clinic Number: [id number]    Therapy Diagnosis:   Encounter Diagnosis   Name Primary?    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 No new symptoms to report, but feels like he is stronger and is not relying on the cane as much.  he was compliant with home exercise program given last session.   Response to previous treatment:good  Functional change:  ongoing    Pain: 2/10  Location: lower back    Objective   Updated at progress report on 2/28/25.    GAIT DEVIATIONS: Jefferson displays antalgic gait, difficulty with L LE weight shift, need for SPC for balance and unloading joints, and decreased cornel/step length     Lumbar Range of Motion:     %   Flexion 90      Extension 75      Left Side Bending 60   Right Side Bending 60   Left rotation    75   Right Rotation    50    *= pain     Lower Extremity Strength     Right LE   Left LE     Hip flexion: 4/5 Hip flexion: 4/5   Knee extension: 4+/5 Knee extension: 4+/5   Knee flexion: 4+/5 Knee flexion: 4+/5   Hip IR: 4-/5 Hip IR: 4-/5   Hip ER: 4-/5 Hip ER: 4-/5   Hip extension:  4/5 Hip extension: 4/5   Hip abduction: 4/5 Hip abduction: 4/5   Hip adduction:  3+/5 Hip adduction 3+/5   Ankle dorsiflexion: 4/5 Ankle dorsiflexion: 4/5   Ankle plantarflexion: 4/5 Ankle plantarflexion: 4/5     Treatment     Jefferson received the following manual therapy techniques for 0 minutes:   NA today    STM to R lumbar paraspinals/scar mobility  Prone unilateral P/As  Prone knee FL and hip ER/IR    Jefferson received therapeutic exercises for 35 minutes including:  Nustep x 6 min Standing UBE 2 min F/2 min B for joint nutrition Lvl  2  Prone hip EX x 10 5 second holds  Prone on elbows x 2 minute  Quadruped + knee crunch into hip extension 2 x 5 reps each side  Supine hip FL stretch x 4# weight 90 second hang on each side  Supine hip flexion with legs starting straight Y TB 2 x 10 3 second holds  Standing weight shift to R side x 15 reps- NP    Jefferson participated in dynamic functional therapeutic activities to improve functional performance for 10 minutes, including:  Ankle righting reactions facing away from wall x 10 reps- NP  4 in step up 2 x 10 ea with 1 UE support 3 second at top  4 in side step up 2 x 8 ea with 1 UE support 3 second hold at top  Resisted forward stepping R TB x 10 each foot- NP    Neuromuscular re-education activities to improve: Balance, Coordination, and Proprioception for 10 minutes. The following activities  3 rounds- NP  Resisted sidestepping Y TB with yellow pole  Sit<>stands with 1 foam pad x 10 reps without UE support    Ankle righting reactions with pulling self forward- performed in between treadmills today 2 x 10 reps  Standing TKE red thick band 2 x 10 5 second holds on each side    In purple thick band- NP   Stepping forward with 5 second holds, 10 times each side- 2 rounds    CP to lumbar spine/SI joint x 5 minutes in Z lie.    Home Exercises and Education Provided     Education provided:   - See above  - Progress towards goals     Written Home Exercises Provided:   Exercises were reviewed and Jefferson was able to demonstrate them prior to the end of the  session.  Jefferson demonstrated good  understanding of the HEP provided.   .   See EMR under Patient Instructions for exercises provided prior visit.      Assessment   Patient has demonstrated improvement in pain/adverse symptoms, improvement in lumbar ROM, improvement in hip mobility, improvement in balance/quality of gait, and improvement in functional strength since starting skilled therapy. Patient is still at high fall risk with losing balance backwards due to decreased core/LE strength without UE support, decreased tolerance to more standing/longer distance walking, and decreased tolerance to activity. He needs continued skilled therapy in order to improve quality of life, return to PLOF, and to decrease risk for falls.    Jefferson is progressing well towards his goals and goals were updated on 2/28/25. Pt prognosis is Good.     Pt will continue to benefit from skilled outpatient physical therapy to address the deficits listed in the problem list on initial evaluation provide pt/family education and to maximize pt's level of independence in the home and community environment.     Anticipated barriers to physical therapy: chronicity and severity of symptoms    Pt's spiritual, cultural and educational needs considered and pt agreeable to plan of care and goals.    Goals:  Short Term Goals:  4-5 weeks  1. Report decreased in pain at worse less than  <   / =  5  /10  to increase tolerance for functional activities. MET  2. Increased B hip/LE MMT 1/2  to increase tolerance for ADL and work activities.MET  3. Pt to tolerate HEP to improve ROM and independence with ADL's. MET  4. Pt to improve range of motion by 50% to allow for improved functional mobility to allow for improvement in IADLs. MET.     Long Term Goals: 8-10 weeks  1. Report decreased in pain at worse less than  <   / =  3  /10  to increase tolerance for functional mobility.  On going  2. Increased B hip/LE MMT 1 grade to increase tolerance for ADL and work  activities.On going  3. Pt will report a 51 or greater score on FOTO Lumbar spine survey  to demonstrate decrease in disability and improvement in back pain.On going  4. Pt to be Independent with HEP to improve ROM and independence with ADL's. On going  5. Pt to demonstrate negative Bridge Test in order to show improved core strength for lumbar stabilization. On going  6. Pt to improve range of motion by 75% to allow for improved functional mobility to allow for improvement in IADLs. On going  7. Patient will improve TUG score to less than 11 seconds with or without AD safely. On going.      Plan   Plan of care Certification: 12/3/24 to 1/29/25- updated to 4/4/25    Continue POC    Marito-Sanna Armando, PT   3/3/2025

## 2025-03-05 ENCOUNTER — CLINICAL SUPPORT (OUTPATIENT)
Dept: REHABILITATION | Facility: OTHER | Age: 89
End: 2025-03-05
Payer: MEDICARE

## 2025-03-05 DIAGNOSIS — R26.2 DIFFICULTY IN WALKING: Primary | ICD-10-CM

## 2025-03-05 PROCEDURE — 97110 THERAPEUTIC EXERCISES: CPT | Mod: KX,CQ

## 2025-03-05 PROCEDURE — 97530 THERAPEUTIC ACTIVITIES: CPT | Mod: KX,CQ

## 2025-03-05 NOTE — PROGRESS NOTES
Physical Therapy Reassessment/Treatment Note     Name: Jefferson Boogie  Clinic Number: [id number]    Therapy Diagnosis:   Encounter Diagnosis   Name Primary?    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 He has good days and bad days. He does not do HEP but he walks up and down his stairs and that is a good workout.   he was compliant with home exercise program given last session.   Response to previous treatment:good  Functional change:  ongoing    Pain: 2/10  Location: lower back    Objective   Updated at progress report on 2/28/25.    GAIT DEVIATIONS: Jefferson displays antalgic gait, difficulty with L LE weight shift, need for SPC for balance and unloading joints, and decreased cornel/step length     Lumbar Range of Motion:     %   Flexion 90      Extension 75      Left Side Bending 60   Right Side Bending 60   Left rotation    75   Right Rotation    50    *= pain     Lower Extremity Strength     Right LE   Left LE     Hip flexion: 4/5 Hip flexion: 4/5   Knee extension: 4+/5 Knee extension: 4+/5   Knee flexion: 4+/5 Knee flexion: 4+/5   Hip IR: 4-/5 Hip IR: 4-/5   Hip ER: 4-/5 Hip ER: 4-/5   Hip extension:  4/5 Hip extension: 4/5   Hip abduction: 4/5  Hip abduction: 4/5   Hip adduction: 3+/5 Hip adduction 3+/5   Ankle dorsiflexion: 4/5 Ankle dorsiflexion: 4/5   Ankle plantarflexion: 4/5 Ankle plantarflexion: 4/5     Treatment     Jefferson received the following manual therapy techniques for 0 minutes:   NA today    STM to R lumbar paraspinals/scar mobility  Prone unilateral P/As  Prone knee FL and hip ER/IR    Jefferson received therapeutic exercises for 35 minutes including:  Nustep x 6 min Standing UBE 2 min F/2 min B for joint nutrition Lvl  2  Prone hip EX x 10 5 second holds  Prone on elbows x 2 minute  Quadruped + knee crunch into hip extension 2 x 5 reps each side  Supine hip FL stretch x 4# weight 90 second hang on each side  Supine hip flexion with legs starting straight Y TB 2 x 10 3 second holds  Leg press 140# 3 x 10     Jefferson participated in dynamic functional therapeutic activities to improve functional performance for 10 minutes, including:  Ankle righting reactions facing away from wall x 10 reps- NP  4 in step up 2 x 10 ea with 1 UE support 3 second at top  4 in side step up 2 x 8 ea with 1 UE support 3 second hold at top  Resisted forward stepping R TB x 10 each foot- NP    Neuromuscular re-education activities to improve: Balance, Coordination, and Proprioception for 10 minutes. The following activities  3 rounds- NP  Resisted sidestepping Y TB with yellow pole  Sit<>stands with 1 foam pad x 10 reps without UE support    Ankle righting reactions with pulling self forward- performed in between treadmills today 2 x 10 reps  Standing TKE red thick band 2 x 10 5 second holds on each side    In purple thick band- NP   Stepping forward with 5 second holds, 10 times each side- 2 rounds    CP to lumbar spine/SI joint x 5 minutes in Z lie.    Home Exercises and Education Provided     Education provided:   - See above  - Progress towards goals     Written Home Exercises Provided:   Exercises were reviewed and Jefferson was able to demonstrate them prior to the end  of the session.  Jefferson demonstrated good  understanding of the HEP provided.   .   See EMR under Patient Instructions for exercises provided prior visit.      Assessment   Patient tolerated session well. Continued previously prescribed without issue. Progressed LE resistance tolerated well.     Jefferson is progressing well towards his goals and goals were updated on 2/28/25. Pt prognosis is Good.     Pt will continue to benefit from skilled outpatient physical therapy to address the deficits listed in the problem list on initial evaluation provide pt/family education and to maximize pt's level of independence in the home and community environment.     Anticipated barriers to physical therapy: chronicity and severity of symptoms    Pt's spiritual, cultural and educational needs considered and pt agreeable to plan of care and goals.    Goals:  Short Term Goals:  4-5 weeks  1. Report decreased in pain at worse less than  <   / =  5  /10  to increase tolerance for functional activities. MET  2. Increased B hip/LE MMT 1/2  to increase tolerance for ADL and work activities.MET  3. Pt to tolerate HEP to improve ROM and independence with ADL's. MET  4. Pt to improve range of motion by 50% to allow for improved functional mobility to allow for improvement in IADLs. MET.     Long Term Goals: 8-10 weeks  1. Report decreased in pain at worse less than  <   / =  3  /10  to increase tolerance for functional mobility.  On going  2. Increased B hip/LE MMT 1 grade to increase tolerance for ADL and work activities.On going  3. Pt will report a 51 or greater score on FOTO Lumbar spine survey  to demonstrate decrease in disability and improvement in back pain.On going  4. Pt to be Independent with HEP to improve ROM and independence with ADL's. On going  5. Pt to demonstrate negative Bridge Test in order to show improved core strength for lumbar stabilization. On going  6. Pt to improve range of motion by 75% to allow for improved  functional mobility to allow for improvement in IADLs. On going  7. Patient will improve TUG score to less than 11 seconds with or without AD safely. On going.      Plan   Plan of care Certification: 12/3/24 to 1/29/25- updated to 4/4/25    Continue POC    Zaheer Vasquez PTA   3/5/2025

## 2025-03-12 ENCOUNTER — CLINICAL SUPPORT (OUTPATIENT)
Dept: REHABILITATION | Facility: OTHER | Age: 89
End: 2025-03-12
Payer: MEDICARE

## 2025-03-12 DIAGNOSIS — R26.2 DIFFICULTY IN WALKING: Primary | ICD-10-CM

## 2025-03-12 PROCEDURE — 97110 THERAPEUTIC EXERCISES: CPT | Mod: CQ

## 2025-03-12 NOTE — PROGRESS NOTES
Physical Therapy Reassessment/Treatment Note     Name: Jefferson Boogie  Clinic Number: [id number]    Therapy Diagnosis:   Encounter Diagnosis   Name Primary?    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 He feels like he is improving day to day.   he was compliant with home exercise program given last session.   Response to previous treatment:good  Functional change:  ongoing    Pain: 2/10  Location: lower back    Objective   Updated at progress report on 2/28/25.    GAIT DEVIATIONS: Jefferson displays antalgic gait, difficulty with L LE weight shift, need for SPC for balance and unloading joints, and decreased cornel/step length     Lumbar Range of Motion:     %   Flexion 90      Extension 75      Left Side Bending 60   Right Side Bending 60   Left rotation    75   Right Rotation    50    *= pain     Lower Extremity Strength     Right LE   Left LE     Hip flexion: 4/5 Hip flexion: 4/5   Knee extension: 4+/5 Knee extension: 4+/5   Knee flexion: 4+/5 Knee flexion: 4+/5   Hip IR: 4-/5 Hip IR: 4-/5   Hip ER: 4-/5 Hip ER: 4-/5   Hip extension:  4/5 Hip extension: 4/5   Hip abduction: 4/5 Hip abduction: 4/5   Hip adduction: 3+/5 Hip adduction 3+/5    Ankle dorsiflexion: 4/5 Ankle dorsiflexion: 4/5   Ankle plantarflexion: 4/5 Ankle plantarflexion: 4/5     Treatment     Jefferson received the following manual therapy techniques for 05 minutes:   NA today    STM to R lumbar paraspinal      Jefferson received therapeutic exercises for 30 minutes including:  Nustep x 6 min Standing UBE 2 min F/2 min B for joint nutrition Lvl  2  Prone hip EX x 10 5 second holds  Prone on elbows x 2 minute  Quadruped + knee crunch into hip extension 2 x 5 reps each side  Supine hip FL stretch x 4# weight 90 second hang on each side  Supine hip flexion with legs starting straight Y TB 2 x 10 3 second holds  Leg press 140# 3 x 10     Jefferson participated in dynamic functional therapeutic activities to improve functional performance for 10 minutes, including:  Ankle righting reactions facing away from wall x 10 reps- NP  4 in step up 2 x 10 ea with 1 UE support 3 second at top  4 in side step up 2 x 8 ea with 1 UE support 3 second hold at top  Resisted forward stepping R TB x 10 each foot- NP    Neuromuscular re-education activities to improve: Balance, Coordination, and Proprioception for 10 minutes. The following activities  3 rounds- NP  Resisted sidestepping Y TB with yellow pole  Sit<>stands with 1 foam pad x 10 reps without UE support    Ankle righting reactions with pulling self forward- performed in between treadmills today 2 x 10 reps  Standing TKE red thick band 2 x 10 5 second holds on each side    In purple thick band- NP   Stepping forward with 5 second holds, 10 times each side- 2 rounds    CP to lumbar spine/SI joint x 5 minutes in Z lie.    Home Exercises and Education Provided     Education provided:   - See above  - Progress towards goals     Written Home Exercises Provided:   Exercises were reviewed and Jefferson was able to demonstrate them prior to the end of the session.  Jefferson demonstrated good  understanding of the HEP provided.   .   See EMR under Patient Instructions for  exercises provided prior visit.      Assessment    Pt continues to show improvement in functional strength and stability. Pt tolerated session without exacerbation of low back pain.     Jefferson is progressing well towards his goals and goals were updated on 2/28/25. Pt prognosis is Good.     Pt will continue to benefit from skilled outpatient physical therapy to address the deficits listed in the problem list on initial evaluation provide pt/family education and to maximize pt's level of independence in the home and community environment.     Anticipated barriers to physical therapy: chronicity and severity of symptoms    Pt's spiritual, cultural and educational needs considered and pt agreeable to plan of care and goals.    Goals:  Short Term Goals:  4-5 weeks  1. Report decreased in pain at worse less than  <   / =  5  /10  to increase tolerance for functional activities. MET  2. Increased B hip/LE MMT 1/2  to increase tolerance for ADL and work activities.MET  3. Pt to tolerate HEP to improve ROM and independence with ADL's. MET  4. Pt to improve range of motion by 50% to allow for improved functional mobility to allow for improvement in IADLs. MET.     Long Term Goals: 8-10 weeks  1. Report decreased in pain at worse less than  <   / =  3  /10  to increase tolerance for functional mobility.  On going  2. Increased B hip/LE MMT 1 grade to increase tolerance for ADL and work activities.On going  3. Pt will report a 51 or greater score on FOTO Lumbar spine survey  to demonstrate decrease in disability and improvement in back pain.On going  4. Pt to be Independent with HEP to improve ROM and independence with ADL's. On going  5. Pt to demonstrate negative Bridge Test in order to show improved core strength for lumbar stabilization. On going  6. Pt to improve range of motion by 75% to allow for improved functional mobility to allow for improvement in IADLs. On going  7. Patient will improve TUG score to less than 11  seconds with or without AD safely. On going.      Plan   Plan of care Certification: 12/3/24 to 1/29/25- updated to 4/4/25    Continue POC    Zaheer Vasquez PTA   3/13/2025

## 2025-03-13 NOTE — PROGRESS NOTES
Physical Therapy Treatment Note     Name: Jefferson Boogie  Clinic Number: [id number]    Therapy Diagnosis:   Encounter Diagnosis   Name Primary?    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-10-CM) - Spondylosis without myelopathy   Evaluation Date: 9/24/2024  Authorization Period Expiration: 12/31/25  Plan of Care Expiration: 12/3/24 to 1/29/25- updated to 4/4/25  Visit # / Visits authorized: 20/40 (last referral 30/30)  Progress Note Due: 3/28/25   FOTO: 4/4           Precautions: SI joint fusion on 8/12/24; fall risk; spinal cord stimulator     Time In: 1:30 pm  Time Out: 2:30 pm  Total Billable: 55 minutes  Total Appointment Time (timed & untimed codes): 30 minutes    Subjective     Pt reports: that he overdid it at the gym yesterday when he increased his intensity and speed on the bike. He feels more pain/tension in the left hamstring/gastroc on the L side.  he was compliant with home exercise program given last session.   Response to previous treatment:good  Functional change:  ongoing    Pain: 4/10  Location: left gastroc/hamstring    Objective   Updated at progress report unless specified    Treatment     Jefferson received the following manual therapy techniques for 15 minutes:   STM to L gastroc/soleus  Prone knee FL stretch both sides  Prone hip ER/IR on both sides  Lumbar unilateral P/As grade 2/3    Jefferson received therapeutic exercises for 30 minutes including:  Nustep x 6 min Standing UBE 2 min F/2 min B for joint nutrition Lvl  2  Supine bridge 2 x 10 5 second holds  Seated hamstring stretch 3 x 3 seconds and standing gastroc stretch on wedge 3 x 30 seconds  Prone hip EX 2 x 10 5 second holds  Prone on elbows x 2 minute  Quadruped + knee crunch into hip extension 2 x 5 reps each side-  NP  Supine hip FL stretch x 4# weight 90 second hang on each side  Supine hip flexion with legs starting straight Y TB 2 x 10 3 second holds  Leg press 140# 3 x 10- NP    Jefferson participated in dynamic functional therapeutic activities to improve functional performance for 10 minutes, including:  Ankle righting reactions facing away from wall x 10 reps- NP  4 in step up x 10 ea with 1 UE support 3 second at top  4 in side step up x 10 ea with 1 UE support 3 second hold at top  Resisted forward stepping R TB x 10 each foot- NP    Neuromuscular re-education activities to improve: Balance, Coordination, and Proprioception for 0 minutes. The following activities  NA today    3 rounds- NP  Resisted sidestepping Y TB with yellow pole  Sit<>stands with 1 foam pad x 10 reps without UE support    Ankle righting reactions with pulling self forward- performed in between treadmills today 2 x 10 reps  Standing TKE red thick band 2 x 10 5 second holds on each side    In purple thick band- NP   Stepping forward with 5 second holds, 10 times each side- 2 rounds    CP to lumbar spine/SI joint/L gastroc x 5 minutes in prone.    Home Exercises and Education Provided     Education provided:   - See above  - Progress towards goals     Written Home Exercises Provided:   Exercises were reviewed and Jefferson was able to demonstrate them prior to the end of the session.  Jefferson demonstrated good  understanding of the HEP provided.   .   See EMR under Patient Instructions for exercises provided prior visit.      Assessment   Patient has acute hamstring/gastroc strain from working out yesterday, so dialed back some exercises and focused on improving pain/adverse symptoms to that area. Improvement in quality of gait and adverse symptoms to L LE by the end of the session.    Jefferson is progressing well towards his goals and goals were updated on 2/28/25. Pt prognosis is Good.     Pt will continue to benefit from skilled outpatient physical therapy to  address the deficits listed in the problem list on initial evaluation provide pt/family education and to maximize pt's level of independence in the home and community environment.     Anticipated barriers to physical therapy: chronicity and severity of symptoms    Pt's spiritual, cultural and educational needs considered and pt agreeable to plan of care and goals.    Goals:  Short Term Goals:  4-5 weeks  1. Report decreased in pain at worse less than  <   / =  5  /10  to increase tolerance for functional activities. MET  2. Increased B hip/LE MMT 1/2  to increase tolerance for ADL and work activities.MET  3. Pt to tolerate HEP to improve ROM and independence with ADL's. MET  4. Pt to improve range of motion by 50% to allow for improved functional mobility to allow for improvement in IADLs. MET.     Long Term Goals: 8-10 weeks  1. Report decreased in pain at worse less than  <   / =  3  /10  to increase tolerance for functional mobility.  On going  2. Increased B hip/LE MMT 1 grade to increase tolerance for ADL and work activities.On going  3. Pt will report a 51 or greater score on FOTO Lumbar spine survey  to demonstrate decrease in disability and improvement in back pain.On going  4. Pt to be Independent with HEP to improve ROM and independence with ADL's. On going  5. Pt to demonstrate negative Bridge Test in order to show improved core strength for lumbar stabilization. On going  6. Pt to improve range of motion by 75% to allow for improved functional mobility to allow for improvement in IADLs. On going  7. Patient will improve TUG score to less than 11 seconds with or without AD safely. On going.      Plan   Plan of care Certification: 12/3/24 to 1/29/25- updated to 4/4/25    Continue TRINH Armando, PT   3/14/2025

## 2025-03-14 ENCOUNTER — CLINICAL SUPPORT (OUTPATIENT)
Dept: REHABILITATION | Facility: OTHER | Age: 89
End: 2025-03-14
Payer: MEDICARE

## 2025-03-14 DIAGNOSIS — R26.2 DIFFICULTY IN WALKING: Primary | ICD-10-CM

## 2025-03-14 PROCEDURE — 97530 THERAPEUTIC ACTIVITIES: CPT

## 2025-03-14 PROCEDURE — 97140 MANUAL THERAPY 1/> REGIONS: CPT

## 2025-03-14 PROCEDURE — 97110 THERAPEUTIC EXERCISES: CPT

## 2025-03-17 ENCOUNTER — CLINICAL SUPPORT (OUTPATIENT)
Dept: REHABILITATION | Facility: OTHER | Age: 89
End: 2025-03-17
Payer: MEDICARE

## 2025-03-17 DIAGNOSIS — R26.2 DIFFICULTY IN WALKING: Primary | ICD-10-CM

## 2025-03-17 PROCEDURE — 97110 THERAPEUTIC EXERCISES: CPT

## 2025-03-17 NOTE — PROGRESS NOTES
Physical Therapy Treatment Note     Name: Jefferson Boogie  Clinic Number: [id number]    Therapy Diagnosis:   Encounter Diagnosis   Name Primary?    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 But overall way better than last week.  he was compliant with home exercise program given last session.   Response to previous treatment:good  Functional change:  ongoing    Pain: 2/10  Location: left gastroc/hamstring    Objective   Updated at progress report unless specified    Treatment     Jefferson received the following manual therapy techniques for 0 minutes:   NA today    STM to L gastroc/soleus  Prone knee FL stretch both sides  Prone hip ER/IR on both sides  Lumbar unilateral P/As grade 2/3    Jefferson received therapeutic exercises for 40 minutes including:  Nustep x 6 min Standing UBE 2 min F/2 min B for joint nutrition Lvl  2  Supine bridge 2 x 10 5 second holds  Seated hamstring stretch 3 x 3 seconds and standing gastroc stretch on wedge 3 x 30 seconds  Prone hip EX 2 x 10 5 second holds- NP  Prone on elbows x 2 minute  Quadruped + knee crunch into hip extension 2 x 5 reps each side- NP  Supine hip FL stretch x 4# weight 90  second hang on each side  Supine hip flexion with legs starting straight Y TB 2 x 10 3 second holds  Leg press 140# 3 x 10- NP    Jefferson participated in dynamic functional therapeutic activities to improve functional performance for 15 minutes, including:  Ankle righting reactions facing away from wall x 10 reps- NP  4 in step up x 10 ea with 1 UE support 3 second at top  4 in side step up x 10 ea with 1 UE support 3 second hold at top  Resisted forward stepping R TB x 10 each foot- NP    Neuromuscular re-education activities to improve: Balance, Coordination, and Proprioception for 0 minutes. The following activities  NA today    3 rounds- NP  Resisted sidestepping Y TB with yellow pole  Sit<>stands with 1 foam pad x 10 reps without UE support    Ankle righting reactions with pulling self forward- performed in between treadmills today 2 x 10 reps  Standing TKE red thick band 2 x 10 5 second holds on each side    In purple thick band- NP   Stepping forward with 5 second holds, 10 times each side- 2 rounds    CP to lumbar spine/SI joint/L gastroc x 5 minutes in prone.    Home Exercises and Education Provided     Education provided:   - See above  - Progress towards goals     Written Home Exercises Provided:   Exercises were reviewed and Jefferson was able to demonstrate them prior to the end of the session.  Jefferson demonstrated good  understanding of the HEP provided.   .   See EMR under Patient Instructions for exercises provided prior visit.      Assessment   Patient demonstrated good tolerance to strengthening and motor control work this session. Improvement in pain/adverse symptoms by the end of the session.     Jefferson is progressing well towards his goals and goals were updated on 2/28/25. Pt prognosis is Good.     Pt will continue to benefit from skilled outpatient physical therapy to address the deficits listed in the problem list on initial evaluation provide pt/family education and to maximize pt's level of  independence in the home and community environment.     Anticipated barriers to physical therapy: chronicity and severity of symptoms    Pt's spiritual, cultural and educational needs considered and pt agreeable to plan of care and goals.    Goals:  Short Term Goals:  4-5 weeks  1. Report decreased in pain at worse less than  <   / =  5  /10  to increase tolerance for functional activities. MET  2. Increased B hip/LE MMT 1/2  to increase tolerance for ADL and work activities.MET  3. Pt to tolerate HEP to improve ROM and independence with ADL's. MET  4. Pt to improve range of motion by 50% to allow for improved functional mobility to allow for improvement in IADLs. MET.     Long Term Goals: 8-10 weeks  1. Report decreased in pain at worse less than  <   / =  3  /10  to increase tolerance for functional mobility.  On going  2. Increased B hip/LE MMT 1 grade to increase tolerance for ADL and work activities.On going  3. Pt will report a 51 or greater score on FOTO Lumbar spine survey  to demonstrate decrease in disability and improvement in back pain.On going  4. Pt to be Independent with HEP to improve ROM and independence with ADL's. On going  5. Pt to demonstrate negative Bridge Test in order to show improved core strength for lumbar stabilization. On going  6. Pt to improve range of motion by 75% to allow for improved functional mobility to allow for improvement in IADLs. On going  7. Patient will improve TUG score to less than 11 seconds with or without AD safely. On going.      Plan   Plan of care Certification: 12/3/24 to 1/29/25- updated to 4/4/25    Continue TRINH Devi-Sanna Armando, PT   3/17/2025

## 2025-03-21 ENCOUNTER — CLINICAL SUPPORT (OUTPATIENT)
Dept: REHABILITATION | Facility: OTHER | Age: 89
End: 2025-03-21
Payer: MEDICARE

## 2025-03-21 DIAGNOSIS — R26.2 DIFFICULTY IN WALKING: Primary | ICD-10-CM

## 2025-03-21 PROCEDURE — 97110 THERAPEUTIC EXERCISES: CPT

## 2025-03-21 NOTE — PROGRESS NOTES
Physical Therapy Treatment Note     Name: Jefferson Boogie  Clinic Number: [id number]    Therapy Diagnosis:   Encounter Diagnosis   Name Primary?    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   he was compliant with home exercise program given last session.   Response to previous treatment:good  Functional change:  ongoing    Pain: 2/10  Location: left gastroc/hamstring    Objective   Updated at progress report unless specified    Treatment     Jefferson received the following manual therapy techniques for 0 minutes:   NA today    STM to L gastroc/soleus  Prone knee FL stretch both sides  Prone hip ER/IR on both sides  Lumbar unilateral P/As grade 2/3    Jefferson received therapeutic exercises for 40 minutes including:  Nustep x 6 min Standing UBE 2 min F/2 min B for joint nutrition Lvl  2  Supine bridge 2 x 10 5 second holds  Seated hamstring stretch 3 x 30 seconds and standing gastroc stretch on wedge 3 x 30 seconds  Prone hip EX 2 x 10 5 second holds- NP  Prone on elbows x 2 minute  Quadruped + knee crunch into hip extension 2 x 5 reps each side- NP  Supine hip FL stretch x 4# weight 90 second hang on each side  Supine hip flexion with legs starting straight Y TB 2 x 10 3 second  holds  Leg press 140# 3 x 10- NP    Jefferson participated in dynamic functional therapeutic activities to improve functional performance for 15 minutes, including:  Ankle righting reactions facing away from wall x 10 reps- NP  4 in step up x 10 ea with 1 UE support 3 second at top  4 in side step up x 10 ea with 1 UE support 3 second hold at top  Resisted forward stepping R TB x 10 each foot- NP    Neuromuscular re-education activities to improve: Balance, Coordination, and Proprioception for 0 minutes. The following activities  NA today    3 rounds- NP  Resisted sidestepping Y TB with yellow pole  Sit<>stands with 1 foam pad x 10 reps without UE support    Ankle righting reactions with pulling self forward- performed in between treadmills today 2 x 10 reps  Standing TKE red thick band 2 x 10 5 second holds on each side    In purple thick band- NP   Stepping forward with 5 second holds, 10 times each side- 2 rounds    CP to lumbar spine/SI joint/L gastroc x 5 minutes in prone.    Home Exercises and Education Provided     Education provided:   - See above  - Progress towards goals     Written Home Exercises Provided:   Exercises were reviewed and Jefferson was able to demonstrate them prior to the end of the session.  Jefferson demonstrated good  understanding of the HEP provided.   .   See EMR under Patient Instructions for exercises provided prior visit.      Assessment   Patient presents to therapy with usual symptoms. He demonstrated good tolerance to strengthening and motor control work this session. Will continue to monitor and progress exercises as tolerated by patient to further address impairments and improve overall functional mobility.     Jefferson is progressing well towards his goals and goals were updated on 2/28/25. Pt prognosis is Good.     Pt will continue to benefit from skilled outpatient physical therapy to address the deficits listed in the problem list on initial evaluation provide pt/family education and to  maximize pt's level of independence in the home and community environment.     Anticipated barriers to physical therapy: chronicity and severity of symptoms    Pt's spiritual, cultural and educational needs considered and pt agreeable to plan of care and goals.    Goals:  Short Term Goals:  4-5 weeks  1. Report decreased in pain at worse less than  <   / =  5  /10  to increase tolerance for functional activities. MET  2. Increased B hip/LE MMT 1/2  to increase tolerance for ADL and work activities.MET  3. Pt to tolerate HEP to improve ROM and independence with ADL's. MET  4. Pt to improve range of motion by 50% to allow for improved functional mobility to allow for improvement in IADLs. MET.     Long Term Goals: 8-10 weeks  1. Report decreased in pain at worse less than  <   / =  3  /10  to increase tolerance for functional mobility.  On going  2. Increased B hip/LE MMT 1 grade to increase tolerance for ADL and work activities.On going  3. Pt will report a 51 or greater score on FOTO Lumbar spine survey  to demonstrate decrease in disability and improvement in back pain.On going  4. Pt to be Independent with HEP to improve ROM and independence with ADL's. On going  5. Pt to demonstrate negative Bridge Test in order to show improved core strength for lumbar stabilization. On going  6. Pt to improve range of motion by 75% to allow for improved functional mobility to allow for improvement in IADLs. On going  7. Patient will improve TUG score to less than 11 seconds with or without AD safely. On going.      Plan   Plan of care Certification: 12/3/24 to 1/29/25- updated to 4/4/25    Continue TRINH Grier, PT   3/21/2025

## 2025-03-26 ENCOUNTER — CLINICAL SUPPORT (OUTPATIENT)
Dept: REHABILITATION | Facility: OTHER | Age: 89
End: 2025-03-26
Payer: MEDICARE

## 2025-03-26 DIAGNOSIS — R26.2 DIFFICULTY IN WALKING: Primary | ICD-10-CM

## 2025-03-26 PROCEDURE — 97112 NEUROMUSCULAR REEDUCATION: CPT | Mod: CQ

## 2025-03-26 PROCEDURE — 97110 THERAPEUTIC EXERCISES: CPT | Mod: CQ

## 2025-03-26 NOTE — PROGRESS NOTES
Physical Therapy Treatment Note     Name: Jefferson Boogie  Clinic Number: [id number]    Therapy Diagnosis:   Encounter Diagnosis   Name Primary?    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 He has been able to walk around his house without his cane and walk from car to front door without cane.   he was compliant with home exercise program given last session.   Response to previous treatment:good  Functional change:  ongoing    Pain: 2/10  Location: left gastroc/hamstring    Objective   Updated at progress report unless specified    Treatment     Jefferson received the following manual therapy techniques for 0 minutes:   NA today    STM to L gastroc/soleus  Prone knee FL stretch both sides  Prone hip ER/IR on both sides  Lumbar unilateral P/As grade 2/3    Jefferson received therapeutic exercises for 40 minutes including:  Nustep x 6 min Standing UBE 2 min F/2 min B for joint nutrition Lvl  2  Supine bridge 2 x 10 5 second holds +GTB  Seated hamstring stretch 3 x 30 seconds and standing gastroc stretch on wedge 3 x 30 seconds  Prone hip EX 2 x 10 5 second holds- NP  Prone on elbows x 2 minute  Quadruped + knee crunch into hip extension 2 x 5 reps each side- NP  Supine hip FL  stretch x 4# weight 90 second hang on each side  Supine hip flexion with legs starting straight Y TB 2 x 10 3 second holds  Leg press 140# 3 x 10-     Jefferson participated in dynamic functional therapeutic activities to improve functional performance for 05 minutes, including:  Ankle righting reactions facing away from wall x 10 reps- NP  6 in step up x 10 ea with 1 UE support 3 second at top  6 in side step up x 10 ea with 1 UE support 3 second hold at top  Resisted forward stepping R TB x 10 each foot- NP    Neuromuscular re-education activities to improve: Balance, Coordination, and Proprioception for 10 minutes. The following activities  NA today    3 rounds-   Resisted sidestepping Y TB with yellow pole  Sit<>stands with 1 foam pad x 10 reps without UE support    Ankle righting reactions with pulling self forward- performed in between treadmills today 2 x 10 reps  Standing TKE red thick band 2 x 10 5 second holds on each side    In purple thick band- NP   Stepping forward with 5 second holds, 10 times each side- 2 rounds    CP to lumbar spine/SI joint/L gastroc x 5 minutes in prone.    Home Exercises and Education Provided     Education provided:   - See above  - Progress towards goals     Written Home Exercises Provided:   Exercises were reviewed and Jefferson was able to demonstrate them prior to the end of the session.  Jefferson demonstrated good  understanding of the HEP provided.   .   See EMR under Patient Instructions for exercises provided prior visit.      Assessment   Patient presents to therapy with usual symptoms. Pt continues to improve with functional LE strengthening. Progressed step ups to 6 in with good tolerance.     Jefferson is progressing well towards his goals and goals were updated on 2/28/25. Pt prognosis is Good.     Pt will continue to benefit from skilled outpatient physical therapy to address the deficits listed in the problem list on initial evaluation provide pt/family education and to maximize  pt's level of independence in the home and community environment.     Anticipated barriers to physical therapy: chronicity and severity of symptoms    Pt's spiritual, cultural and educational needs considered and pt agreeable to plan of care and goals.    Goals:  Short Term Goals:  4-5 weeks  1. Report decreased in pain at worse less than  <   / =  5  /10  to increase tolerance for functional activities. MET  2. Increased B hip/LE MMT 1/2  to increase tolerance for ADL and work activities.MET  3. Pt to tolerate HEP to improve ROM and independence with ADL's. MET  4. Pt to improve range of motion by 50% to allow for improved functional mobility to allow for improvement in IADLs. MET.     Long Term Goals: 8-10 weeks  1. Report decreased in pain at worse less than  <   / =  3  /10  to increase tolerance for functional mobility.  On going  2. Increased B hip/LE MMT 1 grade to increase tolerance for ADL and work activities.On going  3. Pt will report a 51 or greater score on FOTO Lumbar spine survey  to demonstrate decrease in disability and improvement in back pain.On going  4. Pt to be Independent with HEP to improve ROM and independence with ADL's. On going  5. Pt to demonstrate negative Bridge Test in order to show improved core strength for lumbar stabilization. On going  6. Pt to improve range of motion by 75% to allow for improved functional mobility to allow for improvement in IADLs. On going  7. Patient will improve TUG score to less than 11 seconds with or without AD safely. On going.      Plan   Plan of care Certification: 12/3/24 to 1/29/25- updated to 4/4/25    Continue TRINH Vasquez, PTA   3/26/2025

## 2025-04-02 ENCOUNTER — CLINICAL SUPPORT (OUTPATIENT)
Dept: REHABILITATION | Facility: OTHER | Age: 89
End: 2025-04-02
Payer: MEDICARE

## 2025-04-02 DIAGNOSIS — R26.2 DIFFICULTY IN WALKING: Primary | ICD-10-CM

## 2025-04-02 PROCEDURE — 97530 THERAPEUTIC ACTIVITIES: CPT | Mod: CQ

## 2025-04-02 NOTE — PROGRESS NOTES
Physical Therapy Treatment Note     Name: Jefferson Boogie  Clinic Number: [id number]    Therapy Diagnosis:   Encounter Diagnosis   Name Primary?    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 No new issues  he was compliant with home exercise program given last session.   Response to previous treatment:good  Functional change:  ongoing    Pain: 2/10  Location: left gastroc/hamstring    Objective   Updated at progress report unless specified    Treatment     Jefferson received the following manual therapy techniques for 0 minutes:   NA today    STM to L gastroc/soleus  Prone knee FL stretch both sides  Prone hip ER/IR on both sides  Lumbar unilateral P/As grade 2/3    Jefferson received therapeutic exercises for 45 minutes including:  Nustep x 6 min Standing UBE 2 min F/2 min B for joint nutrition Lvl  2  Supine bridge 2 x 10 5 second holds +GTB  Seated hamstring stretch 3 x 30 seconds and standing gastroc stretch on wedge 3 x 30 seconds  Prone hip EX 2 x 10 5 second holds- NP  Prone on elbows x 2 minute  Supine hip FL stretch x 4# weight 90 second hang on each side  Supine hip flexion with legs starting straight Y TB 2 x 10 3 second holds  Leg press 150# 3 x 10-      Jefferson participated in dynamic functional therapeutic activities to improve functional performance for 10 minutes, including:    3 rounds-   Resisted sidestepping RTB with yellow pole  Sit<>stands with 1 foam pad x 10 reps without UE support (RTB at ankles)    Neuromuscular re-education activities to improve: Balance, Coordination, and Proprioception for 00 minutes. The following activities  NA today    CP to lumbar spine/SI joint/L gastroc x 5 minutes in prone.    Home Exercises and Education Provided     Education provided:   - See above  - Progress towards goals     Written Home Exercises Provided:   Exercises were reviewed and Jefferson was able to demonstrate them prior to the end of the session.  Jefferson demonstrated good  understanding of the HEP provided.   .   See EMR under Patient Instructions for exercises provided prior visit.      Assessment   Patient tolerated session well. Progressed glut and LE strengthening with appropriate challenge. Pt demos improved functional strengthening with sit<>stand. Pt continues to requires SBA for safety with exercises.    Jefferson is progressing well towards his goals and goals were updated on 2/28/25. Pt prognosis is Good.     Pt will continue to benefit from skilled outpatient physical therapy to address the deficits listed in the problem list on initial evaluation provide pt/family education and to maximize pt's level of independence in the home and community environment.     Anticipated barriers to physical therapy: chronicity and severity of symptoms    Pt's spiritual, cultural and educational needs considered and pt agreeable to plan of care and goals.    Goals:  Short Term Goals:  4-5 weeks  1. Report decreased in pain at worse less than  <   / =  5  /10  to increase tolerance for functional activities. MET  2. Increased B hip/LE MMT 1/2  to increase tolerance for ADL and work activities.MET  3. Pt to tolerate HEP to improve ROM and independence with ADL's. MET  4. Pt  to improve range of motion by 50% to allow for improved functional mobility to allow for improvement in IADLs. MET.     Long Term Goals: 8-10 weeks  1. Report decreased in pain at worse less than  <   / =  3  /10  to increase tolerance for functional mobility.  On going  2. Increased B hip/LE MMT 1 grade to increase tolerance for ADL and work activities.On going  3. Pt will report a 51 or greater score on FOTO Lumbar spine survey  to demonstrate decrease in disability and improvement in back pain.On going  4. Pt to be Independent with HEP to improve ROM and independence with ADL's. On going  5. Pt to demonstrate negative Bridge Test in order to show improved core strength for lumbar stabilization. On going  6. Pt to improve range of motion by 75% to allow for improved functional mobility to allow for improvement in IADLs. On going  7. Patient will improve TUG score to less than 11 seconds with or without AD safely. On going.      Plan   Plan of care Certification: 12/3/24 to 1/29/25- updated to 4/4/25    Continue TRINH Vasquez, PTA   4/3/2025

## 2025-04-04 ENCOUNTER — CLINICAL SUPPORT (OUTPATIENT)
Dept: REHABILITATION | Facility: OTHER | Age: 89
End: 2025-04-04
Payer: MEDICARE

## 2025-04-04 DIAGNOSIS — R26.2 DIFFICULTY IN WALKING: Primary | ICD-10-CM

## 2025-04-04 PROCEDURE — 97110 THERAPEUTIC EXERCISES: CPT | Mod: KX

## 2025-04-04 PROCEDURE — 97140 MANUAL THERAPY 1/> REGIONS: CPT | Mod: KX

## 2025-04-04 PROCEDURE — 97530 THERAPEUTIC ACTIVITIES: CPT | Mod: KX

## 2025-04-04 NOTE — PROGRESS NOTES
Outpatient Rehab    Physical Therapy Progress Note : Updated Plan of Care    Patient Name: Jefferson Boogie  MRN: [id number]  YOB: 1935  Encounter Date: 2025    Therapy Diagnosis:   Encounter Diagnosis   Name Primary?    Difficulty in walking Yes     Physician: Samuel Jimenez MD    Physician Orders: Eval and Treat  Medical Diagnosis: Sacroiliitis  Chronic pain syndrome  Spondylosis without myelopathy    Visit # / Visits Authorized:    Insurance Authorization Period: 2025 to 2025  Evaluation Date: 2024  Plan of Care Expiration: updated to 25 to  25  Progress Note Due: 25      PT/PTA:   PT  Number of PTA visits since last PT visit: NA  Time In: 1330   Time Out: 1430  Total Time: 60   Total Billable Time: 55    FOTO:  Intake Score:  39%  Survey Score 1: 51 %  Survey Score 2: 42 %         Subjective   Patient reports overall feeling about the same. He has not been hurting as much, but also feels like he is plateauing..  Pain reported as 1/10.      Objective       Updated at progress report on 25.     GAIT DEVIATIONS: Jefferson displays antalgic gait, difficulty with L LE weight shift, need for SPC for balance and unloading joints, and decreased cornel/step length     Lumbar Range of Motion:     %   Flexion 90      Extension 75      Left Side Bending 60   Right Side Bending 60   Left rotation    75   Right Rotation    75    *= pain     Lower Extremity Strength     Right LE   Left LE     Hip flexion: 4/5 Hip flexion: 4/5   Knee extension: 4+/5 Knee extension: 4+/5   Knee flexion: 4+/5 Knee flexion: 4+/5   Hip IR: 4/5 Hip IR: 4/5   Hip ER: 4/5 Hip ER: 4/5   Hip extension:  4/5 Hip extension: 4/5   Hip abduction: 4/5 Hip abduction: 4/5   Hip adduction: 3+/5 Hip adduction 3+/5   Ankle dorsiflexion: 4/5 Ankle dorsiflexion: 4/5   Ankle plantarflexion: 4/5 Ankle plantarflexion: 4/5     TU seconds with SPC  5x sit to stand no UE support: 17 seconds  5x sit to  stand one UE support: 13 seconds  5x sit to stand 2 hands: 13 seconds    Treatment:     Jefferson received the following manual therapy techniques for 10 minutes:   Prone knee FL stretch both sides  Prone hip ER/IR on both sides  Lumbar unilateral P/As grade 2/3     Jefferson received therapeutic exercises for 30 minutes including:  Nustep x 6 min Standing UBE 2 min F/2 min B for joint nutrition Lvl  2  Thread the needle x 10 reps each side  Standing golf swings with back against wall 2 x 15 reps  Supine bridge 2 x 10 5 second holds +GTB- NP  Prone hip EX x 10 5 second holds  Prone on elbows x 2 minute  Supine hip FL stretch x 4# weight 90 second hang on each side- NP  Supine hip flexion with legs starting straight Y TB 2 x 10 3 second holds- NP  Leg press 150# 3 x 10- NP     Jefferson participated in dynamic functional therapeutic activities to improve functional performance for 15 minutes, including:  Reassessment    NP  3 rounds  Resisted sidestepping RTB with yellow pole  Sit<>stands with 1 foam pad x 10 reps without UE support (RTB at ankles)     Neuromuscular re-education activities to improve: Balance, Coordination, and Proprioception for 00 minutes. The following activities  NA today     CP to lumbar spine in supine with HOB elevated x 5 minutes.    Time Entry(in minutes):       Assessment & Plan   Assessment  Jefferson presents with a condition of Low complexity.   Presentation of Symptoms: Stable  Will Comorbidities Impact Care: Yes       Functional Limitations: Activity tolerance, Bed mobility, Carrying objects, Completing self-care activities, Completing work/school activities, Decreased ambulation distance/endurance, Functional mobility, Gait limitations, Getting off the floor, Increased risk of fall, Maintaining balance, Pain with ADLs/IADLs, Painful locomotion/ambulation, Participating in leisure activities, Participating in sports, Performing household chores, Range of motion, Reaching, Sitting tolerance, Squatting,  Standing tolerance, Transfers  Impairments: Abnormal gait, Abnormal muscle firing, Abnormal muscle tone, Abnormal or restricted range of motion, Activity intolerance, Impaired physical strength, Pain with functional activity  Personal Factors Affecting Prognosis: Physical limitations    Prognosis: Good  Assessment Details: Patient has demonstrated improvement in overall strength/motor control, decreased pain/adverse symptoms, improvement in gait, improvement in balance, and improvement in tolerance to activity sicne starting skilled therapy. Patient however has plateaued in the last month or so in therapy as far as strength gains and improvement in gait/balance. TUG and 5 time sit<>stand test show plateauing since last reassessment. Discussed with patient decreasing to 1 time a week for his future visits for a couple of weeks to wean off therapy and to become more independent with exercises at the gym with patient reporting understanding. Needs continued therapy at this time to improve tolerance to working out at gym, finalize HEP, and adherence to exercise in order to improve quality of life and return to PLOF    Plan  From a physical therapy perspective, the patient would benefit from: Skilled Rehab Services    Planned therapy interventions include: Therapeutic exercise, Therapeutic activities, Neuromuscular re-education, Manual therapy, and Gait training.    Planned modalities to include: Cryotherapy (cold pack) and Thermotherapy (hot pack).        Visit Frequency: 1 times Per Week for 8 Weeks.  Other/tapered frequency details: 1 time a week and taper to every other week after next week    This plan was discussed with Patient.   Discussion participants: Agreed Upon Plan of Care             Patient will continue to benefit from skilled outpatient physical therapy to address the deficits listed in the problem list box on initial evaluation, provide pt/family education and to maximize pt's level of independence in  the home and community environment.     Patient's spiritual, cultural, and educational needs considered and patient agreeable to plan of care and goals.           Goals:   Active       LTG       Patient will be IND with gym work out and HEP to maintain current progress and improve quality of life.       Start:  04/04/25    Expected End:  05/30/25               STG       Patient will improve tolerance to standing/walking with 2/10 or less increase in adverse symptoms.       Start:  04/04/25    Expected End:  05/02/25                Yesenia Armando PT

## 2025-04-09 ENCOUNTER — CLINICAL SUPPORT (OUTPATIENT)
Dept: REHABILITATION | Facility: OTHER | Age: 89
End: 2025-04-09
Payer: MEDICARE

## 2025-04-09 DIAGNOSIS — R26.2 DIFFICULTY IN WALKING: Primary | ICD-10-CM

## 2025-04-09 PROCEDURE — 97140 MANUAL THERAPY 1/> REGIONS: CPT

## 2025-04-09 PROCEDURE — 97110 THERAPEUTIC EXERCISES: CPT

## 2025-04-09 NOTE — PROGRESS NOTES
Outpatient Rehab    Physical Therapy Visit    Patient Name: Jefferson Boogie  MRN: [id number]  YOB: 1935  Encounter Date: 4/9/2025    Therapy Diagnosis:   Encounter Diagnosis   Name Primary?    Difficulty in walking Yes     Physician: Samuel Jimenez MD    Physician Orders: Eval and Treat  Medical Diagnosis: Sacroiliitis  Chronic pain syndrome  Spondylosis without myelopathy    Visit # / Visits Authorized:  26 / 40  Insurance Authorization Period: 1/1/2025 to 12/31/2025  Evaluation Date: 9/24/2024  Plan of Care Expiration: updated to 4/4/25 to  5/30/25  Progress Note Due: 5/4/25      PT/PTA:  PT  Number of PTA visits since last PT visit: NA  Time In: 1330   Time Out: 1430  Total Time: 60   Total Billable Time: 30         Subjective   Patient reports having a little increase in pain the last couple of days. He has not been able to get to the gym like he would like due to being busier at work..  Pain reported as 3/10.      Objective            Treatment:   Jefferson received the following manual therapy techniques for 10 minutes:   Prone knee FL stretch both sides  Prone hip ER/IR on both sides  Lumbar unilateral P/As grade 2/3     Jefferson received therapeutic exercises for 30 minutes including:  Nustep x 6 min Standing UBE 2 min F/2 min B for joint nutrition Lvl  2  Thread the needle x 10 reps each side  Standing golf swings with back against wall 2 x 15 reps- NP  Prone hip EX x 10 5 second holds  Prone on elbows x 2 minute  Supine hip FL stretch x 4# weight 90 second hang on each side  Supine hip flexion with legs starting straight Y TB 2 x 10 3 second holds- NP  Leg press 150# 3 x 10     Jefferson participated in dynamic functional therapeutic activities to improve functional performance for 15 minutes, including:  3 rounds  Resisted sidestepping RTB with yellow pole  Sit<>stands with 1 foam pad x 10 reps without UE support (RTB at ankles)     Neuromuscular re-education activities to improve: Balance,  Coordination, and Proprioception for 00 minutes. The following activities  NA today     CP to lumbar spine in supine with HOB elevated x 5 minutes.    Time Entry(in minutes):       Assessment & Plan   Assessment: Patient demonstrated good tolerance to strengthening and ROM this session. Improvement in pain/adverse symptoms by the end of the session.       Patient will continue to benefit from skilled outpatient physical therapy to address the deficits listed in the problem list box on initial evaluation, provide pt/family education and to maximize pt's level of independence in the home and community environment.     Patient's spiritual, cultural, and educational needs considered and patient agreeable to plan of care and goals.           Plan: Continue POC    Goals:   Active       LTG       Patient will be IND with gym work out and HEP to maintain current progress and improve quality of life.       Start:  04/04/25    Expected End:  05/30/25               STG       Patient will improve tolerance to standing/walking with 2/10 or less increase in adverse symptoms.       Start:  04/04/25    Expected End:  05/02/25                Yesenia Armando, PT

## 2025-04-11 ENCOUNTER — CLINICAL SUPPORT (OUTPATIENT)
Dept: REHABILITATION | Facility: OTHER | Age: 89
End: 2025-04-11
Payer: MEDICARE

## 2025-04-11 DIAGNOSIS — R26.2 DIFFICULTY IN WALKING: Primary | ICD-10-CM

## 2025-04-11 PROCEDURE — 97110 THERAPEUTIC EXERCISES: CPT

## 2025-04-11 PROCEDURE — 97112 NEUROMUSCULAR REEDUCATION: CPT

## 2025-04-11 NOTE — PROGRESS NOTES
Outpatient Rehab    Physical Therapy Visit    Patient Name: Jefferson Boogie  MRN: [id number]  YOB: 1935  Encounter Date: 4/11/2025    Therapy Diagnosis:   Encounter Diagnosis   Name Primary?    Difficulty in walking Yes     Physician: Samuel Jimenez MD    Physician Orders: Eval and Treat  Medical Diagnosis: Sacroiliitis  Chronic pain syndrome  Spondylosis without myelopathy    Visit # / Visits Authorized:  27 / 40  Insurance Authorization Period: 1/1/2025 to 12/31/2025  Evaluation Date: 9/24/2024  Plan of Care Expiration: updated 4/4/25 to 5/30/25  Progress Note Due: 5/4/25     PT/PTA:  PT  Number of PTA visits since last PT visit: NA  Time In: 1330   Time Out: 1430  Total Time: 60   Total Billable Time: 55         Subjective   Patient reports feeling about the same last session, but one of his friends did tell him he was walking better..  Pain reported as 3/10.      Objective            Treatment:   Jefferson received the following manual therapy techniques for 0 minutes:   Prone knee FL stretch both sides  Prone hip ER/IR on both sides  Lumbar unilateral P/As grade 2/3     Jefferson received therapeutic exercises for 40 minutes including:  Nustep x 8 min Standing UBE 2 min F/2 min B for joint nutrition Lvl  2  Thread the needle 2 x 10 reps each side  Standing golf swings with back against wall x 15 reps  Prone hip EX x 10 5 second holds  Prone on elbows x 2 minute  Quadruped knee crunch 2 x 10 on each side 3 second holds  Supine hip FL stretch x 4# weight 90 second hang on each side  Supine hip flexion with legs starting straight Y TB 2 x 10 3 second holds  Leg press 150# 3 x 10- NP     Jefferson participated in dynamic functional therapeutic activities to improve functional performance for 15 minutes, including:  3 rounds  Resisted step out x 10 reps Y TB with yellow pole (staying in place)  Sit<>stands with 1 foam pad x 10 reps without UE support (Y TB at ankles)     Neuromuscular re-education  activities to improve: Balance, Coordination, and Proprioception for 00 minutes. The following activities  NA today     CP to lumbar spine in supine with HOB elevated x 5 minutes.    Time Entry(in minutes):       Assessment & Plan   Assessment:  Patient demonstrated good tolerance to strengthening and more motor control work. Improvement in pain/adverse symptoms.       Patient will continue to benefit from skilled outpatient physical therapy to address the deficits listed in the problem list box on initial evaluation, provide pt/family education and to maximize pt's level of independence in the home and community environment.     Patient's spiritual, cultural, and educational needs considered and patient agreeable to plan of care and goals.           Plan: Continue POC    Goals:   Active       LTG       Patient will be IND with gym work out and HEP to maintain current progress and improve quality of life.       Start:  04/04/25    Expected End:  05/30/25               STG       Patient will improve tolerance to standing/walking with 2/10 or less increase in adverse symptoms.       Start:  04/04/25    Expected End:  05/02/25                Yesenia Armando, PT

## 2025-04-14 ENCOUNTER — CLINICAL SUPPORT (OUTPATIENT)
Dept: REHABILITATION | Facility: OTHER | Age: 89
End: 2025-04-14
Payer: MEDICARE

## 2025-04-14 DIAGNOSIS — R26.2 DIFFICULTY IN WALKING: Primary | ICD-10-CM

## 2025-04-14 PROCEDURE — 97140 MANUAL THERAPY 1/> REGIONS: CPT | Mod: KX

## 2025-04-14 PROCEDURE — 97110 THERAPEUTIC EXERCISES: CPT | Mod: KX

## 2025-04-14 NOTE — PROGRESS NOTES
Outpatient Rehab    Physical Therapy Visit    Patient Name: Jefferson Boogie  MRN: [id number]  YOB: 1935  Encounter Date: 2025    Therapy Diagnosis:   Encounter Diagnosis   Name Primary?    Difficulty in walking Yes     Physician: Samuel Jimenez MD    Physician Orders: Eval and Treat  Medical Diagnosis: Sacroiliitis  Chronic pain syndrome  Spondylosis without myelopathy    Visit # / Visits Authorized:    Insurance Authorization Period: 2025 to 2025  Evaluation Date: 2024  Plan of Care Expiration: updated 25 to 25  Progress Note Due: 25     PT/PTA:  PT  Number of PTA visits since last PT visit: NA  Time In: 1330   Time Out: 1430  Total Time: 60   Total Billable Time: 30         Subjective   Patient reports he is hurting a little more today than normal and is not sure why. He is glad to be here..  Pain reported as 2/10.      Objective            Treatment:   Jefferson received the following manual therapy techniques for 10 minutes:   Prone knee FL stretch both sides  Prone hip ER/IR on both sides  Lumbar unilateral P/As grade 2/3     Jefferson received therapeutic exercises for 40 minutes including:  Nustep x 6 min Standing UBE 2 min F/2 min B for joint nutrition Lvl  2  Thread the needle 2 x 10 reps each side  Standing golf swings with back against wall x 15 reps- NP  Prone hip EX x 15 5 second holds  Prone on elbows x 2 minute  Quadruped knee crunch 2 x 10 on each side 3 second holds  Supine hip FL stretch x 4# weight 90 second hang on each side  Supine hip flexion with legs starting straight Y TB 2 x 10 3 second holds  Leg press 150# 3 x 10- NP     Jefferson participated in dynamic functional therapeutic activities to improve functional performance for 5 minutes, includin rounds (1/2 reps 2nd round)  Resisted step out x 10 reps Y TB with yellow pole (staying in place)  Sit<>stands with 1 foam pad x 10 reps without UE support (Y TB at ankles)     Neuromuscular  re-education activities to improve: Balance, Coordination, and Proprioception for 0 minutes. The following activities  NA today     CP to lumbar spine in supine with HOB elevated x 5 minutes.    Time Entry(in minutes):       Assessment & Plan   Assessment:  Patient demonstrated good tolerance to strengthening and ROM this session. Had increase in symptoms more than normal, but improved by the end of the session.       Patient will continue to benefit from skilled outpatient physical therapy to address the deficits listed in the problem list box on initial evaluation, provide pt/family education and to maximize pt's level of independence in the home and community environment.     Patient's spiritual, cultural, and educational needs considered and patient agreeable to plan of care and goals.           Plan: Continue POC    Goals:   Active       LTG       Patient will be IND with gym work out and HEP to maintain current progress and improve quality of life.       Start:  04/04/25    Expected End:  05/30/25               STG       Patient will improve tolerance to standing/walking with 2/10 or less increase in adverse symptoms.       Start:  04/04/25    Expected End:  05/02/25                Yesenia Armando, PT

## 2025-04-16 ENCOUNTER — CLINICAL SUPPORT (OUTPATIENT)
Dept: REHABILITATION | Facility: OTHER | Age: 89
End: 2025-04-16
Payer: MEDICARE

## 2025-04-16 DIAGNOSIS — R26.2 DIFFICULTY IN WALKING: Primary | ICD-10-CM

## 2025-04-16 PROCEDURE — 97110 THERAPEUTIC EXERCISES: CPT | Mod: CQ

## 2025-04-16 NOTE — PROGRESS NOTES
Outpatient Rehab    Physical Therapy Visit    Patient Name: Jefferson Boogie  MRN: [id number]  YOB: 1935  Encounter Date: 2025    Therapy Diagnosis:   Encounter Diagnosis   Name Primary?    Difficulty in walking Yes       Physician: Samuel Jimenez MD    Physician Orders: Eval and Treat  Medical Diagnosis: Sacroiliitis  Chronic pain syndrome  Spondylosis without myelopathy    Visit # / Visits Authorized:    Insurance Authorization Period: 2025 to 2025  Evaluation Date: 2024  Plan of Care Expiration: updated 25 to 25  Progress Note Due: 25     PT/PTA:  PT  Number of PTA visits since last PT visit: NA  Time In: 130   Time Out: 225  Total Time: 55   Total Billable Time:           Subjective   He is hurting in his hips, low back and legs. He felt like he should be further along..  Pain reported as 6/10.      Objective            Treatment:   Jefferson received the following manual therapy techniques for 00 minutes:   Prone knee FL stretch both sides  Prone hip ER/IR on both sides  Lumbar unilateral P/As grade 2/3     Jefferson received therapeutic exercises for 40 minutes including:  Nustep x 6 min Standing UBE 2 min F/2 min B for joint nutrition Lvl  2    Prone hip EX x 15 5 second holds  Prone on elbows x 2 minute  Supine hip FL stretch x 4# weight 90 second hang on each side  Supine hip flexion with legs starting straight Y TB 2 x 10 3 second holds  Leg press 160# 3 x 10-   Knee extension 60# 2 x 10  Knee curl 70# 2 x 10  Chest press 70# 2 x 10  Row 70# 2 x 10     Jefferson participated in dynamic functional therapeutic activities to improve functional performance for 5 minutes, includin rounds (1/2 reps 2nd round)  Resisted step out x 10 reps Y TB with yellow pole (staying in place)  Sit<>stands with 1 foam pad x 10 reps without UE support (Y TB at ankles)     Neuromuscular re-education activities to improve: Balance, Coordination, and Proprioception for 0  minutes. The following activities  NA today     CP to lumbar spine in supine with HOB elevated x 5 minutes.    Time Entry(in minutes):       Assessment & Plan   Assessment: Jefferson presents with normal levels of pain. Pt reports good days and bad days. Discussed returning to gym post therapy as pt has a membership. Initiated peripheral machines to bulid efficacy following DC from therapy.  Evaluation/Treatment Tolerance: Patient tolerated treatment well    Patient will continue to benefit from skilled outpatient physical therapy to address the deficits listed in the problem list box on initial evaluation, provide pt/family education and to maximize pt's level of independence in the home and community environment.     Patient's spiritual, cultural, and educational needs considered and patient agreeable to plan of care and goals.           Plan: Continue POC    Goals:   Active       LTG       Patient will be IND with gym work out and HEP to maintain current progress and improve quality of life.       Start:  04/04/25    Expected End:  05/30/25               STG       Patient will improve tolerance to standing/walking with 2/10 or less increase in adverse symptoms.       Start:  04/04/25    Expected End:  05/02/25                Zaheer Vasquez, PTA

## 2025-04-23 ENCOUNTER — CLINICAL SUPPORT (OUTPATIENT)
Dept: REHABILITATION | Facility: OTHER | Age: 89
End: 2025-04-23
Payer: MEDICARE

## 2025-04-23 DIAGNOSIS — R26.2 DIFFICULTY IN WALKING: Primary | ICD-10-CM

## 2025-04-23 PROCEDURE — 97530 THERAPEUTIC ACTIVITIES: CPT | Mod: CQ

## 2025-04-23 PROCEDURE — 97110 THERAPEUTIC EXERCISES: CPT | Mod: CQ

## 2025-04-23 NOTE — PROGRESS NOTES
Outpatient Rehab    Physical Therapy Visit    Patient Name: Jefferson Boogie  MRN: [id number]  YOB: 1935  Encounter Date: 2025    Therapy Diagnosis:   Encounter Diagnosis   Name Primary?    Difficulty in walking Yes         Physician: Samuel Jimenez MD    Physician Orders: Eval and Treat  Medical Diagnosis: Sacroiliitis  Chronic pain syndrome  Spondylosis without myelopathy    Visit # / Visits Authorized:    Insurance Authorization Period: 2025 to 2025  Evaluation Date: 2024  Plan of Care Expiration: updated 25 to 25  Progress Note Due: 25     PT/PTA: PTAPT  Number of PTA visits since last PT visit:2NA  Time In: 1330   Time Out: 1425  Total Time: 55   Total Billable Time: 30         Subjective   He is hurting more than usual. His pain is across low back and into hips. He does feel like jimenez has been more active. He has been lifting chairs at work..  Pain reported as 6/10.      Objective            Treatment:   Jefferson received the following manual therapy techniques for 5 minutes:   Prone knee FL stretch both sides  Prone hip ER/IR on both sides  Lumbar unilateral P/As grade 2/3     Jefferson received therapeutic exercises for 35 minutes including:  Nustep x 6 min Standing UBE 2 min F/2 min B for joint nutrition Lvl  2    Prone hip EX x 15 5 second holds  Prone on elbows x 2 minute  Supine hip FL stretch x 4# weight 90 second hang on each side  Supine hip flexion with legs starting straight Y TB 2 x 10 3 second holds  Leg press 160# 3 x 10-   Knee extension 60# 2 x 10  Knee curl 70# 2 x 10  Chest press 70# 2 x 10  Row 70# 2 x 10     Jefferson participated in dynamic functional therapeutic activities to improve functional performance for 15 minutes, includin rounds   Resisted step out x 10 reps Y TB with yellow pole (staying in place)  Sit<>stands with 1 foam pad x 10 reps without UE support (Y TB at ankles)     Neuromuscular re-education activities to improve:  Balance, Coordination, and Proprioception for 0 minutes. The following activities  NA today     CP to lumbar spine in supine with HOB elevated x 5 minutes.    Time Entry(in minutes):       Assessment & Plan   Assessment: Pt presents with continued reports of low back and bilateral hip pain. Pt does endorses being more active such as lifting chairs at work. Continued emphasis on peripheral machines to encourage gym use following therapy.  Evaluation/Treatment Tolerance: Patient tolerated treatment well    Patient will continue to benefit from skilled outpatient physical therapy to address the deficits listed in the problem list box on initial evaluation, provide pt/family education and to maximize pt's level of independence in the home and community environment.     Patient's spiritual, cultural, and educational needs considered and patient agreeable to plan of care and goals.           Plan: Continue POC    Goals:   Active       LTG       Patient will be IND with gym work out and HEP to maintain current progress and improve quality of life.       Start:  04/04/25    Expected End:  05/30/25               STG       Patient will improve tolerance to standing/walking with 2/10 or less increase in adverse symptoms.       Start:  04/04/25    Expected End:  05/02/25                Zaheer Vasquez, PTA

## 2025-05-14 ENCOUNTER — CLINICAL SUPPORT (OUTPATIENT)
Dept: REHABILITATION | Facility: OTHER | Age: 89
End: 2025-05-14
Payer: MEDICARE

## 2025-05-14 DIAGNOSIS — R26.2 DIFFICULTY IN WALKING: Primary | ICD-10-CM

## 2025-05-14 PROCEDURE — 97110 THERAPEUTIC EXERCISES: CPT | Mod: KX,CQ

## 2025-05-14 PROCEDURE — 97530 THERAPEUTIC ACTIVITIES: CPT | Mod: KX,CQ

## 2025-05-14 NOTE — PROGRESS NOTES
Outpatient Rehab    Physical Therapy Visit    Patient Name: Jefferson Boogie  MRN: [id number]  YOB: 1935  Encounter Date: 2025    Therapy Diagnosis:   Encounter Diagnosis   Name Primary?    Difficulty in walking Yes           Physician: Samuel Jimenez MD    Physician Orders: Eval and Treat  Medical Diagnosis: Sacroiliitis  Chronic pain syndrome  Spondylosis without myelopathy    Visit # / Visits Authorized:    Insurance Authorization Period: 2025 to 2025  Evaluation Date: 2024  Plan of Care Expiration: updated 25 to 25  Progress Note Due: 25     PT/PTA: PTAPT  Number of PTA visits since last PT visit:3NA  Time In: 0125   Time Out: 225  Total Time: 60   Total Billable Time: 30         Subjective   He is hurting today. He missed the the last 2 weeks and he walked a lot today so he is hurting more..  Pain reported as 7/10.      Objective            Treatment:   Jefferson received the following manual therapy techniques for 5 minutes:   STM to lumbar paraspinals      Jefferson received therapeutic exercises for 35 minutes including:  Nustep x 6 min Standing UBE 2 min F/2 min B for joint nutrition Lvl  2    Prone hip EX x 15 5 second holds  Prone on elbows x 2 minute  Supine hip FL stretch x 4# weight 90 second hang on each side  Supine hip flexion with legs starting straight Y TB 2 x 10 3 second holds  Leg press 160# 3 x 10-   Knee extension 60# 2 x 10  Knee curl 70# 2 x 10  Chest press 70# 2 x 10  Row 70# 2 x 10     Jefferson participated in dynamic functional therapeutic activities to improve functional performance for 15 minutes, includin rounds   Resisted step out x 10 reps Y TB with yellow pole (staying in place)  Sit<>stands with 1 foam pad x 10 reps without UE support (Y TB at ankles)     Neuromuscular re-education activities to improve: Balance, Coordination, and Proprioception for 0 minutes. The following activities  NA today     CP to lumbar spine in  supine with HOB elevated x 5 minutes.    Time Entry(in minutes):       Assessment & Plan   Assessment:         Patient will continue to benefit from skilled outpatient physical therapy to address the deficits listed in the problem list box on initial evaluation, provide pt/family education and to maximize pt's level of independence in the home and community environment.     Patient's spiritual, cultural, and educational needs considered and patient agreeable to plan of care and goals.           Plan:      Goals:   Active       LTG       Patient will be IND with gym work out and HEP to maintain current progress and improve quality of life.       Start:  04/04/25    Expected End:  05/30/25               STG       Patient will improve tolerance to standing/walking with 2/10 or less increase in adverse symptoms.       Start:  04/04/25    Expected End:  05/02/25                Zaheer Vasquez, PTA

## 2025-05-21 ENCOUNTER — CLINICAL SUPPORT (OUTPATIENT)
Dept: REHABILITATION | Facility: OTHER | Age: 89
End: 2025-05-21
Payer: MEDICARE

## 2025-05-21 DIAGNOSIS — R26.2 DIFFICULTY IN WALKING: Primary | ICD-10-CM

## 2025-05-21 PROCEDURE — 97110 THERAPEUTIC EXERCISES: CPT | Mod: KX

## 2025-05-21 PROCEDURE — 97530 THERAPEUTIC ACTIVITIES: CPT | Mod: KX

## 2025-05-22 ENCOUNTER — OFFICE VISIT (OUTPATIENT)
Dept: PAIN MEDICINE | Facility: CLINIC | Age: 89
End: 2025-05-22
Attending: ANESTHESIOLOGY
Payer: MEDICARE

## 2025-05-22 VITALS
HEART RATE: 58 BPM | TEMPERATURE: 98 F | OXYGEN SATURATION: 97 % | WEIGHT: 207.69 LBS | SYSTOLIC BLOOD PRESSURE: 166 MMHG | BODY MASS INDEX: 27.4 KG/M2 | DIASTOLIC BLOOD PRESSURE: 69 MMHG

## 2025-05-22 DIAGNOSIS — M54.16 LUMBAR RADICULOPATHY: Primary | ICD-10-CM

## 2025-05-22 DIAGNOSIS — Z98.1 S/P FUSION OF SACROILIAC JOINT: ICD-10-CM

## 2025-05-22 DIAGNOSIS — M79.18 MYOFASCIAL PAIN: ICD-10-CM

## 2025-05-22 PROCEDURE — 99214 OFFICE O/P EST MOD 30 MIN: CPT | Mod: S$PBB,GC,, | Performed by: ANESTHESIOLOGY

## 2025-05-22 PROCEDURE — 99214 OFFICE O/P EST MOD 30 MIN: CPT | Mod: PBBFAC | Performed by: ANESTHESIOLOGY

## 2025-05-22 PROCEDURE — 99999 PR PBB SHADOW E&M-EST. PATIENT-LVL IV: CPT | Mod: PBBFAC,,, | Performed by: ANESTHESIOLOGY

## 2025-05-22 RX ORDER — GABAPENTIN 300 MG/1
300 CAPSULE ORAL 3 TIMES DAILY
Qty: 90 CAPSULE | Refills: 2 | Status: SHIPPED | OUTPATIENT
Start: 2025-05-22 | End: 2025-05-28 | Stop reason: SDUPTHER

## 2025-05-22 RX ORDER — OXYCODONE AND ACETAMINOPHEN 10; 325 MG/1; MG/1
1 TABLET ORAL EVERY 4 HOURS PRN
Qty: 90 TABLET | Refills: 0 | Status: SHIPPED | OUTPATIENT
Start: 2025-05-22 | End: 2025-06-21

## 2025-05-22 NOTE — PROGRESS NOTES
Chronic Pain Established Patient           PCP: No, Primary Doctor      Interval History 5/22/2025:  Patient presents for for follow up low back pain. He has undergone SCS and R SI fusion in the past. He continues the [facility] and patient also has gotten a . He says that the relief from the SI fusion is reaching a plateau. The pain is focused to the right buttock area. Patient is able to sleep and wake without pain. The pain worsens with activity and movement. He occasionally has pain that radiates down the posterior right leg, but that is much less frequent than it used to be. This responded very well to his S1 TFESI. Rates the pain at its worse 8/10.     Interval History 2/20/24:   Jefferson HANSON Keagan presents to the clinic for a follow-up appointment for chronic pain complaints. Since the last visit, Jefferson Boogie states the pain has been improving. LCV 11/24 at which time pt was doing very well after S1 TFESI and healhy back program. In the interim, pt has continued [facility]. Today, pt reports functional improvements, but it is slow. Overall, pt is very happy with his progress.     Interval History 11/29/24:   Today the patient returns to clinic for follow up after right S1 TFESI which completely resolved the numbness and tingling in right foot following the Right SIJ fusion ad subsequent finding of narrowing in S1 foramina. He is currently in 3/10 pain, progressing well in the healthy back program however slightly discouraged at his progress with mobility. Currently taking gabapentin and cymbalta, has not used opioid pain medication in quite some time and only would use it if in severe pain but he stated . He denies new onset fever/night sweats, urinary incontinence, bowel incontinence, significant weight changes, significant motor  weakness or changes, or loss of sensations.      Interval History 8/26/2024:  Jefferson Boogie returns to clinic for follow-up after right SIJ fusion. He recently had CT pelvis performed showing new, narrowing of right S1 foramina. He continues to report tingling to the right foot. He denies weakness. He continues Norco 10/325 TID PRN and Hydromorphone 2 mg BID PRN with mild relief. He denies any perceived side effects. He denies recent falls or trauma. He denies new onset fever/night sweats, urinary incontinence, bowel incontinence, significant weight changes, significant motor weakness or changes, or loss of sensations. His pain today is 7/10.      Interval History 8/19/2024:  Jefferson Boogie returns to clinic for follow-up after Right SIJ fusion. He continues to report significant amounts of pain. He has been taking Hydromophone 2 mg daily and Norco 4 times per day without relief. He continues to utilize a rolling walker to help him ambulate. He denies fever, drainage, redness, swelling or any other signs of infection. He denies recent falls or trauma. He denies new onset fever/night sweats, urinary incontinence, bowel incontinence, significant weight changes, significant motor weakness or changes, or loss of sensations. His pain today is 7/10.      Interval History 8/15/2024:  Jefferson Boogie returns to clinic for follow-up after Right SI joint fusion using SILO TFX. He reports significant pain. He continues Hydromorphone 2 mg. He took 1 pill today . He has not taken any Norco today. He is present with his wife. She is wondering when they can start doing things like going to dinner or if he needs to stay at home while he recovers. He denies fever, drainage, redness or swelling from procedure. Site. He denies recent falls or trauma. He denies new onset fever/night sweats, urinary incontinence, bowel incontinence, significant weight changes, significant motor weakness or changes, or loss of sensations. His  pain today is 9/10.      Interval History 6/27/2024:  Patient returns to clinic for follow up. Patient is s/p Right SIJ and R Piriformis injections on 4/10/2024. Patient reports 100% relief from the procedure for 2 days. Pain has recurred and is same as previous description. Sha Ku ([facility]) is also here today for programming of [place] SCS. Patient continues with current medications with some benefit and HEP. Current pain 6/10. Patient denies red flags including weakness, unexpected weight loss/gain, night sweats/fevers, saddle anesthesia, and symptoms of CAREN.     Interval History 3/14/2024:  Jefferson Boogie presents for follow up. He is here today to discuss further options for treatment after right cluneal nerve block on 2/28/2024. He was here for the same reason two weeks ago, and we decided to give the block a bit more time to work before investigating other options. He reports no significant changes in his symptoms. He does say that he can manage his symptoms satisfactorily using 1.5 of his oxycodone  every 3 hours when doing things like golf and walking. He said he may use the medication once or twice a month and denies significant side effects.      Interval History 2/29/24:  Jefferson Boogie presents to the clinic for follow-up. He is s/p right cluneal nerve block 2/28/24. Today, he says that he does not know if he has had any difference in his symptoms so far. We spoke briefly yesterday about additional options, and he is here today for further discussion.      Interval History 01/16/2024:  Jefferson Boogie presents to the clinic for a follow-up appointment for low back, right hip, and right leg pain. Since the last visit, Jefferson Boogie states the pain has been persistant. Currently his right hip and leg pain is his biggest problem. He continues to endorse sharp stabbing pain in his hip and leg. Pain is exacerbated by activity, standing, laying on right side, lifting, bending. Pain is  improved with rest, exercising, stretching, medications. Continues with Celebrex and Cymbalta. Current pain intensity is 6/10. Patient continues to endorse benefit from SCS. Patient denies red flags including weakness, unexpected weight loss/gain, night sweats/fevers, saddle anesthesia, and symptoms of CAREN.     Interval History 8/10/2023:  Patient reports that his pain post right SIJ and piriformis was persistent until it decreased 50% on 7/4/23, but then the pain returned after a few days. He reports that during that period he was able to run after a bus in [facility]. The pain is improved, but not as much as previous in his right buttock and calf. Patient reports that he has been working with the [employer], but not finding the correct programming as of yet.  No fevers, chills, unexplained weight loss.  Hx of bladder cancer in 1967. Remembers falling while skiing and landed on his buttocks 30 years ago, but no other pelvic trauma. Patient was traveling to [place] and recently came back to [place].  We discussed his recent pelvic CT in details especially for the accidental finding of irregular bone lesion. Explained to patient the need to investigate this further with our urology and oncology team.     Interval History 6/22/2023  Jefferson Boogie presents to the clinic for a follow-up appointment for chroniic LBP. Pt was last seen in clinic on 5/25 and scheduled for right SIJ and piriformis injection--performed 6/22. Pt reports 40% relief x 1-2 days. He continues to work with the rep with regards to programming the [place] SCS device. Pt reports that he is getting some decent relief from the device with current program, but feels like there is still some progress to be made. Continues to complain of pain of similar character and quality to that of prior eval. Localized in the right lowerlumbar spine/gluteal area as well as the right lateral leg. Since the last visit, Jefferson Boogie states the pain has been improving. Current  pain intensity is 5/10. He continues to exercise daily with stationary bike and weight training. Continues to take Cymbalta and Celebrex daily. He also takes Norco 10 very sparingly, maybe 1-2 per week. Overall the current regimen of SCS, medications and occasional injections have provided pt with enough relief for him to remain functional, and able to participate in his hobbies.      Interval history 5/25/23:  In the interim he has met with the [employer] reps for reprogramming. He has his good and bad days but feels that the pain is manageable. The reps with [employer] are present for the visit today. Today his pain is a 6.5/10. Still taking percocet and cymbalta (missed a few days), but these medications do not provide much relief. Still pain with walking and sitting, but the pain with laying down is better.      Interval History 5/4/23:  Patient seen today via virtual follow-up after implantation of his [employer] SCS in February. He reports no change in his pain since his last visit. Most of his pain is into his right hip with intermittent radiation into his RLE. He denies any new symptoms. No red flag symptoms. He is scheduled to meet with the [employer] reps from reprogramming next week. He remains optimistic. In discussing his current medications, he has run out of the hydrocodone he was previously taking and has only been taking his Cymbalta once per day rather two.      Interval History 3/30/2023:  Mr Boogie presents 5-6 weeks after implantation of his [employer] SCS system. He reports no significant change in his pains, which are primarily down the RLE.  No constitutional signs symptoms concerning for infection.  No new areas of pain or neurological changes since procedure. No voicing of s/s concerning for cauda equina syndrome. He does endorse improvement in sleep. He is worried that his implant was a failure because his pain has not changed.     Interval History 2/20/2023:  Mr Boogie presents for follow up of [place]  SCS replacement. He states doing well. No constitutional signs symptoms concerning for infection.  No new areas of pain or neurological changes since procedure. No voicing of s/s concerning for cauda equina syndrome.      Interval History 1/23/2023:  Still taking Celebrex, however hasn't noticed a difference in pain with this medication. Location, quality, and severity of pain are unchanged from prior visit and is described above. Here today to discuss removal of [place] SCS, and implant of [place] SCS. He states his stimulator has not been helpful for the past 2 years despite multiple reprogramming and adjustment.     Interval History 11/21/22:  Reports 24 hours of 100% relief for 48 hours, but he reports that now his pain is only approximally 10% better. His pain at rest is 3-4/10. Pain at its worst is 8-9/10. Pain is improved when leaning over a grocery cart. Pain only allows him to walk for 3-4mins.      Interval History 8/29/22:  Jefferson Boogie presents to the clinic for a follow-up appointment for back pain.  He had his second bilateral L3, L4, L5 MBB and reports 100% pain relief. He would like to proceed with RFA.     Interval History 5/26/22:  Jefferson Boogie presents to the clinic for a follow-up appointment for back pain. Since the last visit, Jefferson Boogie states the pain has been stable. Certain postures he can tolerate in the standing position such as leaning on a shopping cart or support to his posterior thighs. He now uses a walker when covering long distances. He continues to play golf despite it aggravating his pain. He receives some benefit from the SCS whereas other interventions have not helped. He remains interested in the [place] device.     Interval History 3/24/22:  Jefferson Boogie presents to the clinic for a follow-up appointment for back pain. Since the last visit, Jefferson Boogie states the pain has been worse than usual. Current pain intensity is 8/10. He continues to report  "benefit with [place] SCS however he has not been able to get in touch with the representative in order to have his settings adjusted. He continues to take occasional norco 7.5 mg with benefit, particularly when he plays golf.     Interval History 2/10/22:  Pat presents today s/p caudal epidural steroid injections on 1/12/22 since then he has not had any relief. He states his pain has been unchanged and is a 6/10 on average with the worst being 8/10 and best is 4/10. He has been taking percocet 5mg when he golfs or plays with his granddaughter.      Interval History 12/30/21:  Jefferson Boogie presents to clinic for follow up appointment s/p Right SI joint and Right piriformis muscle injection under US guidance on 11/29/21. He did not obtain any noticeable relief with this procedure similar to all of his previous injections. His pain is unchanged and constant over the right buttock. He is taking percocet 5mg x 3 on days he is more active, such as playing golf. This helps some, but minimally. Denies any new symptoms or neurological changes. No numbness, tingling, weakness in his lower extremities. Denies bowel/bladder incontinence or saddle anesthesia.      Interval History 11/4/2021:  Jefferson Boogie presents to the clinic for a follow-up appointment for right sided back pain and SI joint pain. Mr. Boogie had a right SI joint injection on 10/13/2021. He did not note significant relief with the injection for any period of time. Pain is still constant over the right buttock and most noticeable when playing golf. He denies any new bowel/bladder incontinence, lower extremity numbness or weakness or saddle anesthesia.     Interval History 8/26/2021:  Jefferson Boogie presents to the clinic for a follow-up appointment for right sided hip pain with right-sided radiculopathy . Since the last visit, Jefferson Boogie states the pain has been "particularly tender today" 7/10. Patient reports playing golf recently and " experienced worsening symptoms the following day. Mr. Boogie is s/p right-side hip joint injection with minimal relief. Patient states he has had relief with previous interventions and is open to RFA.     Interval History 6/10/2021:  Jefferson Boogie presents to the clinic for a follow-up appointment. Since the last visit, Jefferson Boogie states the pain has been stable. Current pain intensity is 3/10. States he is still having pain around SIJ that is affecting his ADL      Interval History 4/15/2021:  The patient returns to clinic today for follow up of back pain. He reports that the swelling above the SI fusion incision has resolved. He denies any fever, chills, or drainage from the incision. He does report benefit since the fusion. He continues to report benefit with Nevro SCS. He reports intermittent shoulder pain at this time. He did receive an injection with Sports Medicine with benefit. He denies any other health changes. His pain today is 2/10.     Interval History 3/25/21:  Mr. Boogie presents to clinic for follow up of bilateral shoulder pain. He is s/p BL subacromial injections on 2/8/21. He reports maximum 20% relief of pain in the Right shoulder. Today the Left shoulder pain is 1/10; Right shoulder pain is 5-7/10.      Interval History 3/22/2021:  The patient returns to clinic today for follow up of back pain. He is s/p right SI fusion on 3/15/2021. He reports some relief at this time. He does report soreness to the incision. He denies any fever, chills, or drainage from incision. He continues to report benefit with Nevro SCS. He denies any other health changes. His pain today is 4/10.     Interval History 1/11/2020:  Patient is here for follow-up of low back pain now s/p sacroiliac RFA on 12/9/20 which provided no benefit and with the new complaint of bilateral anterior shoulder pain R>L. His shoulder pain started about 2 months ago. He describes 9/10 sharp/stinging pain without radiation that  is exacerbated with overhead activity and improved with rest. He started PT about one month ago. He takes oxycodone which provides some relief. He had a blind subacromial steroid injection in the right shoulder with Dr. Ramirez on 10/26/19 which provided some short term relief.     Interval History 11/5/2020:  Jefferson Boogie presents to the clinic for a follow-up appointment for low back pain. Since the last visit, Jefferson Boogie states the pain has been stable. Current pain intensity is 4/10. He had good relief from the SI joint injection that lasted for about a week. Pain has since returned. He also slipped and fell on his buttock a couple of weeks ago and had increased pain in the right buttock after that which is slowly improving. He continues to have variable benefit with the Nevro SCS for intermittent pain in the right leg. He is scheduled to meet with representatives today. He is taking celebrex daily and percocet as needed sparingly. He denies any adverse effects and any other health changes.     Interval History 10/5/2020:  The patient returns to clinic today for follow up of low back pain. He is s/p right SI joint injection on 9/9/2020. He reports 25% relief of his right sided low back and buttock pain. He did have good relief the first two days. He continues to report right sided low back and buttock pain. He denies any radicular leg pain. His pain is worse with prolonged standing and walking. He continues to report intermittent pain into his achilles from previous injury. He feels as though this has changed his gait. He continues to report some benefit with Nevro SCS device. He is in contact with representatives. He is currently taking Percocet as needed sparingly with some benefit. He denies any adverse effects. He denies any other health changes. His pain today is 4/10.      Interval History 9/2/2020:  The patient returns to clinic today for follow up of back pain. He reports that his back pain  has improved since last audio visit. He continues to report back pain with intermittent radiating pain into his right hip and calf. He has spoken with Bienvenido from [employer] via phone with some programming. He is meeting with Bienvenido today. He had some issues with charging but this has improved. He is currently taking Norco intermittently but this is only lasts 2-3 hours. He denies any adverse effects. He denies any other health changes. His pain today is 8/10.     Interval History 08/27/2020:  Pt was contacted over virtual audio for a follow up appointment.  He is currently complaining of right hip and right calf with no associated numbness or weakness.  He continues to use his Nevro device.  He states it has been very frustrating. Inconsistent.  Took 20 mins to 1 hour to charge.  Couldn't get it to start this morning.  He states that there is no drainage at the battery site, no signs of infection or pain at the site.  Patient is not taking medications at this time.  He wants to speak about medication options because he would like to start playing golf again.     Interval History 8/17/2020:  The patient returns to clinic today for post op wound check. He continues to report benefit with Nevro device. He denies any fever, chills, or drainage. He continues to follow his activity restrictions. He does report a recent achilles tendon injury while swimming. He is seeing Orthopedics. He denies any other health changes. His pain today is 4/10.     Interval History 8/3/2020:  The patient returns to clinic today for post op. He is s/p Nevro battery change on 7/27/2020. He denies any fever, chills, or drainage from incision. He has not completed his antibiotics as he missed two days of the medication. He continues to follow his activity restrictions. He reports relief with the device. He is meeting with Bienvenido today for programming. He denies any other health changes. His pain today is 2/10     Interval History 7/22/2020:  Pt presents  for audio follow up only s/p Right SI joint injection on 7/8/2020. Pt states he has near resolution of focal pain to buttock. He is pleased with result and pain relief. Pt has been in contact with [employer] and will be having battery swap in 5 days. He has difficulty whether stimulator is beneficial and has even had a holiday from using SCS.  He denies any newer areas pain, denies any neuro changes, meds area adequate to control pain without adverse side effects. Pain is 2/10 today.     Interval HPI 6/15/2020:  Jefferson Boogie presents to the clinic for a follow-up appointment for 3 week follow up right hip and right thigh pain. Since the last visit, Jefferson Boogie states the pain has been persistant. Current pain intensity is 5/10. Patient has been working with Bienvenido Varghese, to try and get better coverage of a localized pain that he still experiences in his right low back area. He does report improvement in symptoms of numbness/tingling. Today, worse pain is 1/10 in severity and localizes to the right SI joint. Pain is worse with extension of his back. It is worse with golfing. Pain relieved by Norco--he takes 1-2 tablets of 5-325 Norco on days that he plays golf. Pain is also improved with being still and not being active. Patient endorses a much more active lifestyle with Norco. Denies new weakness/numbness or bowel/bladder changes.     Previous injection history includes a series of 3 lumbar CHOCO at [facility].      Interval HPI 3/5/20:  Patient presents to the clinic for a follow-up appointment for low back pain. Since the last visit, he states his pain has been unchanged. Current pain intensity is 4/10. He continues to work with [employer] to adjust settings on his SCS. He has stopped using tramadol, and uses norco sparingly. He continues to work with a  for physical therapy.      Interval HPI 1/9/2020:  Patient returns for follow up s/p [place] SCS. He states he is unsure if it has helped his  pain since he had it implanted. He last had an adjustment of his programming about 1 month ago. He does note that once he is done charging his SCS his pain is improved. Pain still worse with prolonged standing and activity such as golf. He still is doing home exercise program. He says that he is trying to do more since getting the SCS. He is still taking the Tramadol with limited pain relief. He takes Tramadol 50 mg twice daily only on days of activity which is once a week. No other health changes.      HPI 11/20/19  Jefferson Boogie returns to clinic today for follow up. He reports increased low back and leg pain over the last few days. He has been in contact with [employer] representatives for programming adjustments. He does report benefit with the device. He reports good days and bad days. His pain is worse with prolonged standing and activity. He continues to participate in a home exercise routine. He does take Tramadol sparingly but reports limited relief. He denies any other health changes. His pain today is 5/10.         5/22/2025    11:53 AM   Last 3 PDI Scores   Pain Disability Index (PDI) 45     Pain Medications:     Cymbalta  Gabapentin 600 mg TID        Opioid Contract: yes      report:  Reviewed and consistent with medication use as prescribed.     PT: no    Chiro:  HEP in the past 6 months: patient preforming daily HEP with limited benefit      Interventional Pain Procedures:   9/9/2020 - Right SI joint injection  7/27/2020 - [place]ro SCS battery change  7/8/2020 - Right SIJ injection >80% relief   8/26/19 - SCS implant  8/5/19 - SCS trial  7/8/2020 - Right SI joint injection  7/27/2020 - SCS battery revision  9/9/2020 - Right SI joint injection   12/9/2020 - Right L5-S1 RFA  3/15/2021 - Right SI fusion  7/21/2021 - Injection R Hip - minimal relief  10/13/2021 - Right SI joint injection  11/29/2021 - R SI joing and R piriformis muscle injection - no relief   1/12/2022 - Caudal CHOCO- no relief   8/24/22 -  Diagnostic Bilateral L3, L4 and L5 Lumbar Medial Branch Block under Fluoroscopy  09/21/22 - Right L3, L4, L5 RFA  10/12/22 -  Left L3, L4, L5 RFA  2/13/23 - [place] SCS implant  5/31/23 - Right SIJ and piriformis injection   2/28/24 -  Right cluneal nerve block  4/10/24 - Right SIJ and piriformis injection  8/12/24 - Right SIJ fusion  8/28/24 - right S1 TFESI 80% relief tingling in right foot        IMAGING:       CT PELVIS WITHOUT CONTRAST     CLINICAL HISTORY:  Pelvis pain, stress fracture suspected, neg xray;  Arthrodesis status     TECHNIQUE:  1.25 mm axial images of the pelvis obtained, coronal and sagittal images reformatted.     COMPARISON:  None     FINDINGS:  Percutaneous Posterior right  Sacroiliac Joint Fusion using SILO TFX transfixing device 08/12/2024.     New, moderate narrowing of the right S1 foramina by osseous density extending to the foramina, could cause symptoms referable to a right S1 neuropathy, to correlate clinically.     The bilateral sacroiliac joints appear within normal limits.     The bilateral hip joints appear normal.  No advanced degenerative change.     Mild osseous hypertrophy at the pubic symphysis.     The intrapelvic content demonstrate significant diverticulosis coli.  Moderate stool retention.  Mild bladder wall thickening possibly due to nondistention.  The prostate is not enlarged.  The abdominal wall and inguinal regions appear normal.     Degenerative disc disease, disc space narrowing and vacuum disc phenomenon L4-5 L5-S1, unchanged.     Impression:     New, moderate-severe narrowing of the right S1 foramina, by osseous density extending in the right foramina, could impinge on the exiting right S1 nerve root, to correlate with patient's symptoms.     Significant sigmoid diverticulosis.     This report was flagged in Epic as abnormal.        Electronically signed by:Ammy Nance MD  Date:                                            08/21/2024  Time:                                            16:45     XR SACRUM AND COCCYX     CLINICAL HISTORY:  Arthrodesis status     FINDINGS:  Sacrum coccyx three views.     There is hardware status post right SI joint arthrodesis.  No complication seen.  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        Electronically signed by:Aki Amaya MD  Date:                                            08/16/2024  Time:                                           08:19     XR HIPS BILATERAL 2 VIEW INCL AP PELVIS     CLINICAL HISTORY:  Arthrodesis status     FINDINGS:  Hips bilateral two views to include pelvis.     Right: No fracture dislocation bone destruction seen.     Left: No fracture dislocation bone destruction seen.        Electronically signed by:Aki Amaya MD  Date:                                            08/16/2024  Time:                                           08:17        EXAMINATION:  CT PELVIS WITHOUT CONTRAST     CLINICAL HISTORY:  History fo percutanous SI fusion.;  Chronic pain syndrome     TECHNIQUE:  CT of the pelvis, without intravenous contrast.     COMPARISON:  None     FINDINGS:  BONE: Diffuse osteopenia.  No fracture or osteonecrosis.  Few lytic and sclerotic lesions scattered throughout the pelvis, including a lesion in the right iliac bone with irregularity of the overlying cortex (2:103).     JOINT: Postoperative changes from right sacroiliac joint fusion.  The implant is in satisfactory position.  No osseous fusion across the joint space.     Degenerative changes both sacroiliac joints.  No erosions or ankylosis.  Degenerative changes also involve the lower lumbar spine, both hip joints, and pubic symphysis.  Chondrocalcinosis of the pubic symphysis.  No significant joint effusion.     SOFT TISSUE: Normal muscle bulk. Regional tendons are intact.  Calcific tendinopathy involving the proximal hamstring tendons bilaterally.  No bursal collection.     MISCELLANEOUS: Circumferential bladder wall thickening.  Prostatic calcifications.   Colonic diverticulosis.  Atherosclerosis.     Impression:     Postoperative changes in the right sacroiliac joint.  No osseous fusion.     Few lytic and sclerotic lesions scattered throughout the pelvis, concerning for metastases or myeloma.  No prior studies are available for comparison.     Bladder wall thickening, which could be secondary to outlet obstruction or cystitis.  Correlate with urinalysis.     Other findings as described.     This report was flagged in Epic as abnormal.     6/10/2021 - X ray Hips Bilateral: No radiographic evidence of acute osseous abnormality of the pelvis and hips and without radiographic evidence of osteonecrosis of the femoral heads.     9/18/21 MRI L-spine:  FINDINGS:  Lumbar sagittal alignment is slightly is straightened.  There is slight convex right curvature lumbar spine.  There is degenerative disc disease with intervertebral disc height loss and endplate degenerative change at all levels with scattered disc desiccation allowing for degenerative change the lumbar vertebral body heights and contours are within normal is without evidence for acute fracture or subluxation.  There is heterogeneous T1/T2 signal focus within the right aspect of the S1 vertebra most compatible with hemangioma this measures 2.0 cm.     The distal spinal cord and conus is normal in signal and contour tip of the conus approximates the T12/L1 level.  Please note there is artifact from indwelling spinal stimulator device with leads partially visualized casting artifact entering the dorsal epidural space at the T12 level and extending cranially.     There is abnormal clumping configuration of the filum terminalis a from T12 through L5 with slight thickening of scattered nerve roots most compatible with arachnoiditis.     No aneurysmal dilatation of the visualized abdominal aorta.     T12/L1 through L1/L2: No significant disc bulge, central canal or neural foraminal stenosis.     L2/L3: Posterior disc  osteophyte with facet joint arthropathy without significant central canal stenosis with mild bilateral neural foraminal stenosis.     L3/L4:Bulging disc with ligamentum flavum hypertrophy without significant central canal stenosis with mild bilateral neural foraminal stenosis.     L4/L5:Posterior disc osteophyte with ligamentum flavum hypertrophy and facet joint arthropathy without significant central canal stenosis and mild bilateral neural foraminal stenosis.     L5/S1: Posterior disc osteophyte with facet joint arthropathy without significant central canal stenosis with moderate right and mild left neural foraminal stenosis.     This report was flagged in Epic as abnormal.     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  Please note there is spinal stimulator device with leads partially visualized and distorting the study by artifacts.     There is abnormal thickening of the filum configuration most compatible with arachnoiditis.     Please see above for additional details.     Thoracic spine MRI:  FINDINGS: Thoracic vertebral body height and alignment are maintained with a few small scattered Schmorl's nodes involving the endplates of several mid to lower thoracic vertebra. The T3-4 vertebra are partially fused. There is some degree of desiccation of all of the thoracic discs with multilevel disc space narrowing, especially at the T7-T8, T6-T7 and T5-T6 levels. There is no abnormal prevertebral soft tissue thickening. The somewhat heterogeneous appearance to the signal from the thoracic vertebra is presumably secondary to an admixture of red and yellow marrow.    T1-T2 level: There is no bony foraminal narrowing or bony central canal stenosis and the posterior disc margin is unremarkable.    T2-T3 level: There is no bony foraminal narrowing or bony central canal stenosis and the posterior disc margin is unremarkable.    T3-T4 level: There is no bony foraminal narrowing  or bony central canal stenosis and the posterior disc margin is unremarkable.    T4-5 level: There is no bony foraminal narrowing or bony central canal stenosis and the posterior disc margin is unremarkable.    T5-T6 level: There is no bony foraminal narrowing or bony central canal stenosis and the posterior disc margin is unremarkable.    T6-T7 level: There is no bony foraminal narrowing or bony central canal stenosis and the posterior disc margin is unremarkable.    T7-T8 level: There is no bony foraminal narrowing or bony central canal stenosis and the posterior disc margin is unremarkable.    T8-T9 level: There is no bony foraminal narrowing or bony central canal stenosis and the posterior disc margin is unremarkable.    T9-T10 level: There is no bony foraminal narrowing or bony central canal stenosis and the posterior disc margin is unremarkable.    T10-T11 level: There is no bony foraminal narrowing or bony central canal stenosis and the posterior disc margin is unremarkable.    T11-T12 level: There is no bony foraminal narrowing or bony central canal stenosis and the posterior disc margin is unremarkable.    T12-L1 level: The tip the conus medullaris extends down to level of the superior endplate of L1. 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 There is no bony foraminal narrowing or bony central canal stenosis at this level.    On the axial images, the immediate paravertebral soft tissues are unremarkable. A small cyst is seen to involve the upper pole the left kidney.    Following contrast administration, there is no abnormal enhancement of any bony or soft tissue elements of the thoracic spine. There is no abnormal enhancement of the subserosal surface of the thoracic spinal cord. Some foci of mild signal intensity in the CSF dorsal to the spinal cord of some of the sagittal sequences presumably is secondary to CSF pulsation artifact.    On the sagittal   image, there is cervical spondylosis and kyphosis with narrowing of all of the disc spaces in the cervical spine.        Past Medical History:   Diagnosis Date    Bronchitis     Cancer     stage I bladder cancer 50 yrs ago    Hypercholesteremia     Hypertension     Sacroiliitis 07/08/2020    Thyroid disease      Past Surgical History:   Procedure Laterality Date    BLADDER SURGERY      CATARACT EXTRACTION      CATARACT EXTRACTION W/  INTRAOCULAR LENS IMPLANT Right 3/9/2022    Procedure: EXTRACTION, CATARACT, WITH IOL INSERTION;  Surgeon: Bell Cedeno MD;  Location: [facility] OR;  Service: Ophthalmology;  Laterality: Right;    COLONOSCOPY      EPIDURAL STEROID INJECTION N/A 1/12/2022    Procedure: INJECTION, STEROID, EPIDURAL CAUDAL With Catheter needs consent;  Surgeon: Samuel Jimenez MD;  Location: [facility] PAIN MGT;  Service: Pain Management;  Laterality: N/A;    EYE SURGERY      cataracts     FUSION OF SACROILIAC JOINT Right 3/15/2021    Procedure: FUSION, RIGHT SACROILIAC JOINT FUSION;  Surgeon: Samuel Jimenez MD;  Location: [facility] OR;  Service: Pain Management;  Laterality: Right;  C-ARM, PAINTEQ REP     FUSION OF SACROILIAC JOINT Right 8/12/2024    Procedure: FUSION, SACROILIAC JOINT;  Surgeon: Samuel Jimenez MD;  Location: [facility] OR;  Service: Pain Management;  Laterality: Right;    HERNIA REPAIR      INJECTION OF ANESTHETIC AGENT AROUND NERVE Bilateral 6/1/2022    Procedure: BLOCK, NERVE MEDIAL BRANCH L3,4,5 BILATERAL 1st;  Surgeon: Samuel Jimenez MD;  Location: [facility] PAIN MGT;  Service: Pain Management;  Laterality: Bilateral;    INJECTION OF ANESTHETIC AGENT AROUND NERVE Bilateral 8/24/2022    Procedure: Block, Nerve Kenny L3, L4, & L5;  Surgeon: Samuel Jimenez MD;  Location: [facility] PAIN MGT;  Service: Pain Management;  Laterality: Bilateral;    INJECTION OF ANESTHETIC AGENT AROUND NERVE Right 2/28/2024    Procedure: BLOCK, NERVE RIGHT CLUNEAL;  Surgeon: Samuel Jimenez MD;  Location:  [facility] [place] PAIN MGT;  Service: Pain Management;  Laterality: Right;  [phone]    INJECTION OF JOINT Right 7/8/2020    Procedure: INJECTION, JOINT, SACROILIAC (SI);  Surgeon: Samuel Jimenez MD;  Location: [facility] PAIN MGT;  Service: Pain Management;  Laterality: Right;    INJECTION OF JOINT Right 9/9/2020    Procedure: INJECTION, JOINT, SACROILIAC (SI);  Surgeon: Samuel Jimenez MD;  Location: [facility] [place] PAIN MGT;  Service: Pain Management;  Laterality: Right;    INJECTION OF JOINT Right 7/21/2021    Procedure: INJECTION, JOINT, HIP RIGHT;  Surgeon: Samuel Jimenez MD;  Location: [facility] [place] PAIN MGT;  Service: Pain Management;  Laterality: Right;  CONSENT NEEDED    INJECTION OF JOINT Right 10/13/2021    Procedure: INJECTION, JOINT, SACROILIAC (SI)  NEED CONSENT pt. requesting sedation;  Surgeon: Samuel Jimenez MD;  Location: [facility] [place] PAIN MGT;  Service: Pain Management;  Laterality: Right;    INJECTION OF JOINT Right 4/10/2024    Procedure: INJECTION, JOINT RIGHT SI AND RIGHT PIRIFORMIS;  Surgeon: Samuel Jimenez MD;  Location: [facility] [place] PAIN MGT;  Service: Pain Management;  Laterality: Right;  [phone]    INJECTION, SACROILIAC JOINT Right 5/31/2023    Procedure: INJECTION,SACROILIAC JOINT, RIGHT SI AND RIGHT PIRIFORMIS;  Surgeon: Samuel Jimenez MD;  Location: [facility] [place] PAIN MGT;  Service: Pain Management;  Laterality: Right;    KNEE SURGERY      RADIOFREQUENCY ABLATION Right 12/9/2020    Procedure: RADIOFREQUENCY ABLATION, L5-S1-S2 LATERAL BRANCH COOLED;  Surgeon: Samuel Jimenez MD;  Location: [facility] [place] PAIN MGT;  Service: Pain Management;  Laterality: Right;    RADIOFREQUENCY ABLATION Right 9/21/2022    Procedure: RADIOFREQUENCY ABLATION, RIGHT L3-L4-L5 PER DR JIMENEZ;  Surgeon: Samuel Jimenez MD;  Location: [facility] [place] PAIN MGT;  Service: Pain Management;  Laterality: Right;    RADIOFREQUENCY ABLATION Left 10/12/2022    Procedure: RADIOFREQUENCY ABLATION, LEFT L3-L4-L5 TWO OF TWO;  Surgeon: Samuel Jimenez MD;   Location: [facility] PAIN MGT;  Service: Pain Management;  Laterality: Left;    REPLACEMENT OF NERVE STIMULATOR BATTERY N/A 7/27/2020    Procedure: Replacement, SPINAL CORD STIMULATOR BATTERY CHANGE TO NEVRO OMNION BATTERY;  Surgeon: Samuel Jimenez MD;  Location: [facility] OR;  Service: Pain Management;  Laterality: N/A;  C-ARM, NEVRO REP    REPLACEMENT OF SPINAL CORD STIMULATOR  2/13/2023    Procedure: REPLACEMENT, SPINAL CORD STIMULATOR;  Surgeon: Samuel Jimenez MD;  Location: [facility] OR;  Service: Pain Management;;    REVISION PROCEDURE INVOLVING SPINAL CORD NEUROSTIMULATOR N/A 2/13/2023    Procedure: SPINAL CORD STIMULATOR EXPLANT AND REIMPLANT SALUDA REP;  Surgeon: Samuel Jimenez MD;  Location: [facility] OR;  Service: Pain Management;  Laterality: N/A;    TRANSFORAMINAL EPIDURAL INJECTION OF STEROID Right 8/28/2024    Procedure: LUMBAR TANSFORAMINAL RIGHT S1 MEDICALLY URGENT *ASPIRIN OTC*;  Surgeon: Samuel Jimenez MD;  Location: [facility] PAIN MGT;  Service: Pain Management;  Laterality: Right;  STAT  [phone]  *SCHEDULE ON 8/28 PER MG    TRIAL OF SPINAL CORD NERVE STIMULATOR N/A 8/5/2019    Procedure: Trial, Neurostimulator, SPINAL CORD STIMULATOR TRIAL;  Surgeon: Samuel Jimenez MD;  Location: [facility] CATH LAB;  Service: Pain Management;  Laterality: N/A;  C-ARM, NEVRO REP     Social History[1]  No family history on file.    Review of patient's allergies indicates:  No Known Allergies    Current Outpatient Medications   Medication Sig    ALPRAZolam (XANAX) 2 MG Tab     amLODIPine (NORVASC) 5 MG tablet     ascorbic acid, vitamin C, (VITAMIN C) 1000 MG tablet Take 1,000 mg by mouth.    aspirin (ECOTRIN) 81 MG EC tablet Take 1 tablet by mouth once daily.    DULoxetine (CYMBALTA) 30 MG capsule Take 1 capsule (30 mg total) by mouth 2 (two) times daily.    ergocalciferol, vitamin D2, (VITAMIN D ORAL) Take by mouth every 30 days.    finasteride (PROSCAR) 5 mg tablet Take 1 tablet by mouth once daily.    gabapentin  (NEURONTIN) 600 MG tablet TAKE 1 TABLET THREE TIMES A DAY    irbesartan (AVAPRO) 75 MG tablet 150 mg once daily.     levothyroxine (SYNTHROID) 100 MCG tablet Take 100 mcg by mouth.    promethazine-dextromethorphan (PROMETHAZINE-DM) 6.25-15 mg/5 mL Syrp     simvastatin (ZOCOR) 20 MG tablet Take 20 mg by mouth every evening.    tadalafil (CIALIS) 20 MG Tab     temazepam (RESTORIL) 30 mg capsule TK 1 C PO QD HS    celecoxib (CELEBREX) 200 MG capsule  (Patient not taking: Reported on 9/23/2024)    ergocalciferol (DRISDOL) 200 mcg/mL (8,000 unit/mL) Drop Take by mouth.    meloxicam (MOBIC) 15 MG tablet Take 15 mg by mouth. (Patient not taking: Reported on 5/22/2025)    mirabegron (MYRBETRIQ ORAL) Take by mouth.     No current facility-administered medications for this visit.     Facility-Administered Medications Ordered in Other Visits   Medication    balanced salt irrigation intra-ocular solution 1 drop    sodium chloride 0.9% flush 2 mL         ROS:  GENERAL: No fever. No chills. No fatigue. Denies weight loss. Denies weight gain.  HEENT: Denies headaches. Denies vision change. Denies eye pain. Denies double vision. Denies ear pain.   CV: Denies chest pain.   PULM: Denies of shortness of breath.  GI: Denies constipation. No diarrhea. No abdominal pain. Denies nausea. Denies vomiting. No blood in stool.  HEME: Denies bleeding problems.  : Denies urgency. No painful urination. No blood in urine.  MS: Denies joint stiffness. Denies joint swelling.    SKIN: Denies rash.   NEURO: Denies seizures. No weakness.  PSYCH:  Denies difficulty sleeping. No anxiety. Denies depression. No suicidal thoughts.       VITALS:   Vitals:    05/22/25 1151   BP: (!) 166/69   Pulse: (!) 58   Temp: 97.5 °F (36.4 °C)   SpO2: 97%   Weight: 94.2 kg (207 lb 10.8 oz)   PainSc:   6   PainLoc: Back         PHYSICAL EXAM:   GENERAL: Well appearing, in no acute distress, alert and oriented x3.  PSYCH:  Mood and affect appropriate.  SKIN: Skin color,  texture, turgor normal, no rashes or lesions.  HEENT:  Normocephalic, atraumatic. Cranial nerves grossly intact.  NECK:   Negative for pain to palpation over the cervical paraspinous muscles.   Negative for pain to palpation over facets.  PULM: No evidence of respiratory difficulty, symmetric chest rise.  GI:  Non-distended  BACK:   Normal range of motion, limited 2/2 pain.   Positive axial loading test bilateral. Positive tenderness over both SIJ with positive thigh and sacral thrust test, Positive FABERE,Ganselin and Yeoman's test on the both side.negative FADIR    EXTREMITIES:   No deformities, edema, or skin discoloration.   Shoulder, hip, and knee provocative maneuvers are negative. No atrophy is noted.   FADIR was Negative Bilaterally.   NEURO: Sensation is equal and appropriate bilaterally. Bilateral upper and lower extremity strength is normal and symmetric. Bilateral upper and lower extremity coordination and muscle stretch reflexes are physiologic and symmetric. Plantar response are downgoing. Straight leg raising in the supine position is POSITIVE on RIGHT to radicular pain.   GAIT: Antalgic gait              ASSESSMENT: 89 y.o. year old with lower back pain, consistent with:    1. Lumbar radiculopathy  Procedure Request Order for Pain Management    oxyCODONE-acetaminophen (PERCOCET)  mg per tablet      2. S/P fusion of sacroiliac joint  X-Ray Hips Bilateral 2 View Incl AP Pelvis      3. Myofascial pain                  PLAN:    Patient Education:  Discussed the importance of physical therapy, activity modification, and a home exercise plan to help with pain and improve health.  Therapy referral:  Continue with [facility] and    Medications:   Patient can continue with pain medications for now per their provider since they are providing benefits, using them appropriately, and without side effects.  Prescribe the patient Oxycodone today. Instructed patient to not take this  medication with any other opiate. Maximum of three pills per day.   Encouraged patient to take Gabapentin that is prescribed as written.   Schedule pain intervention for: R S1 TFESI   Future consideration: Repeat imaging   Counseled patient: regarding the importance of activity modification, constant sleeping habits, and physical therapy.  Return to clinic: with RAMAN after pain procedure          Elio Smith I have personally reviewed the history and exam of this patient and agree with the resident/fellow/NPs note as stated above.    Samuel Jimenez MD    2025             [1]   Social History  Socioeconomic History    Marital status:    Tobacco Use    Smoking status: Former     Current packs/day: 0.00     Types: Cigarettes     Quit date:      Years since quittin.4    Smokeless tobacco: Never    Tobacco comments:     stopped 40 years ago   Substance and Sexual Activity    Alcohol use: Yes     Alcohol/week: 1.0 standard drink of alcohol     Types: 1 Shots of liquor per week     Comment: nightly -scotch    Drug use: Never    Sexual activity: Yes     Partners: Female     Social Drivers of Health     Financial Resource Strain: Low Risk  (2025)    Overall Financial Resource Strain (CARDIA)     Difficulty of Paying Living Expenses: Not hard at all   Food Insecurity: No Food Insecurity (2025)    Hunger Vital Sign     Worried About Running Out of Food in the Last Year: Never true     Ran Out of Food in the Last Year: Never true   Transportation Needs: No Transportation Needs (2025)    PRAPARE - Transportation     Lack of Transportation (Medical): No     Lack of Transportation (Non-Medical): No   Physical Activity: Sufficiently Active (2025)    Exercise Vital Sign     Days of Exercise per Week: 4 days     Minutes of Exercise per Session: 40 min   Stress: No Stress Concern Present (2025)    [employer] [place] [employer] - Occupational Stress Questionnaire      Feeling of Stress : Not at all   Housing Stability: Low Risk  (2/11/2025)    Housing Stability Vital Sign     Unable to Pay for Housing in the Last Year: No     Homeless in the Last Year: No

## 2025-05-26 ENCOUNTER — APPOINTMENT (OUTPATIENT)
Dept: RADIOLOGY | Facility: OTHER | Age: 89
End: 2025-05-26
Attending: STUDENT IN AN ORGANIZED HEALTH CARE EDUCATION/TRAINING PROGRAM
Payer: MEDICARE

## 2025-05-26 DIAGNOSIS — Z98.1 S/P FUSION OF SACROILIAC JOINT: ICD-10-CM

## 2025-05-26 PROCEDURE — 73521 X-RAY EXAM HIPS BI 2 VIEWS: CPT | Mod: 26,,, | Performed by: STUDENT IN AN ORGANIZED HEALTH CARE EDUCATION/TRAINING PROGRAM

## 2025-05-26 PROCEDURE — 73521 X-RAY EXAM HIPS BI 2 VIEWS: CPT | Mod: TC,PN

## 2025-05-28 ENCOUNTER — TELEPHONE (OUTPATIENT)
Dept: PAIN MEDICINE | Facility: CLINIC | Age: 89
End: 2025-05-28
Payer: MEDICARE

## 2025-05-28 RX ORDER — GABAPENTIN 300 MG/1
300 CAPSULE ORAL 3 TIMES DAILY
Qty: 90 CAPSULE | Refills: 2 | Status: SHIPPED | OUTPATIENT
Start: 2025-05-28 | End: 2025-08-26

## 2025-05-28 NOTE — TELEPHONE ENCOUNTER
----- Message from [facility] sent at 5/28/2025 10:35 AM CDT -----  Name of Caller:karly Toro Name:[facility] scriptsPrescription Name:gabapentin (NEURONTIN) 300 MG capsuleWhat do they need to clarify? Directions Can you be contacted via [facility]?call Best Call Back Number:.[phone] Information:

## 2025-06-02 ENCOUNTER — HOSPITAL ENCOUNTER (OUTPATIENT)
Facility: OTHER | Age: 89
Discharge: HOME | End: 2025-06-02
Attending: ANESTHESIOLOGY | Admitting: ANESTHESIOLOGY
Payer: MEDICARE

## 2025-06-02 ENCOUNTER — TELEPHONE (OUTPATIENT)
Dept: PAIN MEDICINE | Facility: CLINIC | Age: 89
End: 2025-06-02
Payer: MEDICARE

## 2025-06-02 VITALS
HEIGHT: 73 IN | DIASTOLIC BLOOD PRESSURE: 70 MMHG | WEIGHT: 185 LBS | HEART RATE: 58 BPM | OXYGEN SATURATION: 96 % | TEMPERATURE: 98 F | SYSTOLIC BLOOD PRESSURE: 168 MMHG | BODY MASS INDEX: 24.52 KG/M2 | RESPIRATION RATE: 18 BRPM

## 2025-06-02 DIAGNOSIS — M54.16 LUMBAR RADICULOPATHY: Primary | ICD-10-CM

## 2025-06-02 DIAGNOSIS — G89.29 CHRONIC PAIN: ICD-10-CM

## 2025-06-02 PROCEDURE — 64483 NJX AA&/STRD TFRM EPI L/S 1: CPT | Mod: RT,,, | Performed by: ANESTHESIOLOGY

## 2025-06-02 PROCEDURE — 63600175 PHARM REV CODE 636 W HCPCS: Performed by: ANESTHESIOLOGY

## 2025-06-02 PROCEDURE — 25500020 PHARM REV CODE 255: Performed by: ANESTHESIOLOGY

## 2025-06-02 PROCEDURE — 64483 NJX AA&/STRD TFRM EPI L/S 1: CPT | Mod: RT | Performed by: ANESTHESIOLOGY

## 2025-06-02 RX ORDER — LIDOCAINE HYDROCHLORIDE 10 MG/ML
INJECTION, SOLUTION EPIDURAL; INFILTRATION; INTRACAUDAL; PERINEURAL
Status: DISCONTINUED | OUTPATIENT
Start: 2025-06-02 | End: 2025-06-02 | Stop reason: HOSPADM

## 2025-06-02 RX ORDER — DEXAMETHASONE SODIUM PHOSPHATE 10 MG/ML
INJECTION, SOLUTION INTRA-ARTICULAR; INTRALESIONAL; INTRAMUSCULAR; INTRAVENOUS; SOFT TISSUE
Status: DISCONTINUED | OUTPATIENT
Start: 2025-06-02 | End: 2025-06-02 | Stop reason: HOSPADM

## 2025-06-02 RX ORDER — LIDOCAINE HYDROCHLORIDE 20 MG/ML
INJECTION, SOLUTION INFILTRATION; PERINEURAL
Status: DISCONTINUED | OUTPATIENT
Start: 2025-06-02 | End: 2025-06-02 | Stop reason: HOSPADM

## 2025-06-02 RX ORDER — SODIUM CHLORIDE 9 MG/ML
INJECTION, SOLUTION INTRAVENOUS CONTINUOUS
Status: DISCONTINUED | OUTPATIENT
Start: 2025-06-02 | End: 2025-06-02 | Stop reason: HOSPADM

## 2025-06-02 RX ORDER — FENTANYL CITRATE 50 UG/ML
INJECTION, SOLUTION INTRAMUSCULAR; INTRAVENOUS
Status: DISCONTINUED | OUTPATIENT
Start: 2025-06-02 | End: 2025-06-02 | Stop reason: HOSPADM

## 2025-06-02 RX ORDER — MIDAZOLAM HYDROCHLORIDE 1 MG/ML
INJECTION INTRAMUSCULAR; INTRAVENOUS
Status: DISCONTINUED | OUTPATIENT
Start: 2025-06-02 | End: 2025-06-02 | Stop reason: HOSPADM

## 2025-06-06 ENCOUNTER — CLINICAL SUPPORT (OUTPATIENT)
Dept: REHABILITATION | Facility: OTHER | Age: 89
End: 2025-06-06
Payer: MEDICARE

## 2025-06-06 DIAGNOSIS — R26.2 DIFFICULTY IN WALKING: Primary | ICD-10-CM

## 2025-06-06 PROCEDURE — 97110 THERAPEUTIC EXERCISES: CPT | Mod: KX

## 2025-06-06 PROCEDURE — 97530 THERAPEUTIC ACTIVITIES: CPT | Mod: KX

## 2025-06-27 NOTE — PROGRESS NOTES
"  Outpatient Rehab    Physical Therapy Visit    Patient Name: Jefferson Boogie  MRN: [id number]  YOB: 1935  Encounter Date: 6/6/2025    Therapy Diagnosis:   Encounter Diagnosis   Name Primary?    Difficulty in walking Yes     Physician: Samuel Jimenez MD    Physician Orders: Eval and Treat  Medical Diagnosis: Sacroiliitis  Chronic pain syndrome  Spondylosis without myelopathy  Surgical Diagnosis: Not applicable for this Episode   Surgical Date: Not applicable for this Episode  Days Since Last Surgery: Not applicable for this Episode    Visit # / Visits Authorized:  33 / 40  Insurance Authorization Period: 1/1/2025 to 12/31/2025  Date of Evaluation: 9/24/2024  Plan of Care Certification: 5/21/2025 to 7/21/2025      PT/PTA: PT   Number of PTA visits since last PT visit:0  Time In: 1330   Time Out: 1430  Total Time (in minutes): 60   Total Billable Time (in minutes): 30    FOTO:  Intake Score:  %  Survey Score 2:  %  Survey Score 3:  %    Precautions:       Subjective   He is trying to go to the gym and see a  multiple times a week. He continues to have pain and would like to complete a few more visits before being discharged.  Pain reported as 5/10.      Objective            Treatment:     Jefferson received therapeutic exercises for 30 minutes including:  Nustep x 6 min Standing UBE 2 min F/2 min B for joint nutrition Lvl  2     Standing golf swings with back against wall 2 x 15 reps  Supine bridge 2 x 10 5 second holds +GTB- NP  Prone hip EX x 10 5 second holds  Prone on elbows x 2 minute  Supine hip FL stretch x 4# weight 90 second hang on each side  Supine hip flexion with legs starting straight Y TB 2 x 10 3 second holds  Leg press 150# 3 x 10     Jefferson participated in dynamic functional therapeutic activities to improve functional performance for 25 minutes, including:     Tandem stance 3x30"  Heel raises 2x10  Step ups 6" 3x10ea  NBOS on airex 3x1'     3 rounds  Resisted sidestepping RTB " with yellow pole  Sit<>stands with 1 foam pad x 10 reps without UE support (RTB at ankles)  Time Entry(in minutes):  Hot/Cold Pack Time Entry: 5  Therapeutic Activity Time Entry: 25  Therapeutic Exercise Time Entry: 30    Assessment & Plan   Assessment: Pt presents today with continued complaints of pain. Continuation and progression of dynamic LE strengthening, balance, and lumbopelvic mobility activities with good tolerance. Continue to progress as able.   Evaluation/Treatment Tolerance: Patient tolerated treatment well    The patient will continue to benefit from skilled outpatient physical therapy in order to address the deficits listed in the problem list on the initial evaluation, provide patient and family education, and maximize the patients level of independence in the home and community environments.     The patient's spiritual, cultural, and educational needs were considered, and the patient is agreeable to the plan of care and goals.           Plan: Continue treatment per established plan of care    Goals:   Active       LTG       Patient will be IND with gym work out and HEP to maintain current progress and improve quality of life.       Start:  04/04/25    Expected End:  05/30/25               STG       Patient will improve tolerance to standing/walking with 2/10 or less increase in adverse symptoms.       Start:  04/04/25    Expected End:  05/02/25                Wyatt Yen PT

## 2025-07-16 ENCOUNTER — CLINICAL SUPPORT (OUTPATIENT)
Dept: REHABILITATION | Facility: OTHER | Age: 89
End: 2025-07-16
Payer: MEDICARE

## 2025-07-16 DIAGNOSIS — R26.2 DIFFICULTY IN WALKING: Primary | ICD-10-CM

## 2025-07-16 PROCEDURE — 97112 NEUROMUSCULAR REEDUCATION: CPT

## 2025-07-16 PROCEDURE — 97110 THERAPEUTIC EXERCISES: CPT

## 2025-07-16 NOTE — PROGRESS NOTES
"  Outpatient Rehab    Physical Therapy Progress Note    Patient Name: Jefferson Boogie  MRN: [id number]  YOB: 1935  Encounter Date: 7/16/2025    Therapy Diagnosis:   Encounter Diagnosis   Name Primary?    Difficulty in walking Yes     Physician: Samuel Jimenez MD    Physician Orders: Eval and Treat  Medical Diagnosis: Sacroiliitis  Chronic pain syndrome  Spondylosis without myelopathy  Surgical Diagnosis: Not applicable for this Episode   Surgical Date: Not applicable for this Episode  Days Since Last Surgery: Not applicable for this Episode    Visit # / Visits Authorized:  34 / 40  Insurance Authorization Period: 1/1/2025 to 12/31/2025  Date of Evaluation: 9/24/2024  Plan of Care Certification: 5/21/2025 to 7/21/2025      PT/PTA: PT   Number of PTA visits since last PT visit:0  Time In: 1430   Time Out: 1530  Total Time (in minutes): 60   Total Billable Time (in minutes): 30    FOTO:  Intake Score: Not applicable for this Episode%  Survey Score 2: Not applicable for this Episode%  Survey Score 3: Not applicable for this Episode%    Precautions:       Subjective   Patient reports 3-4/10 R calf pain..  Pain reported as 4/10.      Objective            Treatment:   Treatment:  Jefferson received therapeutic exercises for 30 minutes including:  Nustep x 6 min Standing UBE 2 min F/2 min B for joint nutrition Lvl  2     Standing golf swings with back against wall 2 x 15 reps  Supine bridge 2 x 10 5 second holds +GTB  Prone hip EX 2 x 10 5 second holds  Prone on elbows x 2 minute  Supine hip FL stretch x 4# weight 90 second hang on each side  Supine hip flexion with legs starting straight Y TB 2 x 10 3 second holds  Leg press 150# 3 x 10     Jefferson participated in dynamic functional therapeutic activities to improve functional performance for 25 minutes, including:     Tandem stance 3x30"  Heel raises 2x10  Step ups 6" 3x10ea  NBOS on airex 3x1'     3 rounds  Resisted sidestepping RTB with yellow " pole  Sit<>stands with 1 foam pad x 10 reps without UE support (RTB at ankles)    Cryotherapy to low back and B shoulders in supine position, 5 min   Hot/Cold Pack Time Entry: 5  Therapeutic Activity Time Entry: 25  Therapeutic Exercise Time Entry: 30    Assessment & Plan   Assessment: Patient presents to therapy with minimal to moderate subjective pain complaints in R calf. He performed exercises without exacerbation of symptoms. Noted impaired muscular endurance as patient presents with fatigue. Will continue to monitor and progress exercises as tolerated by patient to further address impairments and improve overall functional mobility.   Evaluation/Treatment Tolerance: Patient tolerated treatment well    The patient will continue to benefit from skilled outpatient physical therapy in order to address the deficits listed in the problem list on the initial evaluation, provide patient and family education, and maximize the patients level of independence in the home and community environments.     The patient's spiritual, cultural, and educational needs were considered, and the patient is agreeable to the plan of care and goals.           Plan: Continue treatment per established plan of care    Goals:   Active       LTG       Patient will be IND with gym work out and HEP to maintain current progress and improve quality of life.       Start:  04/04/25    Expected End:  05/30/25               STG       Patient will improve tolerance to standing/walking with 2/10 or less increase in adverse symptoms.       Start:  04/04/25    Expected End:  05/02/25                Debbie Grier PT

## 2025-08-01 ENCOUNTER — CLINICAL SUPPORT (OUTPATIENT)
Dept: REHABILITATION | Facility: OTHER | Age: 89
End: 2025-08-01
Payer: MEDICARE

## 2025-08-01 DIAGNOSIS — R26.2 DIFFICULTY IN WALKING: Primary | ICD-10-CM

## 2025-08-01 PROCEDURE — 97140 MANUAL THERAPY 1/> REGIONS: CPT | Mod: KX | Performed by: PHYSICAL THERAPIST

## 2025-08-01 PROCEDURE — 97110 THERAPEUTIC EXERCISES: CPT | Mod: KX | Performed by: PHYSICAL THERAPIST

## 2025-08-01 NOTE — PROGRESS NOTES
Outpatient Rehab    Physical Therapy Progress Note : Updated Plan of Care    Patient Name: Jefferson Boogie  MRN: [id number]  YOB: 1935  Encounter Date: 8/1/2025    Therapy Diagnosis:   Encounter Diagnosis   Name Primary?    Difficulty in walking Yes     Physician: Samuel Jimenez MD    Physician Orders: Eval and Treat  Medical Diagnosis: Sacroiliitis  Chronic pain syndrome  Spondylosis without myelopathy  Surgical Diagnosis: Not applicable for this Episode   Surgical Date: Not applicable for this Episode  Days Since Last Surgery: Not applicable for this Episode    Visit # / Visits Authorized: 35 / 40  Insurance Authorization Period: 1/1/2025 to 12/31/2025  Date of Evaluation: 9/24/2024   Plan of Care Certification: 8/1/2025 to 9/26/25     PT/PTA:  PT  Number of PTA visits since last PT visit: NA  Time In: 1400   Time Out: 1400  Total Time (in minutes): 0   Total Billable Time (in minutes): 30    FOTO:  Intake Score (%): Not applicable for this Episode  Survey Score 2 (%): Not applicable for this Episode  Survey Score 3 (%): Not applicable for this Episode    Precautions:  Additional Precautions and Protocol Details: Fall risk, spinal cord stimulator    Subjective   Patient reports having to go to the urgent care on vacation due to R LE swelling, but they ruled out a DVT. He is overall feeling better now. Still having difficulty with walking, balance, and lower back pain/adverse symptoms..  Pain reported as 3/10.      Objective              GAIT DEVIATIONS: Jefferson displays antalgic gait, difficulty with L LE weight shift, need for SPC for balance and unloading joints, and decreased cornel/step length     Lumbar Range of Motion:     %   Flexion 90      Extension 50      Left Side Bending 60   Right Side Bending 60   Left rotation    75   Right Rotation    75    *= pain     Lower Extremity Strength     Right LE   Left LE     Hip flexion: 4/5 Hip flexion: 4/5   Knee extension: 4+/5 Knee extension:  "4+/5   Knee flexion: 4+/5 Knee flexion: 4+/5   Hip IR: 4/5 Hip IR: 4/5   Hip ER: 4/5 Hip ER: 4/5   Hip extension:  4/5 Hip extension: 4/5   Hip abduction: 4/5 Hip abduction: 4/5   Hip adduction: 3+/5 Hip adduction 3+/5   Ankle dorsiflexion: 4/5 Ankle dorsiflexion: 4/5   Ankle plantarflexion: 4/5 Ankle plantarflexion: 4/5       Treatment:   Treatment:  Jefferson received therapeutic exercises for 30 minutes including:  Nustep x 6 min for joint nutrition Lvl  2   Standing golf swings with back against wall 2 x 15 reps- NP  Supine bridge 2 x 10 5 second holds +GTB  Prone hip EX 2 x 10 5 second holds  Prone on elbows x 2 minute  Supine hip FL stretch x 4# weight 90 second hang on each side  Supine hip flexion with legs starting straight Y TB 2 x 10 3 second holds  Leg press 150# 3 x 10     Jefferson participated in dynamic functional therapeutic activities to improve functional performance for 15 minutes, including:  Reassessment   Tandem stance 3x30"- NP  Heel raises 2x10- NP  Step ups 6" 3x10ea- NP  NBOS on airex 3x1'- NP     3 rounds  Resisted sidestepping RTB with yellow pole  Sit<>stands with 1 foam pad x 10 reps without UE support (RTB at ankles)    Jefferson received manual therapy to improve functional performance for 10 minutes, including:  Prone hip ER/IR and prone knee FL  Prone unilateral lumbar P/As grade 2/3    Time Entry(in minutes):       Assessment & Plan     Pt presents today with continued complaints of back pain and poor activity tolerance. Reassessment performed with pt demonstrating gains in strength, ROM, balance, and activity tolerance, but he continues to have deficits in those areas as well as gait deviation, poor TUG and Sit to stand test scores, and decreased tolerance to ADL's and functional mobility activities. He will continued to benefit from skilled therapy to address described deficits, decrease pain, and improve activity tolerance. Discussed with patient becoming more IND with working out and HEP " and will do 1 x 1 week until every other week to finish out his last 4 visits.    Plan  From a physical therapy perspective, the patient would benefit from: Skilled Rehab Services    Planned therapy interventions include: Therapeutic exercise, Therapeutic activities, Neuromuscular re-education, Manual therapy, ADLs/IADLs, and Gait training.    Planned modalities to include: Biofeedback, Electrical stimulation - attended, Electrical stimulation - passive/unattended, Thermotherapy (hot pack), and Cryotherapy (cold pack).        Visit Frequency: 1 times Per Week for 8 Weeks.  Other/tapered frequency details: 1 time a week to every other week    This plan was discussed with Patient.   Discussion participants: Agreed Upon Plan of Care             The patient will continue to benefit from skilled outpatient physical therapy in order to address the deficits listed in the problem list on the initial evaluation, provide patient and family education, and maximize the patients level of independence in the home and community environments.     The patient's spiritual, cultural, and educational needs were considered, and the patient is agreeable to the plan of care and goals.           Goals:   Active       LTG       Patient will be IND with gym work out and HEP to maintain current progress and improve quality of life. (Progressing)       Start:  04/04/25    Expected End:  05/30/25               STG       Patient will improve tolerance to standing/walking with 2/10 or less increase in adverse symptoms. (Progressing)       Start:  04/04/25    Expected End:  05/02/25                Yesenia Armando, PT, DPT

## 2025-08-07 ENCOUNTER — OFFICE VISIT (OUTPATIENT)
Dept: PAIN MEDICINE | Facility: CLINIC | Age: 89
End: 2025-08-07
Payer: MEDICARE

## 2025-08-07 VITALS
DIASTOLIC BLOOD PRESSURE: 56 MMHG | BODY MASS INDEX: 27.09 KG/M2 | SYSTOLIC BLOOD PRESSURE: 107 MMHG | HEART RATE: 63 BPM | HEIGHT: 73 IN | WEIGHT: 204.38 LBS

## 2025-08-07 DIAGNOSIS — M53.3 SACROILIAC DYSFUNCTION: ICD-10-CM

## 2025-08-07 DIAGNOSIS — M54.16 LUMBAR RADICULOPATHY: ICD-10-CM

## 2025-08-07 DIAGNOSIS — M19.012 PRIMARY OSTEOARTHRITIS OF BOTH SHOULDERS: Primary | ICD-10-CM

## 2025-08-07 DIAGNOSIS — G89.29 OTHER CHRONIC PAIN: ICD-10-CM

## 2025-08-07 DIAGNOSIS — M25.511 PAIN OF BOTH SHOULDER JOINTS: ICD-10-CM

## 2025-08-07 DIAGNOSIS — Z96.89 S/P INSERTION OF SPINAL CORD STIMULATOR: ICD-10-CM

## 2025-08-07 DIAGNOSIS — Z98.1 S/P FUSION OF SACROILIAC JOINT: ICD-10-CM

## 2025-08-07 DIAGNOSIS — M25.512 PAIN OF BOTH SHOULDER JOINTS: ICD-10-CM

## 2025-08-07 DIAGNOSIS — M19.011 PRIMARY OSTEOARTHRITIS OF BOTH SHOULDERS: Primary | ICD-10-CM

## 2025-08-07 PROCEDURE — 99215 OFFICE O/P EST HI 40 MIN: CPT | Mod: PBBFAC

## 2025-08-07 PROCEDURE — 99214 OFFICE O/P EST MOD 30 MIN: CPT | Mod: S$PBB,,,

## 2025-08-07 PROCEDURE — 99999 PR PBB SHADOW E&M-EST. PATIENT-LVL V: CPT | Mod: PBBFAC,,,

## 2025-08-07 RX ORDER — LIDOCAINE 50 MG/G
1 PATCH TOPICAL DAILY
Qty: 90 PATCH | Refills: 0 | Status: SHIPPED | OUTPATIENT
Start: 2025-08-07

## 2025-08-07 NOTE — PATIENT INSTRUCTIONS
You can purchase Diclofenac (Voltaren) Gel 1% over the counter. Apply 2 times per day as needed to shoulders    You can take Tylenol Extra Strength 500-1000 mg 2-3 times per day for arthritis

## 2025-08-07 NOTE — PROGRESS NOTES
Interventional Pain Management - Established Visit  Follow-Up       PCP: No, Primary Doctor      Interval History 8/7/2025:  Jefferson Boogie returns for follow-up after right S1 TFESI on 6/2/2025. He reports 50% relief. He states he is able to walk further and more without his cane. He states the pain continues, but is not as severe. The pain continues to the right buttock/lower back pain. He states he will occasionally wake up from the pain, but this is not often. Today, he reports new bilateral shoulder pain over the last month. He reports he was able to fish 2 weeks ago with limited pain. He describes the pain as aching. Exacerbating factors: lifting arms overhead and reaching and pain is worse upon waking. Mitigating factors: stretching and moving. He continues the [employer] Healthy Back program along with personal training sessions. He continues Celebrex 200 mg daily, Duloxetine 30 mg BID with some benefit. He denies any perceived side effects. He denies recent health changes. He denies recent falls or trauma. He denies new onset fever/night sweats, urinary incontinence, bowel incontinence, significant weight changes, significant motor weakness or changes, or loss of sensations. His shoulder pain today is 5/10.      Interval History 5/22/2025:  Patient presents for for follow up low back pain. He has undergone SCS and R SI fusion in the past. He continues the [employer] Health Back Program and patient also has gotten a . He says that the relief from the SI fusion is reaching a plateau. The pain is focused to the right buttock area. Patient is able to sleep and wake without pain. The pain worsens with activity and movement. He occasionally has pain that radiates down the posterior right leg, but that is much less frequent than it used to be. This responded very well to his S1 TFESI. Rates the pain at its worse 8/10.     Interval History 2/20/24:   Jefferson Boogie presents to the clinic for a  follow-up appointment for chronic pain complaints. Since the last visit, Jefferson Boogie states the pain has been improving. V 11/24 at which time pt was doing very well after S1 TFESI and healhy back program. In the interim, pt has continued [facility] healthy back program. Today, pt reports functional improvements, but it is slow. Overall, pt is very happy with his progress.     Interval History 11/29/24:   Today the patient returns to clinic for follow up after right S1 TFESI which completely resolved the numbness and tingling in right foot following the Right SIJ fusion ad subsequent finding of narrowing in S1 foramina. He is currently in 3/10 pain, progressing well in the healthy back program however slightly discouraged at his progress with mobility. Currently taking gabapentin and cymbalta, has not used opioid pain medication in quite some time and only would use it if in severe pain but he stated . He denies new onset fever/night sweats, urinary incontinence, bowel incontinence, significant weight changes, significant motor weakness or changes, or loss of sensations.      Interval History 8/26/2024:  Jefferson Boogie returns to clinic for follow-up after right SIJ fusion. He recently had CT pelvis performed showing new, narrowing of right S1 foramina. He continues to report tingling to the right foot. He denies weakness. He continues Norco 10/325 TID PRN and Hydromorphone 2 mg BID PRN with mild relief. He denies any perceived side effects. He denies recent falls or trauma. He denies new onset fever/night sweats, urinary incontinence, bowel incontinence, significant weight changes, significant motor weakness or changes, or loss of sensations. His pain today is 7/10.      Interval History 8/19/2024:  Jefferson Boogie returns to clinic for follow-up after Right SIJ fusion. He continues to report significant amounts of pain. He has been taking Hydromophone 2 mg daily and Norco 4 times per day without relief.  He continues to utilize a rolling walker to help him ambulate. He denies fever, drainage, redness, swelling or any other signs of infection. He denies recent falls or trauma. He denies new onset fever/night sweats, urinary incontinence, bowel incontinence, significant weight changes, significant motor weakness or changes, or loss of sensations. His pain today is 7/10.      Interval History 8/15/2024:  Jefferson Boogie returns to clinic for follow-up after Right SI joint fusion using SILO TFX. He reports significant pain. He continues Hydromorphone 2 mg. He took 1 pill today . He has not taken any Norco today. He is present with his wife. She is wondering when they can start doing things like going to dinner or if he needs to stay at home while he recovers. He denies fever, drainage, redness or swelling from procedure. Site. He denies recent falls or trauma. He denies new onset fever/night sweats, urinary incontinence, bowel incontinence, significant weight changes, significant motor weakness or changes, or loss of sensations. His pain today is 9/10.      Interval History 6/27/2024:  Patient returns to clinic for follow up. Patient is s/p Right SIJ and R Piriformis injections on 4/10/2024. Patient reports 100% relief from the procedure for 2 days. Pain has recurred and is same as previous description. Sha Ku ([facility]) is also here today for programming of [place] SCS. Patient continues with current medications with some benefit and HEP. Current pain 6/10. Patient denies red flags including weakness, unexpected weight loss/gain, night sweats/fevers, saddle anesthesia, and symptoms of CAREN.     Interval History 3/14/2024:  Jefferson Boogie presents for follow up. He is here today to discuss further options for treatment after right cluneal nerve block on 2/28/2024. He was here for the same reason two weeks ago, and we decided to give the block a bit more time to work before investigating other options. He reports no  significant changes in his symptoms. He does say that he can manage his symptoms satisfactorily using 1.5 of his oxycodone  every 3 hours when doing things like golf and walking. He said he may use the medication once or twice a month and denies significant side effects.      Interval History 2/29/24:  Jefferson Boogie presents to the clinic for follow-up. He is s/p right cluneal nerve block 2/28/24. Today, he says that he does not know if he has had any difference in his symptoms so far. We spoke briefly yesterday about additional options, and he is here today for further discussion.      Interval History 01/16/2024:  Jefferson Boogie presents to the clinic for a follow-up appointment for low back, right hip, and right leg pain. Since the last visit, Jefferson Boogie states the pain has been persistant. Currently his right hip and leg pain is his biggest problem. He continues to endorse sharp stabbing pain in his hip and leg. Pain is exacerbated by activity, standing, laying on right side, lifting, bending. Pain is improved with rest, exercising, stretching, medications. Continues with Celebrex and Cymbalta. Current pain intensity is 6/10. Patient continues to endorse benefit from SCS. Patient denies red flags including weakness, unexpected weight loss/gain, night sweats/fevers, saddle anesthesia, and symptoms of CAREN.     Interval History 8/10/2023:  Patient reports that his pain post right SIJ and piriformis was persistent until it decreased 50% on 7/4/23, but then the pain returned after a few days. He reports that during that period he was able to run after a bus in [facility]. The pain is improved, but not as much as previous in his right buttock and calf. Patient reports that he has been working with the [employer], but not finding the correct programming as of yet.  No fevers, chills, unexplained weight loss.  Hx of bladder cancer in 1967. Remembers falling while skiing and landed on his buttocks 30 years  ago, but no other pelvic trauma. Patient was traveling to [place] and recently came back to [place].  We discussed his recent pelvic CT in details especially for the accidental finding of irregular bone lesion. Explained to patient the need to investigate this further with our urology and oncology team.     Interval History 6/22/2023  Jefferson Boogie presents to the clinic for a follow-up appointment for chroniic LBP. Pt was last seen in clinic on 5/25 and scheduled for right SIJ and piriformis injection--performed 6/22. Pt reports 40% relief x 1-2 days. He continues to work with the rep with regards to programming the [employer] SCS device. Pt reports that he is getting some decent relief from the device with current program, but feels like there is still some progress to be made. Continues to complain of pain of similar character and quality to that of prior eval. Localized in the right lowerlumbar spine/gluteal area as well as the right lateral leg. Since the last visit, Jefferson Boogie states the pain has been improving. Current pain intensity is 5/10. He continues to exercise daily with stationary bike and weight training. Continues to take Cymbalta and Celebrex daily. He also takes Norco 10 very sparingly, maybe 1-2 per week. Overall the current regimen of SCS, medications and occasional injections have provided pt with enough relief for him to remain functional, and able to participate in his hobbies.      Interval history 5/25/23:  In the interim he has met with the [employer] reps for reprogramming. He has his good and bad days but feels that the pain is manageable. The reps with [employer] are present for the visit today. Today his pain is a 6.5/10. Still taking percocet and cymbalta (missed a few days), but these medications do not provide much relief. Still pain with walking and sitting, but the pain with laying down is better.      Interval History 5/4/23:  Patient seen today via virtual follow-up after implantation of  his [place] SCS in February. He reports no change in his pain since his last visit. Most of his pain is into his right hip with intermittent radiation into his RLE. He denies any new symptoms. No red flag symptoms. He is scheduled to meet with the [place] reps from reprogramming next week. He remains optimistic. In discussing his current medications, he has run out of the hydrocodone he was previously taking and has only been taking his Cymbalta once per day rather two.      Interval History 3/30/2023:  Mr Boogie presents 5-6 weeks after implantation of his [place] SCS system. He reports no significant change in his pains, which are primarily down the RLE.  No constitutional signs symptoms concerning for infection.  No new areas of pain or neurological changes since procedure. No voicing of s/s concerning for cauda equina syndrome. He does endorse improvement in sleep. He is worried that his implant was a failure because his pain has not changed.     Interval History 2/20/2023:  Mr Boogie presents for follow up of [place] SCS replacement. He states doing well. No constitutional signs symptoms concerning for infection.  No new areas of pain or neurological changes since procedure. No voicing of s/s concerning for cauda equina syndrome.      Interval History 1/23/2023:  Still taking Celebrex, however hasn't noticed a difference in pain with this medication. Location, quality, and severity of pain are unchanged from prior visit and is described above. Here today to discuss removal of [employer] SCS, and implant of [place] SCS. He states his stimulator has not been helpful for the past 2 years despite multiple reprogramming and adjustment.     Interval History 11/21/22:  Reports 24 hours of 100% relief for 48 hours, but he reports that now his pain is only approximally 10% better. His pain at rest is 3-4/10. Pain at its worst is 8-9/10. Pain is improved when leaning over a grocery cart. Pain only allows him to walk for  3-4mins.      Interval History 8/29/22:  Jefferson Boogie presents to the clinic for a follow-up appointment for back pain.  He had his second bilateral L3, L4, L5 MBB and reports 100% pain relief. He would like to proceed with RFA.     Interval History 5/26/22:  Jefferson Boogie presents to the clinic for a follow-up appointment for back pain. Since the last visit, Jefferson Boogie states the pain has been stable. Certain postures he can tolerate in the standing position such as leaning on a shopping cart or support to his posterior thighs. He now uses a walker when covering long distances. He continues to play golf despite it aggravating his pain. He receives some benefit from the SCS whereas other interventions have not helped. He remains interested in the [place] device.     Interval History 3/24/22:  Jefferson Boogie presents to the clinic for a follow-up appointment for back pain. Since the last visit, Jefferson Boogie states the pain has been worse than usual. Current pain intensity is 8/10. He continues to report benefit with [employer] SCS however he has not been able to get in touch with the representative in order to have his settings adjusted. He continues to take occasional norco 7.5 mg with benefit, particularly when he plays golf.     Interval History 2/10/22:  Patent presents today s/p caudal epidural steroid injections on 1/12/22 since then he has not had any relief. He states his pain has been unchanged and is a 6/10 on average with the worst being 8/10 and best is 4/10. He has been taking percocet 5mg when he golfs or plays with his granddaughter.      Interval History 12/30/21:  Jefferson Boogie presents to clinic for follow up appointment s/p Right SI joint and Right piriformis muscle injection under US guidance on 11/29/21. He did not obtain any noticeable relief with this procedure similar to all of his previous injections. His pain is unchanged and constant over the right buttock. He is  "taking percocet 5mg x 3 on days he is more active, such as playing golf. This helps some, but minimally. Denies any new symptoms or neurological changes. No numbness, tingling, weakness in his lower extremities. Denies bowel/bladder incontinence or saddle anesthesia.      Interval History 11/4/2021:  Jefferson Boogie presents to the clinic for a follow-up appointment for right sided back pain and SI joint pain. Mr. Boogie had a right SI joint injection on 10/13/2021. He did not note significant relief with the injection for any period of time. Pain is still constant over the right buttock and most noticeable when playing golf. He denies any new bowel/bladder incontinence, lower extremity numbness or weakness or saddle anesthesia.     Interval History 8/26/2021:  Jefferson Boogie presents to the clinic for a follow-up appointment for right sided hip pain with right-sided radiculopathy . Since the last visit, Jefferson Boogie states the pain has been "particularly tender today" 7/10. Patient reports playing golf recently and experienced worsening symptoms the following day. Mr. Boogie is s/p right-side hip joint injection with minimal relief. Patient states he has had relief with previous interventions and is open to RFA.     Interval History 6/10/2021:  Jefferson Boogie presents to the clinic for a follow-up appointment. Since the last visit, Jefferson Boogie states the pain has been stable. Current pain intensity is 3/10. States he is still having pain around SIJ that is affecting his ADL      Interval History 4/15/2021:  The patient returns to clinic today for follow up of back pain. He reports that the swelling above the SI fusion incision has resolved. He denies any fever, chills, or drainage from the incision. He does report benefit since the fusion. He continues to report benefit with Nevro SCS. He reports intermittent shoulder pain at this time. He did receive an injection with Sports Medicine with " benefit. He denies any other health changes. His pain today is 2/10.     Interval History 3/25/21:  Mr. Boogie presents to clinic for follow up of bilateral shoulder pain. He is s/p BL subacromial injections on 2/8/21. He reports maximum 20% relief of pain in the Right shoulder. Today the Left shoulder pain is 1/10; Right shoulder pain is 5-7/10.      Interval History 3/22/2021:  The patient returns to clinic today for follow up of back pain. He is s/p right SI fusion on 3/15/2021. He reports some relief at this time. He does report soreness to the incision. He denies any fever, chills, or drainage from incision. He continues to report benefit with Nevro SCS. He denies any other health changes. His pain today is 4/10.     Interval History 1/11/2020:  Patient is here for follow-up of low back pain now s/p sacroiliac RFA on 12/9/20 which provided no benefit and with the new complaint of bilateral anterior shoulder pain R>L. His shoulder pain started about 2 months ago. He describes 9/10 sharp/stinging pain without radiation that is exacerbated with overhead activity and improved with rest. He started PT about one month ago. He takes oxycodone which provides some relief. He had a blind subacromial steroid injection in the right shoulder with Dr. Ramirez on 10/26/19 which provided some short term relief.     Interval History 11/5/2020:  Jefferson Boogie presents to the clinic for a follow-up appointment for low back pain. Since the last visit, Jefferson Boogie states the pain has been stable. Current pain intensity is 4/10. He had good relief from the SI joint injection that lasted for about a week. Pain has since returned. He also slipped and fell on his buttock a couple of weeks ago and had increased pain in the right buttock after that which is slowly improving. He continues to have variable benefit with the [employer] SCS for intermittent pain in the right leg. He is scheduled to meet with representatives today. He  is taking celebrex daily and percocet as needed sparingly. He denies any adverse effects and any other health changes.     Interval History 10/5/2020:  The patient returns to clinic today for follow up of low back pain. He is s/p right SI joint injection on 9/9/2020. He reports 25% relief of his right sided low back and buttock pain. He did have good relief the first two days. He continues to report right sided low back and buttock pain. He denies any radicular leg pain. His pain is worse with prolonged standing and walking. He continues to report intermittent pain into his achilles from previous injury. He feels as though this has changed his gait. He continues to report some benefit with Nevro SCS device. He is in contact with representatives. He is currently taking Percocet as needed sparingly with some benefit. He denies any adverse effects. He denies any other health changes. His pain today is 4/10.      Interval History 9/2/2020:  The patient returns to clinic today for follow up of back pain. He reports that his back pain has improved since last audio visit. He continues to report back pain with intermittent radiating pain into his right hip and calf. He has spoken with Bienvenido from [employer] via phone with some programming. He is meeting with Bienvenido today. He had some issues with charging but this has improved. He is currently taking Norco intermittently but this is only lasts 2-3 hours. He denies any adverse effects. He denies any other health changes. His pain today is 8/10.     Interval History 08/27/2020:  Pt was contacted over [employer] for a follow up appointment.  He is currently complaining of right hip and right calf with no associated numbness or weakness.  He continues to use his Nevro device.  He states it has been very frustrating. Inconsistent.  Took 20 mins to 1 hour to charge.  Couldn't get it to start this morning.  He states that there is no drainage at the battery site, no signs of infection or  pain at the site.  Patient is not taking medications at this time.  He wants to speak about medication options because he would like to start playing golf again.     Interval History 8/17/2020:  The patient returns to clinic today for post op wound check. He continues to report benefit with Nevro device. He denies any fever, chills, or drainage. He continues to follow his activity restrictions. He does report a recent achilles tendon injury while swimming. He is seeing Orthopedics. He denies any other health changes. His pain today is 4/10.     Interval History 8/3/2020:  The patient returns to clinic today for post op. He is s/p Nevro battery change on 7/27/2020. He denies any fever, chills, or drainage from incision. He has not completed his antibiotics as he missed two days of the medication. He continues to follow his activity restrictions. He reports relief with the device. He is meeting with Bien[employer] today for programming. He denies any other health changes. His pain today is 2/10     Interval History 7/22/2020:  Pt presents for audio follow up only s/p Right SI joint injection on 7/8/2020. Pt states he has near resolution of focal pain to buttock. He is pleased with result and pain relief. Pt has been in contact with [employer] and will be having battery swap in 5 days. He has difficulty whether stimulator is beneficial and has even had a holiday from using SCS.  He denies any newer areas pain, denies any neuro changes, meds area adequate to control pain without adverse side effects. Pain is 2/10 today.     Interval HPI 6/15/2020:  Jefferson Boogie presents to the clinic for a follow-up appointment for 3 week follow up right hip and right thigh pain. Since the last visit, Jefferson Boogie states the pain has been persistant. Current pain intensity is 5/10. Patient has been working with Bienvenido Varghese, to try and get better coverage of a localized pain that he still experiences in his right low back area. He does  report improvement in symptoms of numbness/tingling. Today, worse pain is 1/10 in severity and localizes to the right SI joint. Pain is worse with extension of his back. It is worse with golfing. Pain relieved by Norco--he takes 1-2 tablets of 5-325 Norco on days that he plays golf. Pain is also improved with being still and not being active. Patient endorses a much more active lifestyle with Norco. Denies new weakness/numbness or bowel/bladder changes.     Previous injection history includes a series of 3 lumbar CHOCO at [facility].      Interval HPI 3/5/20:  Patient presents to the clinic for a follow-up appointment for low back pain. Since the last visit, he states his pain has been unchanged. Current pain intensity is 4/10. He continues to work with [employer] to adjust settings on his SCS. He has stopped using tramadol, and uses norco sparingly. He continues to work with a  for physical therapy.      Interval HPI 1/9/2020:  Patient returns for follow up s/p [place] SCS. He states he is unsure if it has helped his pain since he had it implanted. He last had an adjustment of his programming about 1 month ago. He does note that once he is done charging his SCS his pain is improved. Pain still worse with prolonged standing and activity such as golf. He still is doing home exercise program. He says that he is trying to do more since getting the SCS. He is still taking the Tramadol with limited pain relief. He takes Tramadol 50 mg twice daily only on days of activity which is once a week. No other health changes.      HPI 11/20/19  Jefferson Boogie returns to clinic today for follow up. He reports increased low back and leg pain over the last few days. He has been in contact with [employer] representatives for programming adjustments. He does report benefit with the device. He reports good days and bad days. His pain is worse with prolonged standing and activity. He continues to participate in a home exercise  routine. He does take Tramadol sparingly but reports limited relief. He denies any other health changes. His pain today is 5/10.         8/7/2025     8:34 AM   Last 3 PDI Scores   Pain Disability Index (PDI) 38     Pain Medications:     Cymbalta  Gabapentin 600 mg TID        Opioid Contract: yes      report:  Reviewed and consistent with medication use as prescribed.     PT: no    Chiro:  HEP in the past 6 months: patient preforming daily HEP with limited benefit      Interventional Pain Procedures:   9/9/2020 - Right SI joint injection  7/27/2020 - Nevro SCS battery change  7/8/2020 - Right SIJ injection >80% relief   8/26/19 - SCS implant  8/5/19 - SCS trial  7/8/2020 - Right SI joint injection  7/27/2020 - SCS battery revision  9/9/2020 - Right SI joint injection   12/9/2020 - Right L5-S1 RFA  3/15/2021 - Right SI fusion  7/21/2021 - Injection R Hip - minimal relief  10/13/2021 - Right SI joint injection  11/29/2021 - R SI joing and R piriformis muscle injection - no relief   1/12/2022 - Caudal CHOCO- no relief   8/24/22 - Diagnostic Bilateral L3, L4 and L5 Lumbar Medial Branch Block under Fluoroscopy  09/21/22 - Right L3, L4, L5 RFA  10/12/22 -  Left L3, L4, L5 RFA  2/13/23 - Edgar SCS implant  5/31/23 - Right SIJ and piriformis injection   2/28/24 -  Right cluneal nerve block  4/10/24 - Right SIJ and piriformis injection  8/12/24 - Right SIJ fusion  8/28/24 - right S1 TFESI 80% relief tingling in right foot  6/2/25 - Right S1 TFESI - 50% relief        IMAGING:       CT PELVIS WITHOUT CONTRAST     CLINICAL HISTORY:  Pelvis pain, stress fracture suspected, neg xray;  Arthrodesis status     TECHNIQUE:  1.25 mm axial images of the pelvis obtained, coronal and sagittal images reformatted.     COMPARISON:  None     FINDINGS:  Percutaneous Posterior right  Sacroiliac Joint Fusion using SILO TFX transfixing device 08/12/2024.     New, moderate narrowing of the right S1 foramina by osseous density extending to the  foramina, could cause symptoms referable to a right S1 neuropathy, to correlate clinically.     The bilateral sacroiliac joints appear within normal limits.     The bilateral hip joints appear normal.  No advanced degenerative change.     Mild osseous hypertrophy at the pubic symphysis.     The intrapelvic content demonstrate significant diverticulosis coli.  Moderate stool retention.  Mild bladder wall thickening possibly due to nondistention.  The prostate is not enlarged.  The abdominal wall and inguinal regions appear normal.     Degenerative disc disease, disc space narrowing and vacuum disc phenomenon L4-5 L5-S1, unchanged.     Impression:     New, moderate-severe narrowing of the right S1 foramina, by osseous density extending in the right foramina, could impinge on the exiting right S1 nerve root, to correlate with patient's symptoms.     Significant sigmoid diverticulosis.     This report was flagged in Epic as abnormal.        Electronically signed by:Ammy Nance MD  Date:                                            08/21/2024  Time:                                           16:45     XR SACRUM AND COCCYX     CLINICAL HISTORY:  Arthrodesis status     FINDINGS:  Sacrum coccyx three views.     There is hardware status post right SI joint arthrodesis.  No complication seen.  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        Electronically signed by:Aki Amaya MD  Date:                                            08/16/2024  Time:                                           08:19     XR HIPS BILATERAL 2 VIEW INCL AP PELVIS     CLINICAL HISTORY:  Arthrodesis status     FINDINGS:  Hips bilateral two views to include pelvis.     Right: No fracture dislocation bone destruction seen.     Left: No fracture dislocation bone destruction seen.        Electronically signed by:Aki Amaya MD  Date:                                            08/16/2024  Time:                                           08:17        EXAMINATION:  CT  PELVIS WITHOUT CONTRAST     CLINICAL HISTORY:  History fo percutanous SI fusion.;  Chronic pain syndrome     TECHNIQUE:  CT of the pelvis, without intravenous contrast.     COMPARISON:  None     FINDINGS:  BONE: Diffuse osteopenia.  No fracture or osteonecrosis.  Few lytic and sclerotic lesions scattered throughout the pelvis, including a lesion in the right iliac bone with irregularity of the overlying cortex (2:103).     JOINT: Postoperative changes from right sacroiliac joint fusion.  The implant is in satisfactory position.  No osseous fusion across the joint space.     Degenerative changes both sacroiliac joints.  No erosions or ankylosis.  Degenerative changes also involve the lower lumbar spine, both hip joints, and pubic symphysis.  Chondrocalcinosis of the pubic symphysis.  No significant joint effusion.     SOFT TISSUE: Normal muscle bulk. Regional tendons are intact.  Calcific tendinopathy involving the proximal hamstring tendons bilaterally.  No bursal collection.     MISCELLANEOUS: Circumferential bladder wall thickening.  Prostatic calcifications.  Colonic diverticulosis.  Atherosclerosis.     Impression:     Postoperative changes in the right sacroiliac joint.  No osseous fusion.     Few lytic and sclerotic lesions scattered throughout the pelvis, concerning for metastases or myeloma.  No prior studies are available for comparison.     Bladder wall thickening, which could be secondary to outlet obstruction or cystitis.  Correlate with urinalysis.     Other findings as described.     This report was flagged in Epic as abnormal.     6/10/2021 - X ray Hips Bilateral: No radiographic evidence of acute osseous abnormality of the pelvis and hips and without radiographic evidence of osteonecrosis of the femoral heads.     9/18/21 MRI L-spine:  FINDINGS:  Lumbar sagittal alignment is slightly is straightened.  There is slight convex right curvature lumbar spine.  There is degenerative disc disease with  intervertebral disc height loss and endplate degenerative change at all levels with scattered disc desiccation allowing for degenerative change the lumbar vertebral body heights and contours are within normal is without evidence for acute fracture or subluxation.  There is heterogeneous T1/T2 signal focus within the right aspect of the S1 vertebra most compatible with hemangioma this measures 2.0 cm.     The distal spinal cord and conus is normal in signal and contour tip of the conus approximates the T12/L1 level.  Please note there is artifact from indwelling spinal stimulator device with leads partially visualized casting artifact entering the dorsal epidural space at the T12 level and extending cranially.     There is abnormal clumping configuration of the filum terminalis a from T12 through L5 with slight thickening of scattered nerve roots most compatible with arachnoiditis.     No aneurysmal dilatation of the visualized abdominal aorta.     T12/L1 through L1/L2: No significant disc bulge, central canal or neural foraminal stenosis.     L2/L3: Posterior disc osteophyte with facet joint arthropathy without significant central canal stenosis with mild bilateral neural foraminal stenosis.     L3/L4:Bulging disc with ligamentum flavum hypertrophy without significant central canal stenosis with mild bilateral neural foraminal stenosis.     L4/L5:Posterior disc osteophyte with ligamentum flavum hypertrophy and facet joint arthropathy without significant central canal stenosis and mild bilateral neural foraminal stenosis.     L5/S1: Posterior disc osteophyte with facet joint arthropathy without significant central canal stenosis with moderate right and mild left neural foraminal stenosis.     This report was flagged in Epic as abnormal.     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  Please note there is spinal stimulator device with leads partially visualized and distorting  the study by artifacts.     There is abnormal thickening of the filum configuration most compatible with arachnoiditis.     Please see above for additional details.     Thoracic spine MRI:  FINDINGS: Thoracic vertebral body height and alignment are maintained with a few small scattered Schmorl's nodes involving the endplates of several mid to lower thoracic vertebra. The T3-4 vertebra are partially fused. There is some degree of desiccation of all of the thoracic discs with multilevel disc space narrowing, especially at the T7-T8, T6-T7 and T5-T6 levels. There is no abnormal prevertebral soft tissue thickening. The somewhat heterogeneous appearance to the signal from the thoracic vertebra is presumably secondary to an admixture of red and yellow marrow.    T1-T2 level: There is no bony foraminal narrowing or bony central canal stenosis and the posterior disc margin is unremarkable.    T2-T3 level: There is no bony foraminal narrowing or bony central canal stenosis and the posterior disc margin is unremarkable.    T3-T4 level: There is no bony foraminal narrowing or bony central canal stenosis and the posterior disc margin is unremarkable.    T4-5 level: There is no bony foraminal narrowing or bony central canal stenosis and the posterior disc margin is unremarkable.    T5-T6 level: There is no bony foraminal narrowing or bony central canal stenosis and the posterior disc margin is unremarkable.    T6-T7 level: There is no bony foraminal narrowing or bony central canal stenosis and the posterior disc margin is unremarkable.    T7-T8 level: There is no bony foraminal narrowing or bony central canal stenosis and the posterior disc margin is unremarkable.    T8-T9 level: There is no bony foraminal narrowing or bony central canal stenosis and the posterior disc margin is unremarkable.    T9-T10 level: There is no bony foraminal narrowing or bony central canal stenosis and the posterior disc margin is  unremarkable.    T10-T11 level: There is no bony foraminal narrowing or bony central canal stenosis and the posterior disc margin is unremarkable.    T11-T12 level: There is no bony foraminal narrowing or bony central canal stenosis and the posterior disc margin is unremarkable.    T12-L1 level: The tip the conus medullaris extends down to level of the superior endplate of L1. 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 There is no bony foraminal narrowing or bony central canal stenosis at this level.    On the axial images, the immediate paravertebral soft tissues are unremarkable. A small cyst is seen to involve the upper pole the left kidney.    Following contrast administration, there is no abnormal enhancement of any bony or soft tissue elements of the thoracic spine. There is no abnormal enhancement of the subserosal surface of the thoracic spinal cord. Some foci of mild signal intensity in the CSF dorsal to the spinal cord of some of the sagittal sequences presumably is secondary to CSF pulsation artifact.    On the sagittal  image, there is cervical spondylosis and kyphosis with narrowing of all of the disc spaces in the cervical spine.        Past Medical History:   Diagnosis Date    Bronchitis     Cancer     stage I bladder cancer 50 yrs ago    Hypercholesteremia     Hypertension     Sacroiliitis 07/08/2020    Thyroid disease      Past Surgical History:   Procedure Laterality Date    BLADDER SURGERY      CATARACT EXTRACTION      CATARACT EXTRACTION W/  INTRAOCULAR LENS IMPLANT Right 3/9/2022    Procedure: EXTRACTION, CATARACT, WITH IOL INSERTION;  Surgeon: Bell Cdeeno MD;  Location: [facility] OR;  Service: Ophthalmology;  Laterality: Right;    COLONOSCOPY      EPIDURAL STEROID INJECTION N/A 1/12/2022    Procedure: INJECTION, STEROID, EPIDURAL CAUDAL With Catheter needs consent;  Surgeon: Samuel Jimenez MD;  Location: [facility] PAIN MGT;  Service: Pain  Management;  Laterality: N/A;    EYE SURGERY      cataracts     FUSION OF SACROILIAC JOINT Right 3/15/2021    Procedure: FUSION, RIGHT SACROILIAC JOINT FUSION;  Surgeon: Samuel Jimenez MD;  Location: [facility] [place] OR;  Service: Pain Management;  Laterality: Right;  C-ARM, PAINTEQ REP     FUSION OF SACROILIAC JOINT Right 8/12/2024    Procedure: FUSION, SACROILIAC JOINT;  Surgeon: Samuel Jimenez MD;  Location: [facility] [place] OR;  Service: Pain Management;  Laterality: Right;    HERNIA REPAIR      INJECTION OF ANESTHETIC AGENT AROUND NERVE Bilateral 6/1/2022    Procedure: BLOCK, NERVE MEDIAL BRANCH L3,4,5 BILATERAL 1st;  Surgeon: Samuel Jimenez MD;  Location: [facility] [place] PAIN MGT;  Service: Pain Management;  Laterality: Bilateral;    INJECTION OF ANESTHETIC AGENT AROUND NERVE Bilateral 8/24/2022    Procedure: Block, Nerve Kenny L3, L4, & L5;  Surgeon: Samuel Jimenez MD;  Location: [facility] [place] PAIN MGT;  Service: Pain Management;  Laterality: Bilateral;    INJECTION OF ANESTHETIC AGENT AROUND NERVE Right 2/28/2024    Procedure: BLOCK, NERVE RIGHT CLUNEAL;  Surgeon: Samuel Jimenez MD;  Location: [facility] [place] PAIN MGT;  Service: Pain Management;  Laterality: Right;  [phone]    INJECTION OF JOINT Right 7/8/2020    Procedure: INJECTION, JOINT, SACROILIAC (SI);  Surgeon: Samuel Jimenez MD;  Location: [facility] [place] PAIN MGT;  Service: Pain Management;  Laterality: Right;    INJECTION OF JOINT Right 9/9/2020    Procedure: INJECTION, JOINT, SACROILIAC (SI);  Surgeon: Samuel Jimenez MD;  Location: [facility] [place] PAIN MGT;  Service: Pain Management;  Laterality: Right;    INJECTION OF JOINT Right 7/21/2021    Procedure: INJECTION, JOINT, HIP RIGHT;  Surgeon: Samuel Jimenez MD;  Location: [facility] [place] PAIN MGT;  Service: Pain Management;  Laterality: Right;  CONSENT NEEDED    INJECTION OF JOINT Right 10/13/2021    Procedure: INJECTION, JOINT, SACROILIAC (SI)  NEED CONSENT pt. requesting sedation;  Surgeon: Samuel Jimenez MD;  Location: [facility]GT;   Service: Pain Management;  Laterality: Right;    INJECTION OF JOINT Right 4/10/2024    Procedure: INJECTION, JOINT RIGHT SI AND RIGHT PIRIFORMIS;  Surgeon: Samuel Jimenez MD;  Location: [facility] PAIN MGT;  Service: Pain Management;  Laterality: Right;  [phone]    INJECTION, SACROILIAC JOINT Right 5/31/2023    Procedure: INJECTION,SACROILIAC JOINT, RIGHT SI AND RIGHT PIRIFORMIS;  Surgeon: Samuel Jimenez MD;  Location: [facility] PAIN MGT;  Service: Pain Management;  Laterality: Right;    INJECTION, SPINE, LUMBOSACRAL, TRANSFORAMINAL APPROACH Right 6/2/2025    Procedure: LUMBAR TRANSFORAMINAL RIGHT S1 *ASPIRIN OTC* HOLD FOR 5 DAYS;  Surgeon: Samuel Jimenez MD;  Location: [facility] PAIN MGT;  Service: Pain Management;  Laterality: Right;  2 WK F/U RAMAN    KNEE SURGERY      RADIOFREQUENCY ABLATION Right 12/9/2020    Procedure: RADIOFREQUENCY ABLATION, L5-S1-S2 LATERAL BRANCH COOLED;  Surgeon: Samuel Jimenez MD;  Location: [facility] PAIN MGT;  Service: Pain Management;  Laterality: Right;    RADIOFREQUENCY ABLATION Right 9/21/2022    Procedure: RADIOFREQUENCY ABLATION, RIGHT L3-L4-L5 PER DR JIMENEZ;  Surgeon: Samuel Jimenez MD;  Location: [facility] PAIN MGT;  Service: Pain Management;  Laterality: Right;    RADIOFREQUENCY ABLATION Left 10/12/2022    Procedure: RADIOFREQUENCY ABLATION, LEFT L3-L4-L5 TWO OF TWO;  Surgeon: Samuel Jimenez MD;  Location: [facility] PAIN MGT;  Service: Pain Management;  Laterality: Left;    REPLACEMENT OF NERVE STIMULATOR BATTERY N/A 7/27/2020    Procedure: Replacement, SPINAL CORD STIMULATOR BATTERY CHANGE TO NEVRO OMNION BATTERY;  Surgeon: Samuel Jimenez MD;  Location: [facility] OR;  Service: Pain Management;  Laterality: N/A;  C-ARM, NEVRO REP    REPLACEMENT OF SPINAL CORD STIMULATOR  2/13/2023    Procedure: REPLACEMENT, SPINAL CORD STIMULATOR;  Surgeon: Samuel Jimenez MD;  Location: [facility] OR;  Service: Pain Management;;    REVISION PROCEDURE INVOLVING SPINAL CORD NEUROSTIMULATOR N/A  2/13/2023    Procedure: SPINAL CORD STIMULATOR EXPLANT AND REIMPLANT SALUDA REP;  Surgeon: Samuel Jimenez MD;  Location: [facility] OR;  Service: Pain Management;  Laterality: N/A;    TRANSFORAMINAL EPIDURAL INJECTION OF STEROID Right 8/28/2024    Procedure: LUMBAR TANSFORAMINAL RIGHT S1 MEDICALLY URGENT *ASPIRIN OTC*;  Surgeon: Samuel Jimenez MD;  Location: [facility] PAIN MGT;  Service: Pain Management;  Laterality: Right;  STAT  [phone]  *SCHEDULE ON 8/28 PER MG    TRIAL OF SPINAL CORD NERVE STIMULATOR N/A 8/5/2019    Procedure: Trial, Neurostimulator, SPINAL CORD STIMULATOR TRIAL;  Surgeon: Samuel Jimenez MD;  Location: [facility] CATH LAB;  Service: Pain Management;  Laterality: N/A;  C-ARM, NEVRO REP     Social History[1]  No family history on file.    Review of patient's allergies indicates:  No Known Allergies    Current Outpatient Medications   Medication Sig    ALPRAZolam (XANAX) 2 MG Tab     amLODIPine (NORVASC) 5 MG tablet     ascorbic acid, vitamin C, (VITAMIN C) 1000 MG tablet Take 1,000 mg by mouth.    aspirin (ECOTRIN) 81 MG EC tablet Take 1 tablet by mouth once daily.    ergocalciferol, vitamin D2, (VITAMIN D ORAL) Take by mouth every 30 days.    finasteride (PROSCAR) 5 mg tablet Take 1 tablet by mouth once daily.    gabapentin (NEURONTIN) 300 MG capsule Take 1 capsule (300 mg total) by mouth 3 (three) times daily. Take 2 pills by dinner and 1 pill at bedtime    irbesartan (AVAPRO) 75 MG tablet 150 mg once daily.     levothyroxine (SYNTHROID) 100 MCG tablet Take 100 mcg by mouth.    promethazine-dextromethorphan (PROMETHAZINE-DM) 6.25-15 mg/5 mL Syrp     simvastatin (ZOCOR) 20 MG tablet Take 20 mg by mouth every evening.    celecoxib (CELEBREX) 200 MG capsule  (Patient not taking: Reported on 9/23/2024)    DULoxetine (CYMBALTA) 30 MG capsule Take 1 capsule (30 mg total) by mouth 2 (two) times daily. (Patient not taking: Reported on 8/7/2025)    ergocalciferol (DRISDOL) 200 mcg/mL (8,000 unit/mL)  "Drop Take by mouth.    LIDOcaine (LIDODERM) 5 % Place 1 patch onto the skin once daily. Remove & Discard patch within 12 hours or as directed by MD donovan (MYRBETRIQ ORAL) Take by mouth.    tadalafil (CIALIS) 20 MG Tab  (Patient not taking: Reported on 8/7/2025)    temazepam (RESTORIL) 30 mg capsule TK 1 C PO QD HS (Patient not taking: Reported on 8/7/2025)     No current facility-administered medications for this visit.     Facility-Administered Medications Ordered in Other Visits   Medication    balanced salt irrigation intra-ocular solution 1 drop    sodium chloride 0.9% flush 2 mL         ROS:  GENERAL: No fever. No chills. No fatigue. Denies weight loss. Denies weight gain.  HEENT: Denies headaches. Denies vision change. Denies eye pain. Denies double vision. Denies ear pain.   CV: Denies chest pain.   PULM: Denies of shortness of breath.  GI: Denies constipation. No diarrhea. No abdominal pain. Denies nausea. Denies vomiting. No blood in stool.  HEME: Denies bleeding problems.  : Denies urgency. No painful urination. No blood in urine.  MS: Denies joint stiffness. Denies joint swelling.    SKIN: Denies rash.   NEURO: Denies seizures. No weakness.  PSYCH:  Denies difficulty sleeping. No anxiety. Denies depression. No suicidal thoughts.       VITALS:   Vitals:    08/07/25 0831   BP: (!) 107/56   Pulse: 63   Weight: 92.7 kg (204 lb 5.9 oz)   Height: 6' 1" (1.854 m)   PainSc:   5   PainLoc: Shoulder           PHYSICAL EXAM:   GENERAL: Well appearing, in no acute distress, alert and oriented x3.  PSYCH:  Mood and affect appropriate.  SKIN: Skin color, texture, turgor normal, no rashes or lesions.  HEENT:  Normocephalic, atraumatic. Cranial nerves grossly intact.  NECK:   Negative for pain to palpation over the cervical paraspinous muscles.   Negative for pain to palpation over facets.  PULM: No evidence of respiratory difficulty, symmetric chest rise.  GI:  Non-distended  BACK:   Normal range of motion, " limited 2/2 pain.   Positive axial loading test bilateral. Positive tenderness over both SIJ with positive thigh and sacral thrust test, Positive FABERE,Ganselin and Yeoman's test on the both side.negative FADIR  EXTREMITIES:   No deformities, edema, or skin discoloration.   Bilateral shoulders: Full AROM with pain on overhead reaching and internal rotation. No pain to palpation to shoulder joint or AC joint. Mildly + full can and empty can bilaterally. -Pineda, - Neer's bilaterally. No atrophy is noted.   FADIR was Negative Bilaterally.   NEURO: Sensation is equal and appropriate bilaterally. Bilateral upper and lower extremity strength is normal and symmetric. Bilateral upper and lower extremity coordination and muscle stretch reflexes are physiologic and symmetric. Plantar response are downgoing. Negative SLR in the seated position bilaterally.   GAIT: Antalgic gait- ambulates with straight cane    ASSESSMENT: 89 y.o. year old with lower back pain, consistent with:    1. Primary osteoarthritis of both shoulders  LIDOcaine (LIDODERM) 5 %    Ambulatory Referral/Consult to Physical Therapy    CANCELED: Ambulatory Referral/Consult to Physical Therapy      2. Pain of both shoulder joints  Ambulatory Referral/Consult to Physical Therapy    CANCELED: Ambulatory Referral/Consult to Physical Therapy      3. Lumbar radiculopathy        4. S/P fusion of sacroiliac joint        5. Other chronic pain        6. Sacroiliac dysfunction        7. S/P insertion of spinal cord stimulator            PLAN:    He is s/p right S1 TFESI with 50% relief  Patient Education:  Discussed the importance of physical therapy, activity modification, and a home exercise plan to help with pain and improve health.  Therapy referral:  Continue with [facility] and   Referral placed today for physical therapy for shoulder pain.   Medications:   Patient can continue with pain medications for now per their provider since they are  providing benefits, using them appropriately, and without side effects.  Previously prescribed Oxycodone. Patient did not fill this prescription.  Encouraged patient to take Gabapentin that is prescribed as written.   He can apply Voltaren 1% OTC gel to bilateral shoulders 2 times per day.   Add Lidoderm 5% patches for arthritic pain.  He can take Tylenol 1000 mg 2-3 times per day as needed, up to 3000 mg per day.   Schedule pain intervention for: n/a  Future consideration: Repeat imaging; bilateral shoulder injections   Counseled patient: regarding the importance of activity modification, constant sleeping habits, and physical therapy.  Return to clinic: 3-4 months or sooner for reevaluation if needed    The above plan and management options were discussed at length with patient. Patient is in agreement with the above and verbalized understanding.     Kathi Berrios NP  2025         [1]   Social History  Socioeconomic History    Marital status:    Tobacco Use    Smoking status: Former     Current packs/day: 0.00     Types: Cigarettes     Quit date:      Years since quittin.6    Smokeless tobacco: Never    Tobacco comments:     stopped 40 years ago   Substance and Sexual Activity    Alcohol use: Yes     Alcohol/week: 1.0 standard drink of alcohol     Types: 1 Shots of liquor per week     Comment: nightly -scotch    Drug use: Never    Sexual activity: Yes     Partners: Female     Social Drivers of Health     Financial Resource Strain: Low Risk  (2025)    Overall Financial Resource Strain (CARDIA)     Difficulty of Paying Living Expenses: Not hard at all   Food Insecurity: No Food Insecurity (2025)    Hunger Vital Sign     Worried About Running Out of Food in the Last Year: Never true     Ran Out of Food in the Last Year: Never true   Transportation Needs: No Transportation Needs (2025)    PRAPARE - Transportation     Lack of Transportation (Medical): No     Lack of Transportation  (Non-Medical): No   Physical Activity: Sufficiently Active (2/11/2025)    Exercise Vital Sign     Days of Exercise per Week: 4 days     Minutes of Exercise per Session: 40 min   Stress: No Stress Concern Present (2/11/2025)    [employer] - Occupational Stress Questionnaire     Feeling of Stress : Not at all   Housing Stability: Low Risk  (2/11/2025)    Housing Stability Vital Sign     Unable to Pay for Housing in the Last Year: No     Homeless in the Last Year: No

## 2025-08-08 ENCOUNTER — CLINICAL SUPPORT (OUTPATIENT)
Dept: REHABILITATION | Facility: OTHER | Age: 89
End: 2025-08-08
Payer: MEDICARE

## 2025-08-08 DIAGNOSIS — R26.2 DIFFICULTY IN WALKING: Primary | ICD-10-CM

## 2025-08-08 PROCEDURE — 97530 THERAPEUTIC ACTIVITIES: CPT | Mod: KX

## 2025-08-08 PROCEDURE — 97112 NEUROMUSCULAR REEDUCATION: CPT | Mod: KX

## 2025-08-08 PROCEDURE — 97010 HOT OR COLD PACKS THERAPY: CPT | Mod: KX

## 2025-08-08 PROCEDURE — 97110 THERAPEUTIC EXERCISES: CPT | Mod: KX

## 2025-08-08 NOTE — PROGRESS NOTES
Outpatient Rehab    Physical Therapy Progress Note : Updated Plan of Care    Patient Name: Jefferson Boogie  MRN: [id number]  YOB: 1935  Encounter Date: 8/8/2025    Therapy Diagnosis:   Encounter Diagnosis   Name Primary?    Difficulty in walking Yes     Physician: Samuel Jimenez MD    Physician Orders: Eval and Treat  Medical Diagnosis: Sacroiliitis  Chronic pain syndrome  Spondylosis without myelopathy  Surgical Diagnosis: Not applicable for this Episode   Surgical Date: Not applicable for this Episode  Days Since Last Surgery: Not applicable for this Episode    Visit # / Visits Authorized: 36 / 40  Insurance Authorization Period: 1/1/2025 to 12/31/2025  Date of Evaluation: 9/24/2024   Plan of Care Certification: 8/8/2025 to 9/26/25     PT/PTA:  PT  Number of PTA visits since last PT visit: NA  Time In: 1435   Time Out: 1539  Total Time (in minutes): 64   Total Billable Time (in minutes): 58    FOTO:  Intake Score (%): Not applicable for this Episode  Survey Score 2 (%): Not applicable for this Episode  Survey Score 3 (%): Not applicable for this Episode    Precautions:  Additional Precautions and Protocol Details: Fall risk, spinal cord stimulator    Subjective   Patient says hes SOL, and right leg swelling is going down millimeter by millimeter. He is consistently going to the [facility]. He would like to look at his right ankle because he believes it's swollen..  Pain reported as 3/10.      Objective              GAIT DEVIATIONS: Jefferson displays antalgic gait, difficulty with L LE weight shift, need for SPC for balance and unloading joints, and decreased cornel/step length     Lumbar Range of Motion:     %   Flexion 90      Extension 50      Left Side Bending 60   Right Side Bending 60   Left rotation    75   Right Rotation    75    *= pain     Lower Extremity Strength     Right LE   Left LE     Hip flexion: 4/5 Hip flexion: 4/5   Knee extension: 4+/5 Knee extension: 4+/5   Knee flexion: 4+/5 Knee  "flexion: 4+/5   Hip IR: 4/5 Hip IR: 4/5   Hip ER: 4/5 Hip ER: 4/5   Hip extension:  4/5 Hip extension: 4/5   Hip abduction: 4/5 Hip abduction: 4/5   Hip adduction: 3+/5 Hip adduction 3+/5   Ankle dorsiflexion: 4/5 Ankle dorsiflexion: 4/5   Ankle plantarflexion: 4/5 Ankle plantarflexion: 4/5       Treatment:  Therapeutic Exercise  TE 1: Nustep x 6 min for joint nutrition Lvl 2  TE 3: Supine bridge 2 x 10 5 second holds +GTB  TE 4: Prone on elbows x 2 minute  TE 5: Supine hip FL stretch x 4# weight 90 second hang on each side  TE 6: Leg press 150# 3 x 10  TE 7: + Seated hamstring stretch x 1' x 1  Manual Therapy  MT 1: Prone hip ER/IR and prone knee FL  MT 2: Prone unilateral lumbar P/As grade 2/3  MT 3: +Lumbar paraspinal & bilateral QL STM  Balance/Neuromuscular Re-Education  NMR 1: Leg press 150# 3 x 10  NMR 2: + Standing hip Ext isometric into treadmill (knee flexed) 5" x 20  NMR 3: + Thoracolumbar extension at wall hold 30" x 2  Therapeutic Activity  TA 1: Resisted sidestepping RTB with yellow pole x 3  TA 2: Sit<>stands with 1 foam pad x 10 reps without UE support (RTB at ankles) x 3  TA 3: Seated heel raises x 20  Jefferson received therapeutic exercises for 30 minutes including:    Nustep x 6 min for joint nutrition Lvl  2   Standing golf swings with back against wall 2 x 15 reps- NP  Supine bridge 2 x 10 5 second holds +GTB  + Standing hip Ext isometric into treadmill (knee flexed) 5" x 20  + Seated hamstring stretch x 1' x 1  Prone on elbows x 2 minute  Supine hip FL stretch x 4# weight 90 second hang on each side  Leg press 150# 3 x 10     Jefferson participated in dynamic functional therapeutic activities to improve functional performance for 10 minutes, including:    Tandem stance 3x30"- NP  Heel raises in seated 2x10  Step ups 6" 3x10ea  NBOS on airex 3x1'- NP     3 rounds  Resisted sidestepping RTB with yellow pole  Sit<>stands with 1 foam pad x 10 reps without UE support (RTB at ankles)    Jefferson received " manual therapy to improve functional performance for 5 minutes, including:    Prone hip ER/IR and prone knee FL  Prone unilateral lumbar P/As grade 2/3    Time Entry(in minutes):  Hot/Cold Pack Time Entry: 5  Manual Therapy Time Entry: 5  Neuromuscular Re-Education Time Entry: 15  Therapeutic Activity Time Entry: 23  Therapeutic Exercise Time Entry: 15    Assessment & Plan     Assessment  Patient presents reporting consistent decrease in right lower extremity swelling and consistent attendance at gym independently. His flexed gait pattern continues to impair dynamic gait stability. He was able to achieve greater lumbar extension when performing lumbar extension stretch utilizing forward wall support. For greater gluteal and hamstring recruitment during initial stance phase of gait his prone hip extension was transitioned to standing isometric hip extension. In doing so, patient able to achieve gluteal activation through a greater range of motion.  Evaluation/Treatment Tolerance: Patient tolerated treatment well    The patient's spiritual, cultural, and educational needs were considered, and the patient is agreeable to the plan of care and goals.           Goals:   Active       LTG       Patient will be IND with gym work out and HEP to maintain current progress and improve quality of life. (Progressing)       Start:  04/04/25    Expected End:  05/30/25               STG       Patient will improve tolerance to standing/walking with 2/10 or less increase in adverse symptoms. (Progressing)       Start:  04/04/25    Expected End:  05/02/25                Katina Hidalgo PT

## 2025-08-22 ENCOUNTER — CLINICAL SUPPORT (OUTPATIENT)
Dept: REHABILITATION | Facility: OTHER | Age: 89
End: 2025-08-22
Payer: MEDICARE

## 2025-08-22 DIAGNOSIS — R26.2 DIFFICULTY IN WALKING: Primary | ICD-10-CM

## 2025-08-22 PROCEDURE — 97110 THERAPEUTIC EXERCISES: CPT | Mod: KX | Performed by: PHYSICAL THERAPIST

## 2025-08-22 PROCEDURE — 97140 MANUAL THERAPY 1/> REGIONS: CPT | Mod: KX | Performed by: PHYSICAL THERAPIST

## (undated) DEVICE — UNDERGLOVES BIOGEL PI SIZE 8

## (undated) DEVICE — GLOVE BIOGEL SKINSENSE PI 8.0

## (undated) DEVICE — Device

## (undated) DEVICE — NDL SPINAL 18GX3.5 SPINOCAN

## (undated) DEVICE — DRAPE SURG W/TWL 17 5/8X23

## (undated) DEVICE — COVER SNAP 36IN X 30IN

## (undated) DEVICE — DEVICE FIXATE SUT BAND DUAL PK

## (undated) DEVICE — SEE MEDLINE ITEM 152186

## (undated) DEVICE — STAPLER SKIN PROXIMATE WIDE

## (undated) DEVICE — ELECTRODE REM PLYHSV RETURN 9

## (undated) DEVICE — SEE MEDLINE ITEM 152622

## (undated) DEVICE — TUNNELING TOOL

## (undated) DEVICE — DRAPE INCISE IOBAN 2 23X17IN

## (undated) DEVICE — NDL 18GA X1 1/2 REG BEVEL

## (undated) DEVICE — NDL HYPO REG 25G X 1 1/2

## (undated) DEVICE — FASTNER CATH PERC DRAINAGE

## (undated) DEVICE — DRESSING ABSRBNT ISLAND 3.6X8

## (undated) DEVICE — SEE MEDLINE ITEM 157131

## (undated) DEVICE — SOL 9P NACL IRR PIC IL

## (undated) DEVICE — DRESSING AQUACEL FOAM 5 X 5

## (undated) DEVICE — DRESSING LEUKOPLAST FLEX 1X3IN

## (undated) DEVICE — STAPLER SKIN ROTATING HEAD

## (undated) DEVICE — GAUZE SPONGE 4X4 12PLY

## (undated) DEVICE — SYR 10CC LUER LOCK

## (undated) DEVICE — GOWN SURGICAL XX LARGE X LONG

## (undated) DEVICE — SYR LUER SLIP GLASS 5ML

## (undated) DEVICE — SENZA OMNIA PATIENT REMOTE KIT

## (undated) DEVICE — SPONGE COTTON TRAY 4X4IN

## (undated) DEVICE — SEE MEDLINE ITEM 157117

## (undated) DEVICE — APPLICATOR CHLORAPREP ORN 26ML

## (undated) DEVICE — SOL PVP-I SCRUB 7.5% 4OZ

## (undated) DEVICE — SUT VICRYL 2-0 8-18 CP-2

## (undated) DEVICE — PACK UNIV PROCEDURE

## (undated) DEVICE — BRUSH SCRUB HIBICLENS 4%

## (undated) DEVICE — DRESSING TRANS 2X2 TEGADERM

## (undated) DEVICE — BAG SNAP KOVER BAND 36 X 28 IN

## (undated) DEVICE — DRAPE C-ARMOR EQUIPMENT COVER

## (undated) DEVICE — SUT MONOCRYL 4-0 PS-2

## (undated) DEVICE — BINDER ABDOMINAL 9 30-45

## (undated) DEVICE — SPONGE/BRUSH SCRUB SURG PCMX

## (undated) DEVICE — CLIPPER BLADE MOD 4406 (CAREF)

## (undated) DEVICE — ELECTRODE BLD EXT INSUL 1

## (undated) DEVICE — SEE MEDLINE ITEM 157148

## (undated) DEVICE — GLOVE BIOGEL SKINSENSE PI 7.5

## (undated) DEVICE — DECANTER VIAL ASEPTIC TRANSFER

## (undated) DEVICE — KIT TEMPLATE IPG

## (undated) DEVICE — DRESSING MEPORE ISLAND 31/2X4

## (undated) DEVICE — NDL SPINAL SPINOCAN 22GX3.5

## (undated) DEVICE — DRESSING TRANS 4X4 TEGADERM

## (undated) DEVICE — GLOVE BIOGEL ECLIPSE SZ 6.5

## (undated) DEVICE — SYR SALINE PREFILLED FLSH 10ML

## (undated) DEVICE — SYR SLIP TIP 1CC

## (undated) DEVICE — DRAPE LAP T SHT W/ INSTR PAD

## (undated) DEVICE — SENZA CHARGER KIT

## (undated) DEVICE — DRAPE OPHTHALMIC 48X62 FEN

## (undated) DEVICE — SUT 2/0 30IN SILK BLK BRAI

## (undated) DEVICE — KIT NERVE BLOCK PREP BAPTIST

## (undated) DEVICE — COVER SURG LIGHT HANDLE

## (undated) DEVICE — SEE MEDLINE ITEM 152742

## (undated) DEVICE — SOL IRR SOD CHL .9% POUR

## (undated) DEVICE — DRESSING MEPILEX FLEX 4X4IN

## (undated) DEVICE — BINDER ABDOMINAL 9 46-62

## (undated) DEVICE — GOWN ECLIPSE REINF LVL4 TWL LG

## (undated) DEVICE — DRESSING AQUACEL AG FOAM 4X4

## (undated) DEVICE — SUT SILK 2.0 BLK 18

## (undated) DEVICE — KIT TUNNELING TOOL 35CM

## (undated) DEVICE — CLOSURE SKIN STERI STRIP 1/2X4

## (undated) DEVICE — GOWN SMART IMP BREATHABLE XXLG

## (undated) DEVICE — SOL BETADINE 5%

## (undated) DEVICE — DRESSING AQUACEL FOAM 4X4IN